# Patient Record
Sex: MALE | Race: WHITE | NOT HISPANIC OR LATINO | Employment: OTHER | URBAN - METROPOLITAN AREA
[De-identification: names, ages, dates, MRNs, and addresses within clinical notes are randomized per-mention and may not be internally consistent; named-entity substitution may affect disease eponyms.]

---

## 2018-08-31 ENCOUNTER — TRANSFERRED RECORDS (OUTPATIENT)
Dept: HEALTH INFORMATION MANAGEMENT | Facility: CLINIC | Age: 58
End: 2018-08-31

## 2021-07-19 ENCOUNTER — TRANSFERRED RECORDS (OUTPATIENT)
Dept: HEALTH INFORMATION MANAGEMENT | Facility: CLINIC | Age: 61
End: 2021-07-19

## 2021-07-26 ENCOUNTER — TRANSFERRED RECORDS (OUTPATIENT)
Dept: HEALTH INFORMATION MANAGEMENT | Facility: CLINIC | Age: 61
End: 2021-07-26

## 2021-09-07 ENCOUNTER — NURSE TRIAGE (OUTPATIENT)
Dept: NURSING | Facility: CLINIC | Age: 61
End: 2021-09-07

## 2021-09-07 NOTE — TELEPHONE ENCOUNTER
"Patient is being seen at [facility] and [facility] (both [facility]) does not feel that they are helping him with his pain adequately). He says he has a red painful rash in his perineal area/rectal area and has a catheter for urination. Advised that he follow up with PCP for better pain control.  Jennifer Harrell RN  [facility]    Reason for Disposition    Red, painful skin around the anus and no fever    Additional Information    Negative: Blood in stools or rectal bleeding without a stool    Negative: Rectal or anal pain caused by constipation    Negative: Rash or pain caused by diarrhea    Negative: Pinworms seen    Negative: Other worm seen in the stool    Negative: Rectal foreign body (large, sharp or causing bleeding)    Negative: Rectal foreign body (all others)    Negative: Sexual abuse suspected    Negative: Child sounds very sick or weak to the triager    Negative: Red, painful skin around the anus with fever    Negative: Red/purple tissue protrudes from the anus by caller's report    Answer Assessment - Initial Assessment Questions  1. SYMPTOM:  \"What's the main symptom you're concerned about?\" (e.g., rash, pain, itching, swelling)      pain  2. ONSET: \"When did pain  start?\"      3+ months ago  3. PAIN: \"Is there any pain?\" If so, ask: \"How bad is it?\"      Severe pain into penis and perineal area  4. STOOLS:  \"Any difficulty passing stools?\" \"When was the last one?\"      no  5. CAUSE: \"What do you think is causing the anus symptoms?\"      Rash, being treated at [facility]    Protocols used: RECTAL AND ANUS SYMPTOMS-P-OH      "

## 2021-09-16 ENCOUNTER — HOSPITAL ENCOUNTER (EMERGENCY)
Facility: CLINIC | Age: 61
Discharge: HOME | End: 2021-09-16
Attending: PHYSICIAN ASSISTANT | Admitting: PHYSICIAN ASSISTANT
Payer: COMMERCIAL

## 2021-09-16 VITALS
OXYGEN SATURATION: 98 % | HEART RATE: 92 BPM | TEMPERATURE: 99.1 F | BODY MASS INDEX: 28.29 KG/M2 | WEIGHT: 170 LBS | DIASTOLIC BLOOD PRESSURE: 94 MMHG | SYSTOLIC BLOOD PRESSURE: 133 MMHG | RESPIRATION RATE: 16 BRPM

## 2021-09-16 DIAGNOSIS — K62.89 RECTAL PAIN: ICD-10-CM

## 2021-09-16 DIAGNOSIS — R82.90 ABNORMAL URINALYSIS: ICD-10-CM

## 2021-09-16 LAB
ALBUMIN UR-MCNC: 30 MG/DL
ANION GAP SERPL CALCULATED.3IONS-SCNC: 6 MMOL/L (ref 3–14)
APPEARANCE UR: ABNORMAL
BACTERIA #/AREA URNS HPF: ABNORMAL /HPF
BASOPHILS # BLD AUTO: 0 10E3/UL (ref 0–0.2)
BASOPHILS NFR BLD AUTO: 0 %
BILIRUB UR QL STRIP: NEGATIVE
BUN SERPL-MCNC: 17 MG/DL (ref 7–30)
CALCIUM SERPL-MCNC: 9 MG/DL (ref 8.5–10.1)
CHLORIDE BLD-SCNC: 110 MMOL/L (ref 94–109)
CO2 SERPL-SCNC: 24 MMOL/L (ref 20–32)
COLOR UR AUTO: YELLOW
CREAT SERPL-MCNC: 0.81 MG/DL (ref 0.66–1.25)
EOSINOPHIL # BLD AUTO: 0 10E3/UL (ref 0–0.7)
EOSINOPHIL NFR BLD AUTO: 0 %
ERYTHROCYTE [DISTWIDTH] IN BLOOD BY AUTOMATED COUNT: 12 % (ref 10–15)
GFR SERPL CREATININE-BSD FRML MDRD: >90 ML/MIN/1.73M2
GLUCOSE BLD-MCNC: 111 MG/DL (ref 70–99)
GLUCOSE UR STRIP-MCNC: NEGATIVE MG/DL
HCT VFR BLD AUTO: 43 % (ref 40–53)
HGB BLD-MCNC: 14.8 G/DL (ref 13.3–17.7)
HGB UR QL STRIP: NEGATIVE
IMM GRANULOCYTES # BLD: 0.1 10E3/UL
IMM GRANULOCYTES NFR BLD: 1 %
KETONES UR STRIP-MCNC: NEGATIVE MG/DL
LEUKOCYTE ESTERASE UR QL STRIP: ABNORMAL
LYMPHOCYTES # BLD AUTO: 1.4 10E3/UL (ref 0.8–5.3)
LYMPHOCYTES NFR BLD AUTO: 13 %
MCH RBC QN AUTO: 30.3 PG (ref 26.5–33)
MCHC RBC AUTO-ENTMCNC: 34.4 G/DL (ref 31.5–36.5)
MCV RBC AUTO: 88 FL (ref 78–100)
MONOCYTES # BLD AUTO: 0.8 10E3/UL (ref 0–1.3)
MONOCYTES NFR BLD AUTO: 8 %
MUCOUS THREADS #/AREA URNS LPF: PRESENT /LPF
NEUTROPHILS # BLD AUTO: 7.9 10E3/UL (ref 1.6–8.3)
NEUTROPHILS NFR BLD AUTO: 78 %
NITRATE UR QL: POSITIVE
NRBC # BLD AUTO: 0 10E3/UL
NRBC BLD AUTO-RTO: 0 /100
PH UR STRIP: 6 [PH] (ref 5–7)
PLATELET # BLD AUTO: 350 10E3/UL (ref 150–450)
POTASSIUM BLD-SCNC: 3.9 MMOL/L (ref 3.4–5.3)
RBC # BLD AUTO: 4.89 10E6/UL (ref 4.4–5.9)
RBC URINE: 19 /HPF
SODIUM SERPL-SCNC: 140 MMOL/L (ref 133–144)
SP GR UR STRIP: 1.02 (ref 1–1.03)
UROBILINOGEN UR STRIP-MCNC: NORMAL MG/DL
WBC # BLD AUTO: 10.2 10E3/UL (ref 4–11)
WBC URINE: >182 /HPF

## 2021-09-16 PROCEDURE — 80048 BASIC METABOLIC PNL TOTAL CA: CPT | Performed by: PHYSICIAN ASSISTANT

## 2021-09-16 PROCEDURE — 81001 URINALYSIS AUTO W/SCOPE: CPT | Performed by: PHYSICIAN ASSISTANT

## 2021-09-16 PROCEDURE — 250N000009 HC RX 250: Performed by: PHYSICIAN ASSISTANT

## 2021-09-16 PROCEDURE — 87086 URINE CULTURE/COLONY COUNT: CPT | Performed by: PHYSICIAN ASSISTANT

## 2021-09-16 PROCEDURE — 99284 EMERGENCY DEPT VISIT MOD MDM: CPT | Performed by: PHYSICIAN ASSISTANT

## 2021-09-16 PROCEDURE — 99283 EMERGENCY DEPT VISIT LOW MDM: CPT

## 2021-09-16 PROCEDURE — 36415 COLL VENOUS BLD VENIPUNCTURE: CPT | Performed by: PHYSICIAN ASSISTANT

## 2021-09-16 PROCEDURE — 85025 COMPLETE CBC W/AUTO DIFF WBC: CPT | Performed by: PHYSICIAN ASSISTANT

## 2021-09-16 RX ORDER — CEPHALEXIN 500 MG/1
500 CAPSULE ORAL 3 TIMES DAILY
Qty: 21 CAPSULE | Refills: 0 | Status: SHIPPED | OUTPATIENT
Start: 2021-09-16 | End: 2021-09-20

## 2021-09-16 RX ORDER — LIDOCAINE HYDROCHLORIDE 20 MG/ML
JELLY TOPICAL ONCE
Status: COMPLETED | OUTPATIENT
Start: 2021-09-16 | End: 2021-09-16

## 2021-09-16 RX ADMIN — LIDOCAINE HYDROCHLORIDE: 20 JELLY TOPICAL at 11:05

## 2021-09-16 NOTE — ED PROVIDER NOTES
History     Chief Complaint   Patient presents with     Rectal/perineal Pain     HPI  Mustapha Negrete is a 60 year old male with past medical history significant for BPH with urinary retention indwelling Walden catheter, MARIAN, hepatic steatosis, psoriatic arthritis, dyslipidemia, mild intermittent asthma osteoarthritis of his hip, , atherosclerotic heart disease, low back pain, who presents to the emergency department with concern with ongoing rectal/perineal pain which is been present for approximately the last 2 months.  Patient describes intense burning pain of rectum which radiates through to his urethra/penis  without clear aggravating or alleviating factors.  He has been evaluated for this multiple times previously including admission to the hospital for uncontrolled pain and has been seen by urology, gastroenterology who performed colonoscopy and neurology had MRI to rule out cauda equina and does have appointment with urology tomorrow for trial of catheter removal as well as with neuro clinic later this month.   He was diagnosed with an anal fissure well after pain had started and is unsure if this is still present. And has been diagnosed with shingles with suspected neuropathic pain.  He has been treating with [facility] for pain control.  He has not taken recommended topical lidocaine as he states this makes his pain worse and is unable to tolerate recommended doses of gabapentin due to side effects.   He denies any fever, chills, myalgias, cough, dyspnea, wheezing, nausea, vomiting diarrhea, melena, hematochezia, dysuria, hematuria.        Allergies:  Allergies   Allergen Reactions     Pcn [Penicillins]      Feels warm and flushed  But tolerates cephalexin     Sulfa Drugs      Tetracycline      Problem List:    There are no problems to display for this patient.     Past Medical History:    Past Medical History:   Diagnosis Date     Asthma      Past Surgical History:    Past Surgical History:   Procedure  Laterality Date     ENT SURGERY      tonsillectomy     GENITOURINARY SURGERY      TURP     ORTHOPEDIC SURGERY      wrist surgery, carpal tunnel 2005     Family History:    Family History   Problem Relation Age of Onset     Cerebrovascular Disease Mother      Hypertension Mother      Diabetes Mother      Cancer Father 84        liver cancer     Prostate Cancer Father      Social History:  Marital Status:   [4]  Social History     Tobacco Use     Smoking status: Former Smoker     Quit date: 1999     Years since quittin.8     Smokeless tobacco: Never Used   Substance Use Topics     Alcohol use: No     Drug use: No      Medications:    hydrocodone-acetaminophen 5-325 MG per tablet      Review of Systems   Constitutional: Negative for activity change, appetite change, chills and fever.   Respiratory: Negative for cough, shortness of breath and wheezing.    Cardiovascular: Negative for chest pain and palpitations.   Gastrointestinal: Negative for abdominal pain, diarrhea, nausea and vomiting.        Rectal pain   Genitourinary: Positive for penile pain. Negative for dysuria, flank pain, frequency, genital sores and hematuria.   Skin: Negative for color change, rash and wound.   Neurological: Negative for dizziness, light-headedness and headaches.     Physical Exam   BP: (!) 133/94  Pulse: 112  Temp: 99.1  F (37.3  C)  Resp: 16  Weight: 77.1 kg (170 lb)  SpO2: 97 %  Physical Exam  GENERAL APPEARANCE: alert, cooperative, patient has pain which appears out of proportion to findings, standing on floor leaning over exam bed upon my entering the room.  EYES: EOMI,  PERRL, conjunctiva clear  RESP: lungs clear to auscultation - no rales, rhonchi or wheezes  CV: regular rates and rhythm, normal S1 S2, no murmur noted  ABDOMEN:  soft, nontender, no HSM or masses and bowel sounds normal  :  There is erythema of the gluteal cleft, no focal anal tenderness to palpation, no external hemorrhoids or anal fissures  noted  NEURO: Normal strength and tone, sensory exam grossly normal,  normal speech and mentation  SKIN: numerous hyperpigmented lesions on posterior right thigh  ED Course        Procedures       Critical Care time:  none        Results for orders placed or performed during the hospital encounter of 09/16/21   Basic metabolic panel     Status: Abnormal   Result Value Ref Range    Sodium 140 133 - 144 mmol/L    Potassium 3.9 3.4 - 5.3 mmol/L    Chloride 110 (H) 94 - 109 mmol/L    Carbon Dioxide (CO2) 24 20 - 32 mmol/L    Anion Gap 6 3 - 14 mmol/L    Urea Nitrogen 17 7 - 30 mg/dL    Creatinine 0.81 0.66 - 1.25 mg/dL    Calcium 9.0 8.5 - 10.1 mg/dL    Glucose 111 (H) 70 - 99 mg/dL    GFR Estimate >90 >60 mL/min/1.73m2   UA with Microscopic reflex to Culture     Status: Abnormal    Specimen: Urine, Walden Catheter   Result Value Ref Range    Color Urine Yellow Colorless, Straw, Light Yellow, Yellow    Appearance Urine Slightly Cloudy (A) Clear    Glucose Urine Negative Negative mg/dL    Bilirubin Urine Negative Negative    Ketones Urine Negative Negative mg/dL    Specific Gravity Urine 1.016 1.003 - 1.035    Blood Urine Negative Negative    pH Urine 6.0 5.0 - 7.0    Protein Albumin Urine 30  (A) Negative mg/dL    Urobilinogen Urine Normal Normal, 2.0 mg/dL    Nitrite Urine Positive (A) Negative    Leukocyte Esterase Urine Large (A) Negative    Bacteria Urine Moderate (A) None Seen /HPF    Mucus Urine Present (A) None Seen /LPF    RBC Urine 19 (H) <=2 /HPF    WBC Urine >182 (H) <=5 /HPF    Narrative    Urine Culture ordered based on laboratory criteria   CBC with platelets and differential     Status: Abnormal   Result Value Ref Range    WBC Count 10.2 4.0 - 11.0 10e3/uL    RBC Count 4.89 4.40 - 5.90 10e6/uL    Hemoglobin 14.8 13.3 - 17.7 g/dL    Hematocrit 43.0 40.0 - 53.0 %    MCV 88 78 - 100 fL    MCH 30.3 26.5 - 33.0 pg    MCHC 34.4 31.5 - 36.5 g/dL    RDW 12.0 10.0 - 15.0 %    Platelet Count 350 150 - 450 10e3/uL     % Neutrophils 78 %    % Lymphocytes 13 %    % Monocytes 8 %    % Eosinophils 0 %    % Basophils 0 %    % Immature Granulocytes 1 %    NRBCs per 100 WBC 0 <1 /100    Absolute Neutrophils 7.9 1.6 - 8.3 10e3/uL    Absolute Lymphocytes 1.4 0.8 - 5.3 10e3/uL    Absolute Monocytes 0.8 0.0 - 1.3 10e3/uL    Absolute Eosinophils 0.0 0.0 - 0.7 10e3/uL    Absolute Basophils 0.0 0.0 - 0.2 10e3/uL    Absolute Immature Granulocytes 0.1 (H) <=0.0 10e3/uL    Absolute NRBCs 0.0 10e3/uL       Medications - No data to display    Assessments & Plan (with Medical Decision Making)     I have reviewed the nursing notes.    I have reviewed the findings, diagnosis, plan and need for follow up with the patient.       New Prescriptions    No medications on file     Final diagnoses:   Rectal pain   Abnormal urinalysis     60-year-old male presents today with history of ongoing rectal/perineal pain for the last two months which has previously been evaluated by urology, gastroenterology and had MRI of lumbar spine.  Physical exam findings as described above may show some degree of intertrigo/skin irritation in gluteal clef, however symptoms most consistent with neuropathic pain would consider likely secondary to recent shingles diagnosis.  He did have non-contributory CBC, CMP.  Urinalysis did show some evidence of infection unclear if this represents true cystitis or is colonization from indwelling leon catheter.  I have low concern that this is the actual source of his pain at this time, however did discuss risk/benefits of initiating antibiotics while culture results pending and patient elected to initiating medications.  He was discharged home stable with prescription for topical butt paste cream.  Follow up as previously scheduled. Worrisome reasons to return to ER/UC sooner discussed.     Disclaimer: This note consists of symbols derived from keyboarding, dictation, and/or voice recognition software. As a result, there may be errors in  the script that have gone undetected.  Please consider this when interpreting information found in the chart.      9/16/2021   [facility] EMERGENCY DEPT     Luann Ann PA-C  09/21/21 [id number]

## 2021-09-17 LAB — BACTERIA UR CULT: NORMAL

## 2021-09-17 NOTE — RESULT ENCOUNTER NOTE
[facility] Emergency Dept discharge antibiotic (if prescribed): Cephalexin (Keflex) 500 mg capsule, 1 capsule (500 mg) by mouth 3 times daily for 7 days.  Date of Rx (if applicable):  9/16/21  No changes in treatment per [facility] ED Lab Result Urine culture protocol.

## 2021-09-19 ASSESSMENT — ENCOUNTER SYMPTOMS
SHORTNESS OF BREATH: 0
APPETITE CHANGE: 0
ABDOMINAL PAIN: 0
WHEEZING: 0
DYSURIA: 0
COUGH: 0
FLANK PAIN: 0
FREQUENCY: 0
ROS GI COMMENTS: RECTAL PAIN
CHILLS: 0
PALPITATIONS: 0
VOMITING: 0
DIARRHEA: 0
HEADACHES: 0
DIZZINESS: 0
FEVER: 0
HEMATURIA: 0
WOUND: 0
COLOR CHANGE: 0
NAUSEA: 0
ACTIVITY CHANGE: 0
LIGHT-HEADEDNESS: 0

## 2021-09-20 ENCOUNTER — OFFICE VISIT (OUTPATIENT)
Dept: UROLOGY | Facility: CLINIC | Age: 61
End: 2021-09-20
Payer: COMMERCIAL

## 2021-09-20 VITALS
WEIGHT: 170 LBS | DIASTOLIC BLOOD PRESSURE: 104 MMHG | HEIGHT: 65 IN | BODY MASS INDEX: 28.32 KG/M2 | SYSTOLIC BLOOD PRESSURE: 154 MMHG | HEART RATE: 100 BPM | OXYGEN SATURATION: 96 % | TEMPERATURE: 98.5 F

## 2021-09-20 DIAGNOSIS — R33.9 URINARY RETENTION: Primary | ICD-10-CM

## 2021-09-20 PROCEDURE — 99203 OFFICE O/P NEW LOW 30 MIN: CPT | Performed by: UROLOGY

## 2021-09-20 RX ORDER — FINASTERIDE 5 MG/1
5 TABLET, FILM COATED ORAL DAILY
Qty: 90 TABLET | Refills: 3 | Status: SHIPPED | OUTPATIENT
Start: 2021-09-20 | End: 2021-10-28

## 2021-09-20 RX ORDER — OXYBUTYNIN CHLORIDE 10 MG/1
10 TABLET, EXTENDED RELEASE ORAL DAILY
Qty: 60 TABLET | Refills: 3 | Status: ON HOLD | OUTPATIENT
Start: 2021-09-20 | End: 2021-10-12

## 2021-09-20 ASSESSMENT — MIFFLIN-ST. JEOR: SCORE: 1497.99

## 2021-09-20 NOTE — PROGRESS NOTES
Appointment source: New Patient  Patient name: Mustapha Negrete  Urology Staff: Alex Oneill MD    Subjective: This is a 60 year old year old male complaining of urinary retention.    Previously underwent UroLift which failed.    Currently has an indwelling Walden catheter for management of his urinary retention.    Reportedly also has developed shingles.    Is not taking any medications to alleviate urinary retention.    He was on tamsulosin stopping the medication because he believed it was increasing penile pain.    The penile pain seems to come in waves which may be bladder spasms although it is difficult to separate from issues related to his shingles.    Objective: Catheter appears to be in good position although the catheter fixation devices riding a little high.    Skin within the crease of the buttocks is bright red.  Skin is intact.    There is inflammation at the meatus from his catheter.    Assessment: Urinary retention    Plan: Start finasteride and restart tamsulosin.    We will prescribe oxybutynin 10 mg daily for possible bladder spasms.    We will have him apply hydrocortisone and clotrimazole to the buttocks crease for comfort and possible yeast infection management.    He will return in 2 weeks for follow-up    Total time 20 minutes

## 2021-09-24 ENCOUNTER — TELEPHONE (OUTPATIENT)
Dept: UROLOGY | Facility: CLINIC | Age: 61
End: 2021-09-24

## 2021-09-24 NOTE — TELEPHONE ENCOUNTER
Reason for Call:  Other prescription    Detailed comments: States previous urologist put him on finasteride (to shrink prostate) but due to the fact that he had a urolift surgery -bands around prostate- previous urologist called him right away and he was taken off medication 3 days after he started.    Currently pt saw  and he prescribed this medication - pt concerned wondering if he should be on this medication?    Phone Number Patient can be reached at: Home number on file [phone] (home)    Best Time:     Can we leave a detailed message on this number? YES    Call taken on 9/24/2021 at 8:47 AM by Leyda Holly

## 2021-09-24 NOTE — TELEPHONE ENCOUNTER
Patient wants to double-check with Dr. Oneill that it is okay for him to be on finasteride w/ his history of uro-lift. He had previously been told not to take finasteride, but was never given a reason.     Wants to make sure it is okay. Also wondering if he can get PSA checked. Last lab was 2018= 1.74.     Thank you,   Violetta MATA RN   [facility]

## 2021-09-24 NOTE — TELEPHONE ENCOUNTER
He can continue the finasteride    Message text      Per Dr. Oneill.     Violetta MATA RN   [facility]

## 2021-09-30 ENCOUNTER — TRANSFERRED RECORDS (OUTPATIENT)
Dept: HEALTH INFORMATION MANAGEMENT | Facility: CLINIC | Age: 61
End: 2021-09-30

## 2021-10-03 ENCOUNTER — MYC MEDICAL ADVICE (OUTPATIENT)
Dept: UROLOGY | Facility: CLINIC | Age: 61
End: 2021-10-03

## 2021-10-04 ENCOUNTER — TRANSFERRED RECORDS (OUTPATIENT)
Dept: HEALTH INFORMATION MANAGEMENT | Facility: CLINIC | Age: 61
End: 2021-10-04
Payer: COMMERCIAL

## 2021-10-04 LAB — PHQ9 SCORE: 20

## 2021-10-05 NOTE — TELEPHONE ENCOUNTER
Talked with [facility] in derm, they are going to see if they can get him in sooner than later for this. CK

## 2021-10-06 ENCOUNTER — APPOINTMENT (OUTPATIENT)
Dept: CT IMAGING | Facility: CLINIC | Age: 61
End: 2021-10-06
Attending: PHYSICIAN ASSISTANT
Payer: COMMERCIAL

## 2021-10-06 ENCOUNTER — TELEPHONE (OUTPATIENT)
Dept: UROLOGY | Facility: CLINIC | Age: 61
End: 2021-10-06

## 2021-10-06 ENCOUNTER — HOSPITAL ENCOUNTER (OUTPATIENT)
Facility: CLINIC | Age: 61
Setting detail: OBSERVATION
Discharge: HOME | End: 2021-10-13
Attending: PHYSICIAN ASSISTANT | Admitting: HOSPITALIST
Payer: COMMERCIAL

## 2021-10-06 DIAGNOSIS — R10.2 PERINEAL PAIN IN MALE: ICD-10-CM

## 2021-10-06 DIAGNOSIS — N48.89 PENILE PAIN: ICD-10-CM

## 2021-10-06 DIAGNOSIS — R52 UNCONTROLLED PAIN: ICD-10-CM

## 2021-10-06 DIAGNOSIS — N41.0 ACUTE PROSTATITIS: ICD-10-CM

## 2021-10-06 DIAGNOSIS — Z11.52 ENCOUNTER FOR SCREENING LABORATORY TESTING FOR SEVERE ACUTE RESPIRATORY SYNDROME CORONAVIRUS 2 (SARS-COV-2): ICD-10-CM

## 2021-10-06 DIAGNOSIS — K62.89 RECTAL PAIN: ICD-10-CM

## 2021-10-06 LAB
ALBUMIN SERPL-MCNC: 3.9 G/DL (ref 3.4–5)
ALBUMIN UR-MCNC: NEGATIVE MG/DL
ALP SERPL-CCNC: 33 U/L (ref 40–150)
ALT SERPL W P-5'-P-CCNC: 41 U/L (ref 0–70)
ANION GAP SERPL CALCULATED.3IONS-SCNC: 10 MMOL/L (ref 3–14)
APPEARANCE UR: CLEAR
AST SERPL W P-5'-P-CCNC: 18 U/L (ref 0–45)
BACTERIA #/AREA URNS HPF: ABNORMAL /HPF
BASOPHILS # BLD AUTO: 0 10E3/UL (ref 0–0.2)
BASOPHILS NFR BLD AUTO: 0 %
BILIRUB SERPL-MCNC: 1.8 MG/DL (ref 0.2–1.3)
BILIRUB UR QL STRIP: NEGATIVE
BUN SERPL-MCNC: 15 MG/DL (ref 7–30)
CALCIUM SERPL-MCNC: 9.7 MG/DL (ref 8.5–10.1)
CHLORIDE BLD-SCNC: 106 MMOL/L (ref 94–109)
CO2 SERPL-SCNC: 22 MMOL/L (ref 20–32)
COLOR UR AUTO: ABNORMAL
CREAT SERPL-MCNC: 0.76 MG/DL (ref 0.66–1.25)
CRP SERPL-MCNC: <2.9 MG/L (ref 0–8)
EOSINOPHIL # BLD AUTO: 0 10E3/UL (ref 0–0.7)
EOSINOPHIL NFR BLD AUTO: 0 %
ERYTHROCYTE [DISTWIDTH] IN BLOOD BY AUTOMATED COUNT: 11.6 % (ref 10–15)
ERYTHROCYTE [SEDIMENTATION RATE] IN BLOOD BY WESTERGREN METHOD: 10 MM/HR (ref 0–20)
GFR SERPL CREATININE-BSD FRML MDRD: >90 ML/MIN/1.73M2
GLUCOSE BLD-MCNC: 109 MG/DL (ref 70–99)
GLUCOSE UR STRIP-MCNC: NEGATIVE MG/DL
HCT VFR BLD AUTO: 41.4 % (ref 40–53)
HGB BLD-MCNC: 14.5 G/DL (ref 13.3–17.7)
HGB UR QL STRIP: ABNORMAL
HOLD SPECIMEN: NORMAL
IMM GRANULOCYTES # BLD: 0 10E3/UL
IMM GRANULOCYTES NFR BLD: 0 %
KETONES UR STRIP-MCNC: NEGATIVE MG/DL
LEUKOCYTE ESTERASE UR QL STRIP: ABNORMAL
LYMPHOCYTES # BLD AUTO: 1.5 10E3/UL (ref 0.8–5.3)
LYMPHOCYTES NFR BLD AUTO: 16 %
MCH RBC QN AUTO: 30.4 PG (ref 26.5–33)
MCHC RBC AUTO-ENTMCNC: 35 G/DL (ref 31.5–36.5)
MCV RBC AUTO: 87 FL (ref 78–100)
MONOCYTES # BLD AUTO: 0.8 10E3/UL (ref 0–1.3)
MONOCYTES NFR BLD AUTO: 8 %
NEUTROPHILS # BLD AUTO: 6.8 10E3/UL (ref 1.6–8.3)
NEUTROPHILS NFR BLD AUTO: 76 %
NITRATE UR QL: NEGATIVE
NRBC # BLD AUTO: 0 10E3/UL
NRBC BLD AUTO-RTO: 0 /100
PH UR STRIP: 8 [PH] (ref 5–7)
PLATELET # BLD AUTO: 355 10E3/UL (ref 150–450)
POTASSIUM BLD-SCNC: 3.6 MMOL/L (ref 3.4–5.3)
PROT SERPL-MCNC: 7.3 G/DL (ref 6.8–8.8)
RBC # BLD AUTO: 4.77 10E6/UL (ref 4.4–5.9)
RBC URINE: 4 /HPF
SARS-COV-2 RNA RESP QL NAA+PROBE: NEGATIVE
SODIUM SERPL-SCNC: 138 MMOL/L (ref 133–144)
SP GR UR STRIP: 1 (ref 1–1.03)
UROBILINOGEN UR STRIP-MCNC: NORMAL MG/DL
WBC # BLD AUTO: 9.1 10E3/UL (ref 4–11)
WBC URINE: 62 /HPF

## 2021-10-06 PROCEDURE — G0378 HOSPITAL OBSERVATION PER HR: HCPCS

## 2021-10-06 PROCEDURE — 258N000003 HC RX IP 258 OP 636: Performed by: PHYSICIAN ASSISTANT

## 2021-10-06 PROCEDURE — 81001 URINALYSIS AUTO W/SCOPE: CPT | Performed by: PHYSICIAN ASSISTANT

## 2021-10-06 PROCEDURE — 96375 TX/PRO/DX INJ NEW DRUG ADDON: CPT | Performed by: EMERGENCY MEDICINE

## 2021-10-06 PROCEDURE — 250N000011 HC RX IP 250 OP 636: Performed by: PHYSICIAN ASSISTANT

## 2021-10-06 PROCEDURE — 87491 CHLMYD TRACH DNA AMP PROBE: CPT | Performed by: HOSPITALIST

## 2021-10-06 PROCEDURE — C9803 HOPD COVID-19 SPEC COLLECT: HCPCS | Performed by: EMERGENCY MEDICINE

## 2021-10-06 PROCEDURE — 250N000009 HC RX 250: Performed by: PHYSICIAN ASSISTANT

## 2021-10-06 PROCEDURE — 250N000013 HC RX MED GY IP 250 OP 250 PS 637: Performed by: PHYSICIAN ASSISTANT

## 2021-10-06 PROCEDURE — 36415 COLL VENOUS BLD VENIPUNCTURE: CPT | Performed by: PHYSICIAN ASSISTANT

## 2021-10-06 PROCEDURE — 87635 SARS-COV-2 COVID-19 AMP PRB: CPT | Performed by: PHYSICIAN ASSISTANT

## 2021-10-06 PROCEDURE — 87086 URINE CULTURE/COLONY COUNT: CPT | Performed by: PHYSICIAN ASSISTANT

## 2021-10-06 PROCEDURE — 96374 THER/PROPH/DIAG INJ IV PUSH: CPT | Mod: 59 | Performed by: EMERGENCY MEDICINE

## 2021-10-06 PROCEDURE — 85652 RBC SED RATE AUTOMATED: CPT | Performed by: HOSPITALIST

## 2021-10-06 PROCEDURE — 99220 PR INITIAL OBSERVATION CARE,LEVEL III: CPT | Performed by: HOSPITALIST

## 2021-10-06 PROCEDURE — 82247 BILIRUBIN TOTAL: CPT | Performed by: PHYSICIAN ASSISTANT

## 2021-10-06 PROCEDURE — 99285 EMERGENCY DEPT VISIT HI MDM: CPT | Mod: 25 | Performed by: EMERGENCY MEDICINE

## 2021-10-06 PROCEDURE — 86140 C-REACTIVE PROTEIN: CPT | Performed by: HOSPITALIST

## 2021-10-06 PROCEDURE — 74177 CT ABD & PELVIS W/CONTRAST: CPT

## 2021-10-06 PROCEDURE — 99285 EMERGENCY DEPT VISIT HI MDM: CPT | Performed by: EMERGENCY MEDICINE

## 2021-10-06 PROCEDURE — 87040 BLOOD CULTURE FOR BACTERIA: CPT | Performed by: PHYSICIAN ASSISTANT

## 2021-10-06 PROCEDURE — 96376 TX/PRO/DX INJ SAME DRUG ADON: CPT | Performed by: EMERGENCY MEDICINE

## 2021-10-06 PROCEDURE — 85025 COMPLETE CBC W/AUTO DIFF WBC: CPT | Performed by: PHYSICIAN ASSISTANT

## 2021-10-06 PROCEDURE — 96361 HYDRATE IV INFUSION ADD-ON: CPT | Performed by: EMERGENCY MEDICINE

## 2021-10-06 RX ORDER — AMLODIPINE BESYLATE 10 MG/1
10 TABLET ORAL DAILY
COMMUNITY
Start: 2021-08-17 | End: 2022-06-21

## 2021-10-06 RX ORDER — NALOXONE HYDROCHLORIDE 0.4 MG/ML
0.4 INJECTION, SOLUTION INTRAMUSCULAR; INTRAVENOUS; SUBCUTANEOUS
Status: DISCONTINUED | OUTPATIENT
Start: 2021-10-06 | End: 2021-10-13 | Stop reason: HOSPADM

## 2021-10-06 RX ORDER — NALOXONE HYDROCHLORIDE 0.4 MG/ML
0.2 INJECTION, SOLUTION INTRAMUSCULAR; INTRAVENOUS; SUBCUTANEOUS
Status: DISCONTINUED | OUTPATIENT
Start: 2021-10-06 | End: 2021-10-13 | Stop reason: HOSPADM

## 2021-10-06 RX ORDER — HYDROMORPHONE HYDROCHLORIDE 1 MG/ML
0.5 INJECTION, SOLUTION INTRAMUSCULAR; INTRAVENOUS; SUBCUTANEOUS
Status: COMPLETED | OUTPATIENT
Start: 2021-10-06 | End: 2021-10-06

## 2021-10-06 RX ORDER — ACETAMINOPHEN 325 MG/1
650 TABLET ORAL EVERY 4 HOURS PRN
Status: DISCONTINUED | OUTPATIENT
Start: 2021-10-06 | End: 2021-10-09

## 2021-10-06 RX ORDER — ONDANSETRON 2 MG/ML
4 INJECTION INTRAMUSCULAR; INTRAVENOUS EVERY 30 MIN PRN
Status: DISCONTINUED | OUTPATIENT
Start: 2021-10-06 | End: 2021-10-06

## 2021-10-06 RX ORDER — ONDANSETRON 2 MG/ML
4 INJECTION INTRAMUSCULAR; INTRAVENOUS EVERY 6 HOURS PRN
Status: DISCONTINUED | OUTPATIENT
Start: 2021-10-06 | End: 2021-10-13 | Stop reason: HOSPADM

## 2021-10-06 RX ORDER — CLOTRIMAZOLE 1 %
1 CREAM (GRAM) TOPICAL 2 TIMES DAILY
COMMUNITY
Start: 2021-09-29 | End: 2021-11-17

## 2021-10-06 RX ORDER — NALOXONE HYDROCHLORIDE 0.4 MG/ML
0.4 INJECTION, SOLUTION INTRAMUSCULAR; INTRAVENOUS; SUBCUTANEOUS
Status: DISCONTINUED | OUTPATIENT
Start: 2021-10-06 | End: 2021-10-07

## 2021-10-06 RX ORDER — IBUPROFEN 600 MG/1
600 TABLET, FILM COATED ORAL EVERY 6 HOURS PRN
Status: DISCONTINUED | OUTPATIENT
Start: 2021-10-06 | End: 2021-10-09

## 2021-10-06 RX ORDER — ONDANSETRON 4 MG/1
4 TABLET, ORALLY DISINTEGRATING ORAL EVERY 6 HOURS PRN
Status: DISCONTINUED | OUTPATIENT
Start: 2021-10-06 | End: 2021-10-13 | Stop reason: HOSPADM

## 2021-10-06 RX ORDER — NALOXONE HYDROCHLORIDE 0.4 MG/ML
0.2 INJECTION, SOLUTION INTRAMUSCULAR; INTRAVENOUS; SUBCUTANEOUS
Status: DISCONTINUED | OUTPATIENT
Start: 2021-10-06 | End: 2021-10-07

## 2021-10-06 RX ORDER — OXYCODONE HYDROCHLORIDE 5 MG/1
5-10 TABLET ORAL
Status: DISCONTINUED | OUTPATIENT
Start: 2021-10-06 | End: 2021-10-09

## 2021-10-06 RX ORDER — DIPHENHYDRAMINE HYDROCHLORIDE 50 MG/ML
25 INJECTION INTRAMUSCULAR; INTRAVENOUS ONCE
Status: COMPLETED | OUTPATIENT
Start: 2021-10-06 | End: 2021-10-06

## 2021-10-06 RX ORDER — ACETAMINOPHEN 325 MG/1
975 TABLET ORAL ONCE
Status: COMPLETED | OUTPATIENT
Start: 2021-10-06 | End: 2021-10-06

## 2021-10-06 RX ORDER — IOPAMIDOL 755 MG/ML
79 INJECTION, SOLUTION INTRAVASCULAR ONCE
Status: COMPLETED | OUTPATIENT
Start: 2021-10-06 | End: 2021-10-06

## 2021-10-06 RX ORDER — NITROGLYCERIN 20 MG/G
1 OINTMENT TOPICAL 2 TIMES DAILY
COMMUNITY
Start: 2021-09-30 | End: 2022-01-04

## 2021-10-06 RX ORDER — OLANZAPINE 5 MG/1
5 TABLET, ORALLY DISINTEGRATING ORAL ONCE
Status: COMPLETED | OUTPATIENT
Start: 2021-10-06 | End: 2021-10-06

## 2021-10-06 RX ORDER — LIDOCAINE HYDROCHLORIDE 20 MG/ML
JELLY TOPICAL ONCE
Status: COMPLETED | OUTPATIENT
Start: 2021-10-06 | End: 2021-10-06

## 2021-10-06 RX ORDER — LEVOFLOXACIN 500 MG/1
500 TABLET, FILM COATED ORAL DAILY
Status: DISCONTINUED | OUTPATIENT
Start: 2021-10-06 | End: 2021-10-08

## 2021-10-06 RX ORDER — HYDROMORPHONE HYDROCHLORIDE 1 MG/ML
0.3 INJECTION, SOLUTION INTRAMUSCULAR; INTRAVENOUS; SUBCUTANEOUS
Status: DISCONTINUED | OUTPATIENT
Start: 2021-10-06 | End: 2021-10-09

## 2021-10-06 RX ORDER — OLANZAPINE 10 MG/2ML
5 INJECTION, POWDER, FOR SOLUTION INTRAMUSCULAR DAILY PRN
Status: DISCONTINUED | OUTPATIENT
Start: 2021-10-06 | End: 2021-10-06 | Stop reason: CLARIF

## 2021-10-06 RX ADMIN — IBUPROFEN 600 MG: 200 TABLET, FILM COATED ORAL at 13:44

## 2021-10-06 RX ADMIN — SODIUM CHLORIDE 1000 ML: 9 INJECTION, SOLUTION INTRAVENOUS at 13:56

## 2021-10-06 RX ADMIN — OXYCODONE HYDROCHLORIDE 5 MG: 5 TABLET ORAL at 23:00

## 2021-10-06 RX ADMIN — LEVOFLOXACIN 500 MG: 500 TABLET, FILM COATED ORAL at 19:38

## 2021-10-06 RX ADMIN — HYDROMORPHONE HYDROCHLORIDE 0.5 MG: 1 INJECTION, SOLUTION INTRAMUSCULAR; INTRAVENOUS; SUBCUTANEOUS at 19:14

## 2021-10-06 RX ADMIN — ACETAMINOPHEN 975 MG: 325 TABLET, FILM COATED ORAL at 13:44

## 2021-10-06 RX ADMIN — ACETAMINOPHEN 650 MG: 325 TABLET, FILM COATED ORAL at 22:57

## 2021-10-06 RX ADMIN — DIPHENHYDRAMINE HYDROCHLORIDE 25 MG: 50 INJECTION, SOLUTION INTRAMUSCULAR; INTRAVENOUS at 13:45

## 2021-10-06 RX ADMIN — HYDROMORPHONE HYDROCHLORIDE 0.5 MG: 1 INJECTION, SOLUTION INTRAMUSCULAR; INTRAVENOUS; SUBCUTANEOUS at 16:16

## 2021-10-06 RX ADMIN — LIDOCAINE HYDROCHLORIDE: 20 JELLY TOPICAL at 13:57

## 2021-10-06 RX ADMIN — OLANZAPINE 5 MG: 5 TABLET, ORALLY DISINTEGRATING ORAL at 18:12

## 2021-10-06 RX ADMIN — SODIUM CHLORIDE 60 ML: 9 INJECTION, SOLUTION INTRAVENOUS at 16:32

## 2021-10-06 RX ADMIN — HYDROMORPHONE HYDROCHLORIDE 0.5 MG: 1 INJECTION, SOLUTION INTRAMUSCULAR; INTRAVENOUS; SUBCUTANEOUS at 20:54

## 2021-10-06 RX ADMIN — IOPAMIDOL 79 ML: 755 INJECTION, SOLUTION INTRAVENOUS at 16:32

## 2021-10-06 RX ADMIN — PRAMOXINE HYDROCHLORIDE HYDROCORTISONE ACETATE 1 APPLICATORFUL: 100; 100 AEROSOL, FOAM TOPICAL at 16:18

## 2021-10-06 ASSESSMENT — ENCOUNTER SYMPTOMS
ABDOMINAL PAIN: 0
FREQUENCY: 0
GASTROINTESTINAL NEGATIVE: 1
DIZZINESS: 0
CONSTITUTIONAL NEGATIVE: 1
NECK STIFFNESS: 0
APPETITE CHANGE: 0
HEMATURIA: 0
CONFUSION: 0
MYALGIAS: 0
NECK PAIN: 0
CHILLS: 0
NUMBNESS: 0
DIARRHEA: 0
VOMITING: 0
BACK PAIN: 0
WEAKNESS: 0
FLANK PAIN: 0
PSYCHIATRIC NEGATIVE: 1
CARDIOVASCULAR NEGATIVE: 1
FEVER: 0
FATIGUE: 0
HEADACHES: 0
RESPIRATORY NEGATIVE: 1
NAUSEA: 0
LIGHT-HEADEDNESS: 0
ACTIVITY CHANGE: 0

## 2021-10-06 ASSESSMENT — MIFFLIN-ST. JEOR
SCORE: 1436.76
SCORE: 1448

## 2021-10-06 NOTE — ED NOTES
Existing catheter removed without difficulty, patient stating he doesn't want another CT scan, MD updated.

## 2021-10-06 NOTE — TELEPHONE ENCOUNTER
Pt returned call.  He c/o severe pain and burning in rectum and with catheter.  Pt really upset and stated no one will help him.  Referred him to RN for triage, RN agreed to call pt right away.      Maria M PINEDO CMA

## 2021-10-06 NOTE — TELEPHONE ENCOUNTER
Left message for the pt requesting a return call, number given.    Paperwork started and signed by Dr. Oneill.  Needs completion.      Maria M PINEDO CMA

## 2021-10-06 NOTE — TELEPHONE ENCOUNTER
"Called patient immediately to discuss symptoms.   Patient crying and sounds to be writhing in pain.   States his rectum hurts so bad he can no longer take it and that his penis hurts too.   Patient is all over in his conversation, stating he \"gets treated like I'm looking for pain medications only\" \"they keep thinking that's all I want\".   Has been in and out of emergency rooms, last seen in [place] 10/4 for c/o abdominal pain, however patient states he went in due to rectal pain and catheter problems?   Patient wondering if he has shingles.   Originally called for what we thought was to get catheter removed, however, patient understands we cannot successfully do a TOV in clinic (with no provider) due to the amount of pain he is having.   Patient has derm appointment today- informed him that he may need to reschedule as immediate pain control for possible shingles may not be achievable either, and may be sent to ED at that time as well.     Did encourage patient to go in to ED multiple times due to excruciating pain     Violetta MATA RN   [facility]       "

## 2021-10-06 NOTE — ED PROVIDER NOTES
History     Chief Complaint   Patient presents with     Catheter Problem     pt states that he has pain from his rectum to his penis     HPI  Mustapha Negrete is a 60 year old male with past medical history significant for BPH with urinary retention indwelling Walden catheter, MARIAN, hepatic steatosis, psoriatic arthritis, dyslipidemia, mild intermittent asthma osteoarthritis of his hip, atherosclerotic heart disease, low back pain, who presents to the emergency department with concern with worsening rectal/perineal pain and penial burning which is been an ongoing issue for the past 4 months and is progressively worsening.  Patient describes intense burning pain of rectum which radiates through to his urethra/penis as well as sharp burning pain to bilateral buttocks that occasionally radiate down back of the legs without clear aggravating or alleviating factors.  He has been evaluated for this multiple times previously including admission to the hospital for uncontrolled pain and has been seen by urology, gastroenterology who performed colonoscopy and neurology had MRI to rule out cauda equina. Patient was seen in the Emergency Room on 10/4/21 for abdominal pain and had CT and labs at that time. He has history of anal fissure in the past, and states that he also had shingles with suspected neuropathic pain to lower legs a few months ago that started on right leg resolved and then appeared on left leg.  He has been treating with Norco and ibuprofen for pain control with no relief.  He has not taken recommended topical lidocaine as he states this makes his pain worse and is unable to tolerate recommended doses of gabapentin due to side effects.   He denies any fever, chills, myalgias, cough, dyspnea, wheezing, shortness of breath, chest pain, abdominal pain, nausea, vomiting, diarrhea, melena, hematochezia, hematuria, groin pain, testicular pain or swelling, or rash. Patient states he has been using a topical ointment that  he can not remember the name of to the external perineal area which has helped some with redness and rash, but is not helping with the pain. Patient is down at least 20 lbs since issues started 4 months ago and he has been dealing with constipation prior to pain starting months ago. Patient with normal bowel movement today.     Allergies:  Allergies   Allergen Reactions     Droperidol      Other reaction(s): Extrapyramidal Side Effect     Fenofibrate Diarrhea     Other reaction(s): Headache  Other reaction(s): Headache       Keflex [Cephalexin] Itching     Lactose GI Disturbance     Other reaction(s): Intolerance-Can't Take  Other reaction(s): GI Upset, Intolerance-Can't Take       Pcn [Penicillins]      Feels warm and flushed  But tolerates cephalexin     Sulfa Drugs      Tetracycline      Atorvastatin Palpitations     Sertraline Other (See Comments) and Palpitations     Palpitations  No effect  Palpitations  No effect       Simvastatin Palpitations       Problem List:    Patient Active Problem List    Diagnosis Date Noted     Acute prostatitis 10/06/2021     Priority: Medium     Rectal pain 10/06/2021     Priority: Medium     Penile pain 10/06/2021     Priority: Medium     Perineal pain in male 10/06/2021     Priority: Medium     Uncontrolled pain 10/06/2021     Priority: Medium        Past Medical History:    Past Medical History:   Diagnosis Date     Asthma        Past Surgical History:    Past Surgical History:   Procedure Laterality Date     ENT SURGERY      tonsillectomy     GENITOURINARY SURGERY      TURP     ORTHOPEDIC SURGERY      wrist surgery, carpal tunnel 2005       Family History:    Family History   Problem Relation Age of Onset     Cerebrovascular Disease Mother      Hypertension Mother      Diabetes Mother      Cancer Father 84        liver cancer     Prostate Cancer Father        Social History:  Marital Status:   [4]  Social History     Tobacco Use     Smoking status: Former Smoker      "Quit date: 1999     Years since quittin.9     Smokeless tobacco: Never Used   Substance Use Topics     Alcohol use: No     Drug use: No        Medications:    No current outpatient medications on file.        Review of Systems   Constitutional: Negative.  Negative for activity change, appetite change, chills, fatigue and fever.   HENT: Negative.    Respiratory: Negative.    Cardiovascular: Negative.    Gastrointestinal: Negative.  Negative for abdominal pain, diarrhea, nausea and vomiting.   Genitourinary: Positive for penile pain. Negative for discharge, flank pain, frequency, genital sores, hematuria, penile swelling, scrotal swelling and testicular pain.        Penile burning and pain. Catheter present    Musculoskeletal: Negative for back pain, gait problem, myalgias, neck pain and neck stiffness.        Positive bilateral buttock pain, that radiates down both legs occasionally.    Skin:        Persistent rash to rectal/perineal region.    Neurological: Negative for dizziness, weakness, light-headedness, numbness and headaches.   Psychiatric/Behavioral: Negative.  Negative for confusion.   All other systems reviewed and are negative.      Physical Exam   BP: (!) 140/95  Pulse: (!) 127  Temp: 98.2  F (36.8  C)  Resp: 18  Height: 162.6 cm (5' 4\")  Weight: 72.6 kg (160 lb) (stated)  SpO2: 97 %      Physical Exam  Vitals and nursing note reviewed. Exam conducted with a chaperone present.   Constitutional:       General: He is in acute distress.      Appearance: Normal appearance. He is normal weight. He is not ill-appearing, toxic-appearing or diaphoretic.   HENT:      Head: Normocephalic and atraumatic.   Eyes:      General: No scleral icterus.     Extraocular Movements: Extraocular movements intact.      Conjunctiva/sclera: Conjunctivae normal.      Pupils: Pupils are equal, round, and reactive to light.   Cardiovascular:      Rate and Rhythm: Normal rate and regular rhythm.      Heart sounds: Normal " heart sounds.   Pulmonary:      Effort: Pulmonary effort is normal.      Breath sounds: Normal breath sounds.   Abdominal:      General: Bowel sounds are normal. There is no distension.      Palpations: Abdomen is soft. There is no mass.      Tenderness: There is abdominal tenderness (slight over suprapubic region. ). There is no right CVA tenderness, left CVA tenderness, guarding or rebound.      Hernia: No hernia is present.   Genitourinary:     Pubic Area: Rash present. No pubic lice.       Penis: Normal and circumcised. No phimosis, paraphimosis, hypospadias, erythema, tenderness, discharge, swelling or lesions.       Testes: Normal. Cremasteric reflex is present.         Right: Tenderness or swelling not present.         Left: Tenderness or swelling not present.      Epididymis:      Right: Normal. Not inflamed or enlarged. No mass or tenderness.      Left: Normal. Not inflamed or enlarged. No mass or tenderness.      Elio stage (genital): 5.      Prostate: Enlarged. No nodules present.      Rectum: Tenderness present. No external hemorrhoid. Normal anal tone.          Comments: No bloody or black tarry stool with rectal exam. Patient with tenderness throughout rectal exam and unable to tell if it is from prostate or not. Positive mild redness and macerated skin to the inferior base of the shaft of the penis with no tenderness, warmth, swelling, or purulent drainage.   Skin:     General: Skin is warm.      Capillary Refill: Capillary refill takes less than 2 seconds.      Findings: No bruising or rash.   Neurological:      General: No focal deficit present.      Mental Status: He is alert and oriented to person, place, and time.      Cranial Nerves: No cranial nerve deficit.      Sensory: No sensory deficit.      Motor: No weakness.      Coordination: Coordination normal.      Gait: Gait normal.      Deep Tendon Reflexes: Reflexes normal.   Psychiatric:         Mood and Affect: Mood normal.         Behavior:  Behavior normal.         Thought Content: Thought content normal.         Judgment: Judgment normal.         ED Course        Procedures             Critical Care time:  none               Results for orders placed or performed during the hospital encounter of 10/06/21 (from the past 24 hour(s))   CBC with platelets differential    Narrative    The following orders were created for panel order CBC with platelets differential.  Procedure                               Abnormality         Status                     ---------                               -----------         ------                     CBC with platelets and d...[[id number]]                      Final result                 Please view results for these tests on the individual orders.   Comprehensive metabolic panel   Result Value Ref Range    Sodium 138 133 - 144 mmol/L    Potassium 3.6 3.4 - 5.3 mmol/L    Chloride 106 94 - 109 mmol/L    Carbon Dioxide (CO2) 22 20 - 32 mmol/L    Anion Gap 10 3 - 14 mmol/L    Urea Nitrogen 15 7 - 30 mg/dL    Creatinine 0.76 0.66 - 1.25 mg/dL    Calcium 9.7 8.5 - 10.1 mg/dL    Glucose 109 (H) 70 - 99 mg/dL    Alkaline Phosphatase 33 (L) 40 - 150 U/L    AST 18 0 - 45 U/L    ALT 41 0 - 70 U/L    Protein Total 7.3 6.8 - 8.8 g/dL    Albumin 3.9 3.4 - 5.0 g/dL    Bilirubin Total 1.8 (H) 0.2 - 1.3 mg/dL    GFR Estimate >90 >60 mL/min/1.73m2   [facility] Draw    Narrative    The following orders were created for panel order [facility] Draw.  Procedure                               Abnormality         Status                     ---------                               -----------         ------                     Extra Blue Top Tube[[id number]]                              Final result               Extra Red Top Tube[[id number]]                               Final result               Extra Green Top (Lithium...[[id number]]                      Final result               Extra Purple Top Tube[[id number]]                            Final  result                 Please view results for these tests on the individual orders.   CBC with platelets and differential   Result Value Ref Range    WBC Count 9.1 4.0 - 11.0 10e3/uL    RBC Count 4.77 4.40 - 5.90 10e6/uL    Hemoglobin 14.5 13.3 - 17.7 g/dL    Hematocrit 41.4 40.0 - 53.0 %    MCV 87 78 - 100 fL    MCH 30.4 26.5 - 33.0 pg    MCHC 35.0 31.5 - 36.5 g/dL    RDW 11.6 10.0 - 15.0 %    Platelet Count 355 150 - 450 10e3/uL    % Neutrophils 76 %    % Lymphocytes 16 %    % Monocytes 8 %    % Eosinophils 0 %    % Basophils 0 %    % Immature Granulocytes 0 %    NRBCs per 100 WBC 0 <1 /100    Absolute Neutrophils 6.8 1.6 - 8.3 10e3/uL    Absolute Lymphocytes 1.5 0.8 - 5.3 10e3/uL    Absolute Monocytes 0.8 0.0 - 1.3 10e3/uL    Absolute Eosinophils 0.0 0.0 - 0.7 10e3/uL    Absolute Basophils 0.0 0.0 - 0.2 10e3/uL    Absolute Immature Granulocytes 0.0 <=0.0 10e3/uL    Absolute NRBCs 0.0 10e3/uL   Extra Blue Top Tube   Result Value Ref Range    Hold Specimen JIC    Extra Red Top Tube   Result Value Ref Range    Hold Specimen JIC    Extra Green Top (Lithium Heparin) Tube   Result Value Ref Range    Hold Specimen JIC    Extra Purple Top Tube   Result Value Ref Range    Hold Specimen JIC    CRP inflammation   Result Value Ref Range    CRP Inflammation <2.9 0.0 - 8.0 mg/L   Erythrocyte sedimentation rate auto   Result Value Ref Range    Erythrocyte Sedimentation Rate 10 0 - 20 mm/hr   UA with Microscopic reflex to Culture    Specimen: Urine, Walden Catheter   Result Value Ref Range    Color Urine Straw Colorless, Straw, Light Yellow, Yellow    Appearance Urine Clear Clear    Glucose Urine Negative Negative mg/dL    Bilirubin Urine Negative Negative    Ketones Urine Negative Negative mg/dL    Specific Gravity Urine 1.003 1.003 - 1.035    Blood Urine Moderate (A) Negative    pH Urine 8.0 (H) 5.0 - 7.0    Protein Albumin Urine Negative Negative mg/dL    Urobilinogen Urine Normal Normal, 2.0 mg/dL    Nitrite Urine Negative  Negative    Leukocyte Esterase Urine Large (A) Negative    Bacteria Urine Few (A) None Seen /HPF    RBC Urine 4 (H) <=2 /HPF    WBC Urine 62 (H) <=5 /HPF    Narrative    Urine Culture ordered based on laboratory criteria   CT Abdomen Pelvis w Contrast    Narrative    CT ABDOMEN PELVIS W CONTRAST 10/6/2021 4:40 PM    CLINICAL HISTORY: Rectal and perineal pain.    TECHNIQUE: CT scan of the abdomen and pelvis was performed following  injection of IV contrast. Multiplanar reformats were obtained. Dose  reduction techniques were used.  CONTRAST: 79 mL Isovue 370    COMPARISON: None.    FINDINGS:   LOWER CHEST: The visualized lung bases are clear.    HEPATOBILIARY: Left hepatic lobe cyst and otherwise normal liver.  Cholecystectomy.    PANCREAS: Normal.    SPLEEN: Normal.    ADRENAL GLANDS: Normal.    KIDNEYS/BLADDER: Right renal cortical cyst and bilateral parapelvic  simple cyst requiring no specific follow-up. No suspicious masses,  hydronephrosis or renal calculi. The urinary bladder is decompressed  with a Walden catheter. Gas in the urinary bladder lumen, related to  instrumentation.    BOWEL: Diverticulosis in the colon. No acute inflammatory change. No  obstruction.     PELVIC ORGANS: Fiducials within the prostate gland. Evaluation of the  pelvis is slightly limited by streak artifact from left PARKER. No  definite pelvic masses.    ADDITIONAL FINDINGS: No lymphadenopathy. No abdominal aortic aneurysm.  No free fluid, fluid collections or extraluminal air.    MUSCULOSKELETAL: Left total hip arthroplasty. Degenerative changes in  the spine. No suspicious lesions in the bones.      Impression    IMPRESSION:   1.  No acute findings in the abdomen and pelvis.    ZAHIDA MAZARIEGOS MD         SYSTEM ID:  [id number]   Asymptomatic COVID-19 Virus (Coronavirus) by PCR Nasopharyngeal    Specimen: Nasopharyngeal; Swab   Result Value Ref Range    SARS CoV2 PCR Negative Negative    Narrative    Testing was performed using the dileep   SARS-CoV-2 & Influenza A/B Assay on the dileep  [place]  System.  This test should be ordered for the detection of SARS-COV-2 in individuals who meet SARS-CoV-2 clinical and/or epidemiological criteria. Test performance is unknown in asymptomatic patients.  This test is for in vitro diagnostic use under the FDA EUA for laboratories certified under CLIA to perform moderate and/or high complexity testing. This test has not been FDA cleared or approved.  A negative test does not rule out the presence of PCR inhibitors in the specimen or target RNA in concentration below the limit of detection for the assay. The possibility of a false negative should be considered if the patient's recent exposure or clinical presentation suggests COVID-19.  [facility] are certified under the Clinical Laboratory Improvement Amendments of 1988 (CLIA-88) as qualified to perform moderate and/or high complexity laboratory testing.       Medications   hydrocortisone-pramoxine (PROCTOFOAM-HC) rectal foam 1 Applicatorful ( Rectal Canceled Entry 10/6/21 1730)   levofloxacin (LEVAQUIN) tablet 500 mg (500 mg Oral Given 10/6/21 1938)   acetaminophen (TYLENOL) tablet 650 mg (has no administration in time range)   ibuprofen (ADVIL/MOTRIN) tablet 600 mg (has no administration in time range)   naloxone (NARCAN) injection 0.2 mg (has no administration in time range)     Or   naloxone (NARCAN) injection 0.4 mg (has no administration in time range)     Or   naloxone (NARCAN) injection 0.2 mg (has no administration in time range)     Or   naloxone (NARCAN) injection 0.4 mg (has no administration in time range)   oxyCODONE (ROXICODONE) tablet 5-10 mg (has no administration in time range)   naloxone (NARCAN) injection 0.2 mg (has no administration in time range)     Or   naloxone (NARCAN) injection 0.4 mg (has no administration in time range)     Or   naloxone (NARCAN) injection 0.2 mg (has no administration in time range)     Or   naloxone  (NARCAN) injection 0.4 mg (has no administration in time range)   HYDROmorphone (PF) (DILAUDID) injection 0.3 mg (has no administration in time range)   ondansetron (ZOFRAN-ODT) ODT tab 4 mg (has no administration in time range)     Or   ondansetron (ZOFRAN) injection 4 mg (has no administration in time range)   lidocaine (XYLOCAINE) 2 % external gel ( Topical Given 10/6/21 1357)   0.9% sodium chloride BOLUS (0 mLs Intravenous Stopped 10/6/21 1802)   acetaminophen (TYLENOL) tablet 975 mg (975 mg Oral Given 10/6/21 1344)   ibuprofen (ADVIL/MOTRIN) tablet 600 mg (600 mg Oral Given 10/6/21 1344)   diphenhydrAMINE (BENADRYL) injection 25 mg (25 mg Intravenous Given 10/6/21 1345)   iopamidol (ISOVUE-370) solution 79 mL (79 mLs Intravenous Given 10/6/21 1632)   sodium chloride 0.9 % bag 500mL for CT scan flush use (60 mLs Intravenous Given 10/6/21 1632)   HYDROmorphone (PF) (DILAUDID) injection 0.5 mg (0.5 mg Intravenous Given 10/6/21 2054)   OLANZapine zydis (zyPREXA) ODT tab 5 mg (5 mg Oral Given 10/6/21 1812)       Assessments & Plan (with Medical Decision Making)     I have reviewed the nursing notes.    I have reviewed the findings, diagnosis, plan and need for follow up with the patient.    Mustapha Negrete is a 60 year old male with past medical history significant for BPH with urinary retention indwelling Walden catheter, MARIAN, hepatic steatosis, psoriatic arthritis, dyslipidemia, mild intermittent asthma osteoarthritis of his hip, atherosclerotic heart disease, low back pain, who presents to the emergency department with concern with worsening rectal/perineal pain and penial burning which is been an ongoing issue for the past 4 months and is progressively worsening.  Patient describes intense burning pain of rectum which radiates through to his urethra/penis as well as sharp burning pain to bilateral buttocks that occasionally radiate down back of the legs without clear aggravating or alleviating factors.  He has been  evaluated for this multiple times previously including admission to the hospital for uncontrolled pain and has been seen by urology, gastroenterology who performed colonoscopy and neurology had MRI to rule out cauda equina. Patient was seen in the Emergency Room on 10/4/21 for abdominal pain and had CT and labs at that time. He has history of anal fissure in the past, and states that he also had shingles with suspected neuropathic pain to lower legs a few months ago that started on right leg resolved and then appeared on left leg.  He has been treating with Norco and ibuprofen for pain control with no relief.  He has not taken recommended topical lidocaine as he states this makes his pain worse and is unable to tolerate recommended doses of gabapentin due to side effects.   He denies any fever, chills, myalgias, cough, dyspnea, wheezing, shortness of breath, chest pain, abdominal pain, nausea, vomiting, diarrhea, melena, hematochezia, hematuria, groin pain, testicular pain or swelling, or rash. Patient states he has been using a topical ointment that he can not remember the name of to the external perineal area which has helped some with redness and rash, but is not helping with the pain. Patient is down at least 20 lbs since issues started 4 months ago and he has been dealing with constipation prior to pain starting months ago. Patient with normal bowel movement today.     See exam findings above.  Comprehensive metabolic panel, CBC, lipase with no significant abnormal findings.  UA positive for infection however not sure if this is new or from catheter use and may be colonization infection.  Urine culture sent and currently pending.  CT of the dominant pelvis obtained to rule out perineal infection, abscess, necrotizing fasciitis, obdulio gangrene.  No obvious prostatitis noted on CT however due to rectal pain on exam cannot rule out acute prostatitis especially since the pain radiates to the penis.  Patient  given oral Levaquin for treatment of UTI possible early prostatitis.  Patient also admitted for pain control symptoms we were unable to manage his pain here in the emergency department.  Discussed patient case with hospitalist Dr. Hunter at our emergency department doctor Dr. Pena regarding patient case who both agreed with plan.  Patient admitted to the hospital for observation for pain management and rectal pain/perineal with possible early prostatitis and acute cystitis.  Urine culture and blood cultures x2 obtained prior to antibiotics being given.    Current Discharge Medication List          Final diagnoses:   Rectal pain   Perineal pain in male   Penile pain   Uncontrolled pain   Acute prostatitis       10/6/2021   [facility] EMERGENCY DEPT     Maria M Santana PA-C  10/06/21 7897

## 2021-10-06 NOTE — LETTER
[facility] MEDICAL SURGICAL  [address] [address]  Phone: [phone]  Fax: [phone]    October 13, 2021        Mustapha Negrete  [address] [address]          To whom it may concern:    RE: Mustapha Negrete    Patient was hospitalized at [facility] October 6, 2021 through October 13, 2021 with intractable pain under the care of myself as well as Dr Royce Chávez.  In addition patient was seen in the ER on October 4.      Patient has been dealing with an acute exacerbation of a problem that has developed over the last several months.  He will eventually be able to go back to work without restrictions but at this time he remains incapacitated by pain and is not able to sit down.  Anticipate he will need at least another 3 to 4 weeks to recover.    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  Please discuss further issues with Dr. Oneill his urologist or patient's primary care team.      Sincerely,        Alex Hunter MD  Hospital Medicine Service

## 2021-10-07 PROBLEM — K60.2 ANAL FISSURE: Status: ACTIVE | Noted: 2021-08-26

## 2021-10-07 PROBLEM — N13.8 BENIGN PROSTATIC HYPERPLASIA WITH URINARY OBSTRUCTION: Status: ACTIVE | Noted: 2021-07-12

## 2021-10-07 PROBLEM — G62.9 NEUROPATHY: Status: ACTIVE | Noted: 2021-08-16

## 2021-10-07 PROBLEM — K80.20 SYMPTOMATIC CHOLELITHIASIS: Status: ACTIVE | Noted: 2021-04-06

## 2021-10-07 PROBLEM — N40.1 BENIGN PROSTATIC HYPERPLASIA WITH URINARY OBSTRUCTION: Status: ACTIVE | Noted: 2021-07-12

## 2021-10-07 PROBLEM — J45.20 MILD INTERMITTENT ASTHMA, UNCOMPLICATED: Status: ACTIVE | Noted: 2017-04-05

## 2021-10-07 PROBLEM — L40.50 PSORIATIC ARTHRITIS (H): Status: ACTIVE | Noted: 2021-03-15

## 2021-10-07 PROBLEM — K76.0 HEPATIC STEATOSIS: Status: ACTIVE | Noted: 2021-03-26

## 2021-10-07 PROBLEM — E78.5 DYSLIPIDEMIA: Status: ACTIVE | Noted: 2017-07-10

## 2021-10-07 PROCEDURE — 99207 PR CDG-CODE CATEGORY CHANGED: CPT | Performed by: PHYSICIAN ASSISTANT

## 2021-10-07 PROCEDURE — 250N000013 HC RX MED GY IP 250 OP 250 PS 637: Performed by: PHYSICIAN ASSISTANT

## 2021-10-07 PROCEDURE — G0378 HOSPITAL OBSERVATION PER HR: HCPCS

## 2021-10-07 PROCEDURE — 99226 PR SUBSEQUENT OBSERVATION CARE,LEVEL III: CPT | Performed by: PHYSICIAN ASSISTANT

## 2021-10-07 PROCEDURE — 250N000011 HC RX IP 250 OP 636: Performed by: PHYSICIAN ASSISTANT

## 2021-10-07 PROCEDURE — 250N000013 HC RX MED GY IP 250 OP 250 PS 637: Performed by: HOSPITALIST

## 2021-10-07 PROCEDURE — 96376 TX/PRO/DX INJ SAME DRUG ADON: CPT

## 2021-10-07 RX ORDER — CLOTRIMAZOLE 1 %
1 CREAM (GRAM) TOPICAL 2 TIMES DAILY
Status: DISCONTINUED | OUTPATIENT
Start: 2021-10-07 | End: 2021-10-13 | Stop reason: HOSPADM

## 2021-10-07 RX ORDER — FINASTERIDE 5 MG/1
5 TABLET, FILM COATED ORAL DAILY
Status: DISCONTINUED | OUTPATIENT
Start: 2021-10-07 | End: 2021-10-13 | Stop reason: HOSPADM

## 2021-10-07 RX ORDER — POLYETHYLENE GLYCOL 3350 17 G/17G
17 POWDER, FOR SOLUTION ORAL DAILY PRN
Status: DISCONTINUED | OUTPATIENT
Start: 2021-10-07 | End: 2021-10-09

## 2021-10-07 RX ORDER — AMOXICILLIN 250 MG
1 CAPSULE ORAL 2 TIMES DAILY PRN
Status: DISCONTINUED | OUTPATIENT
Start: 2021-10-07 | End: 2021-10-13 | Stop reason: HOSPADM

## 2021-10-07 RX ORDER — AMITRIPTYLINE HYDROCHLORIDE 10 MG/1
10 TABLET ORAL AT BEDTIME
Status: DISCONTINUED | OUTPATIENT
Start: 2021-10-07 | End: 2021-10-09

## 2021-10-07 RX ORDER — OXYBUTYNIN CHLORIDE 5 MG/1
10 TABLET, EXTENDED RELEASE ORAL DAILY
Status: DISCONTINUED | OUTPATIENT
Start: 2021-10-07 | End: 2021-10-12

## 2021-10-07 RX ORDER — AMLODIPINE BESYLATE 10 MG/1
10 TABLET ORAL DAILY
Status: DISCONTINUED | OUTPATIENT
Start: 2021-10-07 | End: 2021-10-13 | Stop reason: HOSPADM

## 2021-10-07 RX ADMIN — HYDROMORPHONE HYDROCHLORIDE 0.3 MG: 1 INJECTION, SOLUTION INTRAMUSCULAR; INTRAVENOUS; SUBCUTANEOUS at 13:18

## 2021-10-07 RX ADMIN — OXYCODONE HYDROCHLORIDE 10 MG: 5 TABLET ORAL at 19:55

## 2021-10-07 RX ADMIN — HYDROMORPHONE HYDROCHLORIDE 0.3 MG: 1 INJECTION, SOLUTION INTRAMUSCULAR; INTRAVENOUS; SUBCUTANEOUS at 21:10

## 2021-10-07 RX ADMIN — CLOTRIMAZOLE 1 G: 1 CREAM TOPICAL at 19:24

## 2021-10-07 RX ADMIN — AMITRIPTYLINE HYDROCHLORIDE 10 MG: 10 TABLET, FILM COATED ORAL at 02:22

## 2021-10-07 RX ADMIN — POLYETHYLENE GLYCOL 3350 17 G: 17 POWDER, FOR SOLUTION ORAL at 19:54

## 2021-10-07 RX ADMIN — NITROGLYCERIN 15 MG: 20 OINTMENT TOPICAL at 19:26

## 2021-10-07 RX ADMIN — FINASTERIDE 5 MG: 5 TABLET, FILM COATED ORAL at 08:33

## 2021-10-07 RX ADMIN — PRAMOXINE HYDROCHLORIDE HYDROCORTISONE ACETATE 1 APPLICATORFUL: 100; 100 AEROSOL, FOAM TOPICAL at 08:41

## 2021-10-07 RX ADMIN — IBUPROFEN 600 MG: 600 TABLET ORAL at 04:11

## 2021-10-07 RX ADMIN — LEVOFLOXACIN 500 MG: 500 TABLET, FILM COATED ORAL at 08:33

## 2021-10-07 RX ADMIN — HYDROMORPHONE HYDROCHLORIDE 0.3 MG: 1 INJECTION, SOLUTION INTRAMUSCULAR; INTRAVENOUS; SUBCUTANEOUS at 04:10

## 2021-10-07 RX ADMIN — OXYBUTYNIN CHLORIDE 10 MG: 5 TABLET, EXTENDED RELEASE ORAL at 08:33

## 2021-10-07 RX ADMIN — SENNOSIDES AND DOCUSATE SODIUM 1 TABLET: 50; 8.6 TABLET ORAL at 19:55

## 2021-10-07 RX ADMIN — OXYCODONE HYDROCHLORIDE 10 MG: 5 TABLET ORAL at 15:53

## 2021-10-07 RX ADMIN — NITROGLYCERIN 15 MG: 20 OINTMENT TOPICAL at 08:33

## 2021-10-07 RX ADMIN — HYDROMORPHONE HYDROCHLORIDE 0.3 MG: 1 INJECTION, SOLUTION INTRAMUSCULAR; INTRAVENOUS; SUBCUTANEOUS at 06:52

## 2021-10-07 RX ADMIN — PRAMOXINE HYDROCHLORIDE HYDROCORTISONE ACETATE 1 APPLICATORFUL: 100; 100 AEROSOL, FOAM TOPICAL at 19:24

## 2021-10-07 RX ADMIN — ACETAMINOPHEN 650 MG: 325 TABLET, FILM COATED ORAL at 15:53

## 2021-10-07 RX ADMIN — IBUPROFEN 600 MG: 600 TABLET ORAL at 13:18

## 2021-10-07 RX ADMIN — CLOTRIMAZOLE 1 G: 1 CREAM TOPICAL at 08:41

## 2021-10-07 RX ADMIN — AMITRIPTYLINE HYDROCHLORIDE 10 MG: 10 TABLET, FILM COATED ORAL at 21:48

## 2021-10-07 RX ADMIN — OXYCODONE HYDROCHLORIDE 10 MG: 5 TABLET ORAL at 08:41

## 2021-10-07 RX ADMIN — AMLODIPINE BESYLATE 10 MG: 10 TABLET ORAL at 08:33

## 2021-10-07 RX ADMIN — ACETAMINOPHEN 650 MG: 325 TABLET, FILM COATED ORAL at 06:52

## 2021-10-07 NOTE — PLAN OF CARE
A/O X4 Patient stated pain increased 10/10 when passing gas, Medication was given. Pain fluctuates, patient stated the 9/10. Dilaudid given pain rated 7/10. Senna and miralax was given patient feels some of the pain is due to not having a BM for a few days.

## 2021-10-07 NOTE — PROGRESS NOTES
[facility]    Medicine Progress Note - Hospitalist Service       Date of Admission:  10/6/2021    Assessment & Plan           Mustapha Negrete is a 60 year old male history of prostate cancer, BPH, recent diagnosis of urinary retention 2 weeks after laparoscopic cholecystectomy, MARIAN on BiPAP, low back pain and Covid infection in April presenting with uncontrolled thigh and rectal pain.    Posterior thigh pain, likely neuropathic  Perineal and rectal pain, possibly neuropathic  Possible prostatitis  Anal Fissure, previously diagnosed   Perianal pain with radiation to the penis since 6/2021. Worse with BM. Also reports posterior thigh pain around the same time. Previously had several lesions on the lateral thighs, first the right then the left. Had been told by a chiropractor that this may be shingles.   Patient was admitted at [facility] 8/26 through 8/29 for the same issue.  He has been seen in the ER four times since then for similar complaints.  He was evaluated by [facility] neurological clinic back in July and had MRIs of his L-spine, see below. Prior laboratory work-up includes normal vitamin D and vitamin B12 and folate levels, negative syphilis, HIV, negative O&P. Additionally he was seen by [place] [facility], most recently last week at which time he was diagnosed with anal fissure along with a perianal fungal infection and started on topical antifungal as well as topical nitroglycerin.  He feels that this has seemed to help a bit. He has undergone colonoscopy recently which was reportedly normal.   Rectal pain may be related to anal fissure vs prostatitis vs pain syndrome vs other. In ED, patient did have quite a bit of prostate tenderness on rectal exam and so he was initiated on levofloxacin for possible prostatitis. He also reports increased pain with catheter exchanges.    Thigh pain seems to be neuropathic - question if this is a postherpetic neuralgia (prior  rash, reported on bilateral lateral thighs) vs lumbar radiculopathy (though bilateral and correlates more with S1 than lumbar changes noted below) vs pain syndrome. Patient reports previously been on gabapentin which was not terribly helpful for him.    Overall this distribution and description of pain is unusual. Though has had extensive work up as outpatient. Unclear exactly what is causing his symptoms, will trial below therapies and monitor response.  --normal inflammatory markers: WBC 9.1, ESR 10 and CRP <2.9.   --Blood cultures pending, urine culture pending  --gonorrhea and Chlamydia urinary pending  -Continue levofloxacin for now although not convinced this is truly prostatitis, suspect LYSSA would be painful in the setting even with a normal prostate.  Would lean towards an abbreviated course.  -Continue clotrimazole topical cream twice daily for previously diagnosed perianal fungal infection  -Continue nitroglycerin ointment to the rectum for anal fissure  --started hydrocortisone rectal foam for discomfort  -Continue acetaminophen, ibuprofen as needed  -Continue oxycodone 5 to 10 mg every 3 hours  -Continue hydromorphone 0.3 mg IV every 2 hours as needed  --Start amitriptyline 10 mg, will need to titrate (although can worsen his urinary retention)    L4/L5 compression  7/26/2021 MRI lumbar spine as below:    Unclear if these L5 and L4 nerve compressions are related to his symptoms.  At one  point patient was considered for what sounds like epidural spinal injections but due to concern for UTI and topical infection, this was deferred and not pursued.  Could consider repeat imaging but at this point patient would not be able to lay still without sedation for MRI, so will hold off.  Patient recently seen by a spine clinic and a plan is being formulated.    Urinary retention, chronic  Prostate cancer/BPH  Developed approximately 2 weeks after he had a laparoscopic cholecystectomy in May.  Catheter was placed in  May and has been in since.  Patient previously seen by [place] [facility] and then more recently by Dr. Oneill here.  Patient has failed trials of voids multiple times.  Reports he had cystoscopy done previously which was normal.  Patient is on oxybutynin which I assume is for bladder spasms.  -Continue home oxybutynin, finasteride  - continue leon catheter   - continue outpatient urology follow up    Hypertension  Blood pressures stable.  - Continue home amlodipine 10 daily    MARIAN  On BiPAP at home per [facility] records.    Covid status  Patient tested positive for Covid 5/3/2021.  He tested positive on 8/26/2021 when he was admitted to [facility], suspect this was lingering viral particles as he was asymptomatic at the time.    - He is not yet vaccinated because he has had all these other health issues since then, but he is open to getting vaccinated in the future.   - PCR negative 10/6/2021     Diet: Regular Diet Adult    DVT Prophylaxis: Low Risk/Ambulatory with no VTE prophylaxis indicated  Leon Catheter: PRESENT, indication: Retention  Central Lines: None  Code Status: Full Code     Disposition Plan   Expected discharge: 1-2 days recommended to prior living arrangement once adequate pain management/ tolerating PO medications and work up complete.     The patient's care was discussed with the Attending Physician, Dr. Royce Chávez, Bedside Nurse and Patient.    Viki Wetzel PA-C  Hospitalist Service  [facility]  Securely message with the Vocera Web Console (learn more here)  Text page via [employer] Paging/Directory  ______________________________________________________________________    Interval History   Pain has been present since June though worsening recently to the point where he was unable to function at home.   This morning, rectal pain feels better after hydrocortisone foam. It is exacerbated by BM. He has frequent BMs, mostly loose though does take a bowel regimen to keep  them soft.  The posterior thigh pain comes and goes without much pattern though does seem to be worse when he is ambulating.  He denies fever, chills, cold symptoms, shortness of breath, palpitations, chest pain, abdominal pain, nausea, vomiting, diarrhea, changes in urination (leon in place) or new rashes/skin changes.    Data reviewed today: I reviewed all medications, new labs and imaging results over the last 24 hours. I personally reviewed no images or EKG's today.    Physical Exam   Vital Signs: Temp: 98.2  F (36.8  C) Temp src: Oral BP: (!) 153/97 Pulse: 85   Resp: 18 SpO2: 97 % O2 Device: None (Room air)    Weight: 162 lbs 7.66 oz  Constitutional: laying down comfortably in bed, nontoxic in appearance, awake, alert, cooperative, no apparent distress, and appears stated age  Respiratory: No increased work of breathing, good air exchange, clear to auscultation bilaterally, no crackles or wheezing  Cardiovascular: regular rate and rhythm, and no murmur noted. No lower extremity edema bilaterally.  GI: normal bowel sounds, soft, non-distended, non-tender  Genitounirinary: Very mild perianal erythema with slight reported tenderness/altered sensation. Perirectal area appears normal. Internal rectal exam deferred. External genitalia appears grossly normal without tenderness. Leon cathter is in place. Nurse accompanied as chaperone.  Skin: normal skin color, texture, turgor  Musculoskeletal: normal bulk and tone. Moving all 4 extremities.  Neurologic: Awake, alert, oriented to name, place and time.   Neuropsychiatric: calm, pleasant, cooperative. Appropriate thought process/content. Short term memory is grossly intact.    Data   Recent Labs   Lab 10/06/21  1353   WBC 9.1   HGB 14.5   MCV 87         POTASSIUM 3.6   CHLORIDE 106   CO2 22   BUN 15   CR 0.76   ANIONGAP 10   ANITA 9.7   *   ALBUMIN 3.9   PROTTOTAL 7.3   BILITOTAL 1.8*   ALKPHOS 33*   ALT 41   AST 18     Recent Results (from the past  24 hour(s))   CT Abdomen Pelvis w Contrast    Narrative    CT ABDOMEN PELVIS W CONTRAST 10/6/2021 4:40 PM    CLINICAL HISTORY: Rectal and perineal pain.    TECHNIQUE: CT scan of the abdomen and pelvis was performed following  injection of IV contrast. Multiplanar reformats were obtained. Dose  reduction techniques were used.  CONTRAST: 79 mL Isovue 370    COMPARISON: None.    FINDINGS:   LOWER CHEST: The visualized lung bases are clear.    HEPATOBILIARY: Left hepatic lobe cyst and otherwise normal liver.  Cholecystectomy.    PANCREAS: Normal.    SPLEEN: Normal.    ADRENAL GLANDS: Normal.    KIDNEYS/BLADDER: Right renal cortical cyst and bilateral parapelvic  simple cyst requiring no specific follow-up. No suspicious masses,  hydronephrosis or renal calculi. The urinary bladder is decompressed  with a Walden catheter. Gas in the urinary bladder lumen, related to  instrumentation.    BOWEL: Diverticulosis in the colon. No acute inflammatory change. No  obstruction.     PELVIC ORGANS: Fiducials within the prostate gland. Evaluation of the  pelvis is slightly limited by streak artifact from left PARKER. No  definite pelvic masses.    ADDITIONAL FINDINGS: No lymphadenopathy. No abdominal aortic aneurysm.  No free fluid, fluid collections or extraluminal air.    MUSCULOSKELETAL: Left total hip arthroplasty. Degenerative changes in  the spine. No suspicious lesions in the bones.      Impression    IMPRESSION:   1.  No acute findings in the abdomen and pelvis.    ZAHIDA MAZARIEGOS MD         SYSTEM ID:  [id number]     Medications       amitriptyline  10 mg Oral At Bedtime     amLODIPine  10 mg Oral Daily     clotrimazole  1 applicator Topical BID     finasteride  5 mg Oral Daily     hydrocortisone-pramoxine  1 Applicatorful Rectal BID     levofloxacin  500 mg Oral Daily     nitroGLYcerin  1 each Topical BID     oxybutynin ER  10 mg Oral Daily

## 2021-10-07 NOTE — H&P
[facility]    History and Physical  Hospital Medicine       Date of Admission:  10/6/2021  Date of Service: 10/7/2021     Assessment & Plan   Mustapha Negrete is a 60 year old male history of prostate cancer, BPH, recent diagnosis of urinary retention 2 weeks after laparoscopic cholecystectomy, MARIAN on BiPAP, low back pain and Covid infection in April presenting with uncontrolled thigh and rectal pain.    Active Problems:    Acute prostatitis    Rectal pain    Penile pain    Perineal pain in male    Uncontrolled pain    Perineal and thigh pain / allodynia- ?  Postherpetic neuralgia  Concern for prostatitis   Patient was admitted at [facility] 8/26 through 8/29 for the same issue.  He has been seen in the ER four times since then for similar complaints.  He was evaluated by [facility] neurological clinic back in July and had MRIs of his L-spine, see below. Prior laboratory work-up includes normal vitamin D and vitamin B12 and folate levels, negative syphilis, HIV, negative O&P.Additionally he was seen by [place] [facility], most recently last week at which time he was diagnosed with anal fissure along with a perianal fungal infection and started on topical antifungal as well as topical nitroglycerin.  He feels that this has seemed to help a bit.   Tonight patient did have quite a bit of prostate tenderness on rectal exam in the ER and so he was initiated on levofloxacin; I did not repeat rectal exam at this time.    Overall his pain seems to be neuropathic - question if this is a postherpetic neuralgia although this is a strange location. Patient reports previously been on gabapentin which was not terribly helpful for him.  I do not think patient has been on a TCA yet.    -Check ESR and CRP.  Blood cultures pending.  -Check gonorrhea and Chlamydia urinary  -Continue levofloxacin for now although not convinced this is truly prostatitis, suspect LYSSA would be painful in the  setting even with a normal prostate.  Would lean towards an abbreviated course.  -Continue clotrimazole topical cream twice daily  -Continue nitroglycerin ointment twice daily to the rectum  -Continue acetaminophen, ibuprofen as needed  -Continue oxycodone 5 to 10 mg every 3 hours  -Continue hydromorphone 0.3 mg IV every 2 hours as needed  -Start amitriptyline 10 mg, will need to titrate (although can worsen his urinary retention)  -Also could try alternative options such as ketamine, valproic acid etc if not improving      L4/L5 compression  7/26/2021 MRI lumbar spine as below:    Unclear if these L5 and L4 nerve compressions are related to his symptoms.  At one  point patient was considered for what sounds like epidural spinal injections but due to concern for UTI and topical infection, this was deferred and not pursued.  Could consider repeat imaging but at this point patient would not be able to lay still without sedation for MRI, so will hold off.  Patient recently seen by a spine clinic and a plan is being formulated.    Urinary retention  Developed approximately 2 weeks after he had a laparoscopic cholecystectomy in May.  Catheter was placed in May and has been in since.  Patient previously seen by [place] [facility] and then more recently by Dr. Oneill here.  Patient has failed trials of voids multiple times.  Reports he had cystoscopy done previously which was normal.  Patient is on oxybutynin which I assume is for bladder spasms.  -If Dr. Oneill is here tomorrow would request consultation to see if anything else can be done  -Continue bladder spasms    Hypertension  -Continue amlodipine 10 daily    Prostate cancer/BPH  -Continue finasteride    MARIAN  On BiPAP at home per [facility] records.    Covid status  Patient tested positive for Covid 5/3/2021.  He tested positive on 8/26/2021 when he was admitted to [place], suspect this was lingering viral particles as he was asymptomatic at the time.  He is not yet  vaccinated because he has had all these other health issues since then, but he is open to getting vaccinated in the future.  PCR negative 10/6/2021         Diet: Regular Diet Adult    DVT Prophylaxis: Hold for now while observation status, may not be necessary patient discharges although suspect this is going to be unlikely  Walden Catheter: PRESENT, indication: Retention  Code Status:   Full code  Lines: PIV    Disposition Plan   Expected discharge:  recommended to prior living arrangement once adequate pain management/ tolerating PO medications.  Entered: Alex Hunter MD 10/07/2021, 1:20 AM     Status: Patient is appropriate for MedSurg  Alex Hunter MD        The patient's care was discussed with the Patient.    Primary Care Physician   Millie Britton [phone]    History is obtained from the patient, and review of old records via the EMR.    History of Present Illness   Mustapha Negrete is a 60 year old male with past medical history of above issues now presents on 10/6/2021 with pain.   He says the last 3 days have been the worst since it started.  He is no longer able to sit down.  Previously he worked as a  and now he cannot even drive his car due to the excruciating pain.  Pain is described as burning sensation of his perineal area and posterior thighs, with spasms.  Pain seems to come from his buttocks and then radiate into his penis.  There is some radiation down his legs bilaterally.  This has been going on since approximately June and has been intensifying.  Early on in the course, there were some red lesions on his thighs (of which he has picture) which were retrospectively thought to possibly be shingles.  These have mostly resolved at this point.    Review of Systems   The 10 point Review of Systems is negative other than noted in the HPI or here.     Past Medical History      Past Medical History:   Diagnosis Date     Asthma      Patient Active Problem List    Diagnosis Date  Noted     Acute prostatitis 10/06/2021     Priority: Medium     Rectal pain 10/06/2021     Priority: Medium     Penile pain 10/06/2021     Priority: Medium     Perineal pain in male 10/06/2021     Priority: Medium     Uncontrolled pain 10/06/2021     Priority: Medium        Past Surgical History     Past Surgical History:   Procedure Laterality Date     ENT SURGERY      tonsillectomy     GENITOURINARY SURGERY      TURP     ORTHOPEDIC SURGERY      wrist surgery, carpal tunnel 2005        Prior to Admission Medications   Prior to Admission Medications   Prescriptions Last Dose Informant Patient Reported? Taking?   NITRO-BID 2 % OINT ointment 10/6/2021 at Unknown time  Yes Yes   Sig: Apply 1 each topically 2 times daily Rectal area   amLODIPine (NORVASC) 10 MG tablet 10/6/2021 at Unknown time  Yes Yes   Sig: Take 10 mg by mouth daily   butt paste ointment   No No   Sig: Nystatin 15g/stomahesive 28.3g/Aquafor 60g   Patient not taking: Reported on 9/20/2021   clotrimazole (LOTRIMIN) 1 % external cream 10/6/2021 at Unknown time  Yes Yes   Sig: Apply 1 applicator topically 2 times daily Rectal area   finasteride (PROSCAR) 5 MG tablet 10/6/2021 at am  No Yes   Sig: Take 1 tablet (5 mg) by mouth daily   hydrocodone-acetaminophen 5-325 MG per tablet Past Week at Unknown time  Yes Yes   Sig: Take  by mouth every 6 hours as needed.   oxybutynin ER (DITROPAN-XL) 10 MG 24 hr tablet 10/6/2021 at am  No Yes   Sig: Take 1 tablet (10 mg) by mouth daily      Facility-Administered Medications: None     Allergies   Allergies   Allergen Reactions     Droperidol      Other reaction(s): Extrapyramidal Side Effect     Fenofibrate Diarrhea     Other reaction(s): Headache  Other reaction(s): Headache       Keflex [Cephalexin] Itching     Lactose GI Disturbance     Other reaction(s): Intolerance-Can't Take  Other reaction(s): GI Upset, Intolerance-Can't Take       Pcn [Penicillins]      Feels warm and flushed  But tolerates cephalexin      Sulfa Drugs      Tetracycline      Atorvastatin Palpitations     Sertraline Other (See Comments) and Palpitations     Palpitations  No effect  Palpitations  No effect       Simvastatin Palpitations       Family History    Family History   Problem Relation Age of Onset     Cerebrovascular Disease Mother      Hypertension Mother      Diabetes Mother      Cancer Father 84        liver cancer     Prostate Cancer Father        Social History   Social History     Socioeconomic History     Marital status:      Spouse name: Not on file     Number of children: Not on file     Years of education: Not on file     Highest education level: Not on file   Occupational History     Not on file   Tobacco Use     Smoking status: Former Smoker     Quit date: 1999     Years since quittin.9     Smokeless tobacco: Never Used   Substance and Sexual Activity     Alcohol use: No     Drug use: No     Sexual activity: Not Currently   Other Topics Concern     Parent/sibling w/ CABG, MI or angioplasty before 65F 55M? Not Asked   Social History Narrative     Not on file     Social Determinants of Health     Financial Resource Strain:      Difficulty of Paying Living Expenses:    Food Insecurity:      Worried About Running Out of Food in the Last Year:      Ran Out of Food in the Last Year:    Transportation Needs:      Lack of Transportation (Medical):      Lack of Transportation (Non-Medical):    Physical Activity:      Days of Exercise per Week:      Minutes of Exercise per Session:    Stress:      Feeling of Stress :    Social Connections:      Frequency of Communication with Friends and Family:      Frequency of Social Gatherings with Friends and Family:      Attends [employer] Services:      Active Member of Clubs or Organizations:      Attends Club or Organization Meetings:      Marital Status:    Intimate Partner Violence:      Fear of Current or Ex-Partner:      Emotionally Abused:      Physically Abused:      Sexually  "Abused:        Physical Exam   /79 (BP Location: Left arm)   Pulse 79   Temp 98.1  F (36.7  C) (Oral)   Resp 16   Ht 1.626 m (5' 4\")   Wt 73.7 kg (162 lb 7.7 oz)   SpO2 96%   BMI 27.89 kg/m       Weight: 162 lbs 7.66 oz Body mass index is 27.89 kg/m .     Constitutional: Uncomfortable appearing male resting in bed, at times having to shift his weight around due to spasms.  He winces with this.  Eyes: Eyes are clear, pupils are reactive.  HENT: Oropharynx is clear and moist. No evidence of cranial trauma.  Lymph/Hematologic: No epitrochlear, axillary, anterior or posterior cervical, or supraclavicular lymphadenopathy is appreciated.  Cardiovascular: Regular rate and rhythm, normal S1 and S2, and no murmur noted. JVP is normal. Good peripheral pulses in wrists bilaterally. No lower extremity edema.  Respiratory: Clear to auscultation bilaterally.   GI: Soft, non-tender, normal bowel sounds, no hepatomegaly.  Genitourinary: There is a urinary catheter.  Penis and testicles appear to be relatively unremarkable.  Perirectal area with some mild localized irritation.  Musculoskeletal: Normal muscle bulk and tone.  Skin: There are a few healed over lesions on his thigh with some surrounding excoriation  Neurologic: Neck supple. Cranial nerves are grossly intact.  is symmetric.     Data   Data reviewed today:   Recent Labs   Lab 10/06/21  1353   WBC 9.1   HGB 14.5   MCV 87         POTASSIUM 3.6   CHLORIDE 106   CO2 22   BUN 15   CR 0.76   ANIONGAP 10   ANITA 9.7   *   ALBUMIN 3.9   PROTTOTAL 7.3   BILITOTAL 1.8*   ALKPHOS 33*   ALT 41   AST 18       Recent Results (from the past 24 hour(s))   CT Abdomen Pelvis w Contrast    Narrative    CT ABDOMEN PELVIS W CONTRAST 10/6/2021 4:40 PM    CLINICAL HISTORY: Rectal and perineal pain.    TECHNIQUE: CT scan of the abdomen and pelvis was performed following  injection of IV contrast. Multiplanar reformats were obtained. Dose  reduction techniques " were used.  CONTRAST: 79 mL Isovue 370    COMPARISON: None.    FINDINGS:   LOWER CHEST: The visualized lung bases are clear.    HEPATOBILIARY: Left hepatic lobe cyst and otherwise normal liver.  Cholecystectomy.    PANCREAS: Normal.    SPLEEN: Normal.    ADRENAL GLANDS: Normal.    KIDNEYS/BLADDER: Right renal cortical cyst and bilateral parapelvic  simple cyst requiring no specific follow-up. No suspicious masses,  hydronephrosis or renal calculi. The urinary bladder is decompressed  with a Walden catheter. Gas in the urinary bladder lumen, related to  instrumentation.    BOWEL: Diverticulosis in the colon. No acute inflammatory change. No  obstruction.     PELVIC ORGANS: Fiducials within the prostate gland. Evaluation of the  pelvis is slightly limited by streak artifact from left PARKER. No  definite pelvic masses.    ADDITIONAL FINDINGS: No lymphadenopathy. No abdominal aortic aneurysm.  No free fluid, fluid collections or extraluminal air.    MUSCULOSKELETAL: Left total hip arthroplasty. Degenerative changes in  the spine. No suspicious lesions in the bones.      Impression    IMPRESSION:   1.  No acute findings in the abdomen and pelvis.    ZAHIDA MAZARIEGOS MD         SYSTEM ID:  [id number]       I personally reviewed CT images without evidence of neck fasc

## 2021-10-07 NOTE — PROGRESS NOTES
"Patient is alert and oriented, able to make his needs known. Continues to have a burning painful sensation in his rectal area and bilateral thighs that increases in intensity without much warning. Subsequently, he reports he has lost a significant amount of weight d/t overall poor nutrition intake. Started first dose of amitriptyline 10 mg tonight. Walden catheter remains intact and draining well.     /86 (BP Location: Left arm)   Pulse 73   Temp 98.2  F (36.8  C) (Oral)   Resp 18   Ht 1.626 m (5' 4\")   Wt 73.7 kg (162 lb 7.7 oz)   SpO2 96%   BMI 27.89 kg/m      "

## 2021-10-07 NOTE — PLAN OF CARE
"[facility] ADMISSION NOTE    Patient admitted to room 2312 at approximately 2100 via cart from emergency room. Patient was accompanied by nurse.     Verbal SBAR report received from Christy prior to patient arrival.     Patient ambulated to bed independently. Patient alert and oriented X 3. Pain is controlled with current analgesics.  Medication(s) being used: acetaminophen and narcotic analgesics including oxycodone and dilaudid.  . Admission vital signs: Blood pressure 129/79, pulse 79, temperature 98.1  F (36.7  C), temperature source Oral, resp. rate 16, height 1.626 m (5' 4\"), weight 73.7 kg (162 lb 7.7 oz), SpO2 96 %. Patient was oriented to plan of care, call light, bed controls, tv, telephone, bathroom and visiting hours.     Risk Assessment    The following safety risks were identified during admission: none. Yellow risk band applied: NO.     Skin Initial Assessment    This writer admitted this patient and completed a full skin assessment and Dedrick score in the Adult PCS flowsheet. Appropriate interventions initiated as needed.         Education    Patient has a [place] to Observation order: Yes  Observation education completed and documented: Yes      WING ROSALES RN    "

## 2021-10-07 NOTE — PLAN OF CARE
"Patient alert, oriented today.   Reports pain overall has improved compared to yesterday, but continues to report pain in rectal/perineum area radiating to upper thighs with \"zingers\" of shooting pain at times.   Patient stated Proctofoam was helpful. Discussed pain options with patient and importance of activity, fluid intake and fiber intake due to side effect of constipation from narcotics.     Motrin oral Dilaudid IV given for sudden \"intense\" discomfort rating 9/10 with plan to switch to oral oxycodone with intention of longer pain control after 1 hour, however patient was found to be sleeping soundly and did not appear to be in discomfort.     Will monitor.  "

## 2021-10-07 NOTE — PROGRESS NOTES
"CLINICAL NUTRITION SERVICES - ASSESSMENT NOTE     Nutrition Prescription    RECOMMENDATIONS FOR MDs/PROVIDERS TO ORDER:  None    Malnutrition Status:    Severe malnutrition in the context of acute on chronic illness.    Recommendations already ordered by Registered Dietitian (RD):  -No ice cream, milk or coffee on trays.  -SF pudding at 10am.  -Rita Doone cookies at 2pm  -Yogurt at HS.  -Daily weights.    Future/Additional Recommendations:  -Monitor, record and encourage oral intake.  -Add/adjust snacks as tolerated.     REASON FOR ASSESSMENT  Mustapha Negrete is a/an 60 year old male assessed by the dietitian for Admission Nutrition Risk Screen for significant weight loss (34+ lb) and poor oral intake.    NUTRITION HISTORY  Obtained from electronic medical record:  -Admitted w/ uncontrolled thigh and rectal pain. Concern for prostatitis. This all started 4 months ago (June).  -PMH: prostate cancer, BPH, recent diagnosis of urinary retention 2 weeks after laparoscopic cholecystectomy, MARIAN on BiPAP, low back pain and Covid infection in April.  -Per discussion in rounds today, patient has had many ED visits and extensive work-up elsewhere. He may discharge today pending pain control.  -GI: last BM 10/5/21. Hyperactive BS in all quadrants.      Spoke to patient at bedside.  -2 weeks after his gallbladder removal it \"all went to hell.\" He's been following a more bland diet since this all started. His decline in oral intake has been gradual. For the past week he's had no appetite d/t pain and was forcing himself to eat. He was eating grilled chicken, soups, fruit such as bananas and pears, toast/bread, and chicken strips. He was eating half of his normal intake during this time. His son was helping him cook.   -Before this started he was physically active and his normal intake was 3 meals/day. Can't drink milk or eat ice cream; can have cheese once in awhile. He doesn't drink coffee.  -Typically has a BM every other day " "(loose most of the time), but there isn't a pattern.      CURRENT NUTRITION ORDERS  Diet: Orders Placed This Encounter      Regular Diet Adult      Intake/Tolerance:  -100% of breakfast this morning. He had 2 turkey sandwiches yesterday (one in the ED, other up on the floor).  -Appetite is getting better. He was looking forward to lunch.    LABS  Labs reviewed    MEDICATIONS  Medications reviewed    ANTHROPOMETRICS  Height: 162.6 cm (5' 4\")  Most Recent Weight: 73.7 kg (162 lb 7.7 oz)    IBW: 59.1 kg (125% IBW)  BMI: 27.89 kg/m2 Overweight BMI 25-29.9  Weight History:   Wt Readings from Last 10 Encounters:   10/06/21 73.7 kg (162 lb 7.7 oz)   09/20/21 77.1 kg (170 lb)   09/16/21 77.1 kg (170 lb)   11/09/11 88.5 kg (195 lb)   -Per care everywhere:  74.8 kg (165 lb) 10/04/2021 7:47 PM CDT  76.2 kg (168 lb) 09/17/2021 8:29 PM CDT  77.1 kg (170 lb) 09/14/2021 7:55 AM CDT  76.4 kg (168 lb 8 oz) 08/29/2021 5:00 AM CDT  Discharge weight from OSH admission 8/26-8/29.  79.4 kg (175 lb) 08/16/2021 9:25 AM CDT  79.4 kg (175 lb) 07/29/2021 11:33 AM CDT  83.9 kg (185 lb) 06/16/2021 3:57 PM CDT  83.3 kg (183 lb 11.2 oz) 05/25/2021 7:28 AM CDT  83.9 kg (185 lb) 04/15/2021 9:29 AM CDT  96.2 kg (212 lb) 04/07/2021 8:09 AM CDT      -Patient was trying to lose weight before this all started, without success, but then got COVID-19 in April and lost weight d/t poor oral intake. He does recall previously weighing 212 lb when he got COVID-19. Since then, his UBW has been 170-175 lb up until a week ago. He weighs himself daily. He wants to get back up to 170 lb.    -Patient has lost 8 lb (5% body weight) in the last 1 month, and 50 lb (24% body weight) in the last 6 months, both of which are clinically significant and suggest acute on chronic weight loss.    Current encounter:  Vitals:    10/06/21 1210 10/06/21 2114   Weight: 72.6 kg (160 lb) 73.7 kg (162 lb 7.7 oz)       Dosing Weight: 74 kg (current weight)    ASSESSED NUTRITION " NEEDS  Estimated Energy Needs: 3715-4219 kcals/day (25 - 30 kcals/kg)  Justification: Maintenance  Estimated Protein Needs: 59-74 grams protein/day (0.8 - 1 grams of pro/kg)  Justification: Maintenance  Estimated Fluid Needs: (1 mL/kcal)   Justification: Maintenance    PHYSICAL FINDINGS  See malnutrition section below.     MALNUTRITION  % Intake: </= 50% for >/= 5 days (severe)  % Weight Loss: > 10% in 6 months (severe) - also acute  Subcutaneous Fat Loss: Facial region:  mild and Upper arm:  severe  Muscle Loss: Temporal:  mild, Facial & jaw region:  mild, Upper arm (bicep, tricep):  moderate and Dorsal hand:  moderate  Fluid Accumulation/Edema: None noted  Malnutrition Diagnosis: Severe malnutrition in the context of acute on chronic illness.    NUTRITION DIAGNOSIS  Inadequate oral intake related to poor appetite 2/2 rectal pain as evidenced by patient report of gradual decline in intake over the last 4 months and eating half of his normal intake for the past week, weight loss of 8 lb (5% body weight) in the last 1 month and 50 lb (24% body weight) in the last 6 months, and subcutaneous fat and muscle loss.      INTERVENTIONS  Implementation  Nutrition Education: Provided education on role of RD in nutrition plan of care. Provided and discussed the following handouts: Constipation Meal Planning Tips, Fill in the Fiber Gaps: Practical Ways to Add Fiber to Your Day, High-Calorie, High-Protein Nutrition Therapy and High-Calorie, High-Protein Recipes    Collaboration with other providers: patient plan of care discussed during IDT rounds this morning.    Modify composition of meals/snacks     Goals  Patient to consume % of nutritionally adequate meal trays TID, or the equivalent with supplements/snacks.     Monitoring/Evaluation  Progress toward goals will be monitored and evaluated per protocol.    Lay Walker RDN, MAIKEL  Clinical Dietitian  Office (Monday-Friday): [phone]  Weekend/Holiday Pager:  [phone]

## 2021-10-07 NOTE — PROGRESS NOTES
"SPIRITUAL HEALTH SERVICES  [facility] - Med/Surg    Referral Source: Pt request    - Mustapha welcomed visit and shared how he has been trying [facility] [place] lately.  He had a \"couple of pastors\" come out to his home recently to provide support and prayer.  He also highlighted how appreciative he has been of [facility] for recognizing the pain he was in and caring enough to admit him for diagnosis and treatment.    - Our visit was interrupted by a phone call from [place], where he has been trying to get in, but after the call he stated that he didn't feel like he needed to make an appointment there given the care he has been receiving here.    - He states he has been  for 15 years so he hasn't had \"any connections like that\" for some time.  He said that his son, Pavel 33 yrs, still lives at home with him.  He has encouraged him to \"move forward with his life\" but he has also been grateful for his support during this time of chronic pain.    - I provided prayer for continued relief from the pain and also for our staff as they determine what would be the primary cause and necessary treatment.    Plan: SH will remain available for any ongoing support needs during LOS.    Quinton Hollis M.A., [facility]  Lead   [facility]  Office: [phone]    "

## 2021-10-08 LAB
BACTERIA UR CULT: ABNORMAL
BACTERIA UR CULT: ABNORMAL
C TRACH DNA SPEC QL PROBE+SIG AMP: NEGATIVE
N GONORRHOEA DNA SPEC QL NAA+PROBE: NEGATIVE
PSA SERPL-MCNC: 2.88 UG/L (ref 0–4)

## 2021-10-08 PROCEDURE — 96375 TX/PRO/DX INJ NEW DRUG ADDON: CPT

## 2021-10-08 PROCEDURE — 96376 TX/PRO/DX INJ SAME DRUG ADON: CPT

## 2021-10-08 PROCEDURE — 36415 COLL VENOUS BLD VENIPUNCTURE: CPT | Performed by: INTERNAL MEDICINE

## 2021-10-08 PROCEDURE — 99226 PR SUBSEQUENT OBSERVATION CARE,LEVEL III: CPT | Performed by: INTERNAL MEDICINE

## 2021-10-08 PROCEDURE — G0378 HOSPITAL OBSERVATION PER HR: HCPCS

## 2021-10-08 PROCEDURE — 250N000013 HC RX MED GY IP 250 OP 250 PS 637: Performed by: PHYSICIAN ASSISTANT

## 2021-10-08 PROCEDURE — 250N000011 HC RX IP 250 OP 636: Performed by: PHYSICIAN ASSISTANT

## 2021-10-08 PROCEDURE — 250N000011 HC RX IP 250 OP 636: Performed by: INTERNAL MEDICINE

## 2021-10-08 PROCEDURE — G0103 PSA SCREENING: HCPCS | Performed by: INTERNAL MEDICINE

## 2021-10-08 PROCEDURE — 250N000013 HC RX MED GY IP 250 OP 250 PS 637: Performed by: HOSPITALIST

## 2021-10-08 PROCEDURE — 99207 PR CDG-CODE CATEGORY CHANGED: CPT | Performed by: INTERNAL MEDICINE

## 2021-10-08 RX ORDER — ERTAPENEM 1 G/1
1 INJECTION, POWDER, LYOPHILIZED, FOR SOLUTION INTRAMUSCULAR; INTRAVENOUS EVERY 24 HOURS
Status: DISCONTINUED | OUTPATIENT
Start: 2021-10-08 | End: 2021-10-09

## 2021-10-08 RX ADMIN — CLOTRIMAZOLE 1 G: 1 CREAM TOPICAL at 08:18

## 2021-10-08 RX ADMIN — OXYCODONE HYDROCHLORIDE 10 MG: 5 TABLET ORAL at 16:05

## 2021-10-08 RX ADMIN — ACETAMINOPHEN 650 MG: 325 TABLET, FILM COATED ORAL at 08:16

## 2021-10-08 RX ADMIN — HYDROMORPHONE HYDROCHLORIDE 0.3 MG: 1 INJECTION, SOLUTION INTRAMUSCULAR; INTRAVENOUS; SUBCUTANEOUS at 17:16

## 2021-10-08 RX ADMIN — NITROGLYCERIN 15 MG: 20 OINTMENT TOPICAL at 08:22

## 2021-10-08 RX ADMIN — ACETAMINOPHEN 650 MG: 325 TABLET, FILM COATED ORAL at 12:13

## 2021-10-08 RX ADMIN — IBUPROFEN 600 MG: 600 TABLET ORAL at 17:16

## 2021-10-08 RX ADMIN — IBUPROFEN 600 MG: 600 TABLET ORAL at 10:51

## 2021-10-08 RX ADMIN — CLOTRIMAZOLE 1 G: 1 CREAM TOPICAL at 19:58

## 2021-10-08 RX ADMIN — OXYCODONE HYDROCHLORIDE 10 MG: 5 TABLET ORAL at 12:13

## 2021-10-08 RX ADMIN — PRAMOXINE HYDROCHLORIDE HYDROCORTISONE ACETATE 1 APPLICATORFUL: 100; 100 AEROSOL, FOAM TOPICAL at 10:51

## 2021-10-08 RX ADMIN — ACETAMINOPHEN 650 MG: 325 TABLET, FILM COATED ORAL at 21:39

## 2021-10-08 RX ADMIN — OXYCODONE HYDROCHLORIDE 10 MG: 5 TABLET ORAL at 08:16

## 2021-10-08 RX ADMIN — HYDROMORPHONE HYDROCHLORIDE 0.3 MG: 1 INJECTION, SOLUTION INTRAMUSCULAR; INTRAVENOUS; SUBCUTANEOUS at 06:55

## 2021-10-08 RX ADMIN — POLYETHYLENE GLYCOL 3350 17 G: 17 POWDER, FOR SOLUTION ORAL at 08:18

## 2021-10-08 RX ADMIN — AMITRIPTYLINE HYDROCHLORIDE 10 MG: 10 TABLET, FILM COATED ORAL at 21:40

## 2021-10-08 RX ADMIN — LEVOFLOXACIN 500 MG: 500 TABLET, FILM COATED ORAL at 08:16

## 2021-10-08 RX ADMIN — FINASTERIDE 5 MG: 5 TABLET, FILM COATED ORAL at 08:16

## 2021-10-08 RX ADMIN — SENNOSIDES AND DOCUSATE SODIUM 1 TABLET: 50; 8.6 TABLET ORAL at 19:54

## 2021-10-08 RX ADMIN — ACETAMINOPHEN 650 MG: 325 TABLET, FILM COATED ORAL at 16:05

## 2021-10-08 RX ADMIN — SENNOSIDES AND DOCUSATE SODIUM 1 TABLET: 50; 8.6 TABLET ORAL at 08:18

## 2021-10-08 RX ADMIN — PRAMOXINE HYDROCHLORIDE HYDROCORTISONE ACETATE 1 APPLICATORFUL: 100; 100 AEROSOL, FOAM TOPICAL at 19:59

## 2021-10-08 RX ADMIN — AMLODIPINE BESYLATE 10 MG: 10 TABLET ORAL at 08:16

## 2021-10-08 RX ADMIN — HYDROMORPHONE HYDROCHLORIDE 0.3 MG: 1 INJECTION, SOLUTION INTRAMUSCULAR; INTRAVENOUS; SUBCUTANEOUS at 13:37

## 2021-10-08 RX ADMIN — NITROGLYCERIN 15 MG: 20 OINTMENT TOPICAL at 19:56

## 2021-10-08 RX ADMIN — HYDROMORPHONE HYDROCHLORIDE 0.3 MG: 1 INJECTION, SOLUTION INTRAMUSCULAR; INTRAVENOUS; SUBCUTANEOUS at 02:43

## 2021-10-08 RX ADMIN — IBUPROFEN 600 MG: 600 TABLET ORAL at 02:42

## 2021-10-08 RX ADMIN — ERTAPENEM SODIUM 1 G: 1 INJECTION, POWDER, LYOPHILIZED, FOR SOLUTION INTRAMUSCULAR; INTRAVENOUS at 19:53

## 2021-10-08 RX ADMIN — OXYCODONE HYDROCHLORIDE 10 MG: 5 TABLET ORAL at 21:38

## 2021-10-08 RX ADMIN — OXYBUTYNIN CHLORIDE 10 MG: 5 TABLET, EXTENDED RELEASE ORAL at 08:16

## 2021-10-08 ASSESSMENT — MIFFLIN-ST. JEOR: SCORE: 1403

## 2021-10-08 NOTE — PLAN OF CARE
Patient is alert and oriented.  Patient continues to report burning/shooting pain from back of thighs into rectal area.  Attempting to stay on top of oral pain medications today - oxycodone and tylenol every 4 hours and ibuprofen every 6.  IV dilaudid was given x 1 for breakthrough pain.  Patient also reports no bowel movement for several days, given senna, miralax, and prune juice.  No bowel movement yet.  Good urine output from leon.  Patient has been out of bed and moving in room independently but only tolerating small amount of activity.

## 2021-10-08 NOTE — PROGRESS NOTES
"Patient alert and oriented x 4. Continues to report pain, which woke him up. IV dilaudid 0.3mg and oral ibuprofen given tonight which has been helpful.    /79   Pulse 73   Temp 98.2  F (36.8  C) (Oral)   Resp 16   Ht 1.626 m (5' 4\")   Wt 73.7 kg (162 lb 7.7 oz)   SpO2 95%   BMI 27.89 kg/m      "

## 2021-10-08 NOTE — UTILIZATION REVIEW
"[facility]     Admission Status; Secondary Review Determination     Admission Date: 10/6/2021 12:33 PM      Under the authority of the Utilization Management Committee, the utilization review process indicated a secondary review on the above patient.  The review outcome is based on review of the medical records, discussions with staff, and applying clinical experience noted on the date of the review.          (x) Observation Status Appropriate - This patient does not meet hospital inpatient criteria and is placed in observation status. If this patient's primary payer is Medicare and was admitted as an inpatient, Condition Code 44 should be used and patient status changed to \"observation\".     RATIONALE FOR DETERMINATION   60-year-old male with a history of prostate cancer, BPH, urinary retention, sleep apnea, and low back pain was admitted on 10/6 with neuropathic perineal and rectal pain with possible prostatitis and anal fissure which was previously diagnosed.  Infectious work-up has been ordered.  He remains on oral levofloxacin.  He is receiving pain management with multiple analgesics.  At the time of this review the patient does not meet medical necessity for inpatient hospitalization and observation status remains appropriate.    The severity of illness, intensity of service provided, expected LOS and risk for adverse outcome make the care appropriate for further observation; however, doesn't meet criteria for hospital inpatient admission.          The information on this document is developed by the utilization review team in order for the business office to ensure compliance.  This only denotes the appropriateness of proper admission status and does not reflect the quality of care rendered.         The definitions of Inpatient Status and Observation Status used in making the determination above are those provided in the CMS Coverage Manual, Chapter 1 and Chapter 6, section 70.4.      Sincerely, "     Rubio Reyes DO MPH   Physician Advisor  Utilization Review  [facility]

## 2021-10-08 NOTE — PROGRESS NOTES
[facility] Progress Note  Date of Service: 10/08/2021     Assessment & Plan           Mustapha Negrete is a 60 year old male history of prostate cancer, BPH, recent diagnosis of urinary retention 2 weeks after laparoscopic cholecystectomy, MARIAN on BiPAP, low back pain and Covid infection in April presenting with uncontrolled thigh and rectal pain.    Possible prostatitis  Possible anal fissure, previously diagnosed   Perianal pain with radiation to the penis since 6/2021. Worse with BM. Also reports posterior thigh pain around the same time. Previously had several lesions on the lateral thighs, first the right then the left. Had been told by a chiropractor that this may be shingles.   Patient was admitted at [facility] 8/26 through 8/29 for the same issue.  He has been seen in the ER four times since then for similar complaints.  He was evaluated by [facility] neurological clinic back in July and had MRIs of his L-spine, see below. Prior laboratory work-up includes normal vitamin D and vitamin B12 and folate levels, negative syphilis, HIV, negative O&P. Additionally he was seen by [facility], most recently last week at which time he was diagnosed with anal fissure along with a perianal fungal infection and started on topical antifungal as well as topical nitroglycerin.  He feels that this has seemed to help a bit. He has undergone colonoscopy recently which was reportedly normal.   Rectal pain may be related to anal fissure vs prostatitis vs pain syndrome vs other. In ED, patient did have quite a bit of prostate tenderness on rectal exam and so he was initiated on levofloxacin for possible prostatitis. He also reports increased pain with catheter exchanges.    Thigh pain seems to be neuropathic - question if this is a postherpetic neuralgia (prior rash, reported on bilateral lateral thighs) vs lumbar radiculopathy (though bilateral and correlates  more with S1 than lumbar changes noted below) vs pain syndrome. Patient reports previously been on gabapentin which was not terribly helpful for him.    Overall this distribution and description of pain is unusual. Though has had extensive work up as outpatient. Unclear exactly what is causing his symptoms, will trial below therapies and monitor response.  This admission:  --normal inflammatory markers: WBC 9.1, ESR 10 and CRP <2.9.   --Blood cultures NGTD  --urine culture positive Enterobacter cloacae complex, sens pending  --gonorrhea and Chlamydia urinary negative      Patient has previously been diagnosed with  Prostatitis this summer and has been treated with levofloxacin 500mg daily x 14 days without improvement and also a course of Augmentin 3 weeks ago. Patient also treated with cipro in the recent past. Suspect patient may have chronic prostatitis as cause of his most severe symptoms of rectal pain radiating to penis and bladder outlet obstruction. Enterobacter positive UC and Enterobacter may have MDR.    - start ertapenem IV today while waiting for final sensitivities of urine culture. We need to get his pain under better control and the antibiotic may help.   -Stop levofloxacin given prior treatment with this at the same dose   - check PSA  -Continue clotrimazole topical cream twice daily for previously diagnosed perianal fungal infection  -Continue nitroglycerin ointment to the rectum for anal fissure  -- continue hydrocortisone rectal foam for discomfort  -Continue acetaminophen, ibuprofen as needed  -Continue oxycodone 5 to 10 mg every 3 hours  -Continue hydromorphone 0.3 mg IV every 2 hours as needed  --Start amitriptyline 10 mg, will need to titrate (although can worsen his urinary retention)    Recent leg lesions, possible bedbug bites    Patient has lesions in a line on one leg and later on the other. He has pictures from when they were acute on the left and these lesions have the appearance to me  of bedbug bites as they are in a line, have a central dark wound surrounded by erythema and localized swelling and he had symptoms of itching. They do not appear like zoster in my opinion.   -I asked patient to have a family member check his room for evidence of bedbug infestation. They can look online to find out what to look for. Patient agreeable.    L4/L5 compression  7/26/2021 MRI lumbar spine as below:    Unclear if these L5 and L4 nerve compressions are related to his symptoms.  At one  point patient was considered for what sounds like epidural spinal injections but due to concern for UTI and topical infection, this was deferred and not pursued.  Could consider repeat imaging but at this point patient would not be able to lay still without sedation for MRI, so will hold off.  Patient recently seen by a spine clinic and a plan is being formulated.    Suspect he may have some radicular pain into the legs that is separate from the rectal pain. Focus is first on the rectal pain.     Urinary retention, chronic  BPH    Developed approximately 2 weeks after he had a laparoscopic cholecystectomy in May.  Catheter was placed in May and has been in since.  Patient previously seen by [facility] and then more recently by Dr. Oneill here.  Patient has failed trials of voids multiple times.  Reports he had cystoscopy done previously which was normal.  Patient is on oxybutynin for bladder spasms.  -Continue home oxybutynin, finasteride  - continue leon catheter   - continue outpatient urology follow up    Hypertension  Blood pressures stable.  - Continue home amlodipine 10 daily    MARIAN  On BiPAP at home per [facility] records.    Covid status  Patient tested positive for Covid 5/3/2021.  He tested positive on 8/26/2021 when he was admitted to [facility], suspect this was lingering viral particles as he was asymptomatic at the time.    - He is not yet vaccinated because he has had all these other health issues since then,  but he is open to getting vaccinated in the future.   - PCR negative 10/6/2021     Diet: Snacks/Supplements Adult: Other; SF pudding; Between Meals  Snacks/Supplements Adult: Other; Rita Doone cookies; Between Meals  Snacks/Supplements Adult: Other; yogurt; Between Meals  Regular Diet Adult    DVT Prophylaxis: Low Risk/Ambulatory with no VTE prophylaxis indicated  [place] Catheter: PRESENT, indication: Retention  Central Lines: None  Code Status: Full Code       Discussion: Very complicated set of signs and symptoms and has had an extensive work-up. It seems his most significant symptom is rectal pain and prostatitis is not ruled out. If his prostatitis, he may get his best relief from appropriate antibiotic. His pain control is still difficult and requiring repeated doses of IV narcotics in addition to oral. Ultimately oral narcotics is not a good long-term solution.    Disposition Plan   Expected discharge: Possibly tomorrow if you get a little better pain control on some oral medications. Hopefully the change in antibiotic will help.    Attestation:  Total time: 70 minutes with greater than 50% spent counseling patient on potentially etiologies of his symptoms and potential treatment plans.    Royce Chávez MD  Hospital Medicine        Interval History   May be a little better with some of the topical applications. However otherwise still having symptoms as before. Most prominent symptom is rectal pain that radiates into the penis.    I reviewed his past medical history at length with the patient and have included significant new or findings in the assessment and plan above. I reviewed pictures of wounds on his leg from when they were newer and they had the appearance of linear set of lesions with a central break in the skin surrounded by erythematous mildly raised. These have the appearance of bedbug bites that I have previously seen. Patient denies any recent staying outside of his own home. Not aware of any  bedbug infestation.    Physical Exam   Temp:  [97.8  F (36.6  C)-98.2  F (36.8  C)] 98  F (36.7  C)  Pulse:  [] 75  Resp:  [16-20] 18  BP: (128-158)/(67-95) 141/87  SpO2:  [95 %-98 %] 96 %    Weights:   Vitals:    10/06/21 1210 10/06/21 2114 10/08/21 0539   Weight: 72.6 kg (160 lb) 73.7 kg (162 lb 7.7 oz) 69.2 kg (152 lb 8.9 oz)    Body mass index is 26.19 kg/m .    Constitutional: Alert and oriented x4, very talkative with a little pressured speech, occasionally winces in pain otherwise no acute distress and nontoxic-appearing  CV: Regular, no murmur, no edema  Respiratory: CTA bilaterally  GI: Soft, non-tender, bowel sounds normal. The prostate is little boggy but very tender. No other abnormalities found on rectal exam. Has good anal tone.  Skin: Warm and dry  Musculoskeletal: No significant L5 or sacral tenderness to palpation    Data   Recent Labs   Lab 10/06/21  1353   WBC 9.1   HGB 14.5   MCV 87         POTASSIUM 3.6   CHLORIDE 106   CO2 22   BUN 15   CR 0.76   ANIONGAP 10   ANITA 9.7   *   ALBUMIN 3.9   PROTTOTAL 7.3   BILITOTAL 1.8*   ALKPHOS 33*   ALT 41   AST 18       Recent Labs   Lab 10/06/21  1353   *        Unresulted Labs Ordered in the Past 30 Days of this Admission     Date and Time Order Name Status Description    10/6/2021  7:05 PM Blood Culture Peripheral Blood Preliminary     10/6/2021  7:05 PM Blood Culture Peripheral Blood Preliminary     10/6/2021  4:10 PM Urine Culture Preliminary            Imaging: No results found for this or any previous visit (from the past 24 hour(s)).     I reviewed all new labs and imaging results over the last 24 hours. I personally reviewed no images or EKG's today.    Medications       amitriptyline  10 mg Oral At Bedtime     amLODIPine  10 mg Oral Daily     clotrimazole  1 applicator Topical BID     finasteride  5 mg Oral Daily     hydrocortisone-pramoxine  1 Applicatorful Rectal BID     nitroGLYcerin  1 each Topical BID      oxybutynin ER  10 mg Oral Daily   Reviewed meds    Royce Chávez MD  [facility]

## 2021-10-09 PROCEDURE — 250N000013 HC RX MED GY IP 250 OP 250 PS 637: Performed by: PHYSICIAN ASSISTANT

## 2021-10-09 PROCEDURE — 250N000013 HC RX MED GY IP 250 OP 250 PS 637: Performed by: INTERNAL MEDICINE

## 2021-10-09 PROCEDURE — 99226 PR SUBSEQUENT OBSERVATION CARE,LEVEL III: CPT | Performed by: INTERNAL MEDICINE

## 2021-10-09 PROCEDURE — G0378 HOSPITAL OBSERVATION PER HR: HCPCS

## 2021-10-09 PROCEDURE — 250N000011 HC RX IP 250 OP 636: Performed by: PHYSICIAN ASSISTANT

## 2021-10-09 PROCEDURE — 96376 TX/PRO/DX INJ SAME DRUG ADON: CPT

## 2021-10-09 PROCEDURE — 250N000013 HC RX MED GY IP 250 OP 250 PS 637: Performed by: HOSPITALIST

## 2021-10-09 PROCEDURE — 99207 PR CDG-CODE CATEGORY CHANGED: CPT | Performed by: INTERNAL MEDICINE

## 2021-10-09 RX ORDER — POLYETHYLENE GLYCOL 3350 17 G/17G
17 POWDER, FOR SOLUTION ORAL DAILY
Status: DISCONTINUED | OUTPATIENT
Start: 2021-10-10 | End: 2021-10-13 | Stop reason: HOSPADM

## 2021-10-09 RX ORDER — TAMSULOSIN HYDROCHLORIDE 0.4 MG/1
0.4 CAPSULE ORAL DAILY
Status: DISCONTINUED | OUTPATIENT
Start: 2021-10-09 | End: 2021-10-13 | Stop reason: HOSPADM

## 2021-10-09 RX ORDER — OXYBUTYNIN CHLORIDE 5 MG/1
1 TABLET ORAL EVERY 6 HOURS PRN
Status: ON HOLD | COMMUNITY
Start: 2021-09-27 | End: 2021-10-13

## 2021-10-09 RX ORDER — CYCLOBENZAPRINE HCL 5 MG
5-10 TABLET ORAL EVERY 8 HOURS PRN
Status: DISCONTINUED | OUTPATIENT
Start: 2021-10-09 | End: 2021-10-13 | Stop reason: HOSPADM

## 2021-10-09 RX ORDER — CLONIDINE HYDROCHLORIDE 0.1 MG/1
0.1 TABLET ORAL 3 TIMES DAILY
Status: DISCONTINUED | OUTPATIENT
Start: 2021-10-09 | End: 2021-10-13 | Stop reason: HOSPADM

## 2021-10-09 RX ORDER — IBUPROFEN 600 MG/1
600 TABLET, FILM COATED ORAL 4 TIMES DAILY
Status: DISCONTINUED | OUTPATIENT
Start: 2021-10-09 | End: 2021-10-13 | Stop reason: HOSPADM

## 2021-10-09 RX ORDER — LEVOFLOXACIN 500 MG/1
500 TABLET, FILM COATED ORAL DAILY
Status: DISCONTINUED | OUTPATIENT
Start: 2021-10-09 | End: 2021-10-10

## 2021-10-09 RX ORDER — ACETAMINOPHEN 325 MG/1
975 TABLET ORAL EVERY 4 HOURS PRN
Status: DISCONTINUED | OUTPATIENT
Start: 2021-10-09 | End: 2021-10-13 | Stop reason: HOSPADM

## 2021-10-09 RX ADMIN — PRAMOXINE HYDROCHLORIDE HYDROCORTISONE ACETATE 1 APPLICATORFUL: 100; 100 AEROSOL, FOAM TOPICAL at 20:34

## 2021-10-09 RX ADMIN — TAMSULOSIN HYDROCHLORIDE 0.4 MG: 0.4 CAPSULE ORAL at 18:50

## 2021-10-09 RX ADMIN — HYDROMORPHONE HYDROCHLORIDE 0.3 MG: 1 INJECTION, SOLUTION INTRAMUSCULAR; INTRAVENOUS; SUBCUTANEOUS at 15:17

## 2021-10-09 RX ADMIN — CLOTRIMAZOLE 1 G: 1 CREAM TOPICAL at 10:26

## 2021-10-09 RX ADMIN — OXYCODONE HYDROCHLORIDE 10 MG: 5 TABLET ORAL at 12:26

## 2021-10-09 RX ADMIN — IBUPROFEN 600 MG: 600 TABLET ORAL at 23:17

## 2021-10-09 RX ADMIN — IBUPROFEN 600 MG: 600 TABLET ORAL at 18:50

## 2021-10-09 RX ADMIN — OXYBUTYNIN CHLORIDE 10 MG: 5 TABLET, EXTENDED RELEASE ORAL at 10:23

## 2021-10-09 RX ADMIN — CLONIDINE HYDROCHLORIDE 0.1 MG: 0.1 TABLET ORAL at 20:32

## 2021-10-09 RX ADMIN — AMLODIPINE BESYLATE 10 MG: 10 TABLET ORAL at 09:30

## 2021-10-09 RX ADMIN — HYDROMORPHONE HYDROCHLORIDE 0.3 MG: 1 INJECTION, SOLUTION INTRAMUSCULAR; INTRAVENOUS; SUBCUTANEOUS at 08:14

## 2021-10-09 RX ADMIN — OXYCODONE HYDROCHLORIDE 10 MG: 5 TABLET ORAL at 09:27

## 2021-10-09 RX ADMIN — NITROGLYCERIN 15 MG: 20 OINTMENT TOPICAL at 10:25

## 2021-10-09 RX ADMIN — FINASTERIDE 5 MG: 5 TABLET, FILM COATED ORAL at 09:26

## 2021-10-09 RX ADMIN — IBUPROFEN 600 MG: 600 TABLET ORAL at 00:09

## 2021-10-09 RX ADMIN — ACETAMINOPHEN 650 MG: 325 TABLET, FILM COATED ORAL at 05:13

## 2021-10-09 RX ADMIN — CLOTRIMAZOLE 1 G: 1 CREAM TOPICAL at 20:34

## 2021-10-09 RX ADMIN — NITROGLYCERIN 15 MG: 20 OINTMENT TOPICAL at 20:33

## 2021-10-09 RX ADMIN — OXYCODONE HYDROCHLORIDE 10 MG: 5 TABLET ORAL at 02:32

## 2021-10-09 RX ADMIN — LEVOFLOXACIN 500 MG: 500 TABLET, FILM COATED ORAL at 18:50

## 2021-10-09 RX ADMIN — ACETAMINOPHEN 650 MG: 325 TABLET, FILM COATED ORAL at 09:27

## 2021-10-09 RX ADMIN — CYCLOBENZAPRINE 5 MG: 5 TABLET, FILM COATED ORAL at 18:50

## 2021-10-09 RX ADMIN — ACETAMINOPHEN 650 MG: 325 TABLET, FILM COATED ORAL at 13:30

## 2021-10-09 RX ADMIN — IBUPROFEN 600 MG: 600 TABLET ORAL at 10:22

## 2021-10-09 RX ADMIN — PRAMOXINE HYDROCHLORIDE HYDROCORTISONE ACETATE 1 APPLICATORFUL: 100; 100 AEROSOL, FOAM TOPICAL at 10:25

## 2021-10-09 NOTE — PLAN OF CARE
"Patient is A&Ox4. Up Ind. Denies any nausea this evening. Has had pain throughout the night rating from 7-8/10 in groin/perineum area. Writer gave PRN Tylenol, along with 10 mg of Oxycodone PO PRN. With some relief. Patient received 1st ABX dose of InVanz , patient stated shortly after that perineum burned more. Writer gave PRN ibuprofen 600 mg PO at that time, along with ice. Patient appeared to fall back asleep. Writer also earlier in night applied scheduled dose of nitroglycerin to rectum, along with Proctofoam. Writer also applied clotrimazole cream to the perineum area. Perineum area appears slightly red, along with rectum. Cath patent and draining urine, with good urine output. Patient has yet to have a BM. PRN senna given around 2100 as well. Writer will pass along to day nurse if patient does not have BM overnight. Today will be day 4 without a bowel movement. Fluids, ambulation, and position change encouraged. PSA was 2.88 at 2000 on 10/8/2021.    /67   Pulse 76   Temp 97.2  F (36.2  C) (Oral)   Resp 19   Ht 1.626 m (5' 4\")   Wt 69.2 kg (152 lb 8.9 oz)   SpO2 96%   BMI 26.19 kg/m      Noy Hwang RN on 10/9/2021 at 2:24 AM    "

## 2021-10-10 PROCEDURE — 96375 TX/PRO/DX INJ NEW DRUG ADDON: CPT

## 2021-10-10 PROCEDURE — 96376 TX/PRO/DX INJ SAME DRUG ADON: CPT

## 2021-10-10 PROCEDURE — 250N000011 HC RX IP 250 OP 636: Performed by: INTERNAL MEDICINE

## 2021-10-10 PROCEDURE — G0378 HOSPITAL OBSERVATION PER HR: HCPCS

## 2021-10-10 PROCEDURE — 99207 PR CDG-CODE CATEGORY CHANGED: CPT | Performed by: INTERNAL MEDICINE

## 2021-10-10 PROCEDURE — 250N000013 HC RX MED GY IP 250 OP 250 PS 637: Performed by: HOSPITALIST

## 2021-10-10 PROCEDURE — 99226 PR SUBSEQUENT OBSERVATION CARE,LEVEL III: CPT | Performed by: INTERNAL MEDICINE

## 2021-10-10 PROCEDURE — 250N000013 HC RX MED GY IP 250 OP 250 PS 637: Performed by: INTERNAL MEDICINE

## 2021-10-10 RX ORDER — OXYCODONE HYDROCHLORIDE 5 MG/1
5 TABLET ORAL EVERY 6 HOURS PRN
Status: DISCONTINUED | OUTPATIENT
Start: 2021-10-10 | End: 2021-10-13 | Stop reason: HOSPADM

## 2021-10-10 RX ORDER — PREGABALIN 25 MG/1
25 CAPSULE ORAL 3 TIMES DAILY
Status: DISCONTINUED | OUTPATIENT
Start: 2021-10-10 | End: 2021-10-11

## 2021-10-10 RX ORDER — MORPHINE SULFATE 2 MG/ML
2 INJECTION, SOLUTION INTRAMUSCULAR; INTRAVENOUS ONCE
Status: COMPLETED | OUTPATIENT
Start: 2021-10-10 | End: 2021-10-10

## 2021-10-10 RX ORDER — MORPHINE SULFATE 2 MG/ML
4 INJECTION, SOLUTION INTRAMUSCULAR; INTRAVENOUS ONCE
Status: COMPLETED | OUTPATIENT
Start: 2021-10-10 | End: 2021-10-10

## 2021-10-10 RX ORDER — DULOXETIN HYDROCHLORIDE 20 MG/1
20 CAPSULE, DELAYED RELEASE ORAL DAILY
Status: DISCONTINUED | OUTPATIENT
Start: 2021-10-10 | End: 2021-10-13 | Stop reason: HOSPADM

## 2021-10-10 RX ADMIN — PRAMOXINE HYDROCHLORIDE HYDROCORTISONE ACETATE 1 APPLICATORFUL: 100; 100 AEROSOL, FOAM TOPICAL at 20:59

## 2021-10-10 RX ADMIN — CLONIDINE HYDROCHLORIDE 0.1 MG: 0.1 TABLET ORAL at 20:59

## 2021-10-10 RX ADMIN — OXYBUTYNIN CHLORIDE 10 MG: 5 TABLET, EXTENDED RELEASE ORAL at 08:05

## 2021-10-10 RX ADMIN — TAMSULOSIN HYDROCHLORIDE 0.4 MG: 0.4 CAPSULE ORAL at 08:05

## 2021-10-10 RX ADMIN — CLOTRIMAZOLE 1 G: 1 CREAM TOPICAL at 08:12

## 2021-10-10 RX ADMIN — NITROGLYCERIN 15 MG: 20 OINTMENT TOPICAL at 20:59

## 2021-10-10 RX ADMIN — OXYCODONE 5 MG: 5 TABLET ORAL at 12:56

## 2021-10-10 RX ADMIN — CYCLOBENZAPRINE 10 MG: 5 TABLET, FILM COATED ORAL at 08:04

## 2021-10-10 RX ADMIN — DULOXETINE HYDROCHLORIDE 20 MG: 20 CAPSULE, DELAYED RELEASE ORAL at 11:19

## 2021-10-10 RX ADMIN — MORPHINE SULFATE 4 MG: 2 INJECTION, SOLUTION INTRAMUSCULAR; INTRAVENOUS at 04:31

## 2021-10-10 RX ADMIN — OXYCODONE 5 MG: 5 TABLET ORAL at 20:59

## 2021-10-10 RX ADMIN — LEVOFLOXACIN 500 MG: 500 TABLET, FILM COATED ORAL at 08:05

## 2021-10-10 RX ADMIN — POLYETHYLENE GLYCOL 3350 17 G: 17 POWDER, FOR SOLUTION ORAL at 08:05

## 2021-10-10 RX ADMIN — AMLODIPINE BESYLATE 10 MG: 10 TABLET ORAL at 08:05

## 2021-10-10 RX ADMIN — CYCLOBENZAPRINE 10 MG: 5 TABLET, FILM COATED ORAL at 16:23

## 2021-10-10 RX ADMIN — ACETAMINOPHEN 975 MG: 325 TABLET, FILM COATED ORAL at 01:14

## 2021-10-10 RX ADMIN — IBUPROFEN 600 MG: 600 TABLET ORAL at 08:04

## 2021-10-10 RX ADMIN — MORPHINE SULFATE 2 MG: 2 INJECTION, SOLUTION INTRAMUSCULAR; INTRAVENOUS at 09:21

## 2021-10-10 RX ADMIN — IBUPROFEN 600 MG: 600 TABLET ORAL at 18:19

## 2021-10-10 RX ADMIN — PREGABALIN 25 MG: 25 CAPSULE ORAL at 20:59

## 2021-10-10 RX ADMIN — CLONIDINE HYDROCHLORIDE 0.1 MG: 0.1 TABLET ORAL at 15:34

## 2021-10-10 RX ADMIN — FINASTERIDE 5 MG: 5 TABLET, FILM COATED ORAL at 08:04

## 2021-10-10 RX ADMIN — IBUPROFEN 600 MG: 600 TABLET ORAL at 21:00

## 2021-10-10 RX ADMIN — CYCLOBENZAPRINE 5 MG: 5 TABLET, FILM COATED ORAL at 01:14

## 2021-10-10 RX ADMIN — CLOTRIMAZOLE 1 G: 1 CREAM TOPICAL at 21:00

## 2021-10-10 RX ADMIN — PREGABALIN 25 MG: 25 CAPSULE ORAL at 13:06

## 2021-10-10 RX ADMIN — NITROGLYCERIN 15 MG: 20 OINTMENT TOPICAL at 08:12

## 2021-10-10 RX ADMIN — ACETAMINOPHEN 975 MG: 325 TABLET, FILM COATED ORAL at 15:34

## 2021-10-10 RX ADMIN — PRAMOXINE HYDROCHLORIDE HYDROCORTISONE ACETATE 1 APPLICATORFUL: 100; 100 AEROSOL, FOAM TOPICAL at 08:12

## 2021-10-10 RX ADMIN — IBUPROFEN 600 MG: 600 TABLET ORAL at 12:56

## 2021-10-10 RX ADMIN — CLONIDINE HYDROCHLORIDE 0.1 MG: 0.1 TABLET ORAL at 11:19

## 2021-10-10 ASSESSMENT — MIFFLIN-ST. JEOR: SCORE: 1401

## 2021-10-10 NOTE — PROGRESS NOTES
"Pt is A/O, able to make needs known. Pt was resting comfortably after 1850 dose of Flexeril. Creams applied as ordered. Now pt verbalizing increased \"burning\" pain to rectum. Ibuprofen and ice pack provided. Will continue to assess pain.   "

## 2021-10-10 NOTE — PROGRESS NOTES
MD ordered one dose of IV Morphine which was given and was effective. Pt had been screaming and crying with burning rectal pain radiating down R leg. Stated that he cannot live with this much pain and does not know how he will manage it at home. Feeling stressed. Pt feeling better after medication and is resting.

## 2021-10-10 NOTE — PLAN OF CARE
"Patient continues to report 10/10 pain in rectal/perineal area.  This morning he had several loose bowel movements which caused 10/10 \"burning\" pain, this afternoon he described pain as spasms.  Discussed with MD - thinks narcotics are making pain worse and they have been stopped.  Patient was given a tennis ball to sit on in attempts to stretch the muscles.  Also trying flexeril.  Continues to have good urine output from leon catheter.  Up independently in room.   "

## 2021-10-10 NOTE — PROVIDER NOTIFICATION
MD notified that patient is having burning rectal pain. Not getting relief from current measures and all PRNs have been utilized. Awaiting response.

## 2021-10-10 NOTE — PROGRESS NOTES
[facility] Medicine Progress Note  Date of Service: 10/10/2021     Assessment & Plan           Mustapha Negrete is a 60 year old male history of prostate cancer, BPH, recent diagnosis of urinary retention 2 weeks after laparoscopic cholecystectomy, MARIAN on BiPAP, low back pain and Covid infection in April presenting with uncontrolled thigh and rectal pain.    Possible prostatitis  Suspect proctalgia fugax   Perianal pain with radiation to the penis since 6/2021. Worse with BM. Also reports posterior thigh pain around the same time. Previously had several lesions on the lateral thighs, first the right then the left. Had been told by a chiropractor that this may be shingles.   Patient was admitted at [facility] 8/26 through 8/29 for the same issue.  He has been seen in the ER four times since then for similar complaints.  He was evaluated by [facility] neurological clinic back in July and had MRIs of his L-spine, see below. Prior laboratory work-up includes normal vitamin D and vitamin B12 and folate levels, negative syphilis, HIV, negative O&P. Additionally he was seen by [place] [facility], most recently last week at which time he was diagnosed with anal fissure along with a perianal fungal infection and started on topical antifungal as well as topical nitroglycerin.  He feels that this has seemed to help a bit. He has undergone colonoscopy recently which was reportedly normal.   In ED, patient did have quite a bit of prostate tenderness on rectal exam and so he was initiated on levofloxacin for possible prostatitis. He also reports increased pain with catheter exchanges.    Thigh pain seems to be neuropathic - question if this is a postherpetic neuralgia (prior rash, reported on bilateral lateral thighs) vs lumbar radiculopathy (though bilateral and correlates more with S1 than lumbar changes noted below) vs pain syndrome. Patient reports previously been on  gabapentin which was not terribly helpful for him.    Overall this distribution and description of pain is unusual. Though has had extensive work up as outpatient. Unclear exactly what is causing his symptoms, will trial below therapies and monitor response.  This admission:    Normal inflammatory markers: WBC 9.1, ESR 10 and CRP <2.9.     Blood cultures NGTD    Urine culture positive Enterobacter cloacae complex, sens to cipro/levo    Gonorrhea and Chlamydia urinary negative      Patient has previously been diagnosed with  Prostatitis this summer and has been treated with ciprofloxacin 500mg BID x 28 days 6/22-7/19 without improvement and also a course of Augmentin 3 weeks ago. UC positive for Enterobacter. Possible patient still has acute bacterial prostatitis and gave a dose of ertapenem while waiting for sensitivities to return on Enterobacter.  Sensitivities show that the Enterobacter is sensitive to fluoroquinolones.  It is possible the patient did not respond to the long course of ciprofloxacin due to the dosing and trial of high-dose levofloxacin started to assure any possible infection has been treated.     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 DDx includes chronic prostatitis/chronic pelvic pain syndrome (CP/CPPS). His pain complaints are diverse. . He stopped his tamsulosin due to feeling that it caused his penis to fill partially with fluid. I'm not sure this was due to tamsulosin and he is willing to try again as this is first line for CP/CPPS along with finasteride. The topical nitroglycerin is treatment for the PF. PF involves spasms of the pelvic floor that are triggered first by smooth muscle spasms the rectum and anus. These can be relieved by stretching by sitting on a tennis ball. Finally, his pain syndrome is perpetuated by the use of narcotics with worsening pain coming on as the narcotics  wear off.    Patient has palpable muscle spasms in anterior perineum that reproduce his pelvic pain and at times pain radiating down right leg. These spasms were improved with constant pressure for 30-60 seconds on exam. This is most consistent with PF. Discussed with patient at length and reinforced that he can reduce his pain significantly by getting the spasms to stretch out.    - Use tennis ball to relieve muscle spasms of pelvic floor - patient has been doing this with some success   - resumed tamsulosin   - continue finasteride   - continue topical nitroglycerin   - schedule ibuprofen 600 mg QID   - oxycodone oral available but patient is to try the tennis ball maneuver first and he is agreeable.    - Blood pressures running a little bit up and so giving some temporary clonidine to see if that helps coming off the narcotics.   - course of high-dose levofloxacin 750 mg for possible bacterial prostatitis   -Continue clotrimazole topical cream twice daily for previously diagnosed perianal fungal infection  -- continue hydrocortisone rectal foam for discomfort    Recent leg lesions, possible bedbug bites    Patient has lesions in a line on one leg and later on the other. He has pictures from when they were acute on the left and these lesions have the appearance to me of bedbug bites as they are in a line, have a central dark wound surrounded by erythema and localized swelling and he had symptoms of itching. They do not appear like zoster in my opinion.   -I asked patient to have a family member check his room for evidence of bedbug infestation. They can look online to find out what to look for. Discussed with son today 10/10    L4/L5 compression  7/26/2021 MRI lumbar spine as below:    Unclear if these L5 and L4 nerve compressions are related to his symptoms.  At one  point patient was considered for what sounds like epidural spinal injections but due to concern for UTI and topical infection, this was deferred and  not pursued.  Could consider repeat imaging but at this point patient would not be able to lay still without sedation for MRI, so will hold off.  Patient recently seen by a spine clinic and a plan is being formulated.    Suspect he may have some radicular pain into the legs that is separate from the rectal pain. Focus is first on the rectal pain.     Urinary retention, chronic  BPH    Developed approximately 2 weeks after he had a laparoscopic cholecystectomy in May.  Catheter was placed in May and has been in since.  Patient previously seen by [facility] and then more recently by Dr. Oneill here.  Patient has failed trials of voids multiple times.  Reports he had cystoscopy done previously which was normal.  Patient is on oxybutynin for bladder spasms.  -Continue home oxybutynin, finasteride   - resuming tamsulosin  - continue leon catheter   - continue outpatient urology follow up    Hypertension  Blood pressures stable.  - Continue home amlodipine 10 daily    MARIAN  On BiPAP at home per [facility] records.    Covid status  Patient tested positive for Covid 5/3/2021.  He tested positive on 8/26/2021 when he was admitted to [facility], suspect this was lingering viral particles as he was asymptomatic at the time.    - He is not yet vaccinated because he has had all these other health issues since then, but he is open to getting vaccinated in the future.   - PCR negative 10/6/2021     Diet: Snacks/Supplements Adult: Other; SF pudding; Between Meals  Snacks/Supplements Adult: Other; Rita Doone cookies; Between Meals  Snacks/Supplements Adult: Other; yogurt; Between Meals  Regular Diet Adult    DVT Prophylaxis: Low Risk/Ambulatory with no VTE prophylaxis indicated  Leon Catheter: PRESENT, indication: Retention  Central Lines: None  Code Status: Full Code       Discussion: I think the patient can manage the peak pain with the tennis ball or other means of stretching the perineum when the muscles are spasmed.  If he  can see that he has this ability to treat this I think he will be less likely to be readmitted for extreme pain.  Patient is agreeable with this plan.    Disposition Plan   Expected discharge: Win in the morning    Attestation:  Total time: 60 minutes over 2 visits with 50 minutes of the time spent counseling patient on diagnosis and treatment plan    Royce Chávez MD  [facility] Medicine        Interval History   Try to go without narcotics overnight but had extreme pain requiring some IV morphine twice.  Tried using the tennis ball in bed.  Does not feel much better at this point.    Physical Exam   Temp:  [97.5  F (36.4  C)-98.5  F (36.9  C)] 98.5  F (36.9  C)  Pulse:  [] 118  Resp:  [17-18] 18  BP: ()/() 119/89  SpO2:  [93 %-98 %] 96 %    Weights:   Vitals:    10/06/21 2114 10/08/21 0539 10/10/21 0441   Weight: 73.7 kg (162 lb 7.7 oz) 69.2 kg (152 lb 8.9 oz) 69 kg (152 lb 1.9 oz)    Body mass index is 26.11 kg/m .    Constitutional: Alert and oriented x4, nontoxic, occasionally appears very uncomfortable.  CV: Regular  Respiratory: CTA bilaterally  GI: Soft, non-tender  Musculoskeletal: The perineum anterior to the anus was palpated and there is clearly palpable bands of spastic muscle that were tender and reproduced his severe pain.  When constant pressure applied the patient initially had pain which caused him to yell out but within seconds of constant pressure his pain improved and the muscle could be felt to relax.  To the right of center in the perineum was some tenderness that radiated down the right leg and it was associated with palpation of a band of tight muscle that also did improve with constant pressure applied for 30 to 60 seconds.  Skin: Warm and dry    Data   Recent Labs   Lab 10/06/21  1353   WBC 9.1   HGB 14.5   MCV 87         POTASSIUM 3.6   CHLORIDE 106   CO2 22   BUN 15   CR 0.76   ANIONGAP 10   ANITA 9.7   *   ALBUMIN 3.9   PROTTOTAL 7.3   BILITOTAL 1.8*    ALKPHOS 33*   ALT 41   AST 18       Recent Labs   Lab 10/06/21  1353   *        Unresulted Labs Ordered in the Past 30 Days of this Admission     Date and Time Order Name Status Description    10/6/2021  7:05 PM Blood Culture Peripheral Blood Preliminary     10/6/2021  7:05 PM Blood Culture Peripheral Blood Preliminary            Imaging: No results found for this or any previous visit (from the past 24 hour(s)).     I reviewed all new labs and imaging results over the last 24 hours. I personally reviewed no images or EKG's today.    Medications       amLODIPine  10 mg Oral Daily     cloNIDine  0.1 mg Oral TID     clotrimazole  1 applicator Topical BID     DULoxetine  20 mg Oral Daily     finasteride  5 mg Oral Daily     hydrocortisone-pramoxine  1 Applicatorful Rectal BID     ibuprofen  600 mg Oral 4x Daily     [START ON 10/11/2021] levofloxacin  750 mg Oral QAM AC     nitroGLYcerin  1 each Topical BID     oxybutynin ER  10 mg Oral Daily     polyethylene glycol  17 g Oral Daily     pregabalin  25 mg Oral TID     tamsulosin  0.4 mg Oral Daily   Reviewed    Royce Chávez MD  [facility] Medicine

## 2021-10-11 ENCOUNTER — TELEPHONE (OUTPATIENT)
Dept: UROLOGY | Facility: CLINIC | Age: 61
End: 2021-10-11

## 2021-10-11 LAB
ALBUMIN SERPL-MCNC: 3 G/DL (ref 3.4–5)
ALP SERPL-CCNC: 30 U/L (ref 40–150)
ALT SERPL W P-5'-P-CCNC: 29 U/L (ref 0–70)
ANION GAP SERPL CALCULATED.3IONS-SCNC: 7 MMOL/L (ref 3–14)
AST SERPL W P-5'-P-CCNC: 10 U/L (ref 0–45)
BACTERIA BLD CULT: NO GROWTH
BACTERIA BLD CULT: NO GROWTH
BILIRUB SERPL-MCNC: 0.8 MG/DL (ref 0.2–1.3)
BUN SERPL-MCNC: 18 MG/DL (ref 7–30)
CALCIUM SERPL-MCNC: 8.8 MG/DL (ref 8.5–10.1)
CHLORIDE BLD-SCNC: 106 MMOL/L (ref 94–109)
CO2 SERPL-SCNC: 24 MMOL/L (ref 20–32)
CREAT SERPL-MCNC: 0.9 MG/DL (ref 0.66–1.25)
ERYTHROCYTE [DISTWIDTH] IN BLOOD BY AUTOMATED COUNT: 11.5 % (ref 10–15)
GFR SERPL CREATININE-BSD FRML MDRD: >90 ML/MIN/1.73M2
GLUCOSE BLD-MCNC: 163 MG/DL (ref 70–99)
HCT VFR BLD AUTO: 36.4 % (ref 40–53)
HGB BLD-MCNC: 12.8 G/DL (ref 13.3–17.7)
MCH RBC QN AUTO: 30.7 PG (ref 26.5–33)
MCHC RBC AUTO-ENTMCNC: 35.2 G/DL (ref 31.5–36.5)
MCV RBC AUTO: 87 FL (ref 78–100)
PLATELET # BLD AUTO: 300 10E3/UL (ref 150–450)
POTASSIUM BLD-SCNC: 4 MMOL/L (ref 3.4–5.3)
PROT SERPL-MCNC: 6 G/DL (ref 6.8–8.8)
RBC # BLD AUTO: 4.17 10E6/UL (ref 4.4–5.9)
SODIUM SERPL-SCNC: 137 MMOL/L (ref 133–144)
WBC # BLD AUTO: 7.3 10E3/UL (ref 4–11)

## 2021-10-11 PROCEDURE — 99221 1ST HOSP IP/OBS SF/LOW 40: CPT | Performed by: SURGERY

## 2021-10-11 PROCEDURE — 250N000013 HC RX MED GY IP 250 OP 250 PS 637: Performed by: HOSPITALIST

## 2021-10-11 PROCEDURE — 80053 COMPREHEN METABOLIC PANEL: CPT | Performed by: SURGERY

## 2021-10-11 PROCEDURE — 99225 PR SUBSEQUENT OBSERVATION CARE,LEVEL II: CPT | Performed by: INTERNAL MEDICINE

## 2021-10-11 PROCEDURE — 250N000013 HC RX MED GY IP 250 OP 250 PS 637: Performed by: PHYSICIAN ASSISTANT

## 2021-10-11 PROCEDURE — 36415 COLL VENOUS BLD VENIPUNCTURE: CPT | Performed by: SURGERY

## 2021-10-11 PROCEDURE — 85027 COMPLETE CBC AUTOMATED: CPT | Performed by: SURGERY

## 2021-10-11 PROCEDURE — G0378 HOSPITAL OBSERVATION PER HR: HCPCS

## 2021-10-11 PROCEDURE — 250N000013 HC RX MED GY IP 250 OP 250 PS 637: Performed by: INTERNAL MEDICINE

## 2021-10-11 RX ORDER — PREGABALIN 25 MG/1
25 CAPSULE ORAL ONCE
Status: COMPLETED | OUTPATIENT
Start: 2021-10-11 | End: 2021-10-11

## 2021-10-11 RX ORDER — PREGABALIN 50 MG/1
50 CAPSULE ORAL 3 TIMES DAILY
Status: DISCONTINUED | OUTPATIENT
Start: 2021-10-11 | End: 2021-10-13 | Stop reason: HOSPADM

## 2021-10-11 RX ADMIN — CYCLOBENZAPRINE 10 MG: 5 TABLET, FILM COATED ORAL at 03:02

## 2021-10-11 RX ADMIN — POLYETHYLENE GLYCOL 3350 17 G: 17 POWDER, FOR SOLUTION ORAL at 08:15

## 2021-10-11 RX ADMIN — OXYBUTYNIN CHLORIDE 10 MG: 5 TABLET, EXTENDED RELEASE ORAL at 08:17

## 2021-10-11 RX ADMIN — PRAMOXINE HYDROCHLORIDE HYDROCORTISONE ACETATE 1 APPLICATORFUL: 100; 100 AEROSOL, FOAM TOPICAL at 21:20

## 2021-10-11 RX ADMIN — CYCLOBENZAPRINE 5 MG: 5 TABLET, FILM COATED ORAL at 11:13

## 2021-10-11 RX ADMIN — PRAMOXINE HYDROCHLORIDE HYDROCORTISONE ACETATE 1 APPLICATORFUL: 100; 100 AEROSOL, FOAM TOPICAL at 08:19

## 2021-10-11 RX ADMIN — AMLODIPINE BESYLATE 10 MG: 10 TABLET ORAL at 08:17

## 2021-10-11 RX ADMIN — OXYCODONE 5 MG: 5 TABLET ORAL at 12:49

## 2021-10-11 RX ADMIN — FINASTERIDE 5 MG: 5 TABLET, FILM COATED ORAL at 08:17

## 2021-10-11 RX ADMIN — CLONIDINE HYDROCHLORIDE 0.1 MG: 0.1 TABLET ORAL at 20:20

## 2021-10-11 RX ADMIN — PREGABALIN 50 MG: 50 CAPSULE ORAL at 20:20

## 2021-10-11 RX ADMIN — IBUPROFEN 600 MG: 600 TABLET ORAL at 18:30

## 2021-10-11 RX ADMIN — PREGABALIN 25 MG: 25 CAPSULE ORAL at 08:18

## 2021-10-11 RX ADMIN — ACETAMINOPHEN 975 MG: 325 TABLET, FILM COATED ORAL at 11:13

## 2021-10-11 RX ADMIN — CLONIDINE HYDROCHLORIDE 0.1 MG: 0.1 TABLET ORAL at 08:17

## 2021-10-11 RX ADMIN — LEVOFLOXACIN 750 MG: 500 TABLET, FILM COATED ORAL at 07:01

## 2021-10-11 RX ADMIN — PREGABALIN 25 MG: 25 CAPSULE ORAL at 13:22

## 2021-10-11 RX ADMIN — DULOXETINE HYDROCHLORIDE 20 MG: 20 CAPSULE, DELAYED RELEASE ORAL at 08:26

## 2021-10-11 RX ADMIN — IBUPROFEN 600 MG: 600 TABLET ORAL at 21:21

## 2021-10-11 RX ADMIN — PREGABALIN 25 MG: 25 CAPSULE ORAL at 15:53

## 2021-10-11 RX ADMIN — CLONIDINE HYDROCHLORIDE 0.1 MG: 0.1 TABLET ORAL at 13:22

## 2021-10-11 RX ADMIN — CYCLOBENZAPRINE 10 MG: 5 TABLET, FILM COATED ORAL at 18:32

## 2021-10-11 RX ADMIN — ACETAMINOPHEN 975 MG: 325 TABLET, FILM COATED ORAL at 20:19

## 2021-10-11 RX ADMIN — TAMSULOSIN HYDROCHLORIDE 0.4 MG: 0.4 CAPSULE ORAL at 08:17

## 2021-10-11 RX ADMIN — OXYCODONE 5 MG: 5 TABLET ORAL at 21:22

## 2021-10-11 RX ADMIN — CLOTRIMAZOLE 1 G: 1 CREAM TOPICAL at 11:06

## 2021-10-11 RX ADMIN — NITROGLYCERIN 15 MG: 20 OINTMENT TOPICAL at 08:16

## 2021-10-11 RX ADMIN — OXYCODONE 5 MG: 5 TABLET ORAL at 03:01

## 2021-10-11 RX ADMIN — NITROGLYCERIN 15 MG: 20 OINTMENT TOPICAL at 21:31

## 2021-10-11 RX ADMIN — CLOTRIMAZOLE 1 G: 1 CREAM TOPICAL at 21:20

## 2021-10-11 RX ADMIN — IBUPROFEN 600 MG: 600 TABLET ORAL at 13:22

## 2021-10-11 RX ADMIN — IBUPROFEN 600 MG: 600 TABLET ORAL at 08:17

## 2021-10-11 ASSESSMENT — MIFFLIN-ST. JEOR: SCORE: 1405

## 2021-10-11 NOTE — PLAN OF CARE
Patient still having pain, had an episode of severe pain this morning and received one time dose of 2mg morphine.  Has been better the rest of the day, have encouraged use of tennis ball when he starts to feel the pain and spasms start, which he has been doing and feels it is somewhat helpful.  5mg oxycodone given once this afternoon and has also had a dose of flexeril.  Able to get up and move about independently.

## 2021-10-11 NOTE — PLAN OF CARE
"Pt is A/O, able to make needs known. Blood pressure 113/74, pulse 66, temperature 98.1  F (36.7  C), temperature source Oral, resp. rate 16, height 1.626 m (5' 4\"), weight 69.4 kg (153 lb), SpO2 96 %.Stable on room air. Pt has chronic [place], patent with good output. Up ambulating independently. Pain is managed with Tylenol alternating with Ibuprofen. Also using Oxycodone and Flexeril for pain management. Tennis ball therapy utilized per MD instructions. Pt with better pain control tonight  Continue to assess per plan of care.    "

## 2021-10-11 NOTE — TELEPHONE ENCOUNTER
Reason for call:  Other   Patient called regarding (reason for call): appointment  Additional comments:  Patient calling. He is in the [facility]. He missed his appt today. Please call to reschedule.     Phone number to reach patient:  Home number on file [phone] (home)    Best Time:   Any     Can we leave a detailed message on this number?  YES    Travel screening: Not Applicable

## 2021-10-11 NOTE — PLAN OF CARE
"Pt is A/O, able to make needs known. Blood pressure 102/61, pulse 85, temperature 98.9  F (37.2  C), temperature source Oral, resp. rate 18, height 1.626 m (5' 4\"), weight 69 kg (152 lb 1.9 oz), SpO2 95 %.  Requested Oxycodone for pain at 2100. Still utilizing tennis ball per MD instructions. Appears to be more comfortable at present. Resting.   Continue to assess per plan of care.  "

## 2021-10-11 NOTE — PROGRESS NOTES
[facility] Medicine Progress Note  Date of Service: 10/11/2021     Assessment & Plan           Mustapha Negrete is a 60 year old male history of prostate cancer, BPH, recent diagnosis of urinary retention 2 weeks after laparoscopic cholecystectomy, MARIAN on BiPAP, low back pain and Covid infection in April presenting with uncontrolled thigh and rectal pain.    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 Worse with BM. Also reports posterior thigh pain around the same time. Previously had several lesions on the lateral thighs, first the right then the left. Had been told by a chiropractor that this may be shingles.   Patient was admitted at [facility] 8/26 through 8/29 for the same issue.  He has been seen in the ER four times since then for similar complaints.  He was evaluated by [facility] neurological clinic back in July and had MRIs of his L-spine, see below. Prior laboratory work-up includes normal vitamin D and vitamin B12 and folate levels, negative syphilis, HIV, negative O&P. Additionally he was seen by [place] [facility], most recently last week at which time he was diagnosed with anal fissure along with a perianal fungal infection and started on topical antifungal as well as topical nitroglycerin.  He feels that this has seemed to help a bit. He has undergone colonoscopy recently which was reportedly normal.   In ED, patient did have quite a bit of prostate tenderness on rectal exam and so he was initiated on levofloxacin for possible prostatitis. He also reports increased pain with catheter exchanges.    Thigh pain seems to be neuropathic - question if this is a postherpetic neuralgia (prior rash, reported on bilateral lateral thighs) vs lumbar radiculopathy (though bilateral and correlates more with S1 than lumbar changes noted  below) vs pain syndrome. Patient reports previously been on gabapentin which was not terribly helpful for him.    Overall this distribution and description of pain is unusual. Though has had extensive work up as outpatient. Unclear exactly what is causing his symptoms, will trial below therapies and monitor response.  This admission:    Normal inflammatory markers: WBC 9.1, ESR 10 and CRP <2.9.     Blood cultures NGTD    Urine culture positive Enterobacter cloacae complex, sens to cipro/levo    Gonorrhea and Chlamydia urinary negative      Patient has previously been diagnosed with  Prostatitis this summer and has been treated with ciprofloxacin 500mg BID x 28 days 6/22-7/19 without improvement and also a course of Augmentin 3 weeks ago. UC positive for Enterobacter. Possible patient still has acute bacterial prostatitis and gave a dose of ertapenem while waiting for sensitivities to return on Enterobacter.  Sensitivities show that the Enterobacter is sensitive to fluoroquinolones.  It is possible the patient did not respond to the long course of ciprofloxacin due to the dosing and trial of high-dose levofloxacin started to assure any possible infection has been treated.     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 DDx includes chronic prostatitis/chronic pelvic pain syndrome (CP/CPPS). His pain complaints are diverse. . He stopped his tamsulosin due to feeling that it caused his penis to fill partially with fluid but unlikely this was due to tamsulosin and he is willing to try again as this is first line for CP/CPPS along with finasteride. The topical nitroglycerin is treatment for the PF. PF involves spasms of the pelvic floor that are triggered first by smooth muscle spasms the rectum and anus. These can be relieved by stretching by sitting on a tennis ball. Finally, his pain syndrome is perpetuated by the use  of narcotics with worsening pain coming on as the narcotics wear off.    Patient has palpable muscle spasms in anterior perineum that reproduce his pelvic pain and at times pain radiating down right leg. These spasms were improved with constant pressure for 30-60 seconds on exam. This is most consistent with PF. Discussed with patient at length and reinforced that he can reduce his pain significantly by getting the spasms to stretch out.     Patient is using tennis ball with intermittent success at pain improvement. Still with occasional severe pain requiring oral oxycodone. Feels Lyrica is helping.   - Use tennis ball to relieve muscle spasms of pelvic floor - patient has been doing this with some success   - surgery consult today, discussed with Dr. Cruz   - resumed tamsulosin   - continue finasteride   - continue topical nitroglycerin   - continue schedule ibuprofen 600 mg QID   - pregabalin started 10/10 with some benefit, increase to 50 mg TID   - oxycodone oral available but patient is to try the tennis ball maneuver first and he is agreeable. Suspect ongoing narcotic use is part of his cycle of pain and needs to wean off.   - Blood pressures running a little bit up and so giving some temporary clonidine to see if that helps coming off the narcotics. Patient feels this may be helping so continuing   - course of high-dose levofloxacin 750 mg for possible bacterial prostatitis   -Continue clotrimazole topical cream twice daily for previously diagnosed perianal fungal infection  -- continue hydrocortisone rectal foam for discomfort    - physical therapy pelvic pain therapy available to see Tuesday    Recent leg lesions, possible bedbug bites    Patient has lesions in a line on one leg and later on the other. He has pictures from when they were acute on the left and these lesions have the appearance to me of bedbug bites as they are in a line, have a central dark wound surrounded by erythema and localized  swelling and he had symptoms of itching. They do not appear like zoster in my opinion.   -I asked patient to have a family member check his room for evidence of bedbug infestation. They can look online to find out what to look for. Discussed with son 10/10    L4/L5 compression  7/26/2021 MRI lumbar spine as below:    Unclear if these L5 and L4 nerve compressions are related to his symptoms.  At one  point patient was considered for what sounds like epidural spinal injections but due to concern for UTI and topical infection, this was deferred and not pursued.  Could consider repeat imaging but at this point patient would not be able to lay still without sedation for MRI, so will hold off.  Patient recently seen by a spine clinic and a plan is being formulated.    Patient noted to have muscle spasm pain down posterior right leg triggered by exam of perineum. Low suspicion that this pain is radiclar.     Urinary retention, chronic  BPH    Developed approximately 2 weeks after he had a laparoscopic cholecystectomy in May.  Catheter was placed in May and has been in since.  Patient previously seen by [place] [facility] and then more recently by Dr. Oneill here.  Patient has failed trials of voids multiple times.  Reports he had cystoscopy done previously which was normal.  Patient is on oxybutynin for bladder spasms.  -Continue home oxybutynin, finasteride   - resuming tamsulosin  - continue leon catheter   - missed outpatient urology follow up, Dr. Oneill not available to see inpatient today    Hypertension  Blood pressures stable.  - Continue home amlodipine 10 daily    MARIAN  On BiPAP at home per [facility] records.    Covid status  Patient tested positive for Covid 5/3/2021.  He tested positive on 8/26/2021 when he was admitted to [place], suspect this was lingering viral particles as he was asymptomatic at the time.    - He is not yet vaccinated because he has had all these other health issues since then, but he is open  to getting vaccinated in the future.   - PCR negative 10/6/2021     Diet: Snacks/Supplements Adult: Other; SF pudding; Between Meals  Snacks/Supplements Adult: Other; Rita Doone cookies; Between Meals  Snacks/Supplements Adult: Other; yogurt; Between Meals  Regular Diet Adult    DVT Prophylaxis: Low Risk/Ambulatory with no VTE prophylaxis indicated  Walden Catheter: PRESENT, indication: Retention  Central Lines: None  Code Status: Full Code           Discussion: Patient fearful of discharge without better control of peak pain episodes. Surgery to see.     Disposition Plan   Expected discharge:Soon.   Attestation:  Total time: 40 minutes    Royce Chávez MD  [facility]        Interval History   Some benefit with tennis ball massage but still some peak pain episodes requiring oxycodone. Feels Lyrica is helping. Afraid to go home.    Physical Exam   Temp:  [97.3  F (36.3  C)-98.9  F (37.2  C)] 97.3  F (36.3  C)  Pulse:  [] 87  Resp:  [16-18] 18  BP: (102-132)/(61-86) 120/70  SpO2:  [93 %-96 %] 96 %    Weights:   Vitals:    10/08/21 0539 10/10/21 0441 10/11/21 0327   Weight: 69.2 kg (152 lb 8.9 oz) 69 kg (152 lb 1.9 oz) 69.4 kg (153 lb)    Body mass index is 26.26 kg/m .    Constitutional: alert and oriented, nontoxic  CV: Regular  Respiratory: CTA bilaterally  GI: Soft, non-tender, bowel sounds present  Skin: Warm and dry  Musculoskeletal: perineal exam reproduces pain including triggering muscle spasm/pain down posterior right leg    Data   Recent Labs   Lab 10/06/21  1353   WBC 9.1   HGB 14.5   MCV 87         POTASSIUM 3.6   CHLORIDE 106   CO2 22   BUN 15   CR 0.76   ANIONGAP 10   ANITA 9.7   *   ALBUMIN 3.9   PROTTOTAL 7.3   BILITOTAL 1.8*   ALKPHOS 33*   ALT 41   AST 18       Recent Labs   Lab 10/06/21  1353   *        Unresulted Labs Ordered in the Past 30 Days of this Admission     Date and Time Order Name Status Description    10/6/2021  7:05 PM Blood Culture Peripheral Blood  Preliminary     10/6/2021  7:05 PM Blood Culture Peripheral Blood Preliminary            Imaging: No results found for this or any previous visit (from the past 24 hour(s)).     I reviewed all new labs and imaging results over the last 24 hours. I personally reviewed no images or EKG's today.    Medications       amLODIPine  10 mg Oral Daily     cloNIDine  0.1 mg Oral TID     clotrimazole  1 applicator Topical BID     DULoxetine  20 mg Oral Daily     finasteride  5 mg Oral Daily     hydrocortisone-pramoxine  1 Applicatorful Rectal BID     ibuprofen  600 mg Oral 4x Daily     levofloxacin  750 mg Oral QAM AC     nitroGLYcerin  1 each Topical BID     oxybutynin ER  10 mg Oral Daily     polyethylene glycol  17 g Oral Daily     pregabalin  25 mg Oral Once     pregabalin  50 mg Oral TID     tamsulosin  0.4 mg Oral Daily   Reviewed maru Chávez MD  [facility]

## 2021-10-12 ENCOUNTER — APPOINTMENT (OUTPATIENT)
Dept: PHYSICAL THERAPY | Facility: CLINIC | Age: 61
End: 2021-10-12
Payer: COMMERCIAL

## 2021-10-12 DIAGNOSIS — R33.9 URINARY RETENTION: ICD-10-CM

## 2021-10-12 PROCEDURE — 250N000013 HC RX MED GY IP 250 OP 250 PS 637: Performed by: INTERNAL MEDICINE

## 2021-10-12 PROCEDURE — 97110 THERAPEUTIC EXERCISES: CPT | Mod: GP | Performed by: PHYSICAL THERAPIST

## 2021-10-12 PROCEDURE — 99225 PR SUBSEQUENT OBSERVATION CARE,LEVEL II: CPT | Performed by: HOSPITALIST

## 2021-10-12 PROCEDURE — 96375 TX/PRO/DX INJ NEW DRUG ADDON: CPT

## 2021-10-12 PROCEDURE — 250N000011 HC RX IP 250 OP 636: Performed by: PHYSICIAN ASSISTANT

## 2021-10-12 PROCEDURE — 250N000013 HC RX MED GY IP 250 OP 250 PS 637: Performed by: HOSPITALIST

## 2021-10-12 PROCEDURE — G0378 HOSPITAL OBSERVATION PER HR: HCPCS

## 2021-10-12 PROCEDURE — 99207 PR CDG-CODE CATEGORY CHANGED: CPT | Performed by: HOSPITALIST

## 2021-10-12 PROCEDURE — 97162 PT EVAL MOD COMPLEX 30 MIN: CPT | Mod: GP | Performed by: PHYSICAL THERAPIST

## 2021-10-12 PROCEDURE — 250N000013 HC RX MED GY IP 250 OP 250 PS 637: Performed by: PHYSICIAN ASSISTANT

## 2021-10-12 RX ORDER — OXYBUTYNIN CHLORIDE 10 MG/1
10 TABLET, EXTENDED RELEASE ORAL DAILY
Qty: 60 TABLET | Refills: 0 | Status: SHIPPED | OUTPATIENT
Start: 2021-10-12 | End: 2021-10-13

## 2021-10-12 RX ADMIN — CLOTRIMAZOLE 1 G: 1 CREAM TOPICAL at 07:57

## 2021-10-12 RX ADMIN — ACETAMINOPHEN 975 MG: 325 TABLET, FILM COATED ORAL at 19:43

## 2021-10-12 RX ADMIN — OXYCODONE 5 MG: 5 TABLET ORAL at 19:59

## 2021-10-12 RX ADMIN — CLONIDINE HYDROCHLORIDE 0.1 MG: 0.1 TABLET ORAL at 07:54

## 2021-10-12 RX ADMIN — OXYBUTYNIN CHLORIDE 10 MG: 5 TABLET, EXTENDED RELEASE ORAL at 07:53

## 2021-10-12 RX ADMIN — NITROGLYCERIN 15 MG: 20 OINTMENT TOPICAL at 07:59

## 2021-10-12 RX ADMIN — IBUPROFEN 600 MG: 600 TABLET ORAL at 21:16

## 2021-10-12 RX ADMIN — IBUPROFEN 600 MG: 600 TABLET ORAL at 07:54

## 2021-10-12 RX ADMIN — ONDANSETRON 4 MG: 2 INJECTION INTRAMUSCULAR; INTRAVENOUS at 01:43

## 2021-10-12 RX ADMIN — FINASTERIDE 5 MG: 5 TABLET, FILM COATED ORAL at 07:57

## 2021-10-12 RX ADMIN — IBUPROFEN 600 MG: 600 TABLET ORAL at 17:36

## 2021-10-12 RX ADMIN — CLONIDINE HYDROCHLORIDE 0.1 MG: 0.1 TABLET ORAL at 14:10

## 2021-10-12 RX ADMIN — CYCLOBENZAPRINE 10 MG: 5 TABLET, FILM COATED ORAL at 19:43

## 2021-10-12 RX ADMIN — CYCLOBENZAPRINE 10 MG: 5 TABLET, FILM COATED ORAL at 11:43

## 2021-10-12 RX ADMIN — POLYETHYLENE GLYCOL 3350 17 G: 17 POWDER, FOR SOLUTION ORAL at 07:53

## 2021-10-12 RX ADMIN — DULOXETINE HYDROCHLORIDE 20 MG: 20 CAPSULE, DELAYED RELEASE ORAL at 07:53

## 2021-10-12 RX ADMIN — NITROGLYCERIN 15 MG: 20 OINTMENT TOPICAL at 21:17

## 2021-10-12 RX ADMIN — AMLODIPINE BESYLATE 10 MG: 10 TABLET ORAL at 07:54

## 2021-10-12 RX ADMIN — CLOTRIMAZOLE 1 G: 1 CREAM TOPICAL at 19:47

## 2021-10-12 RX ADMIN — TAMSULOSIN HYDROCHLORIDE 0.4 MG: 0.4 CAPSULE ORAL at 07:54

## 2021-10-12 RX ADMIN — CYCLOBENZAPRINE 10 MG: 5 TABLET, FILM COATED ORAL at 04:11

## 2021-10-12 RX ADMIN — PRAMOXINE HYDROCHLORIDE HYDROCORTISONE ACETATE 1 APPLICATORFUL: 100; 100 AEROSOL, FOAM TOPICAL at 19:47

## 2021-10-12 RX ADMIN — ACETAMINOPHEN 975 MG: 325 TABLET, FILM COATED ORAL at 04:12

## 2021-10-12 RX ADMIN — SENNOSIDES AND DOCUSATE SODIUM 1 TABLET: 50; 8.6 TABLET ORAL at 17:37

## 2021-10-12 RX ADMIN — OXYCODONE 5 MG: 5 TABLET ORAL at 08:04

## 2021-10-12 RX ADMIN — LEVOFLOXACIN 750 MG: 500 TABLET, FILM COATED ORAL at 06:25

## 2021-10-12 RX ADMIN — PRAMOXINE HYDROCHLORIDE HYDROCORTISONE ACETATE 1 APPLICATORFUL: 100; 100 AEROSOL, FOAM TOPICAL at 07:57

## 2021-10-12 RX ADMIN — IBUPROFEN 600 MG: 600 TABLET ORAL at 11:43

## 2021-10-12 RX ADMIN — CLONIDINE HYDROCHLORIDE 0.1 MG: 0.1 TABLET ORAL at 19:46

## 2021-10-12 RX ADMIN — ACETAMINOPHEN 975 MG: 325 TABLET, FILM COATED ORAL at 14:10

## 2021-10-12 NOTE — UTILIZATION REVIEW
"[facility]     Admission Status; Secondary Review Determination     Admission Date: 10/6/2021 12:33 PM      Under the authority of the Utilization Management Committee, the utilization review process indicated a secondary review on the above patient.  The review outcome is based on review of the medical records, discussions with staff, and applying clinical experience noted on the date of the review.          (x) Observation Status Appropriate - This patient does not meet hospital inpatient criteria and is placed in observation status. If this patient's primary payer is Medicare and was admitted as an inpatient, Condition Code 44 should be used and patient status changed to \"observation\".     RATIONALE FOR DETERMINATION   60-year-old male with a history of prostate cancer, BPH, urinary retention, sleep apnea, and low back pain was admitted with possible prostatitis triggering pelvic pain syndrome.  General surgery saw the patient and signed off.  He is on finasteride and Flomax.  Analgesics have been started with some improvement of his symptoms.  Most recently Lyrica has created some dizziness requiring dose titration.  Urology will be consulted.  He has a Walden for retention.  Overall he is showing clinical improvement.  He also is participating in pelvic floor exercises.  Plan is for trial of voiding and likely discharge tomorrow.  At the time of this review the patient does not meet medical necessity for inpatient hospitalization and observation status remains appropriate.    The severity of illness, intensity of service provided, expected LOS and risk for adverse outcome make the care appropriate for further observation; however, doesn't meet criteria for hospital inpatient admission.         The information on this document is developed by the utilization review team in order for the business office to ensure compliance.  This only denotes the appropriateness of proper admission status and does " not reflect the quality of care rendered.         The definitions of Inpatient Status and Observation Status used in making the determination above are those provided in the CMS Coverage Manual, Chapter 1 and Chapter 6, section 70.4.      Sincerely,     Rubio Reyes DO, MPH   Physician Advisor  Utilization Review  [facility]

## 2021-10-12 NOTE — PROGRESS NOTES
[facility] Medicine Progress Note  Date of Service: 10/12/2021     Assessment & Plan           Mustapha Negrete is a 60 year old male history of prostate cancer, BPH, recent diagnosis of urinary retention 2 weeks after laparoscopic cholecystectomy, MARIAN on BiPAP, low back pain and Covid infection in April presenting with uncontrolled thigh and rectal pain.    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 Worse with BM. Also reports posterior thigh pain around the same time. Previously had several lesions on the lateral thighs, first the right then the left. Had been told by a chiropractor that this may be shingles.   Patient was admitted at [facility] 8/26 through 8/29 for the same issue.  He has been seen in the ER four times since then for similar complaints.  He was evaluated by [facility] neurological clinic back in July and had MRIs of his L-spine, see below. Prior laboratory work-up includes normal vitamin D and vitamin B12 and folate levels, negative syphilis, HIV, negative O&P. Additionally he was seen by [place] [facility], most recently last week at which time he was diagnosed with anal fissure along with a perianal fungal infection and started on topical antifungal as well as topical nitroglycerin.  He feels that this has seemed to help a bit. He has undergone colonoscopy recently which was reportedly normal.   In ED, patient did have quite a bit of prostate tenderness on rectal exam and so he was initiated on levofloxacin for possible prostatitis. He also reports increased pain with catheter exchanges.    Thigh pain seems to be neuropathic - question if this is a postherpetic neuralgia (prior rash, reported on bilateral lateral thighs) vs lumbar radiculopathy (though bilateral and correlates more with S1 than lumbar changes noted  below) vs pain syndrome. Patient reports previously been on gabapentin which was not terribly helpful for him.    Overall this distribution and description of pain is unusual. Though has had extensive work up as outpatient. Unclear exactly what is causing his symptoms, will trial below therapies and monitor response.  This admission:    Normal inflammatory markers: WBC 9.1, ESR 10 and CRP <2.9.     Blood cultures NGTD    Urine culture positive Enterobacter cloacae complex, sens to cipro/levo    Gonorrhea and Chlamydia urinary negative      Patient has previously been diagnosed with  Prostatitis this summer and has been treated with ciprofloxacin 500mg BID x 28 days 6/22-7/19 without improvement and also a course of Augmentin 3 weeks ago. UC positive for Enterobacter. Possible patient still has acute bacterial prostatitis and gave a dose of ertapenem while waiting for sensitivities to return on Enterobacter.  Sensitivities show that the Enterobacter is sensitive to fluoroquinolones.  It is possible the patient did not respond to the long course of ciprofloxacin due to the dosing and trial of high-dose levofloxacin started to assure any possible infection has been treated.     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 DDx includes chronic prostatitis/chronic pelvic pain syndrome (CP/CPPS). His pain complaints are diverse. . He stopped his tamsulosin due to feeling that it caused his penis to fill partially with fluid but unlikely this was due to tamsulosin and he is willing to try again as this is first line for CP/CPPS along with finasteride. The topical nitroglycerin is treatment for the PF. PF involves spasms of the pelvic floor that are triggered first by smooth muscle spasms the rectum and anus. These can be relieved by stretching by sitting on a tennis ball. Finally, his pain syndrome is perpetuated by the use  of narcotics with worsening pain coming on as the narcotics wear off.    Patient has palpable muscle spasms in anterior perineum that reproduce his pelvic pain and at times pain radiating down right leg. These spasms were improved with constant pressure for 30-60 seconds on exam. This is most consistent with PF. Discussed with patient at length and reinforced that he can reduce his pain significantly by getting the spasms to stretch out.     Patient is using tennis ball with intermittent success at pain improvement. Still with occasional severe pain requiring oral oxycodone.  Lyrica was helping but he reports it is making him dizzy at night does not want to take anymore.     Was seen by general surgery yesterday, signed off.   -Pelvic floor physical therapy saw patient today, advised to not use tennis ball and had alternative strategies to offer   - resumed tamsulosin   - continue finasteride   - continue topical nitroglycerin   - continue schedule ibuprofen 600 mg QID   - pregabalin started 10/10 with some benefit, however patient reporting dizziness after increasing to 50 mg 3 times daily and feels that this is causing it.  Will hold further doses for today and reevaluate tomorrow   - oxycodone oral available but patient is to try the tennis ball maneuver first and he is agreeable. Suspect ongoing narcotic use is part of his cycle of pain and needs to wean off.   - Blood pressures running a little bit up and so giving some temporary clonidine to see if that helps coming off the narcotics. Patient feels this may be helping so continuing   - course of high-dose levofloxacin 750 mg for possible bacterial prostatitis   -Continue clotrimazole topical cream twice daily for previously diagnosed perianal fungal infection  -- continue hydrocortisone rectal foam for discomfort    - physical therapy pelvic pain therapy saw patient today, offered some exercises and will follow him as an outpatient.    Recent leg lesions,  possible bedbug bites    Patient has lesions in a line on one leg and later on the other. He has pictures from when they were acute on the left and these lesions have the appearance to me of bedbug bites as they are in a line, have a central dark wound surrounded by erythema and localized swelling and he had symptoms of itching. They do not appear like zoster in my opinion.   -I asked patient to have a family member check his room for evidence of bedbug infestation. They can look online to find out what to look for. Discussed with son 10/10    L4/L5 compression  7/26/2021 MRI lumbar spine as below:    Unclear if these L5 and L4 nerve compressions are related to his symptoms.  At one  point patient was considered for what sounds like epidural spinal injections but due to concern for UTI and topical infection, this was deferred and not pursued.  Could consider repeat imaging but at this point patient would not be able to lay still without sedation for MRI, so will hold off.  Patient recently seen by a spine clinic and a plan is being formulated.    Patient noted to have muscle spasm pain down posterior right leg triggered by exam of perineum. Low suspicion that this pain is radiclar.     Urinary retention, chronic  BPH    Developed approximately 2 weeks after he had a laparoscopic cholecystectomy in May.  Catheter was placed in May and has been in since.  Patient previously seen by [place] [facility] and then more recently by Dr. Oneill here.  Patient has failed trials of voids multiple times.  Reports he had cystoscopy done previously which was normal.  Patient is on oxybutynin for bladder spasms.  Patient previously had UroLift procedure done about a year and half ago  -Continue home finasteride   - resuming tamsulosin  - continue leon catheter but will do trial of void tomorrow  -Hold further oxybutynin (patient already received dose today so cannot do trial of void today)  -Appreciate Dr. Oneill of urology seeing  the patient today.    Hypertension  Blood pressures stable.  - Continue home amlodipine 10 daily    MARIAN  On BiPAP at home per [facility] records.    Covid status  Patient tested positive for Covid 5/3/2021.  He tested positive on 8/26/2021 when he was admitted to [facility], suspect this was lingering viral particles as he was asymptomatic at the time.    - He is not yet vaccinated because he has had all these other health issues since then, but he is open to getting vaccinated in the future.   - PCR negative 10/6/2021     Diet: Snacks/Supplements Adult: Other; SF pudding; Between Meals  Snacks/Supplements Adult: Other; Rita Doone cookies; Between Meals  Snacks/Supplements Adult: Other; yogurt; Between Meals  Regular Diet Adult    DVT Prophylaxis: Low Risk/Ambulatory with no VTE prophylaxis indicated  Walden Catheter: PRESENT, indication: Retention  Central Lines: None  Code Status: Full Code           Discussion: Urology advising inpatient trial of void tomorrow    Disposition Plan   Expected discharge: Tomorrow if trial of void goes well.   Attestation:    Reviewed labs  Discussed with Dr. Oneill of urology.  Also discussed with Dr. Pierre general surgery.        Interval History   Tolerated pelvic floor physical therapy.  Agreeable with doing trial of void tomorrow.  Dr. Oneill discussed intermittent catheterization as an alternative    Physical Exam   Temp:  [97.3  F (36.3  C)-98.5  F (36.9  C)] 98  F (36.7  C)  Pulse:  [74-94] 94  Resp:  [16-18] 16  BP: (112-132)/(67-89) 118/77  SpO2:  [93 %-96 %] 93 %    Weights:   Vitals:    10/08/21 0539 10/10/21 0441 10/11/21 0327   Weight: 69.2 kg (152 lb 8.9 oz) 69 kg (152 lb 1.9 oz) 69.4 kg (153 lb)    Body mass index is 26.26 kg/m .    Constitutional: alert and oriented, nontoxic  CV: Regular  Respiratory: CTA bilaterally  GI: Soft, non-tender, bowel sounds present  Skin: Warm and dry  Musculoskeletal: perineal exam reproduces pain including triggering muscle spasm/pain  down posterior right leg    Data   Recent Labs   Lab 10/11/21  1814 10/06/21  1353   WBC 7.3 9.1   HGB 12.8* 14.5   MCV 87 87    355    138   POTASSIUM 4.0 3.6   CHLORIDE 106 106   CO2 24 22   BUN 18 15   CR 0.90 0.76   ANIONGAP 7 10   ANITA 8.8 9.7   * 109*   ALBUMIN 3.0* 3.9   PROTTOTAL 6.0* 7.3   BILITOTAL 0.8 1.8*   ALKPHOS 30* 33*   ALT 29 41   AST 10 18       Recent Labs   Lab 10/11/21  1814 10/06/21  1353   * 109*        Unresulted Labs Ordered in the Past 30 Days of this Admission     No orders found from 9/6/2021 to 10/7/2021.           Imaging: No results found for this or any previous visit (from the past 24 hour(s)).     I reviewed all new labs and imaging results over the last 24 hours. I personally reviewed no images or EKG's today.    Medications       amLODIPine  10 mg Oral Daily     cloNIDine  0.1 mg Oral TID     clotrimazole  1 applicator Topical BID     DULoxetine  20 mg Oral Daily     finasteride  5 mg Oral Daily     hydrocortisone-pramoxine  1 Applicatorful Rectal BID     ibuprofen  600 mg Oral 4x Daily     levofloxacin  750 mg Oral QAM AC     nitroGLYcerin  1 each Topical BID     oxybutynin ER  10 mg Oral Daily     polyethylene glycol  17 g Oral Daily     pregabalin  50 mg Oral TID     tamsulosin  0.4 mg Oral Daily   Reviewed meds

## 2021-10-12 NOTE — PROGRESS NOTES
Physical Therapy Initial Inpatient Pelvic Floor Muscle Evaluation     10/12/21 0840   Quick Adds   Type of Visit Initial PT Evaluation   Living Environment   People in home alone   Self-Care   Current Activity Tolerance poor   Regular Exercise No   Disability/Function   Toileting issues yes  (Painful BMs and unable to fully empty bladder)   Change in Functional Status Since Onset of Current Illness/Injury yes   General Information   Onset of Illness/Injury or Date of Surgery   (Began after cholecystectomy in May 2021)   Referring Physician Alex Hunter MD   Patient/Family Therapy Goals Statement (PT) Get rid of pain, be able to urinate and have BMs   Pertinent History of Current Problem (include personal factors and/or comorbidities that impact the POC) Mustapha Negrete is a 60 year old male history of prostate cancer, BPH, recent diagnosis of urinary retention 2 weeks after laparoscopic cholecystectomy, MARIAN on BiPAP, low back pain and Covid infection in April presenting with uncontrolled thigh and rectal pain.  Pt is admitted d/t severe PFM pain, constipation, painful BMs and inability to urinate.    Existing Precautions/Restrictions no known precautions/restrictions   General Observations Pt is in great acute distress upon arrival of PT.    Cognition   Orientation Status (Cognition) oriented x 4   Affect/Mental Status (Cognition) agitated;anxious   Follows Commands (Cognition) WNL   Behavioral Issues overwhelmed easily   Safety Deficit (Cognition) minimal deficit   Pain Assessment   Patient Currently in Pain Yes, see Vital Sign flowsheet   Posture    Posture Not impaired   Range of Motion (ROM)   ROM Comment Decreased flexibility at (B) hips; piriformis tightness, adductor tightness, HS tightness   Strength   Strength Comments Pt has chronic tension and spasming of his PFM; viscious pain cycle present   Bed Mobility   Comment (Bed Mobility) Independent   Transfers   Transfer Safety Comments Independent   Sensory  "Examination   Sensory Perception Comments Radicular type pain down (R) posterior thigh \"to the foot.\"    Muscle Tone   Muscle Tone Comments Tension in PFM   Clinical Impression   Criteria for Skilled Therapeutic Intervention yes, treatment indicated   PT Diagnosis (PT) Impaired function at the Pelvic Floor Muscle   Influenced by the following impairments pain, chronic tone/tension, poor knowledge of function of the PFM   Functional limitations due to impairments dysuria, constipation, painful BMs   Clinical Presentation Unstable/Unpredictable   Clinical Presentation Rationale longstanding & worsening pain symptoms, unpredictable \"spasms\" of PFM, multiple comorbidities   Clinical Decision Making (Complexity) moderate complexity   Therapy Frequency (PT) One time eval and treatment only   Predicted Duration of Therapy Intervention (days/wks) 1 visit and then pt to be seen in OP PT   Planned Therapy Interventions (PT) cryotherapy;E-stim;home exercise program;joint mobilization;manual therapy techniques;motor coordination training;neuromuscular re-education;stretching;strengthening;other (see comments)  (Biofeedback)   Anticipated Equipment Needs at Discharge (PT)   (none)   Risk & Benefits of therapy have been explained evaluation/treatment results reviewed;care plan/treatment goals reviewed;participants voiced agreement with care plan;participants included;patient   Clinical Impression Comments Pt presents with primary c/o pelvic floor muscle pain/proctalgia fugax, of multiple month progression hx.  Pt could benefit from skilled PT to improve control, relaxation and elimination function of the PFM.    PT Discharge Planning    PT Discharge Recommendation (DC Rec) home with outpatient physical therapy   PT Rationale for DC Rec Pt could benfit from Formal Pelvic Floor Rehab program: biofeedback, behavioral modification and HEP.    PT Brief overview of current status  Pt in an acute attack of proctalgia fugax/PFM " tension/spasms.  Pt responded well to relaxation training, stretching and instruction in effective elimination techniques.  He will do well in an Outpatient PT program .   Total Evaluation Time   Total Evaluation Time (Minutes) 10   Thank you for the referral of this patient.  Val Molina, PT, MA  #[id number]

## 2021-10-12 NOTE — TELEPHONE ENCOUNTER
Requested Prescriptions   Pending Prescriptions Disp Refills     oxybutynin ER (DITROPAN-XL) 10 MG 24 hr tablet 60 tablet 3     Sig: Take 1 tablet (10 mg) by mouth daily       There is no refill protocol information for this order        Last office visit: 9/20/2021 with prescribing provider:  Dr. Oneill   Future Office Visit:          [facility]  Specialty Clinic CSS

## 2021-10-12 NOTE — PROGRESS NOTES
"Patient reported having some dizziness and feeling nauseous when he wakes up tonight. Patient recently started on new medications and reports it has progressively become more noticeable since then. Instructed patient to take his time when getting up and try eating something small prior to taking medication. Will continue to monitor.     /69 (BP Location: Left arm)   Pulse 74   Temp 98.3  F (36.8  C) (Oral)   Resp 18   Ht 1.626 m (5' 4\")   Wt 69.4 kg (153 lb)   SpO2 95%   BMI 26.26 kg/m      "

## 2021-10-12 NOTE — PLAN OF CARE
Patient receiving multiple medications for rectal cramping, burning and spasms with minimal relief.  Physical therapy has seen patient today and initiated some breathing techniques to help with relaxation.  Patient apparently was told not to use tennis ball therapy for rectal spasms anymore.

## 2021-10-12 NOTE — PROGRESS NOTES
"SPIRITUAL HEALTH SERVICES  [facility] - Med/Surg    Referral Source: Patient    - I provided follow up visit for Mustapha.  He shared a short narrative about the various things that were being tried by his care team.  Some haven't worked; others are \"promising.\"  He is very grateful for the competent and compassionate care that he has received.  He said, \"and not just from one or two staff, but from everybody!\"    - He reflected on how much he has been praying and, since he has been attending [facility] (on line) we talked about a recent sermon about prayer.  He had a question also about Hindu that he asked.  I provided support around these spoken concerns.    - His son, Pavel, called during our visit so I stepped out for a moment.  When I came back he affirmed the good support he is receiving from his son.    Plan:  will remain available for any ongoing support needs during LOS.    Quinton Hollis M.A., [facility]  Lead   [facility]  Office: [phone]    "

## 2021-10-13 VITALS
HEIGHT: 64 IN | BODY MASS INDEX: 26.12 KG/M2 | WEIGHT: 153 LBS | SYSTOLIC BLOOD PRESSURE: 132 MMHG | HEART RATE: 98 BPM | TEMPERATURE: 98.2 F | RESPIRATION RATE: 18 BRPM | DIASTOLIC BLOOD PRESSURE: 83 MMHG | OXYGEN SATURATION: 97 %

## 2021-10-13 LAB
ALBUMIN UR-MCNC: NEGATIVE MG/DL
APPEARANCE UR: CLEAR
BILIRUB UR QL STRIP: NEGATIVE
COLOR UR AUTO: COLORLESS
GLUCOSE UR STRIP-MCNC: NEGATIVE MG/DL
HGB UR QL STRIP: NEGATIVE
KETONES UR STRIP-MCNC: NEGATIVE MG/DL
LEUKOCYTE ESTERASE UR QL STRIP: NEGATIVE
NITRATE UR QL: NEGATIVE
PH UR STRIP: 7 [PH] (ref 5–7)
RBC URINE: 0 /HPF
SP GR UR STRIP: 1 (ref 1–1.03)
UROBILINOGEN UR STRIP-MCNC: NORMAL MG/DL
WBC URINE: 0 /HPF

## 2021-10-13 PROCEDURE — 99217 PR OBSERVATION CARE DISCHARGE: CPT | Performed by: HOSPITALIST

## 2021-10-13 PROCEDURE — 250N000013 HC RX MED GY IP 250 OP 250 PS 637: Performed by: HOSPITALIST

## 2021-10-13 PROCEDURE — G0378 HOSPITAL OBSERVATION PER HR: HCPCS

## 2021-10-13 PROCEDURE — 250N000013 HC RX MED GY IP 250 OP 250 PS 637: Performed by: INTERNAL MEDICINE

## 2021-10-13 PROCEDURE — 81001 URINALYSIS AUTO W/SCOPE: CPT | Performed by: HOSPITALIST

## 2021-10-13 PROCEDURE — 250N000013 HC RX MED GY IP 250 OP 250 PS 637: Performed by: PHYSICIAN ASSISTANT

## 2021-10-13 RX ORDER — CYCLOBENZAPRINE HCL 5 MG
5 TABLET ORAL EVERY 8 HOURS PRN
Qty: 30 TABLET | Refills: 0 | Status: SHIPPED | OUTPATIENT
Start: 2021-10-13 | End: 2021-10-14

## 2021-10-13 RX ORDER — CYCLOBENZAPRINE HCL 5 MG
5 TABLET ORAL EVERY 8 HOURS PRN
Qty: 30 TABLET | Refills: 0 | Status: SHIPPED | OUTPATIENT
Start: 2021-10-13 | End: 2021-10-13

## 2021-10-13 RX ORDER — DULOXETIN HYDROCHLORIDE 20 MG/1
20 CAPSULE, DELAYED RELEASE ORAL DAILY
Qty: 30 CAPSULE | Refills: 0 | Status: SHIPPED | OUTPATIENT
Start: 2021-10-14 | End: 2021-10-13

## 2021-10-13 RX ORDER — ACETAMINOPHEN 325 MG/1
975 TABLET ORAL EVERY 4 HOURS PRN
Start: 2021-10-13 | End: 2022-04-13

## 2021-10-13 RX ORDER — TAMSULOSIN HYDROCHLORIDE 0.4 MG/1
0.4 CAPSULE ORAL DAILY
Qty: 30 CAPSULE | Refills: 0 | Status: SHIPPED | OUTPATIENT
Start: 2021-10-14 | End: 2021-10-13

## 2021-10-13 RX ORDER — OXYCODONE HYDROCHLORIDE 5 MG/1
5 TABLET ORAL 2 TIMES DAILY PRN
Qty: 9 TABLET | Refills: 0 | Status: SHIPPED | OUTPATIENT
Start: 2021-10-13 | End: 2021-10-18

## 2021-10-13 RX ORDER — IBUPROFEN 600 MG/1
600 TABLET, FILM COATED ORAL 4 TIMES DAILY
Qty: 56 TABLET | Refills: 0 | Status: SHIPPED | OUTPATIENT
Start: 2021-10-13 | End: 2021-10-13

## 2021-10-13 RX ORDER — POLYETHYLENE GLYCOL 3350 17 G/17G
17 POWDER, FOR SOLUTION ORAL DAILY
Qty: 510 G | Refills: 0 | Status: SHIPPED | OUTPATIENT
Start: 2021-10-13 | End: 2021-10-13

## 2021-10-13 RX ORDER — TAMSULOSIN HYDROCHLORIDE 0.4 MG/1
0.4 CAPSULE ORAL DAILY
Qty: 30 CAPSULE | Refills: 0 | Status: SHIPPED | OUTPATIENT
Start: 2021-10-14 | End: 2021-10-28

## 2021-10-13 RX ORDER — LEVOFLOXACIN 750 MG/1
750 TABLET, FILM COATED ORAL
Qty: 22 TABLET | Refills: 0 | Status: SHIPPED | OUTPATIENT
Start: 2021-10-14 | End: 2021-10-13

## 2021-10-13 RX ORDER — LEVOFLOXACIN 750 MG/1
750 TABLET, FILM COATED ORAL
Qty: 22 TABLET | Refills: 0 | Status: SHIPPED | OUTPATIENT
Start: 2021-10-14 | End: 2021-10-14

## 2021-10-13 RX ORDER — DULOXETIN HYDROCHLORIDE 20 MG/1
20 CAPSULE, DELAYED RELEASE ORAL DAILY
Qty: 30 CAPSULE | Refills: 0 | Status: SHIPPED | OUTPATIENT
Start: 2021-10-14 | End: 2021-10-28

## 2021-10-13 RX ORDER — IBUPROFEN 600 MG/1
600 TABLET, FILM COATED ORAL 4 TIMES DAILY
Qty: 56 TABLET | Refills: 0 | Status: SHIPPED | OUTPATIENT
Start: 2021-10-13 | End: 2022-04-13

## 2021-10-13 RX ORDER — POLYETHYLENE GLYCOL 3350 17 G/17G
17 POWDER, FOR SOLUTION ORAL DAILY
Qty: 510 G | Refills: 0 | Status: SHIPPED | OUTPATIENT
Start: 2021-10-13 | End: 2022-01-04

## 2021-10-13 RX ADMIN — TAMSULOSIN HYDROCHLORIDE 0.4 MG: 0.4 CAPSULE ORAL at 08:30

## 2021-10-13 RX ADMIN — NITROGLYCERIN 15 MG: 20 OINTMENT TOPICAL at 08:30

## 2021-10-13 RX ADMIN — OXYCODONE 5 MG: 5 TABLET ORAL at 09:43

## 2021-10-13 RX ADMIN — OXYCODONE 5 MG: 5 TABLET ORAL at 01:59

## 2021-10-13 RX ADMIN — CLONIDINE HYDROCHLORIDE 0.1 MG: 0.1 TABLET ORAL at 08:28

## 2021-10-13 RX ADMIN — CLOTRIMAZOLE 1 G: 1 CREAM TOPICAL at 08:28

## 2021-10-13 RX ADMIN — ACETAMINOPHEN 975 MG: 325 TABLET, FILM COATED ORAL at 01:59

## 2021-10-13 RX ADMIN — IBUPROFEN 600 MG: 600 TABLET ORAL at 08:30

## 2021-10-13 RX ADMIN — SENNOSIDES AND DOCUSATE SODIUM 1 TABLET: 50; 8.6 TABLET ORAL at 11:13

## 2021-10-13 RX ADMIN — FINASTERIDE 5 MG: 5 TABLET, FILM COATED ORAL at 08:29

## 2021-10-13 RX ADMIN — AMLODIPINE BESYLATE 10 MG: 10 TABLET ORAL at 08:28

## 2021-10-13 RX ADMIN — CLONIDINE HYDROCHLORIDE 0.1 MG: 0.1 TABLET ORAL at 13:30

## 2021-10-13 RX ADMIN — POLYETHYLENE GLYCOL 3350 17 G: 17 POWDER, FOR SOLUTION ORAL at 05:37

## 2021-10-13 RX ADMIN — IBUPROFEN 600 MG: 600 TABLET ORAL at 11:13

## 2021-10-13 RX ADMIN — PRAMOXINE HYDROCHLORIDE HYDROCORTISONE ACETATE 1 APPLICATORFUL: 100; 100 AEROSOL, FOAM TOPICAL at 08:31

## 2021-10-13 RX ADMIN — DULOXETINE HYDROCHLORIDE 20 MG: 20 CAPSULE, DELAYED RELEASE ORAL at 08:29

## 2021-10-13 RX ADMIN — CYCLOBENZAPRINE 10 MG: 5 TABLET, FILM COATED ORAL at 03:15

## 2021-10-13 RX ADMIN — LEVOFLOXACIN 750 MG: 500 TABLET, FILM COATED ORAL at 06:47

## 2021-10-13 NOTE — DISCHARGE SUMMARY
[facility]    Discharge Summary  Hospital Medicine    Date of Admission:  10/6/2021  Date of Discharge:  10/13/2021   Discharging Provider: Alex Hunter  Date of Service: 10/13/2021      Primary Care     Millie Britton  [address]  [place] [address]      Identification and Chief Compaint: Mustapha Negrete is a 60 year old male who presented on 10/6/2021 with complaint of perineal pain.    Discharge Diagnoses       Perineal pain in male    Acute prostatitis    Rectal pain    Penile pain    Uncontrolled pain    Urinary retention    Neuropathy    Mild intermittent asthma, uncomplicated    Benign prostatic hyperplasia with urinary obstruction    Anal fissure    * No resolved hospital problems. *          Discharge Disposition   Discharged to home    Discharge Orders   No discharge procedures on file.     Discharge Medications   Current Discharge Medication List      START taking these medications    Details   oxybutynin ER (DITROPAN-XL) 10 MG 24 hr tablet Take 1 tablet (10 mg) by mouth daily  Qty: 60 tablet, Refills: 0    Associated Diagnoses: Urinary retention         CONTINUE these medications which have NOT CHANGED    Details   amLODIPine (NORVASC) 10 MG tablet Take 10 mg by mouth daily      clotrimazole (LOTRIMIN) 1 % external cream Apply 1 applicator topically 2 times daily Rectal area      finasteride (PROSCAR) 5 MG tablet Take 1 tablet (5 mg) by mouth daily  Qty: 90 tablet, Refills: 3    Associated Diagnoses: Urinary retention      hydrocodone-acetaminophen 5-325 MG per tablet Take 1 tablet by mouth Every 4-6 hours as needed      NITRO-BID 2 % OINT ointment Apply 1 each topically 2 times daily Rectal area      butt paste ointment Nystatin 15g/stomahesive 28.3g/Aquafor 60g  Qty: 135 g, Refills: 3      oxybutynin (DITROPAN) 5 MG tablet Take 1 tablet by mouth every 6 hours as needed           Allergies   Allergies   Allergen Reactions     Droperidol Other (See Comments)      Extrapyramidal Side Effect     Fenofibrate Headache and Diarrhea              Keflex [Cephalexin] Itching     Lactose GI Disturbance            Pcn [Penicillins] Other (See Comments)     Feels warm and flushed, but tolerates Cephalexin     Atorvastatin Palpitations     Sertraline Palpitations            Simvastatin Palpitations     Sulfa Drugs Headache and Rash     Burning       Tetracycline Rash     Burning         Consultations This Hospital Stay   Consultation during this admission received from urology    [facility] SERVICES IP CONSULT  SURGERY GENERAL IP CONSULT  PHYSICAL THERAPY ADULT IP CONSULT  SURGERY GENERAL IP CONSULT    Significant Results and Procedures   Procedures    None    Data   Results for orders placed or performed during the hospital encounter of 10/06/21   CT Abdomen Pelvis w Contrast    Narrative    CT ABDOMEN PELVIS W CONTRAST 10/6/2021 4:40 PM    CLINICAL HISTORY: Rectal and perineal pain.    TECHNIQUE: CT scan of the abdomen and pelvis was performed following  injection of IV contrast. Multiplanar reformats were obtained. Dose  reduction techniques were used.  CONTRAST: 79 mL Isovue 370    COMPARISON: None.    FINDINGS:   LOWER CHEST: The visualized lung bases are clear.    HEPATOBILIARY: Left hepatic lobe cyst and otherwise normal liver.  Cholecystectomy.    PANCREAS: Normal.    SPLEEN: Normal.    ADRENAL GLANDS: Normal.    KIDNEYS/BLADDER: Right renal cortical cyst and bilateral parapelvic  simple cyst requiring no specific follow-up. No suspicious masses,  hydronephrosis or renal calculi. The urinary bladder is decompressed  with a Walden catheter. Gas in the urinary bladder lumen, related to  instrumentation.    BOWEL: Diverticulosis in the colon. No acute inflammatory change. No  obstruction.     PELVIC ORGANS: Fiducials within the prostate gland. Evaluation of the  pelvis is slightly limited by streak artifact from left PARKER. No  definite pelvic masses.    ADDITIONAL FINDINGS:  No lymphadenopathy. No abdominal aortic aneurysm.  No free fluid, fluid collections or extraluminal air.    MUSCULOSKELETAL: Left total hip arthroplasty. Degenerative changes in  the spine. No suspicious lesions in the bones.      Impression    IMPRESSION:   1.  No acute findings in the abdomen and pelvis.    ZAHIDA MAZARIEGOS MD         SYSTEM ID:  [id number]       History of Present Illness   (From H&P) Mustapha Negrete is a 60 year old male with past medical history of above issues now presents on 10/6/2021 with pain.       He says the last 3 days have been the worst since it started.  He is no longer able to sit down.  Previously he worked as a  and now he cannot even drive his car due to the excruciating pain.  Pain is described as burning sensation of his perineal area and posterior thighs, with spasms.  Pain seems to come from his buttocks and then radiate into his penis.  There is some radiation down his legs bilaterally.  This has been going on since approximately June and has been intensifying.  Early on in the course, there were some red lesions on his thighs (of which he has picture) which were retrospectively thought to possibly be shingles.  These have mostly resolved at this point.    Hospital Course     Mustapha Negrete is a 60 year old male history of prostate cancer, BPH, recent diagnosis of urinary retention 2 weeks after laparoscopic cholecystectomy, MARIAN on BiPAP, low back pain and Covid infection in April presenting with uncontrolled thigh and rectal pain.    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 Worse with BM. Also reports posterior thigh pain around the same time. Previously had several lesions on the lateral thighs, first the right then the left. Had been told by a chiropractor that this may be shingles.   Patient was admitted at [facility] 8/26 through  8/29 for the same issue.  He has been seen in the ER four times since then for similar complaints.  He was evaluated by [facility] neurological clinic back in July and had MRIs of his L-spine, see below. Prior laboratory work-up includes normal vitamin D and vitamin B12 and folate levels, negative syphilis, HIV, negative O&P. Additionally he was seen by [facility], most recently last week at which time he was diagnosed with anal fissure along with a perianal fungal infection and started on topical antifungal as well as topical nitroglycerin.  He feels that this has seemed to help a bit. He has undergone colonoscopy recently which was reportedly normal.   In ED, patient did have quite a bit of prostate tenderness on rectal exam and so he was initiated on levofloxacin for possible prostatitis. He also reports increased pain with catheter exchanges.    Thigh pain seems to be neuropathic - question if this is a postherpetic neuralgia (prior rash, reported on bilateral lateral thighs) vs lumbar radiculopathy (though bilateral and correlates more with S1 than lumbar changes noted below) vs pain syndrome. Patient reports previously been on gabapentin which was not terribly helpful for him.    Overall this distribution and description of pain is unusual. Though has had extensive work up as outpatient. Unclear exactly what is causing his symptoms, will trial below therapies and monitor response.  This admission:    Normal inflammatory markers: WBC 9.1, ESR 10 and CRP <2.9.     Blood cultures NGTD    Urine culture positive Enterobacter cloacae complex, sens to cipro/levo    Gonorrhea and Chlamydia urinary negative      Patient has previously been diagnosed with  Prostatitis this summer and has been treated with ciprofloxacin 500mg BID x 28 days 6/22-7/19 without improvement and also a course of Augmentin 3 weeks ago. UC positive for Enterobacter. Possible patient still has acute bacterial prostatitis and gave a  dose of ertapenem while waiting for sensitivities to return on Enterobacter.  Sensitivities show that the Enterobacter is sensitive to fluoroquinolones.  It is possible the patient did not respond to the long course of ciprofloxacin due to the dosing and trial of high-dose levofloxacin started to assure any possible infection has been treated.     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 DDx includes chronic prostatitis/chronic pelvic pain syndrome (CP/CPPS). His pain complaints are diverse. . He stopped his tamsulosin due to feeling that it caused his penis to fill partially with fluid but unlikely this was due to tamsulosin and he is willing to try again as this is first line for CP/CPPS along with finasteride. The topical nitroglycerin is treatment for the PF. PF involves spasms of the pelvic floor that are triggered first by smooth muscle spasms the rectum and anus. These can be relieved by stretching by sitting on a tennis ball. Finally, his pain syndrome is perpetuated by the use of narcotics with worsening pain coming on as the narcotics wear off.    Patient has palpable muscle spasms in anterior perineum that reproduce his pelvic pain and at times pain radiating down right leg. These spasms were improved with constant pressure for 30-60 seconds on exam. This is most consistent with PF. Discussed with patient at length and reinforced that he can reduce his pain significantly by getting the spasms to stretch out.     Patient is using tennis ball with intermittent success at pain improvement. Still with occasional severe pain requiring oral oxycodone.  Lyrica was helping but he reports it is making him dizzy at night and so this was discontinued.   -Pelvic floor physical therapy saw patient here and referral placed at discharge   - resumed tamsulosin   - continue finasteride   - continue topical  nitroglycerin   - continue schedule ibuprofen 600 mg QID   - oxycodone oral given at discharge  #9 tablets Suspect ongoing narcotic use is part of his cycle of pain and needs to wean off.   - course of high-dose levofloxacin 750 mg for possible bacterial prostatitis - complete 24 more days for total of 4 weeks of treatement   - Continue clotrimazole topical cream twice daily for previously diagnosed perianal fungal infection  -- continue hydrocortisone rectal foam for discomfort    - physical therapy pelvic pain therapy saw patient today, offered some exercises and will follow him as an outpatient.    Recent leg lesions, possible bedbug bites    Patient has lesions in a line on one leg and later on the other. He has pictures from when they were acute on the left and these lesions have the appearance to me of bedbug bites as they are in a line, have a central dark wound surrounded by erythema and localized swelling and he had symptoms of itching. They do not appear like zoster in my opinion.   -I asked patient to have a family member check his room for evidence of bedbug infestation. They can look online to find out what to look for. Discussed with son 10/10    L4/L5 compression  7/26/2021 MRI lumbar spine as below:    Unclear if these L5 and L4 nerve compressions are related to his symptoms.  At one  point patient was considered for what sounds like epidural spinal injections but due to concern for UTI and topical infection, this was deferred and not pursued.  Could consider repeat imaging but at this point patient would not be able to lay still without sedation for MRI, so will hold off.  Patient recently seen by a spine clinic and a plan is being formulated.    Patient noted to have muscle spasm pain down posterior right leg triggered by exam of perineum. Low suspicion that this pain is radiclar.     Urinary retention, chronic  BPH    Developed approximately 2 weeks after he had a laparoscopic cholecystectomy in  May.  Catheter was placed in May and has been in since.  Patient previously seen by [facility] and then more recently by Dr. Oneill here.  Patient has failed trials of voids multiple times.  Reports he had cystoscopy done previously which was normal.  Patient is on oxybutynin for bladder spasms.  Patient previously had UroLift procedure done about a year and half ago. Tolerated trial of void here without evidence of recurrent retention.   --started on finasteride   - started tamsulosin  - discontinued further oxybutynin   - Appreciate Dr. Oneill of urology seeing the patient today.    Hypertension  Blood pressures stable.  - Continue home amlodipine 10 daily    MARIAN  On BiPAP at home per [facility] records.    Covid status  Patient tested positive for Covid 5/3/2021.  He tested positive on 8/26/2021 when he was admitted to [facility] as well.  - He is not yet vaccinated because he has had all these other health issues since then, but he is open to getting vaccinated in the future.   - PCR negative 10/6/2021    Pending Results   Unresulted Labs Ordered in the Past 30 Days of this Admission     No orders found from 9/6/2021 to 10/7/2021.          Physical Exam   Temp:  [97.8  F (36.6  C)-98.7  F (37.1  C)] 98.2  F (36.8  C)  Pulse:  [74-98] 98  Resp:  [16-18] 18  BP: (114-132)/(69-83) 132/83  SpO2:  [93 %-97 %] 97 %  Vitals:    10/08/21 0539 10/10/21 0441 10/11/21 0327   Weight: 69.2 kg (152 lb 8.9 oz) 69 kg (152 lb 1.9 oz) 69.4 kg (153 lb)       Constitutional: anxious appearing male pacing around room, trying to avoid sitting down    The discharge plan was discussed with the patient and RN.    Total time on this discharge was 45 min.    Alex Hunter MD

## 2021-10-13 NOTE — PLAN OF CARE
[facility] DISCHARGE NOTE    Patient discharged to home at 1:40 PM via wheel chair. Accompanied by significant other and staff. Discharge instructions reviewed with patient, opportunity offered to ask questions. Prescriptions sent to patients preferred pharmacy. All belongings sent with patient.    Sarthak Garcia RN

## 2021-10-13 NOTE — PROGRESS NOTES
Voiding trial was successful.    Catheter will be discontinued.    He will return to urology clinic for follow-up in 1 week.    He should stop the oxybutynin and continue tamsulosin 0.4 mg daily.    He should start finasteride 5 mg daily.    He should be encouraged to urinate frequently and does not need to force fluids as bladder emptying has been the problem.    He should not wait until he feels full to urinate but at minimum should urinate about every 3 hours during the waking hours of the day    Reduced fluid intake in the evening would be a good idea.

## 2021-10-13 NOTE — PLAN OF CARE
Voiding trial    170 cc of normal saline irrigation instilled in patients bladder cath plug put in place patient held irrigation for 5 min cath pulled patient voided 170 ml bladder scanned for 30 ml will update rounding MD

## 2021-10-13 NOTE — PROGRESS NOTES
"Requested PRN oxycodone 5 mg at bedtime. Flexeril for spasms.  Patient reports some stretching and other exercises PT discussed have been helpful in recovering during an acute attack. Encouraged patient to assist with the treatment regimen he will continue at home with the proctofoam application, nitro-ointment, and clotrimazole cream. Will continue to monitor.    /69   Pulse 87   Temp 97.8  F (36.6  C) (Oral)   Resp 18   Ht 1.626 m (5' 4\")   Wt 69.4 kg (153 lb)   SpO2 93%   BMI 26.26 kg/m      "

## 2021-10-14 ENCOUNTER — NURSE TRIAGE (OUTPATIENT)
Dept: NURSING | Facility: CLINIC | Age: 61
End: 2021-10-14

## 2021-10-14 ENCOUNTER — HOSPITAL ENCOUNTER (EMERGENCY)
Facility: CLINIC | Age: 61
Discharge: HOME | End: 2021-10-14
Attending: FAMILY MEDICINE | Admitting: FAMILY MEDICINE
Payer: COMMERCIAL

## 2021-10-14 ENCOUNTER — PATIENT OUTREACH (OUTPATIENT)
Dept: CARE COORDINATION | Facility: CLINIC | Age: 61
End: 2021-10-14

## 2021-10-14 VITALS
OXYGEN SATURATION: 95 % | HEART RATE: 95 BPM | HEIGHT: 65 IN | BODY MASS INDEX: 27.49 KG/M2 | RESPIRATION RATE: 20 BRPM | TEMPERATURE: 97.9 F | SYSTOLIC BLOOD PRESSURE: 148 MMHG | WEIGHT: 165 LBS | DIASTOLIC BLOOD PRESSURE: 97 MMHG

## 2021-10-14 DIAGNOSIS — R33.9 URINARY RETENTION WITH INCOMPLETE BLADDER EMPTYING: ICD-10-CM

## 2021-10-14 DIAGNOSIS — G89.29 CHRONIC INTRACTABLE PAIN: ICD-10-CM

## 2021-10-14 DIAGNOSIS — K59.00 CONSTIPATION, UNSPECIFIED CONSTIPATION TYPE: ICD-10-CM

## 2021-10-14 DIAGNOSIS — R20.3 HYPERESTHESIA: ICD-10-CM

## 2021-10-14 DIAGNOSIS — Z71.89 OTHER SPECIFIED COUNSELING: ICD-10-CM

## 2021-10-14 DIAGNOSIS — R68.89 SUSPECTED CAUDA EQUINA SYNDROME: ICD-10-CM

## 2021-10-14 PROCEDURE — 76857 US EXAM PELVIC LIMITED: CPT | Mod: 26 | Performed by: FAMILY MEDICINE

## 2021-10-14 PROCEDURE — 76857 US EXAM PELVIC LIMITED: CPT | Performed by: FAMILY MEDICINE

## 2021-10-14 PROCEDURE — 99284 EMERGENCY DEPT VISIT MOD MDM: CPT | Mod: 25 | Performed by: FAMILY MEDICINE

## 2021-10-14 PROCEDURE — 99285 EMERGENCY DEPT VISIT HI MDM: CPT | Mod: 25 | Performed by: FAMILY MEDICINE

## 2021-10-14 PROCEDURE — 250N000012 HC RX MED GY IP 250 OP 636 PS 637: Performed by: FAMILY MEDICINE

## 2021-10-14 PROCEDURE — 250N000013 HC RX MED GY IP 250 OP 250 PS 637: Performed by: FAMILY MEDICINE

## 2021-10-14 RX ORDER — OXYCODONE HYDROCHLORIDE 5 MG/1
10 TABLET ORAL EVERY 4 HOURS PRN
Status: DISCONTINUED | OUTPATIENT
Start: 2021-10-14 | End: 2021-10-14 | Stop reason: HOSPADM

## 2021-10-14 RX ORDER — NAPROXEN 250 MG/1
500 TABLET ORAL ONCE
Status: COMPLETED | OUTPATIENT
Start: 2021-10-14 | End: 2021-10-14

## 2021-10-14 RX ORDER — DEXAMETHASONE 2 MG/1
2 TABLET ORAL 2 TIMES DAILY WITH MEALS
Qty: 6 TABLET | Refills: 0 | Status: SHIPPED | OUTPATIENT
Start: 2021-10-14 | End: 2021-10-28

## 2021-10-14 RX ADMIN — NAPROXEN 500 MG: 250 TABLET ORAL at 14:27

## 2021-10-14 RX ADMIN — OXYCODONE 10 MG: 5 TABLET ORAL at 14:27

## 2021-10-14 RX ADMIN — DEXAMETHASONE 10 MG: 2 TABLET ORAL at 14:27

## 2021-10-14 ASSESSMENT — ENCOUNTER SYMPTOMS
ABDOMINAL PAIN: 0
NAUSEA: 0
DIARRHEA: 0
SINUS PRESSURE: 0
VOMITING: 0
SORE THROAT: 0
COUGH: 0
FEVER: 0
DYSURIA: 0
CONSTIPATION: 1
DIAPHORESIS: 0
HEADACHES: 0
BLOOD IN STOOL: 0
CHILLS: 0
FREQUENCY: 0
PALPITATIONS: 0
DIFFICULTY URINATING: 1
WHEEZING: 0
SHORTNESS OF BREATH: 0

## 2021-10-14 ASSESSMENT — MIFFLIN-ST. JEOR: SCORE: 1475.32

## 2021-10-14 NOTE — DISCHARGE INSTRUCTIONS
ICD-10-CM    1. Suspected cauda equina syndrome - chronic  R68.89 Neurosurgery Referral     MRI Lumbar spine w/o contrast     INT Epidural injection caudal single    I suspect this is the cause of symptoms, but this chronic for months.  The urine retention, constipation, pain in the perineum and scrotum, inner thighs,  wore pain in scrotum with dorsiflexion foot.  symptoms have been ongoing for >3 months.  plan follow-up neurosurgery.  I spoke with them today. plan MRI, followed by epidural steroid injection.   2. Urinary retention with incomplete bladder emptying  R33.9     continue flomax and finasteride   3. Hyperesthesia - perineum  R20.3     stay on cymbalta.  consider add back neurontin or lyrica   4. Chronic intractable pain  G89.29     take the decadron.  10 mg orally was given here. tomorrow take 2 mg orally twice daily for 3 days. you were given vicodin by pain clinic - this tylenol in it that complicates the dosing.  do not tylenol with vicodin - use this for breakthrough pain - you were given 120 of the vicodin on the 4th. .    take ibuprofen 600 mg orally every 6 hours scheduled.  ibuprofen will need to be held before epidural steroid injection when scheduled.  stop the levaquin - this cannot be used with steroid   5. Constipation, unspecified constipation type  K59.00     likely combination between opioid pain medications and the possible cauda equina.

## 2021-10-14 NOTE — TELEPHONE ENCOUNTER
"Pt discharged yesterday from [facility] ([place]).  Currently on phone with care coordinator (Tammy).  ED chart notes indicate:  Perianal pain with radiation to the penis since 6/2021.    Pt reports \"absolutely miserable.\"  Pt reports \"perineal pain is worse.\"  \"Feels like my tailbone is on fire.\"  \"Also the skin between rectum and scrotum.\"  \"No redness.\"  \"Looks normal.\"  \"Feels like a deeper burning pain.\"    Pt asks \"Could this be a staph infection down there?\"    Discharge Meds:  TYLENOL)  cyclobenzaprine (FLEXERIL)  DULoxetine (CYMBALTA)  Start taking on: October 14, 2021  ibuprofen (ADVIL/MOTRIN)  levofloxacin (LEVAQUIN)  Start taking on: October 14, 2021  oxyCODONE (ROXICODONE)  polyethylene glycol (MIRALAX)  tamsulosin (FLOMAX)  Start taking on: October 14, 2021  STOP taking:  HYDROcodone-acetaminophen 5-325 MG  tablet (NORCO)  oxybutynin 5 MG tablet (DITROPAN)  oxybutynin ER 10 MG 24 hr tablet (DITROPAN-XL)  Review your updated medication list below.  Your activity upon discharge: activity as tolerated  Follow this diet upon discharge: Regular diet  Discharge Instructions.    ___________________    Pt reports exact compliance with ED discharge instructions.  Rates current pain 120/10.  \"Can't sit or stand or walk or lay down.\"  Pain is \"deep, burning.\"    Advised returning to ED.  Pt agrees to plan.  However feels afraid 'they might reject me'.  Therefore now calling [facility] ED to give report.  Spoke with ED RN who takes report and indeed invites pt to return for additional eval.  Conveyed advice to pt who intends to have his brother or a neighbor drive him.  Discussed if no  available, to call an uber or taxi.  Pt agrees to plan.    Jennifer [facility] Nurse Advisor     Reason for Disposition    SEVERE pain (e.g., excruciating)    Additional Information    Negative: Rash or color change of scrotum BUT no swelling or pain    Negative: Followed a genital injury (e.g., penis, " "scrotum)    Negative: Swelling of scrotum is main symptom    Protocols used: SCROTAL PAIN-A-OH    _____________________    COVID 19 Nurse Triage Plan/Patient Instructions    Please be aware that novel coronavirus (COVID-19) may be circulating in the community. If you develop symptoms such as fever, cough, or SOB or if you have concerns about the presence of another infection including coronavirus (COVID-19), please contact your health care provider or visit [email or web address].     Disposition/Instructions    Additional COVID19 information to add for patients.   How can I protect others?  If you have symptoms (fever, cough, body aches or trouble breathing): Stay home and away from others (self-isolate) until:    At least 10 days have passed since your symptoms started, And     You ve had no fever--and no medicine that reduces fever--for 1 full day (24 hours), And      Your other symptoms have resolved (gotten better).     If you don t have symptoms, but a test showed that you have COVID-19 (you tested positive):    Stay home and away from others (self-isolate). Follow the tips under \"How do I self-isolate?\" below for 10 days (20 days if you have a weak immune system).    You don't need to be retested for COVID-19 before going back to school or work. As long as you're fever-free and feeling better, you can go back to school, work and other activities after waiting the 10 or 20 days.     How do I self-isolate?    Stay in your own room, even for meals. Use your own bathroom if you can.     Stay away from others in your home. No hugging, kissing or shaking hands. No visitors.    Don t go to work, school or anywhere else.     Clean  high touch  surfaces often (doorknobs, counters, handles, etc.). Use a household cleaning spray or wipes. You ll find a full list on the EPA website:  www.epa.gov/pesticide-registration/list-n-disinfectants-use-against-sars-cov-2.    Cover your mouth and nose with a mask, tissue or " washcloth to avoid spreading germs.    Wash your hands and face often. Use soap and water.    Caregivers in these groups are at risk for severe illness due to COVID-19:  o People 65 years and older  o People who live in a nursing home or long-term care facility  o People with chronic disease (lung, heart, cancer, diabetes, kidney, liver, immunologic)  o People who have a weakened immune system, including those who:  - Are in cancer treatment  - Take medicine that weakens the immune system, such as corticosteroids  - Had a bone marrow or organ transplant  - Have an immune deficiency  - Have poorly controlled HIV or AIDS  - Are obese (body mass index of 40 or higher)  - Smoke regularly    Caregivers should wear gloves while washing dishes, handling laundry and cleaning bedrooms and bathrooms.    Use caution when washing and drying laundry: Don t shake dirty laundry, and use the warmest water setting that you can.    For more tips, go to www.cdc.gov/coronavirus/2019-ncov/downloads/10Things.pdf.    How can I take care of myself?  1. Get lots of rest. Drink extra fluids (unless a doctor has told you not to).     2. Take Tylenol (acetaminophen) for fever or pain. If you have liver or kidney problems, ask your family doctor if it s okay to take Tylenol.     Adults can take either:     650 mg (two 325 mg pills) every 4 to 6 hours, or     1,000 mg (two 500 mg pills) every 8 hours as needed.     Note: Don t take more than 3,000 mg in one day.   Acetaminophen is found in many medicines (both prescribed and over-the-counter medicines). Read all labels to be sure you don t take too much.     For children, check the Tylenol bottle for the right dose. The dose is based on the child s age or weight.    3. If you have other health problems (like cancer, heart failure, an organ transplant or severe kidney disease): Call your specialty clinic if you don t feel better in the next 2 days.    4. Know when to call 911: Emergency warning  signs include:    Trouble breathing or shortness of breath    Pain or pressure in the chest that doesn t go away    Feeling confused like you haven t felt before, or not being able to wake up    Bluish-colored lips or face    What are the symptoms of COVID-19?     The most common symptoms are cough, fever and trouble breathing.     Less common symptoms include body aches, chills, diarrhea (loose, watery poops), fatigue (feeling very tired), headache, runny nose, sore throat and loss of smell.    COVID-19 can cause severe coughing (bronchitis) and lung infection (pneumonia).    How does it spread?     The virus may spread when a person coughs or sneezes into the air. The virus can travel about 6 feet this way, and it can live on surfaces.      Common  (household disinfectants) will kill the virus.    Who is at risk?  Anyone can catch COVID-19 if they re around someone who has the virus.    How can others protect themselves?     Stay away from people who have COVID-19 (or symptoms of COVID-19).    Wash hands often with soap and water. Or, use hand  with at least 60% alcohol.    Avoid touching the eyes, nose or mouth.     Wear a face mask when you go out in public, when sick or when caring for a sick person.    Where can I get more information?    [facility]: About COVID-19: [email or web address]    CDC: What to Do If You re Sick: www.cdc.gov/coronavirus/2019-ncov/about/steps-when-sick.html    CDC: Ending Home Isolation: www.cdc.gov/coronavirus/2019-ncov/hcp/disposition-in-home-patients.html     CDC: Caring for Someone: www.cdc.gov/coronavirus/2019-ncov/if-you-are-sick/care-for-someone.html     [facility]: Interim Guidance for Hospital Discharge to Home: www.health.[facility].mn.us/diseases/coronavirus/hcp/hospdischarge.pdf    [facility] clinical trials (COVID-19 research studies): clinicalaffairs.[facility]/umn-clinical-trials     Below are the COVID-19 hotlines at [facility]  [facility] ([facility]). Interpreters are available.   o For health questions: Call 970-581-4740 or 1-361.797.3309 (7 a.m. to 7 p.m.)  o For questions about schools and childcare: Call 009-988-3466 or 1-134.847.2584 (7 a.m. to 7 p.m.)          Thank you for taking steps to prevent the spread of this virus.  o Limit your contact with others.  o Wear a simple mask to cover your cough.  o Wash your hands well and often.    Resources    [facility]: About COVID-19: [email or web address]    CDC: What to Do If You're Sick: www.cdc.gov/coronavirus/2019-ncov/about/steps-when-sick.html    CDC: Ending Home Isolation: www.cdc.gov/coronavirus/2019-ncov/hcp/disposition-in-home-patients.html     CDC: Caring for Someone: www.cdc.gov/coronavirus/2019-ncov/if-you-are-sick/care-for-someone.html     [facility]: Interim Guidance for Hospital Discharge to Home: www.health.[facility].mn./diseases/coronavirus/hcp/hospdischarge.pdf    [facility] clinical trials (COVID-19 research studies): clinicalaffairs.[facility]/n-clinical-trials     Below are the COVID-19 hotlines at the [place] [facility] ([facility]). Interpreters are available.   o For health questions: Call 042-943-6255 or 1-539.818.1850 (7 a.m. to 7 p.m.)  o For questions about schools and childcare: Call 023-303-8377 or 1-597.176.5064 (7 a.m. to 7 p.m.)

## 2021-10-14 NOTE — ED PROVIDER NOTES
History     Chief Complaint   Patient presents with     Groin Pain     perianal burning.  Being sent by clinic just discharged yesterday.      HPI  Mustapha Negrete is a 60 year old male who presents with history of prostatitis rectal pain penile pain BPH -admitted to this facility for 3 days of symptoms worsening.  Has excruciating pain especially with sitting into the perineum and thighs.  Worsening since June.  History of urinary retention.  Obstructive sleep apnea on BiPAP low back pain.  Prior Covid infection in April.    Had seen chiropractic care.  Admitted at  8 26-8 29.  Has been seen by [facility] in July MRIs of his lumbar spine.  He had multiple vitamin D vitamin B12 levels folic acid levels negative syphilis HIV negative O&P.  Seen GI.  Diagnosed with an anal fissure.  Perianal fungal infection was considered.  Was empirically tried on levofloxacin for prostatitis.  Question of possible postherpetic neuralgia.  All of his markers were reassuring.  Recent urine culture positive enterobacter that was sensitive to Cipro and levofloxacin based GC chlamydia testing had been negative from urine.  Had been on tamsulosin but this caused atypical idiosyncratic symptoms in the penis of fullness and it was stopped.  Had been tried with nitroglycerin for proctalgia food axis.  Has been instructed on pelvic floor exercises.  Have been restarted tamsulosin continued finasteride topical nitroglycerin and ibuprofen oxycodone levofloxacin for 24 days for total treatment of 4 weeks.  Hydrocortisone foam rectal.  Physical therapy for pelvic floor.    MRI in July showed a right posterior paracentral lateral recess disc protrusion L4-5.  Narrowing at the right L5 nerve root moderate central canal stenosis moderate to severe bilateral foraminal stenosis moderate to severe right L3-4 lateral recess and compression of the right L4 nerve root.  Left lateral recess disc extrusion with caudal  migration at L2-3.    Also seen for BPH by both [facility] and Dr. Oneill at this facility.  Had significant urinary retention.  Prior UroLift procedure done about 1 and half years ago.    During his last evaluation he also underwent CT imaging.  This demonstrates no changes.      The patient has not yet been seen in his follow-up despite discharge yesterday he is referred back to the emergency department for further evaluation after this extensive evaluation that he has undergone on the most recent hospital stay.  He has not seen an outpatient provider yet for follow-up.       He tells me that his pain has been severe.  He had his catheter out yesterday for the first time consistently in 3 months.  Overnight he had difficulty urinating and actually went to the [place] emergency department but while he was in the parking lot felt that he could suddenly urinate was able to empty his bladder.  No current dysuria.  No frequency.  He denies fever.  He has had difficulty with constipation and feels that he only gets small amounts of stool out that is mostly liquid stool.  He has no blood in the stool or black tarry stools.  He is able to ambulate but ambulation makes the pain worse.  There is no obvious foot drop but dorsiflexion of the foot results in pain in the inguinal region and scrotum.      He has been tried on multiple neuropathy related agents such as Lyrica and gabapentin without sufficient effect.  There is been no spine injections such as epidural steroid injection or foraminal injection.              Allergies:  Allergies   Allergen Reactions     Droperidol Other (See Comments)     Extrapyramidal Side Effect     Fenofibrate Headache and Diarrhea              Keflex [Cephalexin] Itching     Lactose GI Disturbance            Pcn [Penicillins] Other (See Comments)     Feels warm and flushed, but tolerates Cephalexin     Atorvastatin Palpitations     Sertraline Palpitations            Simvastatin  Palpitations     Sulfa Drugs Headache and Rash     Burning       Tetracycline Rash     Burning         Problem List:    Patient Active Problem List    Diagnosis Date Noted     Acute prostatitis 10/06/2021     Priority: Medium     Rectal pain 10/06/2021     Priority: Medium     Penile pain 10/06/2021     Priority: Medium     Perineal pain in male 10/06/2021     Priority: Medium     Uncontrolled pain 10/06/2021     Priority: Medium     Anal fissure 08/26/2021     Priority: Medium     Urinary retention 08/16/2021     Priority: Medium     Neuropathy 08/16/2021     Priority: Medium     Benign prostatic hyperplasia with urinary obstruction 07/12/2021     Priority: Medium     MARIAN (obstructive sleep apnea) 05/03/2021     Priority: Medium     Formatting of this note might be different from the original.  Formatting of this note might be different from the original.  On BiPAP, see scans.  Formatting of this note might be different from the original.  On BiPAP, see scans.       Symptomatic cholelithiasis 04/06/2021     Priority: Medium     Hepatic steatosis 03/26/2021     Priority: Medium     Psoriatic arthritis (H) 03/15/2021     Priority: Medium     Dyslipidemia 07/10/2017     Priority: Medium     Mild intermittent asthma, uncomplicated 04/05/2017     Priority: Medium     OA (osteoarthritis) of hip 05/19/2015     Priority: Medium     Low back pain 10/25/2013     Priority: Medium     Reflux esophagitis 04/18/2013     Priority: Medium     Insomnia, unspecified 02/27/2013     Priority: Medium     GERD (gastroesophageal reflux disease) 10/16/2012     Priority: Medium        Past Medical History:    Past Medical History:   Diagnosis Date     Asthma        Past Surgical History:    Past Surgical History:   Procedure Laterality Date     ENT SURGERY      tonsillectomy     GENITOURINARY SURGERY      TURP     ORTHOPEDIC SURGERY      wrist surgery, carpal tunnel 2005       Family History:    Family History   Problem Relation Age of  "Onset     Cerebrovascular Disease Mother      Hypertension Mother      Diabetes Mother      Cancer Father 84        liver cancer     Prostate Cancer Father        Social History:  Marital Status:   [4]  Social History     Tobacco Use     Smoking status: Former Smoker     Quit date: 1999     Years since quittin.9     Smokeless tobacco: Never Used   Substance Use Topics     Alcohol use: No     Drug use: No        Medications:    acetaminophen (TYLENOL) 325 MG tablet  amLODIPine (NORVASC) 10 MG tablet  butt paste ointment  clotrimazole (LOTRIMIN) 1 % external cream  cyclobenzaprine (FLEXERIL) 5 MG tablet  DULoxetine (CYMBALTA) 20 MG capsule  finasteride (PROSCAR) 5 MG tablet  ibuprofen (ADVIL/MOTRIN) 600 MG tablet  levofloxacin (LEVAQUIN) 750 MG tablet  NITRO-BID 2 % OINT ointment  oxyCODONE (ROXICODONE) 5 MG tablet  polyethylene glycol (MIRALAX) 17 GM/Dose powder  tamsulosin (FLOMAX) 0.4 MG capsule          Review of Systems   Constitutional: Negative for chills, diaphoresis and fever.   HENT: Negative for ear pain, sinus pressure and sore throat.    Eyes: Negative for visual disturbance.   Respiratory: Negative for cough, shortness of breath and wheezing.    Cardiovascular: Negative for chest pain and palpitations.   Gastrointestinal: Positive for constipation. Negative for abdominal pain, blood in stool, diarrhea, nausea and vomiting.   Genitourinary: Positive for decreased urine volume and difficulty urinating. Negative for dysuria, frequency and urgency.   Skin: Negative for rash.   Neurological: Negative for headaches.   All other systems reviewed and are negative.      Physical Exam   BP: (!) 132/91  Pulse: (!) 128  Temp: 97.9  F (36.6  C)  Resp: 20  Height: 165.1 cm (5' 5\")  Weight: 74.8 kg (165 lb)  SpO2: 98 %      Physical Exam  Constitutional:       General: He is in acute distress.      Appearance: He is not diaphoretic.   HENT:      Head: Atraumatic.   Eyes:      Conjunctiva/sclera: " Conjunctivae normal.   Cardiovascular:      Rate and Rhythm: Normal rate and regular rhythm.      Heart sounds: No murmur heard.     Pulmonary:      Effort: Pulmonary effort is normal. No respiratory distress.      Breath sounds: Normal breath sounds. No stridor. No wheezing or rhonchi.   Abdominal:      General: Abdomen is flat. There is no distension.      Palpations: There is no mass.      Tenderness: There is no abdominal tenderness. There is no guarding.   Musculoskeletal:      Cervical back: Neck supple.      Right lower leg: No edema.      Left lower leg: No edema.   Skin:     Coloration: Skin is not pale.      Findings: No rash.   Neurological:      General: No focal deficit present.      Mental Status: He is alert.      Motor: No weakness.       Rectal exam is with poor rectal tone.   The perineum is hypersensitive as is the scrotum.  No testicular masses.  No obvious testicular tenderness aside from hyperesthesia in the region.  No hernia.    DTRs are 3+ at the patella 2+ at the Achilles.  He has pain in the scrotum and penis when he dorsiflexes his foot.  Strength is limited by the pain and elicits.  This appears to be worse in the left foot than the right.  I detect no obvious inner thigh numbness but he has hyperesthesia on the inner thighs.          ED Course        Procedures              Critical Care time:  none               Results for orders placed or performed during the hospital encounter of 10/14/21 (from the past 24 hour(s))   POC US ABDOMEN LIMITED    Impression    [facility] Procedure Note      Limited Bedside ED Ultrasound of the Urinary Bladder:    PERFORMED BY: Dr. Max Rico MD  INDICATIONS:  Possible obstrucion and/or mass  PROBE: Low frequency convex probe  BODY LOCATION:  Abdomen  FINDINGS:  Visualization of the bladder in longitudinal and transverse views demonstrated a distended state.  The bladder dimensions measured: 4 cm width X 8 height cm X 7 cm  length.  INTERPRETATION:  Total calculated volume was estimated at 240 cc.  The bladder is abnormally distended.  IMAGE DOCUMENTATION: Images were archived to PACs system.         Medications - No data to display    Assessments & Plan (with Medical Decision Making)     MDM: Mustapha Negrete is a 60 year old male who presents with a very complicated history of the groin and perineal pain that has been persistent and progressive since the spring of this year with abnormal lumbar spine imaging.  Multiple other etiologies of his pain have been postulated and is being treated extensively for these.  But he has had a persistent urinary retention that required catheterization for up to 3 months and just had this removed recently, he has inguinal pain, he has constipation with only small amounts of stool out and poor rectal tone on exam.  He has hyperesthesia in the inguinal region and in the buttock that has been severe and with any ambulation is worse.  He has 250 cc of urine in his bladder currently at bedside ultrasound after his catheter was removed yesterday.  I am concerned for possible chronic cauda equina symptoms and have placed a call for spine consult.  Dr. Ochoa will be calling back     Dr. Ochoa recommends [facility] neurosurgery.  I spoke with Dr. Ruiz in Neurosurgery  who was unable to view the imaging done in the [facility] system.  He agrees that evaluation related to central stenosis is important and repeat MRI is recommended.  Recommends epidural steroid injection after MRI.  Recommends a steroid burst.  We discussed the use of Decadron.  He would recommend 2 mg twice daily 3 days.  The patient was given 1 dose of 10 mg today and then will resume the 2 mg dosing tomorrow.   Patient should then see Neurosurgery.    The difficulty with this presentation is that he has significant findings most suggestive of chronic lumbar spinal stenosis and cauda equina that is chronic for at least several months.   He has had 3  months of urinary retention, persistent constipation and most importantly significant perineal and inner thigh hyperesthesia.      Although these symptoms have been chronic, the patient has had significant pain and and the concerning findings above and I have therefore tried to expedite his evaluation.  I have placed an order for MRI of the lumbar spine as stat, I placed a consult for epidural steroid injection also as ASAP, I have ordered neurosurgery referral also with ASAP within 3 to 5 days.      Finally I have secured a follow-up with  for tomorrow in clinic.  This is primarily to establish coordinated, complex care that will be needed over the next several weeks.       The patient will likely need continuation of his chronic pain management.  His chronic pain management providers have supplied him with 120 Vicodin approximately 1 week ago and he has not used these significantly.  We discussed that the Tylenol and the Vicodin could not be combined with Tylenol but that is based medications should be an anti-inflammatory such as ibuprofen 600 mg every 6 hours.  If Tylenol is sufficient he should use that but if not use the Vicodin.  I told him he will have incomplete relief from his pain and that my goal will be lowering intractable pain 7-8 out of 10 pain down to 4-5 out of 10 pain.  Other measures such as lidocaine patch, position changes, the Cymbalta that is previously been started, consideration for restarting Neurontin.        I have reviewed the nursing notes.    I have reviewed the findings, diagnosis, plan and need for follow up with the patient.       New Prescriptions    No medications on file       Final diagnoses:   Suspected cauda equina syndrome - chronic - I suspect this is the cause of symptoms, but this chronic for months.  The urine retention, constipation, pain in the perineum and scrotum, inner thighs,  wore pain in scrotum with dorsiflexion foot.  symptoms have been ongoing for >3  months.  plan follow-up neurosurgery.  I spoke with them today. plan MRI, followed by epidural steroid injection.   Urinary retention with incomplete bladder emptying - continue flomax and finasteride   Hyperesthesia - perineum - stay on cymbalta.  consider add back neurontin or lyrica   Chronic intractable pain - take the decadron.  10 mg orally was given here. tomorrow take 2 mg orally twice daily for 3 days. you were given vicodin by pain clinic - this tylenol in it that complicates the dosing.  do not tylenol with vicodin - use this for breakthrough pain - you were given 120 of the vicodin on the 4th. .    take ibuprofen 600 mg orally every 6 hours scheduled.  ibuprofen will need to be held before epidural steroid injection when scheduled.  stop the levaquin - this cannot be used with steroid   Constipation, unspecified constipation type - likely combination between opioid pain medications and the possible cauda equina.       10/14/2021   [facility] EMERGENCY DEPT     Max Rico MD  10/15/21 0034

## 2021-10-14 NOTE — PROGRESS NOTES
Clinic Care Coordination Contact    Background: Care Coordination referral placed from [facility] discharge report for reason of patient meeting criteria for a TCM outreach call by [facility] team.    Assessment: Upon chart review, [facility] Team member will cancel/close the referral for TCM outreach due to reason below:     Patient was in a lot of deep pain and burring and [facility] assisted in calling Nurse Triage with the patient to discuss what to do next the RN had requested for the patient to see medical treatment in the ER.    Plan: Care Coordination referral for TCM outreach canceled.    Tammy Maldonado MA  [facility], [facility]

## 2021-10-15 ENCOUNTER — OFFICE VISIT (OUTPATIENT)
Dept: FAMILY MEDICINE | Facility: CLINIC | Age: 61
End: 2021-10-15
Payer: COMMERCIAL

## 2021-10-15 VITALS
RESPIRATION RATE: 22 BRPM | WEIGHT: 168 LBS | DIASTOLIC BLOOD PRESSURE: 82 MMHG | OXYGEN SATURATION: 97 % | HEART RATE: 110 BPM | SYSTOLIC BLOOD PRESSURE: 128 MMHG | TEMPERATURE: 98.1 F | HEIGHT: 65 IN | BODY MASS INDEX: 27.99 KG/M2

## 2021-10-15 DIAGNOSIS — G83.4 CAUDA EQUINA SYNDROME (H): Primary | ICD-10-CM

## 2021-10-15 PROCEDURE — 99204 OFFICE O/P NEW MOD 45 MIN: CPT | Performed by: INTERNAL MEDICINE

## 2021-10-15 ASSESSMENT — MIFFLIN-ST. JEOR: SCORE: 1488.92

## 2021-10-15 NOTE — PROGRESS NOTES
"    Assessment & Plan     Cauda equina syndrome (H) -symptoms of new onset bowel and bladder disturbance along with perineal paresthesia and severe radicular pain and right leg weakness is consistent with clinical cauda equina.  He does have severe right lateral recess narrowing with compression of the right L5 nerve root and the L4 nerve root.  Clouding the picture is a rash in the groin as well as existing BPH.  Agree with urgent neurosurgery referral.  He appear to already be on their radar.  I did send a message to the RN in the neurosurgery clinic stressing the importance of an urgent referral.          44 minutes spent on the date of the encounter doing chart review, review of outside records, review of test results, patient visit and documentation        BMI:   Estimated body mass index is 27.96 kg/m  as calculated from the following:    Height as of this encounter: 1.651 m (5' 5\").    Weight as of this encounter: 76.2 kg (168 lb).       Patient Instructions   1. Agree with urgent Neurosurgery appointment - they are aware and should call you today or Monday  2. Ok with half of Decadron dose due to side effects.  3. Would avoid \"PM medications\" for sleep - stick with Melatonin. PM medications can cause urinary retention.  No more than 5 mg. 1-5 mg melatonin.   Higher doses can make sleep worse.  Review tips for sleep below.  4. Symptoms sound more like side effects of steroid than Cymbalta.  If symptoms of 'feeling jacked up' continue 1 week off steroid, then may be due to cymbalta  5. Follow-up with  in 2-4 weeks        Sleep Hygiene    1. Avoid doing anything stressful in the hour before bedtime. Avoid paying bills, watching politics on the news, etc.  2. Avoid screens just before bedtime. This includes cell phones, iPad, computers, TV. The bright lights on the screen is very stimulating to the brain, and makes it difficult to follow asleep and stay asleep  3. Avoid alcohol. Alcohol can sometimes " make it easier to fall asleep, but significantly reduces the chance that you will stay asleep. It also impairs REM sleep, which is the good restful sleep  4. Use the bed for sleep and sex only. Avoid eating, reading, watching TV in bed  5. If you feel very restless at bedtime, try doing mundane physical activities such as vacuuming, folding laundry, etc.  6. If you find yourself making lists in your head at night, keep pen and paper at the bedside. Write down these lists. Also visualize yourself checking that item off your list and removing it from your brain.  7. Keep the room cool and dark. Consider investing in late darkening curtains  8. Avoid drinking excessive fluids just before bedtime. Empty your bladder just before bedtime  9. Cognitive behavioral therapy (CBT) has been proven to be highly effective to treat insomnia.  Consider meeting with [facility] [place] for CBT.  There is also a free kelvin called CBT-I               No follow-ups on file.    Viki Hanson, [facility]    Zhang Luciano is a 60 year old who presents for the following health issues     HPI     Former PCP Julián Xavier, [facility]    No records from [facility] - noted this after visit, will have patient sign EARLE.  Minimal records from [facility], filled out EARLE     ED/UC Followup:    Facility:  [facility] [place]  Date of visit: 10/14/2021  Reason for visit: Suspected chronic cauna equida syndrome, urinary retention with incomplete bladder emptying, perineum hyperesthesia, chronic intractable pain, and constipation  Current Status: Today, patient is a little bit better but still very sore.  He has been in and out of the hospital the last few weeks.   --lyrica caused dizziness.  Gabapentin not helpful  --has not been at work since June due to severity of symptoms   --2 days w/out catheter.  No trouble so far  --using ibuprofen 600 mg, Tylenol for pain.  --finds the  Decadron is helping but makes him jittery and palpitations; wants to cut in half  --has oxycodone #7 pill; still has Norco leftover.  Used 1.5 Norco last night (Rx #120 [facility] on 10/4)  None today.  --hard to sleep due to pain.  --feels the steroid has helped significantly. Still with severe pain however.  --[facility].    --was seen by [facility], records are incomplete         From ER Note yesterday   history of prostatitis rectal pain penile pain BPH -admitted to this facility for 3 days of symptoms worsening.  Has excruciating pain especially with sitting into the perineum and thighs.  Worsening since June.  History of urinary retention.  Obstructive sleep apnea on BiPAP low back pain.  Prior Covid infection in April.    Had seen chiropractic care.  Admitted at [facility] 8 26-8 29.  Has been seen by [facility] in July MRIs of his lumbar spine.  He had multiple vitamin D vitamin B12 levels folic acid levels negative syphilis HIV negative O&P.  Seen GI.  Diagnosed with an anal fissure.  Perianal fungal infection was considered.  Was empirically tried on levofloxacin for prostatitis.  Question of possible postherpetic neuralgia.  All of his markers were reassuring.  Recent urine culture positive enterobacter that was sensitive to Cipro and levofloxacin based GC chlamydia testing had been negative from urine.  Had been on tamsulosin but this caused atypical idiosyncratic symptoms in the penis of fullness and it was stopped.  Had been tried with nitroglycerin for proctalgia food axis.  Has been instructed on pelvic floor exercises.  Have been restarted tamsulosin continued finasteride topical nitroglycerin and ibuprofen oxycodone levofloxacin for 24 days for total treatment of 4 weeks.  Hydrocortisone foam rectal.  Physical therapy for pelvic floor.     MRI in July showed a right posterior paracentral lateral recess disc protrusion L4-5.   Narrowing at the right L5 nerve root moderate central canal stenosis moderate to severe bilateral foraminal stenosis moderate to severe right L3-4 lateral recess and compression of the right L4 nerve root.  Left lateral recess disc extrusion with caudal migration at L2-3.     Also seen for BPH by both [facility] and Dr. Oneill at this facility.  Had significant urinary retention.  Prior UroLift procedure done about 1 and half years ago.     During his last evaluation he also underwent CT imaging.  This demonstrates no changes.  He tells me that his pain has been severe.  He had his catheter out yesterday for the first time consistently in 3 months.  Overnight he had difficulty urinating and actually went to the [place] emergency department but while he was in the parking lot felt that he could suddenly urinate was able to empty his bladder.  No current dysuria.  No frequency.  He denies fever.  He has had difficulty with constipation and feels that he only gets small amounts of stool out that is mostly liquid stool.  He has no blood in the stool or black tarry stools.  He is able to ambulate but ambulation makes the pain worse.  There is no obvious foot drop but dorsiflexion of the foot results in pain in the inguinal region and scrotum.        He has been tried on multiple neuropathy related agents such as Lyrica and gabapentin without sufficient effect.  There is been no spine injections such as epidural steroid injection or foraminal injection.    MDM: Mustapha Negrete is a 60 year old male who presents with a very complicated history of the groin and perineal pain that has been persistent and progressive since the spring of this year with abnormal lumbar spine imaging.  Multiple other etiologies of his pain have been postulated and is being treated extensively for these.  But he has had a persistent urinary retention that required catheterization for up to 3 months and just had this removed recently, he has  inguinal pain, he has constipation with only small amounts of stool out and poor rectal tone on exam.  He has hyperesthesia in the inguinal region and in the buttock that has been severe and with any ambulation is worse.  He has 250 cc of urine in his bladder currently at bedside ultrasound after his catheter was removed yesterday.  I am concerned for possible chronic cauda equina symptoms and have placed a call for spine consult.  Dr. Ochoa will be calling back      Dr. Ochoa recommends [facility] neurosurgery.  I spoke with Dr. Ruiz in Neurosurgery  who was unable to view the imaging done in the [facility] system.  He agrees that evaluation related to central stenosis is important and repeat MRI is recommended.  Recommends epidural steroid injection after MRI.  Recommends a steroid burst.  We discussed the use of Decadron.  He would recommend 2 mg twice daily 3 days.  The patient was given 1 dose of 10 mg today and then will resume the 2 mg dosing tomorrow.   Patient should then see Neurosurgery.     The difficulty with this presentation is that he has significant findings most suggestive of chronic lumbar spinal stenosis and cauda equina that is chronic for at least several months.   He has had 3 months of urinary retention, persistent constipation and most importantly significant perineal and inner thigh hyperesthesia.       Although these symptoms have been chronic, the patient has had significant pain and and the concerning findings above and I have therefore tried to expedite his evaluation.  I have placed an order for MRI of the lumbar spine as stat, I placed a consult for epidural steroid injection also as ASAP, I have ordered neurosurgery referral also with ASAP within 3 to 5 days.       Finally I have secured a follow-up with  for tomorrow in clinic.  This is primarily to establish coordinated, complex care that will be needed over the next several weeks.        The patient will likely need continuation  "of his chronic pain management.  His chronic pain management providers have supplied him with 120 Vicodin approximately 1 week ago and he has not used these significantly.  We discussed that the Tylenol and the Vicodin could not be combined with Tylenol but that is based medications should be an anti-inflammatory such as ibuprofen 600 mg every 6 hours.  If Tylenol is sufficient he should use that but if not use the Vicodin.  I told him he will have incomplete relief from his pain and that my goal will be lowering intractable pain 7-8 out of 10 pain down to 4-5 out of 10 pain.  Other measures such as lidocaine patch, position changes, the Cymbalta that is previously been started, consideration for restarting Neurontin.      hosp discharge 10/14   - oxycodone oral given at discharge  #9 tablets Suspect ongoing narcotic use is part of his cycle of pain and needs to wean off.   - course of high-dose levofloxacin 750 mg for possible bacterial prostatitis - complete 24 more days for total of 4 weeks of treatement   - Continue clotrimazole topical cream twice daily for previously diagnosed perianal fungal infection  -- continue hydrocortisone rectal foam for discomfort    - physical therapy pelvic pain therapy saw patient today, offered some exercises and will follow him as an outpatient.            Review of Systems   Constitutional, HEENT, cardiovascular, pulmonary, GI, , musculoskeletal, neuro, skin, endocrine and psych systems are negative, except as otherwise noted.      Objective    /82 (BP Location: Right arm, Patient Position: Sitting, Cuff Size: Adult Regular)   Pulse 110   Temp 98.1  F (36.7  C) (Tympanic)   Resp 22   Ht 1.651 m (5' 5\")   Wt 76.2 kg (168 lb)   SpO2 97%   BMI 27.96 kg/m    Body mass index is 27.96 kg/m .  Physical Exam   GENERAL APPEARANCE: alert, no distress and appears in pain  Comprehensive back pain exam:  Tenderness of bilat paralumbar muscles, Pain limits the following " motions: sitting, lumbar flex/ext, 4/5 strength in right leg compared to left leg, absent reflexes bilat knee, decreased sensation in right lower leg compared to left and Straight leg positive on  right, indicating possible ipsilateral radiculopathy

## 2021-10-15 NOTE — PATIENT INSTRUCTIONS
"1. Agree with urgent Neurosurgery appointment - they are aware and should call you today or Monday  2. Ok with half of Decadron dose due to side effects.  3. Would avoid \"PM medications\" for sleep - stick with Melatonin. PM medications can cause urinary retention.  No more than 5 mg. 1-5 mg melatonin.   Higher doses can make sleep worse.  Review tips for sleep below.  4. Symptoms sound more like side effects of steroid than Cymbalta.  If symptoms of 'feeling jacked up' continue 1 week off steroid, then may be due to cymbalta  5. Follow-up with  in 2-4 weeks        Sleep Hygiene    1. Avoid doing anything stressful in the hour before bedtime. Avoid paying bills, watching politics on the news, etc.  2. Avoid screens just before bedtime. This includes cell phones, iPad, computers, TV. The bright lights on the screen is very stimulating to the brain, and makes it difficult to follow asleep and stay asleep  3. Avoid alcohol. Alcohol can sometimes make it easier to fall asleep, but significantly reduces the chance that you will stay asleep. It also impairs REM sleep, which is the good restful sleep  4. Use the bed for sleep and sex only. Avoid eating, reading, watching TV in bed  5. If you feel very restless at bedtime, try doing mundane physical activities such as vacuuming, folding laundry, etc.  6. If you find yourself making lists in your head at night, keep pen and paper at the bedside. Write down these lists. Also visualize yourself checking that item off your list and removing it from your brain.  7. Keep the room cool and dark. Consider investing in late darkening curtains  8. Avoid drinking excessive fluids just before bedtime. Empty your bladder just before bedtime  9. Cognitive behavioral therapy (CBT) has been proven to be highly effective to treat insomnia.  Consider meeting with [facility] [place] for CBT.  There is also a free kelvin called CBT-I           "

## 2021-10-15 NOTE — Clinical Note
I see a telephone note was routed to you about scheduling Mr. Negrete [place] [facility].  He is having very severe symptoms and would greatly benefit from very urgent referral.  He has been totally disabled since symptom onset.  Symptoms have been ongoing since at least May.  He has had extensive work-up, seen multiple specialists, been to ER multiple times, hospitalized multiple times, tried multiple meds.  Please let me know if he is not able to be seen within the next 1-2 weeks.  I am honestly shocked that he was not referred to [facility] [place], although I am very new to his care, as of today.

## 2021-10-16 ENCOUNTER — HOSPITAL ENCOUNTER (EMERGENCY)
Facility: CLINIC | Age: 61
Discharge: HOME | End: 2021-10-16
Attending: FAMILY MEDICINE | Admitting: FAMILY MEDICINE
Payer: COMMERCIAL

## 2021-10-16 ENCOUNTER — NURSE TRIAGE (OUTPATIENT)
Dept: NURSING | Facility: CLINIC | Age: 61
End: 2021-10-16

## 2021-10-16 ENCOUNTER — APPOINTMENT (OUTPATIENT)
Dept: MRI IMAGING | Facility: CLINIC | Age: 61
End: 2021-10-16
Attending: FAMILY MEDICINE
Payer: COMMERCIAL

## 2021-10-16 VITALS
DIASTOLIC BLOOD PRESSURE: 88 MMHG | RESPIRATION RATE: 20 BRPM | SYSTOLIC BLOOD PRESSURE: 138 MMHG | OXYGEN SATURATION: 97 % | WEIGHT: 160 LBS | HEART RATE: 106 BPM | TEMPERATURE: 98 F | BODY MASS INDEX: 26.63 KG/M2

## 2021-10-16 DIAGNOSIS — R33.9 URINARY RETENTION: ICD-10-CM

## 2021-10-16 DIAGNOSIS — K62.89 RECTAL PAIN: ICD-10-CM

## 2021-10-16 DIAGNOSIS — R10.2 PERINEAL PAIN IN MALE: ICD-10-CM

## 2021-10-16 DIAGNOSIS — N48.89 PENILE PAIN: ICD-10-CM

## 2021-10-16 PROCEDURE — 72148 MRI LUMBAR SPINE W/O DYE: CPT

## 2021-10-16 PROCEDURE — 250N000011 HC RX IP 250 OP 636: Performed by: FAMILY MEDICINE

## 2021-10-16 PROCEDURE — 99285 EMERGENCY DEPT VISIT HI MDM: CPT | Performed by: FAMILY MEDICINE

## 2021-10-16 PROCEDURE — 250N000009 HC RX 250: Performed by: FAMILY MEDICINE

## 2021-10-16 PROCEDURE — 96374 THER/PROPH/DIAG INJ IV PUSH: CPT | Mod: 59 | Performed by: FAMILY MEDICINE

## 2021-10-16 PROCEDURE — 51798 US URINE CAPACITY MEASURE: CPT | Performed by: FAMILY MEDICINE

## 2021-10-16 PROCEDURE — 51702 INSERT TEMP BLADDER CATH: CPT | Performed by: FAMILY MEDICINE

## 2021-10-16 PROCEDURE — 99285 EMERGENCY DEPT VISIT HI MDM: CPT | Mod: 25 | Performed by: FAMILY MEDICINE

## 2021-10-16 RX ORDER — HYDROMORPHONE HYDROCHLORIDE 2 MG/1
2 TABLET ORAL EVERY 6 HOURS PRN
Qty: 10 TABLET | Refills: 0 | Status: SHIPPED | OUTPATIENT
Start: 2021-10-16 | End: 2021-10-19

## 2021-10-16 RX ORDER — LIDOCAINE HYDROCHLORIDE 20 MG/ML
JELLY TOPICAL ONCE
Status: COMPLETED | OUTPATIENT
Start: 2021-10-16 | End: 2021-10-16

## 2021-10-16 RX ORDER — HYDROMORPHONE HYDROCHLORIDE 1 MG/ML
0.5 INJECTION, SOLUTION INTRAMUSCULAR; INTRAVENOUS; SUBCUTANEOUS
Status: COMPLETED | OUTPATIENT
Start: 2021-10-16 | End: 2021-10-16

## 2021-10-16 RX ADMIN — HYDROMORPHONE HYDROCHLORIDE 0.5 MG: 1 INJECTION, SOLUTION INTRAMUSCULAR; INTRAVENOUS; SUBCUTANEOUS at 10:32

## 2021-10-16 RX ADMIN — LIDOCAINE HYDROCHLORIDE: 20 JELLY TOPICAL at 13:24

## 2021-10-16 ASSESSMENT — ENCOUNTER SYMPTOMS
CONSTIPATION: 0
NUMBNESS: 1
SORE THROAT: 0
VOMITING: 0
DIFFICULTY URINATING: 1
FEVER: 0
NAUSEA: 0
DIARRHEA: 0
DIAPHORESIS: 1
BACK PAIN: 0
COUGH: 0
SHORTNESS OF BREATH: 0
WOUND: 1
ABDOMINAL PAIN: 0
RHINORRHEA: 0
RECTAL PAIN: 1

## 2021-10-16 NOTE — DISCHARGE INSTRUCTIONS
Use the Dilaudid as needed for severe pain.  See urology on Monday as scheduled.  Neurosurgery should be calling you to set up an appointment.  Apply Vaseline to the area of irritated skin under the penis.

## 2021-10-16 NOTE — ED TRIAGE NOTES
"pt here with groin and leg pain for 4months, last two weeks and last two days the \"attacks\" are happening every hour. Pt has problems when he is trying to urinate. Pt seen in ED 10/14 with Dr. Rico diagnosed with cauda equina syndrome  "

## 2021-10-16 NOTE — ED PROVIDER NOTES
"  History     Chief Complaint   Patient presents with     Leg Pain     pt here with groin and leg pain for 4months, last two weeks and last two days the \"attacks\" are happening every hour. Pt has problems when he is trying to urinate. Pt seen in ED 10/14 with Dr. Rico diagnosed with cauda equina syndrome     Groin Pain     HPI  Mustapha Negrete is a 60 year old male who has a complex history of neurologic deficit suspicious for chronic cauda equina syndrome.  Please refer to previous note from Dr. Rico, and  in the clinic this week.  Details are well elucidated in those notes and are largely unchanged.  He notes that today he comes in because the pain is getting worse in terms of frequency.  It occurs about every hour.  It is triggered by needing to urinate or defecate.  It is accompanied by severe sweating of the perineum.  He has a burning type pain in the perineum and posterior thighs.  Nothing seems to help this.  He has been on steroids which she had to decrease because of side effects.  They also started him on Cymbalta to see if that would help but it has really made a difference yet and he is not sure if the side effects might be from that as well.  In addition he has been given some oxycodone and that really has not made any difference in the symptoms when they occur.  He was hospitalized here in early October and at that time Dilaudid seemed to provide some measure of relief.  He is scheduled for an MRI tomorrow and then is supposed to have neurosurgical follow-up but has not heard back from them yet.    Allergies:  Allergies   Allergen Reactions     Droperidol Other (See Comments)     Extrapyramidal Side Effect     Fenofibrate Headache and Diarrhea              Keflex [Cephalexin] Itching     Lactose GI Disturbance            Pcn [Penicillins] Other (See Comments)     Feels warm and flushed, but tolerates Cephalexin     Atorvastatin Palpitations     Sertraline Palpitations            Simvastatin " Palpitations     Sulfa Drugs Headache and Rash     Burning       Tetracycline Rash     Burning         Problem List:    Patient Active Problem List    Diagnosis Date Noted     Acute prostatitis 10/06/2021     Priority: Medium     Rectal pain 10/06/2021     Priority: Medium     Penile pain 10/06/2021     Priority: Medium     Perineal pain in male 10/06/2021     Priority: Medium     Uncontrolled pain 10/06/2021     Priority: Medium     Anal fissure 08/26/2021     Priority: Medium     Urinary retention 08/16/2021     Priority: Medium     Neuropathy 08/16/2021     Priority: Medium     Benign prostatic hyperplasia with urinary obstruction 07/12/2021     Priority: Medium     MARIAN (obstructive sleep apnea) 05/03/2021     Priority: Medium     Formatting of this note might be different from the original.  Formatting of this note might be different from the original.  On BiPAP, see scans.  Formatting of this note might be different from the original.  On BiPAP, see scans.       Symptomatic cholelithiasis 04/06/2021     Priority: Medium     Hepatic steatosis 03/26/2021     Priority: Medium     Psoriatic arthritis (H) 03/15/2021     Priority: Medium     Dyslipidemia 07/10/2017     Priority: Medium     Mild intermittent asthma, uncomplicated 04/05/2017     Priority: Medium     OA (osteoarthritis) of hip 05/19/2015     Priority: Medium     Low back pain 10/25/2013     Priority: Medium     Reflux esophagitis 04/18/2013     Priority: Medium     Insomnia, unspecified 02/27/2013     Priority: Medium     GERD (gastroesophageal reflux disease) 10/16/2012     Priority: Medium        Past Medical History:    Past Medical History:   Diagnosis Date     Asthma        Past Surgical History:    Past Surgical History:   Procedure Laterality Date     ENT SURGERY      tonsillectomy     GENITOURINARY SURGERY      TURP     ORTHOPEDIC SURGERY      wrist surgery, carpal tunnel 2005       Family History:    Family History   Problem Relation Age of  Onset     Cerebrovascular Disease Mother      Hypertension Mother      Diabetes Mother      Cancer Father 84        liver cancer     Prostate Cancer Father        Social History:  Marital Status:   [4]  Social History     Tobacco Use     Smoking status: Former Smoker     Quit date: 1999     Years since quittin.9     Smokeless tobacco: Never Used   Vaping Use     Vaping Use: Never used   Substance Use Topics     Alcohol use: No     Drug use: No        Medications:    HYDROmorphone (DILAUDID) 2 MG tablet  acetaminophen (TYLENOL) 325 MG tablet  amLODIPine (NORVASC) 10 MG tablet  butt paste ointment  clotrimazole (LOTRIMIN) 1 % external cream  dexamethasone (DECADRON) 2 MG tablet  DULoxetine (CYMBALTA) 20 MG capsule  finasteride (PROSCAR) 5 MG tablet  ibuprofen (ADVIL/MOTRIN) 600 MG tablet  NITRO-BID 2 % OINT ointment  oxyCODONE (ROXICODONE) 5 MG tablet  polyethylene glycol (MIRALAX) 17 GM/Dose powder  tamsulosin (FLOMAX) 0.4 MG capsule          Review of Systems   Constitutional: Positive for diaphoresis. Negative for fever.   HENT: Negative for congestion, ear pain, rhinorrhea and sore throat.    Respiratory: Negative for cough and shortness of breath.    Cardiovascular: Negative for chest pain.   Gastrointestinal: Positive for rectal pain. Negative for abdominal pain, constipation, diarrhea, nausea and vomiting.   Genitourinary: Positive for difficulty urinating and penile pain.   Musculoskeletal: Negative for back pain.   Skin: Positive for wound (Skin under the penis is becoming irritated from current symptoms.).   Neurological: Positive for numbness.       Physical Exam   BP: (!) 149/99  Pulse: 106  Temp: 98  F (36.7  C)  Resp: 20  Weight: 72.6 kg (160 lb)  SpO2: 97 %      Physical Exam  Constitutional:       General: He is not in acute distress.     Appearance: Normal appearance. He is normal weight. He is not ill-appearing, toxic-appearing or diaphoretic.   HENT:      Head: Normocephalic and  atraumatic.   Eyes:      Extraocular Movements: Extraocular movements intact.   Cardiovascular:      Rate and Rhythm: Normal rate.   Pulmonary:      Effort: Pulmonary effort is normal.   Musculoskeletal:         General: Normal range of motion.      Cervical back: Normal range of motion.   Skin:     General: Skin is warm and dry.   Neurological:      General: No focal deficit present.      Mental Status: He is alert.   Psychiatric:         Mood and Affect: Mood normal.         ED Course        Procedures  Bladder scanning indicated 500 mL of urine in the bladder.  Patient was unable to void to adequately empty the bladder.  He has been working with an indwelling urinary catheter for the last couple months and has only had it out for a few days.  I think given his level of urinary retention, coupled with his discomfort, replacement of the catheter is appropriate and this is accomplished in the ED. large amount of urine was returned and due to this and possible factor of his bladder distention and his pain spasms decision made to leave the Walden in with leg bag.  He does have follow-up scheduled with urology on Monday.    MRI did show 2 fairly significant disc herniations.  It will be good for neurosurgery to weigh in on this and the referrals already in for that.            Critical Care time:  none               Results for orders placed or performed during the hospital encounter of 10/16/21 (from the past 24 hour(s))   Lumbar spine MRI w/o contrast    Narrative    EXAM: MR LUMBAR SPINE W/O CONTRAST  LOCATION: [facility]  DATE/TIME: 10/16/2021 10:56 AM    INDICATION: Spinal stenosis, lumbosacral.  COMPARISON: None.  TECHNIQUE: Routine Lumbar Spine MRI without IV contrast.    FINDINGS:   Nomenclature is based on 5 lumbar type vertebral bodies. Minimal rightward convex curvature lumbar spine. Exaggerated lumbar spine lordosis. Lumbar vertebral body heights are maintained. Grade 1  retrolisthesis of L1 on L2 and L2 on L3. Grade 1   anterolisthesis of L4 on L5. Modic type II degenerative endplate changes involving the apposing endplates at L4-L5. The L1 and L5 vertebral body hemangiomas. There is partial fusion across the L5-S1 disc space.    Normal distal spinal cord and cauda equina with conus medullaris at L1. Bilateral renal cysts. Unremarkable visualized bony pelvis.    T12-L1: Normal disc signal and disc space height. No focal disc herniation. Normal facets. No spinal canal stenosis or neural foraminal narrowing.     L1-L2: Disc desiccation and minimal loss of disc space height. Left central disc protrusion. Normal facets. No spinal canal stenosis or neural foraminal narrowing.    L2-L3: Disc desiccation and mild loss of disc space height. Mild circumferential disc bulge. Normal facets. No spinal canal stenosis or neural foraminal narrowing.     L3-L4: Disc desiccation and mild loss of disc space height. Circumferential disc bulge with superimposed right paracentral disc protrusion with narrowing of the right lateral recess and apparent mass effect on the descending/proximal traversing right L4   nerve root. Mild spinal canal stenosis. Mild bilateral neural foraminal stenosis.    L4-L5: Disc desiccation and moderate loss of disc space height. Circumferential disc bulge with superimposed right paracentral slightly caudally directed disc extrusion. There is narrowing of the right lateral recess and apparent mass effect on the   descending/proximal traversing right L5 nerve root. Severe degenerative facet changes. Mild spinal canal stenosis. Mild to moderate bilateral neural foraminal stenosis.    L5-S1: Partial fusion across the disc space. Mild circumferential disc interbody spurring. Mild facet arthropathy. No spinal canal stenosis. No right neural foraminal stenosis. Mild left neural foraminal stenosis.      Impression    IMPRESSION:  1.  Mild to moderate lumbar spondylitic changes  superimposed on mild developmental narrowing of the lumbar spinal canal.  2.  At L4-L5, there is a right paracentral slightly caudally directed disc extrusion that narrows the right lateral recess and causes mass effect on the descending/traversing right L5 nerve root.  3.  At L3-L4, there is a right paracentral disc protrusion superimposed on disc bulge with narrowing of the right lateral recess and mass effect on the descending/traversing right L4 nerve root.       Medications   lidocaine (XYLOCAINE) 2 % external gel (has no administration in time range)   HYDROmorphone (PF) (DILAUDID) injection 0.5 mg (0.5 mg Intravenous Given 10/16/21 1032)       Assessments & Plan (with Medical Decision Making)     I have reviewed the nursing notes.    I have reviewed the findings, diagnosis, plan and need for follow up with the patient.   Severe spasm pain in the perineum.  Could be due to bladder distention or disc disease.  No evidence of cauda equina syndrome on the MRI.    Urinary retention status post catheter removal.  Catheter replaced.  Urology follow-up already scheduled for Monday.    Prescription of Dilaudid for home use will be provided in hopes this offers some relief.    Skin under the penis is slightly irritated recommend using Vaseline as a skin barrier.    New Prescriptions    HYDROMORPHONE (DILAUDID) 2 MG TABLET    Take 1 tablet (2 mg) by mouth every 6 hours as needed for pain       Final diagnoses:   Rectal pain   Penile pain   Perineal pain in male   Urinary retention       10/16/2021   [facility] EMERGENCY DEPT     Michael Miranda MD  10/16/21 [id number]

## 2021-10-16 NOTE — TELEPHONE ENCOUNTER
He was seen on Thursday for nerve damage.  He started on steroids and Cymbalta 2 days ago and it helped yesterday , but today it has come back with a vengence .  He is having extreme pain ,burning ,and cramping in the back of his hamstring.  It is happening multiple times an hour now, and he is having a hard time walking and just moving .  It gets worse when he bears down to urinate and have a bowel movement.  Care advice is to go to the ED to have the pain and his symptoms re evaluated.    Patient verbalized understanding and agrees with plan.    Cathy Neves Nurse Triage Advisor 8:11 AM 10/16/2021      Reason for Disposition    Unable to walk    Additional Information    Negative: Looks like a broken bone or dislocated joint (e.g., crooked or deformed)    Negative: Sounds like a life-threatening emergency to the triager    Negative: Entire foot is cool or blue in comparison to other side    Negative: Difficulty breathing    Negative: Chest pain    Protocols used: LEG PAIN-A-AH

## 2021-10-18 ENCOUNTER — ALLIED HEALTH/NURSE VISIT (OUTPATIENT)
Dept: UROLOGY | Facility: CLINIC | Age: 61
End: 2021-10-18
Payer: COMMERCIAL

## 2021-10-18 DIAGNOSIS — R33.9 URINARY RETENTION: Primary | ICD-10-CM

## 2021-10-18 DIAGNOSIS — N13.8 BENIGN PROSTATIC HYPERPLASIA WITH URINARY OBSTRUCTION: ICD-10-CM

## 2021-10-18 DIAGNOSIS — N40.1 BENIGN PROSTATIC HYPERPLASIA WITH URINARY OBSTRUCTION: ICD-10-CM

## 2021-10-18 DIAGNOSIS — R10.2 PERINEAL PAIN IN MALE: ICD-10-CM

## 2021-10-18 PROCEDURE — 87086 URINE CULTURE/COLONY COUNT: CPT

## 2021-10-18 PROCEDURE — 87088 URINE BACTERIA CULTURE: CPT

## 2021-10-18 PROCEDURE — 87186 SC STD MICRODIL/AGAR DIL: CPT

## 2021-10-18 PROCEDURE — 99207 PR NO CHARGE NURSE ONLY: CPT

## 2021-10-18 NOTE — NURSING NOTE
Patient was in the ED prior to this appointment and had a catheter placed so a PVR could not be completed. Writer spoke with Dr. Oneill who in turn spoke with the patient .  Catheter hold placement was changed and a UA sample collected.  Patient wanted a letter to be sent to [facility] in regards to him having a catheter for the past time.  Dr. Oneill was informed of this request.  Claim/REf number [id number], Fax number [phone] [employer].    Bella Simmons LPN on 10/18/2021 at 12:36 PM

## 2021-10-18 NOTE — TELEPHONE ENCOUNTER
FUTURE VISIT INFORMATION      FUTURE VISIT INFORMATION:    Date: 10/19/2021    Time: 130pm    [place] [facility] [place]  REFERRAL INFORMATION:    Referring provider:  Dr. Rico     Referring providers clinic:  [facility]     Reason for visit/diagnosis  ED Follow-up     RECORDS REQUESTED FROM:       Clinic name Comments Records Status Imaging Status   Internal ED Visit-10/14/2021    -10/15/2021    MR Lumbar Spine-10/16/2021 Epic PACS         Queenie PEREA Lumbar Spine-7/26/2021 Scanned to Chart Requested by mail                        10/18/2021-Request for images faxed to [facility] @ 423pm

## 2021-10-18 NOTE — LETTER
[facility]  [address] [address]  Phone: [phone]      October 18, 2021      RE: Mustapha Negrete  [address] [address]    [employer]  Claim # [id number]    Fax # [phone]      To whom it may concern:    Mustapha Negrete is under my professional care.    As a result of urinary retention, managed by indwelling Walden catheter, he will be unable to work until further notice.    Sincerely,      Alex Oneill MD

## 2021-10-19 ENCOUNTER — PRE VISIT (OUTPATIENT)
Dept: NEUROSURGERY | Facility: CLINIC | Age: 61
End: 2021-10-19

## 2021-10-19 ENCOUNTER — TELEPHONE (OUTPATIENT)
Dept: UROLOGY | Facility: CLINIC | Age: 61
End: 2021-10-19

## 2021-10-19 ENCOUNTER — VIRTUAL VISIT (OUTPATIENT)
Dept: NEUROSURGERY | Facility: CLINIC | Age: 61
End: 2021-10-19
Payer: COMMERCIAL

## 2021-10-19 DIAGNOSIS — M54.42 ACUTE RIGHT-SIDED LOW BACK PAIN WITH BILATERAL SCIATICA: Primary | ICD-10-CM

## 2021-10-19 DIAGNOSIS — M54.41 ACUTE RIGHT-SIDED LOW BACK PAIN WITH BILATERAL SCIATICA: Primary | ICD-10-CM

## 2021-10-19 PROCEDURE — 99207 PR NO CHARGE LOS: CPT | Performed by: NURSE PRACTITIONER

## 2021-10-19 NOTE — NURSING NOTE
Called patient to let him know letter had been faxed on 10/19/21 and that the original was mailed to him.    Bella Simmons LPN on 10/19/2021 at 12:27 PM

## 2021-10-19 NOTE — PROGRESS NOTES
Patient was recently evaluated in the Emergency Room on 10/14/2021.    Mr. Negrete is a 60 year old male with past medical history significant for BPH with urinary retention indwelling Walden catheter, MARIAN, hepatic steatosis, psoriatic arthritis, dyslipidemia, mild intermittent asthma osteoarthritis of his hip, atherosclerotic heart disease, low back pain, who presented to the emergency department with concern with worsening rectal/perineal pain and penial burning which is been an ongoing issue for the past 4 months and is progressively worsening.  Patient described intense burning pain of rectum which radiates through to his urethra/penis as well as sharp burning pain to bilateral buttocks that occasionally radiate down back of the legs without clear aggravating or alleviating factors.      Dr Ruiz from Neurosurgery was consulted with the following recommendations:   Dr. Ochoa recommends [facility] neurosurgery.  I spoke with Dr. Ruiz in Neurosurgery  who was unable to view the imaging done in  the [facility] system.  He agrees that evaluation related to central stenosis is important and repeat MRI is recommended.  Recommends  epidural steroid injection after MRI.  Recommends a steroid burst.  We discussed the use of Decadron.  He would recommend 2 mg  twice daily 3 days.  The patient was given 1 dose of 10 mg today and then will resume the 2 mg dosing tomorrow.   Patient should  then see Neurosurgery.      The difficulty with this presentation is that he has significant findings most suggestive of chronic lumbar spinal stenosis and cauda  equina that is chronic for at least several months.   He has had 3 months of urinary retention, persistent constipation and most  importantly significant perineal and inner thigh hyperesthesia.        Although these symptoms have been chronic, the patient has had significant pain and and the concerning findings above and I have  therefore tried to expedite his evaluation.  I have placed an  order for MRI of the lumbar spine as stat, I placed a consult for epidural  steroid injection also as ASAP, I have ordered neurosurgery referral also with ASAP within 3 to 5 days.      Today, the patient was scheduled with Dr. Matty Ruiz, but unfortunately Dr. Ruiz was pulled away to a surgical procedure in the operating room and needed to cancel clinic.   I was full in clinic myself, but briefly consulted with the patient by telephone.   He also had a very poor phone connection.   He states that he was doing well until this past April when he was dx'd with COVID-19.   He was quite sedentary and lying in the couch often.   He developed significant groin numbness.   He then required gallbladder surgery, and he developed urinary retention, requiring indwelling leon catheter  He also developed severe pain in his right buttock, and down the leg to the calf, and leg weakness.     His MRI of Lumbar spine is reviewed.   Patient is appropriate for surgical consultation with one of the spine neurosurgeons.   Will order EOS Total body spine xray imaging, leg length evaluation,   And Lumbar spine flexion/extension xray imaging.   He will be scheduled for an in-person evaluation with Dr. Drummond this Thursday, Oct 21st.     Naomy Ramos DNP  Neurosurgery Nurse Practitioner  [facility]  [phone]

## 2021-10-19 NOTE — TELEPHONE ENCOUNTER
Reason for Call:  Other - Request for letter: Short Term Disability Letter    Detailed comments: Patient called and stated he needs a letter to go to his short term disability group otherwise they will end his short term disability in two days. Patient talk to [employer] [facility] today and they still haven't received this letter. Patient spoke with ER nurse about this yesterday and she put a note in his chart.  can see that Dr. Oneill put a letter in patient's chart yesterday. Verified with patient the claim number as well as the fax number for [employer] [facility] and both are correct.    Per patient, please fax letter today to [employer] [facility]: [phone]  **Also, please call patient as soon as fax has gone through so he is aware that it was sent. He will then call [employer] to make sure they received it.     Phone Number Patient can be reached at: Home number on file [phone] (home)    Best Time: Anytime    Can we leave a detailed message on this number? YES    Call taken on 10/19/2021 at 10:00 AM by Sydni Santos

## 2021-10-19 NOTE — TELEPHONE ENCOUNTER
Letter was signed by Dr. Oneill and faxed to the requested number on 10/19/21 A copy of the letter was also mailed to patient.    Bella Simmons LPN on 10/19/2021 at 11:11 AM

## 2021-10-19 NOTE — LETTER
10/19/2021       RE: Mustapha Negrete  [address] [address]     Dear Colleague,    Thank you for referring your patient, Mustapha Negrete, to the [facility] [place] at [facility]. Please see a copy of my visit note below.    Patient was recently evaluated in the Emergency Room on 10/14/2021.    Mr. Negrete is a 60 year old male with past medical history significant for BPH with urinary retention indwelling Walden catheter, MARIAN, hepatic steatosis, psoriatic arthritis, dyslipidemia, mild intermittent asthma osteoarthritis of his hip, atherosclerotic heart disease, low back pain, who presented to the emergency department with concern with worsening rectal/perineal pain and penial burning which is been an ongoing issue for the past 4 months and is progressively worsening.  Patient described intense burning pain of rectum which radiates through to his urethra/penis as well as sharp burning pain to bilateral buttocks that occasionally radiate down back of the legs without clear aggravating or alleviating factors.      Dr Ruiz from Neurosurgery was consulted with the following recommendations:   Dr. Ochoa recommends [facility] neurosurgery.  I spoke with Dr. Ruiz in Neurosurgery  who was unable to view the imaging done in  the [facility] system.  He agrees that evaluation related to central stenosis is important and repeat MRI is recommended.  Recommends  epidural steroid injection after MRI.  Recommends a steroid burst.  We discussed the use of Decadron.  He would recommend 2 mg  twice daily 3 days.  The patient was given 1 dose of 10 mg today and then will resume the 2 mg dosing tomorrow.   Patient should  then see Neurosurgery.      The difficulty with this presentation is that he has significant findings most suggestive of chronic lumbar spinal stenosis and cauda  equina that is chronic for at least several months.   He has had 3 months of  urinary retention, persistent constipation and most  importantly significant perineal and inner thigh hyperesthesia.        Although these symptoms have been chronic, the patient has had significant pain and and the concerning findings above and I have  therefore tried to expedite his evaluation.  I have placed an order for MRI of the lumbar spine as stat, I placed a consult for epidural  steroid injection also as ASAP, I have ordered neurosurgery referral also with ASAP within 3 to 5 days.      Today, the patient was scheduled with Dr. Matty Ruiz, but unfortunately Dr. Ruiz was pulled away to a surgical procedure in the operating room and needed to cancel clinic.   I was full in clinic myself, but briefly consulted with the patient by telephone.   He also had a very poor phone connection.   He states that he was doing well until this past April when he was dx'd with COVID-19.   He was quite sedentary and lying in the couch often.   He developed significant groin numbness.   He then required gallbladder surgery, and he developed urinary retention, requiring indwelling leon catheter  He also developed severe pain in his right buttock, and down the leg to the calf, and leg weakness.     His MRI of Lumbar spine is reviewed.   Patient is appropriate for surgical consultation with one of the spine neurosurgeons.   Will order EOS Total body spine xray imaging, leg length evaluation,   And Lumbar spine flexion/extension xray imaging.   He will be scheduled for an in-person evaluation with Dr. Drummond this Thursday, Oct 21st.     Naomy Ramos DNP  Neurosurgery Nurse Practitioner  [facility]  [phone]           Again, thank you for allowing me to participate in the care of your patient.      Sincerely,    MICAELA Casillas CNP

## 2021-10-20 ENCOUNTER — TELEPHONE (OUTPATIENT)
Dept: NEUROSURGERY | Facility: CLINIC | Age: 61
End: 2021-10-20

## 2021-10-20 ENCOUNTER — TELEPHONE (OUTPATIENT)
Dept: UROLOGY | Facility: CLINIC | Age: 61
End: 2021-10-20

## 2021-10-20 DIAGNOSIS — Z87.440 PERSONAL HISTORY OF URINARY TRACT INFECTION: Primary | ICD-10-CM

## 2021-10-20 LAB — BACTERIA UR CULT: ABNORMAL

## 2021-10-20 RX ORDER — CIPROFLOXACIN 250 MG/1
250 TABLET, FILM COATED ORAL 2 TIMES DAILY
Qty: 14 TABLET | Refills: 0 | Status: SHIPPED | OUTPATIENT
Start: 2021-10-20 | End: 2021-11-17

## 2021-10-20 NOTE — TELEPHONE ENCOUNTER
----- Message from JACOB Oneill MD sent at 10/20/2021  4:01 PM CDT -----  Regarding: Prescription for UTI  Please let this patient know that he has a prescription for ciprofloxacin to manage his urinary tract infection.Treating this infection may help with his discomfort.

## 2021-10-20 NOTE — TELEPHONE ENCOUNTER
Health Call Center    Phone Message    May a detailed message be left on voicemail: yes     Reason for Call: Other: Pt called about appt with Dr. Drummond tomorrow. No appt is on Epic. Pt  stated he was scheduled and needs to have xrays scheduled.    Please call Pt back ASAP to clarify ASAP as he is in a great deal of pain and needs this appt tomorrow,    Action Taken: Message routed to:  [facility] ([facility] Neurosurgery    Travel Screening: Not Applicable

## 2021-10-20 NOTE — PROGRESS NOTES
Has bacteriuria sensitive to ciprofloxacin.    Having significant symptoms and will therefore treat.

## 2021-10-21 ENCOUNTER — ANCILLARY PROCEDURE (OUTPATIENT)
Dept: GENERAL RADIOLOGY | Facility: CLINIC | Age: 61
End: 2021-10-21
Attending: NURSE PRACTITIONER
Payer: COMMERCIAL

## 2021-10-21 ENCOUNTER — OFFICE VISIT (OUTPATIENT)
Dept: NEUROSURGERY | Facility: CLINIC | Age: 61
End: 2021-10-21
Payer: COMMERCIAL

## 2021-10-21 VITALS
BODY MASS INDEX: 27.96 KG/M2 | SYSTOLIC BLOOD PRESSURE: 153 MMHG | RESPIRATION RATE: 16 BRPM | WEIGHT: 168 LBS | DIASTOLIC BLOOD PRESSURE: 98 MMHG | HEART RATE: 97 BPM | OXYGEN SATURATION: 97 %

## 2021-10-21 DIAGNOSIS — M54.42 ACUTE RIGHT-SIDED LOW BACK PAIN WITH BILATERAL SCIATICA: ICD-10-CM

## 2021-10-21 DIAGNOSIS — M54.41 ACUTE RIGHT-SIDED LOW BACK PAIN WITH BILATERAL SCIATICA: ICD-10-CM

## 2021-10-21 DIAGNOSIS — M70.72 ISCHIAL BURSITIS OF LEFT SIDE: Primary | ICD-10-CM

## 2021-10-21 DIAGNOSIS — G57.93 NEUROPATHIC PAIN, LEG, BILATERAL: ICD-10-CM

## 2021-10-21 PROCEDURE — 99215 OFFICE O/P EST HI 40 MIN: CPT | Performed by: NEUROLOGICAL SURGERY

## 2021-10-21 PROCEDURE — 77073 BONE LENGTH STUDIES: CPT | Performed by: STUDENT IN AN ORGANIZED HEALTH CARE EDUCATION/TRAINING PROGRAM

## 2021-10-21 PROCEDURE — 72082 X-RAY EXAM ENTIRE SPI 2/3 VW: CPT | Performed by: STUDENT IN AN ORGANIZED HEALTH CARE EDUCATION/TRAINING PROGRAM

## 2021-10-21 PROCEDURE — 99417 PROLNG OP E/M EACH 15 MIN: CPT | Performed by: NEUROLOGICAL SURGERY

## 2021-10-21 PROCEDURE — 72120 X-RAY BEND ONLY L-S SPINE: CPT | Performed by: RADIOLOGY

## 2021-10-21 RX ORDER — HYDROCODONE BITARTRATE AND ACETAMINOPHEN 5; 325 MG/1; MG/1
1 TABLET ORAL
COMMUNITY
Start: 2021-09-29 | End: 2021-11-17

## 2021-10-21 ASSESSMENT — PAIN SCALES - GENERAL: PAINLEVEL: EXTREME PAIN (8)

## 2021-10-21 NOTE — PROGRESS NOTES
Neurosurgery Clinic Note    Chief Complaint: bilateral buttock pain    History of Present Illness:  It was a pleasure to evaluate Mustapha Negrtee in clinic today at the kind referral of [facility]  [address] [place] [address].    Mustapha Negrete is a 60 year old male presenting with severe pain in bilateral buttocks; LEFT is worse than RIGHT; no symptoms below his knees, symptoms mainly in posterior buttock and posterior thighs  Worst when SITTING; unable to sit in regular chair due to severe pain.  Symptoms started after patient got COVID and had 20-pound sudden weight loss  Symptoms have always been bilateral  Patient has had Walden catheter since urgent cholecystectomy for over 4 months; has had prior TURP. He has been treated recently for an anal fissure and for an anal rash.  He notably has had a vesicular type of rash along his right posterior thigh to his knee, and now has a similar one on the left side to his knee, that had red raised papules; he has cell phone photo we will upload to chart.    He had lumbar spine MRIs that showed right-sided disc herniations and was referred to [facility]; he was unable to see the patient in clinic and NP Naomy Ramos did a phone eval, patient was then put into my clinic because spinal pathology was anticipated to be cause of symptoms. However, of note, the lumbar spine MRI has NO significant left-sided pathology to explain his more severe left-sided symptoms which are actually worse than his right-sided symptoms.              Past Medical History:   Diagnosis Date     Asthma        Past Surgical History:   Procedure Laterality Date     ENT SURGERY      tonsillectomy     GENITOURINARY SURGERY      TURP     ORTHOPEDIC SURGERY      wrist surgery, carpal tunnel 2005       Social History     Socioeconomic History     Marital status:      Spouse name: Not on file     Number of children: Not on file     Years of education: Not on file      Highest education level: Not on file   Occupational History     Not on file   Tobacco Use     Smoking status: Former Smoker     Quit date: 1999     Years since quittin.9     Smokeless tobacco: Never Used   Vaping Use     Vaping Use: Never used   Substance and Sexual Activity     Alcohol use: No     Drug use: No     Sexual activity: Not Currently   Other Topics Concern     Parent/sibling w/ CABG, MI or angioplasty before 65F 55M? Not Asked   Social History Narrative     Not on file     Social Determinants of Health     Financial Resource Strain:      Difficulty of Paying Living Expenses:    Food Insecurity:      Worried About Running Out of Food in the Last Year:      Ran Out of Food in the Last Year:    Transportation Needs:      Lack of Transportation (Medical):      Lack of Transportation (Non-Medical):    Physical Activity:      Days of Exercise per Week:      Minutes of Exercise per Session:    Stress:      Feeling of Stress :    Social Connections:      Frequency of Communication with Friends and Family:      Frequency of Social Gatherings with Friends and Family:      Attends Buddhism Services:      Active Member of Clubs or Organizations:      Attends Club or Organization Meetings:      Marital Status:    Intimate Partner Violence:      Fear of Current or Ex-Partner:      Emotionally Abused:      Physically Abused:      Sexually Abused:        family history includes Cancer (age of onset: 84) in his father; Cerebrovascular Disease in his mother; Diabetes in his mother; Hypertension in his mother; Prostate Cancer in his father.        IMAGING per my own measurement and interpretation:    MRI severe loss of disc height L3-4, L4-5, L5-S1, right lateral recess stenosis due to disc herniations L4-5, L5-S1; small left central disc herniation at L1-2 and at L2-3 but no significant left lateral recess or foraminal stenosis      Xrays: L4-S1 hypolordosis, compensatory L1-L4 hyperextension        Resulted  Imaging/Labs:    Lumbar spine MRI w/o contrast    Result Date: 10/16/2021  EXAM: MR LUMBAR SPINE W/O CONTRAST LOCATION: [facility] DATE/TIME: 10/16/2021 10:56 AM INDICATION: Spinal stenosis, lumbosacral. COMPARISON: None. TECHNIQUE: Routine Lumbar Spine MRI without IV contrast. FINDINGS: Nomenclature is based on 5 lumbar type vertebral bodies. Minimal rightward convex curvature lumbar spine. Exaggerated lumbar spine lordosis. Lumbar vertebral body heights are maintained. Grade 1 retrolisthesis of L1 on L2 and L2 on L3. Grade 1 anterolisthesis of L4 on L5. Modic type II degenerative endplate changes involving the apposing endplates at L4-L5. The L1 and L5 vertebral body hemangiomas. There is partial fusion across the L5-S1 disc space. Normal distal spinal cord and cauda equina with conus medullaris at L1. Bilateral renal cysts. Unremarkable visualized bony pelvis. T12-L1: Normal disc signal and disc space height. No focal disc herniation. Normal facets. No spinal canal stenosis or neural foraminal narrowing. L1-L2: Disc desiccation and minimal loss of disc space height. Left central disc protrusion. Normal facets. No spinal canal stenosis or neural foraminal narrowing. L2-L3: Disc desiccation and mild loss of disc space height. Mild circumferential disc bulge. Normal facets. No spinal canal stenosis or neural foraminal narrowing. L3-L4: Disc desiccation and mild loss of disc space height. Circumferential disc bulge with superimposed right paracentral disc protrusion with narrowing of the right lateral recess and apparent mass effect on the descending/proximal traversing right L4 nerve root. Mild spinal canal stenosis. Mild bilateral neural foraminal stenosis. L4-L5: Disc desiccation and moderate loss of disc space height. Circumferential disc bulge with superimposed right paracentral slightly caudally directed disc extrusion. There is narrowing of the right lateral recess and apparent mass  "effect on the descending/proximal traversing right L5 nerve root. Severe degenerative facet changes. Mild spinal canal stenosis. Mild to moderate bilateral neural foraminal stenosis. L5-S1: Partial fusion across the disc space. Mild circumferential disc interbody spurring. Mild facet arthropathy. No spinal canal stenosis. No right neural foraminal stenosis. Mild left neural foraminal stenosis.     IMPRESSION: 1.  Mild to moderate lumbar spondylitic changes superimposed on mild developmental narrowing of the lumbar spinal canal. 2.  At L4-L5, there is a right paracentral slightly caudally directed disc extrusion that narrows the right lateral recess and causes mass effect on the descending/traversing right L5 nerve root. 3.  At L3-L4, there is a right paracentral disc protrusion superimposed on disc bulge with narrowing of the right lateral recess and mass effect on the descending/traversing right L4 nerve root.      Vitamin D:  Vitamin D Deficiency Screening Results:  No results found for: VITDT  25 OH Vit D2   Date Value Ref Range Status   11/09/2011 <5 ug/L Final     25 OH Vit D3   Date Value Ref Range Status   11/09/2011 54 ug/L Final     25 OH Vit D total   Date Value Ref Range Status   11/09/2011  30 - 75 ug/L Final    <59  Season, race, dietary intake, and treatment affect the concentration of   25-hydroxy-Vitamin D. Values may decrease during winter months and increase   during summer months. Values less than 30 ug/L may indicate Vitamin D   deficiency.         Nutritional Status:  Estimated body mass index is 27.96 kg/m  as calculated from the following:    Height as of 10/15/21: 1.651 m (5' 5\").    Weight as of this encounter: 76.2 kg (168 lb).    Lab Results   Component Value Date    ALBUMIN 3.0 10/11/2021       Diabetes Screening:  No results found for: A1C    Nicotine Usage:    No                Physical Exam   BP (!) 153/98   Pulse 97   Resp 16   Wt 76.2 kg (168 lb)   SpO2 97%   BMI 27.96 kg/m  "   Constitutional: Oriented to person, place, and time. Appears well-developed and well-nourished. Cooperative. No distress.     Neurological: alert and oriented to person, place, and time.   No cranial nerve deficit   sensory deficit denies  Gait antalgic      Reflex Scores:        Tricep reflexes are 2+ on the right side and 2+ on the left side.       Bicep reflexes are 2+ on the right side and 2+ on the left side.       Brachioradialis reflexes are 2+ on the right side and 2+ on the left side.       Patellar reflexes are 2+ on the right side and 2+ on the left side.       Achilles reflexes are 0 on the right side and 0 on the left side.    STRENGTH LEFT RIGHT   Deltoid 5 5   Bicep 5 5   Wrist Extensor 5 5   Tricep 5 5   Finger flexion 5 5   Finger abduction 5 5    5 5       Hip Flexion     5     5   Knee Extension 5 5   Ankle Dorsiflexion 5 5   Extensor Hallucis Longus 5 5   Plantar Flexion 4 4   Foot eversion 5 5   Foot inversion 5 5       No Tato's   No ankle clonus    Severe ischial tuberosity tenderness to palpation BILATERAL, reproducing symptomatology  Tenderness to palpation bilateral left >right ischial tuberosity, inferior pubic ramus and proximal biceps femoris tendon    5/5 muscle strength for all hip and knee muscles, except bilateral hamstrings and bilateral gluteus merrill.   Gluteus merrill 4/5 bilaterally; tested in prone with the knee flexed; caused pain in typical area of buttock pain for patient.  Resisted knee flexion in prone was 4/5 and slightly painful bilaterally.   4/5 bilateral lying supine with the left hip flexed to 90  and the knee extended, resisted knee flexion       Sacroiliac Joint Exam LEFT RIGHT   Demi Finger Test - -   PSIS tenderness - -   ThighThrust - -   HUMPHREY - -   Pelvic Gapping - -   Pelvic Compression - -   Gaenslen s - -   Sacral Thrust - -   Hip Exam     Posterior impingement - -   Medial femoral impingement - -       Negative straight leg raise;  Negative  crossed straight leg raise    Skin: Skin is warm, dry; right posterior thigh to knee and up to buttock has healed round dark grey lesions. Left posterior and lateral thigh has similar lesions that are red in color.    Psychiatric: Normal mood and affect. Speech is normal and behavior is normal.        ASSESSMENT:  Mustapha Negrete is a 60 year old male with severe bilateral leg pain, pelvic pain, perineal pain, not supported by lumbar MRI findings of isolated right lateral recess stenosis on right side;  Has had vesicular rash on left and right in areas of pain.  Ischial tuberosity tenderness to palpation, pain with sitting and pain at rest suggest ischiogluteal bursitis as a significant component of the patient's pathology.      PLAN:  I do not think that this patient's pain generator is in his spine; he has negative traditional radicular stretch testing, negative sacroiliac joint exam, negative hip exam, and his pathological pain is reproduced with ischial tuberosity palpation, and he has a vesicular rash in distribution of his pain first on the right leg now on the left leg.    the lumbar spine MRI has NO significant left-sided pathology to explain his more severe left-sided symptoms which are actually worse than his right-sided symptoms    I ordered an MRI pelvis with and without contrast to look for ischial soft tissue pathology, we will refer him to Neurology for his neuropathic leg pain potentially secondary to Herpes zoster.    I do not plan for spine surgery at this time because this does not explain his acute pathology and bilateral pain.          Kristal Drummond MD    [facility] Department of Neurosurgery  Complex Spinal Deformity, Scoliosis, and Minimally Invasive Spine Surgery Specialist  Office: [phone]    10/21/2021        I spent one hundred and two (102) minutes in patient care with greater than 50% spent in counseling and/or coordination of care. I called and spoke  to Orthopedic providers about the patient, spoke to Neurology providers about this patient, called and got medical records from [facility] about this patient.

## 2021-10-21 NOTE — PATIENT INSTRUCTIONS
Dr. Drummond ordered an MRI of your pelvis to make sure there are no signs of infection. Dr. Drummond placed a consult to Neurology to evaluate your neuropathic leg pain. Please followup with them. We do not plan for any spine surgery at this time.

## 2021-10-21 NOTE — LETTER
10/21/2021       RE: Mustapha Negrete  [address] [facility] [address]     Dear Colleague,    Thank you for referring your patient, Mustapha Negrete, to the [facility] NEUROSURGERY CLINIC [place] [facility]. Please see a copy of my visit note below.    Neurosurgery Clinic Note    Chief Complaint: bilateral buttock pain    History of Present Illness:  It was a pleasure to evaluate Mustapha Negrete in clinic today at the kind referral of Naomy Ramos, MICAELA CNP  [address] [place] [address].    Mustapha Negrete is a 60 year old male presenting with severe pain in bilateral buttocks; LEFT is worse than RIGHT; no symptoms below his knees, symptoms mainly in posterior buttock and posterior thighs  Worst when SITTING; unable to sit in regular chair due to severe pain.  Symptoms started after patient got COVID and had 20-pound sudden weight loss  Symptoms have always been bilateral  Patient has had Walden catheter since urgent cholecystectomy for over 4 months; has had prior TURP. He has been treated recently for an anal fissure and for an anal rash.  He notably has had a vesicular type of rash along his right posterior thigh to his knee, and now has a similar one on the left side to his knee, that had red raised papules; he has cell phone photo we will upload to chart.    He had lumbar spine MRIs that showed right-sided disc herniations and was referred to Matty Ruiz; he was unable to see the patient in clinic and NP Naomy Ramos did a phone eval, patient was then put into my clinic because spinal pathology was anticipated to be cause of symptoms. However, of note, the lumbar spine MRI has NO significant left-sided pathology to explain his more severe left-sided symptoms which are actually worse than his right-sided symptoms.      Past Medical History:   Diagnosis Date     Asthma        Past Surgical History:   Procedure Laterality Date     ENT SURGERY       tonsillectomy     GENITOURINARY SURGERY      TURP     ORTHOPEDIC SURGERY      wrist surgery, carpal tunnel        Social History     Socioeconomic History     Marital status:      Spouse name: Not on file     Number of children: Not on file     Years of education: Not on file     Highest education level: Not on file   Occupational History     Not on file   Tobacco Use     Smoking status: Former Smoker     Quit date: 1999     Years since quittin.9     Smokeless tobacco: Never Used   Vaping Use     Vaping Use: Never used   Substance and Sexual Activity     Alcohol use: No     Drug use: No     Sexual activity: Not Currently   Other Topics Concern     Parent/sibling w/ CABG, MI or angioplasty before 65F 55M? Not Asked   Social History Narrative     Not on file     Social Determinants of Health     Financial Resource Strain:      Difficulty of Paying Living Expenses:    Food Insecurity:      Worried About Running Out of Food in the Last Year:      Ran Out of Food in the Last Year:    Transportation Needs:      Lack of Transportation (Medical):      Lack of Transportation (Non-Medical):    Physical Activity:      Days of Exercise per Week:      Minutes of Exercise per Session:    Stress:      Feeling of Stress :    Social Connections:      Frequency of Communication with Friends and Family:      Frequency of Social Gatherings with Friends and Family:      Attends Temple Services:      Active Member of Clubs or Organizations:      Attends Club or Organization Meetings:      Marital Status:    Intimate Partner Violence:      Fear of Current or Ex-Partner:      Emotionally Abused:      Physically Abused:      Sexually Abused:        family history includes Cancer (age of onset: 84) in his father; Cerebrovascular Disease in his mother; Diabetes in his mother; Hypertension in his mother; Prostate Cancer in his father.        IMAGING per my own measurement and interpretation:    MRI severe loss of  disc height L3-4, L4-5, L5-S1, right lateral recess stenosis due to disc herniations L4-5, L5-S1; small left central disc herniation at L1-2 and at L2-3 but no significant left lateral recess or foraminal stenosis      Xrays: L4-S1 hypolordosis, compensatory L1-L4 hyperextension        Resulted Imaging/Labs:    Lumbar spine MRI w/o contrast    Result Date: 10/16/2021  EXAM: MR LUMBAR SPINE W/O CONTRAST LOCATION: [facility] DATE/TIME: 10/16/2021 10:56 AM INDICATION: Spinal stenosis, lumbosacral. COMPARISON: None. TECHNIQUE: Routine Lumbar Spine MRI without IV contrast. FINDINGS: Nomenclature is based on 5 lumbar type vertebral bodies. Minimal rightward convex curvature lumbar spine. Exaggerated lumbar spine lordosis. Lumbar vertebral body heights are maintained. Grade 1 retrolisthesis of L1 on L2 and L2 on L3. Grade 1 anterolisthesis of L4 on L5. Modic type II degenerative endplate changes involving the apposing endplates at L4-L5. The L1 and L5 vertebral body hemangiomas. There is partial fusion across the L5-S1 disc space. Normal distal spinal cord and cauda equina with conus medullaris at L1. Bilateral renal cysts. Unremarkable visualized bony pelvis. T12-L1: Normal disc signal and disc space height. No focal disc herniation. Normal facets. No spinal canal stenosis or neural foraminal narrowing. L1-L2: Disc desiccation and minimal loss of disc space height. Left central disc protrusion. Normal facets. No spinal canal stenosis or neural foraminal narrowing. L2-L3: Disc desiccation and mild loss of disc space height. Mild circumferential disc bulge. Normal facets. No spinal canal stenosis or neural foraminal narrowing. L3-L4: Disc desiccation and mild loss of disc space height. Circumferential disc bulge with superimposed right paracentral disc protrusion with narrowing of the right lateral recess and apparent mass effect on the descending/proximal traversing right L4 nerve root. Mild  "spinal canal stenosis. Mild bilateral neural foraminal stenosis. L4-L5: Disc desiccation and moderate loss of disc space height. Circumferential disc bulge with superimposed right paracentral slightly caudally directed disc extrusion. There is narrowing of the right lateral recess and apparent mass effect on the descending/proximal traversing right L5 nerve root. Severe degenerative facet changes. Mild spinal canal stenosis. Mild to moderate bilateral neural foraminal stenosis. L5-S1: Partial fusion across the disc space. Mild circumferential disc interbody spurring. Mild facet arthropathy. No spinal canal stenosis. No right neural foraminal stenosis. Mild left neural foraminal stenosis.     IMPRESSION: 1.  Mild to moderate lumbar spondylitic changes superimposed on mild developmental narrowing of the lumbar spinal canal. 2.  At L4-L5, there is a right paracentral slightly caudally directed disc extrusion that narrows the right lateral recess and causes mass effect on the descending/traversing right L5 nerve root. 3.  At L3-L4, there is a right paracentral disc protrusion superimposed on disc bulge with narrowing of the right lateral recess and mass effect on the descending/traversing right L4 nerve root.      Vitamin D:  Vitamin D Deficiency Screening Results:  No results found for: VITDT  25 OH Vit D2   Date Value Ref Range Status   11/09/2011 <5 ug/L Final     25 OH Vit D3   Date Value Ref Range Status   11/09/2011 54 ug/L Final     25 OH Vit D total   Date Value Ref Range Status   11/09/2011  30 - 75 ug/L Final    <59  Season, race, dietary intake, and treatment affect the concentration of   25-hydroxy-Vitamin D. Values may decrease during winter months and increase   during summer months. Values less than 30 ug/L may indicate Vitamin D   deficiency.         Nutritional Status:  Estimated body mass index is 27.96 kg/m  as calculated from the following:    Height as of 10/15/21: 1.651 m (5' 5\").    Weight as of " this encounter: 76.2 kg (168 lb).    Lab Results   Component Value Date    ALBUMIN 3.0 10/11/2021       Diabetes Screening:  No results found for: A1C    Nicotine Usage:    No                Physical Exam   BP (!) 153/98   Pulse 97   Resp 16   Wt 76.2 kg (168 lb)   SpO2 97%   BMI 27.96 kg/m    Constitutional: Oriented to person, place, and time. Appears well-developed and well-nourished. Cooperative. No distress.     Neurological: alert and oriented to person, place, and time.   No cranial nerve deficit   sensory deficit denies  Gait antalgic      Reflex Scores:        Tricep reflexes are 2+ on the right side and 2+ on the left side.       Bicep reflexes are 2+ on the right side and 2+ on the left side.       Brachioradialis reflexes are 2+ on the right side and 2+ on the left side.       Patellar reflexes are 2+ on the right side and 2+ on the left side.       Achilles reflexes are 0 on the right side and 0 on the left side.    STRENGTH LEFT RIGHT   Deltoid 5 5   Bicep 5 5   Wrist Extensor 5 5   Tricep 5 5   Finger flexion 5 5   Finger abduction 5 5    5 5       Hip Flexion     5     5   Knee Extension 5 5   Ankle Dorsiflexion 5 5   Extensor Hallucis Longus 5 5   Plantar Flexion 4 4   Foot eversion 5 5   Foot inversion 5 5       No Tato's   No ankle clonus    Severe ischial tuberosity tenderness to palpation BILATERAL, reproducing symptomatology  Tenderness to palpation bilateral left >right ischial tuberosity, inferior pubic ramus and proximal biceps femoris tendon    5/5 muscle strength for all hip and knee muscles, except bilateral hamstrings and bilateral gluteus merrill.   Gluteus merrill 4/5 bilaterally; tested in prone with the knee flexed; caused pain in typical area of buttock pain for patient.  Resisted knee flexion in prone was 4/5 and slightly painful bilaterally.   4/5 bilateral lying supine with the left hip flexed to 90  and the knee extended, resisted knee flexion       Sacroiliac Joint  Exam LEFT RIGHT   Demi Finger Test - -   PSIS tenderness - -   ThighThrust - -   HUMPHREY - -   Pelvic Gapping - -   Pelvic Compression - -   Gaenslen s - -   Sacral Thrust - -   Hip Exam     Posterior impingement - -   Medial femoral impingement - -       Negative straight leg raise;  Negative crossed straight leg raise    Skin: Skin is warm, dry; right posterior thigh to knee and up to buttock has healed round dark grey lesions. Left posterior and lateral thigh has similar lesions that are red in color.    Psychiatric: Normal mood and affect. Speech is normal and behavior is normal.        ASSESSMENT:  Mustapha Negrete is a 60 year old male with severe bilateral leg pain, pelvic pain, perineal pain, not supported by lumbar MRI findings of isolated right lateral recess stenosis on right side;  Has had vesicular rash on left and right in areas of pain.  Ischial tuberosity tenderness to palpation, pain with sitting and pain at rest suggest ischiogluteal bursitis as a significant component of the patient's pathology.      PLAN:  I do not think that this patient's pain generator is in his spine; he has negative traditional radicular stretch testing, negative sacroiliac joint exam, negative hip exam, and his pathological pain is reproduced with ischial tuberosity palpation, and he has a vesicular rash in distribution of his pain first on the right leg now on the left leg.    the lumbar spine MRI has NO significant left-sided pathology to explain his more severe left-sided symptoms which are actually worse than his right-sided symptoms    I ordered an MRI pelvis with and without contrast to look for ischial soft tissue pathology, we will refer him to Neurology for his neuropathic leg pain potentially secondary to Herpes zoster.    I do not plan for spine surgery at this time because this does not explain his acute pathology and bilateral pain.      Kristal Drummond MD    [facility] Department of  Neurosurgery  Complex Spinal Deformity, Scoliosis, and Minimally Invasive Spine Surgery Specialist  Office: [phone]    10/21/2021        I spent one hundred and two (102) minutes in patient care with greater than 50% spent in counseling and/or coordination of care. I called and spoke to Orthopedic providers about the patient, spoke to Neurology providers about this patient, called and got medical records from [facility] about this patient.        Again, thank you for allowing me to participate in the care of your patient.      Sincerely,    Kristal Drummond MD

## 2021-10-24 ENCOUNTER — HOSPITAL ENCOUNTER (OUTPATIENT)
Dept: MRI IMAGING | Facility: CLINIC | Age: 61
Discharge: HOME | End: 2021-10-24
Attending: NEUROLOGICAL SURGERY | Admitting: NEUROLOGICAL SURGERY
Payer: COMMERCIAL

## 2021-10-24 DIAGNOSIS — M70.72 ISCHIAL BURSITIS OF LEFT SIDE: ICD-10-CM

## 2021-10-24 PROCEDURE — A9585 GADOBUTROL INJECTION: HCPCS | Performed by: NEUROLOGICAL SURGERY

## 2021-10-24 PROCEDURE — 72197 MRI PELVIS W/O & W/DYE: CPT

## 2021-10-24 PROCEDURE — 72197 MRI PELVIS W/O & W/DYE: CPT | Mod: 26 | Performed by: RADIOLOGY

## 2021-10-24 PROCEDURE — 255N000002 HC RX 255 OP 636: Performed by: NEUROLOGICAL SURGERY

## 2021-10-24 RX ORDER — GADOBUTROL 604.72 MG/ML
7 INJECTION INTRAVENOUS ONCE
Status: COMPLETED | OUTPATIENT
Start: 2021-10-24 | End: 2021-10-24

## 2021-10-24 RX ADMIN — GADOBUTROL 7 ML: 604.72 INJECTION INTRAVENOUS at 09:04

## 2021-10-28 ENCOUNTER — OFFICE VISIT (OUTPATIENT)
Dept: FAMILY MEDICINE | Facility: CLINIC | Age: 61
End: 2021-10-28
Payer: COMMERCIAL

## 2021-10-28 ENCOUNTER — PATIENT OUTREACH (OUTPATIENT)
Dept: NURSING | Facility: CLINIC | Age: 61
End: 2021-10-28
Payer: COMMERCIAL

## 2021-10-28 ENCOUNTER — ALLIED HEALTH/NURSE VISIT (OUTPATIENT)
Dept: UROLOGY | Facility: CLINIC | Age: 61
End: 2021-10-28
Payer: COMMERCIAL

## 2021-10-28 ENCOUNTER — HOSPITAL ENCOUNTER (EMERGENCY)
Facility: CLINIC | Age: 61
Discharge: HOME | End: 2021-10-28
Attending: EMERGENCY MEDICINE | Admitting: EMERGENCY MEDICINE
Payer: COMMERCIAL

## 2021-10-28 VITALS
OXYGEN SATURATION: 97 % | TEMPERATURE: 98.7 F | SYSTOLIC BLOOD PRESSURE: 130 MMHG | DIASTOLIC BLOOD PRESSURE: 80 MMHG | RESPIRATION RATE: 16 BRPM | BODY MASS INDEX: 28.12 KG/M2 | HEART RATE: 116 BPM | WEIGHT: 169 LBS

## 2021-10-28 VITALS
WEIGHT: 169 LBS | DIASTOLIC BLOOD PRESSURE: 89 MMHG | TEMPERATURE: 98.3 F | SYSTOLIC BLOOD PRESSURE: 135 MMHG | OXYGEN SATURATION: 98 % | HEIGHT: 64 IN | BODY MASS INDEX: 28.85 KG/M2 | RESPIRATION RATE: 16 BRPM | HEART RATE: 88 BPM

## 2021-10-28 DIAGNOSIS — M51.16 LUMBAR DISC HERNIATION WITH RADICULOPATHY: ICD-10-CM

## 2021-10-28 DIAGNOSIS — R10.2 PERINEAL PAIN IN MALE: ICD-10-CM

## 2021-10-28 DIAGNOSIS — G47.01 INSOMNIA DUE TO MEDICAL CONDITION: ICD-10-CM

## 2021-10-28 DIAGNOSIS — R33.9 URINARY RETENTION: ICD-10-CM

## 2021-10-28 DIAGNOSIS — K62.89 RECTAL PAIN: ICD-10-CM

## 2021-10-28 DIAGNOSIS — Z97.8 INDWELLING FOLEY CATHETER PRESENT: ICD-10-CM

## 2021-10-28 DIAGNOSIS — R52 UNCONTROLLED PAIN: Primary | ICD-10-CM

## 2021-10-28 DIAGNOSIS — N40.1 BENIGN PROSTATIC HYPERPLASIA WITH URINARY OBSTRUCTION: ICD-10-CM

## 2021-10-28 DIAGNOSIS — Z87.440 PERSONAL HISTORY OF URINARY TRACT INFECTION: Primary | ICD-10-CM

## 2021-10-28 DIAGNOSIS — S73.191S TEAR OF RIGHT ACETABULAR LABRUM, SEQUELA: ICD-10-CM

## 2021-10-28 DIAGNOSIS — N13.8 BENIGN PROSTATIC HYPERPLASIA WITH URINARY OBSTRUCTION: ICD-10-CM

## 2021-10-28 PROCEDURE — 51798 US URINE CAPACITY MEASURE: CPT

## 2021-10-28 PROCEDURE — 51700 IRRIGATION OF BLADDER: CPT

## 2021-10-28 PROCEDURE — 99215 OFFICE O/P EST HI 40 MIN: CPT | Performed by: INTERNAL MEDICINE

## 2021-10-28 PROCEDURE — 87086 URINE CULTURE/COLONY COUNT: CPT

## 2021-10-28 PROCEDURE — 250N000009 HC RX 250: Performed by: EMERGENCY MEDICINE

## 2021-10-28 PROCEDURE — 99285 EMERGENCY DEPT VISIT HI MDM: CPT | Mod: 25

## 2021-10-28 PROCEDURE — 99284 EMERGENCY DEPT VISIT MOD MDM: CPT | Performed by: EMERGENCY MEDICINE

## 2021-10-28 RX ORDER — TRAZODONE HYDROCHLORIDE 50 MG/1
50-100 TABLET, FILM COATED ORAL AT BEDTIME
Qty: 30 TABLET | Refills: 0 | Status: SHIPPED | OUTPATIENT
Start: 2021-10-28 | End: 2021-11-17

## 2021-10-28 RX ORDER — GABAPENTIN 300 MG/1
3 CAPSULE ORAL DAILY
COMMUNITY
Start: 2021-07-28 | End: 2021-11-04

## 2021-10-28 RX ORDER — LIDOCAINE HYDROCHLORIDE 20 MG/ML
JELLY TOPICAL ONCE
Status: COMPLETED | OUTPATIENT
Start: 2021-10-28 | End: 2021-10-28

## 2021-10-28 RX ADMIN — LIDOCAINE HYDROCHLORIDE 10 ML: 20 JELLY TOPICAL at 23:17

## 2021-10-28 ASSESSMENT — ENCOUNTER SYMPTOMS
FLANK PAIN: 1
APPETITE CHANGE: 0
VOMITING: 0
CHILLS: 0
ABDOMINAL PAIN: 1
WEAKNESS: 0
DYSPHORIC MOOD: 0
BACK PAIN: 0
LIGHT-HEADEDNESS: 0
SHORTNESS OF BREATH: 0
FEVER: 0
FATIGUE: 0
HEADACHES: 0
NAUSEA: 0
DIARRHEA: 0
CHEST TIGHTNESS: 0

## 2021-10-28 ASSESSMENT — ASTHMA QUESTIONNAIRES
QUESTION_2 LAST FOUR WEEKS HOW OFTEN HAVE YOU HAD SHORTNESS OF BREATH: NOT AT ALL
ACUTE_EXACERBATION_TODAY: NO
QUESTION_5 LAST FOUR WEEKS HOW WOULD YOU RATE YOUR ASTHMA CONTROL: WELL CONTROLLED
QUESTION_3 LAST FOUR WEEKS HOW OFTEN DID YOUR ASTHMA SYMPTOMS (WHEEZING, COUGHING, SHORTNESS OF BREATH, CHEST TIGHTNESS OR PAIN) WAKE YOU UP AT NIGHT OR EARLIER THAN USUAL IN THE MORNING: NOT AT ALL
ACT_TOTALSCORE: 23
QUESTION_4 LAST FOUR WEEKS HOW OFTEN HAVE YOU USED YOUR RESCUE INHALER OR NEBULIZER MEDICATION (SUCH AS ALBUTEROL): ONCE A WEEK OR LESS
QUESTION_1 LAST FOUR WEEKS HOW MUCH OF THE TIME DID YOUR ASTHMA KEEP YOU FROM GETTING AS MUCH DONE AT WORK, SCHOOL OR AT HOME: NONE OF THE TIME

## 2021-10-28 ASSESSMENT — MIFFLIN-ST. JEOR: SCORE: 1477.58

## 2021-10-28 NOTE — PROGRESS NOTES
Assessment & Plan     Uncontrolled pain - Severe uncontrolled pain. He has been totally unable to work since June due to the severity of the pain. He has multiple confounding issues including pre-existing BPH that has significantly worsened to the point that he requires catheterization. He has been intolerant to multiple prostate/bladder medications. He is following closely with urology. It is unclear if he has had shingles or what the etiology of the rash is. There is no rash seen today. The neurosurgeon did not feel that the herniated disc with nerve root impingement was the cause of his symptoms. It is unclear what is causing the symptoms if this is the case. Did send a message to his urologist and neurosurgeon to better coordinate care in order to determine the next course of action  - Care Coordination Referral; Future  - Pain Management Referral; Future    Lumbar disc herniation with radiculopathy -he has acute on chronic urine retention and new onset constipation. These could be unrelated to his spinal pathology seen on imaging. We will send a message to the neurosurgeon to discuss further    After visit, NSG messaged me and feels strongly that his pain is neuropathy from shingles and not due to his spinal pathology seen on MRI.    Insomnia due to medical condition -start medication  - traZODone (DESYREL) 50 MG tablet; Take 1-2 tablets ( mg) by mouth At Bedtime    Benign prostatic hyperplasia with urinary obstruction -follows with urology    Perineal pain in male -see above, unclear cause    Rectal pain -see above, unclear cause    Tear of right acetabular labrum, sequela -does not appear to be a major driving force of his pain        52 minutes spent on the date of the encounter doing chart review, review of outside records, review of test results, patient visit, documentation and discussion with other provider(s)        BMI:   Estimated body mass index is 28.12 kg/m  as calculated from the  "following:    Height as of 10/15/21: 1.651 m (5' 5\").    Weight as of this encounter: 76.7 kg (169 lb).       Patient Instructions   1. For disability - you need to work with the company to have forms sent to . We will see if there is anything for specialists to fill out; will wait on this for now  2. Disability company will need to request medical records  3. SW referral to help with disability and paperwork  4.  will collaborate with the specialists about the cause of the pain  5. Referral to Dr. Ramirez [facility]           No follow-ups on file.    Viki Hanson, [facility] DANTE Luciano is a 60 year old who presents for the following health issues     HPI     ACT: done   Covid: Not yet  Flu shot: Not yet  PPSV23: only after 2023 per [facility]    Review MRI     Saw Radiologist - Acute right-sided low back pain with bilateral sciatica 10//21//21    FORM: Disability forms ( very important ) or will end insurance   Claim n#: [id number]  Fax: [phone]  Atn to [employer]  --he spends most of the visit discussing this    Needs something for sleep   --used very old med that started with a T but wasn't sure the name    Back Pain  Onset/Duration: Still having a lot pain - start about 5 months ago   Description:   Location of pain: gluteus both  Character of pain: sharp, stabbing and constant  Pain radiation: radiates into the right leg and radiates into the left leg  New numbness or weakness in legs, not attributed to pain: no   Intensity: Currently 10/10, At its worst 10/10  Progression of Symptoms: worsening and constant  History:   Specific cause: none  Pain interferes with job: YES  History of back problems: previous herniated disc  Any previous MRI or X-rays: Yes--multiple  Sees a specialist for back pain: No  Alleviating factors:   Improved by: nothing    Precipitating factors:  Worsened by: Lifting, Bending, Standing, Sitting, Lying Flat, " Walking and Coughing  Therapies tried and outcome: norco      --is seeing NSG - Dr. Drummond  --he has not heard results of MRI  --he is not taking any pain meds because they don't help  --cymbalta caused dizziness so he stopped  --has seen Urology (dr. Oneill and Dr. Loyd), [facility] pain clinic, Neurosurgery (dr. Drummond), [facility] Neurology    Chart review:  -reviewed records from [facility];  Was being seen 7/2018 for chronic left hip, left shoulder and right foot pain, bilateral hands; s/p left hip replacement;   --s/p bilateral carpal tunnel surgery early 2021  --left shoulder injection 3/2021  -prostate problems pre-date this episode of severe pain - urolift surgery  --has been seen consistently at [facility] since at least 2018;  Pain was stable until 5/12/2021 when he started noting low back pain.  Next visit was 8/24/21 - tramadol was given in addition to norco;  He had injection at [facility] on 8/26  --follow-up visit at [facility] 9/27 - he reports increase in pain.  Started on belbuca and off tramadol and back on norco.  --he notes new onset constipation since May; he attributed this to norco, but he had been on norco for many years prior to May  --last visit with [facility] 10/4- belbuca stopped due to side effects - shortness of breath.  Increased norco    Derm/Uro  --multiple confounding diagnosis - anal fungal infection, possible shingles? Recurrent UTI needing antibiotic, possible prostatitis?  --penile pain/bladder spasms - patient thought was related to tamsulosin and/or ditropan so he stopped it.  Often presented to ER w/these symptoms and thought due to urine retention so catheter placed    MRI 10/16/21  1.  Mild to moderate lumbar spondylitic changes superimposed on mild developmental narrowing of the lumbar spinal canal.  2.  At L4-L5, there is a right paracentral slightly caudally directed disc extrusion that narrows the right lateral recess and causes mass effect on the descending/traversing right L5 nerve  root.  3.  At L3-L4, there is a right paracentral disc protrusion superimposed on disc bulge with narrowing of the right lateral recess and mass effect on the descending/traversing right L4 nerve root.    Review of Systems   Constitutional, HEENT, cardiovascular, pulmonary, GI, , musculoskeletal, neuro, skin, endocrine and psych systems are negative, except as otherwise noted.      Objective    /80   Pulse 116   Temp 98.7  F (37.1  C) (Tympanic)   Resp 16   Wt 76.7 kg (169 lb)   SpO2 97%   BMI 28.12 kg/m    Body mass index is 28.12 kg/m .  Physical Exam   GENERAL APPEARANCE: alert, no distress and patient is often changing positions throughout the visit and appears to be in significant pain. He spends most of the visit standing and leaning over the exam table or the chair. He is unable to sit for long periods of time and often has his weight shifted to one side or the other  Skin: Rectal skin,  cleft, bilateral buttocks, posterior and anterior thighs are clear without rashes

## 2021-10-28 NOTE — PROGRESS NOTES
Patient here for Trial Office Void and cath removal.  NS instilled free gravity into bladder via closed system.  300ml instilled.  Catheter successfully removed at 11:45am PM without immediate complication.  Pt self void 300ml and bladder scan revealed 13ml in bladder.  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  Reviewed home care and voiding techniques to reduce retention and promote bladder emptying.      Kalyn Cho RN

## 2021-10-28 NOTE — PROGRESS NOTES
Clinic Care Coordination Contact  Community Health Worker Initial Outreach    CHW Initial Information Gathering:  Referral Source: PCP  Current living arrangement:: I live in a private home (Patient stated he lives with his adult son)  Community Resources: None  Informal Support system:: Children  No PCP office visit in Past Year: Yes  Transportation means:: Family  CHW Additional Questions  If ED/Hospital discharge, follow-up appointment scheduled as recommended?: N/A  Medication changes made following ED/Hospital discharge?: N/A  MyChart active?: Yes  Patient sent Social Determinants of Health questionnaire?: Yes    Patient accepts CC: Yes. Patient scheduled for assessment with  CC on 10/29 at 11am. Patient noted desire to discuss transportation resources (back up plan), disability forms, future planning/possible support groups.     The Clinic Community Health Worker spoke with the patient today to discuss possible Clinic Care Coordination enrollment. The service was described to the patient and immediate needs were discussed. The patient agreed to enrollment and an assessment was scheduled. The patient was provided with contact information for the clinic CHW.             Assessment date: 10/29 @11am    Megha GUO  Community Health Worker  [facility]  Clinic Care Coordination  [facility]  [email or web address]   Office: [phone]

## 2021-10-28 NOTE — PATIENT INSTRUCTIONS
1. For disability - you need to work with the company to have forms sent to . We will see if there is anything for specialists to fill out; will wait on this for now  2. Disability company will need to request medical records  3. SW referral to help with disability and paperwork  4.  will collaborate with the specialists about the cause of the pain  5. Referral to Dr. Ramirez  Pain clinic

## 2021-10-29 ENCOUNTER — TELEPHONE (OUTPATIENT)
Dept: UROLOGY | Facility: CLINIC | Age: 61
End: 2021-10-29

## 2021-10-29 ENCOUNTER — TELEPHONE (OUTPATIENT)
Dept: FAMILY MEDICINE | Facility: CLINIC | Age: 61
End: 2021-10-29

## 2021-10-29 ENCOUNTER — PATIENT OUTREACH (OUTPATIENT)
Dept: NURSING | Facility: CLINIC | Age: 61
End: 2021-10-29
Attending: INTERNAL MEDICINE
Payer: COMMERCIAL

## 2021-10-29 DIAGNOSIS — R52 UNCONTROLLED PAIN: ICD-10-CM

## 2021-10-29 LAB — BACTERIA UR CULT: NORMAL

## 2021-10-29 ASSESSMENT — ASTHMA QUESTIONNAIRES: ACT_TOTALSCORE: 23

## 2021-10-29 ASSESSMENT — ACTIVITIES OF DAILY LIVING (ADL): DEPENDENT_IADLS:: TRANSPORTATION

## 2021-10-29 NOTE — LETTER
[facility] CARE COORDINATION  [facility]  [address]  [place] [address]    October 29, 2021    Mustapha Negrete  [address] [facility] [address]      Dear Mustapha,    I am a clinic care coordinator who works with Viki Hanson DO at [facility]. I wanted to thank you for spending the time to talk with me.  Below is a description of clinic care coordination and how I can further assist you.      The clinic care coordination team is made up of a registered nurse,  and community health worker who understand the health care system. The goal of clinic care coordination is to help you manage your health and improve access to the health care system in the most efficient manner. The team can assist you in meeting your health care goals by providing education, coordinating services, strengthening the communication among your providers and supporting you with any resource needs.    Please feel free to contact the Community Health Worker at [phone] with any questions or concerns. We are focused on providing you with the highest-quality healthcare experience possible and that all starts with you.     Sincerely,     JELANI Judd   Social Work Clinic Care Coordinator   [facility]  [phone]  [email or web address]     Enclosed: Resources

## 2021-10-29 NOTE — TELEPHONE ENCOUNTER
Reason for call:    Symptom or request:       Patient called stating that he need forms for short term disability-[facility] completed asap.     Claim # [id number]-- fax back [phone].    Forms are being fax to [phone]    Best Time:  Any      Can we leave a detailed message on this number?  YES     Sejal ZAVALA  Station

## 2021-10-29 NOTE — ED TRIAGE NOTES
The last few months has needed multiple Walden's due to urinary retention. Went to urology today. Walden removed and sent home after a trial in voiding that he passed in the office.     Currently on Cipro for a UTI, and culture per Mustapha is due to come back Monday.     Bladder scan 478 at the bedside.

## 2021-10-29 NOTE — PROGRESS NOTES
Clinic Care Coordination Contact    Clinic Care Coordination Contact  OUTREACH    Referral Information:  Referral Source: PCP    Primary Diagnosis: Psychosocial    Chief Complaint   Patient presents with     Clinic Care Coordination - Initial     Needs assessment        Universal Utilization:   Clinic Utilization  Difficulty keeping appointments: No  Compliance Concerns: No  No-Show Concerns: No  No PCP office visit in Past Year: No    Utilization    Hospital Admissions  1             ED Visits  5             No Show Count (past year)  1                Current as of: 10/29/2021 11:18 AM              Clinical Concerns:  Order: Financial support, other - use comments.    PCP OV 10/28/21:   Plan:   1. For disability - you need to work with the company to have forms sent to . We will see if there is anything for specialists to fill out; will wait on this for now.  2. Disability company will need to request medical records.  3. SW referral to help with disability and paperwork.  4.  will collaborate with the specialists about the cause of the pain.  5. Referral to Dr. Ramirez  Pain clinic.    CHW: Patient noted desire to discuss transportation resources (back up plan), disability forms, future planning/possible support groups. Patient stated he has been out of work on short term disability. He has a son who is 33 yrs old and works FT, helps as much as possible. If there are times he cannot provide a ride to an appt, patient stated it might be nice to have resources for transportation as a back up plan.    SW CC outreach to pt for initial assessment per CHW scheduling.     1) Short term disability forms:  Pt talked to PCP yesterday about this. His employer faxed over this AM. They had canceled his STD 10/26/21 but claim will stay open for another week. It will get back dated, but no payment until this is done. CC to look at chart later to see if updated. Very urgent.    Forms were received.       2) Health concerns:   Pt was experiencing catheter issues yesterday, reports he was in so much pain he could not mo. Pt went to ED yesterday. Pt has upcoming urology appt 11/16/21.     CC reviewed pain clinic referral. Gave phone #. [phone]. CC advised to call and schedule if pt does not hear from them today.   Pt reported he went to [facility] in [place] in the past. Did not have a good experience.     Pt reports 24 hrs of pain. Burning. Cant sleep, cant do anything. Pt has PT 11/9/21 - pt has seen her in the past, hope this helps with pain and burning. Pt reports he is able to do more without catheter. Hopeful to get it out soon.     3) Mental health resources:   Pt is dealing with pain, chronic pain. Would like to be able to turn mind off. CC to send low cost/free options.    4) RN CC:   Reviewed that pt may benefit from talking to RN CC. Scheduled pt for Tuesday 11/2/21 at 10 am.     5) Other resources:  Send resources for transportation, support groups, future planning in mail.    6) MyChart:  Pt has issues with MyChart. CC advised to call [employer] support, gave # [phone]. Pt already disabled 2 step auth.     Medication Management:  Medication review status: Not reviewed, had PCP OV; defer to RN CC    Functional Status:  Dependent IADLs: Transportation    Living Situation:  Current living arrangement: I live in a private home (Patient stated he lives with his adult son)  [facility]    Lifestyle & Psychosocial Needs:    Social Determinants of Health     Tobacco Use: Medium Risk     Smoking Tobacco Use: Former Smoker     Smokeless Tobacco Use: Never Used   Alcohol Use:      Frequency of Alcohol Consumption:      Average Number of Drinks:      Frequency of Binge Drinking:    Financial Resource Strain:      Difficulty of Paying Living Expenses:    Food Insecurity:      Worried About Running Out of Food in the Last Year:      Ran Out of Food in the Last Year:     Transportation Needs:      Lack of Transportation (Medical):      Lack of Transportation (Non-Medical):    Physical Activity:      Days of Exercise per Week:      Minutes of Exercise per Session:    Stress:      Feeling of Stress :    Social Connections:      Frequency of Communication with Friends and Family:      Frequency of Social Gatherings with Friends and Family:      Attends Nondenominational Services:      Active Member of Clubs or Organizations:      Attends Club or Organization Meetings:      Marital Status:    Intimate Partner Violence:      Fear of Current or Ex-Partner:      Emotionally Abused:      Physically Abused:      Sexually Abused:    Depression: Not at risk     PHQ-2 Score: 0   Housing Stability:      Unable to Pay for Housing in the Last Year:      Number of Places Lived in the Last Year:      Unstable Housing in the Last Year:        Inadequate nutrition: No  Tube Feeding: No  Transportation means: Family  Nondenominational or spiritual beliefs that impact treatment: No  Mental health DX: No  Mental health management concern: No  Chemical Dependency Status: No Current Concerns  Informal Support system: Children    Care Coordinator has reviewed patient's Social Determinants of Health (SDoH) on this date. Upon review, changes were not made.      Resources and Interventions:  Current Resources:   Community Resources: Financial/Insurance  Employment Status: employed full-time, disabled    Advance Care Plan/Directive  Advanced Care Plans/Directives on file: No    Referrals Placed: Community Resources, [facility] Resources, Financial Services, Transportation, Senior Linkage Line, Mental Health     Patient/Caregiver understanding: Pt reports understanding and denies any additional questions or concerns at this times. CORI CC engaged in AIDET communication during encounter.       Future Appointments              In 4 days [place] [facility]    In 1 week Val [place] PT [facility]  [facility] RAJAN    In 2 weeks JACOB Oneill MD [facility]    In 2 weeks Viki Hanson DO [facility]  Arrive at: Clinic A          Plan: RN CC will outreach to pt for continued assessment next week.     CORI CC will mail intro letter and resources to pt.     [facility]   Social Work Clinic Care Coordinator   [facility]  [phone]  [email or web address]

## 2021-10-29 NOTE — LETTER
Resources:    All categories: https://www.accap.org/resource-guide/     Transportation:      -[facility] [phone]  [email or web address]. Transportation service for seniors 60+ & clients receiving services from [facility] . For medical, dental, or social service appointments only. Transportation provided for Veterans (all ages) to & from VA Hospitals in [facility], and [place]. Call between 9 am-12 pm Mon-Fri. Donations accepted.    - [facility] [phone]  [email or web address]. [facility] and [facility]. Accessible to the disabled & wheelchairs. Fixed route schedule information from [employer] [phone].    - [employer] [phone]    Mental health:     [facility] access for mental health appts: [phone]    If you are experiencing a mental health crisis:    [facility] mental health crisis: [phone]    Call **CRISIS (**431717) from a cell phone to talk to a team of professionals who can help you. (For land lines, see the directory for mental health crisis phone numbers in [place] by county.)    Text  [place]  to 695718. Crisis Text Line offers free help for those who are having a mental health crisis or are contemplating suicide. Services are available 24/7 across [place].    National Suicide Prevention Lifeline 120-420-PRAH (1929).    ROSINA: https://namimn.org/support/information-and-resources/     For non-crisis mental health support:    [place] Warmline ph: 125-817-3000 --The [place] Warmline provides a slvg-ln-ixtx approach to mental health recovery, support and wellness. Calls are answered by our team of professionally trained Certified Peer Specialists, who have first hand experience living with a mental health condition.  The Warmline provides a safe, anonymous and confidential environment to connect with people who are here to listen here to help.  Monday - Saturday 5:00 pm - 10:00  pm    Peer Support Connection ph: [phone] -- Peer to Peer Telephone Support, a safe and supportive place for people to call and speak with peers who are there to listen and support adults experiencing emotional distress.  Peers support each other by understanding struggles, sharing life experiences, building natural relationships, and being empathetic.  Available every day 5:00 pm - 9:00 am    Future planning:     Advance Care Planning and Health Care Directives  When it comes to decisions about your health care, it s important that your voice is heard. You may not always be able to speak for yourself.    We encourage you to have discussions with your loved ones, cultural or spiritual leaders and health care providers about your goals, values, beliefs and choices.    We are a part of [employer] [place] , supporting and promoting the benefits of advance care planning conversations.    Our goals are to:    Help you make informed decisions about your healthcare choices and ensure that those choices are honored    Offer advance care planning discussions with trained staff    Ensure your choices are clearly defined, documented and available in your medical record    Translate your choices into medical orders as needed    Why is Advance Care Planning important?    Know what your health care choices are and decide what is right for you    An unexpected illness or injury could leave you unable to participate in important treatment decisions    When choices are left to others to decide that responsibility can be difficult and stressful    By discussing and outlining your choices, your voice is heard in the care you want to receive    How can I learn more?  We offer free classes at multiple locations, days and times. Our trained facilitators will provide information and guide you through a Health Care Directive document. They can also review, notarize and add your document to your medical record.    Call [facility]  Access [facility] at [phone] or toll free at [phone] for assistance.

## 2021-10-29 NOTE — TELEPHONE ENCOUNTER
The form is received, and placed in [employer] basket to fill out. Forms need at least 3 business days maybe more depending on the providers schedule.     Lucille Jeff CSS on 10/29/2021 at 11:57 AM

## 2021-10-29 NOTE — ED PROVIDER NOTES
History     Chief Complaint   Patient presents with     Urinary Retention     had his catheter removed today and has only been able to urinate a little bit since then     BETO Negrete is a 60 year old male with history of prostatic enlargement and urinary retention who has had an indwelling catheter for the past several months which was removed this morning presented for evaluation of recurrent urinary retention.  Patient reports that he was able to void urine normally after the catheter was removed but since then has not voided anymore.  He reports increasing lower suprapubic pain and pressure throughout the afternoon and evening.  Has been drinking plenty of fluids.  Also reports some mild achiness in his flanks bilaterally.  No fevers or chills.  Symptoms similar to previous episodes of urinary retention.    ==================================================================    CHART REVIEW:      Progress Notes  Kalyn Cho RN (Registered Nurse)     Patient here for Trial Office Void and cath removal.  NS instilled free gravity into bladder via closed system.  300ml instilled.  Catheter successfully removed at 11:45am PM without immediate complication.  Pt self void 300ml and bladder scan revealed 13ml in bladder.  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  Reviewed home care and voiding techniques to reduce retention and promote bladder emptying.            END CHART REVIEW  ==================================================================      Allergies:  Allergies   Allergen Reactions     Droperidol Other (See Comments)     Extrapyramidal Side Effect     Fenofibrate Headache and Diarrhea              Fentanyl      Other reaction(s): N&V hard to wake up     Keflex [Cephalexin] Itching     Lactose GI Disturbance            Midazolam      Other reaction(s): N&V, Hard  to wake up     Monosodium Glutamate      Other reaction(s): diarrhea, headaches     Pcn [Penicillins] Other (See Comments)     Feels warm and flushed, but tolerates Cephalexin     Atorvastatin Palpitations     Sertraline Palpitations            Simvastatin Palpitations     Sulfa Drugs Headache and Rash     Burning    Other reaction(s): Rash     Tetracycline Rash     Burning         Problem List:    Patient Active Problem List    Diagnosis Date Noted     Tear of right acetabular labrum, sequela 10/28/2021     Priority: Medium     Lumbar disc herniation with radiculopathy 10/28/2021     Priority: Medium     MRI 10/21  2.  At L4-L5, there is a right paracentral slightly caudally directed disc extrusion that narrows the right lateral recess and causes mass effect on the descending/traversing right L5 nerve root.  3.  At L3-L4, there is a right paracentral disc protrusion superimposed on disc bulge with narrowing of the right lateral recess and mass effect on the descending/traversing right L4 nerve root.       Acute prostatitis 10/06/2021     Priority: Medium     Rectal pain 10/06/2021     Priority: Medium     Penile pain 10/06/2021     Priority: Medium     Perineal pain in male 10/06/2021     Priority: Medium     Uncontrolled pain 10/06/2021     Priority: Medium     Anal fissure 08/26/2021     Priority: Medium     Urinary retention 08/16/2021     Priority: Medium     Neuropathy 08/16/2021     Priority: Medium     Benign prostatic hyperplasia with urinary obstruction 07/12/2021     Priority: Medium     MARIAN (obstructive sleep apnea) 05/03/2021     Priority: Medium     Formatting of this note might be different from the original.  Formatting of this note might be different from the original.  On BiPAP, see scans.  Formatting of this note might be different from the original.  On BiPAP, see scans.       Symptomatic cholelithiasis 04/06/2021     Priority: Medium     Hepatic steatosis 03/26/2021     Priority: Medium      Psoriatic arthritis (H) 03/15/2021     Priority: Medium     Dyslipidemia 07/10/2017     Priority: Medium     Mild intermittent asthma, uncomplicated 2017     Priority: Medium     OA (osteoarthritis) of hip 2015     Priority: Medium     Low back pain 10/25/2013     Priority: Medium     Reflux esophagitis 2013     Priority: Medium     Insomnia, unspecified 2013     Priority: Medium     GERD (gastroesophageal reflux disease) 10/16/2012     Priority: Medium        Past Medical History:    Past Medical History:   Diagnosis Date     Asthma        Past Surgical History:    Past Surgical History:   Procedure Laterality Date     ENT SURGERY      tonsillectomy     GENITOURINARY SURGERY      TURP     ORTHOPEDIC SURGERY      wrist surgery, carpal tunnel 2005       Family History:    Family History   Problem Relation Age of Onset     Cerebrovascular Disease Mother      Hypertension Mother      Diabetes Mother      Cancer Father 84        liver cancer     Prostate Cancer Father        Social History:  Marital Status:   [4]  Social History     Tobacco Use     Smoking status: Former Smoker     Quit date: 1999     Years since quittin.9     Smokeless tobacco: Never Used   Vaping Use     Vaping Use: Never used   Substance Use Topics     Alcohol use: No     Drug use: No        Medications:    acetaminophen (TYLENOL) 325 MG tablet  amLODIPine (NORVASC) 10 MG tablet  ciprofloxacin (CIPRO) 250 MG tablet  clotrimazole (LOTRIMIN) 1 % external cream  gabapentin (NEURONTIN) 300 MG capsule  HYDROcodone-acetaminophen (NORCO) 5-325 MG tablet  ibuprofen (ADVIL/MOTRIN) 600 MG tablet  NITRO-BID 2 % OINT ointment  polyethylene glycol (MIRALAX) 17 GM/Dose powder  traZODone (DESYREL) 50 MG tablet          Review of Systems   Constitutional: Negative for appetite change, chills, fatigue and fever.   HENT: Negative for congestion.    Respiratory: Negative for chest tightness and shortness of breath.   "  Cardiovascular: Negative for chest pain.   Gastrointestinal: Positive for abdominal pain. Negative for diarrhea, nausea and vomiting.   Genitourinary: Positive for decreased urine volume (Has not voided since this morning) and flank pain (Mild).   Musculoskeletal: Negative for back pain.        Chronic pain down the back of his leg thought to be due to shingles   Skin: Positive for rash (On the back of his left leg, improving).   Neurological: Negative for weakness, light-headedness and headaches.   Psychiatric/Behavioral: Negative for dysphoric mood.   All other systems reviewed and are negative.      Physical Exam   BP: 134/88  Pulse: 88  Temp: 98.1  F (36.7  C)  Resp: 16  Height: 162.6 cm (5' 4\")  Weight: 76.7 kg (169 lb)  SpO2: 97 %      Physical Exam  Vitals and nursing note reviewed.   Constitutional:       Appearance: Normal appearance. He is not ill-appearing or diaphoretic.   HENT:      Head: Atraumatic.      Nose: Nose normal.   Eyes:      Conjunctiva/sclera: Conjunctivae normal.   Cardiovascular:      Rate and Rhythm: Normal rate.      Pulses: Normal pulses.   Pulmonary:      Effort: Pulmonary effort is normal.   Abdominal:      Palpations: Abdomen is soft.      Tenderness: There is abdominal tenderness (Suprapubic tenderness present with fullness of the bladder palpable). There is no guarding.   Musculoskeletal:         General: Normal range of motion.      Cervical back: Normal range of motion.   Skin:     General: Skin is warm and dry.      Capillary Refill: Capillary refill takes less than 2 seconds.   Neurological:      Mental Status: He is alert and oriented to person, place, and time.   Psychiatric:         Mood and Affect: Mood normal.         ED Course        Procedures                No results found for this or any previous visit (from the past 24 hour(s)).    Medications   lidocaine (XYLOCAINE) 2 % external gel (10 mLs Topical Given 10/28/21 9277)     11:25 PM: Patient reassessed.  Catheter " draining clear yellow urine.  Abdominal discomfort relieved.  Assessments & Plan (with Medical Decision Making)  60-year-old male with BPH and urinary retention presenting for evaluation recurrent urinary retention after [place]den was removed this morning.  Patient has a fall and distended and mildly tender in bladder on exam.  Bladder scanner showing about 500 cc in the bladder.  Recommended replacement of the catheter and follow-up with urology.     I have reviewed the nursing notes.    I have reviewed the findings, diagnosis, plan and need for follow up with the patient.       New Prescriptions    No medications on file       Final diagnoses:   Urinary retention       10/28/2021   [facility] EMERGENCY DEPT     Pena, Tesfaye Manning MD  10/28/21 [id number]

## 2021-10-29 NOTE — TELEPHONE ENCOUNTER
Reason for Call:  Other call back    Detailed comments: Pt states he was in yesterday for appt. and  Had catheter removed. Pt states he was in ER last night due to not being able to urinate-after12 hours, even though he was drinking fluid. Pt states they put in another catheter in ER. Pt experiencing pain and burning-he thinks there is an infection. Pt would like care team to reach out to him to discuss next steps.    Phone Number Patient can be reached at: Home number on file [phone] (home)    Best Time:  any    Can we leave a detailed message on this number? YES    Call taken on 10/29/2021 at 8:11 AM by Chuck Kathleen

## 2021-10-31 ENCOUNTER — HEALTH MAINTENANCE LETTER (OUTPATIENT)
Age: 61
End: 2021-10-31

## 2021-11-01 ENCOUNTER — TELEPHONE (OUTPATIENT)
Dept: PALLIATIVE MEDICINE | Facility: CLINIC | Age: 61
End: 2021-11-01

## 2021-11-01 NOTE — LETTER
November 1, 2021    Mustapha Negrete  [id number] [facility] [address]    Dear Mustapha,                                                                 Welcome to the [facility].  We are located on the 2nd floor ([address]) of the [facility], located at [address] [place] [address] [place] [address].  Your appointment has been scheduled on Monday November 15, 2021 at 1:30 PM with Dillan Prescott MD.    At your first visit, you will meet your team of caregivers who will help you to develop pain management strategies that will last a lifetime. You will meet with our support staff to review your insurance information, and collect your co-payment if required by your insurance company. You will also meet with a medical pain specialist and care coordinator who will assess your pain and develop a plan of care for your successful pain rehabilitation. You should expect to spend approximately 1 hour at your first visit with us. Usually, patients work with us for a period of 6-12 months, and eventually return to their primary doctor once their pain management has stabilized.      To help us make your visit go as smoothly as possible, please bring the following items with you on your visit:       Completed Pain Questionnaire enclosed in this packet.  If you do not bring the completed questionnaire, we may have to reschedule your appointment.    List of any medicines that you are currently taking or have been prescribed    Important NON-[place] medical information such as medical records or tests results (X-rays, or laboratory tests)    Your health insurance card    Financial resources to cover your co-payment or balance due at the time of service (cash, personal check, Visa, and MasterCard are acceptable methods of payment)     Due to the demand for new patient evaluations, you must notify the scheduling department 48 hours (2 days) in advance if you are not able to keep this appointment.  Failure to do so could affect your ability to reschedule with our clinic. Please be aware that we will not prescribe any medications at your first visit.     Please call [phone] with any questions regarding your appointment. We look forward to meeting you and working to address your health care needs.     Sincerely,      [facility]

## 2021-11-01 NOTE — TELEPHONE ENCOUNTER
[facility] Center Referral      1. Confirmed address with patient? Yes  2. Confirmed phone number with patient? Yes  3. Confirmed referring provider? Yes  4. Is the PCP the same as the referring provider? Yes  5. Has the patient been to any previous pain clinics? Yes  (If yes, send EARLE with welcome letter)  6. Which insurance are we to bill for this appointment?  BCBS    7. Informed pt of cancellation (48 hour) policy? Yes    REGARDING OPIOID MEDICATIONS: We will always address appropriateness of opioid pain medications, but we generally will not automatically take on a prescribing role. When we do take on prescribing of opioids for chronic pain, it is in collaboration with the referring physician for an intermediate period of time (months), with an expectation that the primary physician or provider will assume the prescribing role if medications are effective at stable doses with demonstrated compliance. Therefore, please do not assume that your prescribing responsibilities end on the day of pain clinic consultation.  8. Informed pt of prescribing policy? Yes    9.Please be aware that once you are established with a pain provider and location, you will need to continue have all future visits with that provider and location. It is best to determine what location is the most convenient for you and schedule with that one.    ** PATIENT INFORMED OF THIS POLICY Yes      9. Referring Provider: Viki Hanson     10. Criteria for Triage Eval:   -Missed/Failed 1st appointment? N/A     -Medication Focused? N/A     -Mental Health Concerns? (e.g. Recent psych hospitalization/snap shot)? N/A     -Active substance abuse? N/A     -Patient behaviors (e.g. Offensive language/raised voice)? N/A    Yanira DUNLAP    [facility]

## 2021-11-02 ENCOUNTER — PRE VISIT (OUTPATIENT)
Dept: NEUROLOGY | Facility: CLINIC | Age: 61
End: 2021-11-02

## 2021-11-02 ENCOUNTER — PATIENT OUTREACH (OUTPATIENT)
Dept: NURSING | Facility: CLINIC | Age: 61
End: 2021-11-02
Payer: COMMERCIAL

## 2021-11-02 DIAGNOSIS — R33.9 URINARY RETENTION: Primary | ICD-10-CM

## 2021-11-02 DIAGNOSIS — M51.16 LUMBAR DISC HERNIATION WITH RADICULOPATHY: ICD-10-CM

## 2021-11-02 RX ORDER — HYDRALAZINE HYDROCHLORIDE 25 MG/1
25 TABLET, FILM COATED ORAL 2 TIMES DAILY PRN
COMMUNITY
End: 2021-11-17

## 2021-11-02 SDOH — HEALTH STABILITY: PHYSICAL HEALTH: ON AVERAGE, HOW MANY DAYS PER WEEK DO YOU ENGAGE IN MODERATE TO STRENUOUS EXERCISE (LIKE A BRISK WALK)?: 0 DAYS

## 2021-11-02 SDOH — ECONOMIC STABILITY: FOOD INSECURITY: WITHIN THE PAST 12 MONTHS, THE FOOD YOU BOUGHT JUST DIDN'T LAST AND YOU DIDN'T HAVE MONEY TO GET MORE.: NEVER TRUE

## 2021-11-02 SDOH — ECONOMIC STABILITY: TRANSPORTATION INSECURITY
IN THE PAST 12 MONTHS, HAS LACK OF TRANSPORTATION KEPT YOU FROM MEETINGS, WORK, OR FROM GETTING THINGS NEEDED FOR DAILY LIVING?: NO

## 2021-11-02 SDOH — HEALTH STABILITY: PHYSICAL HEALTH: ON AVERAGE, HOW MANY MINUTES DO YOU ENGAGE IN EXERCISE AT THIS LEVEL?: 0 MIN

## 2021-11-02 SDOH — ECONOMIC STABILITY: TRANSPORTATION INSECURITY
IN THE PAST 12 MONTHS, HAS THE LACK OF TRANSPORTATION KEPT YOU FROM MEDICAL APPOINTMENTS OR FROM GETTING MEDICATIONS?: NO

## 2021-11-02 SDOH — ECONOMIC STABILITY: FOOD INSECURITY: WITHIN THE PAST 12 MONTHS, YOU WORRIED THAT YOUR FOOD WOULD RUN OUT BEFORE YOU GOT MONEY TO BUY MORE.: NEVER TRUE

## 2021-11-02 ASSESSMENT — ACTIVITIES OF DAILY LIVING (ADL): DEPENDENT_IADLS:: TRANSPORTATION

## 2021-11-02 ASSESSMENT — SOCIAL DETERMINANTS OF HEALTH (SDOH)
IN A TYPICAL WEEK, HOW MANY TIMES DO YOU TALK ON THE PHONE WITH FAMILY, FRIENDS, OR NEIGHBORS?: TWICE A WEEK
HOW HARD IS IT FOR YOU TO PAY FOR THE VERY BASICS LIKE FOOD, HOUSING, MEDICAL CARE, AND HEATING?: NOT HARD AT ALL
HOW OFTEN DO YOU GET TOGETHER WITH FRIENDS OR RELATIVES?: TWICE A WEEK
DO YOU BELONG TO ANY CLUBS OR ORGANIZATIONS SUCH AS CHURCH GROUPS UNIONS, FRATERNAL OR ATHLETIC GROUPS, OR SCHOOL GROUPS?: YES
HOW OFTEN DO YOU GET TOGETHER WITH FRIENDS OR RELATIVES?: MORE THAN THREE TIMES A WEEK
HOW OFTEN DO YOU ATTEND CHURCH OR RELIGIOUS SERVICES?: 1 TO 4 TIMES PER YEAR
IN A TYPICAL WEEK, HOW MANY TIMES DO YOU TALK ON THE PHONE WITH FAMILY, FRIENDS, OR NEIGHBORS?: MORE THAN THREE TIMES A WEEK
HOW OFTEN DO YOU ATTENT MEETINGS OF THE CLUB OR ORGANIZATION YOU BELONG TO?: 1 TO 4 TIMES PER YEAR

## 2021-11-02 NOTE — TELEPHONE ENCOUNTER
FUTURE VISIT INFORMATION      FUTURE VISIT INFORMATION:    Date: 11/4/2021    Time: 1pm    [facility]  REFERRAL INFORMATION:    Referring provider:  Dr. Drummond     Referring providers clinic:  [facility]     Reason for visit/diagnosis  Numbness     RECORDS REQUESTED FROM:       Clinic name Comments Records Status Imaging Status   Internal Dr. Drummond-10/21/2021    MR Lumbar Spine-10/16/2021 Epic PACS          [facility]ie MR Lumbar Spine-7/26/2021 Scanned to Chart PACS

## 2021-11-02 NOTE — PROGRESS NOTES
Clinic Care Coordination Contact    Clinic Care Coordination Contact  OUTREACH    Referral Information:  Referral Source: PCP    Primary Diagnosis: Genitourinary Disorders    Chief Complaint   Patient presents with     Clinic Care Coordination - Initial     Clinic Care Coordination RN         Universal Utilization: 10/16/2021 ED visit Urinary retention   Clinic Utilization  Difficulty keeping appointments:: No  Compliance Concerns: No  No-Show Concerns: No  No PCP office visit in Past Year: No  Utilization    Hospital Admissions  1             ED Visits  5             No Show Count (past year)  1                Current as of: 11/1/2021  2:31 PM            Clinical Concerns:  Current Medical Concerns:  Patient continues to have pain in back of legs,buttocks pain that wraps around colon and penis     Recently saw the Neurosurgeon and was told he was not a candidate for surgery and was referred to the Neurologist.  Patient was given the Neurology contact and he will make a future appointment   Patient has upcoming appointments with Urologist 11/16 and hopefully will have his leon catheter removed.  Catheter is  draining clear yellow  urine and denies any blood in the urine     Current Behavioral Concerns:   Patient continues to have pain in buttocks,upper thigh's radiating to colon and penis. .     Patient has anxiety over the chronic pain and wants to get back to his normal life.  Patient has not worked since June and was on short term disability and is now working on long term disability      Recently saw the Neuro surgeon and no surgery is needed and referred to a Neurologist .  Neurology contact information given to patient to make a future appointment     Neurosurgeon states possibly related to previous shingles and COVID diagnosis       Education Provided to patient:CCintroductory letter and care plan sent via My Chart   Pain  Pain (GOAL):: Yes  Type: Chronic (>3mo)  Radiating: Yes  Progression:  Constant  Limitation of routine activities due to chronic pain:: Yes  Alleviating Factors: Rest, Stretching  Health Maintenance Reviewed: Due/Overdue   Health Maintenance Due   Topic Date Due     PREVENTIVE CARE VISIT  Never done     ASTHMA ACTION PLAN  Never done     ADVANCE CARE PLANNING  Never done     Pneumococcal Vaccine: Pediatrics (0 to 5 Years) and At-Risk Patients (6 to 64 Years) (1 of 2 - PPSV23) Never done     COVID-19 Vaccine (1) Never done     HIV SCREENING  Never done     HEPATITIS C SCREENING  Never done     LIPID  Never done     ZOSTER IMMUNIZATION (1 of 2) Never done     INFLUENZA VACCINE (1) Never done     Clinical Pathway: None    Medication Management:  Medication review status: Medications reviewed and no changes reported per patient.             Functional Status:  Dependent IADLs:: Transportation  Bed or wheelchair confined:: No  Mobility Status: Independent    Living Situation:  Current living arrangement:: I live in a private home (Patient stated he lives with his adult son)  Brother ans his son provide transportation to appointments   Type of residence:: Private home - stairs    Lifestyle & Psychosocial Needs:    Social Determinants of Health     Tobacco Use: Medium Risk     Smoking Tobacco Use: Former Smoker     Smokeless Tobacco Use: Never Used   Alcohol Use:      Frequency of Alcohol Consumption:      Average Number of Drinks:      Frequency of Binge Drinking:    Financial Resource Strain: Low Risk      Difficulty of Paying Living Expenses: Not hard at all   Food Insecurity: No Food Insecurity     Worried About Running Out of Food in the Last Year: Never true     Ran Out of Food in the Last Year: Never true   Transportation Needs: No Transportation Needs     Lack of Transportation (Medical): No     Lack of Transportation (Non-Medical): No   Physical Activity: Inactive     Days of Exercise per Week: 0 days     Minutes of Exercise per Session: 0 min   Stress:      Feeling of Stress :     Social Connections: Unknown     Frequency of Communication with Friends and Family: More than three times a week     Frequency of Social Gatherings with Friends and Family: More than three times a week     Attends Anabaptism Services: 1 to 4 times per year     Active Member of Clubs or Organizations: Yes     Attends Club or Organization Meetings: 1 to 4 times per year     Marital Status: Not on file   Intimate Partner Violence:      Fear of Current or Ex-Partner:      Emotionally Abused:      Physically Abused:      Sexually Abused:    Depression: Not at risk     PHQ-2 Score: 0   Housing Stability:      Unable to Pay for Housing in the Last Year:      Number of Places Lived in the Last Year:      Unstable Housing in the Last Year:      Inadequate nutrition (GOAL):: No  Tube Feeding: No  Inadequate activity/exercise (GOAL):: No  Significant changes in sleep pattern (GOAL): No  Transportation means:: Family     Anabaptism or spiritual beliefs that impact treatment:: No  Mental health DX:: No  Mental health management concern (GOAL):: No  Chemical Dependency Status: No Current Concerns  Informal Support system:: Children      Resources and Interventions:  Current Resources:      Community Resources: Financial/Insurance  Supplies used at home:: None  Equipment Currently Used at Home: none  Employment Status: employed full-time, disabled         Advance Care Plan/Directive  Advanced Care Plans/Directives on file:: No    Referrals Placed: Community Resources, [facility] Resources, Financial Services, Transportation, Senior Linkage Line, Mental Health     Goals:   Goals        General     Medical (pt-stated)      Notes - Note created  11/2/2021 11:10 AM by Omaira Daigle RN     Goal Statement #2: I will complete the Health Maintenance due in the next 1-3 months   Health Maintenance Due   Topic Date Due     PREVENTIVE CARE VISIT  Never done     ASTHMA ACTION PLAN  Never done     ADVANCE CARE PLANNING  Never done      Pneumococcal Vaccine: Pediatrics (0 to 5 Years) and At-Risk Patients (6 to 64 Years) (1 of 2 - PPSV23) Never done     COVID-19 Vaccine (1) Never done     HIV SCREENING  Never done     HEPATITIS C SCREENING  Never done     LIPID  Never done     ZOSTER IMMUNIZATION (1 of 2) Never done     INFLUENZA VACCINE (1) Never done      Date Goal set: 11/2/2021  Barriers: None identified   Strengths: agrees with the plan   Date to Achieve By: 2/2/2022  Patient expressed understanding of goal: Yes  Action steps to achieve this goal:  1. I will discuss with my provider at a future visit          Pain Management (pt-stated)      Notes - Note edited  11/2/2021 11:28 AM by Omaira Daigle, CARMEN     Goal Statement #1: I will have my leon catheter removed 11/16 at Urology visit and cI will be able to urinate on my own .    I will decrease bilateral buttocks and thigh pain in the next 1-2 months   Date Goal set: 11/2/2021  Barriers: constant pain   Strengths: Motivated   Date to Achieve By: 2/2/2022  Patient expressed understanding of goal: Yes  Action steps to achieve this goal:  1. I will keep future Primary Care ,Urology, Pain Clinic appointments and will make a future Neurology appointment   2. I will drink plenty of water to keep my urine clear and yellow  3. I will take Tylenol/Ibuprofen and Gabapentin as directed   4. I will walk a block when able to increase strength               Patient/Caregiver understanding: Patient expresses understanding of plan discussed today     Outreach Frequency: weekly  Future Appointments              In 2 days Sriram Escalera MD [facility]    In 1 week Val Molina PT [facility] Rehabilitation [facility]    In 1 week Arian Prescott MD [facility], [facility]    In 2 weeks JACOB Oneill MD [facility]    In 2 weeks Viki Hanson DO [facility]  [facility]  Arrive at: Clinic A          Plan:   1. Patient will keep future appointments with PT 11/9,Pain Clinic 11/15, Urologist 11/16 and PCP 11/17/2021  2. Patient will take Tylenol.Ibuprofen and Gabapentin as directed   3. Patient will review Transportation,MH and advanced Planning information sent in the mail 10/29/2021  4. CC RN will follow up in 3-5 business days     [facility]   Omaira Daigle RN, Care Coordinator   [facility]'s   E-mail [email or web address]   [phone]

## 2021-11-02 NOTE — LETTER
[facility] CARE COORDINATION  [address] [place] [address]  November 2, 2021    Mustapha Negrete  [address] [address] [place] [address]      Dear Mustapha,    I am a clinic care coordinator who works with Viki Hanson DO at [facility] . I wanted to thank you for spending the time to talk with me.  Below is a description of clinic care coordination and how I can further assist you.      The clinic care coordination team is made up of a registered nurse,  and community health worker who understand the health care system. The goal of clinic care coordination is to help you manage your health and improve access to the health care system in the most efficient manner. The team can assist you in meeting your health care goals by providing education, coordinating services, strengthening the communication among your providers and supporting you with any resource needs.    Please feel free to contact me at [phone] with any questions or concerns. We are focused on providing you with the highest-quality healthcare experience possible and that all starts with you.     Sincerely,     [facility]   Omaira Daigle RN, Care Coordinator   [facility]'s   E-mail [email or web address]   [phone]    Enclosed: I have enclosed a copy of the Patient Centered Plan of Care. This has helpful information and goals that we have talked about. Please keep this in an easy to access place to use as needed.

## 2021-11-02 NOTE — LETTER
[facility]  Patient Centered Plan of Care  About Me:        Patient Name:  Mustapha Negrete    YOB: 1960  Age:         60 year old   Rustam MRN:    [id number] Telephone Information:  Home Phone [phone]   Mobile [phone]       Address:  [address] [place] [address] [place] [address] Email address:  [email or web address]      Emergency Contact(s)    Name Relationship Lgl Grd Work Phone Home Phone Mobile Phone   STEFANO AYOUB Son   [phone]           Primary language:  English     needed? No   [employer]:  [phone] op. 1  Other communication barriers:None    Preferred Method of Communication:     Current living arrangement: I live in a private home (Patient stated he lives with his adult son)    Mobility Status/ Medical Equipment: Independent        Health Maintenance  Health Maintenance Reviewed: Due/Overdue   Health Maintenance Due   Topic Date Due     PREVENTIVE CARE VISIT  Never done     ASTHMA ACTION PLAN  Never done     ADVANCE CARE PLANNING  Never done     Pneumococcal Vaccine: Pediatrics (0 to 5 Years) and At-Risk Patients (6 to 64 Years) (1 of 2 - PPSV23) Never done     COVID-19 Vaccine (1) Never done     HIV SCREENING  Never done     HEPATITIS C SCREENING  Never done     LIPID  Never done     ZOSTER IMMUNIZATION (1 of 2) Never done     INFLUENZA VACCINE (1) Never done         My Access Plan  Medical Emergency 911   Primary Clinic Line [facility] - [phone]   24 Hour Appointment Line [phone] or  [phone]) (toll-free)   24 Hour Nurse Line [phone] (toll-free)   Preferred Urgent Care [facility], [phone]     Preferred Hospital [place] [place]  [phone]     Preferred Pharmacy [facility] [employer] [facility] [place] - [address]     Behavioral Health Crisis Line The National Suicide Prevention Lifeline at 1-113.583.3415 or 437        My Care Team Members  Patient Care Team       Relationship Specialty Notifications Start End    Viki Hanson DO PCP - General Internal Medicine Admissions 10/15/21     Phone: [phone] Pager: [phone] Fax: [phone] [address] [facility] [address]    JACOB Oneill MD Assigned Surgical Provider   9/26/21     Phone: [phone] Pager: [phone] Fax: [phone] [address] [facility] [address]    Viki Hanson DO Assigned PCP   9/30/21     Phone: [phone] Pager: [phone] Fax: [phone] [address] [facility] [address]    Matty Ruiz MD MD Neurological Surgery  10/18/21     Phone: [phone] Fax: [phone]         [address] [place] [address]    Max Rico MD Referring Physician Family Medicine  10/18/21     Phone: [phone] Fax: [phone] [address] [place] [address]    Kristal Drummond MD Assigned Neuroscience Provider   10/31/21     Phone: [phone] Pager: [phone] Fax: [phone]        [address] [place] [address]            My Care Plans  Self Management and Treatment Plan  Goals and (Comments)  Goals        General     Medical (pt-stated)      Notes - Note created  11/2/2021 11:10 AM by Omaira Daigle RN     Goal Statement #2: I will complete the Health Maintenance due in the next 1-3 months   Health Maintenance Due   Topic Date Due     PREVENTIVE CARE VISIT  Never done     ASTHMA ACTION PLAN  Never done     ADVANCE CARE PLANNING  Never done     Pneumococcal Vaccine: Pediatrics (0 to 5 Years) and At-Risk Patients (6 to 64 Years) (1 of 2 - PPSV23) Never done     COVID-19 Vaccine (1) Never done     HIV SCREENING  Never done     HEPATITIS C SCREENING  Never done     LIPID  Never done     ZOSTER IMMUNIZATION (1 of 2) Never done     INFLUENZA VACCINE (1) Never done      Date Goal set: 11/2/2021  Barriers: None identified   Strengths: agrees with the plan    Date to Achieve By: 2/2/2022  Patient expressed understanding of goal: Yes  Action steps to achieve this goal:  1. I will discuss with my provider at a future visit          Pain Management (pt-stated)      Notes - Note edited  11/2/2021 11:28 AM by Omaira Daigle RN     Goal Statement #1: I will have my leon catheter removed 11/16 at Urology visit and cI will be able to urinate on my own .    I will decrease bilateral buttocks and thigh pain in the next 1-2 months   Date Goal set: 11/2/2021  Barriers: constant pain   Strengths: Motivated   Date to Achieve By: 2/2/2022  Patient expressed understanding of goal: Yes  Action steps to achieve this goal:  1. I will keep future Primary Care ,Urology, Pain Clinic appointments and will make a future Neurology appointment   2. I will drink plenty of water to keep my urine clear and yellow  3. I will take Tylenol/Ibuprofen and Gabapentin as directed   4. I will walk a block when able to increase strength                Action Plans on File: None     Advance Care Plans/Directives Type: None  No data recorded    My Medical and Care Information  Problem List   Patient Active Problem List   Diagnosis     Acute prostatitis     Rectal pain     Penile pain     Perineal pain in male     Uncontrolled pain     Urinary retention     Symptomatic cholelithiasis     Reflux esophagitis     Psoriatic arthritis (H)     MARIAN (obstructive sleep apnea)     OA (osteoarthritis) of hip     Neuropathy     Mild intermittent asthma, uncomplicated     Insomnia, unspecified     GERD (gastroesophageal reflux disease)     Hepatic steatosis     Dyslipidemia     Low back pain     Benign prostatic hyperplasia with urinary obstruction     Anal fissure     Tear of right acetabular labrum, sequela     Lumbar disc herniation with radiculopathy      Current Medications and Allergies:  See printed Medication Report.    Care Coordination Start Date: 11/02/2021   Frequency of Care Coordination: weekly     Form  Last Updated: 11/02/2021

## 2021-11-03 NOTE — TELEPHONE ENCOUNTER
Form completed, signed, and faxed to Jin Calixto at [phone].  Copy of form sent to scan and copy placed in basket.

## 2021-11-04 ENCOUNTER — OFFICE VISIT (OUTPATIENT)
Dept: NEUROLOGY | Facility: CLINIC | Age: 61
End: 2021-11-04
Payer: COMMERCIAL

## 2021-11-04 ENCOUNTER — TELEPHONE (OUTPATIENT)
Dept: NEUROLOGY | Facility: CLINIC | Age: 61
End: 2021-11-04

## 2021-11-04 VITALS
SYSTOLIC BLOOD PRESSURE: 129 MMHG | WEIGHT: 175 LBS | RESPIRATION RATE: 16 BRPM | HEART RATE: 106 BPM | OXYGEN SATURATION: 97 % | DIASTOLIC BLOOD PRESSURE: 82 MMHG | BODY MASS INDEX: 30.04 KG/M2

## 2021-11-04 DIAGNOSIS — G89.29 CHRONIC BILATERAL LOW BACK PAIN, UNSPECIFIED WHETHER SCIATICA PRESENT: ICD-10-CM

## 2021-11-04 DIAGNOSIS — Z11.59 ENCOUNTER FOR SCREENING FOR OTHER VIRAL DISEASES: ICD-10-CM

## 2021-11-04 DIAGNOSIS — G62.9 NEUROPATHY: ICD-10-CM

## 2021-11-04 DIAGNOSIS — R90.89 ABNORMAL IMAGING OF CENTRAL NERVOUS SYSTEM: ICD-10-CM

## 2021-11-04 DIAGNOSIS — N48.89 PENILE PAIN: ICD-10-CM

## 2021-11-04 DIAGNOSIS — M79.2: ICD-10-CM

## 2021-11-04 DIAGNOSIS — R10.2 PERINEAL PAIN IN MALE: ICD-10-CM

## 2021-11-04 DIAGNOSIS — K62.89 RECTAL PAIN: ICD-10-CM

## 2021-11-04 DIAGNOSIS — M54.50 CHRONIC BILATERAL LOW BACK PAIN, UNSPECIFIED WHETHER SCIATICA PRESENT: ICD-10-CM

## 2021-11-04 PROCEDURE — 99204 OFFICE O/P NEW MOD 45 MIN: CPT | Mod: GC | Performed by: STUDENT IN AN ORGANIZED HEALTH CARE EDUCATION/TRAINING PROGRAM

## 2021-11-04 RX ORDER — GABAPENTIN 300 MG/1
600 CAPSULE ORAL 3 TIMES DAILY
Qty: 180 CAPSULE | Refills: 11 | Status: SHIPPED | OUTPATIENT
Start: 2021-11-04 | End: 2021-11-04

## 2021-11-04 RX ORDER — GABAPENTIN 300 MG/1
600 CAPSULE ORAL 3 TIMES DAILY
Qty: 180 CAPSULE | Refills: 11 | Status: SHIPPED | OUTPATIENT
Start: 2021-11-04 | End: 2022-06-29

## 2021-11-04 ASSESSMENT — PAIN SCALES - GENERAL: PAINLEVEL: SEVERE PAIN (7)

## 2021-11-04 NOTE — TELEPHONE ENCOUNTER
[facility]    Phone Message    May a detailed message be left on voicemail: yes     Reason for Call: Other: Pt calling in to inform he is having a hard time finding somewhere to get his covid test done by monday, please call back to discuss further      Action Taken: Message routed to:  [facility] neurology    Travel Screening: Not Applicable

## 2021-11-04 NOTE — PATIENT INSTRUCTIONS
1. We will test the fluid around your spinal cord with a lumbar puncture.  Please schedule this with interventional radiology department.    2.  A prescription for gabapentin has been signed.  Increase gabapentin as able:  Start with 1 tablet in morning, 1 tablet in midday, 2 tablets in evening    After one week:  2 tablets in morning, 1 tablet midday, 2 tablets in evening    After one week:  2 tablets in morning, 2 tablets midday, 2 tablets in evening (this will have you taking 600 mg three times daily)    3. Follow up with Dr. Escalera in approximately two weeks after the lumbar puncture to discuss results.

## 2021-11-04 NOTE — LETTER
2021       RE: Mustapha Negrete  [id number] [facility] [address]     Dear Colleague,    Thank you for referring your patient, Mustapha Negrete, to the [facility] [place] at [facility]. Please see a copy of my visit note below.    DEPARTMENT OF NEUROLOGY    Patient Name:  Mustapha Negrete  MRN:  [id number]    :  1960  Date of Clinic Visit:  2021  Primary Care Provider:  Viki Hanson    Assessment:  #Neuropathic pain sacral distribution  #Numbness sacral distribution  #Urinary retention  #Constipation  #Abnormal MRI imaging  Patient initially developed painless onset of numbness in the sacral S2/S3/S4/S5 distribution around the time of acute COVID-19 infection 2021.  He further developed painless urinary retention and constipation mid 2021.  He later developed burning/stinging pain in the back of each thigh and the buttocks.  Neurologic exam with sensory level around S2 dermatome.  Lumbar MRI with possible abnormality at the conus medullaris on personal review.   Distribution of sensory abnormality in bilateral sacral dermatomes suggestive of localization in the lower spinal cord versus a process affecting bilateral sacral roots.  Suspect the constipation and urinary retention is associated with lesion affecting sacral nerves.  Given altered appearance of conus medullaris on latest 2 MRI images, suspect lesion is at that location.  Possible this may represent an infectious/inflammatory etiology given onset around the time of COVID-19 infection and report of possible rash in the posterior thighs.  It is also possible that there may have been a vascular insult, given reports of hypercoagulability with acute COVID-19 infection and timing of initial numbness.  It is also possible that there may be a neoplastic process, though the distribution of numbness and pain does not seem to have expanded as would be  expected for an expanding lesion.  To evaluate between these possibilities, will arrange for CSF studies.      Plan:  - Lumbar puncture to include opening pressure, protein, glucose, cell count, cytology, flow cytometry, HSV, VZV, and to save extra CSF for further studies  -For neuropathic pain, increase gabapentin by 300 mg/day each week:    -First week: 300 mg - 300 mg-600 mg    -Second week: 600 mg-300 mg - 600 mg    -Third week and beyond: 600 mg 3 times daily  -Should above gabapentin dose proved to be insufficient, could increase gabapentin dose further  -Patient to follow-up approximately 2 weeks after lumbar puncture to discuss laboratory results    Patient has been seen with Dr. Gore who agrees with my assessment and plan.    Rebecca Morse MD  PGY-4 Neurology      ------------------------------------------------------------------------------------------------------------    Chief Complaint: pain      HPI:   Patient is a 60-year-old man who presents in consultation for neuropathic pain.      Prior to COVID-19 infection in April 2021, patient reports that he was healthy with no ongoing medical diseases.  Around the time of COVID-19 infection, he noticed that he was numb in the back below the belt line down to his posterior thighs as he was recovering on the couch at home.  This numbness diminished a little bit never disappeared over the subsequent weeks of recovery.  He also underwent cholecystectomy 5/13/2021.  In mid June, he developed constipation and urinary difficulty around the same time.  In late June, he developed burning/stinging sensation of his buttocks and associated with urination.  There was also reports of a rash shortly thereafter with various opinions regarding whether or not the rash represented zoster.    He was seen in consultation at [facility] neurological [facility] in July 2021 in consultation for bilateral posterior leg pain, urinary hesitancy/obstruction, skin rash.  He had reported  itchiness of bilateral groin, buttocks, and posterior legs down to the level of the knees.  At time of follow-up September 2021, MRI was reportedly unremarkable.  Pain continues to be described as burning and throbbing and had required multiple visits to the emergency department for pain management.  There was discussion that there was no clear cause of bowel or bladder difficulties found on MRI, and also discussion of compression of the left L4 nerve root.  Pain management was reportedly provided by [facility].    Since that time, he reports that the distribution of the pain and numbness has not changed.  Continues to extend from scrotum anteriorly to below the belt line posteriorly to just past midline posterior buttocks to down the posterior thighs almost to the level of knees.    Prior negative laboratory studies included vitamin B12, folate, syphilis, HIV.  He was diagnosed with anal fissure by gastroenterology services.  He was also diagnosed with perianal fungal infection.  Colonoscopy was reportedly normal.  He has been treated with multiple courses of antibiotics due to urinary tract infections in the context of need for chronic Walden catheter and additional antibiotics for presumed prostatitis.    Patient had been prescribed opioid medications due to pain, which he reports were not helpful.  A prescription for trazodone was recently written and he has not yet started this medication.  He has been taking gabapentin 300 mg 3 times daily with mild benefit to his neuropathic pain.  He experiences fatigue as a side effect of gabapentin.      Past Medical History:   Diagnosis Date     Asthma      Past Surgical History:   Procedure Laterality Date     ENT SURGERY      tonsillectomy     GENITOURINARY SURGERY      TURP     ORTHOPEDIC SURGERY      wrist surgery, carpal tunnel 2005       Current Outpatient Medications:      acetaminophen (TYLENOL) 325 MG tablet, Take 3 tablets (975 mg) by mouth every 4 hours as needed  "for mild pain or pain, Disp: , Rfl:      amLODIPine (NORVASC) 10 MG tablet, Take 10 mg by mouth daily, Disp: , Rfl:      clotrimazole (LOTRIMIN) 1 % external cream, Apply 1 applicator topically 2 times daily Rectal area, Disp: , Rfl:      gabapentin (NEURONTIN) 300 MG capsule, Take 2 capsules (600 mg) by mouth 3 times daily, Disp: 180 capsule, Rfl: 11     hydrALAZINE (APRESOLINE) 25 MG tablet, Take 25 mg by mouth 2 times daily as needed, Disp: , Rfl:      HYDROcodone-acetaminophen (NORCO) 5-325 MG tablet, Take 1 tablet by mouth , Disp: , Rfl:      ibuprofen (ADVIL/MOTRIN) 600 MG tablet, Take 1 tablet (600 mg) by mouth 4 times daily, Disp: 56 tablet, Rfl: 0     NITRO-BID 2 % OINT ointment, Apply 1 each topically 2 times daily Rectal area, Disp: , Rfl:      polyethylene glycol (MIRALAX) 17 GM/Dose powder, Take 17 g by mouth daily, Disp: 510 g, Rfl: 0     traZODone (DESYREL) 50 MG tablet, Take 1-2 tablets ( mg) by mouth At Bedtime, Disp: 30 tablet, Rfl: 0     ciprofloxacin (CIPRO) 250 MG tablet, Take 1 tablet (250 mg) by mouth 2 times daily (Patient not taking: Reported on 10/28/2021), Disp: 14 tablet, Rfl: 0    Social History     Tobacco Use     Smoking status: Former Smoker     Quit date: 1999     Years since quittin.0     Smokeless tobacco: Never Used   Vaping Use     Vaping Use: Never used   Substance Use Topics     Alcohol use: No     Drug use: No         ROS: 10-point review of systems was negative except for as noted above.     Vital signs:      BP: 129/82 Pulse: 106   Resp: 16 SpO2: 97 %       Weight: 79.4 kg (175 lb)  Estimated body mass index is 30.04 kg/m  as calculated from the following:    Height as of 10/28/21: 1.626 m (5' 4\").    Weight as of this encounter: 79.4 kg (175 lb).      Examination:   -General: Appears to be in pain and changing positions during interview.  -HEENT: Head is normocephalic, atraumatic.   -Pulm: Normal work of breathing on RA.   -Abdomen: Non-distended. "   -Musculoskeletal: No abnormalities noted on gross inspection.  Walden collection bag in place.  -Skin: Several 0.5 to 1 cm discolorations noted in a linear pattern on the posterior thigh resembling scar tissue    -Neurological:   --MS: Patient is alert, attentive, and oriented well. Speech is clear and fluent. Recall and remote memory intact.   --CNs: Pupils symmetric, round and reactive to light. Visual fields are full. Ocular motility is full without nystagmus. Facial sensation intact.  Facial expressions full and symmetric. Hearing intact to conversation. Palate elevation normal. Shoulder shrug is strong and symmetric. Tongue motions normal.   --Motor: Normal muscle tone and bulk. Muscle strength is 5/5 throughout upper and lower extremities.  --Reflexes: 2+ and symmetric at biceps, triceps, brachioradialis, patellae and Achilles. Toes are downgoing bilaterally.   --Sensory: Sensation in upper limbs intact and symmetric to light touch, and prick, vibration.  There is a well delineated area of decreased sensation to light touch and pinprick extending from slightly above the gluteal cleft, inferiorly to the posterior thighs, laterally to approximately senior living to the hips, in approximately S2/S3 dermatomal distribution with presumed continued decreased sensation to S4/S5 distribution not directly tested.  --Coordination:Heel-shin and finger-nose-finger is intact. Negative Romberg.   --Gait: Stands with feet normally spaced. Gait is steady.  Able to perform toe walk, heel walk, and tandem gait without difficulty.      INVESTIGATIONS:  All available and relevant labs, imaging, and other procedures were reviewed with the patient at this visit. Those of particular significance are listed below:    Lumbar spine MRIs dated 10/16/2021, 7/26/2021, and 11/22/2011 were personally reviewed.  On close inspection, there is T2 and STIR hyperintensity in signal of the conus medullaris that is not present in the 2011 MRI imaging.   "Per latest MRI report:  \"IMPRESSION:  1.  Mild to moderate lumbar spondylitic changes superimposed on mild developmental narrowing of the lumbar spinal canal.  2.  At L4-L5, there is a right paracentral slightly caudally directed disc extrusion that narrows the right lateral recess and causes mass effect on the descending/traversing right L5 nerve root.  3.  At L3-L4, there is a right paracentral disc protrusion superimposed on disc bulge with narrowing of the right lateral recess and mass effect on the descending/traversing right L4 nerve root.\"      Attestation signed by Swathi Gore MD at 11/12/2021 12:37 PM:  Physician Attestation   I, Swathi Gore MD, saw this patient and agree with the findings and plan of care as documented in the note.  Per my read, possible conus abnormality on MRI lumbar spine, which may correspond to patient's symptoms. Recommend further evaluation with LP.     Swathi Gore MD        Again, thank you for allowing me to participate in the care of your patient.      Sincerely,    Sriram Escalera MD      "

## 2021-11-04 NOTE — PROGRESS NOTES
DEPARTMENT OF NEUROLOGY    Patient Name:  Mustapha Negrete  MRN:  [id number]    :  1960  Date of Clinic Visit:  2021  Primary Care Provider:  Viki Hanson    Assessment:  #Neuropathic pain sacral distribution  #Numbness sacral distribution  #Urinary retention  #Constipation  #Abnormal MRI imaging  Patient initially developed painless onset of numbness in the sacral S2/S3/S4/S5 distribution around the time of acute COVID-19 infection 2021.  He further developed painless urinary retention and constipation mid 2021.  He later developed burning/stinging pain in the back of each thigh and the buttocks.  Neurologic exam with sensory level around S2 dermatome.  Lumbar MRI with possible abnormality at the conus medullaris on personal review.   Distribution of sensory abnormality in bilateral sacral dermatomes suggestive of localization in the lower spinal cord versus a process affecting bilateral sacral roots.  Suspect the constipation and urinary retention is associated with lesion affecting sacral nerves.  Given altered appearance of conus medullaris on latest 2 MRI images, suspect lesion is at that location.  Possible this may represent an infectious/inflammatory etiology given onset around the time of COVID-19 infection and report of possible rash in the posterior thighs.  It is also possible that there may have been a vascular insult, given reports of hypercoagulability with acute COVID-19 infection and timing of initial numbness.  It is also possible that there may be a neoplastic process, though the distribution of numbness and pain does not seem to have expanded as would be expected for an expanding lesion.  To evaluate between these possibilities, will arrange for CSF studies.      Plan:  - Lumbar puncture to include opening pressure, protein, glucose, cell count, cytology, flow cytometry, HSV, VZV, and to save extra CSF for further studies  -For neuropathic pain, increase  gabapentin by 300 mg/day each week:    -First week: 300 mg - 300 mg-600 mg    -Second week: 600 mg-300 mg - 600 mg    -Third week and beyond: 600 mg 3 times daily  -Should above gabapentin dose proved to be insufficient, could increase gabapentin dose further  -Patient to follow-up approximately 2 weeks after lumbar puncture to discuss laboratory results    Patient has been seen with Dr. Gore who agrees with my assessment and plan.    Rebecca Morse MD  PGY-4 Neurology      ------------------------------------------------------------------------------------------------------------    Chief Complaint: pain      HPI:   Patient is a 60-year-old man who presents in consultation for neuropathic pain.      Prior to COVID-19 infection in April 2021, patient reports that he was healthy with no ongoing medical diseases.  Around the time of COVID-19 infection, he noticed that he was numb in the back below the belt line down to his posterior thighs as he was recovering on the couch at home.  This numbness diminished a little bit never disappeared over the subsequent weeks of recovery.  He also underwent cholecystectomy 5/13/2021.  In mid June, he developed constipation and urinary difficulty around the same time.  In late June, he developed burning/stinging sensation of his buttocks and associated with urination.  There was also reports of a rash shortly thereafter with various opinions regarding whether or not the rash represented zoster.    He was seen in consultation at [facility] neurological [facility] in July 2021 in consultation for bilateral posterior leg pain, urinary hesitancy/obstruction, skin rash.  He had reported itchiness of bilateral groin, buttocks, and posterior legs down to the level of the knees.  At time of follow-up September 2021, MRI was reportedly unremarkable.  Pain continues to be described as burning and throbbing and had required multiple visits to the emergency department for pain management.  There was  discussion that there was no clear cause of bowel or bladder difficulties found on MRI, and also discussion of compression of the left L4 nerve root.  Pain management was reportedly provided by [facility].    Since that time, he reports that the distribution of the pain and numbness has not changed.  Continues to extend from scrotum anteriorly to below the belt line posteriorly to just past midline posterior buttocks to down the posterior thighs almost to the level of knees.    Prior negative laboratory studies included vitamin B12, folate, syphilis, HIV.  He was diagnosed with anal fissure by gastroenterology services.  He was also diagnosed with perianal fungal infection.  Colonoscopy was reportedly normal.  He has been treated with multiple courses of antibiotics due to urinary tract infections in the context of need for chronic Walden catheter and additional antibiotics for presumed prostatitis.    Patient had been prescribed opioid medications due to pain, which he reports were not helpful.  A prescription for trazodone was recently written and he has not yet started this medication.  He has been taking gabapentin 300 mg 3 times daily with mild benefit to his neuropathic pain.  He experiences fatigue as a side effect of gabapentin.      Past Medical History:   Diagnosis Date     Asthma      Past Surgical History:   Procedure Laterality Date     ENT SURGERY      tonsillectomy     GENITOURINARY SURGERY      TURP     ORTHOPEDIC SURGERY      wrist surgery, carpal tunnel 2005       Current Outpatient Medications:      acetaminophen (TYLENOL) 325 MG tablet, Take 3 tablets (975 mg) by mouth every 4 hours as needed for mild pain or pain, Disp: , Rfl:      amLODIPine (NORVASC) 10 MG tablet, Take 10 mg by mouth daily, Disp: , Rfl:      clotrimazole (LOTRIMIN) 1 % external cream, Apply 1 applicator topically 2 times daily Rectal area, Disp: , Rfl:      gabapentin (NEURONTIN) 300 MG capsule, Take 2 capsules (600 mg) by mouth 3  "times daily, Disp: 180 capsule, Rfl: 11     hydrALAZINE (APRESOLINE) 25 MG tablet, Take 25 mg by mouth 2 times daily as needed, Disp: , Rfl:      HYDROcodone-acetaminophen (NORCO) 5-325 MG tablet, Take 1 tablet by mouth , Disp: , Rfl:      ibuprofen (ADVIL/MOTRIN) 600 MG tablet, Take 1 tablet (600 mg) by mouth 4 times daily, Disp: 56 tablet, Rfl: 0     NITRO-BID 2 % OINT ointment, Apply 1 each topically 2 times daily Rectal area, Disp: , Rfl:      polyethylene glycol (MIRALAX) 17 GM/Dose powder, Take 17 g by mouth daily, Disp: 510 g, Rfl: 0     traZODone (DESYREL) 50 MG tablet, Take 1-2 tablets ( mg) by mouth At Bedtime, Disp: 30 tablet, Rfl: 0     ciprofloxacin (CIPRO) 250 MG tablet, Take 1 tablet (250 mg) by mouth 2 times daily (Patient not taking: Reported on 10/28/2021), Disp: 14 tablet, Rfl: 0    Social History     Tobacco Use     Smoking status: Former Smoker     Quit date: 1999     Years since quittin.0     Smokeless tobacco: Never Used   Vaping Use     Vaping Use: Never used   Substance Use Topics     Alcohol use: No     Drug use: No         ROS: 10-point review of systems was negative except for as noted above.     Vital signs:      BP: 129/82 Pulse: 106   Resp: 16 SpO2: 97 %       Weight: 79.4 kg (175 lb)  Estimated body mass index is 30.04 kg/m  as calculated from the following:    Height as of 10/28/21: 1.626 m (5' 4\").    Weight as of this encounter: 79.4 kg (175 lb).      Examination:   -General: Appears to be in pain and changing positions during interview.  -HEENT: Head is normocephalic, atraumatic.   -Pulm: Normal work of breathing on RA.   -Abdomen: Non-distended.   -Musculoskeletal: No abnormalities noted on gross inspection.  Walden collection bag in place.  -Skin: Several 0.5 to 1 cm discolorations noted in a linear pattern on the posterior thigh resembling scar tissue    -Neurological:   --MS: Patient is alert, attentive, and oriented well. Speech is clear and fluent. Recall and " "remote memory intact.   --CNs: Pupils symmetric, round and reactive to light. Visual fields are full. Ocular motility is full without nystagmus. Facial sensation intact.  Facial expressions full and symmetric. Hearing intact to conversation. Palate elevation normal. Shoulder shrug is strong and symmetric. Tongue motions normal.   --Motor: Normal muscle tone and bulk. Muscle strength is 5/5 throughout upper and lower extremities.  --Reflexes: 2+ and symmetric at biceps, triceps, brachioradialis, patellae and Achilles. Toes are downgoing bilaterally.   --Sensory: Sensation in upper limbs intact and symmetric to light touch, and prick, vibration.  There is a well delineated area of decreased sensation to light touch and pinprick extending from slightly above the gluteal cleft, inferiorly to the posterior thighs, laterally to approximately residential to the hips, in approximately S2/S3 dermatomal distribution with presumed continued decreased sensation to S4/S5 distribution not directly tested.  --Coordination:Heel-shin and finger-nose-finger is intact. Negative Romberg.   --Gait: Stands with feet normally spaced. Gait is steady.  Able to perform toe walk, heel walk, and tandem gait without difficulty.      INVESTIGATIONS:  All available and relevant labs, imaging, and other procedures were reviewed with the patient at this visit. Those of particular significance are listed below:    Lumbar spine MRIs dated 10/16/2021, 7/26/2021, and 11/22/2011 were personally reviewed.  On close inspection, there is T2 and STIR hyperintensity in signal of the conus medullaris that is not present in the 2011 MRI imaging.  Per latest MRI report:  \"IMPRESSION:  1.  Mild to moderate lumbar spondylitic changes superimposed on mild developmental narrowing of the lumbar spinal canal.  2.  At L4-L5, there is a right paracentral slightly caudally directed disc extrusion that narrows the right lateral recess and causes mass effect on the " "descending/traversing right L5 nerve root.  3.  At L3-L4, there is a right paracentral disc protrusion superimposed on disc bulge with narrowing of the right lateral recess and mass effect on the descending/traversing right L4 nerve root.\"    "

## 2021-11-05 NOTE — TELEPHONE ENCOUNTER
I called pt back. I let him know I had one of our schedulers help set up his covid test Tuesday 11/9 at [place] at 10am. He has lab work and LP on Thursday 11/11 at the [facility] arrive at 11am.     Olamide MORALES

## 2021-11-09 ENCOUNTER — LAB (OUTPATIENT)
Dept: LAB | Facility: CLINIC | Age: 61
End: 2021-11-09
Payer: COMMERCIAL

## 2021-11-09 ENCOUNTER — HOSPITAL ENCOUNTER (OUTPATIENT)
Dept: PHYSICAL THERAPY | Facility: CLINIC | Age: 61
Setting detail: THERAPIES SERIES
End: 2021-11-09
Attending: HOSPITALIST
Payer: COMMERCIAL

## 2021-11-09 ENCOUNTER — TELEPHONE (OUTPATIENT)
Dept: UROLOGY | Facility: CLINIC | Age: 61
End: 2021-11-09

## 2021-11-09 DIAGNOSIS — Z11.59 ENCOUNTER FOR SCREENING FOR OTHER VIRAL DISEASES: ICD-10-CM

## 2021-11-09 DIAGNOSIS — R90.89 ABNORMAL IMAGING OF CENTRAL NERVOUS SYSTEM: ICD-10-CM

## 2021-11-09 LAB — SARS-COV-2 RNA RESP QL NAA+PROBE: NEGATIVE

## 2021-11-09 PROCEDURE — 97162 PT EVAL MOD COMPLEX 30 MIN: CPT | Mod: GP | Performed by: PHYSICAL THERAPIST

## 2021-11-09 PROCEDURE — 97110 THERAPEUTIC EXERCISES: CPT | Mod: GP,59 | Performed by: PHYSICAL THERAPIST

## 2021-11-09 PROCEDURE — U0005 INFEC AGEN DETEC AMPLI PROBE: HCPCS

## 2021-11-09 PROCEDURE — 90912 BFB TRAINING 1ST 15 MIN: CPT | Mod: GP | Performed by: PHYSICAL THERAPIST

## 2021-11-09 PROCEDURE — U0003 INFECTIOUS AGENT DETECTION BY NUCLEIC ACID (DNA OR RNA); SEVERE ACUTE RESPIRATORY SYNDROME CORONAVIRUS 2 (SARS-COV-2) (CORONAVIRUS DISEASE [COVID-19]), AMPLIFIED PROBE TECHNIQUE, MAKING USE OF HIGH THROUGHPUT TECHNOLOGIES AS DESCRIBED BY CMS-2020-01-R: HCPCS

## 2021-11-09 NOTE — TELEPHONE ENCOUNTER
Reason for Call:  Other     Detailed comments: Patient presented to reception desk:  Just wanted to pass along to Dr. Oneill that he is going in this Thursday 11/11/2021 for a draining of some spinal fluid.  They think that this may be causing some of his issues.  He is still planning on keeping his appt. with Dr. Oneill on Tuesday 11/16/2021 at 1p.m. in person     Phone Number Patient can be reached at: Home number on file [phone] (home)    Best Time:     Can we leave a detailed message on this number? Not Applicable    Call taken on 11/9/2021 at 10:35 AM by Chuck Kathleen

## 2021-11-10 ENCOUNTER — PATIENT OUTREACH (OUTPATIENT)
Dept: NURSING | Facility: CLINIC | Age: 61
End: 2021-11-10
Payer: COMMERCIAL

## 2021-11-10 ENCOUNTER — PATIENT OUTREACH (OUTPATIENT)
Dept: CARE COORDINATION | Facility: CLINIC | Age: 61
End: 2021-11-10
Payer: COMMERCIAL

## 2021-11-10 DIAGNOSIS — R33.9 URINARY RETENTION: Primary | ICD-10-CM

## 2021-11-10 ASSESSMENT — ACTIVITIES OF DAILY LIVING (ADL): DEPENDENT_IADLS:: TRANSPORTATION

## 2021-11-10 NOTE — PROGRESS NOTES
Clinic Care Coordination Contact  [facility]/Voicemail    Clinical Data: Care Coordination  Patient left message on CHW's voicemail with call back information and requested return call from [facility].  Patient stated he needed help addressing some forms that he needs his doctor to provide to short term disability ASAP.   Plan: CHW will route encounter to RN CC and [facility] to discuss patient's request and determine any necessary delegations for CHW.     Megha GUO  Community Health Worker  [facility] Care Coordination  [facility]  [email or web address]   Office: [phone]

## 2021-11-10 NOTE — PROGRESS NOTES
"Physical Therapy Initial Pelvic Floor Muscle Evaluation     11/09/21 1000   General Information   Type of Visit Initial OP Ortho PT Evaluation   Start of Care Date 11/09/21   Referring Physician Alex Hunter MD   Patient/Family Goals Statement Get rid of catheter, reduce burning and numbness in the buttock   Orders Evaluate and Treat   Date of Order 10/13/21   Certification Required? No   Medical Diagnosis Perineal pain in male   Surgical/Medical history reviewed Yes   Precautions/Limitations no known precautions/limitations   General Information Comments PMHx: LBP, lipidemia, asthma, dislipidemia, anal fissure, TURP at age 38, \"urolift\" surgery 2 yrs ago   Body Part(s)   Body Part(s) Pelvic Floor Dysfunction   Presentation and Etiology   Pertinent history of current problem (include personal factors and/or comorbidities that impact the POC) Pt states he had Covid on 4/1/21.  Felt from lying supine for so long his \"butt went numb\" and \"everything has gone wrong since then.\"  Pt states has had trouble with urination for many years (thinks 20 yrs).  Most recently had inability to urinate on 7/4/21, and came in to ER and was catheterized ([facility]).  Pt then had cath pulled 3-4 wks later, unable to urinate within that first 12 hours and then had cath placed again.  Pt was hospitalized with inability to go, and severe pain.  Since 10/1/21, pt has been to ED 5x for pain and inability to void and try to pull catheter.   Currently pt still has catheter in and expects to have it removed as a trial on 11/16/21.  Feels he always has an infection \"inside.\"    Impairments A. Pain;C. Swelling;D. Decreased ROM;E. Decreased flexibility;F. Decreased strength and endurance;H. Impaired gait;J. Burning;K. Numbness;L. Tingling;P. Bowel or bladder problems   Functional Limitations perform activities of daily living;perform required work activities;perform desired leisure / sports activities   Symptom Location Pelvic Floor Muscle " "  How/Where did it occur From insidious onset   Onset date of current episode/exacerbation 07/04/21   Chronicity Chronic   Best (/10) 10   Worst (/10) 10   Pain quality B. Dull;C. Aching;D. Burning;E. Shooting;F. Stabbing   Frequency of pain/symptoms A. Constant   Pain/symptoms are: The same all the time   Pain/symptoms exacerbated by A. Sitting;C. Lifting;G. Certain positions;I. Bending   Pain/symptoms eased by D. Nothing;K. Other   Pain eased by comment When catheter is removed pain is better   Progression of symptoms since onset: Worsened   Prior Level of Function   Functional Level Prior Comment Prior to 7/4/21, \"things weren't good\" but he did not require catheter to go.    Current Level of Function   Current Community Support Family/friend caregiver   Patient role/employment history A. Employed   Employment Comments  ([employer]) - until Jan 9, 2022   Living environment [place]   Fall Risk Screen   Fall screen completed by PT   Have you fallen 2 or more times in the past year? No   Have you fallen and had an injury in the past year? No   Is patient a fall risk? No   Pelvic Floor Dysfunction Questions   Regular exercise Yes  (1/2 mile daily)   Fluid intake-glasses/day (one glass/cup = 8oz Water = 160oz/day, Powerade = 32oz/day   Caffeinated beverages-glasses/day none   Alcoholic beverages - glasses/day none   Recent diet change? No  (lost 40 lbs since problems began)   Do you have the sensation that you need to go to the toilet?  Yes  (even with catheter; less urges for BMs is \"getting clogged.\")   Number of bladder infections last year?  Non-stop with burning!!!   Frequency of bowel movements:  Every other day- random; various consistencies   Do you ignore the urge to defecate?  No   Pelvic Floor Dysfunction Objective Findings   Pain-pelvic dysfunction Testicular pain;Pelvic pain   Observation Pain behaviors demonstrated; sits carefully, and is very aware of presence of his catheter. " " \"It hurts all the time.\"    Posture WFL, mildly shows general flexed pain posture.    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  Pain with any activation of PFM today.  Will more formally assess when catheter is out.    Medications Other   Pelvic (R) > (L) at superficial trans perineum and medial to ischial tuberosity   Testicular (R) > (L), tenderness laterally and at spermatic cord   Pad Depends \"sometimes\" d/t leaking around catheter (?)   Number of pads 1   Medication comment Pt states he was on \"a few drugs for my bladder and it was so painful so I stopped taking all of them and now I am not getting the 'attacks' that sent me into the hospital.\"    Planned Therapy Interventions   Planned Therapy Interventions joint mobilization;manual therapy;motor coordination training;neuromuscular re-education;strengthening;stretching   Planned Modality Interventions   Planned Modality Interventions Biofeedback;Cryotherapy;Electrical stimulation;Hot packs;TENS   Clinical Impression   Criteria for Skilled Therapeutic Interventions Met yes, treatment indicated   PT Diagnosis Impaired function at the PFM   Influenced by the following impairments currently has catheter in place, pain, poor knowledge of functional use of the PFM, possible tension, possible weakness; difficult to fully assess PFM with catheter in place.    Functional limitations due to impairments painful urination, painful BMs, constipation, urinary incontinence around catheter   Clinical Presentation Unstable/Unpredictable   Clinical Presentation Rationale longstanding h/o urinary and pelvic pain issues; unpredictable \"attacks\" of pain; multiple comorbidities   Clinical " Decision Making (Complexity) Moderate complexity   Therapy Frequency 1 time/week   Predicted Duration of Therapy Intervention (days/wks) 8 weeks for up to 8 sessions   Risk & Benefits of therapy have been explained Yes   Patient, Family & other staff in agreement with plan of care Yes   Clinical Impression Comments Pt presents known to this PT d/t recent hospitalization (10/6/21) where he was incapacitated with pelvic pain, unable to urinate and unable to have BMs.  PT was called to his bedside to assist in calming his PFM.  Pt currently is home and has had tried to have catheter removed, but then will not be able to urinate within 12 hours and need catheter replaced.  Pt could benefit from skilled PT to improve control of PFM to urinate once catheter is removed.    Education Assessment   Preferred Learning Style Listening;Reading;Demonstration;Pictures/video   Barriers to Learning No barriers   Ortho Goal 1   Goal Identifier STG   Goal Description 1)Pt will be able to urinate and/or self-cath to empty bladder upon removal of catheter in 1 week.    Target Date 11/16/21   Ortho Goal 2   Goal Identifier STG   Goal Description 2)Pt will routinely empty bladder every 2 hours to avoid need for replacement of catheter in 2 weeks.    Target Date 11/23/21   Ortho Goal 3   Goal Identifier LTG   Goal Description 3)Pt will report void times without pain and without need to cath, every 2 hours, in 4 weeks.    Target Date 12/07/21   Ortho Goal 4   Goal Identifier LTG   Goal Description 4)Pt will report BSC Type 4 daily in 8 weeks.    Target Date 01/04/22   Ortho Goal 5   Goal Identifier LTG   Goal Description 5)Pt will be indep in HEP to prevent need for catheters, in 8 weeks.    Target Date 01/04/22   Total Evaluation Time   PT Eval, Moderate Complexity Minutes (98241) 30   Thank you for the referral of this patient.  Val Molina, PT, MA  #[id number]

## 2021-11-10 NOTE — PROGRESS NOTES
Clinic Care Coordination Contact    Follow Up Progress Note   10/16/2021 ED visit Urinary retention      Assessment:    CC RN informed him the disability forms were sent Nov 2     Patient is stressed out because his short term disability ended Jul 9      Just received a termination letter from work and [employer] information .     I gave him the Senior linkage line information to apply for new insurance     Patient is scheduled for a lumbar spine procedure tomorrow     Leon catheter draining well and Urology appointment 11/16 and hope to have the leon removed     CC RN  will follow up with him next week     Care Gaps:    Health Maintenance Due   Topic Date Due     PREVENTIVE CARE VISIT  Never done     URINE DRUG SCREEN  Never done     ASTHMA ACTION PLAN  Never done     ADVANCE CARE PLANNING  Never done     Pneumococcal Vaccine: Pediatrics (0 to 5 Years) and At-Risk Patients (6 to 64 Years) (1 of 2 - PPSV23) Never done     COVID-19 Vaccine (1) Never done     HIV SCREENING  Never done     HEPATITIS C SCREENING  Never done     LIPID  Never done     ZOSTER IMMUNIZATION (1 of 2) Never done     INFLUENZA VACCINE (1) Never done       Care Gap Goal set: Yes    Goals addressed this encounter:   Goals Addressed                    This Visit's Progress       Medical (pt-stated)   0%      Goal Statement #2: I will complete the Health Maintenance due in the next 1-3 months   Health Maintenance Due   Topic Date Due     PREVENTIVE CARE VISIT  Never done     ASTHMA ACTION PLAN  Never done     ADVANCE CARE PLANNING  Never done     Pneumococcal Vaccine: Pediatrics (0 to 5 Years) and At-Risk Patients (6 to 64 Years) (1 of 2 - PPSV23) Never done     COVID-19 Vaccine (1) Never done     HIV SCREENING  Never done     HEPATITIS C SCREENING  Never done     LIPID  Never done     ZOSTER IMMUNIZATION (1 of 2) Never done     INFLUENZA VACCINE (1) Never done      Date Goal set: 11/2/2021  Barriers: None identified   Strengths: agrees with the plan    Date to Achieve By: 2/2/2022  Patient expressed understanding of goal: Yes  Action steps to achieve this goal:  1. I will discuss with my provider at a future visit            Pain Management (pt-stated)   40%      Goal Statement #1: I will have my leon catheter removed 11/16 at Urology visit and cI will be able to urinate on my own .    I will decrease bilateral buttocks and thigh pain in the next 1-2 months   Date Goal set: 11/2/2021  Barriers: constant pain   Strengths: Motivated   Date to Achieve By: 2/2/2022  Patient expressed understanding of goal: Yes  Action steps to achieve this goal:  1. I will keep future Primary Care ,Urology, Pain Clinic appointments and will make a future Neurology appointment   2. I will drink plenty of water to keep my urine clear and yellow  3. I will take Tylenol/Ibuprofen and Gabapentin as directed   4. I will walk a block when able to increase strength               Intervention/Education provided during outreach: CC contact information given for any future needs           Plan:   Patient will call the Senior Linkage line [phone] to apply for new insurance   Care Coordinator will follow up in 3-5 business days     [facility]   Omaira Daigle RN, Care Coordinator   [facility]'s   E-mail [email or web address]   [phone]

## 2021-11-11 ENCOUNTER — NURSE TRIAGE (OUTPATIENT)
Dept: NURSING | Facility: CLINIC | Age: 61
End: 2021-11-11

## 2021-11-11 ENCOUNTER — ANCILLARY PROCEDURE (OUTPATIENT)
Dept: GENERAL RADIOLOGY | Facility: CLINIC | Age: 61
End: 2021-11-11
Attending: PSYCHIATRY & NEUROLOGY
Payer: COMMERCIAL

## 2021-11-11 ENCOUNTER — LAB (OUTPATIENT)
Dept: LAB | Facility: CLINIC | Age: 61
End: 2021-11-11

## 2021-11-11 VITALS
TEMPERATURE: 98.5 F | HEART RATE: 95 BPM | SYSTOLIC BLOOD PRESSURE: 136 MMHG | OXYGEN SATURATION: 95 % | DIASTOLIC BLOOD PRESSURE: 89 MMHG

## 2021-11-11 DIAGNOSIS — M79.2: ICD-10-CM

## 2021-11-11 DIAGNOSIS — Z01.812 PRE-PROCEDURE LAB EXAM: Primary | ICD-10-CM

## 2021-11-11 DIAGNOSIS — R90.89 ABNORMAL IMAGING OF CENTRAL NERVOUS SYSTEM: ICD-10-CM

## 2021-11-11 DIAGNOSIS — Z01.812 PRE-PROCEDURE LAB EXAM: ICD-10-CM

## 2021-11-11 LAB
APPEARANCE CSF: CLEAR
COLOR CSF: COLORLESS
ERYTHROCYTE [DISTWIDTH] IN BLOOD BY AUTOMATED COUNT: 11.9 % (ref 10–15)
GLUCOSE CSF-MCNC: 64 MG/DL (ref 40–70)
HCT VFR BLD AUTO: 42.7 % (ref 40–53)
HGB BLD-MCNC: 14.2 G/DL (ref 13.3–17.7)
INR PPP: 0.92 (ref 0.85–1.15)
MCH RBC QN AUTO: 30 PG (ref 26.5–33)
MCHC RBC AUTO-ENTMCNC: 33.3 G/DL (ref 31.5–36.5)
MCV RBC AUTO: 90 FL (ref 78–100)
PLATELET # BLD AUTO: 347 10E3/UL (ref 150–450)
PROT CSF-MCNC: 36 MG/DL (ref 15–60)
RBC # BLD AUTO: 4.73 10E6/UL (ref 4.4–5.9)
RBC # CSF MANUAL: 21 /UL (ref 0–2)
TUBE # CSF: 3
WBC # BLD AUTO: 8.1 10E3/UL (ref 4–11)
WBC # CSF MANUAL: 0 /UL (ref 0–5)

## 2021-11-11 PROCEDURE — 85610 PROTHROMBIN TIME: CPT | Performed by: PATHOLOGY

## 2021-11-11 PROCEDURE — 88184 FLOWCYTOMETRY/ TC 1 MARKER: CPT | Performed by: PATHOLOGY

## 2021-11-11 PROCEDURE — 87529 HSV DNA AMP PROBE: CPT | Performed by: PSYCHIATRY & NEUROLOGY

## 2021-11-11 PROCEDURE — 88108 CYTOPATH CONCENTRATE TECH: CPT | Mod: 26 | Performed by: PATHOLOGY

## 2021-11-11 PROCEDURE — 87798 DETECT AGENT NOS DNA AMP: CPT | Performed by: PSYCHIATRY & NEUROLOGY

## 2021-11-11 PROCEDURE — 36415 COLL VENOUS BLD VENIPUNCTURE: CPT | Performed by: PATHOLOGY

## 2021-11-11 PROCEDURE — 88188 FLOWCYTOMETRY/READ 9-15: CPT | Performed by: PATHOLOGY

## 2021-11-11 PROCEDURE — 85027 COMPLETE CBC AUTOMATED: CPT | Performed by: PATHOLOGY

## 2021-11-11 PROCEDURE — 88108 CYTOPATH CONCENTRATE TECH: CPT | Mod: TC,XU

## 2021-11-11 PROCEDURE — 89050 BODY FLUID CELL COUNT: CPT | Performed by: PSYCHIATRY & NEUROLOGY

## 2021-11-11 PROCEDURE — 84157 ASSAY OF PROTEIN OTHER: CPT | Performed by: PSYCHIATRY & NEUROLOGY

## 2021-11-11 PROCEDURE — 88184 FLOWCYTOMETRY/ TC 1 MARKER: CPT | Performed by: PSYCHIATRY & NEUROLOGY

## 2021-11-11 PROCEDURE — 99000 SPECIMEN HANDLING OFFICE-LAB: CPT | Performed by: PATHOLOGY

## 2021-11-11 PROCEDURE — 88185 FLOWCYTOMETRY/TC ADD-ON: CPT | Performed by: PSYCHIATRY & NEUROLOGY

## 2021-11-11 PROCEDURE — 62328 DX LMBR SPI PNXR W/FLUOR/CT: CPT | Performed by: PHYSICIAN ASSISTANT

## 2021-11-11 PROCEDURE — 84999 UNLISTED CHEMISTRY PROCEDURE: CPT | Performed by: PSYCHIATRY & NEUROLOGY

## 2021-11-11 PROCEDURE — 82945 GLUCOSE OTHER FLUID: CPT | Performed by: PSYCHIATRY & NEUROLOGY

## 2021-11-11 RX ORDER — LIDOCAINE HYDROCHLORIDE 10 MG/ML
30 INJECTION, SOLUTION EPIDURAL; INFILTRATION; INTRACAUDAL; PERINEURAL ONCE
Status: COMPLETED | OUTPATIENT
Start: 2021-11-11 | End: 2021-11-11

## 2021-11-11 RX ADMIN — LIDOCAINE HYDROCHLORIDE 5 ML: 10 INJECTION, SOLUTION EPIDURAL; INFILTRATION; INTRACAUDAL; PERINEURAL at 12:13

## 2021-11-11 NOTE — TELEPHONE ENCOUNTER
Pt reporting, his urine was clear this morning when woke.    Then the Pt got up.  Did something's around the house.   Then went for a walk and after going for a walk.    Pt has noticed a bunch of blood in his urine bag after he came for a walk.     Pt reporting, increased pain in the groin area.    No fever symptoms noted.    Pt is having a lumbar infusion today at noon.       So Pt would like to know what is causing the increased bleeding after activity.     It seems like this is an on going thing with blood in his urine bag after he does a lot of activity and groin pain.     It feels like Pt has an infection all the time.      Please call Pt back @ [phone] for further assistance.      Valerie Krause RN  Central Triage Red Flags/Med Refills          Reason for Disposition    Bloody or red-colored urine and no recent prostate or bladder surgery (Exception: brief episode and urine now clear)    Additional Information    Negative: Shock suspected (e.g., cold/pale/clammy skin, too weak to stand, low BP, rapid pulse)    Negative: Sounds like a life-threatening emergency to the triager    Negative: Catheter was accidentally pulled-out and bright red continuous bleeding    Negative: SEVERE abdominal pain    Negative: Fever > 100.4 F (38.0 C)    Negative: Drinking very little and dehydration suspected (e.g., no urine > 12 hours, very dry mouth, very lightheaded)    Negative: Patient sounds very sick or weak to the triager    Negative: Catheter was accidentally pulled-out    Negative: Bleeding around catheter (e.g., from penis or female urethra)    Negative: Lower abdominal pain or distention    Negative: Tea-colored or slightly red urine lasts > 24 hours that is not cleared by increasing fluid intake, and no recent prostate or bladder surgery    Negative: Cloudy urine lasts > 24 hours and not cleared by increasing fluid intake    Protocols used: URINARY CATHETER SYMPTOMS AND [id number]

## 2021-11-11 NOTE — TELEPHONE ENCOUNTER
Discussed a long walk can cause irritation friction in bladder when Wal[place] in place.  Reassurance given and advised to drink plenty of water when able (NPO for procedure) to flush and lubricate the bladder wall.

## 2021-11-11 NOTE — TELEPHONE ENCOUNTER
Pt reporting, increased blood in his catheter after doing somethings around the house and going for a walk.  His urine was clear this morning after he woke up.  But now it looks like really dark beer.  Increase burning and pain in the penis an groin area.    No fever symptoms noted.    Pt is having a lumbar infusion today at noon.    Does not want to miss that appointment.  But would like a call back from the clinic regarding the increase blood in his urine.   Please call the Pt back @ [phone] for further assistance.   Thank you

## 2021-11-11 NOTE — PROGRESS NOTES
Interventional Radiology Brief Post Procedure Note    Procedure: LP with opening pressure    Proceduralist: Nahed Baez PA-C    Assistant: None    Time Out: Prior to the start of the procedure and with procedural staff participation, I verbally confirmed the patient s identity using two indicators, relevant allergies, that the procedure was appropriate and matched the consent or emergent situation, and that the correct equipment/implants were available. Immediately prior to starting the procedure I conducted the Time Out with the procedural staff and re-confirmed the patient s name, procedure, and site/side. (The Joint Commission universal protocol was followed.)  Yes    Medications   Medication Event Details Admin User Admin Time       Sedation: None. Local Anesthestic used    Findings: Fluoroscopic guided lumbar puncture with opening pressure of 12. A total of 10 mL collected.    Estimated Blood Loss: Minimal    Fluoroscopy Time:  minute(s)    SPECIMENS: None    Complications: 1. None     Condition: Stable    Plan: Follow up per primary    Comments: See dictated procedure note for full details.    Nahed Baez PA-C

## 2021-11-12 LAB
HSV1 DNA CSF QL NAA+PROBE: NOT DETECTED
HSV2 DNA CSF QL NAA+PROBE: NOT DETECTED
PATH REPORT.COMMENTS IMP SPEC: NORMAL
PATH REPORT.FINAL DX SPEC: NORMAL
PATH REPORT.MICROSCOPIC SPEC OTHER STN: NORMAL
PATH REPORT.RELEVANT HX SPEC: NORMAL
VZV DNA SPEC QL NAA+PROBE: NEGATIVE

## 2021-11-13 LAB
PATH REPORT.COMMENTS IMP SPEC: NORMAL
PATH REPORT.FINAL DX SPEC: NORMAL
PATH REPORT.GROSS SPEC: NORMAL
PATH REPORT.MICROSCOPIC SPEC OTHER STN: NORMAL
PATH REPORT.RELEVANT HX SPEC: NORMAL

## 2021-11-15 ENCOUNTER — OFFICE VISIT (OUTPATIENT)
Dept: PALLIATIVE MEDICINE | Facility: CLINIC | Age: 61
End: 2021-11-15
Payer: COMMERCIAL

## 2021-11-15 VITALS — DIASTOLIC BLOOD PRESSURE: 77 MMHG | SYSTOLIC BLOOD PRESSURE: 123 MMHG | HEART RATE: 103 BPM

## 2021-11-15 DIAGNOSIS — N48.89 PENILE PAIN: ICD-10-CM

## 2021-11-15 DIAGNOSIS — R10.2 PERINEAL PAIN IN MALE: ICD-10-CM

## 2021-11-15 DIAGNOSIS — M54.16 LUMBAR RADICULOPATHY: ICD-10-CM

## 2021-11-15 DIAGNOSIS — M79.18 MYOFASCIAL MUSCLE PAIN: Primary | ICD-10-CM

## 2021-11-15 DIAGNOSIS — R52 UNCONTROLLED PAIN: ICD-10-CM

## 2021-11-15 DIAGNOSIS — K62.89 RECTAL PAIN: ICD-10-CM

## 2021-11-15 DIAGNOSIS — M79.2 NEUROPATHIC PAIN: ICD-10-CM

## 2021-11-15 PROCEDURE — 99205 OFFICE O/P NEW HI 60 MIN: CPT | Performed by: PAIN MEDICINE

## 2021-11-15 ASSESSMENT — PAIN SCALES - GENERAL: PAINLEVEL: WORST PAIN (10)

## 2021-11-15 NOTE — PROGRESS NOTES
[facility] Consultation    Date of visit: 11/14/2021    Reason for consultation:    Primary Care Provider is Viki Hanson.  Pain medications are being prescribed by n/a.    Please see the [facility] health questionnaire which the patient completed and reviewed with me in detail.    Chief Complaint:    Chief Complaint   Patient presents with     Pain     MME prescribed prior to seeing patient:  Current MME:    Pain history:  Currently being seen by neurology. Suggested further titration of gabapentin for peripheral  neuropathy. Recent LP  Seeing ortho  Seeing urology   Saw neuro surg- did notfeel is was radic in origin . Hx of L5-S1 ant fusion   Different than prior lumbar symptoms.  Reports some urinary retention/bowel- having leon possibly pulled this wk. Has scope planned   Mustapha Negrete is a 60 year old male who first started having problems with pain 4/21  Symptoms started around the time of his COVID-19 infection.  Burning stinging in his posterior thigh and buttocks    Of note has a hx L5-S1 ant fusion 11yr ago  The pain was only on the right side  Pain radiated to his LE   The pt had surgery with essentially complete resolution of his symptoms   The pt feels his current pain is different    Currently  Possible inciting event covid infection in April   The pt did have a rash that he felt was consistent with shingels first on the right than on the left.  The never had shingels in the past  The pt saw derm who did not feel it was shingels    The pt has burning/numbness over his post thigh/rectum/testicles/penis  Of note worse pain is over his coccyx and in his rectum     The symp are worse on the Left>R  The pain is constant  The pain varies in severity   The pain varies in nature  The pain is sharp   The pain is hot  The pain isburning   Denies any symptoms below his knees   The pain is worse with prolonged in activity/being in one position  Worse with prolonged  walking     The pt notes some benefit with changing position  Maybe some benefit with massage    Of note having some bleeding from the catheter     Denies incontinence  Does have retention     The pt not sleeping well- but is sleeping better than before    Pain sig affects his quality of life    Improvement with gabapentin - slowly titrating up dose. Still struggling with am dose 2/2 to sedation     Improvement with a few tabs of steroids- did not have full medrol dose pack       Pain rating: intensity  Averages 10/10 on a 0-10 scale.    Current treatments include:  Mikaela pentin 300mg, 600mg, 600mg    Previous medication treatments included:   cymbalta   voltaren gel     Other treatments have included:  Mustapha Negrete has not been seen at a pain clinic in the past.    PT: no  Chiropractic: n  Acupuncture: n  TENs Unit: n  Injections:   Pt reports     Past Medical History:  Past Medical History:   Diagnosis Date     Asthma      Past Surgical History:  Past Surgical History:   Procedure Laterality Date     ENT SURGERY      tonsillectomy     GENITOURINARY SURGERY      TURP     ORTHOPEDIC SURGERY      wrist surgery, carpal tunnel 2005     Medications:  Current Outpatient Medications   Medication Sig Dispense Refill     acetaminophen (TYLENOL) 325 MG tablet Take 3 tablets (975 mg) by mouth every 4 hours as needed for mild pain or pain       amLODIPine (NORVASC) 10 MG tablet Take 10 mg by mouth daily       clotrimazole (LOTRIMIN) 1 % external cream Apply 1 applicator topically 2 times daily Rectal area       gabapentin (NEURONTIN) 300 MG capsule Take 2 capsules (600 mg) by mouth 3 times daily 180 capsule 11     hydrALAZINE (APRESOLINE) 25 MG tablet Take 25 mg by mouth 2 times daily as needed       ibuprofen (ADVIL/MOTRIN) 600 MG tablet Take 1 tablet (600 mg) by mouth 4 times daily 56 tablet 0     NITRO-BID 2 % OINT ointment Apply 1 each topically 2 times daily Rectal area       polyethylene glycol (MIRALAX) 17 GM/Dose powder  Take 17 g by mouth daily 510 g 0     ciprofloxacin (CIPRO) 250 MG tablet Take 1 tablet (250 mg) by mouth 2 times daily (Patient not taking: Reported on 10/28/2021) 14 tablet 0     HYDROcodone-acetaminophen (NORCO) 5-325 MG tablet Take 1 tablet by mouth  (Patient not taking: Reported on 11/15/2021)       traZODone (DESYREL) 50 MG tablet Take 1-2 tablets ( mg) by mouth At Bedtime (Patient not taking: Reported on 11/15/2021) 30 tablet 0     Allergies:     Allergies   Allergen Reactions     Droperidol Other (See Comments)     Extrapyramidal Side Effect     Fenofibrate Headache and Diarrhea              Fentanyl      Other reaction(s): N&V hard to wake up     Keflex [Cephalexin] Itching     Lactose GI Disturbance            Midazolam      Other reaction(s): N&V, Hard to wake up     Monosodium Glutamate      Other reaction(s): diarrhea, headaches     Pcn [Penicillins] Other (See Comments)     Feels warm and flushed, but tolerates Cephalexin     Atorvastatin Palpitations     Sertraline Palpitations            Simvastatin Palpitations     Sulfa Drugs Headache and Rash     Burning    Other reaction(s): Rash     Tetracycline Rash     Burning       Social History:     uHistory of chemical dependency treatment: no    Family history:  Family History   Problem Relation Age of Onset     Cerebrovascular Disease Mother      Hypertension Mother      Diabetes Mother      Cancer Father 84        liver cancer     Prostate Cancer Father      Family history of headaches:   n    Review of Systems:  Skin: negative  Eyes: negative  Ears/Nose/Throat: negative  Respiratory: No shortness of breath, dyspnea on exertion, cough, or hemoptysis  Cardiovascular: negative  Gastrointestinal: negative  Genitourinary: negative  Musculoskeletal: negative  Neurologic: negative  Psychiatric: negative  Hematologic/Lymphatic/Immunologic: negative  Endocrine: negative    Physical Exam:  Vitals:    11/15/21 1330   BP: 123/77   Pulse: 103      Exam:  Constitutional: healthy, alert and no distress  Head: normocephalic. Atraumatic.   Eyes: no redness or jaundice noted   card: Negative JVD  Respiratory: Speaking in full sentences no accessory muscles use   gastrointestinal: soft  Skin: no suspicious lesions or rashes  Psychiatric: mentation appears normal and affect normal/bright    Musculoskeletal exam:  Gait/Station/Posture: wnl had to get up multiple times during apt  Cervical spine: ROMwnl       Thoracic spine:  Normal     Lumbar spine:     ROM: wnl   Myofascial tenderness:  M+++   Joshi's: ++               Straight leg exam: neg on the left   HUMPHREY/FADIR: mild              SI: neg   Greater trochanteric bursa: neg  Neurologic exam:    Motor:  5/5 UE and LE strength  Reflexes:       Achilles:  +2    Sensory:  (upper and lower extremities):   Light touch: normal    Allodynia: absent    Dysethesia: ++++   Hyperalgesia: absent     Diagnostic tests:       T12-L1: Normal disc signal and disc space height. No focal disc herniation. Normal facets. No spinal canal stenosis or neural foraminal narrowing.      L1-L2: Disc desiccation and minimal loss of disc space height. Left central disc protrusion. Normal facets. No spinal canal stenosis or neural foraminal narrowing.     L2-L3: Disc desiccation and mild loss of disc space height. Mild circumferential disc bulge. Normal facets. No spinal canal stenosis or neural foraminal narrowing.      L3-L4: Disc desiccation and mild loss of disc space height. Circumferential disc bulge with superimposed right paracentral disc protrusion with narrowing of the right lateral recess and apparent mass effect on the descending/proximal traversing right L4   nerve root. Mild spinal canal stenosis. Mild bilateral neural foraminal stenosis.     L4-L5: Disc desiccation and moderate loss of disc space height. Circumferential disc bulge with superimposed right paracentral slightly caudally directed disc extrusion. There is narrowing  of the right lateral recess and apparent mass effect on the   descending/proximal traversing right L5 nerve root. Severe degenerative facet changes. Mild spinal canal stenosis. Mild to moderate bilateral neural foraminal stenosis.     L5-S1: Partial fusion across the disc space. Mild circumferential disc interbody spurring. Mild facet arthropathy. No spinal canal stenosis. No right neural foraminal stenosis. Mild left neural foraminal stenosis.                                                                      IMPRESSION:  1.  Mild to moderate lumbar spondylitic changes superimposed on mild developmental narrowing of the lumbar spinal canal.  2.  At L4-L5, there is a right paracentral slightly caudally directed disc extrusion that narrows the right lateral recess and causes mass effect on the descending/traversing right L5 nerve root.  3.  At L3-L4, there is a right paracentral disc protrusion superimposed on disc bulge with narrowing of the right lateral recess and mass effect on the descending/traversing right L4 nerve root.                                                                   IMPRESSION:   1. No change in slight retrolisthesis of L2 on L3 and anterior slip of  L4 4 L5.  2. Anterior fusion L5-S1 with ossification of the annulus.  3. Degenerative changes as noted above.    Impression:  Postsurgical changes of left total hip arthroplasty with metallic  susceptibility artifact partially compromising assessment.  1. No ischial tuberosity bursitis or collection on either side.   2. Suspect right anterosuperior acetabular labral tearing.          Assessment/Plan:  Mustapha Negrete is a 60 year old male who presents with the complaints of bilat l>r post thigh/rectum/testicles/penis. Currently most sig symp is his rectal pain.  Mustapha was seen today for pain.    Diagnoses and all orders for this visit:    Myofascial muscle pain    Uncontrolled pain  -     Pain Management Referral    Penile pain    Rectal  pain    Perineal pain in male    Lumbar radiculopathy    Neuropathic pain         - Further procedures recommended:     - consider Left pudendal nerve block(would be reasonable to discuss with urologist)     - consider ganglion impar block   - Medication Management:    - would agree with continued gradual titration of gabapentin    - consider addition of elavil instead of trazodone at night for both pain and sleep. (would also recommend discussing with urology as well)  - Physical Therapy: Continue  - Clinical Health Psychologist to address issues of relaxation, behavioral change, coping style, and other factors important to improvement: would strongly consider   - Diagnostic Studies: no  - Urine toxicology screen today: no   - Follow up:    - pending decision on how to proceed      The total TIME spent on this patient on the day of the appointment was 70 minutes.   Time spent preparing to see the patient (reviewing records and tests)  Time spend face to face with the patient  Time spent ordering tests, medications, procedures and referrals  Time spent Referring and communicating with other healthcare professionals  Documenting clinical information in Epic    Arian Prescott MD  [facility]  This note was created with voice recognition software, and while reviewed for accuracy, typos may remain.

## 2021-11-15 NOTE — PATIENT INSTRUCTIONS
- Further procedures recommended:     - consider Left pudendal nerve block(would be reasonable to discuss with urologist)     - consider ganglion impar block   - Medication Management:    - would agree with continued gradual titration of gabapentin    - consider addition of elavil instead of trazodone at night for both pain and sleep. (would also recommend discussing with urology as well)  - Physical Therapy: Continue  - Clinical Health Psychologist to address issues of relaxation, behavioral change, coping style, and other factors important to improvement: would strongly consider   - Diagnostic Studies: no  - Urine toxicology screen today: no   - Follow up:    - pending decision on how to proceed  ----------------------------------------------------------------  Clinic Number:  [phone]     Call with any questions about your care and for scheduling assistance.     Calls are returned Monday through Friday between 8 AM and 4:30 PM. We usually get back to you within 2 business days depending on the issue/request.    If we are prescribing your medications:    For opioid medication refills, call the clinic or send a [employer] message 7 days in advance.  Please include:    Name of requested medication    Name of the pharmacy.    For non-opioid medications, call your pharmacy directly to request a refill. Please allow 3-4 days to be processed.     Per [place] State Law:    All controlled substance prescriptions must be filled within 30 days of being written.      For those controlled substances allowing refills, pickup must occur within 30 days of last fill.      We believe regular attendance is key to your success in our program!      Any time you are unable to keep your appointment we ask that you call us at least 24 hours in advance to cancel.This will allow us to offer the appointment time to another patient.     Multiple missed appointments may lead to dismissal from the clinic.

## 2021-11-16 ENCOUNTER — HOSPITAL ENCOUNTER (OUTPATIENT)
Dept: PHYSICAL THERAPY | Facility: CLINIC | Age: 61
Setting detail: THERAPIES SERIES
End: 2021-11-16
Attending: HOSPITALIST
Payer: COMMERCIAL

## 2021-11-16 ENCOUNTER — OFFICE VISIT (OUTPATIENT)
Dept: UROLOGY | Facility: CLINIC | Age: 61
End: 2021-11-16
Payer: COMMERCIAL

## 2021-11-16 VITALS
BODY MASS INDEX: 29.88 KG/M2 | TEMPERATURE: 98.5 F | DIASTOLIC BLOOD PRESSURE: 94 MMHG | HEIGHT: 64 IN | SYSTOLIC BLOOD PRESSURE: 143 MMHG | HEART RATE: 70 BPM | WEIGHT: 175.04 LBS

## 2021-11-16 DIAGNOSIS — Z87.440 PERSONAL HISTORY OF URINARY TRACT INFECTION: Primary | ICD-10-CM

## 2021-11-16 PROCEDURE — 99213 OFFICE O/P EST LOW 20 MIN: CPT | Performed by: UROLOGY

## 2021-11-16 PROCEDURE — 97140 MANUAL THERAPY 1/> REGIONS: CPT | Mod: GP | Performed by: PHYSICAL THERAPIST

## 2021-11-16 PROCEDURE — 87088 URINE BACTERIA CULTURE: CPT | Performed by: UROLOGY

## 2021-11-16 PROCEDURE — 87086 URINE CULTURE/COLONY COUNT: CPT | Performed by: UROLOGY

## 2021-11-16 PROCEDURE — 97110 THERAPEUTIC EXERCISES: CPT | Mod: GP | Performed by: PHYSICAL THERAPIST

## 2021-11-16 RX ORDER — CIPROFLOXACIN 250 MG/1
250 TABLET, FILM COATED ORAL 2 TIMES DAILY
Qty: 10 TABLET | Refills: 0 | Status: SHIPPED | OUTPATIENT
Start: 2021-11-16 | End: 2021-11-29

## 2021-11-16 ASSESSMENT — MIFFLIN-ST. JEOR: SCORE: 1505

## 2021-11-16 NOTE — NURSING NOTE
Catheter removal documentation on 11/16/2021:    Mustapha Negrete presents to the clinic for catheter removal.  Reason for removal: Pt taught to self catheterize   Order has been verified. Yes  Catheter successfully removed at 1330 without immediate complication.    Patient taught to self catheterize per Dr. Oneill. Patient demonstrated the procedure well. Patient was taught with a 14 fr. Catheter. Supplies given to the patient. Patient is to keep urine volume under 400 cc if possible. Patient handouts given.      Maria M Brooks, CMA

## 2021-11-16 NOTE — NURSING NOTE
"Initial BP (!) 143/94 (BP Location: Right arm, Patient Position: Sitting, Cuff Size: Adult Large)   Pulse 70   Temp 98.5  F (36.9  C) (Tympanic)   Ht 1.626 m (5' 4\")   Wt 79.4 kg (175 lb 0.7 oz)   BMI 30.05 kg/m   Estimated body mass index is 30.05 kg/m  as calculated from the following:    Height as of this encounter: 1.626 m (5' 4\").    Weight as of this encounter: 79.4 kg (175 lb 0.7 oz). .    Bethany Lemon MA    "

## 2021-11-17 ENCOUNTER — TELEPHONE (OUTPATIENT)
Dept: FAMILY MEDICINE | Facility: CLINIC | Age: 61
End: 2021-11-17

## 2021-11-17 ENCOUNTER — OFFICE VISIT (OUTPATIENT)
Dept: FAMILY MEDICINE | Facility: CLINIC | Age: 61
End: 2021-11-17
Payer: COMMERCIAL

## 2021-11-17 VITALS
RESPIRATION RATE: 16 BRPM | OXYGEN SATURATION: 96 % | SYSTOLIC BLOOD PRESSURE: 122 MMHG | DIASTOLIC BLOOD PRESSURE: 60 MMHG | TEMPERATURE: 97.5 F | BODY MASS INDEX: 31.76 KG/M2 | HEART RATE: 97 BPM | WEIGHT: 185 LBS

## 2021-11-17 DIAGNOSIS — R10.2 PERINEAL PAIN IN MALE: ICD-10-CM

## 2021-11-17 DIAGNOSIS — K62.89 RECTAL PAIN: Primary | ICD-10-CM

## 2021-11-17 DIAGNOSIS — G89.29 CHRONIC BILATERAL LOW BACK PAIN, UNSPECIFIED WHETHER SCIATICA PRESENT: ICD-10-CM

## 2021-11-17 DIAGNOSIS — S73.191S TEAR OF RIGHT ACETABULAR LABRUM, SEQUELA: ICD-10-CM

## 2021-11-17 DIAGNOSIS — R33.9 URINARY RETENTION: ICD-10-CM

## 2021-11-17 DIAGNOSIS — M51.16 LUMBAR DISC HERNIATION WITH RADICULOPATHY: ICD-10-CM

## 2021-11-17 DIAGNOSIS — M54.50 CHRONIC BILATERAL LOW BACK PAIN, UNSPECIFIED WHETHER SCIATICA PRESENT: ICD-10-CM

## 2021-11-17 PROCEDURE — 99213 OFFICE O/P EST LOW 20 MIN: CPT | Performed by: INTERNAL MEDICINE

## 2021-11-17 RX ORDER — TAMSULOSIN HYDROCHLORIDE 0.4 MG/1
0.4 CAPSULE ORAL DAILY
COMMUNITY
Start: 2021-11-12 | End: 2022-01-04

## 2021-11-17 RX ORDER — METOPROLOL SUCCINATE 25 MG/1
1 TABLET, EXTENDED RELEASE ORAL DAILY
COMMUNITY
End: 2021-11-17

## 2021-11-17 NOTE — PATIENT INSTRUCTIONS
1. Send a my chart message to Neurologist to explain test results  2. If you don't hear back by the end of the week, send  my chart message and we can send message to Neurology clinic directly to have them communicate the results with you

## 2021-11-17 NOTE — RESULT ENCOUNTER NOTE
Hi Dr. Gore,    Thank you for the heads up! I'll keep an eye out for additional results coming in.    Vasile Page

## 2021-11-17 NOTE — PROGRESS NOTES
Appointment source: Established Patient  Patient name: Mustapha Negrete  Urology Staff: Alex Oneill MD    Subjective: This is a 60 year old year old male returning for follow up of chronic pelvic pain and urinary retention.    He is currently undergoing neurological evaluation for his chronic pelvic pain which may be related to spinal cord issues.  The urinary retention may be part of that but is also contributing to his pain as a result of the discomfort induced by the Walden catheter he requires.    He has failed voiding trials previously.    He stopped taking tamsulosin out of concern that it was producing side effects including increased pelvic pain.    He also stopped taking the finasteride.      Objective: Intermittent catheterization was taught and the catheter was discontinued.    He is willing to retry the tamsulosin.    Assessment: Recurrent urinary tract infection status post UroLift several years ago.    Urine appears infected and he was started on ciprofloxacin empirically.    Urine culture was sent.    Plan: We will see him back in several weeks for follow-up.    Surgical intervention for his urinary retention remains a possibility but is not practical at the current time secondary to Covid restrictions limiting surgeries that require an inpatient stay.    Total time 10 minutes

## 2021-11-17 NOTE — PROGRESS NOTES
Assessment & Plan   Problem List Items Addressed This Visit     Low back pain    Lumbar disc herniation with radiculopathy    Perineal pain in male    Rectal pain - Primary    Tear of right acetabular labrum, sequela    Urinary retention         He has a more defined treatment plan with his specialists -Urology, Neurology, Pain clinic.  Multiple sources of his low back/leg/rectal/perineal pain.  Continue plan as outlined by his specialists; he is still waiting to hear test results from Neurology.  Follow-up with me in early  - when his short term disability will .  Will need to determine if he has recovered enough to return to work or needs long term disability.    Patient Instructions   1. Send a my chart message to Neurologist to explain test results  2. If you don't hear back by the end of the week, send  my chart message and we can send message to Neurology clinic directly to have them communicate the results with you      No follow-ups on file.    Viki Hanson, [facility] and HomeABRAHAM Luciano is a 60 year old who presents for the following health issues     HPI     Covid: dECLINED; we discussed at length and he declines  Flu shot: DECLINED  PPSV23: only after  per [facility]  ADV: HANDLE      Last visit with  10/28/21 Uncontrolled pain - still very high pain 10/10    BP Readings from Last 6 Encounters:   21 122/60   21 (!) 143/94   11/15/21 123/77   21 136/89   21 129/82   10/28/21 135/89     --has been following with Neurology, Pain Clinic in [place], Urology since our last visit  --had spinal tap - has not heard results  --wonders if he should have the nerve block  --Urology removed cath yesterday, taught self cath;  Was given antibiotic for probable urine infection  --short term disability was approved through 22; applied for [employer] because he would need COBRA which he couldn't afford; would strongly prefer to go back  to work if he is making progress w/therapies.      Back Pain  Onset/Duration: Still having a lot pain - start about 5 months ago   Description:   Location of pain: gluteus both  Character of pain: sharp, stabbing and constant  Pain radiation: radiates into the right leg and radiates into the left leg  New numbness or weakness in legs, not attributed to pain: no   Intensity: Currently 10/10, At its worst 10/10  Progression of Symptoms: worsening and constant  History:   Specific cause: none  Pain interferes with job: YES  History of back problems: previous herniated disc  Any previous MRI or X-rays: Yes--multiple  Sees a specialist for back pain: No  Alleviating factors:   Improved by: nothing    Precipitating factors:  Worsened by: Lifting, Bending, Standing, Sitting, Lying Flat, Walking and Coughing  Therapies tried and outcome: no        Review of Systems   Constitutional, HEENT, cardiovascular, pulmonary, gi and gu systems are negative, except as otherwise noted.      Objective    /60   Pulse 97   Temp 97.5  F (36.4  C) (Tympanic)   Resp 16   Wt 83.9 kg (185 lb)   SpO2 96%   BMI 31.76 kg/m    Body mass index is 31.76 kg/m .  Physical Exam   GENERAL APPEARANCE: alert, no distress and appears in significantly less pain than previous visits

## 2021-11-18 LAB
BACTERIA UR CULT: ABNORMAL

## 2021-11-18 NOTE — TELEPHONE ENCOUNTER
Received a chart note request from [facility] for chart notes from Dr. Oneill from 11/1/2021.  Request faxed to [facility] for processing.

## 2021-11-19 ENCOUNTER — TELEPHONE (OUTPATIENT)
Dept: UROLOGY | Facility: CLINIC | Age: 61
End: 2021-11-19
Payer: COMMERCIAL

## 2021-11-19 DIAGNOSIS — Z87.440 PERSONAL HISTORY OF URINARY TRACT INFECTION: Primary | ICD-10-CM

## 2021-11-19 NOTE — TELEPHONE ENCOUNTER
----- Message from JACOB Oneill MD sent at 11/19/2021  9:21 AM CST -----  Regarding: Needs a repeat urine culture in about a week  Please notify him and let him know that we should get a urine specimen from him in about a week to see if the antibiotic gave him was helpful.

## 2021-11-22 ENCOUNTER — HOSPITAL ENCOUNTER (OUTPATIENT)
Dept: PHYSICAL THERAPY | Facility: CLINIC | Age: 61
Setting detail: THERAPIES SERIES
End: 2021-11-22
Attending: HOSPITALIST
Payer: COMMERCIAL

## 2021-11-22 ENCOUNTER — TELEPHONE (OUTPATIENT)
Dept: PALLIATIVE MEDICINE | Facility: CLINIC | Age: 61
End: 2021-11-22

## 2021-11-22 DIAGNOSIS — M53.3 COCCYDYNIA: ICD-10-CM

## 2021-11-22 DIAGNOSIS — K62.89 RECTAL PAIN: Primary | ICD-10-CM

## 2021-11-22 PROCEDURE — 97535 SELF CARE MNGMENT TRAINING: CPT | Mod: GP | Performed by: PHYSICAL THERAPIST

## 2021-11-22 PROCEDURE — 97110 THERAPEUTIC EXERCISES: CPT | Mod: GP | Performed by: PHYSICAL THERAPIST

## 2021-11-22 NOTE — TELEPHONE ENCOUNTER
Patient would like a call back. Continued pain and went to Urologist and wants to be scheduled for epidural. He is painful and Urologist had no new suggestions.

## 2021-11-23 ENCOUNTER — PATIENT OUTREACH (OUTPATIENT)
Dept: NURSING | Facility: CLINIC | Age: 61
End: 2021-11-23
Payer: COMMERCIAL

## 2021-11-23 ENCOUNTER — TELEPHONE (OUTPATIENT)
Dept: NEUROLOGY | Facility: CLINIC | Age: 61
End: 2021-11-23

## 2021-11-23 DIAGNOSIS — R33.9 URINARY RETENTION: Primary | ICD-10-CM

## 2021-11-23 ASSESSMENT — ACTIVITIES OF DAILY LIVING (ADL): DEPENDENT_IADLS:: TRANSPORTATION

## 2021-11-23 NOTE — TELEPHONE ENCOUNTER
Routing message below to provider to review and advise     Lorelei Grider RN, BSN, CMSRN  RN Care Coordinator  [facility]        Plan of Care 11/15/2021 visit w/ Dr. Arian Prescott   - Further procedures recommended:                           - consider Left pudendal nerve block (would be reasonable to discuss with urologist)                           - consider ganglion impar block   - Medication Management:               - would agree with continued gradual titration of gabapentin               - consider addition of elavil instead of trazodone at night for both pain and sleep. (would also recommend discussing with urology as well)  - Physical Therapy: Continue  - Clinical Health Psychologist to address issues of relaxation, behavioral change, coping style, and other factors important to improvement: would strongly consider   - Diagnostic Studies: no  - Urine toxicology screen today: no   - Follow up:               - pending decision on how to proceed

## 2021-11-23 NOTE — TELEPHONE ENCOUNTER
Clinic Care Coordination Contact    Follow Up Progress Note   10/16/2021 ED visit Urinary retention      Assessment: Patient reports the leon catheter is out and self cath periodically  Patient has a nurse only appointment Friday for a urine specimen and then a Urology follow up on Monday 11/29/2021  Patient will have a nerve block in the future when he is cleared of no antibiotics for 7-10 days and so depends on the Urologist advice   Tailbone burning continues with is nerve pain ,  Only lying down and resting relives the pain and no pain medication touches the nerve pain   Patient has short term disability until Jan 8 and then might be terminated  Patient is on [place] now which he is happy about .  Patient is close to penitentiary and then plans to use 401 K and collect Social security in a year     Care Gaps:    Health Maintenance Due   Topic Date Due     PREVENTIVE CARE VISIT  Never done     ASTHMA ACTION PLAN  Never done     Pneumococcal Vaccine: Pediatrics (0 to 5 Years) and At-Risk Patients (6 to 64 Years) (1 of 2 - PPSV23) Never done     COVID-19 Vaccine (1) Never done     HIV SCREENING  Never done     HEPATITIS C SCREENING  Never done     LIPID  Never done     ZOSTER IMMUNIZATION (1 of 2) Never done     INFLUENZA VACCINE (1) Never done       Care Gap Goal set: Yes    Goals addressed this encounter:   Goals Addressed                    This Visit's Progress       Medical (pt-stated)   0%      Goal Statement #2: I will complete the Health Maintenance due in the next 1-3 months   Health Maintenance Due   Topic Date Due     PREVENTIVE CARE VISIT  Never done     ASTHMA ACTION PLAN  Never done     ADVANCE CARE PLANNING  Never done     Pneumococcal Vaccine: Pediatrics (0 to 5 Years) and At-Risk Patients (6 to 64 Years) (1 of 2 - PPSV23) Never done     COVID-19 Vaccine (1) Never done     HIV SCREENING  Never done     HEPATITIS C SCREENING  Never done     LIPID  Never done     ZOSTER IMMUNIZATION (1 of 2)  Never done     INFLUENZA VACCINE (1) Never done      Date Goal set: 11/2/2021  Barriers: None identified   Strengths: agrees with the plan   Date to Achieve By: 2/2/2022  Patient expressed understanding of goal: Yes  Action steps to achieve this goal:  1. I will discuss with my provider at a future visit            Pain Management (pt-stated)   70%      Goal Statement #1: I will have my leon catheter removed 11/16 at Urology visit and cI will be able to urinate on my own .    I will decrease bilateral buttocks and thigh pain in the next 1-2 months   Date Goal set: 11/2/2021  Barriers: constant pain   Strengths: Motivated   Date to Achieve By: 2/2/2022  Patient expressed understanding of goal: Yes  Action steps to achieve this goal:  1. I will keep future Primary Care ,Urology, Pain Clinic appointments and will make a future Neurology appointment   2. I will drink plenty of water to keep my urine clear and yellow  3. I will take Tylenol/Ibuprofen and Gabapentin as directed   4. I will walk a block when able to increase strength               Intervention/Education provided during outreach: CC available if questions or concerns      Plan:   Patient will keep future Urology appointments Friday for UA and Monday for bladder scan   Care Coordinator will follow up in 3-5 business days   [facility]   Omaira Daigle RN, Care Coordinator   [facility]'s   E-mail [email or web address]   [phone]

## 2021-11-24 NOTE — TELEPHONE ENCOUNTER
Patient contacted via telephone with the results of his CSF studies. Explained that all of his studies have returned unrevealing with respect to the cause of his pain. He has been giving some consideration to pursuing a nerve block to provide some relief, as he has been in constant 10/10 pain over the past several months with little to no relief with the gabapentin. If feasible from a proceduralist standpoint, would encourage further exploration of this option given his unrelenting pain.     Vasile Escalera MD  Neurology Resident PGY2

## 2021-11-26 ENCOUNTER — ALLIED HEALTH/NURSE VISIT (OUTPATIENT)
Dept: UROLOGY | Facility: CLINIC | Age: 61
End: 2021-11-26
Payer: COMMERCIAL

## 2021-11-26 ENCOUNTER — TELEPHONE (OUTPATIENT)
Dept: PALLIATIVE MEDICINE | Facility: CLINIC | Age: 61
End: 2021-11-26

## 2021-11-26 DIAGNOSIS — Z87.440 PERSONAL HISTORY OF URINARY TRACT INFECTION: ICD-10-CM

## 2021-11-26 PROCEDURE — 99207 PR NO CHARGE NURSE ONLY: CPT

## 2021-11-26 PROCEDURE — 87086 URINE CULTURE/COLONY COUNT: CPT

## 2021-11-26 NOTE — TELEPHONE ENCOUNTER
Please call patient [phone]. Still waiting to hear about procedure. Still in pain. Has seen urologist and waiting to hear about injection.

## 2021-11-26 NOTE — PROGRESS NOTES
Pt arrived for UA/UC. Sample collected and brought to lab.  Gerri TURNER RN BSN PHN  Specialty Clinics

## 2021-11-27 LAB — BACTERIA UR CULT: NO GROWTH

## 2021-11-29 ENCOUNTER — OFFICE VISIT (OUTPATIENT)
Dept: UROLOGY | Facility: CLINIC | Age: 61
End: 2021-11-29
Payer: COMMERCIAL

## 2021-11-29 VITALS
DIASTOLIC BLOOD PRESSURE: 78 MMHG | BODY MASS INDEX: 31.58 KG/M2 | SYSTOLIC BLOOD PRESSURE: 118 MMHG | TEMPERATURE: 98.2 F | WEIGHT: 185 LBS | HEART RATE: 106 BPM | HEIGHT: 64 IN

## 2021-11-29 DIAGNOSIS — N40.1 BENIGN PROSTATIC HYPERPLASIA WITH LOWER URINARY TRACT SYMPTOMS, SYMPTOM DETAILS UNSPECIFIED: Primary | ICD-10-CM

## 2021-11-29 LAB
ALBUMIN UR-MCNC: NEGATIVE MG/DL
APPEARANCE UR: CLEAR
BILIRUB UR QL STRIP: NEGATIVE
COLOR UR AUTO: YELLOW
GLUCOSE UR STRIP-MCNC: NEGATIVE MG/DL
HGB UR QL STRIP: NEGATIVE
KETONES UR STRIP-MCNC: NEGATIVE MG/DL
LEUKOCYTE ESTERASE UR QL STRIP: ABNORMAL
NITRATE UR QL: NEGATIVE
PH UR STRIP: 6 [PH] (ref 5–7)
RBC #/AREA URNS AUTO: ABNORMAL /HPF
SP GR UR STRIP: 1.01 (ref 1–1.03)
SQUAMOUS #/AREA URNS AUTO: ABNORMAL /LPF
UROBILINOGEN UR STRIP-ACNC: 0.2 E.U./DL
WBC #/AREA URNS AUTO: ABNORMAL /HPF

## 2021-11-29 PROCEDURE — 51798 US URINE CAPACITY MEASURE: CPT | Performed by: UROLOGY

## 2021-11-29 PROCEDURE — 87186 SC STD MICRODIL/AGAR DIL: CPT | Performed by: UROLOGY

## 2021-11-29 PROCEDURE — 87086 URINE CULTURE/COLONY COUNT: CPT | Performed by: UROLOGY

## 2021-11-29 PROCEDURE — 99213 OFFICE O/P EST LOW 20 MIN: CPT | Mod: 25 | Performed by: UROLOGY

## 2021-11-29 PROCEDURE — 81001 URINALYSIS AUTO W/SCOPE: CPT | Performed by: UROLOGY

## 2021-11-29 PROCEDURE — 87088 URINE BACTERIA CULTURE: CPT | Performed by: UROLOGY

## 2021-11-29 RX ORDER — FINASTERIDE 5 MG/1
5 TABLET, FILM COATED ORAL DAILY
Qty: 90 TABLET | Refills: 3 | Status: SHIPPED | OUTPATIENT
Start: 2021-11-29 | End: 2022-01-04

## 2021-11-29 ASSESSMENT — MIFFLIN-ST. JEOR: SCORE: 1550.15

## 2021-11-29 NOTE — NURSING NOTE
".Initial /78 (BP Location: Right arm, Patient Position: Sitting, Cuff Size: Adult Regular)   Pulse 106   Temp 98.2  F (36.8  C) (Tympanic)   Ht 1.626 m (5' 4\")   Wt 83.9 kg (185 lb)   BMI 31.76 kg/m   Estimated body mass index is 31.76 kg/m  as calculated from the following:    Height as of this encounter: 1.626 m (5' 4\").    Weight as of this encounter: 83.9 kg (185 lb). .        Active order to obtain bladder scan? Yes   Name of ordering provider:  Nino  Bladder scan preformed post void Yes:   Bladder scan reveled 134 ML  Provider notified?  Yes    Bella Simmons LPN on 11/29/2021 at 10:16 AM        "

## 2021-11-29 NOTE — TELEPHONE ENCOUNTER
Screening Questions for Radiology Injections:    Injection to be done at which interventional clinic site? [facility]    If [facility] location, tell patient that this procedure requires a COVID-19 lab test be done within 4 days of the procedure. Would you still like to move forward with scheduling the procedure?  Not Applicable   If YES, let patient know that someone will call them to schedule the COVID-19 test and that they will only receive a call back if the result is positive. Route to nursing to enter order.     Instruct patient to arrive as directed prior to the scheduled appointment time:    [place] minutes before      [place]: 30 minutes before; if IV needed 1 hour before     Procedure ordered by Genie     Procedure ordered? ganglion impar       Transforaminal Cervical LEILA -  [place] does NOT have providers that do these- [facility] and [facility] do have providers that do    As a reminder, receiving steroids can decrease your body's ability to fight infection.   Would you still like to move forward with scheduling the injection?  Yes    What insurance would patient like us to bill for this procedure? BC       Worker's comp or MVA (motor vehicle accident) -Any injection DO NOT SCHEDULE and route to Mis Loza.      [employer] insurance - For SI joint injections, DO NOT SCHEDULE and route Mis Loza.       ALL BCBS, [employer] and [employer]-Route to Mis for review DO NOT SCHEDULE      IF SCHEDULING IN [place] AND NEEDS A PA, IT IS OKAY TO SCHEDULE. [place] HANDLES THEIR OWN PA'S AFTER THE PATIENT IS SCHEDULED. PLEASE SCHEDULE AT LEAST 1 WEEK OUT SO A PA CAN BE OBTAINED.    Any chance of pregnancy? Not Applicable   If YES, do NOT schedule and route to RN pool    Is an  needed? No     Patient has a drive home? (mandatory) YES: Informed     Is patient taking any blood thinners (That is: Coumadin, Warfarin, Jantoven, Pradaxa Xarelto, Eliquis, Edoxaban,  Enoxaparin, Lovenox, Heparin, Arixtra, Fondaparinux, or Fragmin? OR Antiplatelet medication such as Plavix, Brilinta, or Effient? )? No   If hold needed, do NOT schedule, route to RN pool     Is patient taking any aspirin products (includes Excedrin and Fiorinal)? No     If more than 325mg/day, OK to schedule; Instruct pt to decrease to less than 325 mg for 7 days AND route to RN pool    For CERVICAL procedures, hold all aspirin products for 6 days.     Tell pt that if aspirin product is not held for 6 days, the procedure WILL BE cancelled.      Does the patient have a bleeding or clotting disorder? No     If YES, okay to schedule AND route to RN nurse pool    For any patients with platelet count <100, must be forwarded to provider    Any allergies to contrast dye, iodine, shellfish, or numbing and steroid medications? No    If YES, add allergy information to appointment notes AND route to the RN pool     If LEILA and Contrast Dye / Iodine Allergy? DO NOT SCHEDULE, route to RN pool    Allergies: Droperidol, Fenofibrate, Fentanyl, Keflex [cephalexin], Lactose, Midazolam, Monosodium glutamate, Pcn [penicillins], Atorvastatin, Sertraline, Simvastatin, Sulfa drugs, and Tetracycline     Is patient diabetic?  No  If YES, instruct them to bring their glucometer.    Does patient have an active infection or treated for one within the past week? No     Is patient currently taking any antibiotics?  No     For patients on chronic, preventative, or prophylactic antibiotics, procedures may be scheduled.     For patients on antibiotics for active or recent infection:antibiotic course must have been completed for 4 days    Is patient currently taking any steroid medications? (i.e. Prednisone, Medrol)  No     For patients on steroid medications, course must have been completed for 4 days    Is patient actively being treated for cancer or immunocompromised? No  If YES, do NOT schedule and route to RN pool     Are you able to get on and  off an exam table with minimal or no assistance? Yes  If NO, do NOT schedule and route to RN pool    Are you able to roll over and lay on your stomach with minimal or no assistance? Yes  If NO, do NOT schedule and route to RN pool     Has the patient had a flu shot or any other vaccinations within 7 days before or after the procedure.  No     Have you recently had a COVID vaccine or have plans to get it in the near future? No    If yes, explain that for the vaccine to work best they need to:       wait 1 week before and 1 week after getting Vaccine #1    wait 1 week before and 2 weeks after getting Vaccine #2    wait 1 week before and 2 weeks after getting Vaccine BOOSTER    If patient has concerns about the timing, send to RN pool     Does patient have an MRI/CT?  YES: 2021  Check Procedure Scheduling Grid to see if required.      Was the MRI done within the last 3 years?  Yes    If yes, where was the MRI done i.e.[facility], [facility], [place], [facility] etc?       If no, do not schedule and route to RN pool    If MRI was not done at [place], [facility] or [facility] do NOT schedule and route to RN pool.      If pt has an imaging disc, the injection MAY be scheduled but pt has to bring disc to appt.     If they show up without the disc the injection cannot be done    Procedure Specific Instructions:      If celiac plexus block, informed patient NPO for 6 hours and that it is okay to take medications with sips of water, especially blood pressure medications  Not Applicable         If this is for a cervical procedure, informed patient that aspirin needs to be held for 6 days.   Not Applicable      If IV needed:    Do not schedule procedures requiring IV placement in the first appointment of the day or first appointment after lunch. Do NOT schedule at 0745, 0815 or 1245.     Instructed pt to arrive 30 minutes early for IV start if required. (Check Procedure Scheduling Grid)  Not  Applicable    Reminders:      If you are started on any steroids or antibiotics between now and your appointment, you must contact us because the procedure may need to be cancelled.  No      For all procedures except radiofrequency ablations (RFAs) and spinal cord stimulator (SCS) trials, informed patient:    IV sedation is not provided for this procedure.  If you feel that an oral anti-anxiety medication is needed, you can discuss this further with your referring provider or primary care provider.  The Pain Clinic provider will discuss specifics of what the procedure includes at your appointment.  Most procedures last 10-20 minutes.  We use numbing medications to help with any discomfort during the procedure.  Not Applicable      For patients 85 or older we recommend having an adult stay w/ them for the remainder of the day.       Does the patient have any questions?  NO  Dori Jalloh  [facility]

## 2021-11-29 NOTE — TELEPHONE ENCOUNTER
Matchett, Severiegn L   Creation Time:  11/26/2021  9:54 AM           Please call patient [phone]. Still waiting to hear about procedure. Still in pain. Has seen urologist and waiting to hear about injection.

## 2021-11-29 NOTE — TELEPHONE ENCOUNTER
No PA required, okay to schedule      Mis RIVERA    [facility]

## 2021-11-29 NOTE — PROGRESS NOTES
Appointment source: Established Patient  Patient name: Mustapha Negrete  Urology Staff: Alex Oneill MD    Subjective: This is a 60 year old year old male returning for follow up of symptoms of bladder outlet obstruction and chronic back pain.    Since starting the intermittent catheterization he has been having less difficulty with urination.    He voids pretty much without any difficulty during the day and often catheterizes himself just once a day.    He is voiding volumes in excess of 200 cc which is promising.    He is currently taking tamsulosin.    His back issues continue to be a very vexing problem and he is currently arranging for some form of nerve block to help with the pain.    He reports that his urine is cloudy in appearance and has an odor.    Objective: Postvoid residual is 134 mL.  Last catheterization was last evening.    Assessment: Reduction in voiding symptoms after resuming tamsulosin and using intermittent catheterization for management of episodic difficulty with bladder emptying.    Symptoms of a possible bladder infection.    Plan: Urine culture has been sent.    He will start empirically on ciprofloxacin 250 mg twice daily.    I will add finasteride to his tamsulosin regimen for further improvement of his voiding especially during these times of severe back issues.    Total time 15 minutes

## 2021-11-30 ENCOUNTER — HOSPITAL ENCOUNTER (OUTPATIENT)
Dept: PHYSICAL THERAPY | Facility: CLINIC | Age: 61
Setting detail: THERAPIES SERIES
End: 2021-11-30
Attending: HOSPITALIST
Payer: COMMERCIAL

## 2021-11-30 PROCEDURE — 90912 BFB TRAINING 1ST 15 MIN: CPT | Mod: GP | Performed by: PHYSICAL THERAPIST

## 2021-11-30 PROCEDURE — 97140 MANUAL THERAPY 1/> REGIONS: CPT | Mod: GP | Performed by: PHYSICAL THERAPIST

## 2021-11-30 PROCEDURE — 90913 BFB TRAINING EA ADDL 15 MIN: CPT | Mod: GP | Performed by: PHYSICAL THERAPIST

## 2021-11-30 PROCEDURE — 97110 THERAPEUTIC EXERCISES: CPT | Mod: GP,59 | Performed by: PHYSICAL THERAPIST

## 2021-11-30 NOTE — TELEPHONE ENCOUNTER
Called patient to schedule injection, he stated he is on antibiotics and will call back to schedule once he knows the end date '        Dori Jalloh    [facility]

## 2021-12-01 DIAGNOSIS — Z87.440 PERSONAL HISTORY OF URINARY TRACT INFECTION: Primary | ICD-10-CM

## 2021-12-01 LAB — BACTERIA UR CULT: ABNORMAL

## 2021-12-01 RX ORDER — NITROFURANTOIN 25; 75 MG/1; MG/1
100 CAPSULE ORAL 2 TIMES DAILY
Qty: 20 CAPSULE | Refills: 3 | Status: SHIPPED | OUTPATIENT
Start: 2021-12-01 | End: 2022-01-04

## 2021-12-01 NOTE — PROGRESS NOTES
Staph epidermidis UTI sensitive to nitrofurantoin.    We will have him stop the ciprofloxacin and start the Macrobid.    We will repeat his urine culture in 2 weeks.

## 2021-12-07 ENCOUNTER — PATIENT OUTREACH (OUTPATIENT)
Dept: CARE COORDINATION | Facility: CLINIC | Age: 61
End: 2021-12-07
Payer: COMMERCIAL

## 2021-12-07 DIAGNOSIS — R33.9 URINARY RETENTION: Primary | ICD-10-CM

## 2021-12-07 ASSESSMENT — ACTIVITIES OF DAILY LIVING (ADL): DEPENDENT_IADLS:: TRANSPORTATION

## 2021-12-07 NOTE — PROGRESS NOTES
Clinic Care Coordination Contact  [facility]/Voicemail    Referral Source: PCP  yolette Answer   Reason for Referral: Financial Support       10/16/2021 ED visit Urinary retention     Clinical Data: Care Coordinator Outreach  Outreach attempted x 1.  Left message on patient's voicemail with call back information and requested return call.  Plan: . Care Coordinator will try to reach patient again in 3-5 business days.  [facility]   Omaira Daigle RN, Care Coordinator   [facility]'s   E-mail [email or web address]   [phone]

## 2021-12-13 ENCOUNTER — HOSPITAL ENCOUNTER (OUTPATIENT)
Dept: PHYSICAL THERAPY | Facility: CLINIC | Age: 61
Setting detail: THERAPIES SERIES
End: 2021-12-13
Attending: HOSPITALIST
Payer: COMMERCIAL

## 2021-12-13 PROCEDURE — 97110 THERAPEUTIC EXERCISES: CPT | Mod: GP | Performed by: PHYSICAL THERAPIST

## 2021-12-13 PROCEDURE — 97140 MANUAL THERAPY 1/> REGIONS: CPT | Mod: GP | Performed by: PHYSICAL THERAPIST

## 2021-12-16 ENCOUNTER — RADIOLOGY INJECTION OFFICE VISIT (OUTPATIENT)
Dept: PALLIATIVE MEDICINE | Facility: CLINIC | Age: 61
End: 2021-12-16
Payer: COMMERCIAL

## 2021-12-16 ENCOUNTER — PATIENT OUTREACH (OUTPATIENT)
Dept: NURSING | Facility: CLINIC | Age: 61
End: 2021-12-16
Payer: COMMERCIAL

## 2021-12-16 VITALS — DIASTOLIC BLOOD PRESSURE: 99 MMHG | HEART RATE: 86 BPM | OXYGEN SATURATION: 97 % | SYSTOLIC BLOOD PRESSURE: 137 MMHG

## 2021-12-16 DIAGNOSIS — M54.50 LOW BACK PAIN: Primary | ICD-10-CM

## 2021-12-16 DIAGNOSIS — M79.2 PERIPHERAL NEURALGIA: ICD-10-CM

## 2021-12-16 DIAGNOSIS — M53.3 COCCYDYNIA: ICD-10-CM

## 2021-12-16 DIAGNOSIS — R33.9 URINARY RETENTION: ICD-10-CM

## 2021-12-16 DIAGNOSIS — K62.89 RECTAL PAIN: ICD-10-CM

## 2021-12-16 PROCEDURE — 77002 NEEDLE LOCALIZATION BY XRAY: CPT | Performed by: PAIN MEDICINE

## 2021-12-16 PROCEDURE — 64450 NJX AA&/STRD OTHER PN/BRANCH: CPT | Mod: RT | Performed by: PAIN MEDICINE

## 2021-12-16 RX ORDER — TRIAMCINOLONE ACETONIDE 40 MG/ML
40 INJECTION, SUSPENSION INTRA-ARTICULAR; INTRAMUSCULAR ONCE
Status: COMPLETED | OUTPATIENT
Start: 2021-12-16 | End: 2021-12-16

## 2021-12-16 RX ADMIN — TRIAMCINOLONE ACETONIDE 40 MG: 40 INJECTION, SUSPENSION INTRA-ARTICULAR; INTRAMUSCULAR at 12:27

## 2021-12-16 ASSESSMENT — PAIN SCALES - GENERAL
PAINLEVEL: WORST PAIN (10)
PAINLEVEL: SEVERE PAIN (7)

## 2021-12-16 ASSESSMENT — ACTIVITIES OF DAILY LIVING (ADL): DEPENDENT_IADLS:: TRANSPORTATION

## 2021-12-16 NOTE — NURSING NOTE
Discharge Information    IV Discontiued Time:  NA    Amount of Fluid Infused:  NA    Discharge Criteria = When patient returns to baseline or as per MD order    Consciousness:  Pt is fully awake    Circulation:  BP +/- 20% of pre-procedure level    Respiration:  Patient is able to breathe deeply    O2 Sat:  Patient is able to maintain O2 Sat >92% on room air    Activity:  Moves 4 extremities on command    Ambulation:  Patient is able to stand and walk or stand and pivot into wheelchair    Dressing:  Clean/dry or No Dressing    Notes:   Discharge instructions and AVS given to patient    Patient meets criteria for discharge?  YES    Admitted to PCU?  No    Responsible adult present to accompany patient home?  Yes    Signature/Title:    Lorelei Villarreal RN  RN Care Coordinator  [facility] [place]

## 2021-12-16 NOTE — PROGRESS NOTES
Pre procedure Diagnosis: coccyx pain, rectal, buttock pain   Post procedure Diagnosis: Same  Procedure performed: ganglion impar block   Anesthesia: none  Complications: none  Operators: Arian Prescott MD      Indications:   Pt presenting for a ganglion impar block.  They have a history of pain over his coccyx rectal pain perineal pain. They tried conservative therapy including meds.       Options/alternatives, benefits and risks were discussed with the patient including bleeding, infection, no pain relief, tissue trauma, exposure to radiation, reaction to medications weakness, numbness, bowel injury.  Questions were answered to her satisfaction and she agrees to proceed. Voluntary informed consent was obtained and signed.      Vitals were reviewed: Yes  Allergies were reviewed:  Yes   Medications were reviewed:  Yes   Pre-procedure pain score: 10/10     Procedure:  After getting informed consent, patient was brought into the procedure suite and was placed in a prone position on the procedure table.   A Pause for the Cause was performed.  Patient was prepped and draped in sterile fashion.      The patient identified areas of pain around her tailbone, and these were marked.  Fluroscopy was then used to identify these locations.      A 25 gauge 2 inch needle was attached to IV tubing.  Bupivacaine 4. ml 0.5% and 40 mg of Kenalog was used.  The two locations along the coccyx were noted.  These were below the level of the sacrococcygeal ligament, although may be related to ligamentous structures attached to the coccyx.  At each of these locations, 2ml of bupivacaine and 20mg kenalog was injected.  These locations were verified in AP and lateral fluoro, to determine location and depth.       A total of 4ml of bupivacaine and 40mg kenalog was utilized     The patient tolerated the procedure well, and was taken to the recovery room.    Images were saved to PACS.     Post-procedure pain score: 8/10  Follow-up includes:    -f/u phone call in one week    Arian Prescott MD  [facility]

## 2021-12-16 NOTE — NURSING NOTE
Pre-procedure Intake  If YES to any questions or NO to having a   Please complete laminated checklist and leave on the computer keyboard for Provider, verbally inform provider if able.    For SCS Trial, RFA's or any sedation procedure:  Have you been fasting? NA    If yes, for how long?     Are you taking any any blood thinners such as Coumadin, Warfarin, Jantoven, Pradaxa Xarelto, Eliquis, Edoxaban, Enoxaparin, Lovenox, Heparin, Arixtra, Fondaparinux, or Fragmin? OR Antiplatelet medication such as Plavix, Brilinta, or Effient?   No     If yes, when did you take your last dose?     Do you take aspirin?  No    If cervical procedure, have you held aspirin for 6 days?       Do you have any allergies to contrast dye, iodine, steroid and/or numbing medications?  NO    Are you currently taking antibiotics or have an active infection?  NO    Have you had a fever/elevated temperature within the past week? NO    Are you currently taking oral steroids? NO    Do you have a ? Yes    Are you pregnant or breastfeeding?  Not Applicable    Have you received the COVID-19 vaccine? No     If yes, was it your 1st, 2nd or only dose needed?     Date of most recent vaccine:     Notify provider and RNs if systolic BP >170, diastolic BP >100, P >100 or O2 sats < 90%

## 2021-12-16 NOTE — PROGRESS NOTES
Clinic Care Coordination Contact    Follow Up Progress Note     10/16/2021 ED visit Urinary retention   10/29/2021 PCP referral   Financial Support    Assessment:   Patient just had a nerve block injection today   Patient should expect relief in days to 2 weeks .    Patient states the disability forms were sent to Dr Oneill and  to fill out . The deadline is 12 /29 to be eligible  for long term disability .    CC RN sent a message to both providers to Please complete forms ASAP    Patient has gone 5 days without self cathing.  Patient is voiding a Third of his flow and might start cathing at night     Care Gaps:    Health Maintenance Due   Topic Date Due     PREVENTIVE CARE VISIT  Never done     ASTHMA ACTION PLAN  Never done     COVID-19 Vaccine (1) Never done     Pneumococcal Vaccine: Pediatrics (0 to 5 Years) and At-Risk Patients (6 to 64 Years) (1 of 2 - PPSV23) Never done     HIV SCREENING  Never done     HEPATITIS C SCREENING  Never done     LIPID  Never done     ZOSTER IMMUNIZATION (1 of 2) Never done     INFLUENZA VACCINE (1) Never done       Care Gap Goal set: Yes    Goals addressed this encounter:   Goals Addressed                    This Visit's Progress       Medical (pt-stated)   0%      Goal Statement #2: I will complete the Health Maintenance due in the next 1-3 months   Health Maintenance Due   Topic Date Due     PREVENTIVE CARE VISIT  Never done     ASTHMA ACTION PLAN  Never done     ADVANCE CARE PLANNING  Never done     Pneumococcal Vaccine: Pediatrics (0 to 5 Years) and At-Risk Patients (6 to 64 Years) (1 of 2 - PPSV23) Never done     COVID-19 Vaccine (1) Never done     HIV SCREENING  Never done     HEPATITIS C SCREENING  Never done     LIPID  Never done     ZOSTER IMMUNIZATION (1 of 2) Never done     INFLUENZA VACCINE (1) Never done      Date Goal set: 11/2/2021  Barriers: None identified   Strengths: agrees with the plan   Date to Achieve By: 2/2/2022  Patient expressed understanding of  goal: Yes  Action steps to achieve this goal:  1. I will discuss with my provider at a future visit            Pain Management (pt-stated)   80%      Goal Statement #1: I will have my leon catheter removed 11/16 at Urology visit and cI will be able to urinate on my own .    I will decrease bilateral buttocks and thigh pain in the next 1-2 months   Date Goal set: 11/2/2021  Barriers: constant pain   Strengths: Motivated   Date to Achieve By: 2/2/2022  Patient expressed understanding of goal: Yes  Action steps to achieve this goal:  1. I will keep future Primary Care ,Urology, Pain Clinic appointments and will make a future Neurology appointment   2. I will drink plenty of water to keep my urine clear and yellow  3. I will take Tylenol/Ibuprofen and Gabapentin as directed   4. I will walk a block when able to increase strength               Intervention/Education provided during outreach: CC RN  available for questions or concerns      Outreach Frequency: 2 weeks    Plan:   Sent a message to Dr Oneill and  to complete disability forms by 12/9/2021 to be eligible for long term disability   Care Coordinator will follow up in 1-2 weeks   [facility]   Omaira Daigle RN, Care Coordinator   [facility]'s   E-mail [email or web address]   [phone]

## 2021-12-16 NOTE — PATIENT INSTRUCTIONS
[facility]   Procedure Discharge Instructions    Today you saw:  Dr. Arian Prescott      You had an:  Ganglion Impar    Medications used:  Lidocaine   Bupivacaine Omnipaque   Kenalog             Be cautious when walking. Numbness and/or weakness in the lower extremities may occur for up to 6-8 hours after the procedure due to effect of the local anesthetic    Do not drive for 6 hours. The effect of the local anesthetic could slow your reflexes.     You may resume your regular activities after 24 hours    Avoid strenuous activity for the first 24 hours    You may shower, however avoid swimming, tub baths or hot tubs for 24 hours following your procedure    You may have a mild to moderate increase in pain for several days following the injection.    It may take up to 14 days for the steroid medication to start working although you may feel the effect as early as a few days after the procedure.       You may use ice packs for 10-15 minutes, 3 to 4 times a day at the injection site for comfort    Do not use heat to painful areas for 6 to 8 hours. This will give the local anesthetic time to wear off and prevent you from accidentally burning your skin.     Unless you have been directed to avoid the use of anti-inflammatory medications (NSAIDS), you may use medications such as ibuprofen, Aleve or Tylenol for pain control if needed.     If you were fasting, you may resume your normal diet and medications after the procedure    If you have diabetes, check your blood sugar more frequently than usual as your blood sugar may be higher than normal for 10-14 days following a steroid injection. Contact your doctor who manages your diabetes if your blood sugar is higher than usual    Possible side effects of steroids that you may experience include flushing, elevated blood pressure, increased appetite, mild headaches and restlessness.  All of these symptoms will get better with time.    If you  experience any of the following, call the Pain Clinic during work hours (Mon-Friday 8-4:30 pm) at [phone] or the Provider Line after hours at [phone]:  -Fever over 100 degree F  -Swelling, bleeding, redness, drainage, warmth at the injection site  -Progressive weakness or numbness in your legs  -Loss of bowel or bladder function  -Unusual headache that is not relieved by Tylenol or other pain reliever  -Unusual new onset of pain that is not improving

## 2021-12-20 ENCOUNTER — TELEPHONE (OUTPATIENT)
Dept: FAMILY MEDICINE | Facility: CLINIC | Age: 61
End: 2021-12-20
Payer: COMMERCIAL

## 2021-12-20 NOTE — TELEPHONE ENCOUNTER
Received a message from Chuck Kathleen [facility] in Specialty, asking about Disability paper work that [employer] has sent. I called patient and he is [employer] so he was not able to take down the correct fax number. He will call back with the correct fax number here at [place] [facility]. [phone].  We have not gotten any new [employer] forms. All we have is the ones from October. He said he needs these done before 12/29/2021.     -From Patient Outreach note on 12/16/2021-  Plan:   Sent a message to Dr Oneill and  to complete disability forms by 12/9/2021 to be eligible for long term disability   Care Coordinator will follow up in 1-2 weeks   [facility]   Omaira Daigle RN, Care Coordinator       Lucille NERI on 12/20/2021 at 9:33 AM

## 2021-12-20 NOTE — TELEPHONE ENCOUNTER
Patient called back and I looked in the patients Media and there forms where scanned in today.   Looks like on the [employer] for the fax number to return the Medical Records fax number was put on there the [phone].     I told patient that I can print them off I will place in Benefitter Basket to fill out.   Please call patient when completed.    Lucille Jeff CSS on 12/20/2021 at 1:52 PM

## 2021-12-21 NOTE — TELEPHONE ENCOUNTER
Pt returned call. Pt to come to [facility] Clinic tomorrow to sign authorization form to release Dr. Oneill chart notes to [place] (see TE 11/17/21 ). Previously signed authorization (signed 10/31/21 by patient) was not acceptable to release information, as 10/31/21 was present date and notes requested were Nov. 1, 2021 through present date. Once authorization is signed, call needs to be placed to [employer] ph:  [phone] to notify them to  look for fax (fax to [phone]) and expedite doctor notes to [place].     Placed form at  ([facility]) for pt to sign.    Chuck Kathleen  [facility] CSS

## 2021-12-21 NOTE — TELEPHONE ENCOUNTER
Left VM for patient to call [facility]. Need to find out from pt if chart notes requested by [employer] (see TE 11/17/21 Dr. YUE Hanson) were still needed.     Chuck Kathleen  [facility] CSS

## 2021-12-22 NOTE — TELEPHONE ENCOUNTER
"Just to follow up... we have not seen patient since previously completed forms that were signed 11/2/2021.  Will [facility] be completing forms?     Forms are in \"Awaiting Response\" tray on forms desk.   "

## 2021-12-23 ENCOUNTER — TELEPHONE (OUTPATIENT)
Dept: UROLOGY | Facility: CLINIC | Age: 61
End: 2021-12-23
Payer: COMMERCIAL

## 2021-12-23 NOTE — TELEPHONE ENCOUNTER
I personally called [facility]   They stated that the patients Insurance is requesting Records from  And Dr. Oneill.     needs to complete the forms and send OV notes from 10/29-Present. I will print them and put in with the forms.  I noticed we filled out a Restriction form on 11/9 in media.     Put forms back in Julies black basket to review and fill out more if needed before giving to .    Shejanet Please start a new Telephone Encounter For the LTD form request. He is resending the forms to [phone] because he said we did not get the forms that  has to fill out.   Save current forms and wait for the other ones that are coming.

## 2021-12-23 NOTE — TELEPHONE ENCOUNTER
Starting new TE for Dr. Oneill's care team regarding LTD forms from [place], as requested (TE 12/20/21 ).     Chuck Kathleen  [facility] CSS

## 2021-12-24 NOTE — TELEPHONE ENCOUNTER
Placed in [place] bin that Valentin will be out till Sebastian 3. Unsure if he will or if they will except it.     Lucille Jeff CSS on 12/24/2021 at 10:42 AM

## 2021-12-27 ENCOUNTER — TELEPHONE (OUTPATIENT)
Dept: PALLIATIVE MEDICINE | Facility: CLINIC | Age: 61
End: 2021-12-27
Payer: COMMERCIAL

## 2021-12-27 NOTE — TELEPHONE ENCOUNTER
Faxed to [employer] (Attn. Stacia Raphael fax: [phone]) office notes (Dr. Oneill) etc. from November 1st through present. Left VM for Stacia Raphael ph: [phone] Ext. [phone] to let [employer] know that we still have not received any additional paperwork from them (see TE 12/20/21).     Copy in file, Authorization for Release sent to cinthya Kathleen  [facility] CSS

## 2021-12-27 NOTE — TELEPHONE ENCOUNTER
Patient called and stated the injection he had on 12/16 is not working and he would like to discuss        Dori Jalloh    [facility]

## 2021-12-29 ENCOUNTER — OFFICE VISIT (OUTPATIENT)
Dept: UROLOGY | Facility: CLINIC | Age: 61
End: 2021-12-29
Payer: COMMERCIAL

## 2021-12-29 ENCOUNTER — ALLIED HEALTH/NURSE VISIT (OUTPATIENT)
Dept: FAMILY MEDICINE | Facility: CLINIC | Age: 61
End: 2021-12-29
Payer: COMMERCIAL

## 2021-12-29 VITALS — SYSTOLIC BLOOD PRESSURE: 145 MMHG | DIASTOLIC BLOOD PRESSURE: 89 MMHG | HEART RATE: 89 BPM

## 2021-12-29 DIAGNOSIS — M51.16 LUMBAR DISC HERNIATION WITH RADICULOPATHY: Primary | ICD-10-CM

## 2021-12-29 DIAGNOSIS — Z87.440 PERSONAL HISTORY OF URINARY TRACT INFECTION: Primary | ICD-10-CM

## 2021-12-29 LAB
ALBUMIN UR-MCNC: NEGATIVE MG/DL
APPEARANCE UR: CLEAR
BILIRUB UR QL STRIP: NEGATIVE
COLOR UR AUTO: YELLOW
GLUCOSE UR STRIP-MCNC: NEGATIVE MG/DL
HGB UR QL STRIP: NEGATIVE
KETONES UR STRIP-MCNC: NEGATIVE MG/DL
LEUKOCYTE ESTERASE UR QL STRIP: NEGATIVE
NITRATE UR QL: NEGATIVE
PH UR STRIP: 6 [PH] (ref 5–7)
RBC #/AREA URNS AUTO: NORMAL /HPF
SP GR UR STRIP: 1.02 (ref 1–1.03)
UROBILINOGEN UR STRIP-ACNC: 0.2 E.U./DL
WBC #/AREA URNS AUTO: NORMAL /HPF

## 2021-12-29 PROCEDURE — 87086 URINE CULTURE/COLONY COUNT: CPT | Performed by: UROLOGY

## 2021-12-29 PROCEDURE — 99213 OFFICE O/P EST LOW 20 MIN: CPT | Mod: 25 | Performed by: UROLOGY

## 2021-12-29 PROCEDURE — 81001 URINALYSIS AUTO W/SCOPE: CPT | Performed by: UROLOGY

## 2021-12-29 PROCEDURE — 51798 US URINE CAPACITY MEASURE: CPT | Performed by: UROLOGY

## 2021-12-29 PROCEDURE — 99207 PR NO CHARGE NURSE ONLY: CPT

## 2021-12-29 NOTE — PROGRESS NOTES
Patient came to clinic to ask about disability paperwork he needed filled out.  I checked and the disability paperwork is on 's desk waiting to be filled out. Dr Waggoner out of office until 1/4/22

## 2021-12-29 NOTE — NURSING NOTE
"Initial BP (!) 145/89   Pulse 89  Estimated body mass index is 31.76 kg/m  as calculated from the following:    Height as of 11/29/21: 1.626 m (5' 4\").    Weight as of 11/29/21: 83.9 kg (185 lb). .  Gerri TURNER RN BSN PHN  [facility] Clin[facility]    "

## 2021-12-29 NOTE — TELEPHONE ENCOUNTER
Per Dr. Capellan this form needs to be completed by PCP and patient has appointment.  Form placed in bin for .

## 2021-12-30 LAB
BACTERIA UR CULT: NORMAL
PERFORMING LABORATORY: NORMAL
SCANNED LAB RESULT: NORMAL
TEST NAME: NORMAL

## 2021-12-31 NOTE — TELEPHONE ENCOUNTER
Per Dr. Prescott's 11/15.21 visit plan:  Further procedures recommended:     - consider Left pudendal nerve block(would be reasonable to discuss with urologist)   - consider ganglion impar block ]12/16/21-  No benefit    Routed to provider.     Jess, RN-BSN  [facility]

## 2022-01-03 ENCOUNTER — DOCUMENTATION ONLY (OUTPATIENT)
Dept: UROLOGY | Facility: CLINIC | Age: 62
End: 2022-01-03
Payer: COMMERCIAL

## 2022-01-03 PROBLEM — R10.13 EPIGASTRIC PAIN: Status: ACTIVE | Noted: 2021-08-11

## 2022-01-03 PROBLEM — K80.10 CALCULUS OF GALLBLADDER WITH CHRONIC CHOLECYSTITIS WITHOUT OBSTRUCTION: Status: ACTIVE | Noted: 2021-05-25

## 2022-01-03 PROBLEM — U07.1 COVID-19 VIRUS INFECTION: Status: ACTIVE | Noted: 2021-04-06

## 2022-01-03 PROBLEM — K92.1 MELENA: Status: ACTIVE | Noted: 2021-04-06

## 2022-01-03 PROBLEM — M19.012 ARTHRITIS OF LEFT ACROMIOCLAVICULAR JOINT: Status: ACTIVE | Noted: 2019-08-05

## 2022-01-03 PROBLEM — N39.0 UTI (URINARY TRACT INFECTION): Status: ACTIVE | Noted: 2021-08-26

## 2022-01-03 PROBLEM — M75.122 COMPLETE TEAR OF LEFT ROTATOR CUFF: Status: ACTIVE | Noted: 2022-01-03

## 2022-01-03 PROBLEM — L29.0 PRURITUS ANI: Status: ACTIVE | Noted: 2022-01-03

## 2022-01-03 PROBLEM — K59.00 CONSTIPATION: Status: ACTIVE | Noted: 2021-08-26

## 2022-01-03 PROBLEM — R39.9 LOWER URINARY TRACT SYMPTOMS: Status: ACTIVE | Noted: 2019-05-13

## 2022-01-03 PROBLEM — K57.30 DIVERTICULAR DISEASE OF LARGE INTESTINE: Status: ACTIVE | Noted: 2021-08-03

## 2022-01-03 PROBLEM — R19.8 IRREGULAR BOWEL HABITS: Status: ACTIVE | Noted: 2021-07-28

## 2022-01-03 PROBLEM — R21 RASH: Status: ACTIVE | Noted: 2021-08-26

## 2022-01-03 PROBLEM — R19.4 CHANGE IN BOWEL HABITS: Status: ACTIVE | Noted: 2022-01-03

## 2022-01-03 RX ORDER — OXYBUTYNIN CHLORIDE 5 MG/1
TABLET ORAL
COMMUNITY
Start: 2021-12-25 | End: 2022-01-04

## 2022-01-03 NOTE — PROGRESS NOTES
"Answers for HPI/ROS submitted by the patient on 1/4/2022  If you checked off any problems, how difficult have these problems made it for you to do your work, take care of things at home, or get along with other people?: Somewhat difficult  PHQ9 TOTAL SCORE: 6  CHRISSY 7 TOTAL SCORE: 3      Assessment & Plan   Problem List Items Addressed This Visit     Lumbar disc herniation with radiculopathy    Neuropathy    MARIAN (obstructive sleep apnea)    Perineal pain in male - Primary    Urinary retention         Defer to pain physician to determine if an EMG is indicated.  Trial of other neuropathy medications?  Change to Lyrica?  Defer to pain physician.  Did fill out paperwork for long-term disability    30 minutes spent on the date of the encounter doing chart review, review of outside records, patient visit, documentation and Filling out forms        BMI:   Estimated body mass index is 31.94 kg/m  as calculated from the following:    Height as of this encounter: 1.657 m (5' 5.25\").    Weight as of this encounter: 87.7 kg (193 lb 6.4 oz).       Patient Instructions   1. It is reasonable to ask Dr. Prescott about EMG test  2. May consider other medications for neuropathy  3.  will fill out forms for disability       No follow-ups on file.    Viki Hanson, DO  [facility] Luciano is a 61 year old who presents for the following health issues     HPI   Chief Complaint   Patient presents with     Forms     Disability forms     Back Pain     Pain History:  When did you first notice your pain? - More than 6 weeks   Have you seen this provider for your pain in the past?   Yes   Where in your body do you have pain? Buttocks and upper legs bilaterally  Are you seeing anyone else for your pain? Yes - Arian Prescott MD at [facility] in [place] for pain management    PHQ-9 SCORE 1/4/2022   PHQ-9 Total Score MyChart 6 (Mild depression)   PHQ-9 Total Score 6     CHRISSY-7 SCORE 1/4/2022 "   Total Score 3 (minimal anxiety)   Total Score 3     PEG Score 1/4/2022   PEG Total Score 10       Chronic Pain Follow Up:    Location of pain: rectal/perineal/low back  Analgesia/pain control:    - Recent changes:  The same    - Overall control: Inadequate pain control    - Current treatments: gabapentin, injections  Adherence:     - Do you ever take more pain medicine than prescribed? No    - When did you take your last dose of pain medicine?  N/A   Adverse effects: yes, felt funny on higher dose of gabapentin  PDMP Review       Value Time User    State PDMP site checked  Yes 10/14/2021  4:18 PM Max Rico MD        Last CSA Agreement:   CSA -- Patient Level:    CSA: None found at the patient level.       Last UDS:             Urine retention:  --saw Urology 12/29 - Assessment: Reasonably good voiding on a combination of finasteride and tamsulosin.  --however, today he reports he stopped finasteride and flomax because of nocturia.  He reports Urology is aware. I don't see any telephone encounters about this, but patient reports he did talk w/Urology about this.    Pain:  --had injection 12/16, not working  --still having significant pain; feeling frustrated  --has appointment with pain clinic after appointment today  --he was taking gabapentin 600 TID, but it made him feel funny, so he self decreased to 300; did not feel higher dose helped.  --he wonders about massage to help with pain  --he is doing physical therapy - somewhat helpful  --has been seeing chiro - somewhat helpful  --wonders about EMG  --has used old opiates - not helpful, except maybe for 1 hour or so    Forms:  --short term disability expires 1/9.  Now looking to apply for long term disability  --has not made any progress despite therapies, injections, multiple medications, multiple speciality consultation   --is looking to go back to see Dr. Waggoner who did previous back surgery    Current Outpatient Medications   Medication Sig Dispense  "Refill     acetaminophen (TYLENOL) 325 MG tablet Take 3 tablets (975 mg) by mouth every 4 hours as needed for mild pain or pain       amLODIPine (NORVASC) 10 MG tablet Take 10 mg by mouth daily       gabapentin (NEURONTIN) 300 MG capsule Take 2 capsules (600 mg) by mouth 3 times daily 180 capsule 11     ibuprofen (ADVIL/MOTRIN) 600 MG tablet Take 1 tablet (600 mg) by mouth 4 times daily 56 tablet 0         Review of Systems   Constitutional, HEENT, cardiovascular, pulmonary, gi and gu systems are negative, except as otherwise noted.      Objective    /82 (BP Location: Right arm, Patient Position: Sitting, Cuff Size: Adult Regular)   Pulse 83   Temp 97.7  F (36.5  C) (Tympanic)   Resp 16   Ht 1.657 m (5' 5.25\")   Wt 87.7 kg (193 lb 6.4 oz)   SpO2 97%   BMI 31.94 kg/m    Body mass index is 31.94 kg/m .  Physical Exam   GENERAL APPEARANCE: alert and no distress                "

## 2022-01-04 ENCOUNTER — OFFICE VISIT (OUTPATIENT)
Dept: PALLIATIVE MEDICINE | Facility: CLINIC | Age: 62
End: 2022-01-04
Payer: COMMERCIAL

## 2022-01-04 ENCOUNTER — OFFICE VISIT (OUTPATIENT)
Dept: FAMILY MEDICINE | Facility: CLINIC | Age: 62
End: 2022-01-04
Payer: COMMERCIAL

## 2022-01-04 VITALS
HEART RATE: 83 BPM | TEMPERATURE: 97.7 F | SYSTOLIC BLOOD PRESSURE: 136 MMHG | RESPIRATION RATE: 16 BRPM | DIASTOLIC BLOOD PRESSURE: 82 MMHG | WEIGHT: 193.4 LBS | HEIGHT: 65 IN | OXYGEN SATURATION: 97 % | BODY MASS INDEX: 32.22 KG/M2

## 2022-01-04 VITALS — DIASTOLIC BLOOD PRESSURE: 83 MMHG | HEART RATE: 85 BPM | SYSTOLIC BLOOD PRESSURE: 144 MMHG

## 2022-01-04 DIAGNOSIS — G47.33 OSA (OBSTRUCTIVE SLEEP APNEA): ICD-10-CM

## 2022-01-04 DIAGNOSIS — R10.2 PERINEAL PAIN IN MALE: ICD-10-CM

## 2022-01-04 DIAGNOSIS — M51.16 LUMBAR DISC HERNIATION WITH RADICULOPATHY: ICD-10-CM

## 2022-01-04 DIAGNOSIS — M53.3 COCCYDYNIA: ICD-10-CM

## 2022-01-04 DIAGNOSIS — G58.8 PUDENDAL NEURALGIA: Primary | ICD-10-CM

## 2022-01-04 DIAGNOSIS — R10.2 PERINEAL PAIN IN MALE: Primary | ICD-10-CM

## 2022-01-04 DIAGNOSIS — M79.2 NEUROPATHIC PAIN: ICD-10-CM

## 2022-01-04 DIAGNOSIS — G62.9 NEUROPATHY: ICD-10-CM

## 2022-01-04 DIAGNOSIS — R33.9 URINARY RETENTION: ICD-10-CM

## 2022-01-04 PROCEDURE — 99214 OFFICE O/P EST MOD 30 MIN: CPT | Performed by: INTERNAL MEDICINE

## 2022-01-04 PROCEDURE — 99214 OFFICE O/P EST MOD 30 MIN: CPT | Performed by: PAIN MEDICINE

## 2022-01-04 RX ORDER — AMITRIPTYLINE HYDROCHLORIDE 10 MG/1
10 TABLET ORAL AT BEDTIME
Qty: 30 TABLET | Refills: 1 | Status: SHIPPED | OUTPATIENT
Start: 2022-01-04 | End: 2022-02-15 | Stop reason: SINTOL

## 2022-01-04 ASSESSMENT — ANXIETY QUESTIONNAIRES
5. BEING SO RESTLESS THAT IT IS HARD TO SIT STILL: NOT AT ALL
3. WORRYING TOO MUCH ABOUT DIFFERENT THINGS: NOT AT ALL
GAD7 TOTAL SCORE: 3
4. TROUBLE RELAXING: NEARLY EVERY DAY
6. BECOMING EASILY ANNOYED OR IRRITABLE: NOT AT ALL
GAD7 TOTAL SCORE: 3
GAD7 TOTAL SCORE: 3
2. NOT BEING ABLE TO STOP OR CONTROL WORRYING: NOT AT ALL
1. FEELING NERVOUS, ANXIOUS, OR ON EDGE: NOT AT ALL
7. FEELING AFRAID AS IF SOMETHING AWFUL MIGHT HAPPEN: NOT AT ALL
7. FEELING AFRAID AS IF SOMETHING AWFUL MIGHT HAPPEN: NOT AT ALL

## 2022-01-04 ASSESSMENT — PATIENT HEALTH QUESTIONNAIRE - PHQ9
SUM OF ALL RESPONSES TO PHQ QUESTIONS 1-9: 6
SUM OF ALL RESPONSES TO PHQ QUESTIONS 1-9: 6
10. IF YOU CHECKED OFF ANY PROBLEMS, HOW DIFFICULT HAVE THESE PROBLEMS MADE IT FOR YOU TO DO YOUR WORK, TAKE CARE OF THINGS AT HOME, OR GET ALONG WITH OTHER PEOPLE: SOMEWHAT DIFFICULT

## 2022-01-04 ASSESSMENT — MIFFLIN-ST. JEOR: SCORE: 1603.1

## 2022-01-04 ASSESSMENT — PAIN SCALES - GENERAL
PAINLEVEL: WORST PAIN (10)
PAINLEVEL: WORST PAIN (10)

## 2022-01-04 NOTE — PATIENT INSTRUCTIONS
- Further procedures recommended:     - Left pudendal nerve block   - Medication Management:    - gabapentin 300mg three times a day    - start elavil 10mg at night will gradually titrate as tolerated   - Physical Therapy: Continue  - Clinical Health Psychologist to address issues of relaxation, behavioral change, coping style, and other factors important to improvement: would strongly consider   - Diagnostic Studies:    - would consider emg   - Urine toxicology screen today: no   - Follow up:    - 1-2 months after the procedure    - reasonable to follow up with surgery        ----------------------------------------------------------------  Clinic Number:  [phone]     Call with any questions about your care and for scheduling assistance.     Calls are returned Monday through Friday between 8 AM and 4:30 PM. We usually get back to you within 2 business days depending on the issue/request.    If we are prescribing your medications:    For opioid medication refills, call the clinic or send a AudioSnaps message 7 days in advance.  Please include:    Name of requested medication    Name of the pharmacy.    For non-opioid medications, call your pharmacy directly to request a refill. Please allow 3-4 days to be processed.     Per [place] State Law:    All controlled substance prescriptions must be filled within 30 days of being written.      For those controlled substances allowing refills, pickup must occur within 30 days of last fill.      We believe regular attendance is key to your success in our program!      Any time you are unable to keep your appointment we ask that you call us at least 24 hours in advance to cancel.This will allow us to offer the appointment time to another patient.     Multiple missed appointments may lead to dismissal from the clinic.

## 2022-01-04 NOTE — PROGRESS NOTES
[facility] F/u  Chief Complaint   Patient presents with     Pain     MME prescribed prior to seeing patient:  Current MME:    rec  - Further procedures recommended:     - consider Left pudendal nerve block(would be reasonable to discuss with urologist)     - consider ganglion impar block   - Medication Management:    - would agree with continued gradual titration of gabapentin    - consider addition of elavil instead of trazodone at night for both pain and sleep. (would also recommend discussing with urology as well)  - Physical Therapy: Continue  - Clinical Health Psychologist to address issues of relaxation, behavioral change, coping style, and other factors important to improvement: would strongly consider   - Diagnostic Studies: no  - Urine toxicology screen today: no   - Follow up:    - pending decision on how to proceed    Interval   No benefit with gang impar  Pain L>R  Pain worse in the groin, test, penis, and upper buttocks  The pt no longer cath  Going BM daily   The pain is burning  The pain is stinging   Cont to be constant  Pt cont to be frustrated  Doing PHYSICAL THERAPY  With pt  The pt feels he is going crazy  Denies any new weakness  Denies recent falls  No new incontinence  Did not tolerate increase in gabapentin   Bad reaction with lyrica in the past    Pain history:  Currently being seen by neurology. Suggested further titration of gabapentin for peripheral  neuropathy. Recent LP  Seeing ortho  Seeing urology   Saw neuro surg- did notfeel is was radic in origin . Hx of L5-S1 ant fusion   Different than prior lumbar symptoms.  Reports some urinary retention/bowel- having leon possibly pulled this wk. Has scope planned   Mustapha Negrete is a 60 year old male who first started having problems with pain 4/21  Symptoms started around the time of his COVID-19 infection.  Burning stinging in his posterior thigh and buttocks    Of note has a hx L5-S1 ant fusion 11yr ago  The pain was  only on the right side  Pain radiated to his LE   The pt had surgery with essentially complete resolution of his symptoms   The pt feels his current pain is different    Currently  Possible inciting event covid infection in April   The pt did have a rash that he felt was consistent with shingels first on the right than on the left.  The never had shingels in the past  The pt saw derm who did not feel it was shingels    The pt has burning/numbness over his post thigh/rectum/testicles/penis  Of note worse pain is over his coccyx and in his rectum     The symp are worse on the Left>R  The pain is constant  The pain varies in severity   The pain varies in nature  The pain is sharp   The pain is hot  The pain isburning   Denies any symptoms below his knees   The pain is worse with prolonged in activity/being in one position  Worse with prolonged walking     The pt notes some benefit with changing position  Maybe some benefit with massage    Of note having some bleeding from the catheter     Denies incontinence  Does have retention     The pt not sleeping well- but is sleeping better than before    Pain sig affects his quality of life    Improvement with gabapentin - slowly titrating up dose. Still struggling with am dose 2/2 to sedation     Improvement with a few tabs of steroids- did not have full medrol dose pack       Pain rating: intensity  Averages 10/10 on a 0-10 scale.    Current treatments include:  Mikaela 300 tid  Motrin  acetamin    Previous medication treatments included:   cymbalta   voltaren gel   Lyrica   Other treatments have included:  Mustapha Negrete has not been seen at a pain clinic in the past.    PT: no  Chiropractic: n  Acupuncture: n  TENs Unit: n  Injections:   Pt reports     Past Medical History:  Past Medical History:   Diagnosis Date     Asthma      Past Surgical History:  Past Surgical History:   Procedure Laterality Date     ENT SURGERY      tonsillectomy     GENITOURINARY SURGERY      TURP      ORTHOPEDIC SURGERY      wrist surgery, carpal tunnel 2005     Medications:  Current Outpatient Medications   Medication Sig Dispense Refill     amitriptyline (ELAVIL) 10 MG tablet Take 1 tablet (10 mg) by mouth At Bedtime 30 tablet 1     amLODIPine (NORVASC) 10 MG tablet Take 10 mg by mouth daily       gabapentin (NEURONTIN) 300 MG capsule Take 2 capsules (600 mg) by mouth 3 times daily 180 capsule 11     acetaminophen (TYLENOL) 325 MG tablet Take 3 tablets (975 mg) by mouth every 4 hours as needed for mild pain or pain (Patient not taking: Reported on 1/4/2022)       ibuprofen (ADVIL/MOTRIN) 600 MG tablet Take 1 tablet (600 mg) by mouth 4 times daily (Patient not taking: Reported on 1/4/2022) 56 tablet 0     Allergies:     Allergies   Allergen Reactions     Droperidol Other (See Comments)     Extrapyramidal Side Effect     Fenofibrate Headache and Diarrhea              Fentanyl      Other reaction(s): N&V hard to wake up     Keflex [Cephalexin] Itching     Lactose GI Disturbance         Other reaction(s): GI upset     Midazolam      Other reaction(s): N&V, Hard to wake up     Monosodium Glutamate      Other reaction(s): diarrhea, headaches     Pcn [Penicillins] Other (See Comments)     Feels warm and flushed, but tolerates Cephalexin     Atorvastatin Palpitations     Other reaction(s): palpitations     Sertraline Palpitations         Other reaction(s): Unknown     Simvastatin Palpitations     Other reaction(s): palpitations     Sulfa Drugs Headache and Rash     Burning    Other reaction(s): Rash  Other reaction(s): rash and headache     Tetracycline Rash     Burning    Other reaction(s): rash     Social History:     uHistory of chemical dependency treatment: no    Family history:  Family History   Problem Relation Age of Onset     Cerebrovascular Disease Mother      Hypertension Mother      Diabetes Mother      Cancer Father 84        liver cancer     Prostate Cancer Father      Family history of headaches:    n    Review of Systems:  Skin: negative  Eyes: negative  Ears/Nose/Throat: negative  Respiratory: No shortness of breath, dyspnea on exertion, cough, or hemoptysis  Cardiovascular: negative  Gastrointestinal: negative  Genitourinary: negative  Musculoskeletal: negative  Neurologic: negative  Psychiatric: negative  Hematologic/Lymphatic/Immunologic: negative  Endocrine: negative    Physical Exam:  Vitals:    01/04/22 1309   BP: (!) 144/83   Pulse: 85     Exam:  Constitutional: healthy, alert and no distress  Head: normocephalic. Atraumatic.   Eyes: no redness or jaundice noted   card: Negative JVD  Respiratory: Speaking in full sentences no accessory muscles use   gastrointestinal: soft  Skin: no suspicious lesions or rashes  Psychiatric: mentation appears normal and affect normal/bright    Musculoskeletal exam:  Gait/Station/Posture: wnl had to get up multiple times during apt  Cervical spine: ROMwnl       Thoracic spine:  Normal     Lumbar spine:     ROM: wnl   Myofascial tenderness:  M+++   Joshi's: ++               Straight leg exam: neg on the left   HUMPHREY/FADIR: mild              SI: neg   Greater trochanteric bursa: neg  Neurologic exam:    Motor:  5/5 UE and LE strength  Reflexes:       Achilles:  +2    Sensory:  (upper and lower extremities):   Light touch: normal    Allodynia: absent    Dysethesia: ++++   Hyperalgesia: absent     Diagnostic tests:       T12-L1: Normal disc signal and disc space height. No focal disc herniation. Normal facets. No spinal canal stenosis or neural foraminal narrowing.      L1-L2: Disc desiccation and minimal loss of disc space height. Left central disc protrusion. Normal facets. No spinal canal stenosis or neural foraminal narrowing.     L2-L3: Disc desiccation and mild loss of disc space height. Mild circumferential disc bulge. Normal facets. No spinal canal stenosis or neural foraminal narrowing.      L3-L4: Disc desiccation and mild loss of disc space height.  Circumferential disc bulge with superimposed right paracentral disc protrusion with narrowing of the right lateral recess and apparent mass effect on the descending/proximal traversing right L4   nerve root. Mild spinal canal stenosis. Mild bilateral neural foraminal stenosis.     L4-L5: Disc desiccation and moderate loss of disc space height. Circumferential disc bulge with superimposed right paracentral slightly caudally directed disc extrusion. There is narrowing of the right lateral recess and apparent mass effect on the   descending/proximal traversing right L5 nerve root. Severe degenerative facet changes. Mild spinal canal stenosis. Mild to moderate bilateral neural foraminal stenosis.     L5-S1: Partial fusion across the disc space. Mild circumferential disc interbody spurring. Mild facet arthropathy. No spinal canal stenosis. No right neural foraminal stenosis. Mild left neural foraminal stenosis.                                                                      IMPRESSION:  1.  Mild to moderate lumbar spondylitic changes superimposed on mild developmental narrowing of the lumbar spinal canal.  2.  At L4-L5, there is a right paracentral slightly caudally directed disc extrusion that narrows the right lateral recess and causes mass effect on the descending/traversing right L5 nerve root.  3.  At L3-L4, there is a right paracentral disc protrusion superimposed on disc bulge with narrowing of the right lateral recess and mass effect on the descending/traversing right L4 nerve root.                                                                   IMPRESSION:   1. No change in slight retrolisthesis of L2 on L3 and anterior slip of  L4 4 L5.  2. Anterior fusion L5-S1 with ossification of the annulus.  3. Degenerative changes as noted above.    Impression:  Postsurgical changes of left total hip arthroplasty with metallic  susceptibility artifact partially compromising assessment.  1. No ischial tuberosity  bursitis or collection on either side.   2. Suspect right anterosuperior acetabular labral tearing.          Assessment/Plan:  Mustapha Negrete is a 60 year old male who presents with the complaints of bilat l>r post thigh/rectum/testicles/penis. Currently most sig symp is his rectal pain.  Mustapha was seen today for pain.    Diagnoses and all orders for this visit:    Pudendal neuralgia  -     PAIN INJECTION EVAL/TREAT/FOLLOW UP; Future  -     amitriptyline (ELAVIL) 10 MG tablet; Take 1 tablet (10 mg) by mouth At Bedtime    Neuropathic pain    Perineal pain in male    Coccydynia         - Further procedures recommended:     - Left pudendal nerve block  - Medication Management:    - gabapentin 300mg three times a day    - start elavil 10mg at night will gradually titrate as tolerated   - Physical Therapy: Continue  - Clinical Health Psychologist to address issues of relaxation, behavioral change, coping style, and other factors important to improvement: would strongly consider   - Diagnostic Studies:    - would consider emg   - Urine toxicology screen today: no   - Follow up:    - 1-2 months after the procedure    - reasonable to follow up with surgery          The total TIME spent on this patient on the day of the appointment was25 minutes.   Time spent preparing to see the patient (reviewing records and tests)  Time spend face to face with the patient  Time spent ordering tests, medications, procedures and referrals  Time spent Referring and communicating with other healthcare professionals  Documenting clinical information in Epic    Arian Prescott MD  [facility]  This note was created with voice recognition software, and while reviewed for accuracy, typos may remain.

## 2022-01-04 NOTE — PATIENT INSTRUCTIONS
1. It is reasonable to ask Dr. Prescott about EMG test  2. May consider other medications for neuropathy  3.  will fill out forms for disability

## 2022-01-05 ENCOUNTER — TELEPHONE (OUTPATIENT)
Dept: PALLIATIVE MEDICINE | Facility: CLINIC | Age: 62
End: 2022-01-05
Payer: COMMERCIAL

## 2022-01-05 ASSESSMENT — ANXIETY QUESTIONNAIRES: GAD7 TOTAL SCORE: 3

## 2022-01-05 NOTE — TELEPHONE ENCOUNTER
Form completed, signed, and faxed to [facility] at [phone]. Copy of form sent to scan and copy placed in cabinet.

## 2022-01-05 NOTE — TELEPHONE ENCOUNTER
Screening Questions for Radiology Injections:    Injection to be done at which interventional clinic site? [facility] - [facility]    Remind [place] Pt's that Covid test is no longer required except for sedation procedure Pt's.    Instruct patient to arrive as directed prior to the scheduled appointment time:    [facility]      [place]: 30 minutes before; if IV needed 1 hour before     Procedure ordered by Genie    Procedure ordered? left Pudendal nerve block      Transforaminal Cervical LEILA -  [place] does NOT have providers that do these- [facility] and [facility] do have providers that do    As a reminder, receiving steroids can decrease your body's ability to fight infection.   Would you still like to move forward with scheduling the injection?  Yes    What insurance would patient like us to bill for this procedure? [place] - will bring info to appointment      Worker's comp or MVA (motor vehicle accident) -Any injection DO NOT SCHEDULE and route to Mis Loza.      "[employer] insurance - For SI joint injections, DO NOT SCHEDULE and route Mis Loza.       ALL BCBS, [employer] and [employer]-Route to Mis for review DO NOT SCHEDULE      IF SCHEDULING IN [place] AND NEEDS A PA, IT IS OKAY TO SCHEDULE. [place] HANDLES THEIR OWN PA'S AFTER THE PATIENT IS SCHEDULED. PLEASE SCHEDULE AT LEAST 1 WEEK OUT SO A PA CAN BE OBTAINED.    Any chance of pregnancy? Not Applicable   If YES, do NOT schedule and route to RN pool    Is an  needed? No     Patient has a drive home? (mandatory) YES: INFORMED    Is patient taking any blood thinners (That is: Coumadin, Warfarin, Jantoven, Pradaxa Xarelto, Eliquis, Edoxaban, Enoxaparin, Lovenox, Heparin, Arixtra, Fondaparinux, or Fragmin? OR Antiplatelet medication such as Plavix, Brilinta, or Effient? )? No   If hold needed, do NOT schedule, route to RN pool     Is patient taking any aspirin products (includes Excedrin and Fiorinal)? No     If  more than 325mg/day, OK to schedule; Instruct pt to decrease to less than 325 mg for 7 days AND route to RN pool    For CERVICAL procedures, hold all aspirin products for 6 days.     Tell pt that if aspirin product is not held for 6 days, the procedure WILL BE cancelled.      Does the patient have a bleeding or clotting disorder? No     If YES, okay to schedule AND route to RN nurse pool    For any patients with platelet count <100, must be forwarded to provider    Any allergies to contrast dye, iodine, shellfish, or numbing and steroid medications? No    If YES, add allergy information to appointment notes AND route to the RN pool     If LEILA and Contrast Dye / Iodine Allergy? DO NOT SCHEDULE, route to RN pool    Allergies: Droperidol, Fenofibrate, Fentanyl, Keflex [cephalexin], Lactose, Midazolam, Monosodium glutamate, Pcn [penicillins], Atorvastatin, Sertraline, Simvastatin, Sulfa drugs, and Tetracycline     Is patient diabetic?  No  If YES, instruct them to bring their glucometer.    Does patient have an active infection or treated for one within the past week? No     Is patient currently taking any antibiotics?  No     For patients on chronic, preventative, or prophylactic antibiotics, procedures may be scheduled.     For patients on antibiotics for active or recent infection:antibiotic course must have been completed for 4 days    Is patient currently taking any steroid medications? (i.e. Prednisone, Medrol)  No     For patients on steroid medications, course must have been completed for 4 days    Is patient actively being treated for cancer or immunocompromised? No  If YES, do NOT schedule and route to RN pool     Are you able to get on and off an exam table with minimal or no assistance? Yes  If NO, do NOT schedule and route to RN pool    Are you able to roll over and lay on your stomach with minimal or no assistance? Yes  If NO, do NOT schedule and route to RN pool     Has the patient had a flu shot or any  other vaccinations within 7 days before or after the procedure.  No     Have you recently had a COVID vaccine or have plans to get it in the near future? No    If yes, explain that for the vaccine to work best they need to:       wait 1 week before and 1 week after getting Vaccine #1    wait 1 week before and 2 weeks after getting Vaccine #2    wait 1 week before and 2 weeks after getting Vaccine BOOSTER    If patient has concerns about the timing, send to RN pool     Does patient have an MRI/CT?  Not Applicable  Check Procedure Scheduling Grid to see if required.      Was the MRI done within the last 3 years?  NA    If yes, where was the MRI done i.e.[facility], [facility], [facility], [facility] etc?       If no, do not schedule and route to RN pool    If MRI was not done at [place], [facility] or [facility] do NOT schedule and route to RN pool.      If pt has an imaging disc, the injection MAY be scheduled but pt has to bring disc to appt.     If they show up without the disc the injection cannot be done    Procedure Specific Instructions:      If celiac plexus block, informed patient NPO for 6 hours and that it is okay to take medications with sips of water, especially blood pressure medications  Not Applicable         If this is for a cervical procedure, informed patient that aspirin needs to be held for 6 days.   Not Applicable      If IV needed:    Do not schedule procedures requiring IV placement in the first appointment of the day or first appointment after lunch. Do NOT schedule at 0745, 0815 or 1245.     Instructed pt to arrive 30 minutes early for IV start if required. (Check Procedure Scheduling Grid)  Not Applicable    Reminders:      If you are started on any steroids or antibiotics between now and your appointment, you must contact us because the procedure may need to be cancelled.  Yes      For all procedures except radiofrequency ablations (RFAs) and spinal cord stimulator (SCS) trials,  informed patient:    IV sedation is not provided for this procedure.  If you feel that an oral anti-anxiety medication is needed, you can discuss this further with your referring provider or primary care provider.  The Pain Clinic provider will discuss specifics of what the procedure includes at your appointment.  Most procedures last 10-20 minutes.  We use numbing medications to help with any discomfort during the procedure.  Not Applicable      For patients 85 or older we recommend having an adult stay w/ them for the remainder of the day.       Does the patient have any questions?  NO  Yanira Jiménez  [facility]

## 2022-01-10 ENCOUNTER — PATIENT OUTREACH (OUTPATIENT)
Dept: CARE COORDINATION | Facility: CLINIC | Age: 62
End: 2022-01-10
Payer: MEDICAID

## 2022-01-10 DIAGNOSIS — M54.50 LOW BACK PAIN: Primary | ICD-10-CM

## 2022-01-10 ASSESSMENT — ACTIVITIES OF DAILY LIVING (ADL): DEPENDENT_IADLS:: TRANSPORTATION

## 2022-01-10 NOTE — PROGRESS NOTES
Clinic Care Coordination Contact  [facility]/Voicemail    Referral Source: PCP  10/16/2021 ED visit Urinary retention   10/29/2021 PCP referral   Financial Support  Clinical Data: Care Coordinator Outreach  Outreach attempted x 1.  Left message on patient's voicemail with call back information and requested return call.  Plan: . Care Coordinator will try to reach patient again in 3-5 business days.  [facility]   Omaira Daigle RN, Care Coordinator   [facility]   E-mail [email or web address]   [phone]

## 2022-01-12 ENCOUNTER — OFFICE VISIT (OUTPATIENT)
Dept: NEUROLOGY | Facility: CLINIC | Age: 62
End: 2022-01-12
Payer: MEDICAID

## 2022-01-12 VITALS — DIASTOLIC BLOOD PRESSURE: 94 MMHG | HEART RATE: 85 BPM | OXYGEN SATURATION: 96 % | SYSTOLIC BLOOD PRESSURE: 150 MMHG

## 2022-01-12 DIAGNOSIS — M79.2 NEURALGIA: Primary | ICD-10-CM

## 2022-01-12 PROCEDURE — 99214 OFFICE O/P EST MOD 30 MIN: CPT | Mod: GC | Performed by: PSYCHIATRY & NEUROLOGY

## 2022-01-12 RX ORDER — CARBAMAZEPINE 200 MG/1
400 TABLET ORAL 2 TIMES DAILY
Qty: 120 TABLET | Refills: 6 | Status: SHIPPED | OUTPATIENT
Start: 2022-01-12 | End: 2022-02-15 | Stop reason: SINTOL

## 2022-01-12 NOTE — PROGRESS NOTES
DEPARTMENT OF NEUROLOGY    Patient Name:  Mustapha Negrete  MRN:  [id number]    :  1960  Date of Clinic Visit:  2022  Primary Care Provider:  Viki Hanson    Assessment:  Mr. Negrete is a 61-year-old male with a history of hypertension who returns this afternoon for follow-up of his neuropathic pain.  He was last seen for this concern on 2021, at which time we elected to pursue a lumbar puncture to evaluate for infectious and/or neoplastic processes to explain a potential lesion in his conus medullaris.  This work-up has since returned unrevealing but unfortunately the patient continues to have significant neuropathic pain and numbness in the S1-S2 distribution.  He has been seen in the pain clinic and has received a nerve block, although he did not feel as though this was helpful.  He has also been taking the gabapentin he was prescribed but does not feel like this has been helpful either.    At this point, it is unclear to me as to what exactly is causing the patient's pain.  Interestingly, it is well confined to the S1-S2 dermatome although I am less convinced that his symptoms are coming from the possible cord signal abnormality previously identified in the conus medullaris, especially in the setting of his bland CSF work-up.  I do think that he should continue working with the pain medicine specialists to see if there are any procedures/interventions that he could potentially benefit from.  In the interim, we will start him on carbamazepine for his neuralgic pain.      Plan:  - Start carbamazepine titration as follows:   - Take one tablet (200 mg) daily for the first few days   - Then, take one tablet in the morning and one tablet in the evening for one week   - Then, take one tablet in the morning and two tablets in the evening for one week   - Then, take two tablets in the morning and two tablets in the evening (I.e., 800 mg total daily)  - We will check a CBC and CMP in one  month  - Continue following in the pain clinic for injections/procedures      Patient has been seen with Dr. Gore who agrees with my assessment and plan.    Vasile Escalera MD  [facility] Department of Neurology PGY-2      ------------------------------------------------------------------------------------------------------------    Chief Complaint: Follow-up neuropathic pain      HPI: Mr. Negrete is a 61-year-old male with a history of hypertension who returns to neurology this afternoon for follow-up of neuropathic pain.  He was initially seen for this concern on 11/4/2021, at which time a lumbar puncture was ordered looking for an infectious or neoplastic process leading to the patient's symptoms.  He was also initiated on a increasing titration of gabapentin for his neuropathic pain.  Since last being seen in the office, patient did call into the clinic reporting ongoing 10/10 pain unabated by the gabapentin and he was encouraged to consider pursuing a nerve block. Please see Dr. Morse's note from 11/4/2021 for additional information regarding this patient's initial presentation to Neurology.     Interval history: Today, patient reiterates that he for started having his sacral pain approximately 2 weeks following having his gallbladder removed in May 2021.  He reports that the gabapentin he was previously prescribed has not been helping at all.  He reached up to a dose of 600 mg 3 times daily but noted some grogginess at this dosing and has since decreased his daily dosing to 300 mg 3 times daily.  Patient also shares that he did go for a nerve block through the pain clinic, although this also failed to relieve any of his pain unfortunately.  He is scheduled to have another nerve block; plan had been for him to get this done tomorrow, but apparently this has been rescheduled out about a week.    In addition to the above, patient has been doing some strengthening exercises on his own.  He had been  "working with physical therapy but has not in recent history due to changes in his insurance.  Other approaches that he has been taking include massage therapy as well as taking alpha lipoic acid for nerve health.  Patient notes that his pain doctor did prescribe him amitriptyline, but he notes that he has not tried this at all due to concern for side effects; specifically, he is concerned about urinary retention as a side effect of this medication.      ROS: 10-point review of systems was negative except for as noted above.     Vital signs:                         Estimated body mass index is 31.94 kg/m  as calculated from the following:    Height as of 1/4/22: 1.657 m (5' 5.25\").    Weight as of 1/4/22: 87.7 kg (193 lb 6.4 oz).      Examination:   -General: Sitting on the chair, appears to be in mild distress with repositioning.  -HEENT: No skin discolorations noted. Head is normocephalic, atraumatic.   -Pulm: Normal work of breathing on RA.   -Abdomen: Non-distended.   -Musculoskeletal: No abnormalities noted on gross inspection.     -Neurological:   --MS: Patient is alert, attentive, and oriented. Speech is clear and fluent.  Remote memory is intact  --CNs: Pupils symmetric, round. Ocular motility is full. Muscles of mastication and facial expression normal. Hearing intact to conversation. Shoulder shrug is strong and symmetric.   --Motor: Normal muscle bulk. Muscle strength is 4+/5 bilaterally at the hip flexors.  Knee flexion and extension is 5/5 bilaterally.  Plantar flexion is 5/5 bilaterally, although dorsiflexion is 4/5 bilaterally.  He has normal tone throughout the lower extremities.  --Sensory: Intact to light touch in 4 out of 4 extremities  --Gait: Stands with feet normally spaced. Gait is antalgic.      INVESTIGATIONS:  All available and relevant labs, imaging, and other procedures were reviewed with the patient at this visit. Those of particular significance are listed below:    CSF cell count: 0 " nucleated cells, 21 RBCs  Cytology: Negative for malignancy  Flow cytometry: No immunophenotypic evidence for B-cell lymphoma.  CSF glucose: 64  CSF protein: 36  CSF HSV and VZV: Negative

## 2022-01-12 NOTE — LETTER
2022       RE: Mustapha Negrete  [address] [address] [address]     Dear Colleague,    Thank you for referring your patient, Mustapha Negrete, to the [facility] [place] at [facility]. Please see a copy of my visit note below.      DEPARTMENT OF NEUROLOGY    Patient Name:  Mustapha Negrete  MRN:  [id number]    :  1960  Date of Clinic Visit:  2022  Primary Care Provider:  Viki Hanson    Assessment:  Mr. Negrete is a 61-year-old male with a history of hypertension who returns this afternoon for follow-up of his neuropathic pain.  He was last seen for this concern on 2021, at which time we elected to pursue a lumbar puncture to evaluate for infectious and/or neoplastic processes to explain a potential lesion in his conus medullaris.  This work-up has since returned unrevealing but unfortunately the patient continues to have significant neuropathic pain and numbness in the S1-S2 distribution.  He has been seen in the pain clinic and has received a nerve block, although he did not feel as though this was helpful.  He has also been taking the gabapentin he was prescribed but does not feel like this has been helpful either.    At this point, it is unclear to me as to what exactly is causing the patient's pain.  Interestingly, it is well confined to the S1-S2 dermatome although I am less convinced that his symptoms are coming from the possible cord signal abnormality previously identified in the conus medullaris, especially in the setting of his bland CSF work-up.  I do think that he should continue working with the pain medicine specialists to see if there are any procedures/interventions that he could potentially benefit from.  In the interim, we will start him on carbamazepine for his neuralgic pain.      Plan:  - Start carbamazepine titration as follows:   - Take one tablet (200 mg) daily for the first few days   -  Then, take one tablet in the morning and one tablet in the evening for one week   - Then, take one tablet in the morning and two tablets in the evening for one week   - Then, take two tablets in the morning and two tablets in the evening (I.e., 800 mg total daily)  - We will check a CBC and CMP in one month  - Continue following in the pain clinic for injections/procedures      Patient has been seen with Dr. Gore who agrees with my assessment and plan.    Vasile Escalera MD  [facility] Department of Neurology PGY-2      ------------------------------------------------------------------------------------------------------------    Chief Complaint: Follow-up neuropathic pain      HPI: Mr. Negrete is a 61-year-old male with a history of hypertension who returns to neurology this afternoon for follow-up of neuropathic pain.  He was initially seen for this concern on 11/4/2021, at which time a lumbar puncture was ordered looking for an infectious or neoplastic process leading to the patient's symptoms.  He was also initiated on a increasing titration of gabapentin for his neuropathic pain.  Since last being seen in the office, patient did call into the clinic reporting ongoing 10/10 pain unabated by the gabapentin and he was encouraged to consider pursuing a nerve block. Please see Dr. Mosre's note from 11/4/2021 for additional information regarding this patient's initial presentation to Neurology.     Interval history: Today, patient reiterates that he for started having his sacral pain approximately 2 weeks following having his gallbladder removed in May 2021.  He reports that the gabapentin he was previously prescribed has not been helping at all.  He reached up to a dose of 600 mg 3 times daily but noted some grogginess at this dosing and has since decreased his daily dosing to 300 mg 3 times daily.  Patient also shares that he did go for a nerve block through the pain clinic, although this also failed to  "relieve any of his pain unfortunately.  He is scheduled to have another nerve block; plan had been for him to get this done tomorrow, but apparently this has been rescheduled out about a week.    In addition to the above, patient has been doing some strengthening exercises on his own.  He had been working with physical therapy but has not in recent history due to changes in his insurance.  Other approaches that he has been taking include massage therapy as well as taking alpha lipoic acid for nerve health.  Patient notes that his pain doctor did prescribe him amitriptyline, but he notes that he has not tried this at all due to concern for side effects; specifically, he is concerned about urinary retention as a side effect of this medication.      ROS: 10-point review of systems was negative except for as noted above.     Vital signs:                         Estimated body mass index is 31.94 kg/m  as calculated from the following:    Height as of 1/4/22: 1.657 m (5' 5.25\").    Weight as of 1/4/22: 87.7 kg (193 lb 6.4 oz).      Examination:   -General: Sitting on the chair, appears to be in mild distress with repositioning.  -HEENT: No skin discolorations noted. Head is normocephalic, atraumatic.   -Pulm: Normal work of breathing on RA.   -Abdomen: Non-distended.   -Musculoskeletal: No abnormalities noted on gross inspection.     -Neurological:   --MS: Patient is alert, attentive, and oriented. Speech is clear and fluent.  Remote memory is intact  --CNs: Pupils symmetric, round. Ocular motility is full. Muscles of mastication and facial expression normal. Hearing intact to conversation. Shoulder shrug is strong and symmetric.   --Motor: Normal muscle bulk. Muscle strength is 4+/5 bilaterally at the hip flexors.  Knee flexion and extension is 5/5 bilaterally.  Plantar flexion is 5/5 bilaterally, although dorsiflexion is 4/5 bilaterally.  He has normal tone throughout the lower extremities.  --Sensory: Intact to light " touch in 4 out of 4 extremities  --Gait: Stands with feet normally spaced. Gait is antalgic.      INVESTIGATIONS:  All available and relevant labs, imaging, and other procedures were reviewed with the patient at this visit. Those of particular significance are listed below:    CSF cell count: 0 nucleated cells, 21 RBCs  Cytology: Negative for malignancy  Flow cytometry: No immunophenotypic evidence for B-cell lymphoma.  CSF glucose: 64  CSF protein: 36  CSF HSV and VZV: Negative    Attestation signed by Swathi Gore MD at 1/21/2022 10:01 AM:  Physician Attestation   I, Swathi Gore MD, saw this patient and agree with the findings and plan of care as documented in the note.      Reviewed LP results, which are reassuring. Discussed symptomatic treatment options, and recommend trial of carbamazepine with lab monitoring.    Swathi Gore MD

## 2022-01-12 NOTE — PATIENT INSTRUCTIONS
- Start by taking one tablet (200 mg) daily for the first few days. After that, begin taking one tablet in the morning and one tablet in the evening for one week. If you are tolerating the medication well, increase to one tablet in the morning and two tablets in th evening for one week. After one week, increase to a target dose of two tablets in the morning and two tablets in the evening.  - OK to taper off the gabapentin as we discussed.

## 2022-01-19 ENCOUNTER — TELEPHONE (OUTPATIENT)
Dept: NEUROLOGY | Facility: CLINIC | Age: 62
End: 2022-01-19
Payer: MEDICAID

## 2022-01-19 NOTE — TELEPHONE ENCOUNTER
"I called and spoke to Mustapha.  He is reporting that he is now up to two tables of Tegretol (400 mg) but is feeling extremely fatigued, a \"little out of it\", and is experiencing upper and lower extremity achiness.  I told him I would update Dr Escalera and call him back with any recommendations.    Message sent to Dr Escalera  "

## 2022-01-19 NOTE — TELEPHONE ENCOUNTER
[facility] Call Center    Phone Message    May a detailed message be left on voicemail: yes     Reason for Call: Medication Question or concern regarding medication   Prescription Clarification  Name of Medication: carBAMazepine (TEGRETOL) 200 MG tablet  Prescribing Provider: Dr. Escalera   Pharmacy: NA   What on the order needs clarification? Pt says that medication is too strong and making him extremely exhausted and some joint pains.    Please call Pt back to advise if he can just stop taking.          Action Taken: Message routed to:  [facility] Neurology    Travel Screening: Not Applicable

## 2022-01-20 ENCOUNTER — RADIOLOGY INJECTION OFFICE VISIT (OUTPATIENT)
Dept: PALLIATIVE MEDICINE | Facility: CLINIC | Age: 62
End: 2022-01-20
Attending: PAIN MEDICINE
Payer: MEDICAID

## 2022-01-20 VITALS — HEART RATE: 89 BPM | SYSTOLIC BLOOD PRESSURE: 157 MMHG | DIASTOLIC BLOOD PRESSURE: 97 MMHG

## 2022-01-20 DIAGNOSIS — G58.8 PUDENDAL NEURALGIA: ICD-10-CM

## 2022-01-20 PROCEDURE — 77002 NEEDLE LOCALIZATION BY XRAY: CPT | Performed by: PAIN MEDICINE

## 2022-01-20 PROCEDURE — 64430 NJX AA&/STRD PUDENDAL NERVE: CPT | Mod: 50 | Performed by: PAIN MEDICINE

## 2022-01-20 RX ORDER — TRIAMCINOLONE ACETONIDE 40 MG/ML
40 INJECTION, SUSPENSION INTRA-ARTICULAR; INTRAMUSCULAR ONCE
Status: COMPLETED | OUTPATIENT
Start: 2022-01-20 | End: 2022-01-20

## 2022-01-20 RX ADMIN — TRIAMCINOLONE ACETONIDE 40 MG: 40 INJECTION, SUSPENSION INTRA-ARTICULAR; INTRAMUSCULAR at 14:26

## 2022-01-20 ASSESSMENT — PAIN SCALES - GENERAL
PAINLEVEL: WORST PAIN (10)
PAINLEVEL: EXTREME PAIN (8)

## 2022-01-20 NOTE — NURSING NOTE
Discharge Information    IV Discontiued Time:  NA    Amount of Fluid Infused:  NA    Discharge Criteria = When patient returns to baseline or as per MD order    Consciousness:  Pt is fully awake    Circulation:  BP +/- 20% of pre-procedure level    Respiration:  Patient is able to breathe deeply    O2 Sat:  Patient is able to maintain O2 Sat >92% on room air    Activity:  Moves 4 extremities on command    Ambulation:  Patient is able to stand and walk or stand and pivot into wheelchair    Dressing:  Clean/dry or No Dressing    Notes:   Discharge instructions and AVS given to patient    Patient meets criteria for discharge?  YES    Admitted to PCU?  No    Responsible adult present to accompany patient home?  Yes    Signature/Title:    Lorelei Villarreal RN  RN Care Coordinator  [facility]

## 2022-01-20 NOTE — PATIENT INSTRUCTIONS
[facility]   Procedure Discharge Instructions    Today you saw:  Dr. Arian Prescott   You had an:  Left Pudendal Nerve Block           Be cautious when walking. Numbness and/or weakness in the lower extremities and rectum may occur for up to 6-8 hours after the procedure due to effect of the local anesthetic    Do not drive for 6 hours. The effect of the local anesthetic could slow your reflexes.     You may resume your regular activities after 24 hours    Avoid strenuous activity for the first 24 hours    You may shower, however avoid swimming, tub baths or hot tubs for 24 hours following your procedure    You may have a mild to moderate increase in pain for several days following the injection.    It may take up to 14 days for the steroid medication to start working although you may feel the effect as early as a few days after the procedure.       You may use ice packs for 10-15 minutes, 3 to 4 times a day at the injection site for comfort    Do not use heat to painful areas for 6 to 8 hours. This will give the local anesthetic time to wear off and prevent you from accidentally burning your skin.     Unless you have been directed to avoid the use of anti-inflammatory medications (NSAIDS), you may use medications such as ibuprofen, Aleve or Tylenol for pain control if needed.     If you were fasting, you may resume your normal diet and medications after the procedure    If you have diabetes, check your blood sugar more frequently than usual as your blood sugar may be higher than normal for 10-14 days following a steroid injection. Contact your doctor who manages your diabetes if your blood sugar is higher than usual    Possible side effects of steroids that you may experience include flushing, elevated blood pressure, increased appetite, mild headaches and restlessness.  All of these symptoms will get better with time.    If you experience any of the following, call the Pain Clinic  during work hours (Mon-Friday 8-4:30 pm) at [phone] or the Provider Line after hours at [phone]:  -Fever over 100 degree F  -Swelling, bleeding, redness, drainage, warmth at the injection site  -Progressive weakness or numbness in your legs   -Loss of bowel or bladder function (rectal numbness is normal)  -Unusual headache that is not relieved by Tylenol or other pain reliever  -Unusual new onset of pain that is not improving

## 2022-01-20 NOTE — PROGRESS NOTES
Pre procedure Diagnosis: Pudendal neuralgia   Post procedure Diagnosis: Same  Procedure performed: Bilateral Pudendal nerve blockinjection  Anesthesia: none  Complications: none  Operators: Arian Prescott MD     Indications:   Mustapha Negrete is a 61 year old male. The patient has a history of bilateral perineal and testicle/penis pain . Other conservative treatments prior to injection include .    Options/alternatives, benefits and risks were discussed with the patient including bleeding, infection, tissue trauma, exposure to radiation, reaction to medications including seizure, nerve injury, weakness, and numbness.  Questions were answered to his satisfaction and he agrees to proceed. Voluntary informed consent was obtained and signed.     Vitals were reviewed: Yes  Allergies were reviewed:  Yes   Medications were reviewed:  Yes   Pre-procedure pain score: 10/10    Procedure:  After obtaining signed informed consent, the patient was brought into the procedure suite and was placed in a prone position on the procedure table.   A Pause for the Cause was performed.  The patient was prepped and draped in the usual sterile fashion.     After identifying the bilateral ischium, the C-arm was rotated obliquely to obtain the best view of the ischial spine  Then 3 ml of Lidocaine 1%  was used to anesthetize the skin at a skin entry site coaxial with the fluoroscopy beam at this location.  A 22 gauge 5 inch needle was advanced under intermittent fluoroscopy until it was felt to contact the ischial spine and walked off the medial boarder.  Aspiration was negative.    A total of 1ml of Omnipaque-300 was injected, confirming appropriate position, with no intravascular uptake noted.  9ml of Omnipaque was wasted. Location was verified in lateral view.    2 ml of 0.5% bupivacaine with 40mg of Kenalog was injected.  The needle was removed.     Hemostasis was achieved, the area was cleaned, and bandaids were placed when appropriate.   The patient tolerated the procedure well, and was taken to the recovery room.  Images were saved to PACS.    Post-procedure pain score: 8/10  Follow-up includes:   -f/u phone call in one week  -f/u with referring Physician     Arian Prescott MD  [facility]

## 2022-01-21 ENCOUNTER — PATIENT OUTREACH (OUTPATIENT)
Dept: NURSING | Facility: CLINIC | Age: 62
End: 2022-01-21
Payer: MEDICAID

## 2022-01-21 DIAGNOSIS — M54.50 LOW BACK PAIN: Primary | ICD-10-CM

## 2022-01-21 DIAGNOSIS — R33.9 URINARY RETENTION: ICD-10-CM

## 2022-01-21 ASSESSMENT — ACTIVITIES OF DAILY LIVING (ADL): DEPENDENT_IADLS:: TRANSPORTATION

## 2022-01-21 NOTE — PROGRESS NOTES
Clinic Care Coordination Contact    Follow Up Progress Note   PCP referral 10/29 Financial support needed      Assessment:   2 positive things is the patient has health insurance and now his disability is in place     Patient continues to work with the Urologist for urinary retention .  Patient voids small amounts  Patient plans to start self catheterizing     Patient continue to get injections for the pain in back of legs,buttocks pain that wraps around colon and penis    Patient continues to see a Massage therapist for tight muscles     Neurologist is looking for a specialist to refer patient to in the future   Patient states he is feeling down due to not getting any relief of pain.  Patient does have support from his son,daughter,brother and sister   Care Gaps:    Health Maintenance Due   Topic Date Due     PREVENTIVE CARE VISIT  Never done     ASTHMA ACTION PLAN  Never done     COVID-19 Vaccine (1) Never done     Pneumococcal Vaccine: Pediatrics (0 to 5 Years) and At-Risk Patients (6 to 64 Years) (1 of 2 - PPSV23) Never done     HIV SCREENING  Never done     HEPATITIS C SCREENING  Never done     LIPID  Never done     ZOSTER IMMUNIZATION (1 of 2) Never done     INFLUENZA VACCINE (1) Never done       Care Gap Goal set: Yes    Goals addressed this encounter:   Goals Addressed                    This Visit's Progress       Medical (pt-stated)   0%      Goal Statement #2: I will complete the Health Maintenance due in the next 1-3 months   Health Maintenance Due   Topic Date Due     PREVENTIVE CARE VISIT  Never done     ASTHMA ACTION PLAN  Never done     ADVANCE CARE PLANNING  Never done     Pneumococcal Vaccine: Pediatrics (0 to 5 Years) and At-Risk Patients (6 to 64 Years) (1 of 2 - PPSV23) Never done     COVID-19 Vaccine (1) Never done     HIV SCREENING  Never done     HEPATITIS C SCREENING  Never done     LIPID  Never done     ZOSTER IMMUNIZATION (1 of 2) Never done     INFLUENZA VACCINE (1) Never done      Date  Goal set: 11/2/2021  Barriers: None identified   Strengths: agrees with the plan   Date to Achieve By: 2/2/2022  Patient expressed understanding of goal: Yes  Action steps to achieve this goal:  1. I will discuss with my provider at a future visit            Pain Management (pt-stated)   90%      Goal Statement #1: I will have my leon catheter removed 11/16 at Urology visit and  will be able to urinate on my own .    I will decrease bilateral buttocks and thigh pain in the next 3-6  months   Date Goal set: 11/2/2021  Barriers: constant pain   Strengths: Motivated   Date to Achieve By: 5/2/2022  Patient expressed understanding of goal: Yes  Action steps to achieve this goal:  1. I will keep future Primary Care ,Urology, Pain Clinic appointments and will make a future Neurology appointment   2. I will drink plenty of water to keep my urine clear and yellow  3. I will take Tylenol/Ibuprofen and Gabapentin as directed   4. I will walk a block when able to increase strength               Intervention/Education provided during outreach: CC RN encouraged patient to write  questions down for future specialty provider visits      Outreach Frequency: 2 weeks    Plan:   Patient will start self catheterizing   Care Coordinator will follow up in 1-2 weeks  [facility]   Omaira Daigle RN, Care Coordinator   [facility]'s   E-mail [email or web address]   [phone]

## 2022-01-25 ENCOUNTER — OFFICE VISIT (OUTPATIENT)
Dept: UROLOGY | Facility: CLINIC | Age: 62
End: 2022-01-25
Payer: MEDICAID

## 2022-01-25 ENCOUNTER — TELEPHONE (OUTPATIENT)
Dept: NEUROLOGY | Facility: CLINIC | Age: 62
End: 2022-01-25

## 2022-01-25 VITALS
SYSTOLIC BLOOD PRESSURE: 144 MMHG | HEART RATE: 97 BPM | BODY MASS INDEX: 32.15 KG/M2 | WEIGHT: 193 LBS | DIASTOLIC BLOOD PRESSURE: 87 MMHG | HEIGHT: 65 IN | TEMPERATURE: 98.5 F

## 2022-01-25 DIAGNOSIS — N32.0 BLADDER OUTLET OBSTRUCTION: Primary | ICD-10-CM

## 2022-01-25 PROCEDURE — 51798 US URINE CAPACITY MEASURE: CPT | Performed by: UROLOGY

## 2022-01-25 PROCEDURE — 99213 OFFICE O/P EST LOW 20 MIN: CPT | Mod: 25 | Performed by: UROLOGY

## 2022-01-25 ASSESSMENT — MIFFLIN-ST. JEOR: SCORE: 1601.28

## 2022-01-25 NOTE — NURSING NOTE
"Initial BP (!) 144/87 (BP Location: Right arm, Patient Position: Sitting, Cuff Size: Adult Regular)   Pulse 97   Temp 98.5  F (36.9  C) (Tympanic)   Ht 1.657 m (5' 5.25\")   Wt 87.5 kg (193 lb)   BMI 31.87 kg/m   Estimated body mass index is 31.87 kg/m  as calculated from the following:    Height as of this encounter: 1.657 m (5' 5.25\").    Weight as of this encounter: 87.5 kg (193 lb). .    Bella Simmons LPN on 1/25/2022 at 2:02 PM    "

## 2022-01-25 NOTE — TELEPHONE ENCOUNTER
----- Message from Sriram Escalera MD sent at 1/25/2022  2:08 PM CST -----  Hi Maria Alejandra,    It's too bad that he wasn't getting relief from the carbamazepine. If the side effects were intolerable at this low of a dose, I'm fine with him stopping the medication. He does follow in the pain clinic and I think this is probably the best place for him to be started on new treatments going forward.     Thanks,  Vasile

## 2022-01-26 NOTE — PROGRESS NOTES
Appointment source: Established Patient  Patient name: Mustapha Negrete  Urology Staff: Alex Oneill MD    Subjective: This is a 61 year old year old male returning for follow up of bladder outlet obstruction symptoms and chronic back pain.    He continues to empty his bladder intermittently by catheterization but also is able to void.    He describes that the urine stream is a little slow.  He did stop both his tamsulosin and finasteride thinking they were not helping at all.  This was done several months ago.    Pain control issues with his back are ongoing and the injections he has had so far have not been particularly helpful.    The back pain issues and the voiding issues are mixed together enough in terms of their symptoms to make it difficult to know what is driving some of his problems.    We did go over his CT scan which reveals the UroLift material in his bladder neck from his previous procedure.  He did have initially good luck with that UroLift but then gradually his symptoms worsened and I am concerned that the presence of this foreign material in the bladder neck may actually be contributing to his current voiding symptoms.    Objective: Postvoid residual is around 30 mL.    Assessment: Ongoing back issues and bladder issues.    Plan: Restart finasteride and tamsulosin.    Return for follow-up in early April    Total time 15 minutes

## 2022-01-28 ENCOUNTER — TELEPHONE (OUTPATIENT)
Dept: PALLIATIVE MEDICINE | Facility: CLINIC | Age: 62
End: 2022-01-28
Payer: MEDICAID

## 2022-01-28 NOTE — TELEPHONE ENCOUNTER
Reason for call:  Other   Patient called regarding (reason for call): call back  Additional comments: Pt calling to state Bilateral Pudendal nerve blockinjection has not helped with his pain.    Phone number to reach patient:  Cell number on file:    Telephone Information:   Mobile [phone]       Best Time:  anytime    Can we leave a detailed message on this number?  YES    Travel screening: Not Applicable     Yanira DUNLAP    [facility]

## 2022-01-31 NOTE — TELEPHONE ENCOUNTER
Per Dr. Prescott's 1/4/22 visit notes:     Further procedures recommended:                           - Left pudendal nerve block. 1/20/22. No relief at all.   - Medication Management:               - gabapentin 300mg three times a day. At this dose currently.  Has tried increasing this in the past-did not tolerate the higher doses.                 - start elavil 10mg at night will gradually titrate as tolerated -did not start as he saw the SE were urinary retention and constipation and he already has this issue  - Physical Therapy: Continue  - Clinical Health Psychologist to address issues of relaxation, behavioral change, coping style, and other factors important to improvement: would strongly consider   - Diagnostic Studies:               - would consider emg would like to try this.   - Urine toxicology screen today: no   - Follow up:               - 1-2 months after the procedure               - reasonable to follow up with surgery-he isn't sur2 what is meant by this but would be open to seeing a surgeon.        Routed to Dr. Prescott.      Jess RN-BSN  [facility]

## 2022-01-31 NOTE — TELEPHONE ENCOUNTER
Pt saw Dr. Prescott in clinic.      Jess, RN-BSN  [facility]

## 2022-02-01 NOTE — TELEPHONE ENCOUNTER
Patient has seen Dr. Chambers in the past.  May be reasonable to follow-up with her and discuss whether she feels any other type of neurologic work-up including a EMG may be beneficial.  Could also discuss at [facility] has seen neurology there.        Could patient please clarify [facility] as this was mentioned in the note.    Lastly I do recommend its worth trying the Elavil to see if he has any improvement, without any significant side effects.      I would hold off on any further injections at this time

## 2022-02-02 ENCOUNTER — MYC MEDICAL ADVICE (OUTPATIENT)
Dept: PALLIATIVE MEDICINE | Facility: CLINIC | Age: 62
End: 2022-02-02
Payer: MEDICAID

## 2022-02-02 NOTE — TELEPHONE ENCOUNTER
Copied MyChart response into related MyChart encounter 1/28/22 & routed to provider    Lorelei Grider RN, BSN, CMSRN  RN Care Coordinator  [facility]

## 2022-02-02 NOTE — TELEPHONE ENCOUNTER
Patient's reply can be found in MyChart encounter 2/2/22, copied in below    To: PAIN NURSE      From: Mustapha Negrete      Created: 2/2/2022 12:19 PM        *-*-*This message was handled on 2/2/2022 1:47 PM by LORELEI BARR*-*-*    I would like for someone to do a EMG so far any doctors i seen have not done it and i mentioned it a few times i would like to have done i am getting very tired of not following through and doing nothing but give me drugs.  i am sick of chronic pain yes i like to have it done i seen her or this doctor jess.  could you set something up for me i can't take the Elavil side effects. please help me setting me up with EMG        Routing [employer] message to provider to review and advise on EMG     Lorelei Grider RN, BSN, CMSRN  RN Care Coordinator  [facility]

## 2022-02-02 NOTE — TELEPHONE ENCOUNTER
[employer] message sent to patient with Message From Provider below    Lorelei Grider RN, BSN, CMSRN  RN Care Coordinator  [facility]

## 2022-02-03 NOTE — TELEPHONE ENCOUNTER
Patient recently started new medication with neurology.  I would recommend giving this medication a chance to work.  It is important to remember the goal is improvement there is no magic fix.  I would not recommend an additional injection at this time.  I will reach out to her neurologist to see if they feel like EMG is warranted or will provide any additional diagnostic value.  In general I have less experience ordering EMGs for sacral pathologies.

## 2022-02-04 NOTE — TELEPHONE ENCOUNTER
Case discussed with neurology.  Would recommend holding off EMG.  Please encourage patient that this is a gradual difficult process and the importance of making 1 change at a time and focusing on improvement.

## 2022-02-04 NOTE — PROGRESS NOTES
"[facility]    Outpatient Physical Therapy Discharge Note  Patient: Mustapha Negrete  : 1960    Beginning/End Dates of Reporting Period:  21 to 21    Referring Provider: Alex Hunter MD    Therapy Diagnosis: PFM Dysfunction     Client Self Report: Still getting burning and points to inguinal and lateral pelvis.  Only had to cath 1x in last 10 days. Feels he has spasms where he will try to go and then not fully empty and will go back in 10 mins to void \"at least another 6oz.\"   Gave up on enemas as was more painful.     Objective Measurements:  Objective Measure: Voiding  Details: BM: loose and small diameter lately, everyday to every other day lately;   Urination: 6 oz avg, feels burning penis and this is his urge to go.  Various times of voids - sometimes every couple hours, and other times 2x in 30 mins. Overnite: 3-4 x per nite to urinate.   Objective Measure: Enemas  Details: No longer doing.  Objective Measure: Palpation  Details: \"It all hurts\" when asked if is tender or not. Pt motions to entire pelvic region. No particular painful areas, but did find TrP at (L) STP mm and at bulbospongiosus.   Objective Measure: GERA   Details: 1.5 width and 2cm depth at 2inches above and 1 inch below umbillicus.          Goals:  Goal Identifier STG   Goal Description 1)Pt will be able to urinate and/or self-cath to empty bladder upon removal of catheter in 1 week.    Target Date 21   Date Met  21   Progress (detail required for progress note): Met: pt had to cath for 3 days after catheter was taken out, and last 3 days was able to urinate without cath'ing.      Goal Identifier STG   Goal Description 2)Pt will routinely empty bladder every 2 hours to avoid need for replacement of catheter in 2 weeks.    Target Date 21   Date Met  21   Progress (detail required for progress note): Met: " feels he is going small amounts at least every 2 hours.      Goal Identifier LTG   Goal Description 3)Pt will report void times without pain and without need to cath, every 2 hours, in 4 weeks.    Target Date 12/07/21   Date Met      Progress (detail required for progress note): Not Fully Met: will get PFM pain all the time.      Goal Identifier LTG   Goal Description 4)Pt will report BSC Type 4 daily in 8 weeks.    Target Date 01/04/22   Date Met      Progress (detail required for progress note):       Goal Identifier LT   Goal Description 5)Pt will be indep in HEP to prevent need for catheters, in 8 weeks.    Target Date 01/04/22   Date Met            Plan:  Discharge from therapy.    Discharge:    Reason for Discharge: Patient chooses to discontinue therapy.  Patient has failed to schedule further appointments.    Equipment Issued: none    Discharge Plan: Patient to continue home program.

## 2022-02-08 NOTE — TELEPHONE ENCOUNTER
Arian Prescott MD routed conversation to Pain Nurse 4 days ago     Arian Prescott MD 4 days ago     JS       Case discussed with neurology.  Would recommend holding off EMG.  Please encourage patient that this is a gradual difficult process and the importance of making 1 change at a time and focusing on improvement.         Documentation      Swathi Gore MD Bowers, Jeffrey L, MD; Arian Prescott MD; Pain Nurse 5 days ago     AM    Thank you all,     I agree that EMG is unlikely to be useful at this time.     MD Boo    Message text      Sriram Escalera MD Snitzer, Jonathan Raphael, MD; Swathi Gore MD; Pain Nurse 5 days ago     ESTRELLITA    Thanks for the heads up.     I personally do not think getting an EMG will add anything or change our approach to treating his symptoms. Would you agree Dr. Gore?     Thanks,   Vasile    Message text      Arian Prescott MD Metzler, Abby Ilona, MD; Sriram Escalera MD; Pain Nurse 5 days ago     JS    Patient fixated on getting an EMG.  I am not sure the diagnostic value of this.  Wanted to reach out.  I would not recommend a repeat pudendal nerve block at this time given he got minimal benefit.  He did get some benefit with a ganglion impar block although this was short-lived.  I attempted to explain to patient that there is no cure for his symptoms rather trying to find things that will provide some improvement.  I completely agree with starting carbamazepine.  Thanks for all your help.    Routing comment      Summarized and sent message to patient    Lorelei Grider RN, BSN, CMSRN  RN Care Coordinator  [facility]

## 2022-02-08 NOTE — TELEPHONE ENCOUNTER
Arian Prescott MD routed conversation to Pain Nurse 4 days ago     Arian Prescott MD 4 days ago     JS       Case discussed with neurology.  Would recommend holding off EMG.  Please encourage patient that this is a gradual difficult process and the importance of making 1 change at a time and focusing on improvement.         Documentation      Swathi Gore MD Bowers, Jeffrey L, MD; Arian Prescott MD; Pain Nurse 5 days ago     AM    Thank you all,     I agree that EMG is unlikely to be useful at this time.     MD Boo    Message text      Sriram Escalera MD Snitzer, Jonathan Raphael, MD; Swathi Gore MD; Pain Nurse 5 days ago     ESTRELLITA    Thanks for the heads up.     I personally do not think getting an EMG will add anything or change our approach to treating his symptoms. Would you agree Dr. Gore?     Thanks,   Vasile    Message text      Arian Prescott MD Metzler, Abby Ilona, MD; Sriram Escalera MD; Pain Nurse 5 days ago     JS    Patient fixated on getting an EMG.  I am not sure the diagnostic value of this.  Wanted to reach out.  I would not recommend a repeat pudendal nerve block at this time given he got minimal benefit.  He did get some benefit with a ganglion impar block although this was short-lived.  I attempted to explain to patient that there is no cure for his symptoms rather trying to find things that will provide some improvement.  I completely agree with starting carbamazepine.  Thanks for all your help.    Routing comment      [employer] message response sent in 2/2/22 encounter    Lorelei Grider RN, BSN, CMSRN  RN Care Coordinator  [facility]

## 2022-02-09 NOTE — TELEPHONE ENCOUNTER
I am sorry to hear your frustration\    Our job as providers is to decide what is clinically safe and appropriate and then present those options to the patient's to ultimately make decisions.  As a result, I do not ever treat patients like guinea pigs.  Rather it is about trial and error finding what works for each individual person.  Our clinic mainly focuses on chronic pain conditions.  With chronic pain there is no magic or perfect fix.  Our goal is to help people improve their quality of life.  This needs to be done in most cases in a safe and incremental process. part of our clinic is a multidisciplinary approach.  I think a referral to our pain PhD would be very beneficial.  Additionally, review of notes from neurology you did not tolerate carbamazepine.  It now sounds like you are not tolerating the Elavil.  Sometimes we will try Pamelor given possible lower side effect profile.  Again our goal needs to be focused on improvement not cure.  After discussion with neurology, I am not sure how much diagnostic value an EMG would provide.  If patient would like to get another opinion from another neurologist or pain provider.

## 2022-02-09 NOTE — TELEPHONE ENCOUNTER
Mustapha reporting side effects from taking Elavil for 4 days (Message sent to clarify what side effects) routing to provider for further advisement on medication alternative    Lorelei Grider RN, BSN, CMSRN  RN Care Coordinator  [facility]

## 2022-02-14 ENCOUNTER — PATIENT OUTREACH (OUTPATIENT)
Dept: NURSING | Facility: CLINIC | Age: 62
End: 2022-02-14
Payer: MEDICAID

## 2022-02-14 DIAGNOSIS — R10.2 PERINEAL PAIN IN MALE: Primary | ICD-10-CM

## 2022-02-14 ASSESSMENT — ACTIVITIES OF DAILY LIVING (ADL): DEPENDENT_IADLS:: TRANSPORTATION

## 2022-02-14 NOTE — PROGRESS NOTES
Clinic Care Coordination Contact    Follow Up Progress Note      Assessment: Patient states he has been having dry needling/acupuncture  3 times a week to decrease pain.  Patient is very hopeful this will work   Massage therapist referred patient to the dry needling .  Continues to have burning upon urination and small amounts of urine  Tomorrow has an appointment with PCP and will talk to Urology office and give an update   Care Gaps:    Health Maintenance Due   Topic Date Due     PREVENTIVE CARE VISIT  Never done     ASTHMA ACTION PLAN  Never done     COVID-19 Vaccine (1) Never done     Pneumococcal Vaccine: Pediatrics (0 to 5 Years) and At-Risk Patients (6 to 64 Years) (1 of 2 - PPSV23) Never done     COLORECTAL CANCER SCREENING  Never done     HIV SCREENING  Never done     HEPATITIS C SCREENING  Never done     LIPID  Never done     ZOSTER IMMUNIZATION (1 of 2) Never done     INFLUENZA VACCINE (1) Never done       Care Gap Goal set: Yes    Goals addressed this encounter:   Goals Addressed                    This Visit's Progress       Medical (pt-stated)   0%      Goal Statement #2: I will complete the Health Maintenance due in the next 1-3 months   Health Maintenance Due   Topic Date Due     PREVENTIVE CARE VISIT  Never done     ASTHMA ACTION PLAN  Never done     ADVANCE CARE PLANNING  Never done     Pneumococcal Vaccine: Pediatrics (0 to 5 Years) and At-Risk Patients (6 to 64 Years) (1 of 2 - PPSV23) Never done     COVID-19 Vaccine (1) Never done     HIV SCREENING  Never done     HEPATITIS C SCREENING  Never done     LIPID  Never done     ZOSTER IMMUNIZATION (1 of 2) Never done     INFLUENZA VACCINE (1) Never done      Date Goal set: 11/2/2021  Barriers: None identified   Strengths: agrees with the plan   Date to Achieve By: 2/2/2022  Patient expressed understanding of goal: Yes  Action steps to achieve this goal:  1. I will discuss with my provider at a future visit            Pain Management (pt-stated)   90%       Goal Statement #1: I will have my leon catheter removed 11/16 at Urology visit and  will be able to urinate on my own .    I will decrease bilateral buttocks and thigh pain in the next 3-6  months   Date Goal set: 11/2/2021  Barriers: constant pain   Strengths: Motivated   Date to Achieve By: 5/2/2022  Patient expressed understanding of goal: Yes  Action steps to achieve this goal:  1. I will keep future Primary Care ,Urology, Pain Clinic appointments and will make a future Neurology appointment   2. I will drink plenty of water to keep my urine clear and yellow  3. I will take Tylenol/Ibuprofen and Gabapentin as directed   4. I will walk a block when able to increase strength   5. I will continue dry needling 3 times a week               Intervention/Education provided during outreach: Not discussed      Outreach Frequency: 2 weeks    Plan:   Patient will continue dry needling /acupuncture 3 times a week to decrease pain   Care Coordinator will follow up in 1-2 weeks   [facility]   Omaira Daigle RN, Care Coordinator   [facility]   E-mail [email or web address]   [phone]

## 2022-02-15 ENCOUNTER — OFFICE VISIT (OUTPATIENT)
Dept: FAMILY MEDICINE | Facility: CLINIC | Age: 62
End: 2022-02-15
Payer: MEDICAID

## 2022-02-15 ENCOUNTER — TELEPHONE (OUTPATIENT)
Dept: UROLOGY | Facility: CLINIC | Age: 62
End: 2022-02-15

## 2022-02-15 VITALS
SYSTOLIC BLOOD PRESSURE: 140 MMHG | DIASTOLIC BLOOD PRESSURE: 86 MMHG | HEIGHT: 65 IN | WEIGHT: 195.2 LBS | OXYGEN SATURATION: 98 % | RESPIRATION RATE: 12 BRPM | HEART RATE: 87 BPM | BODY MASS INDEX: 32.52 KG/M2 | TEMPERATURE: 97.4 F

## 2022-02-15 DIAGNOSIS — Z13.220 SCREENING FOR HYPERLIPIDEMIA: ICD-10-CM

## 2022-02-15 DIAGNOSIS — Z11.4 SCREENING FOR HIV (HUMAN IMMUNODEFICIENCY VIRUS): ICD-10-CM

## 2022-02-15 DIAGNOSIS — K62.89 RECTAL PAIN: Primary | ICD-10-CM

## 2022-02-15 DIAGNOSIS — R10.2 PERINEAL PAIN IN MALE: ICD-10-CM

## 2022-02-15 DIAGNOSIS — G89.4 CHRONIC PAIN SYNDROME: ICD-10-CM

## 2022-02-15 DIAGNOSIS — I10 ESSENTIAL HYPERTENSION: ICD-10-CM

## 2022-02-15 DIAGNOSIS — Z11.59 NEED FOR HEPATITIS C SCREENING TEST: ICD-10-CM

## 2022-02-15 DIAGNOSIS — Z12.11 SCREEN FOR COLON CANCER: ICD-10-CM

## 2022-02-15 PROCEDURE — 99214 OFFICE O/P EST MOD 30 MIN: CPT | Performed by: INTERNAL MEDICINE

## 2022-02-15 RX ORDER — TERAZOSIN 1 MG/1
1 CAPSULE ORAL AT BEDTIME
Qty: 90 CAPSULE | Refills: 3 | Status: SHIPPED | OUTPATIENT
Start: 2022-02-15 | End: 2022-03-08

## 2022-02-15 ASSESSMENT — PATIENT HEALTH QUESTIONNAIRE - PHQ9
SUM OF ALL RESPONSES TO PHQ QUESTIONS 1-9: 3
SUM OF ALL RESPONSES TO PHQ QUESTIONS 1-9: 3
10. IF YOU CHECKED OFF ANY PROBLEMS, HOW DIFFICULT HAVE THESE PROBLEMS MADE IT FOR YOU TO DO YOUR WORK, TAKE CARE OF THINGS AT HOME, OR GET ALONG WITH OTHER PEOPLE: SOMEWHAT DIFFICULT

## 2022-02-15 ASSESSMENT — PAIN SCALES - PAIN ENJOYMENT GENERAL ACTIVITY SCALE (PEG)
PEG_TOTALSCORE: 10
INTERFERED_ENJOYMENT_LIFE: 10 - COMPLETELY INTERFERES
AVG_PAIN_PASTWEEK: 10 - PAIN AS BAD AS YOU CAN IMAGINE
INTERFERED_GENERAL_ACTIVITY: 10 - COMPLETELY INTERFERES

## 2022-02-15 ASSESSMENT — MIFFLIN-ST. JEOR: SCORE: 1611.26

## 2022-02-15 NOTE — TELEPHONE ENCOUNTER
Reason for Call:  Other prescription    Detailed comments: Pt presented to Specialty reception staff that he was awaiting a call back from the care team regarding a new medication after LOV. Pt requesting a call back.     Phone Number Patient can be reached at: Home number on file [phone] (home)    Best Time:     Can we leave a detailed message on this number? Not Applicable    Call taken on 2/15/2022 at 11:58 AM by Chuck Kathleen

## 2022-02-15 NOTE — PATIENT INSTRUCTIONS
Pain:  1. Follow-up with Dr. Oneill - out of office in March  2. Referral to physical therapy   3.   certified you for medical cannabis today; need follow-up yearly to re-certify    Hypertension:  1. Continue amlodipine  2. Start Terazosin (Hytrin) 1 mg once daily  3. RN blood pressure check 2-4 weeks.    Health Care Maintenance:  1. Recommend routine blood work  2. You are due for a colonoscopy.  Please call [phone] to schedule

## 2022-02-15 NOTE — PROGRESS NOTES
"  Assessment & Plan     Rectal pain - Patient is still hopeful that another pain doctor or clinic would find a different treatment that would be helpful.  He has tried and failed multiple medications due to side effects.  He is a candidate for medical cannabis, he was certified today.  Advise follow-up in 1 year for recertification, sooner for other medical issues.  He still has many questions about the EMG but his pain doctor has stated that they did not think an EMG had useful diagnostic value  - Physical Therapy Referral; Future    Perineal pain in male -to follow-up with urology  - Physical Therapy Referral; Future    Essential hypertension -patient wonders if the alpha-blocker also help with his prostate condition, order placed   - terazosin (HYTRIN) 1 MG capsule; Take 1 capsule (1 mg) by mouth At Bedtime    Chronic pain syndrome - wants referral for physical therapy  - Physical Therapy Referral; Future    Screening for HIV (human immunodeficiency virus)  - HIV Antigen Antibody Combo; Future    Screen for colon cancer  - Adult Gastro Ref - Procedure Only; Future    Screening for hyperlipidemia  - Lipid panel reflex to direct LDL Fasting; Future    Need for hepatitis C screening test  - Hepatitis C Screen Reflex to HCV RNA Quant and Genotype; Future             BMI:   Estimated body mass index is 32.23 kg/m  as calculated from the following:    Height as of this encounter: 1.657 m (5' 5.25\").    Weight as of this encounter: 88.5 kg (195 lb 3.2 oz).   Weight management plan: Discussed healthy diet and exercise guidelines    Patient Instructions   Pain:  1. Follow-up with Dr. Oneill - out of office in March  2. Referral to physical therapy   3.   certified you for medical cannabis today; need follow-up yearly to re-certify    Hypertension:  1. Continue amlodipine  2. Start Terazosin (Hytrin) 1 mg once daily  3. RN blood pressure check 2-4 weeks.    Health Care Maintenance:  1. Recommend routine blood " work  2. You are due for a colonoscopy.  Please call [phone] to schedule        No follow-ups on file.    Viki Hanson [facility] DANTE Luciano is a 61 year old who presents for the following health issues     History of Present Illness       He eats 0-1 servings of fruits and vegetables daily.He consumes 0 sweetened beverage(s) daily.He exercises with enough effort to increase his heart rate 20 to 29 minutes per day.  He exercises with enough effort to increase his heart rate 3 or less days per week.   He is taking medications regularly.       Hypertension Follow-up      Do you check your blood pressure regularly outside of the clinic? Yes     Are you following a low salt diet? No    Are your blood pressures ever more than 140 on the top number (systolic) OR more than 90 on the bottom number (diastolic), for example 140/90? Yes      Genitourinary - Male Perineal Pain  Onset/Duration: been going on for about 10 months now, been seen for this before and just getting worse, pain, burning and needle-like sensation  Description:   Dysuria (painful urination): YES}  Hematuria (blood in urine): no  Frequency: middle of the night  Waking at night to urinate: YES  Hesitancy (delay in urine): YES  Retention (unable to empty): YES  Decrease in urinary flow: YES  Incontinence: YES  Progression of Symptoms:  Worsening but also staying the same  Accompanying Signs & Symptoms:  Fever: no  Back/Flank pain: no, hamstrings, buttocks pain area  Urethral discharge: no  Testicle lumps/masses/pain: no  Nausea and/or vomiting: no  Abdominal pain: no  History:   History of frequent UTI s: YES  History of kidney stones: no  History of hernias: no  Personal or Family history of Prostate problems: YES father  Sexually active: no  Precipitating or alleviating factors: None  Therapies tried and outcome: course of antibiotics, ibuprofen and Tylenol'  --has tried multiple modalities - going to pain  clinic, neurology - amitriptyline (side effects), multiple injections, carbamazepine (side effects)  --is doing physical therapy with dry needling and this is helping some  --he wonders if an EMG has any diagnostic value; feeling frustrated with multiple doctors and lack of improvement; wonders if he should go to [facility]  --he notes minor improvement in medical cannabis             Medical Cannabis Certification      MEDICAL DECISION MAKING: Mustapha Negrete is a 61 year old male who presents in consultation for certification into the [facility] Medical Cannabis Program.    The known standards of accepted treatment options for the patient's qualifying condition (s) were discussed today. Mustapha Negrete is a 61 year old male medical history including diagnotic tests, past medication and treatment trials, including outcome reviewed for the past 12 months. The patient's current medication and pan of care were also reviewed, The potential risks and benefits of medical cannabis was discussed. The patient is aware, that the scientific evidence of medical cannabis in regards to the therapeutic benefit and risk as well as  interaction with other drugs is limited.  The patient is also aware that  combined with other therapies medical cannabis may or may not  improve the qualifying condition and may also worsen other conditions such as depression, anxiety, insomnia and substance use disorders. Mustapha Negrete is a 61 year old male is aware of the formulations that are currently available. The patient is also aware that the cost of medical cannabis registration and purchase is not covered under medical insurance. The patient was also informed that there is limitations of use of medical cannabis in public places, while on the job, as well as across state lines.  Mustapha Negrete is a 61 year old maleis aware that this practitioner is under no obligation to to certify for medical marijuana, even if the patient has a  qualifying medical condition.    If this practitioner certifies a qualifying medical condition(s) for this patient, they mutually agree to regular follow up of 12 months with the certifying practitioner.  The patient also agrees to periodic review of the medical cannabis registry, documentation of enrollment in the program, and  random urine drug screen      1. Patient presents to the clinic with request for medical cannabis  --is patient a [place] resident? YES    2. Medical Condition:  Chronic pain syndrome    If Chronic Pain, PEG 3 score 10    3.Relevant history and/or tests relating to the above condition: see excellent summary of patient pain from Pain Clinic note 1/4/22 - dr. Prescott    4. Standard Treatments Tried  --see note dated 1/4/22    5. Discussion about cost - initial registration is $200.  30 day supply is estimated to cost $200-500 which is not covered by health insurance    6. Does the patient have a disability the prevents him/her from self-administering of mediations, or is the patient unable to acquire medical cannabiss from a distribution center?  NO       RECOMMENDATIONS/PLAN:   [place] in the [facility] Medical Cannabis Program Yes  Patient e-mail collected  Yes  [place] statement given to the patient  Yes  Updated medication and problem list given to the patient   Yes          Urology:  --needs to follow-up with Dr. Oneill  --wonders if Hytrin would help with blood pressure and prostate problems    Hypertension:  -running high    GI: troubles with constipation since GB surgery      Current Outpatient Medications   Medication Sig Dispense Refill     amLODIPine (NORVASC) 10 MG tablet Take 10 mg by mouth daily       gabapentin (NEURONTIN) 300 MG capsule Take 2 capsules (600 mg) by mouth 3 times daily 180 capsule 11     acetaminophen (TYLENOL) 325 MG tablet Take 3 tablets (975 mg) by mouth every 4 hours as needed for mild pain or pain (Patient not taking: Reported on  "1/4/2022)       amitriptyline (ELAVIL) 10 MG tablet Take 1 tablet (10 mg) by mouth At Bedtime (Patient not taking: Reported on 1/12/2022) 30 tablet 1     carBAMazepine (TEGRETOL) 200 MG tablet Take 2 tablets (400 mg) by mouth 2 times daily Start by taking one tablet (200 mg) daily for the first few days. After that, begin taking one tablet in the morning and one tablet in the evening for one week. If you are tolerating the medication well, increase to one tablet in the morning and two tablets in th evening for one week. After one week, increase to a target dose of two tablets in the morning and two tablets in the evening. (Patient not taking: Reported on 1/20/2022) 120 tablet 6     ibuprofen (ADVIL/MOTRIN) 600 MG tablet Take 1 tablet (600 mg) by mouth 4 times daily (Patient not taking: Reported on 1/4/2022) 56 tablet 0         Review of Systems   Constitutional, HEENT, cardiovascular, pulmonary, gi and gu systems are negative, except as otherwise noted.      Objective    BP (!) 140/86 (BP Location: Right arm, Patient Position: Chair, Cuff Size: Adult Regular)   Pulse 87   Temp 97.4  F (36.3  C) (Tympanic)   Resp 12   Ht 1.657 m (5' 5.25\")   Wt 88.5 kg (195 lb 3.2 oz)   SpO2 98%   BMI 32.23 kg/m    Body mass index is 32.23 kg/m .  Physical Exam   GENERAL APPEARANCE: healthy, alert and no distress                Answers for HPI/ROS submitted by the patient on 2/15/2022  If you checked off any problems, how difficult have these problems made it for you to do your work, take care of things at home, or get along with other people?: Somewhat difficult  PHQ9 TOTAL SCORE: 3      "

## 2022-02-15 NOTE — TELEPHONE ENCOUNTER
No new meds listed- pt restarted on Flomax and Finasteride     Had to stop the  Finasteride due to burning again.   Stayed on Flomax.    Pt thought provider was going to discuss with a colleague and get back to him about what is next.  Called because things are not better and would like to know what next?    Kalyn DOWNEY   Specialty Clinic RN.

## 2022-02-16 ENCOUNTER — TELEPHONE (OUTPATIENT)
Dept: FAMILY MEDICINE | Facility: CLINIC | Age: 62
End: 2022-02-16
Payer: MEDICAID

## 2022-02-16 DIAGNOSIS — M54.50 CHRONIC BILATERAL LOW BACK PAIN, UNSPECIFIED WHETHER SCIATICA PRESENT: Primary | ICD-10-CM

## 2022-02-16 DIAGNOSIS — R52 UNCONTROLLED PAIN: ICD-10-CM

## 2022-02-16 DIAGNOSIS — R10.2 PERINEAL PAIN IN MALE: ICD-10-CM

## 2022-02-16 DIAGNOSIS — G89.29 CHRONIC BILATERAL LOW BACK PAIN, UNSPECIFIED WHETHER SCIATICA PRESENT: Primary | ICD-10-CM

## 2022-02-16 NOTE — TELEPHONE ENCOUNTER
I assume that just meant the [facility] clinic location.  Signed the order

## 2022-02-16 NOTE — TELEPHONE ENCOUNTER
,    Patient calls and states he wanted the PT for [facility].  Fax [phone].  Pended for your completion. Eleonora DRUMMOND RN

## 2022-02-17 NOTE — TELEPHONE ENCOUNTER
WG    Let him know that I have asked one of my colleagues to speak to him regarding additional prostate surgery after a UroLift procedure.

## 2022-02-24 DIAGNOSIS — K62.89 RECTAL PAIN: Primary | ICD-10-CM

## 2022-02-25 ENCOUNTER — TELEPHONE (OUTPATIENT)
Dept: FAMILY MEDICINE | Facility: CLINIC | Age: 62
End: 2022-02-25
Payer: MEDICAID

## 2022-02-25 DIAGNOSIS — K62.89 RECTAL PAIN: Primary | ICD-10-CM

## 2022-02-25 NOTE — TELEPHONE ENCOUNTER
----- Message from Dana Gordon sent at 2/25/2022 10:17 AM CST -----        Procedure  has reached out to schedule colonoscopy.    Patient now states that he would like a referral to general surgery for consult before scheduling colonoscopy.    Please reach out to patient to discuss     I am sending this message as a courtesy for patient per his request and I only am involved if scheduling an endo procedure       Thank you     Dana Gordon  [facility]  Procedure   [phone]

## 2022-03-03 ENCOUNTER — PATIENT OUTREACH (OUTPATIENT)
Dept: NURSING | Facility: CLINIC | Age: 62
End: 2022-03-03
Payer: COMMERCIAL

## 2022-03-03 DIAGNOSIS — N41.0 ACUTE PROSTATITIS: Primary | ICD-10-CM

## 2022-03-03 ASSESSMENT — ACTIVITIES OF DAILY LIVING (ADL): DEPENDENT_IADLS:: TRANSPORTATION

## 2022-03-07 ENCOUNTER — OFFICE VISIT (OUTPATIENT)
Dept: SURGERY | Facility: CLINIC | Age: 62
End: 2022-03-07
Payer: COMMERCIAL

## 2022-03-07 ENCOUNTER — TELEPHONE (OUTPATIENT)
Dept: SURGERY | Facility: CLINIC | Age: 62
End: 2022-03-07

## 2022-03-07 ENCOUNTER — ALLIED HEALTH/NURSE VISIT (OUTPATIENT)
Dept: FAMILY MEDICINE | Facility: CLINIC | Age: 62
End: 2022-03-07
Payer: COMMERCIAL

## 2022-03-07 ENCOUNTER — TELEPHONE (OUTPATIENT)
Dept: FAMILY MEDICINE | Facility: CLINIC | Age: 62
End: 2022-03-07

## 2022-03-07 VITALS — DIASTOLIC BLOOD PRESSURE: 80 MMHG | SYSTOLIC BLOOD PRESSURE: 126 MMHG | HEART RATE: 76 BPM

## 2022-03-07 VITALS
WEIGHT: 195 LBS | BODY MASS INDEX: 32.49 KG/M2 | SYSTOLIC BLOOD PRESSURE: 124 MMHG | DIASTOLIC BLOOD PRESSURE: 81 MMHG | TEMPERATURE: 97.5 F | HEART RATE: 90 BPM | HEIGHT: 65 IN

## 2022-03-07 DIAGNOSIS — K60.2 ANAL FISSURE: Primary | ICD-10-CM

## 2022-03-07 DIAGNOSIS — K60.2 ANAL FISSURE: ICD-10-CM

## 2022-03-07 DIAGNOSIS — I10 ESSENTIAL HYPERTENSION: Primary | ICD-10-CM

## 2022-03-07 PROCEDURE — 99203 OFFICE O/P NEW LOW 30 MIN: CPT | Performed by: SURGERY

## 2022-03-07 PROCEDURE — 99207 PR NO CHARGE NURSE ONLY: CPT

## 2022-03-07 ASSESSMENT — PAIN SCALES - GENERAL: PAINLEVEL: WORST PAIN (10)

## 2022-03-07 NOTE — TELEPHONE ENCOUNTER
Please see message below and order alternative for pt to [facility] in [place]. Thank you.  Gerri TURNER RN BSN PHN  Specialty Clinics

## 2022-03-07 NOTE — TELEPHONE ENCOUNTER
Nifedipine compound cannot made at local [facility], need to send order to a compounding pharmacy.    Corine Michel RN

## 2022-03-07 NOTE — PROGRESS NOTES
PCP:  Viki Hanson    Chief complaint: Rectal difficulties (multiple)    History of Present Illness: Mustapha is a 61-year-old man who presents to see me with some rectal problems.  He was healthy and working as a  until about 10 months ago when he contracted Covid.  Ever since that time he has had multiple difficulties primarily involving neurologic issues from the umbilicus down.  He describes burning pain tingling numbness of his skin also, he has developed urinary difficulties where he cannot pass his urine.  He has to strain and then only passes a small amount of urine.  He has been seen by urology and has had a catheter placed which was miserable.  He has tried straight cathing which is miserable.  This problem is still ongoing.    From a rectal standpoint he also developed essentially the same type of problem involving his stools.  He feels like he has to go, strains at the toilet and only is able to pass a little bit of stool.  Sometimes it is only gas.  He feels bloated all the time.    He has seen multiple specialists and had a colonoscopy at [facility] sometime last summer which was normal (per patient).  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  He does not bleed with bowel movements.  He has tried MiraLAX and other laxatives, but when he finally does have a bowel movement it is pretty runny.  He is not yet been able to find balance between too loose and too firm.  He has not tried Metamucil.    Histories:  Past Medical History:   Diagnosis Date     Asthma        Past Surgical History:   Procedure Laterality Date     ENT SURGERY      tonsillectomy     GENITOURINARY SURGERY      TURP     ORTHOPEDIC SURGERY      wrist surgery, carpal tunnel 2005       Family History   Problem Relation Age of Onset     Cerebrovascular Disease Mother      Hypertension Mother      Diabetes Mother      Cancer Father 84        liver cancer     Prostate Cancer Father        Social History     Tobacco Use      Smoking status: Former Smoker     Quit date: 1999     Years since quittin.3     Smokeless tobacco: Never Used   Substance Use Topics     Alcohol use: No       Current Outpatient Medications   Medication Sig Dispense Refill     acetaminophen (TYLENOL) 325 MG tablet Take 3 tablets (975 mg) by mouth every 4 hours as needed for mild pain or pain       amLODIPine (NORVASC) 10 MG tablet Take 10 mg by mouth daily       gabapentin (NEURONTIN) 300 MG capsule Take 2 capsules (600 mg) by mouth 3 times daily 180 capsule 11     ibuprofen (ADVIL/MOTRIN) 600 MG tablet Take 1 tablet (600 mg) by mouth 4 times daily 56 tablet 0     nifedipine 0.2% in white petrolatum 0.2 % OINT ointment Apply topically 2 times daily 30 g 2     terazosin (HYTRIN) 1 MG capsule Take 1 capsule (1 mg) by mouth At Bedtime 90 capsule 3       Allergies   Allergen Reactions     Droperidol Other (See Comments)     Extrapyramidal Side Effect     Fenofibrate Headache and Diarrhea              Fentanyl      Other reaction(s): N&V hard to wake up     Keflex [Cephalexin] Itching     Lactose GI Disturbance         Other reaction(s): GI upset     Midazolam      Other reaction(s): N&V, Hard to wake up     Monosodium Glutamate      Other reaction(s): diarrhea, headaches     Pcn [Penicillins] Other (See Comments)     Feels warm and flushed, but tolerates Cephalexin     Atorvastatin Palpitations     Other reaction(s): palpitations     Sertraline Palpitations         Other reaction(s): Unknown     Simvastatin Palpitations     Other reaction(s): palpitations     Sulfa Drugs Headache and Rash     Burning    Other reaction(s): Rash  Other reaction(s): rash and headache     Tetracycline Rash     Burning    Other reaction(s): rash       Images:  No results found for this or any previous visit (from the past 744 hour(s)).    Labs:  No results found for any visits on 22.    ROS:  Brief problem focused review of systems negative except as above    /81 (BP  "Location: Right arm, Patient Position: Sitting, Cuff Size: Adult Large)   Pulse 90   Temp 97.5  F (36.4  C) (Tympanic)   Ht 1.657 m (5' 5.25\")   Wt 88.5 kg (195 lb)   BMI 32.20 kg/m      Exam:  General - Alert and Oriented X4, NAD, well nourished  HEENT - Normocephalic, atraumatic  Neck - supple  Lungs -respirations unlabored, chest wall excursion normal   CV - Heart RRR  Abdomen - Soft, non-tender  Rectal -external exam is normal.  Digital exam normal with some discomfort to palpation in the posterior midline.  The sphincter muscles appear somewhat tight, good tone, no masses  Neuro -grossly intact  Extremities - No cyanosis, clubbing or edema.      Assessment and Plan: Mustapha presents with a difficult problem.  I do not think there is a surgical issue here although he may find some benefit with nifedipine ointment.  In my opinion his problem is a failure of the sphincter muscles to relax when he initiates a bowel movement.  This has to be a neurologic problem although I cannot specifically say which one.  It is most certainly related to his Covid illness.      While its not a cure, I thought it would be reasonable to try some nifedipine ointment to see if we can relax his sphincters and allow him to have a bowel movement.  I also advised stool softeners and Sabianism use of fiber therapy (specifically Metamucil 1 tablespoon daily).  We could consider Botox injections if the nifedipine works, but, again, that would be a temporary solution.    I sent a prescription for nifedipine to the pharmacy at the hospital here.  I hope it works for him.  I will see him back as needed.    30-minute spent with patient.        Matty Wells MD FACS          "

## 2022-03-07 NOTE — TELEPHONE ENCOUNTER
not in clinic this week. Looks like patient have appointment with urologist tomorrow, recommend to discuss Terazosin dose change with urologist.    MICAELA Campbell CNP

## 2022-03-07 NOTE — PROGRESS NOTES
"Initial /81 (BP Location: Right arm, Patient Position: Sitting, Cuff Size: Adult Large)   Pulse 90   Temp 97.5  F (36.4  C) (Tympanic)   Ht 1.657 m (5' 5.25\")   Wt 88.5 kg (195 lb)   BMI 32.20 kg/m   Estimated body mass index is 32.2 kg/m  as calculated from the following:    Height as of this encounter: 1.657 m (5' 5.25\").    Weight as of this encounter: 88.5 kg (195 lb). .    Bella Simmons LPN on 3/7/2022 at 7:53 AM    "

## 2022-03-07 NOTE — LETTER
"    3/7/2022         RE: Mustapha Negrete  [address] [facility] [address]        Dear Colleague,    Thank you for referring your patient, Mustapha Negrete, to the [facility]. Please see a copy of my visit note below.    Initial /81 (BP Location: Right arm, Patient Position: Sitting, Cuff Size: Adult Large)   Pulse 90   Temp 97.5  F (36.4  C) (Tympanic)   Ht 1.657 m (5' 5.25\")   Wt 88.5 kg (195 lb)   BMI 32.20 kg/m   Estimated body mass index is 32.2 kg/m  as calculated from the following:    Height as of this encounter: 1.657 m (5' 5.25\").    Weight as of this encounter: 88.5 kg (195 lb). .    Bella Simmons LPN on 3/7/2022 at 7:53 AM      PCP:  Viki Hanson    Chief complaint: Rectal difficulties (multiple)    History of Present Illness: Mustapha is a 61-year-old man who presents to see me with some rectal problems.  He was healthy and working as a  until about 10 months ago when he contracted Covid.  Ever since that time he has had multiple difficulties primarily involving neurologic issues from the umbilicus down.  He describes burning pain tingling numbness of his skin also, he has developed urinary difficulties where he cannot pass his urine.  He has to strain and then only passes a small amount of urine.  He has been seen by urology and has had a catheter placed which was miserable.  He has tried straight cathing which is miserable.  This problem is still ongoing.    From a rectal standpoint he also developed essentially the same type of problem involving his stools.  He feels like he has to go, strains at the toilet and only is able to pass a little bit of stool.  Sometimes it is only gas.  He feels bloated all the time.    He has seen multiple specialists and had a colonoscopy at [facility] sometime last summer which was normal (per patient).  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  He does not bleed with bowel movements.  He has tried MiraLAX and other " laxatives, but when he finally does have a bowel movement it is pretty runny.  He is not yet been able to find balance between too loose and too firm.  He has not tried Metamucil.    Histories:  Past Medical History:   Diagnosis Date     Asthma        Past Surgical History:   Procedure Laterality Date     ENT SURGERY      tonsillectomy     GENITOURINARY SURGERY      TURP     ORTHOPEDIC SURGERY      wrist surgery, carpal tunnel        Family History   Problem Relation Age of Onset     Cerebrovascular Disease Mother      Hypertension Mother      Diabetes Mother      Cancer Father 84        liver cancer     Prostate Cancer Father        Social History     Tobacco Use     Smoking status: Former Smoker     Quit date: 1999     Years since quittin.3     Smokeless tobacco: Never Used   Substance Use Topics     Alcohol use: No       Current Outpatient Medications   Medication Sig Dispense Refill     acetaminophen (TYLENOL) 325 MG tablet Take 3 tablets (975 mg) by mouth every 4 hours as needed for mild pain or pain       amLODIPine (NORVASC) 10 MG tablet Take 10 mg by mouth daily       gabapentin (NEURONTIN) 300 MG capsule Take 2 capsules (600 mg) by mouth 3 times daily 180 capsule 11     ibuprofen (ADVIL/MOTRIN) 600 MG tablet Take 1 tablet (600 mg) by mouth 4 times daily 56 tablet 0     nifedipine 0.2% in white petrolatum 0.2 % OINT ointment Apply topically 2 times daily 30 g 2     terazosin (HYTRIN) 1 MG capsule Take 1 capsule (1 mg) by mouth At Bedtime 90 capsule 3       Allergies   Allergen Reactions     Droperidol Other (See Comments)     Extrapyramidal Side Effect     Fenofibrate Headache and Diarrhea              Fentanyl      Other reaction(s): N&V hard to wake up     Keflex [Cephalexin] Itching     Lactose GI Disturbance         Other reaction(s): GI upset     Midazolam      Other reaction(s): N&V, Hard to wake up     Monosodium Glutamate      Other reaction(s): diarrhea, headaches     Pcn [Penicillins]  "Other (See Comments)     Feels warm and flushed, but tolerates Cephalexin     Atorvastatin Palpitations     Other reaction(s): palpitations     Sertraline Palpitations         Other reaction(s): Unknown     Simvastatin Palpitations     Other reaction(s): palpitations     Sulfa Drugs Headache and Rash     Burning    Other reaction(s): Rash  Other reaction(s): rash and headache     Tetracycline Rash     Burning    Other reaction(s): rash       Images:  No results found for this or any previous visit (from the past 744 hour(s)).    Labs:  No results found for any visits on 03/07/22.    ROS:  Brief problem focused review of systems negative except as above    /81 (BP Location: Right arm, Patient Position: Sitting, Cuff Size: Adult Large)   Pulse 90   Temp 97.5  F (36.4  C) (Tympanic)   Ht 1.657 m (5' 5.25\")   Wt 88.5 kg (195 lb)   BMI 32.20 kg/m      Exam:  General - Alert and Oriented X4, NAD, well nourished  HEENT - Normocephalic, atraumatic  Neck - supple  Lungs -respirations unlabored, chest wall excursion normal   CV - Heart RRR  Abdomen - Soft, non-tender  Rectal -external exam is normal.  Digital exam normal with some discomfort to palpation in the posterior midline.  The sphincter muscles appear somewhat tight, good tone, no masses  Neuro -grossly intact  Extremities - No cyanosis, clubbing or edema.      Assessment and Plan: Mustapha presents with a difficult problem.  I do not think there is a surgical issue here although he may find some benefit with nifedipine ointment.  In my opinion his problem is a failure of the sphincter muscles to relax when he initiates a bowel movement.  This has to be a neurologic problem although I cannot specifically say which one.  It is most certainly related to his Covid illness.      While its not a cure, I thought it would be reasonable to try some nifedipine ointment to see if we can relax his sphincters and allow him to have a bowel movement.  I also advised stool " softeners and Anabaptist use of fiber therapy (specifically Metamucil 1 tablespoon daily).  We could consider Botox injections if the nifedipine works, but, again, that would be a temporary solution.    I sent a prescription for nifedipine to the pharmacy at the hospital here.  I hope it works for him.  I will see him back as needed.    30-minute spent with patient.        Matty Wells MD FACS              Again, thank you for allowing me to participate in the care of your patient.        Sincerely,        Matty Wells MD

## 2022-03-07 NOTE — TELEPHONE ENCOUNTER
Reason for Call:  Other prescription  Detailed comments: Pharmacy states nifedipine 0.2% in white petrolatum 0.2 % OINT ointment is not covered by insurance. Pt does not want to pay for medication and would like an alternative ordered, if possible. [facility] [place].  Please advise.      Phone Number Patient can be reached at: Other phone number:  "[employer] .xt [phone]    Best Time: any    Can we leave a detailed message on this number? Not Applicable    Call taken on 3/7/2022 at 9:18 AM by Chuck Kathleen

## 2022-03-07 NOTE — TELEPHONE ENCOUNTER
Called pt and he would like to get prescription if it is the only option for him. [facility] [place] [facility] and sent message to mix with 2% lidocaine and to see if possible to get covered by insurance for pt.   Gerri TURNER RN BSN PHN  Specialty Clinics

## 2022-03-07 NOTE — TELEPHONE ENCOUNTER
Called pt and script changed back to [facility] pharmacy.   Gerri TURNER RN BSN PHN  Specialty Clinics

## 2022-03-07 NOTE — TELEPHONE ENCOUNTER
Mustapha Negrete is a 61 year old year old patient who comes in today for a Blood Pressure check because of new medication and ongoing blood pressure monitoring.     Vital Signs as repeated by RN /80, HR 76.     Patient is taking medication as prescribed  Patient is tolerating medications well.    Patient is monitoring Blood Pressure at home.  Average readings if yes are:this morning /88, Patient has not been recording his readings at home.    Current complaints: none  Disposition:  patient to continue with the same medication.Pt instructed to await provider response.     Pt states Terazosin is working well and helping with urinary issues. Pt questioning if dose can be increased.     Mihaela Birmingham RN

## 2022-03-07 NOTE — TELEPHONE ENCOUNTER
Spoke with patient. Patient aware of recommendation of provider to discuss medication with urologist tomorrow.     Mihaela Birmingham RN

## 2022-03-08 ENCOUNTER — LAB (OUTPATIENT)
Dept: LAB | Facility: CLINIC | Age: 62
End: 2022-03-08
Payer: COMMERCIAL

## 2022-03-08 ENCOUNTER — OFFICE VISIT (OUTPATIENT)
Dept: UROLOGY | Facility: CLINIC | Age: 62
End: 2022-03-08
Payer: COMMERCIAL

## 2022-03-08 ENCOUNTER — TELEPHONE (OUTPATIENT)
Dept: SURGERY | Facility: CLINIC | Age: 62
End: 2022-03-08

## 2022-03-08 VITALS
WEIGHT: 195 LBS | SYSTOLIC BLOOD PRESSURE: 159 MMHG | DIASTOLIC BLOOD PRESSURE: 93 MMHG | BODY MASS INDEX: 32.49 KG/M2 | HEIGHT: 65 IN

## 2022-03-08 DIAGNOSIS — Z13.220 SCREENING FOR HYPERLIPIDEMIA: ICD-10-CM

## 2022-03-08 DIAGNOSIS — R10.2 PERINEAL PAIN IN MALE: ICD-10-CM

## 2022-03-08 DIAGNOSIS — Z11.59 NEED FOR HEPATITIS C SCREENING TEST: ICD-10-CM

## 2022-03-08 DIAGNOSIS — I10 ESSENTIAL HYPERTENSION: ICD-10-CM

## 2022-03-08 DIAGNOSIS — N40.1 BENIGN PROSTATIC HYPERPLASIA WITH LOWER URINARY TRACT SYMPTOMS, SYMPTOM DETAILS UNSPECIFIED: Primary | ICD-10-CM

## 2022-03-08 DIAGNOSIS — N52.39 OTHER POST-PROCEDURAL ERECTILE DYSFUNCTION: ICD-10-CM

## 2022-03-08 DIAGNOSIS — R33.9 URINARY RETENTION: ICD-10-CM

## 2022-03-08 DIAGNOSIS — N32.0 BLADDER OUTLET OBSTRUCTION: ICD-10-CM

## 2022-03-08 DIAGNOSIS — Z11.4 SCREENING FOR HIV (HUMAN IMMUNODEFICIENCY VIRUS): ICD-10-CM

## 2022-03-08 LAB
ALBUMIN UR-MCNC: NEGATIVE MG/DL
APPEARANCE UR: ABNORMAL
BILIRUB UR QL STRIP: NEGATIVE
CHOLEST SERPL-MCNC: 190 MG/DL
COLOR UR AUTO: YELLOW
FASTING STATUS PATIENT QL REPORTED: NO
GLUCOSE UR STRIP-MCNC: NEGATIVE MG/DL
HDLC SERPL-MCNC: 54 MG/DL
HGB UR QL STRIP: ABNORMAL
KETONES UR STRIP-MCNC: NEGATIVE MG/DL
LDLC SERPL CALC-MCNC: 110 MG/DL
LEUKOCYTE ESTERASE UR QL STRIP: ABNORMAL
MUCOUS THREADS #/AREA URNS LPF: PRESENT /LPF
NITRATE UR QL: NEGATIVE
NONHDLC SERPL-MCNC: 136 MG/DL
PH UR STRIP: 8 [PH] (ref 5–7)
RBC URINE: 4 /HPF
SP GR UR STRIP: 1.01 (ref 1–1.03)
TRIGL SERPL-MCNC: 129 MG/DL
UROBILINOGEN UR STRIP-MCNC: NORMAL MG/DL
WBC URINE: >182 /HPF

## 2022-03-08 PROCEDURE — 81001 URINALYSIS AUTO W/SCOPE: CPT | Performed by: PATHOLOGY

## 2022-03-08 PROCEDURE — 51798 US URINE CAPACITY MEASURE: CPT | Performed by: UROLOGY

## 2022-03-08 PROCEDURE — 87389 HIV-1 AG W/HIV-1&-2 AB AG IA: CPT | Mod: 90 | Performed by: PATHOLOGY

## 2022-03-08 PROCEDURE — 80061 LIPID PANEL: CPT | Performed by: PATHOLOGY

## 2022-03-08 PROCEDURE — 36415 COLL VENOUS BLD VENIPUNCTURE: CPT | Performed by: PATHOLOGY

## 2022-03-08 PROCEDURE — 87086 URINE CULTURE/COLONY COUNT: CPT | Mod: 90 | Performed by: PATHOLOGY

## 2022-03-08 PROCEDURE — 86803 HEPATITIS C AB TEST: CPT | Mod: 90 | Performed by: PATHOLOGY

## 2022-03-08 PROCEDURE — 87088 URINE BACTERIA CULTURE: CPT | Mod: 90 | Performed by: PATHOLOGY

## 2022-03-08 PROCEDURE — 99000 SPECIMEN HANDLING OFFICE-LAB: CPT | Performed by: PATHOLOGY

## 2022-03-08 PROCEDURE — 51741 ELECTRO-UROFLOWMETRY FIRST: CPT | Performed by: UROLOGY

## 2022-03-08 PROCEDURE — 52000 CYSTOURETHROSCOPY: CPT | Performed by: UROLOGY

## 2022-03-08 PROCEDURE — 99203 OFFICE O/P NEW LOW 30 MIN: CPT | Mod: 25 | Performed by: UROLOGY

## 2022-03-08 PROCEDURE — 87186 SC STD MICRODIL/AGAR DIL: CPT | Mod: 90 | Performed by: PATHOLOGY

## 2022-03-08 PROCEDURE — 84403 ASSAY OF TOTAL TESTOSTERONE: CPT | Mod: 90 | Performed by: PATHOLOGY

## 2022-03-08 RX ORDER — LIDOCAINE HYDROCHLORIDE 20 MG/ML
JELLY TOPICAL ONCE
Status: COMPLETED | OUTPATIENT
Start: 2022-03-08 | End: 2022-03-08

## 2022-03-08 RX ORDER — TADALAFIL 5 MG/1
5 TABLET ORAL EVERY 24 HOURS
Qty: 30 TABLET | Refills: 3 | Status: SHIPPED | OUTPATIENT
Start: 2022-03-08 | End: 2022-03-14

## 2022-03-08 RX ORDER — TADALAFIL 5 MG/1
5 TABLET ORAL EVERY 24 HOURS
Qty: 30 TABLET | Refills: 3 | Status: SHIPPED | OUTPATIENT
Start: 2022-03-08 | End: 2022-03-08

## 2022-03-08 RX ORDER — TAMSULOSIN HYDROCHLORIDE 0.4 MG/1
CAPSULE ORAL
COMMUNITY
End: 2022-05-13

## 2022-03-08 RX ORDER — TERAZOSIN 5 MG/1
5 CAPSULE ORAL AT BEDTIME
Qty: 30 CAPSULE | Refills: 3 | Status: SHIPPED | OUTPATIENT
Start: 2022-03-08 | End: 2022-03-08

## 2022-03-08 RX ORDER — TERAZOSIN 1 MG/1
5 CAPSULE ORAL AT BEDTIME
Qty: 90 CAPSULE | Refills: 3 | Status: SHIPPED | OUTPATIENT
Start: 2022-03-08 | End: 2022-03-08

## 2022-03-08 RX ADMIN — LIDOCAINE HYDROCHLORIDE: 20 JELLY TOPICAL at 15:21

## 2022-03-08 ASSESSMENT — PAIN SCALES - GENERAL: PAINLEVEL: WORST PAIN (10)

## 2022-03-08 NOTE — NURSING NOTE
"Chief Complaint   Patient presents with     Cystoscopy     painful, buring urination, \"everything is so tight and burny\" CIC not successful; too painful- per patient       Blood pressure (!) 159/93, height 1.651 m (5' 5\"), weight 88.5 kg (195 lb). Body mass index is 32.45 kg/m .    Patient Active Problem List   Diagnosis     Acute prostatitis     Rectal pain     Penile pain     Perineal pain in male     Uncontrolled pain     Urinary retention     Symptomatic cholelithiasis     Reflux esophagitis     Psoriatic arthritis (H)     MARIAN (obstructive sleep apnea)     OA (osteoarthritis) of hip     Neuropathy     Mild intermittent asthma, uncomplicated     Insomnia, unspecified     GERD (gastroesophageal reflux disease)     Hepatic steatosis     Dyslipidemia     Low back pain     Benign prostatic hyperplasia with urinary obstruction     Anal fissure     Tear of right acetabular labrum, sequela     Lumbar disc herniation with radiculopathy     Lower urinary tract symptoms     Arthritis of left acromioclavicular joint     ASHD (arteriosclerotic heart disease)     Calculus of gallbladder with chronic cholecystitis without obstruction     Change in bowel habits     Complete tear of left rotator cuff     Constipation     COVID-19 virus infection     Displacement of lumbar intervertebral disc without myelopathy     Diverticular disease of large intestine     Dysphagia     Epigastric pain     Hiatal hernia     Hyperlipidemia     Irregular bowel habits     Low testosterone     Melena     Pruritus ani     Rash     UTI (urinary tract infection)       Allergies   Allergen Reactions     Droperidol Other (See Comments)     Extrapyramidal Side Effect     Fenofibrate Headache and Diarrhea              Fentanyl      Other reaction(s): N&V hard to wake up     Keflex [Cephalexin] Itching     Lactose GI Disturbance         Other reaction(s): GI upset     Midazolam      Other reaction(s): N&V, Hard to wake up     Monosodium Glutamate      " Other reaction(s): diarrhea, headaches     Pcn [Penicillins] Other (See Comments)     Feels warm and flushed, but tolerates Cephalexin     Atorvastatin Palpitations     Other reaction(s): palpitations     Sertraline Palpitations         Other reaction(s): Unknown     Simvastatin Palpitations     Other reaction(s): palpitations     Sulfa Drugs Headache and Rash     Burning    Other reaction(s): Rash  Other reaction(s): rash and headache     Tetracycline Rash     Burning    Other reaction(s): rash       Current Outpatient Medications   Medication Sig Dispense Refill     acetaminophen (TYLENOL) 325 MG tablet Take 3 tablets (975 mg) by mouth every 4 hours as needed for mild pain or pain       amLODIPine (NORVASC) 10 MG tablet Take 10 mg by mouth daily       gabapentin (NEURONTIN) 300 MG capsule Take 2 capsules (600 mg) by mouth 3 times daily 180 capsule 11     ibuprofen (ADVIL/MOTRIN) 600 MG tablet Take 1 tablet (600 mg) by mouth 4 times daily 56 tablet 0     nifedipine 0.2% in white petrolatum 0.2 % OINT ointment Apply topically 2 times daily To rectal area 30 g 2     terazosin (HYTRIN) 1 MG capsule Take 1 capsule (1 mg) by mouth At Bedtime 90 capsule 3     tamsulosin (FLOMAX) 0.4 MG capsule 1 capsule         Social History     Tobacco Use     Smoking status: Former Smoker     Quit date: 1999     Years since quittin.3     Smokeless tobacco: Never Used   Vaping Use     Vaping Use: Never used   Substance Use Topics     Alcohol use: No     Drug use: No       Invasive Procedure Safety Checklist:    Procedure: Cystoscopy    Action: Complete sections and checkboxes as appropriate.    Pre-procedure:  1. Patient ID Verified with 2 identifiers (Ifeoma and  or MRN) : YES    2. Procedure and site verified with patient/designee (when able) : YES    3. Accurate consent documentation in medical record : YES    4. H&P (or appropriate assessment) documented in medical record : N/A  H&P must be up to 30 days prior to  procedure an updated within 24 hours of                 Procedure as applicable.     5. Relevant diagnostic and radiology test results appropriately labeled and displayed as applicable : YES    6. Blood products, implants, devices, and/or special equipment available for the procedure as applicable : YES    7. Procedure site(s) marked with provider initials [Exclusions: none] : NO    8. Marking not required. Reason : Yes  Procedure does not require site marking    Time Out:     Time-Out performed immediately prior to starting procedure, including verbal and active participation of all team members addressing: YES    1. Correct patient identity.  2. Confirmed that the correct side and site are marked.  3. An accurate procedure to be done.  4. Agreement on the procedure to be done.  5. Correct patient position.  6. Relevant images and results are properly labeled and appropriately displayed.  7. The need to administer antibiotics or fluids for irrigation purposes during the procedure as applicable.  8. Safety precautions based on patient history or medication use.    During Procedure: Verification of correct person, site, and procedure occurs any time the responsibility for care of the patient is transferred to another member of the care team.    The following medication was given:     MEDICATION:  Lidocaine without epinephrine 2% jelly  ROUTE: urethral   SITE: urethral   DOSE: 10 mL  LOT #: [id number]  : [employer]  EXPIRATION DATE: 8-23  NDC#: [address]-4   Was there drug waste? No    Prior to med admin, verified patient identity using patient's name and date of birth.  Due to med administration, patient instructed to remain in clinic for 15 minutes  afterwards, and to report any adverse reaction to me immediately.    Drug Amount Wasted:  None.  Vial/Syringe: KRISTINE Woods EMT  3/8/2022  3:13 PM

## 2022-03-08 NOTE — TELEPHONE ENCOUNTER
Reason for Call:  Other prescription    Detailed comments: Amber calling from [facility] need clarification on rx Compound Nifedipine Cream.     Phone Number to  be reached at: [phone]    Best Time: anytime    Can we leave a detailed message on this number? NO    Call taken on 3/8/2022 at 8:17 AM by Tanvi Grewal

## 2022-03-08 NOTE — LETTER
3/8/2022       RE: Mustapha Negrete  [id number] [address] [id number]     Dear Colleague,    Thank you for referring your patient, Mustapha Negrete, to the [facility] [place] at [facility]. Please see a copy of my visit note below.      [facility] COMPREHENSIVE LOWER URINARY TRACT SYMPTOM EVALUATION    Reason for cystoscopy: Pelvic pain    Brief History: 62 yo M referred from Dr. Oneill for history of ongoing prostate/pelvic floor pain.  He has a longstanding history of LUTS.  Underwent TURP in mid 2000s.  More recently had Urolift last year.  He has had severe dysuria, slow flow, intermittency and generalized pain extending from his hips to mid thighs for the past year.  He is seeing pelvic floor PT.  He has tried pudendal nerve injections.  Nothing has really helped.  Takes gabapentin daily, also on small dose of terazosin which is perhaps helpful.  Denies hematuria or UTI.      AUA Symptom Score 34/6    CYSTOSCOPY  After obtaining informed consent, the patient was prepped and draped in the standard sterile fashion.  The 15 Panamanian flexible cystoscope was inserted through the urethral meatus.      The anterior urethra was:  normal without stricture.    The external sphincter was  appropriately coapted.   The prostatic urethra demonstrated minimal hypertrophy, urolift clips could be seen at the level of the bladder neck with an appreciable tenting of the prostate creating an open fossa.  There was no calcification.  The right clip was perhaps a bit close to the bladder neck itself.    The bladder neck was  nonocclusive.    The bladder was  unremarkable for tumors, erythema or stones.    The ureteral orifices  were identified on each side in orthotopic position with efflux of clear urine.   There were minimal trabeculations.    On retroflexion there was the usual bladder neck hyperemia.    There was no intravesical protrusion of the  prostate.      The patient tolerated the procedure well without complication.      UROFLOW/PVR  p uro prostate review 3/8/2022   Peak Flow 11.9   Average Flow 6.8   Residual Volume by Ultrasound 130   Character of Curve Can not be characterized       EVALUATION AND MANAGEMENT   Upon completion of the cystoscopy the patient was brought to a separate examination room to discuss findings of above testing, review available treatment options and make a plan for further steps in care     SUMMARY: 62 yo M with pelvic floor pain  -We discussed that his prostate is not objectively obstructive.  Do not suspect further intervention would be helpful to address his primary concerns  -Discussed interstim as a potential treatment option.  Will refer to Dr. Tello for consideration  -He discussed issues with ED, hasnt had strong erection in 10+ years, reports history of low T though uncertain when.  Recommend trial of cialis instead of terazosin which could help any BPH symptoms and ED as well  -Update testosterone level  -Screening UA      >30 minutes spent were spent on the E/M portion of the visit in a separate examination room after cystoscopy discussing findings, available treatment options and next steps in care.      >30 minutes spent on the date of the encounter, including direct interaction with the patient, performing chart review, documentation and further activities as noted above, exclusive of the time spent performing  cystoscopy    IFrancis saw and evaluated this patient and agree with the plan as stated above.  I personally performed all listed procedures.

## 2022-03-08 NOTE — PROGRESS NOTES
[facility] COMPREHENSIVE LOWER URINARY TRACT SYMPTOM EVALUATION    Reason for cystoscopy: Pelvic pain    Brief History: 60 yo M referred from Dr. Oneill for history of ongoing prostate/pelvic floor pain.  He has a longstanding history of LUTS.  Underwent TURP in mid 2000s.  More recently had Urolift last year.  He has had severe dysuria, slow flow, intermittency and generalized pain extending from his hips to mid thighs for the past year.  He is seeing pelvic floor PT.  He has tried pudendal nerve injections.  Nothing has really helped.  Takes gabapentin daily, also on small dose of terazosin which is perhaps helpful.  Denies hematuria or UTI.      AUA Symptom Score 34/6    CYSTOSCOPY  After obtaining informed consent, the patient was prepped and draped in the standard sterile fashion.  The 15 Sami flexible cystoscope was inserted through the urethral meatus.      The anterior urethra was:  normal without stricture.    The external sphincter was  appropriately coapted.   The prostatic urethra demonstrated minimal hypertrophy, urolift clips could be seen at the level of the bladder neck with an appreciable tenting of the prostate creating an open fossa.  There was no calcification.  The right clip was perhaps a bit close to the bladder neck itself.    The bladder neck was  nonocclusive.    The bladder was  unremarkable for tumors, erythema or stones.    The ureteral orifices  were identified on each side in orthotopic position with efflux of clear urine.   There were minimal trabeculations.    On retroflexion there was the usual bladder neck hyperemia.    There was no intravesical protrusion of the prostate.      The patient tolerated the procedure well without complication.      UROFLOW/PVR  [facility] uro prostate review 3/8/2022   Peak Flow 11.9   Average Flow 6.8   Residual Volume by Ultrasound 130   Character of Curve Can not be characterized       EVALUATION AND MANAGEMENT   Upon completion of the  cystoscopy the patient was brought to a separate examination room to discuss findings of above testing, review available treatment options and make a plan for further steps in care     SUMMARY: 62 yo M with pelvic floor pain  -We discussed that his prostate is not objectively obstructive.  Do not suspect further intervention would be helpful to address his primary concerns  -Discussed interstim as a potential treatment option.  Will refer to Dr. Tello for consideration  -He discussed issues with ED, hasnt had strong erection in 10+ years, reports history of low T though uncertain when.  Recommend trial of cialis instead of terazosin which could help any BPH symptoms and ED as well  -Update testosterone level  -Screening UA      >30 minutes spent were spent on the E/M portion of the visit in a separate examination room after cystoscopy discussing findings, available treatment options and next steps in care.      >30 minutes spent on the date of the encounter, including direct interaction with the patient, performing chart review, documentation and further activities as noted above, exclusive of the time spent performing  cystoscopy    I, Francis Jean-Baptiste saw and evaluated this patient and agree with the plan as stated above.  I personally performed all listed procedures.

## 2022-03-08 NOTE — PATIENT INSTRUCTIONS
Please schedule a consult with Dr. Telol for Interstim, a clinic coordinator will be contacting you to set this up, and stop by the lab to have your blood drawn.

## 2022-03-09 LAB
HCV AB SERPL QL IA: NONREACTIVE
HIV 1+2 AB+HIV1 P24 AG SERPL QL IA: NONREACTIVE

## 2022-03-10 LAB — TESTOST SERPL-MCNC: 240 NG/DL (ref 240–950)

## 2022-03-11 LAB — BACTERIA UR CULT: ABNORMAL

## 2022-03-14 ENCOUNTER — PATIENT OUTREACH (OUTPATIENT)
Dept: UROLOGY | Facility: CLINIC | Age: 62
End: 2022-03-14
Payer: COMMERCIAL

## 2022-03-14 ENCOUNTER — TELEPHONE (OUTPATIENT)
Dept: UROLOGY | Facility: CLINIC | Age: 62
End: 2022-03-14
Payer: COMMERCIAL

## 2022-03-14 DIAGNOSIS — R10.2 PERINEAL PAIN IN MALE: ICD-10-CM

## 2022-03-14 DIAGNOSIS — N39.0 URINARY TRACT INFECTION: Primary | ICD-10-CM

## 2022-03-14 RX ORDER — NITROFURANTOIN 25; 75 MG/1; MG/1
100 CAPSULE ORAL 2 TIMES DAILY
Qty: 10 CAPSULE | Refills: 0 | Status: SHIPPED | OUTPATIENT
Start: 2022-03-14 | End: 2022-03-14

## 2022-03-14 RX ORDER — TADALAFIL 5 MG/1
5 TABLET ORAL EVERY 24 HOURS
Qty: 90 TABLET | Refills: 3 | Status: SHIPPED | OUTPATIENT
Start: 2022-03-14 | End: 2022-05-13

## 2022-03-14 RX ORDER — NITROFURANTOIN 25; 75 MG/1; MG/1
100 CAPSULE ORAL 2 TIMES DAILY
Qty: 14 CAPSULE | Refills: 0 | Status: SHIPPED | OUTPATIENT
Start: 2022-03-14 | End: 2022-04-13

## 2022-03-14 NOTE — TELEPHONE ENCOUNTER
JACOB Oneill MD Bratsch, Angie J RN  Caller: Unspecified (Today,  9:30 AM)  Please explain to the patient that intravenous antibiotics offer no meaningful advantage over oral medications if sensitivities have been established by urine culture.  Frequent monitoring of urine cultures and possible long-term low-dose therapy may be indicated.  I will ask the urology clinic staff at [facility] to make him an appointment in the middle of April for follow-up.       Attempted to call pt. Left detailed message to call clinic back at [phone].   To relay message and confirm preferred pharmacy to send Macrobid to.     iKm Garcia, CARMEN MSN

## 2022-03-14 NOTE — TELEPHONE ENCOUNTER
[facility] Call Center    Phone Message    May a detailed message be left on voicemail: yes     Reason for Call: Other: Pt called regarding lab results.  Pt has questions about the results and is wondering the next steps. Please give pt a call to discuss.      Action Taken: Message routed to:  [facility] URO    Travel Screening: Not Applicable

## 2022-03-14 NOTE — TELEPHONE ENCOUNTER
Call center called with pt on the line. Writer reviewed pt's chart and took the call. Pt was returning Kim's message. [facility] [place]. Pt wanted to inform Dr Jean-Baptiste that the Cialis Rx was $100 for 30 tablets and the pt did not . They want to confirm that the macrobid is not a sulfa drug as they are allergic.

## 2022-03-14 NOTE — TELEPHONE ENCOUNTER
Order sent to confirmed preferred pharmacy.     Spoke to pharmacy and confirmed order received. Updated order for 7 days treatment per Radha Dalal RN.     Kim Mojica RN MSN    Signed Prescriptions:                        Disp   Refills    nitroFURantoin macrocrystal-monohydrate (M*14 cap*0        Sig: Take 1 capsule (100 mg) by mouth 2 times daily  Authorizing Provider: CLARISSA LINDSEY  Ordering User: KIM MOJICA

## 2022-03-14 NOTE — TELEPHONE ENCOUNTER
Spoke to pt. Reviewed UC results. Offered Macrobid, but pt states that he feels he needs IV antibiotics due to dealing this recurring infections for the last 10 months and doesn't have complete relief of symptoms. Pt would like providers to weigh in on suggestion for IV antibiotic instead of oral treatment. Pt requests for Dr. Jean-Baptiste and Dr. Oneill to provide input.     Kim Garcia RN MSN

## 2022-03-18 ENCOUNTER — PRE VISIT (OUTPATIENT)
Dept: UROLOGY | Facility: CLINIC | Age: 62
End: 2022-03-18
Payer: COMMERCIAL

## 2022-03-18 ENCOUNTER — PATIENT OUTREACH (OUTPATIENT)
Dept: NURSING | Facility: CLINIC | Age: 62
End: 2022-03-18
Payer: COMMERCIAL

## 2022-03-18 DIAGNOSIS — R10.2 PERINEAL PAIN IN MALE: Primary | ICD-10-CM

## 2022-03-18 DIAGNOSIS — N39.0 URINARY TRACT INFECTION: ICD-10-CM

## 2022-03-18 DIAGNOSIS — N41.0 ACUTE PROSTATITIS: ICD-10-CM

## 2022-03-18 ASSESSMENT — ACTIVITIES OF DAILY LIVING (ADL): DEPENDENT_IADLS:: TRANSPORTATION

## 2022-03-18 NOTE — PROGRESS NOTES
Clinic Care Coordination Contact    Follow Up Progress Note      Assessment:    Brief conversation with patient today .  Patient reports everything is about the same and nothing new to report     Patient just finished dry needling PT in [place] and on his way home   Very sore from the procedure.  Patient is thinking about making a visit to a Chiropractor .    Patient has a virtual Urology visit April 6     Care Gaps:    Health Maintenance Due   Topic Date Due     PREVENTIVE CARE VISIT  Never done     ASTHMA ACTION PLAN  Never done     COVID-19 Vaccine (1) Never done     Pneumococcal Vaccine: Pediatrics (0 to 5 Years) and At-Risk Patients (6 to 64 Years) (1 of 2 - PPSV23) Never done     ZOSTER IMMUNIZATION (1 of 2) Never done     INFLUENZA VACCINE (1) Never done       Care Gap Goal set: Yes    Goals addressed this encounter:   Goals Addressed                    This Visit's Progress       Medical (pt-stated)   0%      Goal Statement #2: I will complete the Health Maintenance due in the next 1-3 months   Health Maintenance Due   Topic Date Due     PREVENTIVE CARE VISIT  Never done     ASTHMA ACTION PLAN  Never done     ADVANCE CARE PLANNING  Never done     Pneumococcal Vaccine: Pediatrics (0 to 5 Years) and At-Risk Patients (6 to 64 Years) (1 of 2 - PPSV23) Never done     COVID-19 Vaccine (1) Never done     HIV SCREENING  Never done     HEPATITIS C SCREENING  Never done     LIPID  Never done     ZOSTER IMMUNIZATION (1 of 2) Never done     INFLUENZA VACCINE (1) Never done      Date Goal set: 11/2/2021  Barriers: None identified   Strengths: agrees with the plan   Date to Achieve By: 2/2/2022  Patient expressed understanding of goal: Yes  Action steps to achieve this goal:  1. I will discuss with my provider at a future visit            Pain Management (pt-stated)   90%      Goal Statement #1: I will be able to urinate on my own without difficulty .    I will decrease bilateral buttocks and thigh pain in the next 3-6   months   Date Goal set: 11/2/2021  Barriers: constant pain   Strengths: Motivated   Date to Achieve By: 5/2/2022  Patient expressed understanding of goal: Yes  Action steps to achieve this goal:  1. I will keep future Primary Care ,Urology, Pain Clinic appointments and will make a future Neurology appointment   2. I will drink plenty of water to keep my urine clear and yellow  3. I will take Tylenol/Ibuprofen and Gabapentin as directed   4. I will walk a block when able to increase strength   5. I will continue dry needling 3 times a week               Intervention/Education provided during outreach: Not discussed today      Outreach Frequency: 2 weeks    Plan:   Patient continues outpatient PT  Patient will keep future urology Virtual visit 4/6/2022  Care Coordinator will follow up in 1-2 weeks   [facility]   Omaira Daigle RN, Care Coordinator   [facility]'s   E-mail [email or web address]   [phone]

## 2022-03-18 NOTE — TELEPHONE ENCOUNTER
Reason for Visit: Consult on Interstim    Diagnosis: Benign prostatic hyperplasia with lower urinary tract symptoms, Bladder outlet obstruction Urinary retention, Perineal pain in male, Essential hypertension, Other post-procedural erectile dysfunction    Orders/Procedures/Records: in system    Contact Patient: n/a    Rooming Requirements: Shavon Turner  03/18/22  10:00 AM

## 2022-03-23 RX ORDER — NITROFURANTOIN 25; 75 MG/1; MG/1
CAPSULE ORAL
Refills: 0 | OUTPATIENT
Start: 2022-03-23

## 2022-03-23 NOTE — TELEPHONE ENCOUNTER
NITROFURANTOIN MONO- MG      Last Written Prescription Date:  3-14-22  Last Fill Quantity: 14,   # refills: 0  Last Office Visit : 3-8-22  Future Office visit:  4-6-22    Routing refill request to provider for review/approval because:  Med not on protocol  Last fill 14 tab:0       0 = understands/communicates without difficulty

## 2022-03-24 ENCOUNTER — TELEPHONE (OUTPATIENT)
Dept: FAMILY MEDICINE | Facility: CLINIC | Age: 62
End: 2022-03-24
Payer: COMMERCIAL

## 2022-03-24 NOTE — TELEPHONE ENCOUNTER
Patient Quality Outreach    Patient is due for the following:   Hypertension -  Hypertension follow-up visit  Physical  - Due after never done    NEXT STEPS:   Schedule a yearly physical    Type of outreach:    Sent Qriously message.      Questions for provider review:    None     Millie Sherwood MA

## 2022-03-29 ENCOUNTER — NURSE TRIAGE (OUTPATIENT)
Dept: UROLOGY | Facility: CLINIC | Age: 62
End: 2022-03-29
Payer: COMMERCIAL

## 2022-03-29 DIAGNOSIS — R30.0 DYSURIA: Primary | ICD-10-CM

## 2022-03-29 DIAGNOSIS — R33.9 URINARY RETENTION: ICD-10-CM

## 2022-03-29 NOTE — TELEPHONE ENCOUNTER
Spoke to pt. Pt is requesting increase in terazosin. Pt is currently taking 200 mg now. He was taking 100 mg, but increased it last month. Since he's increased dose, he has noted an improvement in his urinary flow. Pt feels he could still improve and he also still gets up 3-4 times a night. Pt tried using cialis, but did not have any improvement in flow so he stopped taking.     Kim Garcia RN MSN

## 2022-03-29 NOTE — TELEPHONE ENCOUNTER
[facility] Call Center    Phone Message    May a detailed message be left on voicemail: yes     Reason for Call: Medication Question or concern regarding medication   Prescription Clarification  Name of Medication: terazosin  Prescribing Provider:    Pharmacy: [facility] [address] [place] [address]   What on the order needs clarification? Pt is calling, stating  put him on terazosin, and is wanting a higher dosage, please call annabel, thank you          Action Taken: Message routed to:  [facility] uro    Travel Screening: Not Applicable

## 2022-03-30 NOTE — TELEPHONE ENCOUNTER
Spoke to pt. Pt clarified that he is taking 1 mg capsules. Pt is taking up to 2 capsules currently. Pt would like Terazosin order to be updated to take up to 5 mg daily.     Kim Garcia RN MSN

## 2022-03-31 NOTE — TELEPHONE ENCOUNTER
Pt Is calling again wanting an update, he is wanting to take 4 caps daily, he stated he found an old rx, so he has some medication still, but he will run out soon as he will be taking more than the recommended dose, please call annabel with an update, thank you

## 2022-04-04 RX ORDER — TERAZOSIN 1 MG/1
1 CAPSULE ORAL DAILY
Qty: 150 CAPSULE | Refills: 3 | Status: SHIPPED | OUTPATIENT
Start: 2022-04-04 | End: 2022-04-11

## 2022-04-04 NOTE — TELEPHONE ENCOUNTER
Francis Jean-Baptiste MD Bratsch, Kim J, RN  Caller: Unspecified (6 days ago,  9:01 AM)  Lets order him 5mg terazosin daily, he just needs to watch out for dizziness/lightheadedness which are the common side effects thanks     Spoke to pt. Informed pt of order and he verbalized understanding.     Nurse Triage SBAR    Is this a 2nd Level Triage? NO    Situation: Pt reports having worsening ongoing dysuria.     Background: Pt has been experiencing this for last 10 months and feels that it has worsened in last 2 months.     Assessment: Pt experiences burning and stinging with urination. He reports that it feels like pin needles. Pt is currently working on pelvic floor muscles with a therapist. Pt is getting up every 2 hours at night time. Pt is straining to urinate every time, but does feel he is getting all urine out. Takes a lot of effort. Urine is clear and dark yellow, like beer color. Pt will sometimes experience pain in his kidney when he is really straining. Pt reports he is drinking 100 ounces of water including sugar free powerade. No hematuria or fever. Chart and problems list reviewed.      Protocol Recommended Disposition:   See Today In Office    Recommendation: UA/UC.  Increase fluid intake.  Upcoming video visit with Dr. Tello on 4/6/22.      .    Does the patient meet one of the following criteria for ADS visit consideration? No     Kim Garcia, RN MSN    Orders Placed This Encounter   Procedures     Routine UA with microscopic - No culture     Standing Status:   Future     Standing Expiration Date:   4/4/2023     Urine Culture Aerobic Bacterial     Standing Status:   Future     Standing Expiration Date:   4/4/2023       Reason for Disposition    All other males with painful urination, or patient wants to be seen    Protocols used: URINATION PAIN - MALE-A-OH

## 2022-04-05 ENCOUNTER — LAB (OUTPATIENT)
Dept: LAB | Facility: CLINIC | Age: 62
End: 2022-04-05
Payer: COMMERCIAL

## 2022-04-05 DIAGNOSIS — R30.0 DYSURIA: ICD-10-CM

## 2022-04-05 LAB
ALBUMIN UR-MCNC: NEGATIVE MG/DL
APPEARANCE UR: CLEAR
BILIRUB UR QL STRIP: NEGATIVE
COLOR UR AUTO: YELLOW
GLUCOSE UR STRIP-MCNC: NEGATIVE MG/DL
HGB UR QL STRIP: NEGATIVE
KETONES UR STRIP-MCNC: NEGATIVE MG/DL
LEUKOCYTE ESTERASE UR QL STRIP: NEGATIVE
NITRATE UR QL: NEGATIVE
PH UR STRIP: 6.5 [PH] (ref 5–7)
RBC #/AREA URNS AUTO: NORMAL /HPF
SP GR UR STRIP: 1.01 (ref 1–1.03)
UROBILINOGEN UR STRIP-ACNC: 0.2 E.U./DL
WBC #/AREA URNS AUTO: NORMAL /HPF

## 2022-04-05 PROCEDURE — 81001 URINALYSIS AUTO W/SCOPE: CPT

## 2022-04-05 PROCEDURE — 87086 URINE CULTURE/COLONY COUNT: CPT

## 2022-04-06 ENCOUNTER — VIRTUAL VISIT (OUTPATIENT)
Dept: UROLOGY | Facility: CLINIC | Age: 62
End: 2022-04-06
Payer: COMMERCIAL

## 2022-04-06 VITALS — WEIGHT: 195 LBS | BODY MASS INDEX: 33.29 KG/M2 | HEIGHT: 64 IN

## 2022-04-06 DIAGNOSIS — R33.9 URINARY RETENTION: ICD-10-CM

## 2022-04-06 DIAGNOSIS — M62.89 PELVIC FLOOR DYSFUNCTION: Primary | ICD-10-CM

## 2022-04-06 DIAGNOSIS — R10.2 PELVIC PAIN: ICD-10-CM

## 2022-04-06 PROCEDURE — 99215 OFFICE O/P EST HI 40 MIN: CPT | Mod: 95 | Performed by: UROLOGY

## 2022-04-06 RX ORDER — DIAZEPAM 2.5 MG/.5ML
10 GEL RECTAL
Qty: 10 SUPPOSITORY | Refills: 0 | Status: SHIPPED | OUTPATIENT
Start: 2022-04-06 | End: 2022-05-13

## 2022-04-06 ASSESSMENT — PAIN SCALES - GENERAL: PAINLEVEL: WORST PAIN (10)

## 2022-04-06 NOTE — PATIENT INSTRUCTIONS
-rectal valium suppositories  -send information on SNS, both medtronic and [employer].   -f/u in a month

## 2022-04-06 NOTE — PROGRESS NOTES
"HPI:  Mustapha Negrete is a 61 year old male being seen for pelvic floor dysfunction and urinary retention.  This all started in middle of May of 2021 he had a cholecystectomy and tehn had Singles in the summer.  He then went into retention and required a catheter for 4 months.  He is able to void a little better but continues to have difficulty voiding.  He also has some pelvic discomfort.  He uses gabapentin.  He also continues to go to PFPT.  He has tried amitriptyline and carbamazepine.      Reviewed previous notes from Dr. Jean-Baptiste on 3/8/22      Exam:  Ht 1.626 m (5' 4\")   Wt 88.5 kg (195 lb)   BMI 33.47 kg/m    GENERAL: Healthy, alert and no distress  EYES: Eyes grossly normal to inspection.  No discharge or erythema, or obvious scleral/conjunctival abnormalities.  RESP: No audible wheeze, cough, or visible cyanosis.  No visible retractions or increased work of breathing.    SKIN: Visible skin clear. No significant rash, abnormal pigmentation or lesions.  NEURO: Cranial nerves grossly intact.  Mentation and speech appropriate for age.  PSYCH: Mentation appears normal, affect normal/bright, judgement and insight intact, normal speech and appearance well-groomed.    Review of Imaging:  The following imaging exams were independently viewed and interpreted by me and discussed with patient:  10/24/21 pelvic MRI  Impression:  Postsurgical changes of left total hip arthroplasty with metallic  susceptibility artifact partially compromising assessment.  1. No ischial tuberosity bursitis or collection on either side.   2. Suspect right anterosuperior acetabular labral tearing.    Review of Labs:  The following labs were reviewed by me and discussed with the patient:  Recent Results (from the past 720 hour(s))   Routine UA with microscopic - No culture    Collection Time: 03/08/22  3:30 AM   Result Value Ref Range    Color Urine Yellow Colorless, Straw, Light Yellow, Yellow    Appearance Urine Slightly Cloudy (A) Clear    " "Glucose Urine Negative Negative mg/dL    Bilirubin Urine Negative Negative    Ketones Urine Negative Negative mg/dL    Specific Gravity Urine 1.010 1.003 - 1.035    Blood Urine Trace (A) Negative    pH Urine 8.0 (H) 5.0 - 7.0    Protein Albumin Urine Negative Negative mg/dL    Urobilinogen Urine Normal Normal, 2.0 mg/dL    Nitrite Urine Negative Negative    Leukocyte Esterase Urine Moderate (A) Negative    Mucus Urine Present (A) None Seen /LPF    RBC Urine 4 (H) <=2 /HPF    WBC Urine >182 (H) <=5 /HPF   Urine Culture Aerobic Bacterial    Collection Time: 03/08/22  3:30 AM    Specimen: Urine, Midstream   Result Value Ref Range    Culture >100,000 CFU/mL Staphylococcus epidermidis (A)        Susceptibility    Staphylococcus epidermidis - SB*     Oxacillin* >=4.0 Resistant ug/mL      * Oxacillin susceptible isolates are susceptible to cephalosporins (example: cefazolin and cephalexin) and beta lactam combination agents. Oxacillin resistant isolates are resistant to these agents.     Gentamicin 4.0 Susceptible ug/mL     Ciprofloxacin 2.0 Intermediate ug/mL     Levofloxacin 4.0 Resistant ug/mL     Vancomycin 1.0 Susceptible ug/mL     Tetracycline <=1.0 Susceptible ug/mL     Nitrofurantoin <=16.0 Susceptible ug/mL     Trimethoprim/Sulfamethoxazole  Susceptible      * Antibiotics listed as \"No Interpretation\" have no regulatory guidelines for susceptibility/resistance available.   Testosterone total    Collection Time: 03/08/22  4:34 PM   Result Value Ref Range    Testosterone Total 240 240 - 950 ng/dL   HIV Antigen Antibody Combo    Collection Time: 03/08/22  4:34 PM   Result Value Ref Range    HIV Antigen Antibody Combo Nonreactive Nonreactive   Hepatitis C Screen Reflex to HCV RNA Quant and Genotype    Collection Time: 03/08/22  4:34 PM   Result Value Ref Range    Hepatitis C Antibody Nonreactive Nonreactive   Lipid panel reflex to direct LDL Fasting    Collection Time: 03/08/22  4:34 PM   Result Value Ref Range    " Cholesterol 190 <200 mg/dL    Triglycerides 129 <150 mg/dL    Direct Measure HDL 54 >=40 mg/dL    LDL Cholesterol Calculated 110 (H) <=100 mg/dL    Non HDL Cholesterol 136 (H) <130 mg/dL    Patient Fasting > 8hrs? No    Routine UA with microscopic - No culture    Collection Time: 04/05/22  9:04 AM   Result Value Ref Range    Color Urine Yellow Colorless, Straw, Light Yellow, Yellow    Appearance Urine Clear Clear    Glucose Urine Negative Negative mg/dL    Bilirubin Urine Negative Negative    Ketones Urine Negative Negative mg/dL    Specific Gravity Urine 1.010 1.003 - 1.035    Blood Urine Negative Negative    pH Urine 6.5 5.0 - 7.0    Protein Albumin Urine Negative Negative mg/dL    Urobilinogen Urine 0.2 0.2, 1.0 E.U./dL    Nitrite Urine Negative Negative    Leukocyte Esterase Urine Negative Negative   Urine Culture Aerobic Bacterial    Collection Time: 04/05/22  9:04 AM    Specimen: Urine, Midstream   Result Value Ref Range    Culture No growth, less than 1 day    Urine Microscopic Exam    Collection Time: 04/05/22  9:04 AM   Result Value Ref Range    RBC Urine 0-2 0-2 /HPF /HPF    WBC Urine 0-5 0-5 /HPF /HPF         Assessment & Plan   62 y/o male with idiopathic urinary retention that is intermittent but has some urinary hesitancy despite being s/p Urolift and TURP and no evidence of urinary obstruction.  He also has some pelvic floor dysfunction and has exhausted many therapies wihtout avail or relief.  We discussed options of rectal Valium to help relax pelvic floor as well as SNS in detail.  Pt. Would like to think further about SNS and in the meantime will try the rectal suppositories.  -rectal valium suppositories  -send information on SNS, both medtronic and [employer]on[employer].   -f/u in a month    Nayan Tello MD  [facility] [place]      ==========================      Additional Coding Information:    Time spent:  43 minutes spent on the date of the encounter doing chart review,  history and exam, documentation and further activities per the note

## 2022-04-06 NOTE — PROGRESS NOTES
Mustapha is a 61 year old who is being evaluated via a billable video visit.      How would you like to obtain your AVS? MyChart  If the video visit is dropped, the invitation should be resent by: Text to cell phone: [phone]  Will anyone else be joining your video visit? No      Video Start Time: 1:12 PM  Video-Visit Details    Type of service:  Video Visit    Video End Time:1:42 PM    Originating Location (pt. Location): Home    Distant Location (provider location):  [facility] [place]     Platform used for Video Visit: [employer]

## 2022-04-06 NOTE — LETTER
"4/6/2022       RE: Mustapha Negrete  [id number] [address] [address]     Dear Colleague,    Thank you for referring your patient, Mustapha Negrete, to the [facility] [place] at [facility]. Please see a copy of my visit note below.    Mustapha is a 61 year old who is being evaluated via a billable video visit.      How would you like to obtain your AVS? MyChart  If the video visit is dropped, the invitation should be resent by: Text to cell phone: [phone]  Will anyone else be joining your video visit? No      Video Start Time: 1:12 PM  Video-Visit Details    Type of service:  Video Visit    Video End Time:1:42 PM    Originating Location (pt. Location): Home    Distant Location (provider location):  [facility]OLOG[facility] [place]     Platform used for Video Visit: [email or web address]    HPI:  Mustapha Negrete is a 61 year old male being seen for pelvic floor dysfunction and urinary retention.  This all started in middle of May of 2021 he had a cholecystectomy and tehn had Singles in the summer.  He then went into retention and required a catheter for 4 months.  He is able to void a little better but continues to have difficulty voiding.  He also has some pelvic discomfort.  He uses gabapentin.  He also continues to go to PFPT.  He has tried amitriptyline and carbamazepine.      Reviewed previous notes from Dr. Jean-Baptiste on 3/8/22      Exam:  Ht 1.626 m (5' 4\")   Wt 88.5 kg (195 lb)   BMI 33.47 kg/m    GENERAL: Healthy, alert and no distress  EYES: Eyes grossly normal to inspection.  No discharge or erythema, or obvious scleral/conjunctival abnormalities.  RESP: No audible wheeze, cough, or visible cyanosis.  No visible retractions or increased work of breathing.    SKIN: Visible skin clear. No significant rash, abnormal pigmentation or lesions.  NEURO: Cranial nerves grossly intact.  Mentation and speech appropriate for age.  PSYCH: Mentation " appears normal, affect normal/bright, judgement and insight intact, normal speech and appearance well-groomed.    Review of Imaging:  The following imaging exams were independently viewed and interpreted by me and discussed with patient:  10/24/21 pelvic MRI  Impression:  Postsurgical changes of left total hip arthroplasty with metallic  susceptibility artifact partially compromising assessment.  1. No ischial tuberosity bursitis or collection on either side.   2. Suspect right anterosuperior acetabular labral tearing.    Review of Labs:  The following labs were reviewed by me and discussed with the patient:  Recent Results (from the past 720 hour(s))   Routine UA with microscopic - No culture    Collection Time: 03/08/22  3:30 AM   Result Value Ref Range    Color Urine Yellow Colorless, Straw, Light Yellow, Yellow    Appearance Urine Slightly Cloudy (A) Clear    Glucose Urine Negative Negative mg/dL    Bilirubin Urine Negative Negative    Ketones Urine Negative Negative mg/dL    Specific Gravity Urine 1.010 1.003 - 1.035    Blood Urine Trace (A) Negative    pH Urine 8.0 (H) 5.0 - 7.0    Protein Albumin Urine Negative Negative mg/dL    Urobilinogen Urine Normal Normal, 2.0 mg/dL    Nitrite Urine Negative Negative    Leukocyte Esterase Urine Moderate (A) Negative    Mucus Urine Present (A) None Seen /LPF    RBC Urine 4 (H) <=2 /HPF    WBC Urine >182 (H) <=5 /HPF   Urine Culture Aerobic Bacterial    Collection Time: 03/08/22  3:30 AM    Specimen: Urine, Midstream   Result Value Ref Range    Culture >100,000 CFU/mL Staphylococcus epidermidis (A)        Susceptibility    Staphylococcus epidermidis - SB*     Oxacillin* >=4.0 Resistant ug/mL      * Oxacillin susceptible isolates are susceptible to cephalosporins (example: cefazolin and cephalexin) and beta lactam combination agents. Oxacillin resistant isolates are resistant to these agents.     Gentamicin 4.0 Susceptible ug/mL     Ciprofloxacin 2.0 Intermediate ug/mL     " Levofloxacin 4.0 Resistant ug/mL     Vancomycin 1.0 Susceptible ug/mL     Tetracycline <=1.0 Susceptible ug/mL     Nitrofurantoin <=16.0 Susceptible ug/mL     Trimethoprim/Sulfamethoxazole  Susceptible      * Antibiotics listed as \"No Interpretation\" have no regulatory guidelines for susceptibility/resistance available.   Testosterone total    Collection Time: 03/08/22  4:34 PM   Result Value Ref Range    Testosterone Total 240 240 - 950 ng/dL   HIV Antigen Antibody Combo    Collection Time: 03/08/22  4:34 PM   Result Value Ref Range    HIV Antigen Antibody Combo Nonreactive Nonreactive   Hepatitis C Screen Reflex to HCV RNA Quant and Genotype    Collection Time: 03/08/22  4:34 PM   Result Value Ref Range    Hepatitis C Antibody Nonreactive Nonreactive   Lipid panel reflex to direct LDL Fasting    Collection Time: 03/08/22  4:34 PM   Result Value Ref Range    Cholesterol 190 <200 mg/dL    Triglycerides 129 <150 mg/dL    Direct Measure HDL 54 >=40 mg/dL    LDL Cholesterol Calculated 110 (H) <=100 mg/dL    Non HDL Cholesterol 136 (H) <130 mg/dL    Patient Fasting > 8hrs? No    Routine UA with microscopic - No culture    Collection Time: 04/05/22  9:04 AM   Result Value Ref Range    Color Urine Yellow Colorless, Straw, Light Yellow, Yellow    Appearance Urine Clear Clear    Glucose Urine Negative Negative mg/dL    Bilirubin Urine Negative Negative    Ketones Urine Negative Negative mg/dL    Specific Gravity Urine 1.010 1.003 - 1.035    Blood Urine Negative Negative    pH Urine 6.5 5.0 - 7.0    Protein Albumin Urine Negative Negative mg/dL    Urobilinogen Urine 0.2 0.2, 1.0 E.U./dL    Nitrite Urine Negative Negative    Leukocyte Esterase Urine Negative Negative   Urine Culture Aerobic Bacterial    Collection Time: 04/05/22  9:04 AM    Specimen: Urine, Midstream   Result Value Ref Range    Culture No growth, less than 1 day    Urine Microscopic Exam    Collection Time: 04/05/22  9:04 AM   Result Value Ref Range    RBC " Urine 0-2 0-2 /HPF /HPF    WBC Urine 0-5 0-5 /HPF /HPF         Assessment & Plan   60 y/o male with idiopathic urinary retention that is intermittent but has some urinary hesitancy despite being s/p Urolift and TURP and no evidence of urinary obstruction.  He also has some pelvic floor dysfunction and has exhausted many therapies wihtout avail or relief.  We discussed options of rectal Valium to help relax pelvic floor as well as SNS in detail.  Pt. Would like to think further about SNS and in the meantime will try the rectal suppositories.  -rectal valium suppositories  -send information on SNS, both medtronic and [employer].   -f/u in a month    Nayan Tello MD  [facility]OLOG[facility] [place]      ==========================      Additional Coding Information:    Time spent:  43 minutes spent on the date of the encounter doing chart review, history and exam, documentation and further activities per the note

## 2022-04-07 ENCOUNTER — PATIENT OUTREACH (OUTPATIENT)
Dept: NURSING | Facility: CLINIC | Age: 62
End: 2022-04-07
Payer: COMMERCIAL

## 2022-04-07 DIAGNOSIS — N41.0 ACUTE PROSTATITIS: Primary | ICD-10-CM

## 2022-04-07 DIAGNOSIS — R33.9 URINARY RETENTION: ICD-10-CM

## 2022-04-07 LAB — BACTERIA UR CULT: NO GROWTH

## 2022-04-07 ASSESSMENT — ACTIVITIES OF DAILY LIVING (ADL): DEPENDENT_IADLS:: TRANSPORTATION

## 2022-04-07 NOTE — PROGRESS NOTES
Clinic Care Coordination Contact    Follow Up Progress Note   10 28/2022 ED visit Urinary retention   10/29/2021 PCP referral   Financial Support     Assessment: Patient continues to have burning in hamstrings radiating up the canal   Patient had a recent visit with the Urologist .  The Urologist suggested a suppository to release tense muscles and then will discuss electrodes to the back nerves Next Urology visit 4/18/2022  Patient continues dry needling therapy and pelvic floor exercises   Continues to work on long term disability     Care Gaps:    Health Maintenance Due   Topic Date Due     PREVENTIVE CARE VISIT  Never done     ASTHMA ACTION PLAN  Never done     COVID-19 Vaccine (1) Never done     Pneumococcal Vaccine: Pediatrics (0 to 5 Years) and At-Risk Patients (6 to 64 Years) (1 of 2 - PPSV23) Never done     ZOSTER IMMUNIZATION (1 of 2) Never done     INFLUENZA VACCINE (1) Never done     ASTHMA CONTROL TEST  04/28/2022       Care Gap Goal set: Yes    Goals addressed this encounter:   Goals Addressed                    This Visit's Progress       Medical (pt-stated)   0%      Goal Statement #2: I will complete the Health Maintenance due in the next 1-3 months   Health Maintenance Due   Topic Date Due     PREVENTIVE CARE VISIT  Never done     ASTHMA ACTION PLAN  Never done     ADVANCE CARE PLANNING  Never done     Pneumococcal Vaccine: Pediatrics (0 to 5 Years) and At-Risk Patients (6 to 64 Years) (1 of 2 - PPSV23) Never done     COVID-19 Vaccine (1) Never done     HIV SCREENING  Never done     HEPATITIS C SCREENING  Never done     LIPID  Never done     ZOSTER IMMUNIZATION (1 of 2) Never done     INFLUENZA VACCINE (1) Never done      Date Goal set: 11/2/2021  Barriers: None identified   Strengths: agrees with the plan   Date to Achieve By: 2/2/2022  Patient expressed understanding of goal: Yes  Action steps to achieve this goal:  1. I will discuss with my provider at a future visit            Pain Management  (pt-stated)   70%      Goal Statement #1: I will be able to urinate on my own without difficulty .    I will decrease bilateral buttocks and thigh pain in the next 3-6  months   Date Goal set: 11/2/2021  Barriers: constant pain   Strengths: Motivated   Date to Achieve By: 5/2/2022  Patient expressed understanding of goal: Yes  Action steps to achieve this goal:  1. I will keep future Primary Care ,Urology, Pain Clinic appointments and will make a future Neurology appointment   2. I will drink plenty of water to keep my urine clear and yellow  3. I will take Tylenol/Ibuprofen and Gabapentin as directed   4. I will walk a block when able to increase strength   5. I will continue dry needling 3 times a week  6. I will try the suppositories to release muscles and discuss a electrode therapy to the nerves in back with Urologist               Intervention/Education provided during outreach: Not discussed      Outreach Frequency: 2 weeks        Plan:   1. Urologist will mail suppositories in the mail to use at night to release tense muscles  2. Patient will continue pelvic floor exercises and dry needling therapy   3. Care Coordinator will follow up after Urology visit 3/18/2022    [facility]   Omaira Daigle RN, Care Coordinator   [facility]'s   E-mail [email or web address]   [phone]

## 2022-04-11 RX ORDER — TERAZOSIN 1 MG/1
1 CAPSULE ORAL DAILY
Qty: 450 CAPSULE | Refills: 0 | Status: SHIPPED | OUTPATIENT
Start: 2022-04-11 | End: 2022-10-20

## 2022-04-11 NOTE — TELEPHONE ENCOUNTER
TERAZOSIN 1 MG CAPSULE      Take 1 capsule (1 mg) by mouth daily Take up to 5 mg daily.  Last Written Prescription Date:  4-4-20  Last Fill Quantity: 150,   # refills: 3  Last Office Visit : 4-6-22  Future Office visit:  none    Routing refill request to provider for review/approval because:  Requesting 90 day-    pharm --? Direction

## 2022-04-12 ENCOUNTER — TELEPHONE (OUTPATIENT)
Dept: UROLOGY | Facility: CLINIC | Age: 62
End: 2022-04-12
Payer: COMMERCIAL

## 2022-04-12 NOTE — TELEPHONE ENCOUNTER
[facility] Call Center    Phone Message    May a detailed message be left on voicemail: yes     Reason for Call: Medication Question or concern regarding medication   Prescription Clarification  Name of Medication: diazepam 10 mg SUPP [[id number]]  Prescribing Provider: Dr. Tello   [facility] [place] [id number] [facility]   What on the order needs clarification? Pt called and stated that the provider did not sign off on the above medication and the pt is really wanting to get this going and start to see if it helps him at all, Please call pt once order is signed. Thanks!    Action Taken: Message routed to:  [facility] Uro    Travel Screening: Not Applicable

## 2022-04-12 NOTE — TELEPHONE ENCOUNTER
Spoke to pt. Informed pt that order was sent to pharmacy. He reports that the pharmacy told him something needs to be signed by provider. Informed him that the pharmacy will be contacted.     Spoke to pharmacy. They report that they had faxed a form that needs to be reviewed and signed by provider to clinic on 4/7/22. They will resend now to [phone].     Informed pt of pending form to be completed.     Kim Garcia RN MSN

## 2022-04-12 NOTE — TELEPHONE ENCOUNTER
Carmencita Capellan  You; Kim Garcia RN Just now (11:00 AM)     MT    Fax was received. I will have Dr. Jean-Baptiste sign it today.

## 2022-05-05 ENCOUNTER — TELEPHONE (OUTPATIENT)
Dept: UROLOGY | Facility: CLINIC | Age: 62
End: 2022-05-05
Payer: COMMERCIAL

## 2022-05-06 ENCOUNTER — PATIENT OUTREACH (OUTPATIENT)
Dept: CARE COORDINATION | Facility: CLINIC | Age: 62
End: 2022-05-06
Payer: COMMERCIAL

## 2022-05-06 NOTE — PROGRESS NOTES
Clinic Care Coordination Contact  [facility]/Voicemail    Clinical Data: Care Coordinator Outreach  Outreach attempted x 1.  Left message on patient's voicemail with call back information and requested return call.  Plan: Care Coordinator will try to reach patient again in 3-5 business days.    Lucille Maldonado RN, Casual Care Coordinator  [facility] Care Coordination  [phone]

## 2022-05-13 ENCOUNTER — OFFICE VISIT (OUTPATIENT)
Dept: FAMILY MEDICINE | Facility: CLINIC | Age: 62
End: 2022-05-13
Payer: COMMERCIAL

## 2022-05-13 VITALS
TEMPERATURE: 97.5 F | RESPIRATION RATE: 20 BRPM | WEIGHT: 196.9 LBS | HEART RATE: 79 BPM | SYSTOLIC BLOOD PRESSURE: 124 MMHG | OXYGEN SATURATION: 97 % | DIASTOLIC BLOOD PRESSURE: 82 MMHG | BODY MASS INDEX: 33.8 KG/M2

## 2022-05-13 DIAGNOSIS — M62.89 PELVIC FLOOR DYSFUNCTION: ICD-10-CM

## 2022-05-13 DIAGNOSIS — M53.3 COCCYDYNIA: ICD-10-CM

## 2022-05-13 DIAGNOSIS — R10.2 PERINEAL PAIN IN MALE: Primary | ICD-10-CM

## 2022-05-13 DIAGNOSIS — G58.8 PUDENDAL NEURALGIA: ICD-10-CM

## 2022-05-13 PROBLEM — R21 RASH: Status: RESOLVED | Noted: 2021-08-26 | Resolved: 2022-05-13

## 2022-05-13 PROBLEM — R19.8 IRREGULAR BOWEL HABITS: Status: RESOLVED | Noted: 2021-07-28 | Resolved: 2022-05-13

## 2022-05-13 PROBLEM — K92.1 MELENA: Status: RESOLVED | Noted: 2021-04-06 | Resolved: 2022-05-13

## 2022-05-13 PROBLEM — E78.5 DYSLIPIDEMIA: Status: RESOLVED | Noted: 2017-07-10 | Resolved: 2022-05-13

## 2022-05-13 PROBLEM — R52 UNCONTROLLED PAIN: Status: RESOLVED | Noted: 2021-10-06 | Resolved: 2022-05-13

## 2022-05-13 PROBLEM — R10.13 EPIGASTRIC PAIN: Status: RESOLVED | Noted: 2021-08-11 | Resolved: 2022-05-13

## 2022-05-13 PROBLEM — N39.0 UTI (URINARY TRACT INFECTION): Status: RESOLVED | Noted: 2021-08-26 | Resolved: 2022-05-13

## 2022-05-13 PROCEDURE — 99214 OFFICE O/P EST MOD 30 MIN: CPT | Performed by: INTERNAL MEDICINE

## 2022-05-13 RX ORDER — ALBUTEROL SULFATE 90 UG/1
2 AEROSOL, METERED RESPIRATORY (INHALATION) EVERY 6 HOURS
COMMUNITY

## 2022-05-13 ASSESSMENT — ASTHMA QUESTIONNAIRES: ACT_TOTALSCORE: 20

## 2022-05-13 ASSESSMENT — PAIN SCALES - GENERAL: PAINLEVEL: WORST PAIN (10)

## 2022-05-13 ASSESSMENT — PATIENT HEALTH QUESTIONNAIRE - PHQ9
SUM OF ALL RESPONSES TO PHQ QUESTIONS 1-9: 11
10. IF YOU CHECKED OFF ANY PROBLEMS, HOW DIFFICULT HAVE THESE PROBLEMS MADE IT FOR YOU TO DO YOUR WORK, TAKE CARE OF THINGS AT HOME, OR GET ALONG WITH OTHER PEOPLE: VERY DIFFICULT
SUM OF ALL RESPONSES TO PHQ QUESTIONS 1-9: 11

## 2022-05-13 NOTE — PROGRESS NOTES
Assessment & Plan   Problem List Items Addressed This Visit     Coccydynia    Pelvic floor dysfunction    Perineal pain in male - Primary    Pudendal neuralgia         Patient reports ongoing frustration with lack of diagnostic clarity to the cause of his pain.  He continues to be very focused on determining the source of the pain and therefore alleviating/curing the pain.  We discussed that the may not be a definitive diagnosis for the cause of the pain, and that treatment should focus on managing chronic pain as opposed to curing the pain that he has been so focused on thus far.  Discussed that he is trialed multiple different medication therapies and been seen at multiple pain clinics and multiple musculoskeletal specialist, therefore his pain is more complex than what a primary care physician can manage.  I encouraged him to reestablish with the pain clinic for ongoing management.  Once he is in a steady state, if opiates are part of his pain treatment plan I could continue prescribing, but at this point his pain is complex and dynamic and therefore it needs intervention from the pain clinic.  He is frustrated with this answer.  He also insists that I order x-rays that his chiropractor recommended which I declined.  I stated that is not appropriate for primary care physician to order testing that other healthcare providers recommended.  He expresses frustration that this is well      Patient Instructions   HEALTH CARE MAINTENANCE:  1. You are due for a colonoscopy.  Please call [phone] to schedule    Pain:  1. If other doctors want images, they need to order themselves. It is not appropriate for a primary care doctor to orders other tests requested by doctor  2. Due for follow-up in June with Dr. Tello [phone]  3. Recommend follow-up with Pain Clinic for medication management;  the pain you are experiencing is too complex for a primary care physician to manage  4. Agree with ongoing physical therapy        No follow-ups on file.    Viki Hanson, DO  [facility] DANTE Luciano is a 61 year old who presents for the following health issues  accompanied by his self.    History of Present Illness       Mental Health Follow-up:                    Today's PHQ-9         PHQ-9 Total Score: 11  PHQ-9 Q9 Thoughts of better off dead/self-harm past 2 weeks :   (P) Not at all    How difficult have these problems made it for you to do your work, take care of things at home, or get along with other people: Very difficult        Reason for visit:  Pain pelvic  Symptom onset:  More than a month  Symptoms include:  Pelvic pain urology pain  Symptom intensity:  Severe  Symptom progression:  Worsening  Had these symptoms before:  Yes  Has tried/received treatment for these symptoms:  Yes  Previous treatment was successful:  No  What makes it worse:  Sitting and laying down  What makes it better:  No    He eats 0-1 servings of fruits and vegetables daily.He consumes 0 sweetened beverage(s) daily.He exercises with enough effort to increase his heart rate 9 or less minutes per day.  He exercises with enough effort to increase his heart rate 3 or less days per week.   He is taking medications regularly.       Chief Complaint   Patient presents with     pt     PT would like Sacrum and Coccyx x-rays, been doing PT for 2 months and this has not helping having numbness, burns, going to go to [facility] in June for 5 day treatment adjustment of tailbone.      Labs Only     UA, A1C,      Medication Request     For Pain      Health Maintenance     Dont want shots            Pain History:  When did you first notice your pain? - Chronic Pain x 1 year  Have you seen anyone else for your pain? Yes - PT  Where in your body do you have pain? Musculoskeletal problem/pain  Onset/Duration: 13 m  Description  Location: Buttocks, penis - bilateral  Joint Swelling:  YES  Redness: unsure  Pain: YES  Warmth: YES  Intensity:  severe  Progression of Symptoms:  worsening  Accompanying signs and symptoms:   Fevers: no  Numbness/tingling/weakness: YES  History  Trauma to the area: no  Recent illness:  13 m ago had covid  Previous similar problem: no  Previous evaluation:  PT right now,   Precipitating or alleviating factors:  Aggravating factors include: sitting, walking and laying down  Therapies tried and outcome: rest/inactivity, heat, ice, massage, stretching, exercises, acetaminophen, Ibuprofen, chiropractor and physical therapy, not helped   --he is going to see a Chiro in [place] - a [facility] specialist  --he has been to multiple doctors for the pain - multiple pain([facility] pain clinic,  pain clinic - he was on opiates prior to episodes of pain) ortho, urology, neurology, Neurosurgery  --he had pelvic MRI 10/21  --SNS offered by Urology  --last opiate Rx 10/16/21  --he reports new hair loss in both lower legs and swelling in both legs - physical therapy is worried about vascular problem  --he wants Rx for opiates      Therapies tried  --Amitriptyline  --carbamazepine  --rectal valium - reports facial swelling after use  --gabapentin  --steroids  --multiple opiates  --cymbalta  --lyrica      Current Outpatient Medications   Medication Sig Dispense Refill     albuterol (PROAIR HFA/PROVENTIL HFA/VENTOLIN HFA) 108 (90 Base) MCG/ACT inhaler Inhale 2 puffs into the lungs every 6 hours       amLODIPine (NORVASC) 10 MG tablet Take 10 mg by mouth daily       gabapentin (NEURONTIN) 300 MG capsule Take 2 capsules (600 mg) by mouth 3 times daily 180 capsule 11     terazosin (HYTRIN) 1 MG capsule TAKE 1 CAPSULE (1 MG) BY MOUTH DAILY TAKE UP TO 5 MG DAILY. 450 capsule 0         Review of Systems   Constitutional, HEENT, cardiovascular, pulmonary, gi and gu systems are negative, except as otherwise noted.      Objective    /82 (BP Location: Right arm, Patient Position:  Sitting, Cuff Size: Adult Regular)   Pulse 79   Temp 97.5  F (36.4  C) (Tympanic)   Resp 20   Wt 89.3 kg (196 lb 14.4 oz)   SpO2 97%   BMI 33.80 kg/m    Body mass index is 33.8 kg/m .  Physical Exam   GENERAL APPEARANCE: alert and no distress  CV: Normal and equal bilateral pedal pulses  LYMPHATICS: 1+ nonpitting edema bilateral lower legs  SKIN: Mild varicose vein changes bilateral lower legs

## 2022-05-13 NOTE — PATIENT INSTRUCTIONS
HEALTH CARE MAINTENANCE:  You are due for a colonoscopy.  Please call [phone] to schedule    Pain:  If other doctors want images, they need to order themselves. It is not appropriate for a primary care doctor to orders other tests requested by doctor  Due for follow-up in June with Dr. Tello [phone]  Recommend follow-up with Pain Clinic for medication management;  the pain you are experiencing is too complex for a primary care physician to manage  Agree with ongoing physical therapy

## 2022-05-24 ENCOUNTER — PATIENT OUTREACH (OUTPATIENT)
Dept: CARE COORDINATION | Facility: CLINIC | Age: 62
End: 2022-05-24
Payer: COMMERCIAL

## 2022-05-24 DIAGNOSIS — M62.89 PELVIC FLOOR DYSFUNCTION: Primary | ICD-10-CM

## 2022-05-24 DIAGNOSIS — N41.0 ACUTE PROSTATITIS: ICD-10-CM

## 2022-05-24 ASSESSMENT — ACTIVITIES OF DAILY LIVING (ADL): DEPENDENT_IADLS:: TRANSPORTATION

## 2022-05-24 NOTE — PROGRESS NOTES
Clinic Care Coordination Contact  [facility]/Voicemail    Referral Source: PCP  Clinical Data: Care Coordinator Outreach  Outreach attempted x 2.  Left message on patient's voicemail with call back information and requested return call.  Plan: . Care Coordinator will try to reach patient again in 3-5 business days.  [facility]   Omaira Daigle RN, Care Coordinator   [facility]'s   E-mail [email or web address]   [phone]

## 2022-05-25 ENCOUNTER — PATIENT OUTREACH (OUTPATIENT)
Dept: NURSING | Facility: CLINIC | Age: 62
End: 2022-05-25
Payer: COMMERCIAL

## 2022-05-25 DIAGNOSIS — M62.89 PELVIC FLOOR DYSFUNCTION: Primary | ICD-10-CM

## 2022-05-25 ASSESSMENT — ACTIVITIES OF DAILY LIVING (ADL): DEPENDENT_IADLS:: TRANSPORTATION

## 2022-05-25 NOTE — PROGRESS NOTES
Clinic Care Coordination Contact    Follow Up Progress Note   Coccydynia      Pelvic floor dysfunction     Perineal pain in male - Primary     Pudendal neuralgia        Assessment:   Patient states he is switching to the [facility] for primary care   Patient has a future appointment with Dr Pro 7/5/2022    CC RN informed patient Sebastian Rock RN is the [facility] CC RN .    Patient  requests a follow up by Sebastian Rock after [facility]8 [facility] 10 [facility]     Patient tried the suppositories to release muscle tension in the rectal area but had an allergic reaction where his face got swollen     Patient has pain throughout the day and he doesn't sleep  Patient is desperate to find a therapy that helps .  Several has had several  different therapies in the past   Last PCP visits suggests returning to the Pain Clinic     Care Gaps:    Health Maintenance Due   Topic Date Due     PREVENTIVE CARE VISIT  Never done     ASTHMA ACTION PLAN  Never done     COLORECTAL CANCER SCREENING  Never done       Care Gap Goal set: Yes    Goals addressed this encounter:    Goals Addressed                    This Visit's Progress       Medical (pt-stated)   0%      Goal Statement #2: I will complete the Health Maintenance due in the next 1-3 months   Health Maintenance Due   Topic Date Due     PREVENTIVE CARE VISIT  Never done     ASTHMA ACTION PLAN  Never done     ADVANCE CARE PLANNING  Never done     Pneumococcal Vaccine: Pediatrics (0 to 5 Years) and At-Risk Patients (6 to 64 Years) (1 of 2 - PPSV23) Never done     COVID-19 Vaccine (1) Never done     HIV SCREENING  Never done     HEPATITIS C SCREENING  Never done     LIPID  Never done     ZOSTER IMMUNIZATION (1 of 2) Never done     INFLUENZA VACCINE (1) Never done      Date Goal set: 11/2/2021  Barriers: None identified   Strengths: agrees with the plan   Date to Achieve By: 2/2/2022  Patient expressed understanding of goal:  Yes  Action steps to achieve this goal:  1. I will discuss with my provider at a future visit              Pain Management (pt-stated)   70%      Goal Statement #1: I will be able to urinate on my own without difficulty .    I will decrease bilateral buttocks and thigh pain in the next 3-6  months   Date Goal set: 11/2/2021  Barriers: constant pain   Strengths: Motivated   Date to Achieve By: 5/2/2022 Updated 8/2/2022  Patient expressed understanding of goal: Yes  Action steps to achieve this goal:  1. I will keep future Primary Care ( recently switched to the [facility] ),Urology, Pain Clinic appointments and will make a future Neurology appointment   2. I will drink plenty of water to keep my urine clear and yellow  3. I will take Tylenol/Ibuprofen and Gabapentin as directed   4. I will walk a block when able to increase strength   5. I will continue dry needling 3 times a week  6. I will keep future appointments scheduled with [facility] specialist in [place] June 6, 8 and 10th              Intervention/Education provided during outreach: informed patient of new care coordinator and agrees to be transferred to Sebastian Rock RN CC for future follow up      Outreach Frequency: 2 weeks        Plan:   1. Patient is switching primary care to the [facility] .CC RN transferred care to Sebastian MONTE   2. Care Coordinator will follow up after tailbone specialty appointments for therapy in [facility] June 6,8 and 10 th  3. Updated care plan sent via My Chart     [facility]   Omaira Daigle RN, Care Coordinator   [facility]'s   E-mail [email or web address]   [phone]

## 2022-05-25 NOTE — LETTER
[facility]  Patient Centered Plan of Care  [facility] CARE COORDINATION    May 25, 2022        Mustapha Negrete  [address] [address]          Dear Nemo Luciano is an updated Patient Centered Plan of Care for your continued enrollment in Care Coordination. It has been my pleasure working with you.  Sebastian Rock is your new nurse care coordinator  You can reach her at [phone]  Sincerely,    [facility]   Omaira Daigle RN, Care Coordinator   [facility]'s   E-mail [email or web address]   [phone]          About Me:        Patient Name:  Mustapha Negrete    YOB: 1960  Age:         61 year old   [place] MRN:    [id number] Telephone Information:  Home Phone [phone]   Mobile [phone]       Address:  [address] [address] Email address:  [email or web address]      Emergency Contact(s)    Name Relationship Lgl Grd Work Phone Home Phone Mobile Phone   STEFANO AYOUB Son   [phone]           Primary language:  English     needed? No   [place] [employer]:  [phone] op. 1  Other communication barriers:None    Preferred Method of Communication:     Current living arrangement: I live in a private home (Patient stated he lives with his adult son)    Mobility Status/ Medical Equipment: Independent        Health Maintenance  Health Maintenance Reviewed:   Health Maintenance Due   Topic Date Due     PREVENTIVE CARE VISIT  Never done     ASTHMA ACTION PLAN  Never done     COLORECTAL CANCER SCREENING  Never done     My Access Plan  Medical Emergency 911   Primary Clinic Line [facility] [phone]   24 Hour Appointment Line [phone] or  [phone] ([phone]) (toll-free)   24 Hour Nurse Line [phone] (toll-free)   Preferred Urgent Care [facility], [phone]     [facility]  [place], [place]  [phone]     Preferred Pharmacy [id number] [place], [place] - [address]     Behavioral Health Crisis Line The National Suicide Prevention Lifeline at 1-801.819.6343 or 911       My Care Team Members  Patient Care Team       Relationship Specialty Notifications Start End    JACOB Oneill MD Assigned Surgical Provider   9/26/21     Phone: [phone] Pager: [phone] Fax: [phone] [address] [address]    Viki Hanson DO Assigned PCP   9/30/21     Phone: [phone] Pager: [phone] Fax: [phone] [address] [address]    Matty Ruiz MD MD Neurological Surgery  10/18/21     Phone: [phone] Fax: [phone]         [address] [place] [address]    Max Rico MD Referring Physician Family Medicine  10/18/21     Phone: [phone] Fax: [phone] [address] [place] [facility] [address]    Kristal Drummond MD Assigned Neuroscience Provider   10/31/21     Phone: [phone] Pager: [phone] Fax: [phone]        [address] [place] [address]    Sebastian Rock RN Lead Care Coordinator Primary Care - CC Admissions 5/25/22     Phone: [phone] Fax: [phone]                My Care Plans  Self Management and Treatment Plan  Goals and (Comments)   Goals        General     Medical (pt-stated)      Notes - Note created  11/2/2021 11:10 AM by Omaira Daigle RN     Goal Statement #2: I will complete the Health Maintenance due in the next 1-3 months   Health Maintenance Due   Topic Date Due     PREVENTIVE CARE VISIT  Never done     ASTHMA ACTION PLAN  Never done     ADVANCE CARE PLANNING  Never done     Pneumococcal Vaccine: Pediatrics (0 to 5 Years) and At-Risk Patients (6 to 64 Years) (1 of 2 - PPSV23) Never done     COVID-19 Vaccine (1) Never done     HIV SCREENING  Never done     HEPATITIS C SCREENING  Never done     LIPID  Never done     ZOSTER  IMMUNIZATION (1 of 2) Never done     INFLUENZA VACCINE (1) Never done      Date Goal set: 11/2/2021  Barriers: None identified   Strengths: agrees with the plan   Date to Achieve By: 2/2/2022  Patient expressed understanding of goal: Yes  Action steps to achieve this goal:  1. I will discuss with my provider at a future visit            Pain Management (pt-stated)      Notes - Note edited  5/25/2022  4:23 PM by Omaira Daigle RN     Goal Statement #1: I will be able to urinate on my own without difficulty .    I will decrease bilateral buttocks and thigh pain in the next 3-6  months   Date Goal set: 11/2/2021  Barriers: constant pain   Strengths: Motivated   Date to Achieve By: 5/2/2022 Updated 8/2/2022  Patient expressed understanding of goal: Yes  Action steps to achieve this goal:  1. I will keep future Primary Care ( recently switched to the [facility] ),Urology, Pain Clinic appointments and will make a future Neurology appointment   2. I will drink plenty of water to keep my urine clear and yellow  3. I will take Tylenol/Ibuprofen and Gabapentin as directed   4. I will walk a block when able to increase strength   5. I will continue dry needling 3 times a week  6. I will keep future appointments scheduled with [facility] specialist in [place] June 6, 8 and 10th               Action Plans on File: None     Advance Care Plans/Directives Type:   No data recorded    My Medical and Care Information  Problem List   Patient Active Problem List   Diagnosis     Acute prostatitis     Rectal pain     Penile pain     Perineal pain in male     Urinary retention     Symptomatic cholelithiasis     Reflux esophagitis     Psoriatic arthritis (H)     MARIAN (obstructive sleep apnea)     OA (osteoarthritis) of hip     Neuropathy     Mild intermittent asthma, uncomplicated     Insomnia, unspecified     GERD (gastroesophageal reflux disease)     Hepatic steatosis     Benign prostatic hyperplasia with urinary obstruction      Anal fissure     Tear of right acetabular labrum, sequela     Lumbar disc herniation with radiculopathy     Lower urinary tract symptoms     Arthritis of left acromioclavicular joint     ASHD (arteriosclerotic heart disease)     Calculus of gallbladder with chronic cholecystitis without obstruction     Change in bowel habits     Complete tear of left rotator cuff     Constipation     COVID-19 virus infection     Displacement of lumbar intervertebral disc without myelopathy     Diverticular disease of large intestine     Hiatal hernia     Hyperlipidemia     Low testosterone     Pruritus ani     Pelvic floor dysfunction     Pudendal neuralgia     Coccydynia      Current Medications and Allergies:   Current Outpatient Medications   Medication     albuterol (PROAIR HFA/PROVENTIL HFA/VENTOLIN HFA) 108 (90 Base) MCG/ACT inhaler     amLODIPine (NORVASC) 10 MG tablet     gabapentin (NEURONTIN) 300 MG capsule     terazosin (HYTRIN) 1 MG capsule     No current facility-administered medications for this visit.       Care Coordination Start Date: 11/2/2021   Frequency of Care Coordination: 2 weeks     Form Last Updated: 05/25/2022

## 2022-06-20 ENCOUNTER — VIRTUAL VISIT (OUTPATIENT)
Dept: UROLOGY | Facility: CLINIC | Age: 62
End: 2022-06-20
Payer: COMMERCIAL

## 2022-06-20 DIAGNOSIS — R10.2 PELVIC PAIN: ICD-10-CM

## 2022-06-20 DIAGNOSIS — M62.89 PELVIC FLOOR DYSFUNCTION: ICD-10-CM

## 2022-06-20 DIAGNOSIS — R33.9 URINARY RETENTION: Primary | ICD-10-CM

## 2022-06-20 PROCEDURE — 99215 OFFICE O/P EST HI 40 MIN: CPT | Mod: 95 | Performed by: UROLOGY

## 2022-06-20 NOTE — PATIENT INSTRUCTIONS
-schedule appt. With Dr. Jean-Baptiste to discuss removal of urolift clips.  -if he has this removed and still has issues then he can return and consider SNS at that time.

## 2022-06-20 NOTE — PROGRESS NOTES
Reason for visit:  F/u on urinary symptoms.    Clinical Data.  Mr. Negrete is a 61 year old male with pelvic floor dysfunction and urinary retention.  This all started in middle of May of 2021 he had a cholecystectomy and tehn had Singles in the summer.  He then went into retention and required a catheter for 4 months.  He is able to void a little better but continues to have difficulty voiding.  He also has some pelvic discomfort.  He uses gabapentin.  He also continues to go to PFPT.  He has tried amitriptyline and carbamazepine.     He was started on rectal valium but he didn't tolerate at all.  He also had some sacral adjustment which he also doesn't feel like it helped.    10/24/21 pelvic MRI  Impression:  Postsurgical changes of left total hip arthroplasty with metallic  susceptibility artifact partially compromising assessment.  1. No ischial tuberosity bursitis or collection on either side.   2. Suspect right anterosuperior acetabular labral tearing.      Assessment & Plan   62 y/o male with idiopathic urinary retention that is intermittent but has some urinary hesitancy despite being s/p Urolift and prior to that a TURP and no evidence of urinary obstruction.  He also has some pelvic floor dysfunction and has exhausted many therapies wihtout avail or relief.  We discussed options of SNS in detail.  However he feels strongly that his problems started shortly after the urolift implant and he would like to consider having the clips removed.    -schedule appt. With Dr. Jean-Baptiste to discuss removal of urolift clips.  -if he has this removed and still has issues then he can return and consider SNS at that time.    Nayan Tello MD  [facility]OLOGY [facility] [place]      ==========================      Additional Coding Information:    Time spent:  44 minutes spent on the date of the encounter doing chart review, history and exam, documentation and further activities per the note

## 2022-06-20 NOTE — PROGRESS NOTES
Mustapha Negrete  who is being evaluated via a billable video visit.      How would you like to obtain your AVS? Mail a copy  If the video visit is dropped, the invitation should be resent by: Send to e-mail at: [email or web address]  Will anyone else be joining your video visit? No    Video-Visit Details    Type of service:  Video Visit    Video Start Time: 3:02 PM    Video End Time:3:22 PM    Originating Location (pt. Location): Home    Distant Location (provider location):  [facility]     Platform used for Video Visit: [employer]

## 2022-06-21 DIAGNOSIS — I10 ESSENTIAL HYPERTENSION: Primary | ICD-10-CM

## 2022-06-21 RX ORDER — AMLODIPINE BESYLATE 10 MG/1
10 TABLET ORAL DAILY
Qty: 90 TABLET | Refills: 1 | Status: SHIPPED | OUTPATIENT
Start: 2022-06-21 | End: 2022-08-08

## 2022-06-21 NOTE — TELEPHONE ENCOUNTER
Shelby at [facility] in [facility], says that patient is requesting a refill of amlodipine 10mg and that he's completely out of the medication. He says that  is his PCP now, and that the med was previously filled by Dr. Murcia through [facility] but that he doesn't go there anymore. Pharmacy is requesting a one month refill of the medication, and that they can have him make an appointment for future refills. Order pended, routing to provider for review as medication is listed as historical.    Luann Cat RN  [facility]

## 2022-06-24 ENCOUNTER — PATIENT OUTREACH (OUTPATIENT)
Dept: CARE COORDINATION | Facility: CLINIC | Age: 62
End: 2022-06-24

## 2022-06-24 ASSESSMENT — ACTIVITIES OF DAILY LIVING (ADL): DEPENDENT_IADLS:: TRANSPORTATION

## 2022-06-24 NOTE — LETTER
[facility]  Patient Centered Plan of Care  About Me:        Patient Name:  Mustapha Negrete    YOB: 1960  Age:         61 year old   Rustam MRN:    [id number] Telephone Information:  Home Phone [phone]   Mobile [phone]       Address:  [address] [place] [address] [place] [address] Email address:  [email or web address]      Emergency Contact(s)    Name Relationship Lgl Grd Work Phone Home Phone Mobile Phone   STEFANO AYOUB Son   [phone]           Primary language:  English     needed? No   [employer]:  [phone] op. 1  Other communication barriers:None    Preferred Method of Communication:     Current living arrangement: I live in a private home (Patient stated he lives with his adult son)    Mobility Status/ Medical Equipment: Independent        Health Maintenance  Health Maintenance Reviewed: Due/Overdue   Health Maintenance Due   Topic Date Due     PREVENTIVE CARE VISIT  Never done     ASTHMA ACTION PLAN  Never done     COLORECTAL CANCER SCREENING  Never done           My Access Plan  Medical Emergency 911   Primary Clinic Line [facility] [phone]   24 Hour Appointment Line [phone] or  [phone]) (toll-free)   24 Hour Nurse Line [phone] (toll-free)   Preferred Urgent Care [facility], [phone]     [facility] [place], [place]  [phone]     Preferred Pharmacy [id number] [place] - [address]     Behavioral Health Crisis Line The National Suicide Prevention Lifeline at 1-679.409.5951 or 911             My Care Team Members  Patient Care Team       Relationship Specialty Notifications Start End    No Ref-Primary, Physician PCP - General   6/14/22     Fax: [phone]         JACOB Oneill MD Assigned Surgical Provider   9/26/21     Phone: [phone] Pager: [phone] Fax: [phone]  [facility] [address]    Viki Hanson DO Assigned PCP   9/30/21     Phone: [phone] Pager: [phone] Fax: [phone] [address] [facility] [address]    Matty Ruiz MD MD Neurological Surgery  10/18/21     Phone: [phone] Fax: [phone]         [address] [facility] [address]    Max Rico MD Referring Physician Family Medicine  10/18/21     Phone: [phone] Fax: [phone] [address] [facility] [place] [address]    Kristal Drummond MD Assigned Neuroscience Provider   10/31/21     Phone: [phone] Pager: [phone] Fax: [phone]        [address] [facility] [address] [facility] [address]    Sebastian Rock, RN Lead Care Coordinator Primary Care - CC Admissions 5/25/22     Phone: [phone] Fax: [phone]                My Care Plans  Self Management and Treatment Plan  Goals and (Comments)   Goals        General     Medical (pt-stated)      Notes - Note edited  6/24/2022  3:17 PM by Sebastian Rock, RN     Goal Statement #2: I will complete the Health Maintenance due in the next 1-3 months   Health Maintenance Due   Topic Date Due     PREVENTIVE CARE VISIT  Never done     ASTHMA ACTION PLAN  Never done     ADVANCE CARE PLANNING  Never done     Pneumococcal Vaccine: Pediatrics (0 to 5 Years) and At-Risk Patients (6 to 64 Years) (1 of 2 - PPSV23) Never done     COVID-19 Vaccine (1) Never done     HIV SCREENING  Never done     HEPATITIS C SCREENING  Never done     LIPID  Never done     ZOSTER IMMUNIZATION (1 of 2) Never done     INFLUENZA VACCINE (1) Never done      Date Goal set: 11/2/2021  Barriers: None identified   Strengths: agrees with the plan   Date to Achieve By: 2/2/2023  Patient expressed understanding of goal: Yes  Action steps to achieve this goal:  1. I will discuss with my provider at a future visit            Pain Management (pt-stated)      Notes - Note edited  5/25/2022  4:23 PM by Omaira Daigle RN     Goal Statement #1:  I will be able to urinate on my own without difficulty .    I will decrease bilateral buttocks and thigh pain in the next 3-6  months   Date Goal set: 11/2/2021  Barriers: constant pain   Strengths: Motivated   Date to Achieve By: 5/2/2022 Updated 8/2/2022  Patient expressed understanding of goal: Yes  Action steps to achieve this goal:  1. I will keep future Primary Care ( recently switched to the [facility] ),Urology, Pain Clinic appointments and will make a future Neurology appointment   2. I will drink plenty of water to keep my urine clear and yellow  3. I will take Tylenol/Ibuprofen and Gabapentin as directed   4. I will walk a block when able to increase strength   5. I will continue dry needling 3 times a week  6. I will keep future appointments scheduled with [facility] in [place] June 6, 8 and 10th               Action Plans on File:                       Advance Care Plans/Directives Type:   No data recorded    My Medical and Care Information  Problem List   Patient Active Problem List   Diagnosis     Acute prostatitis     Rectal pain     Penile pain     Perineal pain in male     Urinary retention     Symptomatic cholelithiasis     Reflux esophagitis     Psoriatic arthritis (H)     MARIAN (obstructive sleep apnea)     OA (osteoarthritis) of hip     Neuropathy     Mild intermittent asthma, uncomplicated     Insomnia, unspecified     GERD (gastroesophageal reflux disease)     Hepatic steatosis     Benign prostatic hyperplasia with urinary obstruction     Anal fissure     Tear of right acetabular labrum, sequela     Lumbar disc herniation with radiculopathy     Lower urinary tract symptoms     Arthritis of left acromioclavicular joint     ASHD (arteriosclerotic heart disease)     Calculus of gallbladder with chronic cholecystitis without obstruction     Change in bowel habits     Complete tear of left rotator cuff     Constipation     COVID-19 virus infection     Displacement of lumbar  intervertebral disc without myelopathy     Diverticular disease of large intestine     Hiatal hernia     Hyperlipidemia     Low testosterone     Pruritus ani     Pelvic floor dysfunction     Pudendal neuralgia     Coccydynia      Current Medications and Allergies:  See printed Medication Report.    Care Coordination Start Date: 11/2/2021   Frequency of Care Coordination: monthly     Form Last Updated: 06/24/2022

## 2022-06-24 NOTE — PROGRESS NOTES
Clinic Care Coordination Contact  [facility]/Voicemail       Clinical Data: Care Coordinator Outreach  Outreach attempted x 1.  Left message on patient's voicemail with call back information and requested return call.  Plan: Care Coordinator sent care coordination introduction letter on 5/25/22 via [facility]. Care Coordinator will try to reach patient again in 10 business days.          Sebastian Jimenez MSN, RN, PHN, CCM   Primary Care Clinical RN Care Coordinator  [facility]  6/24/2022   2:59 PM  [email or web address]  Office: [phone]

## 2022-06-24 NOTE — LETTER
[facility] CARE COORDINATION      June 24, 2022    Mustapha Negrete  [address] [facility] [address]      Dear Mustapha,    I am a clinic care coordinator who works with Physician Chandrika Wiggins with the [facility]. I wanted to introduce myself and provide you with my contact information for you to be able to call me with any questions or concerns. I wanted to thank you for spending the time to talk with me.  Below is a description of clinic care coordination and how I can further assist you.       The clinic care coordination team is made up of a registered nurse, , financial resource worker and community health worker who understand the health care system. The goal of clinic care coordination is to help you manage your health and improve access to the health care system. Our team works alongside your provider to assist you in determining your health and social needs. We can help you obtain health care and community resources, providing you with necessary information and education. We can work with you through any barriers and develop a care plan that helps coordinate and strengthen the communication between you and your care team.    Please feel free to contact me with any questions or concerns regarding care coordination and what we can offer.      We are focused on providing you with the highest-quality healthcare experience possible.    Sincerely,     Sebastian Jimenez MSN, RN, PHN, [facility]   Primary Care Clinical RN Care Coordinator  [facility]  6/24/2022   3:27 PM  [email or web address]  Office: [phone]      Enclosed: I have enclosed a copy of the Patient Centered Plan of Care. This has helpful information and goals that we have talked about. Please keep this in an easy to access place to use as needed.

## 2022-06-24 NOTE — PROGRESS NOTES
Clinic Care Coordination Contact    Follow Up Progress Note      Assessment: The RN CC nurse care coordinator received a call back from the patient for a follow-up call.  The patient states that he remains in extraordinary pain from his rectum to his penis.  He is switching doctors to Dr. Pro, and has an appointment on July 5.  The patient is hoping that Dr. Pro will have some different ideas of what he can do to reduce the pain.  The patient also states that he has edema in both legs with the right being worse than the left.  He is attending physical therapy and getting minimal results but he keeps going.  He also wears compression stockings when he can to keep the edema down.  The RN CC also recommended to the patient that he elevate his legs as often as possible to help prevent that edema.    Care Gaps:    Health Maintenance Due   Topic Date Due     PREVENTIVE CARE VISIT  Never done     ASTHMA ACTION PLAN  Never done     COLORECTAL CANCER SCREENING  Never done       Care Gaps Last addressed on 6/24/22 and Care Gap Goal set: Yes    Goals addressed this encounter:    Goals Addressed                    This Visit's Progress       Medical (pt-stated)   10%      Goal Statement #2: I will complete the Health Maintenance due in the next 1-3 months   Health Maintenance Due   Topic Date Due     PREVENTIVE CARE VISIT  Never done     ASTHMA ACTION PLAN  Never done     ADVANCE CARE PLANNING  Never done     Pneumococcal Vaccine: Pediatrics (0 to 5 Years) and At-Risk Patients (6 to 64 Years) (1 of 2 - PPSV23) Never done     COVID-19 Vaccine (1) Never done     HIV SCREENING  Never done     HEPATITIS C SCREENING  Never done     LIPID  Never done     ZOSTER IMMUNIZATION (1 of 2) Never done     INFLUENZA VACCINE (1) Never done      Date Goal set: 11/2/2021  Barriers: None identified   Strengths: agrees with the plan   Date to Achieve By: 2/2/2023  Patient expressed understanding of goal: Yes  Action steps to achieve this  goal:  1. I will discuss with my provider at a future visit              Pain Management (pt-stated)   70%      Goal Statement #1: I will be able to urinate on my own without difficulty .    I will decrease bilateral buttocks and thigh pain in the next 3-6  months   Date Goal set: 11/2/2021  Barriers: constant pain   Strengths: Motivated   Date to Achieve By: 5/2/2022 Updated 8/2/2022  Patient expressed understanding of goal: Yes  Action steps to achieve this goal:  1. I will keep future Primary Care ( recently switched to the [facility] ),Urology, Pain Clinic appointments and will make a future Neurology appointment   2. I will drink plenty of water to keep my urine clear and yellow  3. I will take Tylenol/Ibuprofen and Gabapentin as directed   4. I will walk a block when able to increase strength   5. I will continue dry needling 3 times a week  6. I will keep future appointments scheduled with [facility] specialist in [place] June 6, 8 and 10th              Intervention/Education provided during outreach: The RN CC reviewed with the patient elevation of his legs to prevent edema.     Outreach Frequency: monthly        Plan:   1.  The patient will elevate his legs as often as possible to prevent the edema.  2.  The patient will take all medications as prescribed by the providers to control his pain.  3.  The patient will reach out to the RN CC as needed with questions or concerns.    RN CC Nurse Care Coordinator will follow up in 3 weeks.            Sebastian Jimenez MSN, RN, PHN, CCM   Primary Care Clinical RN Care Coordinator  [facility]  6/24/2022   3:46 PM  [email or web address]  Office: [phone]

## 2022-06-29 ENCOUNTER — OFFICE VISIT (OUTPATIENT)
Dept: PALLIATIVE MEDICINE | Facility: CLINIC | Age: 62
End: 2022-06-29
Payer: COMMERCIAL

## 2022-06-29 VITALS — HEART RATE: 101 BPM | SYSTOLIC BLOOD PRESSURE: 148 MMHG | DIASTOLIC BLOOD PRESSURE: 90 MMHG

## 2022-06-29 DIAGNOSIS — M53.3 COCCYDYNIA: Primary | ICD-10-CM

## 2022-06-29 DIAGNOSIS — R90.89 ABNORMAL IMAGING OF CENTRAL NERVOUS SYSTEM: ICD-10-CM

## 2022-06-29 DIAGNOSIS — M79.2 NEUROPATHIC PAIN: ICD-10-CM

## 2022-06-29 DIAGNOSIS — G58.8 PUDENDAL NEURALGIA: ICD-10-CM

## 2022-06-29 DIAGNOSIS — M79.18 MYOFASCIAL MUSCLE PAIN: ICD-10-CM

## 2022-06-29 DIAGNOSIS — K62.89 RECTAL PAIN: ICD-10-CM

## 2022-06-29 DIAGNOSIS — M79.2: ICD-10-CM

## 2022-06-29 PROCEDURE — 99214 OFFICE O/P EST MOD 30 MIN: CPT | Performed by: PAIN MEDICINE

## 2022-06-29 RX ORDER — GABAPENTIN 300 MG/1
CAPSULE ORAL
Qty: 60 CAPSULE | Refills: 11 | COMMUNITY
Start: 2022-06-29 | End: 2022-12-09

## 2022-06-29 RX ORDER — NORTRIPTYLINE HCL 10 MG
10 CAPSULE ORAL AT BEDTIME
Qty: 30 CAPSULE | Refills: 1 | Status: SHIPPED | OUTPATIENT
Start: 2022-06-29 | End: 2022-08-08

## 2022-06-29 ASSESSMENT — PAIN SCALES - GENERAL: PAINLEVEL: WORST PAIN (10)

## 2022-06-29 NOTE — PATIENT INSTRUCTIONS
- Further procedures recommended:     - ordered sacrococcygeal ligament and ganglion impar block    - Medication Management:    - start Pamelor 10mg will titrate as tolerated    - consider medical cannabis   - Physical Therapy: Continue  - Clinical Health Psychologist to address issues of relaxation, behavioral change, coping style, and other factors important to improvement: would strongly consider   - Diagnostic Studies:    - not at this time   - Urine toxicology screen today: no   - Follow up:    - 1-2 months after the procedure     ----------------------------------------------------------------  Clinic Number:  [phone]   Call with any questions about your care and for scheduling assistance.   Calls are returned Monday through Friday between 8 AM and 4:30 PM. We usually get back to you within 2 business days depending on the issue/request.    If we are prescribing your medications:  For opioid medication refills, call the clinic or send a femeninas message 7 days in advance.  Please include:  Name of requested medication  Name of the pharmacy.  For non-opioid medications, call your pharmacy directly to request a refill. Please allow 3-4 days to be processed.   Per [place] State Law:  All controlled substance prescriptions must be filled within 30 days of being written.    For those controlled substances allowing refills, pickup must occur within 30 days of last fill.      We believe regular attendance is key to your success in our program!    Any time you are unable to keep your appointment we ask that you call us at least 24 hours in advance to cancel.This will allow us to offer the appointment time to another patient.   Multiple missed appointments may lead to dismissal from the clinic.

## 2022-07-03 ENCOUNTER — APPOINTMENT (OUTPATIENT)
Dept: CT IMAGING | Facility: CLINIC | Age: 62
End: 2022-07-03
Attending: EMERGENCY MEDICINE
Payer: COMMERCIAL

## 2022-07-03 ENCOUNTER — HOSPITAL ENCOUNTER (EMERGENCY)
Facility: CLINIC | Age: 62
Discharge: HOME | End: 2022-07-03
Attending: EMERGENCY MEDICINE | Admitting: EMERGENCY MEDICINE
Payer: COMMERCIAL

## 2022-07-03 VITALS
SYSTOLIC BLOOD PRESSURE: 150 MMHG | WEIGHT: 195 LBS | DIASTOLIC BLOOD PRESSURE: 96 MMHG | TEMPERATURE: 98.2 F | HEART RATE: 98 BPM | RESPIRATION RATE: 16 BRPM | HEIGHT: 65 IN | BODY MASS INDEX: 32.49 KG/M2 | OXYGEN SATURATION: 97 %

## 2022-07-03 DIAGNOSIS — G24.3 SPASMODIC TORTICOLLIS: ICD-10-CM

## 2022-07-03 PROCEDURE — 99284 EMERGENCY DEPT VISIT MOD MDM: CPT | Mod: 25 | Performed by: EMERGENCY MEDICINE

## 2022-07-03 PROCEDURE — 250N000013 HC RX MED GY IP 250 OP 250 PS 637: Performed by: EMERGENCY MEDICINE

## 2022-07-03 PROCEDURE — 20552 NJX 1/MLT TRIGGER POINT 1/2: CPT | Performed by: EMERGENCY MEDICINE

## 2022-07-03 PROCEDURE — 72125 CT NECK SPINE W/O DYE: CPT

## 2022-07-03 RX ORDER — ACETAMINOPHEN 500 MG
1000 TABLET ORAL ONCE
Status: COMPLETED | OUTPATIENT
Start: 2022-07-03 | End: 2022-07-03

## 2022-07-03 RX ORDER — OXYCODONE HYDROCHLORIDE 5 MG/1
5 TABLET ORAL EVERY 6 HOURS PRN
Qty: 10 TABLET | Refills: 0 | Status: SHIPPED | OUTPATIENT
Start: 2022-07-03 | End: 2022-08-05

## 2022-07-03 RX ADMIN — ACETAMINOPHEN 1000 MG: 500 TABLET ORAL at 09:56

## 2022-07-03 NOTE — ED TRIAGE NOTES
Neck pain x 2 weeks, worsened yesterday, now limited mobility in neck, no injury to neck. Pain does not radiate down UE.      Triage Assessment     Row Name 07/03/22 0892       Triage Assessment (Adult)    Airway WDL WDL       Respiratory WDL    Respiratory WDL WDL       Skin Circulation/Temperature WDL    Skin Circulation/Temperature WDL WDL       Cardiac WDL    Cardiac WDL WDL       Peripheral/Neurovascular WDL    Peripheral Neurovascular WDL WDL       Cognitive/Neuro/Behavioral WDL    Cognitive/Neuro/Behavioral WDL WDL

## 2022-07-03 NOTE — ED PROVIDER NOTES
History     Chief Complaint   Patient presents with     Neck Pain     Neck pain x 2 weeks, worsened yesterday, now limited mobility in neck, no injury to neck       HPI  Mustapha Negrete is a 61 year old male who presents for neck pain.  Symptoms ongoing over the past 2 weeks, no known injury but worsened yesterday.  The patient says that yesterday of the son move and was carrying multiple boxes.  Since then he has had significant worsening of the pain, severe, sharp, hurts to move the neck in all.  He has tried ibuprofen without improvement.  He denies fever, chills, nausea, vomiting, chest pain, shortness of breath, cough.  No sore throat, runny nose, or rash.  He does have a headache, moderate to severe, hurts in the back of the head and radiates up to the forehead.  No dizziness, changes in his vision, or new focal numbness or weakness.  He says he has had ongoing issues with bladder function, this has been an issue for over a year after he had prostate surgery.  He has been following up with urology regarding possibility of removing the clips placed in his prostate and the possibility of a bladder stimulator, no changes in his urinary function with this.  No difficulty ambulating.    Allergies:  Allergies   Allergen Reactions     Droperidol Other (See Comments)     Extrapyramidal Side Effect     Fenofibrate Headache and Diarrhea              Fentanyl      Other reaction(s): N&V hard to wake up     Keflex [Cephalexin] Itching     Lactose GI Disturbance         Other reaction(s): GI upset     Midazolam      Other reaction(s): N&V, Hard to wake up     Monosodium Glutamate      Other reaction(s): diarrhea, headaches     Pcn [Penicillins] Other (See Comments)     Feels warm and flushed, but tolerates Cephalexin     Atorvastatin Palpitations     Other reaction(s): palpitations     Sertraline Palpitations         Other reaction(s): Unknown     Simvastatin Palpitations     Other reaction(s): palpitations     Sulfa Drugs  Headache and Rash     Burning    Other reaction(s): Rash  Other reaction(s): rash and headache     Tetracycline Rash     Burning    Other reaction(s): rash       Problem List:    Patient Active Problem List    Diagnosis Date Noted     Pelvic floor dysfunction 05/13/2022     Priority: Medium     Pudendal neuralgia 05/13/2022     Priority: Medium     Coccydynia 05/13/2022     Priority: Medium     Change in bowel habits 01/03/2022     Priority: Medium     Complete tear of left rotator cuff 01/03/2022     Priority: Medium     Pruritus ani 01/03/2022     Priority: Medium     Tear of right acetabular labrum, sequela 10/28/2021     Priority: Medium     Lumbar disc herniation with radiculopathy 10/28/2021     Priority: Medium     MRI 10/21  2.  At L4-L5, there is a right paracentral slightly caudally directed disc extrusion that narrows the right lateral recess and causes mass effect on the descending/traversing right L5 nerve root.  3.  At L3-L4, there is a right paracentral disc protrusion superimposed on disc bulge with narrowing of the right lateral recess and mass effect on the descending/traversing right L4 nerve root.       Acute prostatitis 10/06/2021     Priority: Medium     Rectal pain 10/06/2021     Priority: Medium     Penile pain 10/06/2021     Priority: Medium     Perineal pain in male 10/06/2021     Priority: Medium     Anal fissure 08/26/2021     Priority: Medium     Constipation 08/26/2021     Priority: Medium     Urinary retention 08/16/2021     Priority: Medium     Neuropathy 08/16/2021     Priority: Medium     Diverticular disease of large intestine 08/03/2021     Priority: Medium     Benign prostatic hyperplasia with urinary obstruction 07/12/2021     Priority: Medium     Calculus of gallbladder with chronic cholecystitis without obstruction 05/25/2021     Priority: Medium     MARIAN (obstructive sleep apnea) 05/03/2021     Priority: Medium     On BiPAP, see scans.       Symptomatic cholelithiasis  2021     Priority: Medium     COVID-19 virus infection 2021     Priority: Medium     Hepatic steatosis 2021     Priority: Medium     Psoriatic arthritis (H) 03/15/2021     Priority: Medium     Arthritis of left acromioclavicular joint 2019     Priority: Medium     Lower urinary tract symptoms 2019     Priority: Medium     Mild intermittent asthma, uncomplicated 2017     Priority: Medium     OA (osteoarthritis) of hip 2015     Priority: Medium     ASHD (arteriosclerotic heart disease) 10/25/2013     Priority: Medium     Hyperlipidemia 10/25/2013     Priority: Medium     Reflux esophagitis 2013     Priority: Medium     Hiatal hernia 2013     Priority: Medium     Insomnia, unspecified 2013     Priority: Medium     GERD (gastroesophageal reflux disease) 10/16/2012     Priority: Medium     Low testosterone 10/16/2012     Priority: Medium     Displacement of lumbar intervertebral disc without myelopathy 2011     Priority: Medium        Past Medical History:    Past Medical History:   Diagnosis Date     Asthma        Past Surgical History:    Past Surgical History:   Procedure Laterality Date     ENT SURGERY      tonsillectomy     GENITOURINARY SURGERY      TURP     ORTHOPEDIC SURGERY      wrist surgery, carpal tunnel        Family History:    Family History   Problem Relation Age of Onset     Cerebrovascular Disease Mother      Hypertension Mother      Diabetes Mother      Cancer Father 84        liver cancer     Prostate Cancer Father        Social History:  Marital Status:   [4]  Social History     Tobacco Use     Smoking status: Former Smoker     Quit date: 1999     Years since quittin.6     Smokeless tobacco: Never Used   Vaping Use     Vaping Use: Never used   Substance Use Topics     Alcohol use: No     Drug use: No        Medications:    oxyCODONE (ROXICODONE) 5 MG tablet  albuterol (PROAIR HFA/PROVENTIL HFA/VENTOLIN HFA) 108  "(90 Base) MCG/ACT inhaler  amLODIPine (NORVASC) 10 MG tablet  gabapentin (NEURONTIN) 300 MG capsule  nortriptyline (PAMELOR) 10 MG capsule  terazosin (HYTRIN) 1 MG capsule          Review of Systems  Pertinent positives and negatives listed in the HPI, all other systems reviewed and are negative.    Physical Exam   BP: (!) 150/96  Pulse: 98  Temp: 98.2  F (36.8  C)  Resp: 16  Height: 165.1 cm (5' 5\")  Weight: 88.5 kg (195 lb)  SpO2: 97 %      Physical Exam  Vitals and nursing note reviewed.   Constitutional:       General: He is in acute distress.      Appearance: He is well-developed. He is not diaphoretic.   HENT:      Head: Normocephalic and atraumatic.      Right Ear: External ear normal.      Left Ear: External ear normal.      Nose: Nose normal.   Eyes:      General: No scleral icterus.     Conjunctiva/sclera: Conjunctivae normal.   Cardiovascular:      Rate and Rhythm: Normal rate and regular rhythm.   Pulmonary:      Effort: Pulmonary effort is normal. No respiratory distress.      Breath sounds: No stridor.   Musculoskeletal:      Cervical back: Torticollis present. No erythema, signs of trauma or crepitus. Decreased range of motion.   Skin:     General: Skin is warm and dry.   Neurological:      Mental Status: He is alert and oriented to person, place, and time.      Motor: Motor function is intact. No weakness or tremor.      Gait: Gait normal.      Comments: Left arm: 5/5 strength shoulder abduction, elbow flexion/extension, wrist flexion/extension, index-thumb opposition, and finger abduction/adduction. Light touch sensation intact at 1st dorsal webspace, thenar eminence, and volar surface of 5th digit.  Right arm: 5/5 strength shoulder abduction, elbow flexion/extension, wrist flexion/extension, index-thumb opposition, and finger abduction/adduction. Light touch sensation intact at 1st dorsal webspace, thenar eminence, and volar surface of 5th digit.  Left lower extremity: 5/5 strength with hip " flexion, hip extension, knee flexion, knee extension, dorsiflexion, and plantar flexion  Right lower extremity: 5/5 strength with hip flexion, hip extension, knee flexion, knee extension, dorsiflexion, and plantar flexion    Psychiatric:         Behavior: Behavior normal.         ED Course                 Procedures  Trigger point injection  Location: Left neck  Indication: pain  Date: 7/3/2022   The patient gave verbal consent for the procedure. Risks and benefits were discussed.  Pre-procedure exam: Motor and sensory exam normal prior to injection. Normal perfusion.  Procedure: A 30 gauge needle was inserted under normal sterile procedure. Approximately 3 mL of 0.5% bupivacaine was injected. The patient tolerated the procedure well without immediate complication. Pain was improved.             Critical Care time:  none               Results for orders placed or performed during the hospital encounter of 07/03/22 (from the past 24 hour(s))   Cervical spine CT w/o contrast    Narrative    EXAM: CT CERVICAL SPINE W/O CONTRAST  LOCATION: [facility]  DATE/TIME: 7/3/2022 10:01 AM    INDICATION: pain, torticollis; Neck pain; No applicable indication  COMPARISON: None.  TECHNIQUE: Routine CT Cervical Spine without IV contrast. Multiplanar reformats. Dose reduction techniques were used.    FINDINGS:  No evidence of acute fracture or subluxation of the cervical spine by CT imaging. Anterolisthesis of C4 on C5 measuring 2 mm. Anterior marginal osteophytes at C3/C4-C6/C7. The vertebral bodies of the cervical spine otherwise have normal stature and   alignment. Multilevel degenerative disc disease cervical spine with disc height loss, most pronounced at C4/C5-C6/C7. The partially imaged intracranial contents are unremarkable. No prevertebral soft tissue swelling. The partially imaged lung apices are   unremarkable.     Craniovertebral junction and C1-C2: The odontoid process is well approximated  with the anterior body of C1 and well aligned between the lateral masses of C1.    C2-C3: No posterior disc bulge or spinal canal narrowing. No neural foraminal narrowing.     C3-C4: No posterior disc bulge or spinal canal narrowing. Uncovertebral joint disease and facet arthropathy with severe left neural foraminal narrowing. No right neural foraminal narrowing.     C4-C5: Broad bar disc osteophyte complex with mild spinal canal narrowing. Uncovertebral joint disease and facet arthropathy with severe left and moderate neural foraminal narrowing.     C5-C6: Broad bar disc osteophyte complex with mild spinal canal narrowing. Uncovertebral joint disease and facet arthropathy with severe bilateral neural foraminal narrowing.     C6-C7: Broad bar disc osteophyte complex with mild spinal canal narrowing. Uncovertebral joint disease and facet arthropathy with severe bilateral neural foraminal narrowing.     C7-T1: No posterior disc bulge or spinal canal narrowing. No neural foraminal narrowing.       Impression    IMPRESSION:  1.  No evidence of acute fracture or subluxation of the cervical spine by CT imaging.  2.  Degenerative cervical spondylosis as described above.       Medications   acetaminophen (TYLENOL) tablet 1,000 mg (1,000 mg Oral Given 7/3/22 0956)       Assessments & Plan (with Medical Decision Making)   61-year-old male presents for pain and stiffness has been getting worse for 2 weeks.  Temperature is 36.8  C, heart rate is 98, SPO2 is 97% on.  He is given acetaminophen for pain.  Trigger point injection performed as above.  CT of the spine obtained, images reviewed independently as well as radiology read reviewed, no signs of fracture dislocation or mass.  At this time he is safe to discharge home with instructions to return if he has worsening of his symptoms or other concerns, otherwise use acetaminophen, ibuprofen, full course of oxycodone for severe pain.  The patient is in agreement to this  plan.    I have reviewed the nursing notes.    I have reviewed the findings, diagnosis, plan and need for follow up with the patient.       Discharge Medication List as of 7/3/2022 10:46 AM      START taking these medications    Details   oxyCODONE (ROXICODONE) 5 MG tablet Take 1 tablet (5 mg) by mouth every 6 hours as needed for severe pain, Disp-10 tablet, R-0, [facility]             Final diagnoses:   Spasmodic torticollis       7/3/2022   [facility] EMERGENCY DEPT     Jose Pérez MD  07/03/22 2029

## 2022-07-03 NOTE — DISCHARGE INSTRUCTIONS
Return to the emergency department for fevers, weakness in the legs, difficulty walking, worsening symptoms, or other concerns.  Follow-up in clinic for recheck if not feeling better in 1 week.

## 2022-07-05 ENCOUNTER — TELEPHONE (OUTPATIENT)
Dept: PALLIATIVE MEDICINE | Facility: CLINIC | Age: 62
End: 2022-07-05

## 2022-07-05 ENCOUNTER — OFFICE VISIT (OUTPATIENT)
Dept: FAMILY MEDICINE | Facility: CLINIC | Age: 62
End: 2022-07-05
Payer: COMMERCIAL

## 2022-07-05 VITALS
HEART RATE: 102 BPM | SYSTOLIC BLOOD PRESSURE: 140 MMHG | OXYGEN SATURATION: 96 % | WEIGHT: 200 LBS | TEMPERATURE: 98.3 F | DIASTOLIC BLOOD PRESSURE: 82 MMHG | BODY MASS INDEX: 33.28 KG/M2

## 2022-07-05 DIAGNOSIS — Z12.5 SCREENING FOR PROSTATE CANCER: ICD-10-CM

## 2022-07-05 DIAGNOSIS — M62.89 PELVIC FLOOR DYSFUNCTION: Primary | ICD-10-CM

## 2022-07-05 DIAGNOSIS — R39.89 URINARY PROBLEM: ICD-10-CM

## 2022-07-05 DIAGNOSIS — Z12.11 SCREEN FOR COLON CANCER: ICD-10-CM

## 2022-07-05 DIAGNOSIS — I10 ESSENTIAL HYPERTENSION: ICD-10-CM

## 2022-07-05 LAB
ALBUMIN UR-MCNC: NEGATIVE MG/DL
APPEARANCE UR: CLEAR
BACTERIA #/AREA URNS HPF: ABNORMAL /HPF
BILIRUB UR QL STRIP: NEGATIVE
COLOR UR AUTO: YELLOW
GLUCOSE UR STRIP-MCNC: NEGATIVE MG/DL
HGB UR QL STRIP: NEGATIVE
KETONES UR STRIP-MCNC: NEGATIVE MG/DL
LEUKOCYTE ESTERASE UR QL STRIP: NEGATIVE
MUCOUS THREADS #/AREA URNS LPF: PRESENT /LPF
NITRATE UR QL: NEGATIVE
PH UR STRIP: 6.5 [PH] (ref 5–7)
RBC #/AREA URNS AUTO: ABNORMAL /HPF
SP GR UR STRIP: 1.02 (ref 1–1.03)
SQUAMOUS #/AREA URNS AUTO: ABNORMAL /LPF
UROBILINOGEN UR STRIP-ACNC: 0.2 E.U./DL
WBC #/AREA URNS AUTO: ABNORMAL /HPF

## 2022-07-05 PROCEDURE — 84154 ASSAY OF PSA FREE: CPT | Performed by: FAMILY MEDICINE

## 2022-07-05 PROCEDURE — 84153 ASSAY OF PSA TOTAL: CPT | Performed by: FAMILY MEDICINE

## 2022-07-05 PROCEDURE — 99213 OFFICE O/P EST LOW 20 MIN: CPT | Performed by: FAMILY MEDICINE

## 2022-07-05 PROCEDURE — 36415 COLL VENOUS BLD VENIPUNCTURE: CPT | Performed by: FAMILY MEDICINE

## 2022-07-05 PROCEDURE — 81001 URINALYSIS AUTO W/SCOPE: CPT | Performed by: FAMILY MEDICINE

## 2022-07-05 RX ORDER — LISINOPRIL 10 MG/1
10 TABLET ORAL DAILY
Qty: 90 TABLET | Refills: 3 | Status: SHIPPED | OUTPATIENT
Start: 2022-07-05 | End: 2022-09-08

## 2022-07-05 ASSESSMENT — ENCOUNTER SYMPTOMS
SHORTNESS OF BREATH: 0
PALPITATIONS: 0
PARESTHESIAS: 1
NERVOUS/ANXIOUS: 0
COUGH: 0
FEVER: 0
CHILLS: 0
MYALGIAS: 0
HEADACHES: 0
SORE THROAT: 0
DIZZINESS: 0
ARTHRALGIAS: 0
WEAKNESS: 0
JOINT SWELLING: 0

## 2022-07-05 ASSESSMENT — PAIN SCALES - GENERAL: PAINLEVEL: WORST PAIN (10)

## 2022-07-05 NOTE — TELEPHONE ENCOUNTER
Screening Questions for Radiology Injections:    Injection to be done at which interventional clinic site? [facility]    Procedure ordered by Genie     Procedure ordered? sacrococcygeal ligament and ganglion impar block     Transforaminal Cervical LEILA - Send to [facility] ([facility]) - No [facility] providers perform this procedure    What insurance would patient like us to bill for this procedure? [employer]     Worker's comp or MVA (motor vehicle accident) -Any injection DO NOT SCHEDULE and route to Meghann Nesbitt.      [employer] insurance - For SI joint injections, DO NOT SCHEDULE and route to Mis Dago.       ALL BCBS, [employer] and [employer] - DO NOT SCHEDULE and route to Mis Loza    Is an  needed? No     Patient has a  home? (Review Grid) YES: Informed     Any chance of pregnancy? Not Applicable   If YES, do NOT schedule and route to RN pool    Is patient actively being treated for cancer or immunocompromised? No  If YES, do NOT schedule and route to RN pool     Does the patient have a bleeding or clotting disorder? No     If YES, okay to schedule AND route to RN nurse pool. (For any patients with platelet count <100, RN must forward to provider)    Is patient taking any Blood Thinners OR Antiplatelet medication?  No   If hold needed, do NOT schedule, route to RN pool    Examples:   o Blood Thinners: (Coumadin, Warfarin, Jantoven, Pradaxa, Xarelto, Eliquis, Edoxaban, Enoxaparin, Lovenox, Heparin, Arixtra, Fondaparinux or Fragmin)  o Antiplatelet Medications: (Plavix, Brilinta or Effient)     Is patient taking any aspirin products (includes Excedrin and Fiorinal)? No     If more than 325mg/day, OK to schedule; Instruct Pt to decrease to less than 325 mg for 7 days AND route to RN pool    For CERVICAL procedures, hold all aspirin products for 6 days.     Tell Pt that if aspirin product is not held for 6 days, the procedure WILL BE cancelled.     Any allergies to  contrast dye, iodine, shellfish, or numbing and steroid medications? No    If YES, schedule and add allergy information to appointment notes AND route to the RN pool     If LEILA and Contrast Dye / Iodine Allergy? DO NOT SCHEDULE, route to RN pool    Allergies: Droperidol, Fenofibrate, Fentanyl, Keflex [cephalexin], Lactose, Midazolam, Monosodium glutamate, Pcn [penicillins], Atorvastatin, Sertraline, Simvastatin, Sulfa drugs, and Tetracycline     Does patient have an active infection or treated for one within the past week? No    Is patient currently taking any antibiotics or steroid medications?  No     For patients on chronic, preventative, or prophylactic antibiotics, procedures may be scheduled.     For patients on antibiotics for active or recent infection, schedule 4 days after completed.    For patients on steroid medications, schedule 4 days after completed.     Has the patient had a flu shot or any other vaccinations within the past 7 days? No  If yes, explain that for the vaccine to work best they need to:       wait 1 week before and 1 week after getting any Vaccine    wait 1 week before and 2 weeks after getting Covid Vaccine #2 or BOOSTER    If patient has concerns about the timing, send to RN pool     Does patient have an MRI/CT?  Not Applicable Include Date and Check Procedure Scheduling Grid to see if required.    Was the MRI/CT done within the last 3 years?  NA     If no route to RN Pool    If yes, where was the MRI/CT done?      Refer to PACS Transmissions list for approved external locations and route to RN Pool High Priority    If MRI was not done at approved external location do NOT schedule and route to RN pool.      If patient has an imaging disc, the injection MAY be scheduled but patient must bring disc to appt or appt will be cancelled.    Procedure Specific Instructions:    If celiac plexus block, informed patient NPO for 6 hours and that it is okay to take medications with sips of water,  especially blood pressure medications Not Applicable         If this is for a cervical procedure, informed patient that aspirin needs to be held for 6 days.   Not Applicable      Sedation, If Sedation is ordered for any procedure, patient must be NPO for 6 hours prior to procedure Not Applicable      If IV needed:    Do not schedule procedures requiring IV placement in the first appointment of the day or first appointment after lunch. Do NOT schedule at 0745, 0815 or 1245.     Instructed patient to arrive 30 minutes early for IV start if required. (Check Procedure Scheduling Grid)  Not Applicable    Reminders:    If you are started on any steroids or antibiotics between now and your appointment, you must contact us because the procedure may need to be cancelled.  No      As a reminder, receiving steroids can decrease your body's ability to fight infection.   Would you still like to move forward with scheduling the injection?  No      IV Sedation is not provided for procedures. If oral anti-anxiety medication is needed, the patient should request this from their referring provider.      Instruct patient to arrive as directed prior to the scheduled appointment time:  [place]: 30 minutes before; if IV needed 1 hour before       For patients 85 or older we recommend having an adult stay w/ them for the remainder of the day.       If the patient is Diabetic, remind them to bring their glucometer.      Does the patient have any questions?  NO  Dori Jalloh  [facility] [place]

## 2022-07-05 NOTE — PATIENT INSTRUCTIONS
Claudy Luciano,    Thank you for allowing [facility] to manage your care.    I ordered some blood work, please go to the laboratory to get your laboratory studies.    Keep your upcoming appointment with urology and pain management.     I sent your prescriptions to your pharmacy.    For your high blood pressure, I am starting lisinopril 10 mg daily.     I made a colonoscopy referral, they will be calling in approximately 1 week to set up your appointment.  If you do not hear from them, please call the specialty number on your after visit.     For your convenience, test results are released as soon as they are available  Please allow 1-2 business days for me to send you a comment about your results.  If not done so, I encourage you to login into [employer] [email or web address] to review your results in real time.     If you have any questions or concerns, please feel free to call us at [phone].    Sincerely,    Dr. Pro    Did you know?      You can schedule a video visit for follow-up appointments as well as future appointments for certain conditions.  Please see the below link.     [email or web address]    If you have not already done so,  I encourage you to sign up for [employer] ([email or web address]).  This will allow you to review your results, securely communicate with a provider, and schedule virtual visits as well.

## 2022-07-05 NOTE — PROGRESS NOTES
1. Pelvic floor dysfunction  History of Urolift.  Now with symptoms of numbness and pain.  Advised to keep appointment with urology and pain management.  Continue with pelvic floor PT.  Will defer to urology if patient benefits from nerve stimulator or removal of staples from previous surgery.  Continue with gabapentin at this time.     2. Screen for colon cancer  - Colonscopy Screening  Referral; Future    3. Urinary problem  - UA with Microscopic reflex to Culture - Clinic Collect  - Urine Microscopic    4. Screening for prostate cancer  - PSA, total and free; Future  - PSA, total and free    5. Essential hypertension  Amlodipine was discontinued in the past.   - lisinopril (ZESTRIL) 10 MG tablet; Take 1 tablet (10 mg) by mouth daily  Dispense: 90 tablet; Refill: 3      Past Medical History:   Diagnosis Date     MARIAN (obstructive sleep apnea)      Pelvic floor dysfunction        Past Surgical History:   Procedure Laterality Date     BACK SURGERY       CHOLECYSTECTOMY       ENT SURGERY      tonsillectomy     GENITOURINARY SURGERY      TURP     JOINT REPLACEMENT, HIP RT/LT Left      ORTHOPEDIC SURGERY      wrist surgery, carpal tunnel      PROSTATE SURGERY      Urolift     ROTATOR CUFF REPAIR RT/LT Left        Family History   Problem Relation Age of Onset     Cerebrovascular Disease Mother      Hypertension Mother      Diabetes Mother      Liver Cancer Mother      Cancer Father 84        liver cancer     Prostate Cancer Father        Social History     Tobacco Use     Smoking status: Former Smoker     Packs/day: 1.00     Years: 5.00     Pack years: 5.00     Quit date: 1999     Years since quittin.6     Smokeless tobacco: Never Used   Substance Use Topics     Alcohol use: No     Current Outpatient Medications   Medication     albuterol (PROAIR HFA/PROVENTIL HFA/VENTOLIN HFA) 108 (90 Base) MCG/ACT inhaler     gabapentin (NEURONTIN) 300 MG capsule     lisinopril (ZESTRIL) 10 MG tablet      nortriptyline (PAMELOR) 10 MG capsule     oxyCODONE (ROXICODONE) 5 MG tablet     terazosin (HYTRIN) 1 MG capsule     amLODIPine (NORVASC) 10 MG tablet     No current facility-administered medications for this visit.        Allergies   Allergen Reactions     Droperidol Other (See Comments)     Extrapyramidal Side Effect     Fenofibrate Headache and Diarrhea              Fentanyl      Other reaction(s): N&V hard to wake up     Keflex [Cephalexin] Itching     Lactose GI Disturbance         Other reaction(s): GI upset     Midazolam      Other reaction(s): N&V, Hard to wake up     Monosodium Glutamate      Other reaction(s): diarrhea, headaches     Pcn [Penicillins] Other (See Comments)     Feels warm and flushed, but tolerates Cephalexin     Atorvastatin Palpitations     Other reaction(s): palpitations     Sertraline Palpitations         Other reaction(s): Unknown     Simvastatin Palpitations     Other reaction(s): palpitations     Sulfa Drugs Headache and Rash     Burning    Other reaction(s): Rash  Other reaction(s): rash and headache     Tetracycline Rash     Burning    Other reaction(s): rash           Zhang Luciano is a 61 year old, presenting for the following health issues:  Urinary Problem      History of Present Illness       Reason for visit:  Pelvic area pain sever swelling burning pain numbness difficulty urinating    He eats 0-1 servings of fruits and vegetables daily.He consumes 0 sweetened beverage(s) daily.He exercises with enough effort to increase his heart rate 20 to 29 minutes per day.  He exercises with enough effort to increase his heart rate 3 or less days per week.   He is taking medications regularly.    1. Establish care    2. Pelvic pain: Started last April when patient had COVID-19.  Patient had numbness due constant laying in back.  Had cholecystomy last May.  Patient went back to work.  After 3 weeks of returning back to work, difficulty with urination and frequency.  Feels pressure  and burning.  Has to hold testicular downward.  Burning sensation.  As of note, patient when Urolift 2-3 years ago, patient had nocturia and urinary frequency.  After the procedure, symptoms after 6-8 months but slowly getting worse.  New symptoms burning and needle sensation.  Started on gabapentin.  Patient was sent to pelvic floor physical therapy with some mild relief.  Patient has been seen by urology and was referred by Dr. Jean-Baptiste and Elisha.  Was recommended to have SNS (nerve stimulator).  Scheduled to have a follow-up Dr. Jean-Baptiste on 7/19/22.  Patient tried tylenol and ibuprofen with minimal improvement.  Patient is followed by pain clinic for potential nerve block.     3. Essential hypertension: Leg swelling: Patient stopped amlodipine due swelling.       Review of Systems   Constitutional: Negative for chills and fever.   HENT: Negative for congestion, ear pain, hearing loss and sore throat.    Respiratory: Negative for cough and shortness of breath.    Cardiovascular: Negative for chest pain, palpitations and peripheral edema.   Musculoskeletal: Negative for arthralgias, joint swelling and myalgias.   Skin: Negative for rash.   Neurological: Positive for paresthesias (pelvic ). Negative for dizziness, weakness and headaches.   Psychiatric/Behavioral: Negative for mood changes. The patient is not nervous/anxious.             Objective    BP (!) 140/82   Pulse 102   Temp 98.3  F (36.8  C) (Tympanic)   Wt 90.7 kg (200 lb)   SpO2 96%   BMI 33.28 kg/m    Body mass index is 33.28 kg/m .  Physical Exam  Constitutional:       General: He is not in acute distress.     Appearance: He is well-developed.   HENT:      Head: Normocephalic and atraumatic.      Nose: Nose normal.   Eyes:      Conjunctiva/sclera: Conjunctivae normal.   Neck:      Trachea: No tracheal deviation.   Cardiovascular:      Rate and Rhythm: Normal rate and regular rhythm.      Heart sounds: Normal heart sounds.   Pulmonary:      Effort:  Pulmonary effort is normal.      Breath sounds: No wheezing.   Musculoskeletal:         General: Normal range of motion.      Cervical back: Normal range of motion.   Skin:     Findings: No erythema or rash.   Neurological:      Mental Status: He is alert and oriented to person, place, and time.   Psychiatric:         Behavior: Behavior normal.

## 2022-07-06 ENCOUNTER — TELEPHONE (OUTPATIENT)
Dept: GASTROENTEROLOGY | Facility: CLINIC | Age: 62
End: 2022-07-06

## 2022-07-06 ENCOUNTER — PATIENT OUTREACH (OUTPATIENT)
Dept: CARE COORDINATION | Facility: CLINIC | Age: 62
End: 2022-07-06

## 2022-07-06 ENCOUNTER — HOSPITAL ENCOUNTER (OUTPATIENT)
Facility: AMBULATORY SURGERY CENTER | Age: 62
End: 2022-07-06
Attending: SURGERY
Payer: COMMERCIAL

## 2022-07-06 LAB
PSA FREE MFR SERPL: 10.47 %
PSA FREE SERPL-MCNC: 0.2 NG/ML
PSA SERPL-MCNC: 1.91 NG/ML (ref 0–4.5)

## 2022-07-06 ASSESSMENT — ACTIVITIES OF DAILY LIVING (ADL): DEPENDENT_IADLS:: TRANSPORTATION

## 2022-07-06 NOTE — PROGRESS NOTES
Clinic Care Coordination Contact    Follow Up Progress Note      Assessment: The RN CC nurse care coordinator contacted the patient by phone for a follow-up call.  The patient was recently in the ED for shoulder pain and received a trigger injection.  The patient states that today he was seen by a specialist who gave him a steroid injection in his shoulder.  The patient states that he is feeling much better.  The patient has seen a new provider at [facility].  He is very pleased with the visit.    Care Gaps:    Health Maintenance Due   Topic Date Due     PREVENTIVE CARE VISIT  Never done     ASTHMA ACTION PLAN  Never done     COLORECTAL CANCER SCREENING  Never done       Care Gaps Last addressed on 7/6/22    Goals addressed this encounter:    Goals Addressed                    This Visit's Progress       Medical (pt-stated)   10%      Goal Statement #2: I will complete the Health Maintenance due in the next 1-3 months   Health Maintenance Due   Topic Date Due     PREVENTIVE CARE VISIT  Never done     ASTHMA ACTION PLAN  Never done     ADVANCE CARE PLANNING  Never done     Pneumococcal Vaccine: Pediatrics (0 to 5 Years) and At-Risk Patients (6 to 64 Years) (1 of 2 - PPSV23) Never done     COVID-19 Vaccine (1) Never done     HIV SCREENING  Never done     HEPATITIS C SCREENING  Never done     LIPID  Never done     ZOSTER IMMUNIZATION (1 of 2) Never done     INFLUENZA VACCINE (1) Never done      Date Goal set: 11/2/2021  Barriers: None identified   Strengths: agrees with the plan   Date to Achieve By: 2/2/2023  Patient expressed understanding of goal: Yes  Action steps to achieve this goal:  1. I will discuss with my provider at a future visit              Pain Management (pt-stated)   70%      Goal Statement #1: I will be able to urinate on my own without difficulty .    I will decrease bilateral buttocks and thigh pain in the next 3-6  months   Date Goal set: 11/2/2021  Barriers: constant pain   Strengths:  Motivated   Date to Achieve By: 5/2/2022 Updated 8/2/2022  Patient expressed understanding of goal: Yes  Action steps to achieve this goal:  1. I will keep future Primary Care ( recently switched to the [facility] ),Urology, Pain Clinic appointments and will make a future Neurology appointment   2. I will drink plenty of water to keep my urine clear and yellow  3. I will take Tylenol/Ibuprofen and Gabapentin as directed   4. I will walk a block when able to increase strength   5. I will continue dry needling 3 times a week  6. I will keep future appointments scheduled with [facility] specialist in [place] June 6, 8 and 10th              Intervention/Education provided during outreach: The patient will work on avoiding dehydration.     Outreach Frequency: monthly        Plan:   1.  The patient will continue to watch his fluid intake to ensure that he is not becoming dehydrated.  2.  The patient will take all of his medications as prescribed by the provider especially the Tylenol/ibuprofen and gabapentin.  3.  The patient will follow the directions of his new provider and complete the tests as required.      The RN CC Nurse care coordinator will reach out to the patient again in 4 weeks.      Sebastian Jimenez MSN, RN, PHN, CCM   Primary Care Clinical RN Care Coordinator  [facility]  7/6/2022   12:20 PM  [email or web address]  Office: [phone]        {

## 2022-07-06 NOTE — LETTER
[facility]  Patient Centered Plan of Care  About Me:        Patient Name:  Mustapha Negrete    YOB: 1960  Age:         61 year old   Rustam MRN:    [id number] Telephone Information:  Home Phone [phone]   Mobile [phone]       Address:  [address] [place] [address] [place] [address] Email address:  [email or web address]      Emergency Contact(s)    Name Relationship Lgl Grd Work Phone Home Phone Mobile Phone   STEFANO AYOUB Son   [phone]           Primary language:  English     needed? No   [employer]:  [phone] op. 1  Other communication barriers:None    Preferred Method of Communication:     Current living arrangement: I live in a private home (Patient stated he lives with his adult son)    Mobility Status/ Medical Equipment: Independent        Health Maintenance  Health Maintenance Reviewed: Due/Overdue   Health Maintenance Due   Topic Date Due     PREVENTIVE CARE VISIT  Never done     ASTHMA ACTION PLAN  Never done     COLORECTAL CANCER SCREENING  Never done           My Access Plan  Medical Emergency 911   Primary Clinic Line [facility] - [phone]   24 Hour Appointment Line [phone] or  [phone]) (toll-free)   24 Hour Nurse Line [phone] (toll-free)   Preferred Urgent Care [facility], [phone]     Preferred Hospital [place], [place]  [phone]     Preferred Pharmacy [id number] [employer] [facility] [place] - [address]     Behavioral Health Crisis Line The National Suicide Prevention Lifeline at 1-467.136.8315 or 911             My Care Team Members  Patient Care Team       Relationship Specialty Notifications Start End    Silver Pro DO PCP - General Family Medicine Admissions 7/6/22     Phone: [phone] Fax: [phone] [address] [facility] [address] [place] [address]    JACOB Oneill MD Assigned Surgical  Provider   9/26/21     Phone: [phone] Pager: [phone] Fax: [phone] [address] [facility] [address]    Viki Hanson DO Assigned PCP   9/30/21     Phone: [phone] Pager: [phone] Fax: [phone] [address] [facility] [address]    Matty Ruiz MD MD Neurological Surgery  10/18/21     Phone: [phone] Fax: [phone]         [address] [place] [address]    Max Rico MD Referring Physician Family Medicine  10/18/21     Phone: [phone] Fax: [phone] [address] [facility] [address] [place] [address]    Kristal Drummond MD Assigned Neuroscience Provider   10/31/21     Phone: [phone] Pager: [phone] Fax: [phone]        [address] [facility] [address] [facility] [address]    Sebastian Rock, RN Lead Care Coordinator Primary Care - CC Admissions 5/25/22     Phone: [phone] Fax: [phone]                My Care Plans  Self Management and Treatment Plan  Goals and (Comments)   Goals        General     Medical (pt-stated)      Notes - Note edited  6/24/2022  3:17 PM by Sebastian Rock, RN     Goal Statement #2: I will complete the Health Maintenance due in the next 1-3 months   Health Maintenance Due   Topic Date Due     PREVENTIVE CARE VISIT  Never done     ASTHMA ACTION PLAN  Never done     ADVANCE CARE PLANNING  Never done     Pneumococcal Vaccine: Pediatrics (0 to 5 Years) and At-Risk Patients (6 to 64 Years) (1 of 2 - PPSV23) Never done     COVID-19 Vaccine (1) Never done     HIV SCREENING  Never done     HEPATITIS C SCREENING  Never done     LIPID  Never done     ZOSTER IMMUNIZATION (1 of 2) Never done     INFLUENZA VACCINE (1) Never done      Date Goal set: 11/2/2021  Barriers: None identified   Strengths: agrees with the plan   Date to Achieve By: 2/2/2023  Patient expressed understanding of goal: Yes  Action steps to achieve this goal:  1. I will discuss with my provider at a future visit            Pain Management  (pt-stated)      Notes - Note edited  5/25/2022  4:23 PM by Omaira Daigle, RN     Goal Statement #1: I will be able to urinate on my own without difficulty .    I will decrease bilateral buttocks and thigh pain in the next 3-6  months   Date Goal set: 11/2/2021  Barriers: constant pain   Strengths: Motivated   Date to Achieve By: 5/2/2022 Updated 8/2/2022  Patient expressed understanding of goal: Yes  Action steps to achieve this goal:  1. I will keep future Primary Care ( recently switched to the [facility] ),Urology, Pain Clinic appointments and will make a future Neurology appointment   2. I will drink plenty of water to keep my urine clear and yellow  3. I will take Tylenol/Ibuprofen and Gabapentin as directed   4. I will walk a block when able to increase strength   5. I will continue dry needling 3 times a week  6. I will keep future appointments scheduled with [facility] specialist in [place] June 6, 8 and 10th               Action Plans on File:                       Advance Care Plans/Directives Type:   No data recorded    My Medical and Care Information  Problem List   Patient Active Problem List   Diagnosis     Acute prostatitis     Rectal pain     Penile pain     Perineal pain in male     Urinary retention     Symptomatic cholelithiasis     Reflux esophagitis     Psoriatic arthritis (H)     MARIAN (obstructive sleep apnea)     OA (osteoarthritis) of hip     Neuropathy     Mild intermittent asthma, uncomplicated     Insomnia, unspecified     GERD (gastroesophageal reflux disease)     Hepatic steatosis     Benign prostatic hyperplasia with urinary obstruction     Anal fissure     Tear of right acetabular labrum, sequela     Lumbar disc herniation with radiculopathy     Lower urinary tract symptoms     Arthritis of left acromioclavicular joint     ASHD (arteriosclerotic heart disease)     Calculus of gallbladder with chronic cholecystitis without obstruction     Change in bowel habits      Complete tear of left rotator cuff     Constipation     COVID-19 virus infection     Displacement of lumbar intervertebral disc without myelopathy     Diverticular disease of large intestine     Hiatal hernia     Hyperlipidemia     Low testosterone     Pruritus ani     Pelvic floor dysfunction     Pudendal neuralgia     Coccydynia      Current Medications and Allergies:  See printed Medication Report.    Care Coordination Start Date: 11/2/2021   Frequency of Care Coordination: monthly     Form Last Updated: 07/06/2022

## 2022-07-06 NOTE — TELEPHONE ENCOUNTER
Screening Questions  BlueKIND OF PREP RedLOCATION [review exclusion criteria] GreenSEDATION TYPE      1. Are you active on mychart? yes - but would like letter in mail       2. Ordering/Referring Provider: Silver Pro DO     3. What insurance is in the chart? [employer]    4. Do you have a legal guardian or medical Power of ?  Are you able to give consent for your medical care? y   (Sedation review/consideration needed)      5.    BMI 33.28 [BMI OVER 40-EXTENDED PREP]  If greater than 40 review exclusion criteria [PAC APPT IF @ [facility]]       6.  Have you had a positive covid test in the last 90 days? n     7.   Respiratory Screening :  [If yes to any of the following HOSPITAL setting only]                     Do you use daily home oxygen? n  Do you have mod to severe Obstructive Sleep Apnea? n [OKAY @ [facility] SH PH RI]                   Do you have Pulmonary Hypertension? n                   Do you have UNCONTROLLED asthma? n        8.   Have you had a heart or lung transplant? n      9.   Are you currently on dialysis?  n [ If yes, G-PREP & HOSPITAL setting only]      10.   Do you have chronic kidney disease? n [ If yes, G-PREP ]     11.  Have you had a stroke or Transient ischemic attack (TIA - aka  mini stroke ) within 6 months?  n (If yes, please review exclusion criteria)     12.   In the past 6 months, have you had any heart related issues including cardiomyopathy or heart attack? n          If yes, did it require cardiac stenting or other implantable device? n       13.   Do you have any implantable devices in your body (pacemaker, defib, LVAD)? n  (If yes, please review exclusion criteria)     14.   Do you take nitroglycerin? n           If yes, how often? n  (if yes, HOSPITAL setting ONLY)     15.   Are you currently taking any blood thinners? n          [IF YES, INFORM PATIENT TO FOLLOW UP W/ ORDERING PROVIDER FOR BRIDGING INSTRUCTIONS]      16.   Do you have a diagnosis of diabetes? n  [ If yes, G-PREP ]     17.   [FEMALES] Are you currently pregnant? n   If yes, how many weeks? n     18.   Are you taking any prescription pain medications on a routine schedule?  n  [ If yes, EXTENDED PREP.] [If yes, MAC]     19.   Do you have any chemical dependencies such as alcohol, street drugs, or methadone?  n [If yes, MAC]     20.   Do you have any history of post-traumatic stress syndrome, severe anxiety or history of psychosis?  n [If yes, MAC]     21.   Do you transfer independently?  y     22.  On a regular basis do you go 3-5 days between bowel movements? n [ If yes, EXTENDED PREP.]     23.   Preferred LOCAL Pharmacy for Pre Prescription       [id number] [place] [address]          Scheduling Details      Caller :  Mustapha   (Please ask for phone number if not scheduled by patient)    Type of Procedure Scheduled: Lower Endoscopy [Colonoscopy]  Which Colonoscopy Prep was Sent?: mprep    Surgeon: chito  Date of Procedure: 08/09   Location:     Sedation Type: mod    Conscious Sedation- Needs  for 6 hours after the procedure  MAC/General-Needs  for 24 hours after procedure    Pre-op Required at [facility], [place], [place] and OR for MAC sedation: n  (advise patient they will need a pre-op prior to procedure -)      Informed patient they will need an adult  y  Cannot take any type of public or medical transportation alone    Pre-Procedure Covid test to be completed at [facility] or Externally: home    Confirmed Nurse will call to complete assessment y    Additional comments:

## 2022-07-08 ENCOUNTER — PRE VISIT (OUTPATIENT)
Dept: UROLOGY | Facility: CLINIC | Age: 62
End: 2022-07-08

## 2022-07-08 NOTE — TELEPHONE ENCOUNTER
Reason for visit: Follow-Up discuss removal of uro clips    Dx/Hx/Sx: Urinary Retention    Records/imaging/labs/orders: In EPIC    At Rooming: standard video

## 2022-07-19 ENCOUNTER — VIRTUAL VISIT (OUTPATIENT)
Dept: UROLOGY | Facility: CLINIC | Age: 62
End: 2022-07-19
Payer: COMMERCIAL

## 2022-07-19 DIAGNOSIS — R10.2 PELVIC PAIN IN MALE: Primary | ICD-10-CM

## 2022-07-19 PROCEDURE — 99214 OFFICE O/P EST MOD 30 MIN: CPT | Mod: 95 | Performed by: UROLOGY

## 2022-07-19 NOTE — PROGRESS NOTES
Mustapha is a 61 year old who is being evaluated via a billable video visit.            UROLOGY OUTPATIENT VISIT      Chief Complaint:   Pelvic Pain      Synopsis    Mustapha Negrete is a very pleasant AGE: 61 year old year old person    He has been seen by me and Dr. Tello for pelvic pain.  He has a long history of this summarized in prior notes.  Recent bout seems to have been exacerbated by UroLift clips placed several years ago.  Today he reports severe frustration with the lack of any efficacy with symptoms.  Dr. Tello had discussed a possible trial of sacral neuromodulation but also wanted him to discuss the possibility of an outlet procedure 1 more time with me for which he presents today.  Some of his symptoms in particular the slow stream and sensation of incomplete emptying are suggestive of outlet obstruction but we discussed that this also is difficult to differentiate from detrusor hypocontractility.  Given the fact that he has previously had an endoscopic work-up and not been identified as having overt evidence of prostatic obstruction I am suspicious that he has perhaps hypocontractility as his primary reason for his symptoms.  We discussed the further work-up for this including urodynamics.    Medical Comorbidities      Past Medical History:   Diagnosis Date     MARIAN (obstructive sleep apnea)      Pelvic floor dysfunction                Medications     Current Outpatient Medications   Medication     albuterol (PROAIR HFA/PROVENTIL HFA/VENTOLIN HFA) 108 (90 Base) MCG/ACT inhaler     gabapentin (NEURONTIN) 300 MG capsule     lisinopril (ZESTRIL) 10 MG tablet     terazosin (HYTRIN) 1 MG capsule     amLODIPine (NORVASC) 10 MG tablet     nortriptyline (PAMELOR) 10 MG capsule     oxyCODONE (ROXICODONE) 5 MG tablet     No current facility-administered medications for this visit.         The following  distinct labs were reviewed    I personally reviewed all applicable laboratory data and went over findings with  patient  Significant for:    CBC RESULTS:  Recent Labs   Lab Test 11/11/21  1049 10/11/21  1814 10/06/21  1353 09/16/21  1101   WBC 8.1 7.3 9.1 10.2   HGB 14.2 12.8* 14.5 14.8    300 355 350        BMP RESULTS:  Recent Labs   Lab Test 10/11/21  1814 10/06/21  1353 09/16/21  1101    138 140   POTASSIUM 4.0 3.6 3.9   CHLORIDE 106 106 110*   CO2 24 22 24   ANIONGAP 7 10 6   * 109* 111*   BUN 18 15 17   CR 0.90 0.76 0.81   GFRESTIMATED >90 >90 >90       CALCIUM RESULTS:  Recent Labs   Lab Test 10/11/21  1814 10/06/21  1353 09/16/21  1101   ANITA 8.8 9.7 9.0       PTH RESULTS:  No results for input(s): PTHI in the last 77174 hours.    HGB A1C RESULTS:  No results found for: A1C    UA RESULTS:   Recent Labs   Lab Test 07/05/22  0959 04/05/22  0904 03/08/22  0330   SG 1.025 1.010 1.010   URINEPH 6.5 6.5 8.0*   NITRITE Negative Negative Negative   RBCU 0-2 0-2 4*   WBCU 0-5 0-5 >182*       PSA RESULTS  Prostate Specific Antigen Screen   Date Value Ref Range Status   10/08/2021 2.88 0.00 - 4.00 ug/L Final     PSA Tumor Marker   Date Value Ref Range Status   07/05/2022 1.91 0.00 - 4.50 ng/mL Final         Recent Imaging Report    I personally reviewed all applicable imaging and went over the below findings with patient.    Results for orders placed or performed during the hospital encounter of 07/03/22   Cervical spine CT w/o contrast    Narrative    EXAM: CT CERVICAL SPINE W/O CONTRAST  LOCATION: [facility]  DATE/TIME: 7/3/2022 10:01 AM    INDICATION: pain, torticollis; Neck pain; No applicable indication  COMPARISON: None.  TECHNIQUE: Routine CT Cervical Spine without IV contrast. Multiplanar reformats. Dose reduction techniques were used.    FINDINGS:  No evidence of acute fracture or subluxation of the cervical spine by CT imaging. Anterolisthesis of C4 on C5 measuring 2 mm. Anterior marginal osteophytes at C3/C4-C6/C7. The vertebral bodies of the cervical spine otherwise have  normal stature and   alignment. Multilevel degenerative disc disease cervical spine with disc height loss, most pronounced at C4/C5-C6/C7. The partially imaged intracranial contents are unremarkable. No prevertebral soft tissue swelling. The partially imaged lung apices are   unremarkable.     Craniovertebral junction and C1-C2: The odontoid process is well approximated with the anterior body of C1 and well aligned between the lateral masses of C1.    C2-C3: No posterior disc bulge or spinal canal narrowing. No neural foraminal narrowing.     C3-C4: No posterior disc bulge or spinal canal narrowing. Uncovertebral joint disease and facet arthropathy with severe left neural foraminal narrowing. No right neural foraminal narrowing.     C4-C5: Broad bar disc osteophyte complex with mild spinal canal narrowing. Uncovertebral joint disease and facet arthropathy with severe left and moderate neural foraminal narrowing.     C5-C6: Broad bar disc osteophyte complex with mild spinal canal narrowing. Uncovertebral joint disease and facet arthropathy with severe bilateral neural foraminal narrowing.     C6-C7: Broad bar disc osteophyte complex with mild spinal canal narrowing. Uncovertebral joint disease and facet arthropathy with severe bilateral neural foraminal narrowing.     C7-T1: No posterior disc bulge or spinal canal narrowing. No neural foraminal narrowing.       Impression    IMPRESSION:  1.  No evidence of acute fracture or subluxation of the cervical spine by CT imaging.  2.  Degenerative cervical spondylosis as described above.              Assessment/Plan   61 year old year old person with chronic pelvic pain and urinary symptoms  -I suggest that he undergo video urodynamics to further characterize voiding and determine whether or not there is any evidence of obstruction  -I also made contact with  and will share my impression.  I do suspect there may be a role for sacral neuromodulation but will defer  this to her expertise.    CC:  [employer]    >20 minutes spent exclusive to any listed procedure on the clinical encounter date doing chart review, history and exam, documentation and further activities as noted above        How would you like to obtain your AVS? MyChart  If the video visit is dropped, the invitation should be resent by: Text to cell phone: [phone]  Will anyone else be joining your video visit? No        Video-Visit Details    Video Start Time: 9:47    Type of service:  Video Visit    Video End Time:9:56    Originating Location (pt. Location): Home    Distant Location (provider location):  [facility]     Platform used for Video Visit: [employer]

## 2022-07-19 NOTE — LETTER
7/19/2022       RE: Mustapha Negrete  [address] [address] [place] [address]     Dear Colleague,    Thank you for referring your patient, Mustapha Negrete, to the [facility] [place] [facility]. Please see a copy of my visit note below.    Mustapha is a 61 year old who is being evaluated via a billable video visit.            UROLOGY OUTPATIENT VISIT      Chief Complaint:   Pelvic Pain      Synopsis    Mustapha Negrete is a very pleasant AGE: 61 year old year old person    He has been seen by me and Dr. Tello for pelvic pain.  He has a long history of this summarized in prior notes.  Recent bout seems to have been exacerbated by UroLift clips placed several years ago.  Today he reports severe frustration with the lack of any efficacy with symptoms.  Dr. Tello had discussed a possible trial of sacral neuromodulation but also wanted him to discuss the possibility of an outlet procedure 1 more time with me for which he presents today.  Some of his symptoms in particular the slow stream and sensation of incomplete emptying are suggestive of outlet obstruction but we discussed that this also is difficult to differentiate from detrusor hypocontractility.  Given the fact that he has previously had an endoscopic work-up and not been identified as having overt evidence of prostatic obstruction I am suspicious that he has perhaps hypocontractility as his primary reason for his symptoms.  We discussed the further work-up for this including urodynamics.    Medical Comorbidities      Past Medical History:   Diagnosis Date     MARIAN (obstructive sleep apnea)      Pelvic floor dysfunction                Medications     Current Outpatient Medications   Medication     albuterol (PROAIR HFA/PROVENTIL HFA/VENTOLIN HFA) 108 (90 Base) MCG/ACT inhaler     gabapentin (NEURONTIN) 300 MG capsule     lisinopril (ZESTRIL) 10 MG tablet     terazosin (HYTRIN) 1 MG capsule     amLODIPine  (NORVASC) 10 MG tablet     nortriptyline (PAMELOR) 10 MG capsule     oxyCODONE (ROXICODONE) 5 MG tablet     No current facility-administered medications for this visit.         The following  distinct labs were reviewed    I personally reviewed all applicable laboratory data and went over findings with patient  Significant for:    CBC RESULTS:  Recent Labs   Lab Test 11/11/21  1049 10/11/21  1814 10/06/21  1353 09/16/21  1101   WBC 8.1 7.3 9.1 10.2   HGB 14.2 12.8* 14.5 14.8    300 355 350        BMP RESULTS:  Recent Labs   Lab Test 10/11/21  1814 10/06/21  1353 09/16/21  1101    138 140   POTASSIUM 4.0 3.6 3.9   CHLORIDE 106 106 110*   CO2 24 22 24   ANIONGAP 7 10 6   * 109* 111*   BUN 18 15 17   CR 0.90 0.76 0.81   GFRESTIMATED >90 >90 >90       CALCIUM RESULTS:  Recent Labs   Lab Test 10/11/21  1814 10/06/21  1353 09/16/21  1101   ANITA 8.8 9.7 9.0       PTH RESULTS:  No results for input(s): PTHI in the last 98099 hours.    HGB A1C RESULTS:  No results found for: A1C    UA RESULTS:   Recent Labs   Lab Test 07/05/22  0959 04/05/22  0904 03/08/22  0330   SG 1.025 1.010 1.010   URINEPH 6.5 6.5 8.0*   NITRITE Negative Negative Negative   RBCU 0-2 0-2 4*   WBCU 0-5 0-5 >182*       PSA RESULTS  Prostate Specific Antigen Screen   Date Value Ref Range Status   10/08/2021 2.88 0.00 - 4.00 ug/L Final     PSA Tumor Marker   Date Value Ref Range Status   07/05/2022 1.91 0.00 - 4.50 ng/mL Final         Recent Imaging Report    I personally reviewed all applicable imaging and went over the below findings with patient.    Results for orders placed or performed during the hospital encounter of 07/03/22   Cervical spine CT w/o contrast    Narrative    EXAM: CT CERVICAL SPINE W/O CONTRAST  LOCATION: [facility]  DATE/TIME: 7/3/2022 10:01 AM    INDICATION: pain, torticollis; Neck pain; No applicable indication  COMPARISON: None.  TECHNIQUE: Routine CT Cervical Spine without IV contrast.  Multiplanar reformats. Dose reduction techniques were used.    FINDINGS:  No evidence of acute fracture or subluxation of the cervical spine by CT imaging. Anterolisthesis of C4 on C5 measuring 2 mm. Anterior marginal osteophytes at C3/C4-C6/C7. The vertebral bodies of the cervical spine otherwise have normal stature and   alignment. Multilevel degenerative disc disease cervical spine with disc height loss, most pronounced at C4/C5-C6/C7. The partially imaged intracranial contents are unremarkable. No prevertebral soft tissue swelling. The partially imaged lung apices are   unremarkable.     Craniovertebral junction and C1-C2: The odontoid process is well approximated with the anterior body of C1 and well aligned between the lateral masses of C1.    C2-C3: No posterior disc bulge or spinal canal narrowing. No neural foraminal narrowing.     C3-C4: No posterior disc bulge or spinal canal narrowing. Uncovertebral joint disease and facet arthropathy with severe left neural foraminal narrowing. No right neural foraminal narrowing.     C4-C5: Broad bar disc osteophyte complex with mild spinal canal narrowing. Uncovertebral joint disease and facet arthropathy with severe left and moderate neural foraminal narrowing.     C5-C6: Broad bar disc osteophyte complex with mild spinal canal narrowing. Uncovertebral joint disease and facet arthropathy with severe bilateral neural foraminal narrowing.     C6-C7: Broad bar disc osteophyte complex with mild spinal canal narrowing. Uncovertebral joint disease and facet arthropathy with severe bilateral neural foraminal narrowing.     C7-T1: No posterior disc bulge or spinal canal narrowing. No neural foraminal narrowing.       Impression    IMPRESSION:  1.  No evidence of acute fracture or subluxation of the cervical spine by CT imaging.  2.  Degenerative cervical spondylosis as described above.              Assessment/Plan   61 year old year old person with chronic pelvic pain and  urinary symptoms  -I suggest that he undergo video urodynamics to further characterize voiding and determine whether or not there is any evidence of obstruction  -I also made contact with  and will share my impression.  I do suspect there may be a role for sacral neuromodulation but will defer this to her expertise.    CC:  [employer]    >20 minutes spent exclusive to any listed procedure on the clinical encounter date doing chart review, history and exam, documentation and further activities as noted above        How would you like to obtain your AVS? MyChart  If the video visit is dropped, the invitation should be resent by: Text to cell phone: [phone]  Will anyone else be joining your video visit? No        Video-Visit Details    Video Start Time: 9:47    Type of service:  Video Visit    Video End Time:9:56    Originating Location (pt. Location): Home    Distant Location (provider location):  [facility] [place]     Platform used for Video Visit: [employer]    Sincerely,    Francis Jean-Baptiste MD

## 2022-07-22 ENCOUNTER — TELEPHONE (OUTPATIENT)
Dept: PALLIATIVE MEDICINE | Facility: CLINIC | Age: 62
End: 2022-07-22

## 2022-07-22 NOTE — TELEPHONE ENCOUNTER
Received fax from [employer] asking for documentation and signature. Placed in Dr.Snitzer rizo. Will fax back when is complete.     Telly Glover MA

## 2022-07-25 ENCOUNTER — TELEPHONE (OUTPATIENT)
Dept: GASTROENTEROLOGY | Facility: CLINIC | Age: 62
End: 2022-07-25

## 2022-07-26 ENCOUNTER — TELEPHONE (OUTPATIENT)
Dept: PALLIATIVE MEDICINE | Facility: CLINIC | Age: 62
End: 2022-07-26

## 2022-07-26 ENCOUNTER — TELEPHONE (OUTPATIENT)
Dept: FAMILY MEDICINE | Facility: CLINIC | Age: 62
End: 2022-07-26

## 2022-07-26 ENCOUNTER — RADIOLOGY INJECTION OFFICE VISIT (OUTPATIENT)
Dept: PALLIATIVE MEDICINE | Facility: CLINIC | Age: 62
End: 2022-07-26
Attending: PAIN MEDICINE
Payer: COMMERCIAL

## 2022-07-26 VITALS
OXYGEN SATURATION: 96 % | RESPIRATION RATE: 16 BRPM | DIASTOLIC BLOOD PRESSURE: 98 MMHG | SYSTOLIC BLOOD PRESSURE: 161 MMHG | HEART RATE: 91 BPM

## 2022-07-26 DIAGNOSIS — K62.89 RECTAL PAIN: ICD-10-CM

## 2022-07-26 DIAGNOSIS — M53.3 COCCYDYNIA: ICD-10-CM

## 2022-07-26 PROCEDURE — 64999 UNLISTED PX NERVOUS SYSTEM: CPT | Performed by: PAIN MEDICINE

## 2022-07-26 RX ORDER — TRIAMCINOLONE ACETONIDE 40 MG/ML
40 INJECTION, SUSPENSION INTRA-ARTICULAR; INTRAMUSCULAR ONCE
Status: COMPLETED | OUTPATIENT
Start: 2022-07-26 | End: 2022-07-26

## 2022-07-26 RX ADMIN — TRIAMCINOLONE ACETONIDE 40 MG: 40 INJECTION, SUSPENSION INTRA-ARTICULAR; INTRAMUSCULAR at 10:19

## 2022-07-26 ASSESSMENT — PAIN SCALES - GENERAL: PAINLEVEL: EXTREME PAIN (9)

## 2022-07-26 NOTE — PROGRESS NOTES
Discharge Information    IV Discontiued Time:  NA    Amount of Fluid Infused:  NA    Discharge Criteria = When patient returns to baseline or as per MD order    Consciousness:  Pt is fully awake    Circulation:  BP +/- 20% of pre-procedure level    Respiration:  Patient is able to breathe deeply    O2 Sat:  Patient is able to maintain O2 Sat >92% on room air    Activity:  Moves 4 extremities on command    Ambulation:  Patient is able to stand and walk or stand and pivot into wheelchair    Dressing:  Clean/dry or No Dressing    Notes:   Discharge instructions and AVS given to patient    Patient meets criteria for discharge?  YES    Admitted to PCU?  No    Responsible adult present to accompany patient home?  Yes    Signature/Title:    Max Lima RN  RN Care Coordinator  [facility] [place]

## 2022-07-26 NOTE — PATIENT INSTRUCTIONS
[facility]   Procedure Discharge Instructions    Today you saw:    Dr. Arian Prescott      You had a:  ganglion impar block     Medications used:  Bupivacaine  Omnipaque    Kenalog       After you go home:  Do some activities today to see if you have pain relief.  If you have pain relief, write down:  how long it lasted AND  the percentage of relief you had during that time.   Call the clinic in a couple of days with an update.   The next step will be determined based on your response to the injection.  You may resume your normal diet.       Care of Puncture Site:  If you have a bandaid on your puncture site, you may remove it the next morning  You may shower   No bath tubs, whirlpools or swimming for at least 24 hours      Activity:  You may go back to normal activity in 24 hours  Avoid strenuous activity for the first 24 hours.  Be cautious numbness and/or weakness in the may occur for up to 6-8 hours after the procedure due to effect of the numbing medication used.  Do not drive for 6 hours.  The effect of the numbing medication could slow you reflexes.    Pain:   You may have a mild to moderate increase in pain for several days following the injection.  It may take up to 14 days for the steroid medication to start working although you may feel the effect as early as a few days after the procedure.  You may use ice packs for 10-15 minutes, 3 to 4 times a day at the injection site for comfort.  Do not use heat to painful areas for 6 to 8 hours.  This will give the numbing medication time to wear off and prevent you from accidentally burning your skin.    Medicines:  You may resume all medications  If you are taking a different blood thinner, please follow the directions given to you by the Pain Clinic RN for restarting this medication.  For minor pain, you may use anti-inflammatory medications - (such as Ibuprofen, Aleve, Advil) or Acetaminophen (Tylenol) for pain control if  necessary.  Resume your Warfarin/Coumadin at your regular dose tonight. Follow up with your provider to have your INR rechecked  Resume your Plavix/Clopidogrel and Aspirin in 12 hours.      Call the Pain Clinic if you experience any of the following:    You have chills or a fever greater than 100 F   Swelling, bleeding, redness, drainage, or warmth at the injection site  Progressive weakness or numbness in your legs or arms  Loss of bowel or bladder function  Unusual headache that is not relieved by Tylenol or other pain medication  Unusual new onset of pain that is not improving      If you have questions call:    Pain Clinic @ [phone] during business hours,   Monday-Friday 8 - 4:30 pm    Provider Line @ [phone] after hours

## 2022-07-26 NOTE — PROGRESS NOTES
Pre procedure Diagnosis: coccyx pain, rectal, buttock pain   Post procedure Diagnosis: Same  Procedure performed: ganglion impar block   Anesthesia: none  Complications: none  Operators: Arian Prescott MD      Indications:   Pt presenting for a ganglion impar block.  They have a history of pain over his coccyx rectal pain perineal pain. They tried conservative therapy including meds.       Options/alternatives, benefits and risks were discussed with the patient including bleeding, infection, no pain relief, tissue trauma, exposure to radiation, reaction to medications weakness, numbness, bowel injury.  Questions were answered to her satisfaction and she agrees to proceed. Voluntary informed consent was obtained and signed.      Vitals were reviewed: Yes  Allergies were reviewed:  Yes   Medications were reviewed:  Yes   Pre-procedure pain score: 10/10     Procedure:  After getting informed consent, patient was brought into the procedure suite and was placed in a prone position on the procedure table.   A Pause for the Cause was performed.  Patient was prepped and draped in sterile fashion.      The patient identified areas of pain around her tailbone, and these were marked.  Fluroscopy was then used to identify these locations.      A 25 gauge 2 inch needle was attached to IV tubing.  Bupivacaine 4. ml 0.5% and 40 mg of Kenalog was used.  The two locations along the coccyx were noted.  These were below the level of the sacrococcygeal ligament, although may be related to ligamentous structures attached to the coccyx.  At each of these locations, 2ml of bupivacaine and 20mg kenalog was injected.  These locations were verified in AP and lateral fluoro, to determine location and depth.       A total of 4ml of bupivacaine and 40mg kenalog was utilized     The patient tolerated the procedure well, and was taken to the recovery room.    Images were saved to PACS.     Post-procedure pain score: 9/10  Follow-up includes:    -f/u phone call in one week    Arian Prescott MD  [facility]

## 2022-07-26 NOTE — TELEPHONE ENCOUNTER
Patient Quality Outreach    Patient is due for the following:   Hypertension -  BP check  Colon Cancer Screening -  Colonoscopy  Physical  - Due after never done     NEXT STEPS:   Patient has upcoming appointment, these items will be addressed at that time.    Type of outreach:    Chart review performed, no outreach needed.      Questions for provider review:    None     Millie Sherwood MA

## 2022-07-26 NOTE — NURSING NOTE
Pre-procedure Intake  If YES to any questions or NO to having a   Please complete laminated checklist and leave on the computer keyboard for Provider, verbally inform provider if able.    For SCS Trial, RFA's or any sedation procedure:  Have you been fasting? Yes    If yes, for how long?     Are you taking any any blood thinners such as Coumadin, Warfarin, Jantoven, Pradaxa Xarelto, Eliquis, Edoxaban, Enoxaparin, Lovenox, Heparin, Arixtra, Fondaparinux, or Fragmin? OR Antiplatelet medication such as Plavix, Brilinta, or Effient?   No     If yes, when did you take your last dose?     Do you take aspirin?  No    If cervical procedure, have you held aspirin for 6 days?       Do you have any allergies to contrast dye, iodine, steroid and/or numbing medications?  NO    Are you currently taking antibiotics or have an active infection?  NO    Have you had a fever/elevated temperature within the past week? NO    Are you currently taking oral steroids? NO    Do you have a ? Yes    Are you pregnant or breastfeeding?  Not Applicable    Have you received the COVID-19 vaccine? No     If yes, was it your 1st, 2nd or only dose needed?     Date of most recent vaccine:     Notify provider and RNs if systolic BP >170, diastolic BP >100, P >100 or O2 sats < 90%

## 2022-07-28 ENCOUNTER — PRE VISIT (OUTPATIENT)
Dept: UROLOGY | Facility: CLINIC | Age: 62
End: 2022-07-28

## 2022-08-02 ENCOUNTER — PATIENT OUTREACH (OUTPATIENT)
Dept: CARE COORDINATION | Facility: CLINIC | Age: 62
End: 2022-08-02

## 2022-08-02 ASSESSMENT — ACTIVITIES OF DAILY LIVING (ADL): DEPENDENT_IADLS:: TRANSPORTATION;INDEPENDENT

## 2022-08-04 NOTE — PROGRESS NOTES
1. Pelvic floor dysfunction  Advised to keep upcoming appointment with urology .   - oxyCODONE (ROXICODONE) 5 MG tablet; Take 1 tablet (5 mg) by mouth daily as needed for severe pain  Dispense: 15 tablet; Refill: 0  - MR Pelvis Musclular Tissue wo & w Contrast; Future    2. Screen for colon cancer  Scheduled for upcoming colonoscopy    3. Essential hypertension  Stable.  Continue with current medications.     Zhang Luciano is a 61 year old  presenting for the following health issues:  Pain      History of Present Illness       Reason for visit:  Pelvic floor issues    He eats 0-1 servings of fruits and vegetables daily.He consumes 0 sweetened beverage(s) daily.He exercises with enough effort to increase his heart rate 10 to 19 minutes per day.  He exercises with enough effort to increase his heart rate 3 or less days per week.   He is taking medications regularly.    Today's PHQ-9         PHQ-9 Total Score: 7    PHQ-9 Q9 Thoughts of better off dead/self-harm past 2 weeks :   Not at all    How difficult have these problems made it for you to do your work, take care of things at home, or get along with other people: Very difficult  Today's CHRISSY-7 Score: 4       Pain History:  When did you first notice your pain? - Chronic Pain   Have you seen this provider for your pain in the past?   Yes   Where in your body do you have pain? Pelvic floor  Are you seeing anyone else for your pain? Yes - physical therapy Dr. Millie Mcbride    PHQ-9 SCORE 2/15/2022 5/13/2022 8/5/2022   PHQ-9 Total Score MyChart 3 (Minimal depression) 11 (Moderate depression) 7 (Mild depression)   PHQ-9 Total Score 3 11 7       CHRISSY-7 SCORE 1/4/2022 8/5/2022   Total Score 3 (minimal anxiety) 4 (minimal anxiety)   Total Score 3 4           PEG Score 1/4/2022 6/29/2022 8/5/2022   PEG Total Score 10 10 10         PDMP Review       Value Time User    State PDMP site checked  Yes 7/3/2022 10:43 AM Jose Pérez MD        Last CSA Agreement:    CSA -- Patient Level:    CSA: None found at the patient level.       Last UDS:             1. Hypertension f/u: Currently on lisinopril.  Chronic condition.  Tolerating medications.     2. Pelvic floor dysfunction: Patient is currently being followed by urology and scheduled for upcoming procedure for urology.  Patient continues to be in discomfort.  Sharp burning pain from rectum to penis.  Ongoing for past 1.5 years.  Patient was seen by pain specialist and did not noticed any improvement with ganglion block and pudendal block.      Review of Systems   Constitutional: Negative for chills and fever.   HENT: Negative for congestion, ear pain, hearing loss and sore throat.    Respiratory: Negative for cough and shortness of breath.    Cardiovascular: Negative for chest pain, palpitations and peripheral edema.   Genitourinary:        States of pelvic pain   Musculoskeletal: Negative for arthralgias, joint swelling and myalgias.   Skin: Negative for rash.   Neurological: Negative for dizziness, weakness, headaches and paresthesias.   Psychiatric/Behavioral: Negative for mood changes. The patient is not nervous/anxious.             Objective    /87 (BP Location: Left arm, Patient Position: Sitting, Cuff Size: Adult Regular)   Pulse 97   Temp 97.5  F (36.4  C) (Tympanic)   Resp 20   Wt 88.5 kg (195 lb)   SpO2 98%   BMI 32.45 kg/m    Body mass index is 32.45 kg/m .  Physical Exam  Constitutional:       General: He is not in acute distress.     Appearance: He is well-developed.   HENT:      Head: Normocephalic and atraumatic.      Nose: Nose normal.   Eyes:      Conjunctiva/sclera: Conjunctivae normal.   Neck:      Trachea: No tracheal deviation.   Cardiovascular:      Rate and Rhythm: Normal rate and regular rhythm.      Heart sounds: Normal heart sounds.   Pulmonary:      Effort: Pulmonary effort is normal.      Breath sounds: No wheezing.   Musculoskeletal:         General: Normal range of motion.       Cervical back: Normal range of motion.   Skin:     Findings: No erythema or rash.   Neurological:      Mental Status: He is alert and oriented to person, place, and time.   Psychiatric:         Behavior: Behavior normal.          MR pelvis  Impression:  Postsurgical changes of left total hip arthroplasty with metallic  susceptibility artifact partially compromising assessment.  1. No ischial tuberosity bursitis or collection on either side.   2. Suspect right anterosuperior acetabular labral tearing.

## 2022-08-05 ENCOUNTER — OFFICE VISIT (OUTPATIENT)
Dept: FAMILY MEDICINE | Facility: CLINIC | Age: 62
End: 2022-08-05
Payer: COMMERCIAL

## 2022-08-05 VITALS
WEIGHT: 195 LBS | OXYGEN SATURATION: 98 % | HEART RATE: 97 BPM | SYSTOLIC BLOOD PRESSURE: 123 MMHG | TEMPERATURE: 97.5 F | DIASTOLIC BLOOD PRESSURE: 87 MMHG | RESPIRATION RATE: 20 BRPM | BODY MASS INDEX: 32.45 KG/M2

## 2022-08-05 DIAGNOSIS — M62.89 PELVIC FLOOR DYSFUNCTION: Primary | ICD-10-CM

## 2022-08-05 DIAGNOSIS — Z12.11 SCREEN FOR COLON CANCER: ICD-10-CM

## 2022-08-05 DIAGNOSIS — I10 ESSENTIAL HYPERTENSION: ICD-10-CM

## 2022-08-05 PROCEDURE — 99214 OFFICE O/P EST MOD 30 MIN: CPT | Performed by: FAMILY MEDICINE

## 2022-08-05 RX ORDER — OXYCODONE HYDROCHLORIDE 5 MG/1
5 TABLET ORAL DAILY PRN
Qty: 15 TABLET | Refills: 0 | Status: SHIPPED | OUTPATIENT
Start: 2022-08-05 | End: 2022-09-08

## 2022-08-05 ASSESSMENT — ANXIETY QUESTIONNAIRES
GAD7 TOTAL SCORE: 4
GAD7 TOTAL SCORE: 4
6. BECOMING EASILY ANNOYED OR IRRITABLE: NOT AT ALL
GAD7 TOTAL SCORE: 4
2. NOT BEING ABLE TO STOP OR CONTROL WORRYING: NOT AT ALL
7. FEELING AFRAID AS IF SOMETHING AWFUL MIGHT HAPPEN: NOT AT ALL
7. FEELING AFRAID AS IF SOMETHING AWFUL MIGHT HAPPEN: NOT AT ALL
5. BEING SO RESTLESS THAT IT IS HARD TO SIT STILL: SEVERAL DAYS
4. TROUBLE RELAXING: NEARLY EVERY DAY
8. IF YOU CHECKED OFF ANY PROBLEMS, HOW DIFFICULT HAVE THESE MADE IT FOR YOU TO DO YOUR WORK, TAKE CARE OF THINGS AT HOME, OR GET ALONG WITH OTHER PEOPLE?: VERY DIFFICULT
1. FEELING NERVOUS, ANXIOUS, OR ON EDGE: NOT AT ALL
IF YOU CHECKED OFF ANY PROBLEMS ON THIS QUESTIONNAIRE, HOW DIFFICULT HAVE THESE PROBLEMS MADE IT FOR YOU TO DO YOUR WORK, TAKE CARE OF THINGS AT HOME, OR GET ALONG WITH OTHER PEOPLE: VERY DIFFICULT
3. WORRYING TOO MUCH ABOUT DIFFERENT THINGS: NOT AT ALL

## 2022-08-05 ASSESSMENT — PAIN SCALES - GENERAL: PAINLEVEL: WORST PAIN (10)

## 2022-08-05 ASSESSMENT — PATIENT HEALTH QUESTIONNAIRE - PHQ9
SUM OF ALL RESPONSES TO PHQ QUESTIONS 1-9: 7
10. IF YOU CHECKED OFF ANY PROBLEMS, HOW DIFFICULT HAVE THESE PROBLEMS MADE IT FOR YOU TO DO YOUR WORK, TAKE CARE OF THINGS AT HOME, OR GET ALONG WITH OTHER PEOPLE: VERY DIFFICULT
SUM OF ALL RESPONSES TO PHQ QUESTIONS 1-9: 7

## 2022-08-05 NOTE — PATIENT INSTRUCTIONS
Claudy Luciano,    Thank you for allowing [facility] to manage your care.    I sent your prescriptions to your pharmacy.    Keep your upcoming urology and colonoscopy appointment.     I ordered a pelvic MRI, please call diagnostic imaging [phone] to schedule your test.    For your convenience, test results are released as soon as they are available  Please allow 1-2 business days for me to send you a comment about your results.  If not done so, I encourage you to login into [employer] ([email or web address] to review your results in real time.     If you have any questions or concerns, please feel free to call us at [phone].    Sincerely,    Dr. Pro    Did you know?      You can schedule a video visit for follow-up appointments as well as future appointments for certain conditions.  Please see the below link.     [email or web address]    If you have not already done so,  I encourage you to sign up for [employer] ([email or web address]).  This will allow you to review your results, securely communicate with a provider, and schedule virtual visits as well.

## 2022-08-05 NOTE — LETTER
My Asthma Action Plan    Name: Mustapha Negrete   YOB: 1960  Date: 8/5/2022   My doctor: Silver Pro,    My clinic: [facility]        My Rescue Medicine:   Albuterol inhaler (Proair/Ventolin/Proventil HFA)  2-4 puffs EVERY 4 HOURS as needed. Use a spacer if recommended by your provider.   My Asthma Severity:   Intermittent / Exercise Induced  Know your asthma triggers: hay fever             GREEN ZONE   Good Control    I feel good    No cough or wheeze    Can work, sleep and play without asthma symptoms       Take your asthma control medicine every day.     1. If exercise triggers your asthma, take your rescue medication    15 minutes before exercise or sports, and    During exercise if you have asthma symptoms  2. Spacer to use with inhaler: If you have a spacer, make sure to use it with your inhaler             YELLOW ZONE Getting Worse  I have ANY of these:    I do not feel good    Cough or wheeze    Chest feels tight    Wake up at night   1. Keep taking your Green Zone medications  2. Start taking your rescue medicine:    every 20 minutes for up to 1 hour. Then every 4 hours for 24-48 hours.  3. If you stay in the Yellow Zone for more than 12-24 hours, contact your doctor.  4. If you do not return to the Green Zone in 12-24 hours or you get worse, start taking your oral steroid medicine if prescribed by your provider.           RED ZONE Medical Alert - Get Help  I have ANY of these:    I feel awful    Medicine is not helping    Breathing getting harder    Trouble walking or talking    Nose opens wide to breathe       1. Take your rescue medicine NOW  2. If your provider has prescribed an oral steroid medicine, start taking it NOW  3. Call your doctor NOW  4. If you are still in the Red Zone after 20 minutes and you have not reached your doctor:    Take your rescue medicine again and    Call 911 or go to the emergency room right away    See your regular doctor within 2 weeks  of an Emergency Room or Urgent Care visit for follow-up treatment.          Annual Reminders:  Meet with Asthma Educator,  Flu Shot in the Fall, consider Pneumonia Vaccination for patients with asthma (aged 19 and older).    Pharmacy: [facility] [id number] [place] [address]    Electronically signed by Silver Pro DO   Date: 08/05/22                    Asthma Triggers  How To Control Things That Make Your Asthma Worse    Triggers are things that make your asthma worse.  Look at the list below to help you find your triggers and   what you can do about them. You can help prevent asthma flare-ups by staying away from your triggers.      Trigger                                                          What you can do   Cigarette Smoke  Tobacco smoke can make asthma worse. Do not allow smoking in your home, car or around you.  Be sure no one smokes at a child s day care or school.  If you smoke, ask your health care provider for ways to help you quit.  Ask family members to quit too.  Ask your health care provider for a referral to Quit Plan to help you quit smoking, or call 8-417-732-PLAN.     Colds, Flu, Bronchitis  These are common triggers of asthma. Wash your hands often.  Don t touch your eyes, nose or mouth.  Get a flu shot every year.     Dust Mites  These are tiny bugs that live in cloth or carpet. They are too small to see. Wash sheets and blankets in hot water every week.   Encase pillows and mattress in dust mite proof covers.  Avoid having carpet if you can. If you have carpet, vacuum weekly.   Use a dust mask and HEPA vacuum.   Pollen and Outdoor Mold  Some people are allergic to trees, grass, or weed pollen, or molds. Try to keep your windows closed.  Limit time out doors when pollen count is high.   Ask you health care provider about taking medicine during allergy season.     Animal Dander  Some people are allergic to skin flakes, urine or saliva from pets with fur or feathers. Keep pets  with fur or feathers out of your home.    If you can t keep the pet outdoors, then keep the pet out of your bedroom.  Keep the bedroom door closed.  Keep pets off cloth furniture and away from stuffed toys.     Mice, Rats, and Cockroaches  Some people are allergic to the waste from these pests.   Cover food and garbage.  Clean up spills and food crumbs.  Store grease in the refrigerator.   Keep food out of the bedroom.   Indoor Mold  This can be a trigger if your home has high moisture. Fix leaking faucets, pipes, or other sources of water.   Clean moldy surfaces.  Dehumidify basement if it is damp and smelly.   Smoke, Strong Odors, and Sprays  These can reduce air quality. Stay away from strong odors and sprays, such as perfume, powder, hair spray, paints, smoke incense, paint, cleaning products, candles and new carpet.   Exercise or Sports  Some people with asthma have this trigger. Be active!  Ask your doctor about taking medicine before sports or exercise to prevent symptoms.    Warm up for 5-10 minutes before and after sports or exercise.     Other Triggers of Asthma  Cold air:  Cover your nose and mouth with a scarf.  Sometimes laughing or crying can be a trigger.  Some medicines and food can trigger asthma.

## 2022-08-08 ENCOUNTER — ALLIED HEALTH/NURSE VISIT (OUTPATIENT)
Dept: UROLOGY | Facility: CLINIC | Age: 62
End: 2022-08-08
Payer: COMMERCIAL

## 2022-08-08 VITALS
BODY MASS INDEX: 32.49 KG/M2 | WEIGHT: 195 LBS | SYSTOLIC BLOOD PRESSURE: 155 MMHG | HEART RATE: 83 BPM | HEIGHT: 65 IN | DIASTOLIC BLOOD PRESSURE: 97 MMHG

## 2022-08-08 DIAGNOSIS — R10.2 PELVIC PAIN IN MALE: Primary | ICD-10-CM

## 2022-08-08 LAB
ALBUMIN UR-MCNC: NEGATIVE MG/DL
APPEARANCE UR: CLEAR
BILIRUB UR QL STRIP: NEGATIVE
COLOR UR AUTO: YELLOW
GLUCOSE UR STRIP-MCNC: NEGATIVE MG/DL
HGB UR QL STRIP: ABNORMAL
KETONES UR STRIP-MCNC: NEGATIVE MG/DL
LEUKOCYTE ESTERASE UR QL STRIP: NEGATIVE
NITRATE UR QL: NEGATIVE
PH UR STRIP: 7 [PH] (ref 5–8)
SP GR UR STRIP: 1.01 (ref 1–1.03)
UROBILINOGEN UR STRIP-ACNC: 0.2 E.U./DL

## 2022-08-08 PROCEDURE — 81003 URINALYSIS AUTO W/O SCOPE: CPT | Performed by: NURSE PRACTITIONER

## 2022-08-08 PROCEDURE — 51741 ELECTRO-UROFLOWMETRY FIRST: CPT | Performed by: NURSE PRACTITIONER

## 2022-08-08 PROCEDURE — 51797 INTRAABDOMINAL PRESSURE TEST: CPT | Performed by: NURSE PRACTITIONER

## 2022-08-08 PROCEDURE — 74455 X-RAY URETHRA/BLADDER: CPT | Performed by: NURSE PRACTITIONER

## 2022-08-08 PROCEDURE — 51784 ANAL/URINARY MUSCLE STUDY: CPT | Performed by: NURSE PRACTITIONER

## 2022-08-08 PROCEDURE — 81003 URINALYSIS AUTO W/O SCOPE: CPT | Performed by: PATHOLOGY

## 2022-08-08 PROCEDURE — 51728 CYSTOMETROGRAM W/VP: CPT | Performed by: NURSE PRACTITIONER

## 2022-08-08 PROCEDURE — 51600 INJECTION FOR BLADDER X-RAY: CPT | Performed by: NURSE PRACTITIONER

## 2022-08-08 RX ORDER — LIDOCAINE HYDROCHLORIDE 20 MG/ML
JELLY TOPICAL ONCE
Status: COMPLETED | OUTPATIENT
Start: 2022-08-08 | End: 2022-08-08

## 2022-08-08 RX ORDER — CIPROFLOXACIN 500 MG/1
500 TABLET, FILM COATED ORAL ONCE
Status: COMPLETED | OUTPATIENT
Start: 2022-08-08 | End: 2022-08-08

## 2022-08-08 RX ADMIN — LIDOCAINE HYDROCHLORIDE: 20 JELLY TOPICAL at 13:28

## 2022-08-08 RX ADMIN — CIPROFLOXACIN 500 MG: 500 TABLET, FILM COATED ORAL at 13:27

## 2022-08-08 ASSESSMENT — ENCOUNTER SYMPTOMS
PARESTHESIAS: 0
WEAKNESS: 0
FEVER: 0
COUGH: 0
SORE THROAT: 0
DIZZINESS: 0
ARTHRALGIAS: 0
JOINT SWELLING: 0
HEADACHES: 0
MYALGIAS: 0
CHILLS: 0
NERVOUS/ANXIOUS: 0
SHORTNESS OF BREATH: 0
PALPITATIONS: 0

## 2022-08-08 ASSESSMENT — PAIN SCALES - GENERAL: PAINLEVEL: NO PAIN (0)

## 2022-08-08 NOTE — PATIENT INSTRUCTIONS
Follow up with Dr. Tello as scheduled.    It was a pleasure meeting with you today.  Thank you for allowing me and my team the privilege of caring for you today.  YOU are the reason we are here, and I truly hope we provided you with the excellent service you deserve.  Please let us know if there is anything else we can do for you so that we can be sure you are leaving completely satisfied with your care experience.      Mei Davis, CMA

## 2022-08-08 NOTE — PROGRESS NOTES
PREPROCEDURE DIAGNOSES:    1. BPH s/p Urolift and longstanding pelvic pain     POSTPROCEDURE DIAGNOSES:  -Normal bladder capacity (total volume voided = 375 mL).  -UDCs x3 throughout the study, reaching ~55 cm H20. He leaks with the second two contractions. DLPP 40 cm H2O.   -Pdet pressure returns to baseline between episodes of DO.   -With permission to void, he appears to attempt entirely through Valsalva effort. During his second attempt from a standing position, he continues to Valsalva void, with an apparent weak rise in detrusor pressure. He voids a third time into a private uroflow to completely empty his bladder. Weak flow rate (Qmax 6.4-11.1 mL/s) with an intermittent flow curve  -Mild increase in EMG activity during voiding, which likely correlates with Valsalva effort.  -BOOI is 26.7 which is equivocal for bladder outlet obstruction.  -Fluoroscopy reveals a moderately-trabeculated bladder wall with a moderate right-sided diverticulum.  No vesicoureteral reflux was observed.  The bladder neck was open during filling and during voiding. Of note, there appears to be contrast collecting in the prostatic urethra, likely 2/2 his history of Urolift.    PROCEDURE:    -Sterile urethral catheterization for measurement of postvoid residual urine volume.  -Complex filling cystometrogram with measurement of bladder and rectal pressures.  -Complex voiding cystometrogram with measurement of bladder and rectal pressures.  -Electromyography of the pelvic floor during urodynamics.  -Fluoroscopic imaging of the bladder during urodynamics, at least 3 views.    -Interpretation of urodynamics and flouroscopic imaging.      INDICATIONS FOR PROCEDURE:  Mr. Mustapha Ngerete is a pleasant 61 year old male with BPH s/p Urolift and longstanding pelvic pain. Video urodynamic assessment is requested today by Dr. Jean-Baptiste to better characterize Mr. Mustapha Negrete's voiding dysfunction.      VOIDING DIARY:  Not completed.    DESCRIPTION OF  PROCEDURE:  Risks, benefits, and alternatives to urodynamics were discussed with the patient and he wished to proceed.  Urodynamics are planned to better assess the primary etiology for Mr. Negrete's urologic dysfunction.  After informed consent was obtained, the patient was taken to the procedure room where the study was initiated. Findings below.     PRE-STUDY UROFLOWMETRY:  Postvoid residual by catheter: 460 mL.  Pretest urine dipstick was negative for leukocytes and nitrites.    Next a 7F double-lumen urodynamics catheter was inserted into the bladder under sterile technique via the urethra.  A 7F abdominal manometry catheter was placed in the rectum.  EMG pads were placed on both sides of the anal verge.  The bladder was filled with 200 mL of Isovue at 30 mL/minute and serial pressures were recorded.  With coughing there was an appropriate rise in vesical and abdominal pressures with no change in detrusor pressure, confirming good study catheter placement.    DURING THE FILLING PHASE:  First sensation: 122 mL.  Capacity based on total volume voided at completion of study: 375 mL    Uninhibited detrusor contractions: UDC started at fill volume of ~100 mL, reaching Pdet of 28 cm H2O. He has a second UDC at volume of 162 mL, which reaches Pdet 55 cm H2O at which time he begins to leak a small volume and then begins to attempt Valsalva to void more urine before being instructed to try and hold it. This episode too resolves, with pressures back to baseline, but a third UDC begins at fill volume of 296 mL and reaches ~40 cm H2O. He again begins to leak and is given permission to empty his bladder.   Compliance: Good, between episodes of DO, as described above.  Continence: DOI, as described above. DLPP 40 cm H2O.   EMG: Concordant during filling.    DURING THE VOIDING PHASE:  As described above, he is given permission to void during his third episode of DO, but he appears to attempt to void entirely through Valsalva  effort, and his Pdet pressure does not rise beyond that point. This yielded the following data:     Maximum detrusor contraction with void: Pdet pressure at 40 cm H2O when he begins to void with Valsalva effort.  Voided volume: 128 mL.  Maximum flow rate: 6.4 mL/sec.  Average flow rate: 3.0 mL/sec.  Postvoid Residual: 145 mL.  EMG activity: Mild activity, which may correlate with Valsalva.  Character of voiding curve: Intermittent.  BOOI: 26.7 (suggesting equivocal obstruction - see key below)  [obstructed (WELCH index [BOOI] ? 40); equivocal (no definite   obstruction; BOOI 20-40); and no obstruction (BOOI ? 20)]    As he was unable to empty his bladder from a seated position, he was assisted to stand for a second attempt. While standing, he continued to Valsalva void, though it appears there may be a weak underlying rise in detrusor pressure. He voided an additional 110 mL.     Catheters were then removed, and he was allowed to void one additional time into a private uroflow, which yielded the following:    Voided volume: 137 mL  Qmax: 11.1 mL/s  Qmean: 4.2 mL/s  Character of voiding curve: Intermittent  PVR: 0 mL    FLUOROSCOPIC IMAGING OF THE BLADDER DURING URODYNAMICS:  Please note, image numbers on UDS tracings correlate with iSite series numbers on PACS images. Fluoroscopy during today's procedure demonstrated a moderately-trabeculated bladder wall with a moderate right-sided diverticulum.  No vesicoureteral reflux was observed.  The bladder neck was open during filling and during voiding. Of note, there appears to be contrast collecting in the prostatic urethra, likely 2/2 his history of Urolift.  At the completion of the study, all catheters were removed and the patient was brought back into the consultation room to further discuss today's study results.      ASSESSMENT/PLAN:  Mr. Mustapha Negrete is a pleasant 61 year old male with BPH s/p Urolift and longstanding pelvic pain who demonstrated the following  findings today on urodynamic evaluation:    -Normal bladder capacity (total volume voided = 375 mL).  -UDCs x3 throughout the study, reaching ~55 cm H20. He leaks with the second two contractions. DLPP 40 cm H2O.   -Pdet pressure returns to baseline between episodes of DO.   -With permission to void, he appears to attempt entirely through Valsalva effort. During his second attempt from a standing position, he continues to Valsalva void, with an apparent weak rise in detrusor pressure. He voids a third time into a private uroflow to completely empty his bladder. Weak flow rate (Qmax 6.4-11.1 mL/s) with an intermittent flow curve  -Mild increase in EMG activity during voiding, which likely correlates with Valsalva effort.  -BOOI is 26.7 which is equivocal for bladder outlet obstruction.  -Fluoroscopy reveals a moderately-trabeculated bladder wall with a moderate right-sided diverticulum.  No vesicoureteral reflux was observed.  The bladder neck was open during filling and during voiding. Of note, there appears to be contrast collecting in the prostatic urethra, likely 2/2 his history of Urolift.    The patient will follow up as scheduled with Dr. Tello to further discuss today's study results and make plans for how best to proceed.      - A single Cipro was provided for UTI prophylaxis following completion of today's study per department protocol.  The risk of UTI with VUDS is low at ~2.5-3%.      Thank you for allowing me to participate in the care of Mr. Mustapha Negrete and please don't hesitate to contact me with any questions or concerns.      This procedure was performed under a collaborative agreement with Dr. Leo Johansen, Professor and  of Urology, [facility].    MICAELA Palm, CNP  Department of Urology

## 2022-08-12 ENCOUNTER — ANCILLARY PROCEDURE (OUTPATIENT)
Dept: MRI IMAGING | Facility: CLINIC | Age: 62
End: 2022-08-12
Attending: FAMILY MEDICINE
Payer: COMMERCIAL

## 2022-08-12 DIAGNOSIS — M62.89 PELVIC FLOOR DYSFUNCTION: ICD-10-CM

## 2022-08-12 PROCEDURE — 72195 MRI PELVIS W/O DYE: CPT | Performed by: RADIOLOGY

## 2022-08-19 ENCOUNTER — OFFICE VISIT (OUTPATIENT)
Dept: FAMILY MEDICINE | Facility: CLINIC | Age: 62
End: 2022-08-19
Payer: COMMERCIAL

## 2022-08-19 VITALS
TEMPERATURE: 97.1 F | HEART RATE: 91 BPM | SYSTOLIC BLOOD PRESSURE: 137 MMHG | DIASTOLIC BLOOD PRESSURE: 83 MMHG | OXYGEN SATURATION: 96 % | BODY MASS INDEX: 32.89 KG/M2 | WEIGHT: 197.4 LBS | HEIGHT: 65 IN | RESPIRATION RATE: 18 BRPM

## 2022-08-19 DIAGNOSIS — Z13.1 SCREENING FOR DIABETES MELLITUS: ICD-10-CM

## 2022-08-19 DIAGNOSIS — Z01.818 PREOP GENERAL PHYSICAL EXAM: Primary | ICD-10-CM

## 2022-08-19 DIAGNOSIS — I10 ESSENTIAL HYPERTENSION: ICD-10-CM

## 2022-08-19 DIAGNOSIS — M62.89 PELVIC FLOOR DYSFUNCTION: ICD-10-CM

## 2022-08-19 DIAGNOSIS — G47.33 OSA (OBSTRUCTIVE SLEEP APNEA): ICD-10-CM

## 2022-08-19 LAB
ANION GAP SERPL CALCULATED.3IONS-SCNC: 6 MMOL/L (ref 3–14)
BUN SERPL-MCNC: 23 MG/DL (ref 7–30)
CALCIUM SERPL-MCNC: 9.3 MG/DL (ref 8.5–10.1)
CHLORIDE BLD-SCNC: 107 MMOL/L (ref 94–109)
CO2 SERPL-SCNC: 28 MMOL/L (ref 20–32)
CREAT SERPL-MCNC: 0.89 MG/DL (ref 0.66–1.25)
GFR SERPL CREATININE-BSD FRML MDRD: >90 ML/MIN/1.73M2
GLUCOSE BLD-MCNC: 110 MG/DL (ref 70–99)
HGB BLD-MCNC: 14.5 G/DL (ref 13.3–17.7)
POTASSIUM BLD-SCNC: 4.6 MMOL/L (ref 3.4–5.3)
SODIUM SERPL-SCNC: 141 MMOL/L (ref 133–144)

## 2022-08-19 PROCEDURE — 83036 HEMOGLOBIN GLYCOSYLATED A1C: CPT | Performed by: STUDENT IN AN ORGANIZED HEALTH CARE EDUCATION/TRAINING PROGRAM

## 2022-08-19 PROCEDURE — 85018 HEMOGLOBIN: CPT | Performed by: STUDENT IN AN ORGANIZED HEALTH CARE EDUCATION/TRAINING PROGRAM

## 2022-08-19 PROCEDURE — 80048 BASIC METABOLIC PNL TOTAL CA: CPT | Performed by: STUDENT IN AN ORGANIZED HEALTH CARE EDUCATION/TRAINING PROGRAM

## 2022-08-19 PROCEDURE — 99214 OFFICE O/P EST MOD 30 MIN: CPT | Performed by: STUDENT IN AN ORGANIZED HEALTH CARE EDUCATION/TRAINING PROGRAM

## 2022-08-19 PROCEDURE — 36415 COLL VENOUS BLD VENIPUNCTURE: CPT | Performed by: STUDENT IN AN ORGANIZED HEALTH CARE EDUCATION/TRAINING PROGRAM

## 2022-08-19 PROCEDURE — 93000 ELECTROCARDIOGRAM COMPLETE: CPT | Performed by: STUDENT IN AN ORGANIZED HEALTH CARE EDUCATION/TRAINING PROGRAM

## 2022-08-19 ASSESSMENT — PATIENT HEALTH QUESTIONNAIRE - PHQ9
SUM OF ALL RESPONSES TO PHQ QUESTIONS 1-9: 13
10. IF YOU CHECKED OFF ANY PROBLEMS, HOW DIFFICULT HAVE THESE PROBLEMS MADE IT FOR YOU TO DO YOUR WORK, TAKE CARE OF THINGS AT HOME, OR GET ALONG WITH OTHER PEOPLE: NOT DIFFICULT AT ALL
SUM OF ALL RESPONSES TO PHQ QUESTIONS 1-9: 13

## 2022-08-19 ASSESSMENT — PAIN SCALES - GENERAL: PAINLEVEL: WORST PAIN (10)

## 2022-08-19 NOTE — PROGRESS NOTES
[facility]  [address]  [place] [address]  Phone: [phone]  Primary Provider: Silver Pro  Pre-op Performing Provider: EREN FLEMING      PREOPERATIVE EVALUATION:  Today's date: 8/19/2022    Mustapha Negrete is a 61 year old male who presents for a preoperative evaluation.    Surgical Information:  Surgery/Procedure: COLONOSCOPY  Surgery Location: [facility]  Surgeon: Veena Mendzoa MD  Surgery Date: 8/31/2022  Time of Surgery: 2:30PM   Where patient plans to recover: At home with family  Fax number for surgical facility: Note does not need to be faxed, will be available electronically in Epic.    Type of Anesthesia Anticipated: Monitor Anesthesia Care     Assessment & Plan     The proposed surgical procedure is considered INTERMEDIATE risk.    1. Preop general physical exam    - EKG 12-lead complete w/read - Clinics  - Basic metabolic panel  (Ca, Cl, CO2, Creat, Gluc, K, Na, BUN); Future  - Hemoglobin; Future  - Asymptomatic COVID-19 Virus (Coronavirus) by PCR; Future  - Hemoglobin  - Basic metabolic panel  (Ca, Cl, CO2, Creat, Gluc, K, Na, BUN)    2. MARIAN (obstructive sleep apnea)  He is not complaint with CPAP   pt is educated on the complications of untreated MARIAN including but not limited  to A.fib, hypertension, stroke, pulmonary hypertension and heart failure.      3. Pelvic floor dysfunction  Managed by urology    4. Essential hypertension  Controlled with lisinopril    5. Screening for diabetes mellitus    - Hemoglobin A1c; Future           Risks and Recommendations:  The patient has the following additional risks and recommendations for perioperative complications:   - No identified additional risk factors other than previously addressed    Medication Instructions:  NPO, he can take his medications after the procedure     RECOMMENDATION:  APPROVAL GIVEN to proceed with proposed procedure, without further diagnostic  evaluation.      {    Subjective     HPI related to upcoming procedure: Patient is having colonoscopy and he will be using MAC.      Preop Questions 8/19/2022   1. Have you ever had a heart attack or stroke? No   2. Have you ever had surgery on your heart or blood vessels, such as a stent placement, a coronary artery bypass, or surgery on an artery in your head, neck, heart, or legs? No   3. Do you have chest pain with activity? No   4. Do you have a history of  heart failure? No   5. Do you currently have a cold, bronchitis or symptoms of other infection? No   6. Do you have a cough, shortness of breath, or wheezing? No   7. Do you or anyone in your family have previous history of blood clots? No   8. Do you or does anyone in your family have a serious bleeding problem such as prolonged bleeding following surgeries or cuts? No   9. Have you ever had problems with anemia or been told to take iron pills? No   10. Have you had any abnormal blood loss such as black, tarry or bloody stools? No   11. Have you ever had a blood transfusion? No   12. Are you willing to have a blood transfusion if it is medically needed before, during, or after your surgery? Yes   13. Have you or any of your relatives ever had problems with anesthesia? YES , patient had reaction to versed 25 years ago   14. Do you have sleep apnea, excessive snoring or daytime drowsiness? YES , he has sleep apnea that he is not compliant with CPAP   14a. Do you have a CPAP machine? No   15. Do you have any artifical heart valves or other implanted medical devices like a pacemaker, defibrillator, or continuous glucose monitor? No   16. Do you have artificial joints? YES , hip replacement in the left hip and   17. Are you allergic to latex? No     Health Care Directive:  Patient does not have a Health Care Directive or Living Will: Patient states has Advance Directive and will bring in a copy to clinic.    Preoperative Review of :   reviewed - controlled  substances reflected in medication list.      Status of Chronic Conditions:  See problem list for active medical problems.  Problems all longstanding and stable, except as noted/documented.  See ROS for pertinent symptoms related to these conditions.      Review of Systems  Constitutional, neuro, ENT, endocrine, pulmonary, cardiac, gastrointestinal, genitourinary, musculoskeletal, integument and psychiatric systems are negative, except as otherwise noted.    Patient Active Problem List    Diagnosis Date Noted     Pelvic floor dysfunction 05/13/2022     Priority: Medium     Pudendal neuralgia 05/13/2022     Priority: Medium     Coccydynia 05/13/2022     Priority: Medium     Change in bowel habits 01/03/2022     Priority: Medium     Complete tear of left rotator cuff 01/03/2022     Priority: Medium     Pruritus ani 01/03/2022     Priority: Medium     Tear of right acetabular labrum, sequela 10/28/2021     Priority: Medium     Lumbar disc herniation with radiculopathy 10/28/2021     Priority: Medium     MRI 10/21  2.  At L4-L5, there is a right paracentral slightly caudally directed disc extrusion that narrows the right lateral recess and causes mass effect on the descending/traversing right L5 nerve root.  3.  At L3-L4, there is a right paracentral disc protrusion superimposed on disc bulge with narrowing of the right lateral recess and mass effect on the descending/traversing right L4 nerve root.       Acute prostatitis 10/06/2021     Priority: Medium     Rectal pain 10/06/2021     Priority: Medium     Penile pain 10/06/2021     Priority: Medium     Perineal pain in male 10/06/2021     Priority: Medium     Anal fissure 08/26/2021     Priority: Medium     Constipation 08/26/2021     Priority: Medium     Urinary retention 08/16/2021     Priority: Medium     Neuropathy 08/16/2021     Priority: Medium     Diverticular disease of large intestine 08/03/2021     Priority: Medium     Benign prostatic hyperplasia with urinary  obstruction 07/12/2021     Priority: Medium     Calculus of gallbladder with chronic cholecystitis without obstruction 05/25/2021     Priority: Medium     MARIAN (obstructive sleep apnea) 05/03/2021     Priority: Medium     On BiPAP, see scans.       Symptomatic cholelithiasis 04/06/2021     Priority: Medium     COVID-19 virus infection 04/06/2021     Priority: Medium     Hepatic steatosis 03/26/2021     Priority: Medium     Psoriatic arthritis (H) 03/15/2021     Priority: Medium     Arthritis of left acromioclavicular joint 08/05/2019     Priority: Medium     Lower urinary tract symptoms 05/13/2019     Priority: Medium     Mild intermittent asthma, uncomplicated 04/05/2017     Priority: Medium     OA (osteoarthritis) of hip 05/19/2015     Priority: Medium     ASHD (arteriosclerotic heart disease) 10/25/2013     Priority: Medium     Hyperlipidemia 10/25/2013     Priority: Medium     Reflux esophagitis 04/18/2013     Priority: Medium     Hiatal hernia 04/18/2013     Priority: Medium     Insomnia, unspecified 02/27/2013     Priority: Medium     GERD (gastroesophageal reflux disease) 10/16/2012     Priority: Medium     Low testosterone 10/16/2012     Priority: Medium     Displacement of lumbar intervertebral disc without myelopathy 09/12/2011     Priority: Medium      Past Medical History:   Diagnosis Date     MARIAN (obstructive sleep apnea)      Pelvic floor dysfunction      Past Surgical History:   Procedure Laterality Date     BACK SURGERY       CHOLECYSTECTOMY       ENT SURGERY      tonsillectomy     GENITOURINARY SURGERY      TURP     JOINT REPLACEMENT, HIP RT/LT Left      ORTHOPEDIC SURGERY      wrist surgery, carpal tunnel 2005     PROSTATE SURGERY      Urolift     ROTATOR CUFF REPAIR RT/LT Left      Current Outpatient Medications   Medication Sig Dispense Refill     albuterol (PROAIR HFA/PROVENTIL HFA/VENTOLIN HFA) 108 (90 Base) MCG/ACT inhaler Inhale 2 puffs into the lungs every 6 hours (Patient not taking: No sig  reported)       gabapentin (NEURONTIN) 300 MG capsule Take 1 capsule (300 mg) by mouth 2 times daily 60 capsule 11     lisinopril (ZESTRIL) 10 MG tablet Take 1 tablet (10 mg) by mouth daily 90 tablet 3     oxyCODONE (ROXICODONE) 5 MG tablet Take 1 tablet (5 mg) by mouth daily as needed for severe pain 15 tablet 0     terazosin (HYTRIN) 1 MG capsule TAKE 1 CAPSULE (1 MG) BY MOUTH DAILY TAKE UP TO 5 MG DAILY. 450 capsule 0       Allergies   Allergen Reactions     Droperidol Other (See Comments)     Extrapyramidal Side Effect     Fenofibrate Headache and Diarrhea              Fentanyl      Other reaction(s): N&V hard to wake up     Keflex [Cephalexin] Itching     Lactose GI Disturbance         Other reaction(s): GI upset     Midazolam      Other reaction(s): N&V, Hard to wake up     Monosodium Glutamate      Other reaction(s): diarrhea, headaches     Pcn [Penicillins] Other (See Comments)     Feels warm and flushed, but tolerates Cephalexin     Atorvastatin Palpitations     Other reaction(s): palpitations     Sertraline Palpitations         Other reaction(s): Unknown     Simvastatin Palpitations     Other reaction(s): palpitations     Sulfa Drugs Headache and Rash     Burning    Other reaction(s): Rash  Other reaction(s): rash and headache     Tetracycline Rash     Burning    Other reaction(s): rash        Social History     Tobacco Use     Smoking status: Former Smoker     Packs/day: 1.00     Years: 5.00     Pack years: 5.00     Quit date: 1999     Years since quittin.7     Smokeless tobacco: Never Used   Substance Use Topics     Alcohol use: Yes     Comment: very little     Family History   Problem Relation Age of Onset     Cerebrovascular Disease Mother      Hypertension Mother      Diabetes Mother      Liver Cancer Mother      Cancer Father 84        liver cancer     Prostate Cancer Father      History   Drug Use     Types: Marijuana         Objective     There were no vitals taken for this  visit.    Physical Exam    GENERAL APPEARANCE: healthy, alert and no distress     EYES: EOMI,  PERRL     HENT: ear canals and TM's normal and nose and mouth without ulcers or lesions, Mallampati 2     NECK: no adenopathy, no asymmetry, masses, or scars and thyroid normal to palpation     RESP: lungs clear to auscultation - no rales, rhonchi or wheezes     CV: regular rates and rhythm, normal S1 S2, no S3 or S4 and no murmur, click or rub     ABDOMEN:  soft, nontender, no HSM or masses and bowel sounds normal     MS: extremities normal- no gross deformities noted, no evidence of inflammation in joints, FROM in all extremities.     SKIN: no suspicious lesions or rashes     NEURO: Normal strength and tone, sensory exam grossly normal, mentation intact and speech normal     PSYCH: mentation appears normal. and affect normal/bright     LYMPHATICS: No cervical adenopathy    Recent Labs   Lab Test 11/11/21  1050 11/11/21  1049 10/11/21  1814 10/06/21  1353   HGB  --  14.2 12.8* 14.5   PLT  --  347 300 355   INR 0.92  --   --   --    NA  --   --  137 138   POTASSIUM  --   --  4.0 3.6   CR  --   --  0.90 0.76        Diagnostics:  Labs pending at this time.  Results will be reviewed when available.   EKG: Normal Sinus Rhythm, normal axis, normal intervals, no acute ST/T changes c/w ischemia, unchanged from previous tracings    Revised Cardiac Risk Index (RCRI):  The patient has the following serious cardiovascular risks for perioperative complications:   - No serious cardiac risks = 0 points     RCRI Interpretation: 0 points: Class I (very low risk - 0.4% complication rate)        Signed Electronically by: Crystal Zurita MD  Copy of this evaluation report is provided to requesting physician.

## 2022-08-19 NOTE — PATIENT INSTRUCTIONS
Preparing for Your Surgery  Getting started  A nurse will call you to review your health history and instructions. They will give you an arrival time based on your scheduled surgery time. Please be ready to share:    Your doctor's clinic name and phone number    Your medical, surgical and anesthesia history    A list of allergies and sensitivities    A list of medicines, including herbal treatments and over-the-counter drugs    Whether the patient has a legal guardian (ask how to send us the papers in advance)  Please tell us if you're pregnant--or if there's any chance you might be pregnant. Some surgeries may injure a fetus (unborn baby), so they require a pregnancy test. Surgeries that are safe for a fetus don't always need a test, and you can choose whether to have one.   If you have a child who's having surgery, please ask for a copy of Preparing for Your Child's Surgery.    Preparing for surgery    Within 30 days of surgery: Have a pre-op exam (sometimes called an H&P, or History and Physical). This can be done at a clinic or pre-operative center.  ? If you're having a , you may not need this exam. Talk to your care team.    At your pre-op exam, talk to your care team about all medicines you take. If you need to stop any medicines before surgery, ask when to start taking them again.  ? We do this for your safety. Many medicines can make you bleed too much during surgery. Some change how well surgery (anesthesia) drugs work.    Call your insurance company to let them know you're having surgery. (If you don't have insurance, call [phone].)    Call your clinic if there's any change in your health. This includes signs of a cold or flu (sore throat, runny nose, cough, rash, fever). It also includes a scrape or scratch near the surgery site.    If you have questions on the day of surgery, call your hospital or surgery center.  COVID testing  You may need to be tested for COVID-19 before having  surgery. If so, we will give you instructions.  Eating and drinking guidelines  For your safety: Unless your surgeon tells you otherwise, follow the guidelines below.    Eat and drink as usual until 8 hours before surgery. After that, no food or milk.    Drink clear liquids until 2 hours before surgery. These are liquids you can see through, like water, Gatorade and Propel Water. You may also have black coffee and tea (no cream or milk).    Nothing by mouth within 2 hours of surgery. This includes gum, candy and breath mints.    If you drink alcohol: Stop drinking it the night before surgery.    If your care team tells you to take medicine on the morning of surgery, it's okay to take it with a sip of water.  Preventing infection    Shower or bathe the night before and morning of your surgery. Follow the instructions your clinic gave you. (If no instructions, use regular soap.)    Don't shave or clip hair near your surgery site. We'll remove the hair if needed.    Don't smoke or vape the morning of surgery. You may chew nicotine gum up to 2 hours before surgery. A nicotine patch is okay.  ? Note: Some surgeries require you to completely quit smoking and nicotine. Check with your surgeon.    Your care team will make every effort to keep you safe from infection. We will:  ? Clean our hands often with soap and water (or an alcohol-based hand rub).  ? Clean the skin at your surgery site with a special soap that kills germs.  ? Give you a special gown to keep you warm. (Cold raises the risk of infection.)  ? Wear special hair covers, masks, gowns and gloves during surgery.  ? Give antibiotic medicine, if prescribed. Not all surgeries need antibiotics.  What to bring on the day of surgery    Photo ID and insurance card    Copy of your health care directive, if you have one    Glasses and hearing aides (bring cases)  ? You can't wear contacts during surgery    Inhaler and eye drops, if you use them (tell us about these when  you arrive)    CPAP machine or breathing device, if you use them    A few personal items, if spending the night    If you have . . .  ? A pacemaker, ICD (cardiac defibrillator) or other implant: Bring the ID card.  ? An implanted stimulator: Bring the remote control.  ? A legal guardian: Bring a copy of the certified (court-stamped) guardianship papers.  Please remove any jewelry, including body piercings. Leave jewelry and other valuables at home.  If you're going home the day of surgery    You must have a responsible adult drive you home. They should stay with you overnight as well.    If you don't have someone to stay with you, and you aren't safe to go home alone, we may keep you overnight. Insurance often won't pay for this.  After surgery  If it's hard to control your pain or you need more pain medicine, please call your surgeon's office.  Questions?   If you have any questions for your care team, list them here: _________________________________________________________________________________________________________________________________________________________________________ ____________________________________ ____________________________________ ____________________________________  For informational purposes only. Not to replace the advice of your health care provider. Copyright   2003, 2019 [facility]. All rights reserved. Clinically reviewed by Pema Gimenez MD. CRAZE [id number] - REV 07/21.

## 2022-08-22 DIAGNOSIS — R73.03 PREDIABETES: Primary | ICD-10-CM

## 2022-08-22 LAB — HBA1C MFR BLD: 5.9 % (ref 0–5.6)

## 2022-08-28 ENCOUNTER — PATIENT OUTREACH (OUTPATIENT)
Dept: CARE COORDINATION | Facility: CLINIC | Age: 62
End: 2022-08-28

## 2022-08-30 RX ORDER — ONDANSETRON 2 MG/ML
4 INJECTION INTRAMUSCULAR; INTRAVENOUS
Status: CANCELLED | OUTPATIENT
Start: 2022-08-30

## 2022-08-30 RX ORDER — LIDOCAINE 40 MG/G
CREAM TOPICAL
Status: CANCELLED | OUTPATIENT
Start: 2022-08-30

## 2022-08-31 ENCOUNTER — ANESTHESIA (OUTPATIENT)
Dept: GASTROENTEROLOGY | Facility: CLINIC | Age: 62
End: 2022-08-31
Payer: COMMERCIAL

## 2022-08-31 ENCOUNTER — ANESTHESIA EVENT (OUTPATIENT)
Dept: GASTROENTEROLOGY | Facility: CLINIC | Age: 62
End: 2022-08-31
Payer: COMMERCIAL

## 2022-08-31 ENCOUNTER — HOSPITAL ENCOUNTER (OUTPATIENT)
Facility: CLINIC | Age: 62
Discharge: HOME | End: 2022-08-31
Attending: COLON & RECTAL SURGERY | Admitting: COLON & RECTAL SURGERY
Payer: COMMERCIAL

## 2022-08-31 VITALS
OXYGEN SATURATION: 98 % | WEIGHT: 197 LBS | BODY MASS INDEX: 32.82 KG/M2 | HEIGHT: 65 IN | HEART RATE: 80 BPM | RESPIRATION RATE: 18 BRPM | DIASTOLIC BLOOD PRESSURE: 90 MMHG | SYSTOLIC BLOOD PRESSURE: 143 MMHG

## 2022-08-31 DIAGNOSIS — Z12.11 ENCOUNTER FOR SCREENING COLONOSCOPY: Primary | ICD-10-CM

## 2022-08-31 LAB — COLONOSCOPY: NORMAL

## 2022-08-31 PROCEDURE — 250N000009 HC RX 250: Performed by: NURSE ANESTHETIST, CERTIFIED REGISTERED

## 2022-08-31 PROCEDURE — 45378 DIAGNOSTIC COLONOSCOPY: CPT | Performed by: COLON & RECTAL SURGERY

## 2022-08-31 PROCEDURE — 258N000003 HC RX IP 258 OP 636: Performed by: NURSE ANESTHETIST, CERTIFIED REGISTERED

## 2022-08-31 PROCEDURE — 370N000017 HC ANESTHESIA TECHNICAL FEE, PER MIN: Performed by: COLON & RECTAL SURGERY

## 2022-08-31 PROCEDURE — 250N000011 HC RX IP 250 OP 636: Performed by: NURSE ANESTHETIST, CERTIFIED REGISTERED

## 2022-08-31 PROCEDURE — 999N000010 HC STATISTIC ANES STAT CODE-CRNA PER MINUTE: Performed by: COLON & RECTAL SURGERY

## 2022-08-31 RX ORDER — DEXMEDETOMIDINE HYDROCHLORIDE 4 UG/ML
INJECTION, SOLUTION INTRAVENOUS PRN
Status: DISCONTINUED | OUTPATIENT
Start: 2022-08-31 | End: 2022-08-31

## 2022-08-31 RX ORDER — SODIUM CHLORIDE, SODIUM LACTATE, POTASSIUM CHLORIDE, CALCIUM CHLORIDE 600; 310; 30; 20 MG/100ML; MG/100ML; MG/100ML; MG/100ML
INJECTION, SOLUTION INTRAVENOUS CONTINUOUS PRN
Status: DISCONTINUED | OUTPATIENT
Start: 2022-08-31 | End: 2022-08-31

## 2022-08-31 RX ORDER — LIDOCAINE HYDROCHLORIDE 20 MG/ML
INJECTION, SOLUTION INFILTRATION; PERINEURAL PRN
Status: DISCONTINUED | OUTPATIENT
Start: 2022-08-31 | End: 2022-08-31

## 2022-08-31 RX ORDER — ONDANSETRON 2 MG/ML
INJECTION INTRAMUSCULAR; INTRAVENOUS PRN
Status: DISCONTINUED | OUTPATIENT
Start: 2022-08-31 | End: 2022-08-31

## 2022-08-31 RX ORDER — PROPOFOL 10 MG/ML
INJECTION, EMULSION INTRAVENOUS PRN
Status: DISCONTINUED | OUTPATIENT
Start: 2022-08-31 | End: 2022-08-31

## 2022-08-31 RX ADMIN — PROPOFOL 200 MCG/KG/MIN: 10 INJECTION, EMULSION INTRAVENOUS at 14:27

## 2022-08-31 RX ADMIN — SODIUM CHLORIDE, POTASSIUM CHLORIDE, SODIUM LACTATE AND CALCIUM CHLORIDE: 600; 310; 30; 20 INJECTION, SOLUTION INTRAVENOUS at 14:24

## 2022-08-31 RX ADMIN — DEXMEDETOMIDINE HYDROCHLORIDE 12 MCG: 200 INJECTION INTRAVENOUS at 14:24

## 2022-08-31 RX ADMIN — ONDANSETRON 4 MG: 2 INJECTION INTRAMUSCULAR; INTRAVENOUS at 14:25

## 2022-08-31 RX ADMIN — PROPOFOL 50 MG: 10 INJECTION, EMULSION INTRAVENOUS at 14:25

## 2022-08-31 RX ADMIN — LIDOCAINE HYDROCHLORIDE 40 MG: 20 INJECTION, SOLUTION INFILTRATION; PERINEURAL at 14:25

## 2022-08-31 ASSESSMENT — ACTIVITIES OF DAILY LIVING (ADL): ADLS_ACUITY_SCORE: 35

## 2022-08-31 NOTE — ANESTHESIA CARE TRANSFER NOTE
Patient: Mustapha Negrete    Procedure: Procedure(s):  COLONOSCOPY       Diagnosis: Screen for colon cancer [Z12.11]  Diagnosis Additional Information: No value filed.    Anesthesia Type:   MAC     Note:    Oropharynx: oropharynx clear of all foreign objects  Level of Consciousness: awake  Oxygen Supplementation: room air    Independent Airway: airway patency satisfactory and stable  Dentition: dentition unchanged  Vital Signs Stable: post-procedure vital signs reviewed and stable  Report to RN Given: handoff report given  Patient transferred to: PACU    Handoff Report: Identifed the Patient, Identified the Reponsible Provider, Reviewed the pertinent medical history, Discussed the surgical course, Reviewed Intra-OP anesthesia mangement and issues during anesthesia, Set expectations for post-procedure period and Allowed opportunity for questions and acknowledgement of understanding        Electronically Signed By: MICAELA Meredith CRNA  August 31, 2022  2:48 PM

## 2022-08-31 NOTE — ANESTHESIA PREPROCEDURE EVALUATION
Prior to Admission medications    Medication Sig Start Date End Date Taking? Authorizing Provider   albuterol (PROAIR HFA/PROVENTIL HFA/VENTOLIN HFA) 108 (90 Base) MCG/ACT inhaler Inhale 2 puffs into the lungs every 6 hours   Yes Reported, Patient   gabapentin (NEURONTIN) 300 MG capsule Take 1 capsule (300 mg) by mouth 2 times daily 6/29/22  Yes Arian Prescott MD   lisinopril (ZESTRIL) 10 MG tablet Take 1 tablet (10 mg) by mouth daily 7/5/22  Yes Silver Pro DO   oxyCODONE (ROXICODONE) 5 MG tablet Take 1 tablet (5 mg) by mouth daily as needed for severe pain 8/5/22  Yes Silver Pro DO   terazosin (HYTRIN) 1 MG capsule TAKE 1 CAPSULE (1 MG) BY MOUTH DAILY TAKE UP TO 5 MG DAILY. 4/11/22  Yes Francis Jean-Baptiste MD     No current Epic-ordered facility-administered medications on file.     No current [facility]-ordered outpatient medications on file.       No results for input(s): ABO, RH in the last 05225 hours.  No results for input(s): HCG in the last 39137 hours.  Recent Results (from the past 744 hour(s))   XR Surgery JACOB L/T 5 Min Fluoro w Stills    Narrative    This exam was marked as non-reportable because it will not be read by a   radiologist or a [place] non-radiologist provider.         MR Pelvis Bone wo Contrast    Narrative    MR pelvis without contrast 8/12/2022 12:43 PM    Techniques: Multiplanar multisequence imaging of the pelvis was  obtained without  administration of  intravenous contrast using  routing [facility] protocol.    History: History of chronic pelvic pain and previous MR pelvis right  anterosuperior acetabular labral tearing.; Pelvic floor dysfunction     Comparison: MR 10/24/2021, CT 10/6/2021    Findings:    Postsurgical changes of left total hip arthroplasty with metallic  susceptibility artifact compromising assessment of the region.    Osseous structures  Osseous structures: No fracture, stress reaction, avascular necrosis,  or focal osseous lesion is  seen.    Degenerative change of right hip with subchondral edema like marrow  signal intensity in the acetabular roof, progressed since prior.  Suspect right anterosuperior acetabular labral tearing.    Degenerative changes of visualized lower lumbar spine with severe disc  space loss at the L5-S1, moderate disc space of L4-L5 with trace  anterolisthesis at L4-L5.    Internal derangement of joints are not well assessed owing to chosen  field of view, especially around the left hip due to susceptibility  artifact.    Joint and Periarticular soft tissue:    Sacroiliac joints and pubic symphysis are congruent.    Joint effusion: A physiologic amount of joint fluid in bilateral hip.    Bursal effusion: Minimal nonspecific edema over the greater  trochanter. No substantial iliopsoas or trochanteric bursal effusion.  Right greater than left trace ischial tuberosity bursal fluid.    Muscles and tendons  Muscles and tendons: Proximal hamstrings, right rectus femoris, right  sartorius, and right iliopsoas tendons intact. The right hip abductors  are intact bilaterally. The visualized adductor muscles are  unremarkable bilaterally.  Mild asymmetric muscle bulk loss of the  left piriformis, presumably related to prior surgery. Bilateral  relatively symmetric fatty infiltration of the gluteus minimus.    Nerves:  The visualized course of the sciatic nerves are unremarkable  bilaterally.    Other Findings:  Foci of susceptibility artifacts in the prostate, consistent with  prior surgery. Extensive colonic diverticulosis.      Impression    Impression:  1. Progression right hip osteoarthrosis with subchondral edema.  2. Suspect right anterosuperior acetabular labral tearing    KELVIN MILDRED         SYSTEM ID:  [id number]   Anesthesia Pre-Procedure Evaluation    Patient: Mustapha Negrete   MRN: [id number] : 1960        Procedure : Procedure(s):  COLONOSCOPY          Past Medical History:   Diagnosis Date     MARIAN (obstructive  sleep apnea)     havne't used cpap since 2018     Pelvic floor dysfunction       Past Surgical History:   Procedure Laterality Date     BACK SURGERY       CHOLECYSTECTOMY       ENT SURGERY      tonsillectomy     GENITOURINARY SURGERY      TURP     JOINT REPLACEMENT, HIP RT/LT Left      ORTHOPEDIC SURGERY      wrist surgery, carpal tunnel 2005     PROSTATE SURGERY      Urolift     ROTATOR CUFF REPAIR RT/LT Left       Allergies   Allergen Reactions     Droperidol Other (See Comments)     Extrapyramidal Side Effect     Fenofibrate Headache and Diarrhea              Fentanyl      Other reaction(s): N&V hard to wake up     Keflex [Cephalexin] Itching     Lactose GI Disturbance         Other reaction(s): GI upset     Midazolam      Other reaction(s): N&V, Hard to wake up     Monosodium Glutamate      Other reaction(s): diarrhea, headaches     Pcn [Penicillins] Other (See Comments)     Feels warm and flushed, but tolerates Cephalexin     Atorvastatin Palpitations     Other reaction(s): palpitations     Sertraline Palpitations         Other reaction(s): Unknown     Simvastatin Palpitations     Other reaction(s): palpitations     Sulfa Drugs Headache and Rash     Burning    Other reaction(s): Rash  Other reaction(s): rash and headache     Tetracycline Rash     Burning    Other reaction(s): rash      Social History     Tobacco Use     Smoking status: Former Smoker     Packs/day: 1.00     Years: 5.00     Pack years: 5.00     Quit date: 1999     Years since quittin.8     Smokeless tobacco: Never Used   Substance Use Topics     Alcohol use: Yes     Comment: very little      Wt Readings from Last 1 Encounters:   22 89.4 kg (197 lb)        Anesthesia Evaluation   Pt has had prior anesthetic.     No history of anesthetic complications       ROS/MED HX  ENT/Pulmonary:     (+) sleep apnea, doesn't use CPAP, Intermittent, asthma     Neurologic:       Cardiovascular:     (+) --CAD ---    METS/Exercise Tolerance:      Hematologic:       Musculoskeletal:       GI/Hepatic:     (+) GERD, hiatal hernia, liver disease,     Renal/Genitourinary:       Endo:       Psychiatric/Substance Use:       Infectious Disease:       Malignancy:       Other:            Physical Exam    Airway        Mallampati: I    Neck ROM: full     Respiratory Devices and Support         Dental  no notable dental history         Cardiovascular   cardiovascular exam normal          Pulmonary   pulmonary exam normal                OUTSIDE LABS:  CBC:   Lab Results   Component Value Date    WBC 8.1 11/11/2021    WBC 7.3 10/11/2021    HGB 14.5 08/19/2022    HGB 14.2 11/11/2021    HCT 42.7 11/11/2021    HCT 36.4 (L) 10/11/2021     11/11/2021     10/11/2021     BMP:   Lab Results   Component Value Date     08/19/2022     10/11/2021    POTASSIUM 4.6 08/19/2022    POTASSIUM 4.0 10/11/2021    CHLORIDE 107 08/19/2022    CHLORIDE 106 10/11/2021    CO2 28 08/19/2022    CO2 24 10/11/2021    BUN 23 08/19/2022    BUN 18 10/11/2021    CR 0.89 08/19/2022    CR 0.90 10/11/2021     (H) 08/19/2022     (H) 10/11/2021     COAGS:   Lab Results   Component Value Date    INR 0.92 11/11/2021     POC: No results found for: BGM, HCG, HCGS  HEPATIC:   Lab Results   Component Value Date    ALBUMIN 3.0 (L) 10/11/2021    PROTTOTAL 6.0 (L) 10/11/2021    ALT 29 10/11/2021    AST 10 10/11/2021    ALKPHOS 30 (L) 10/11/2021    BILITOTAL 0.8 10/11/2021     OTHER:   Lab Results   Component Value Date    A1C 5.9 (H) 08/19/2022    ANITA 9.3 08/19/2022    TSH 1.88 11/09/2011    CRP <2.9 10/06/2021    SED 10 10/06/2021       Anesthesia Plan    ASA Status:  2   NPO Status:  NPO Appropriate    Anesthesia Type: MAC.     - Reason for MAC: immobility needed, straight local not clinically adequate              Consents    Anesthesia Plan(s) and associated risks, benefits, and realistic alternatives discussed. Questions answered and patient/representative(s) expressed  understanding.    - Discussed:     - Discussed with:  Patient         Postoperative Care    Pain management: IV analgesics.   PONV prophylaxis: Ondansetron (or other 5HT-3)     Comments:                Gatito Gonzáles MD

## 2022-09-06 ENCOUNTER — PRE VISIT (OUTPATIENT)
Dept: UROLOGY | Facility: CLINIC | Age: 62
End: 2022-09-06

## 2022-09-06 ENCOUNTER — PATIENT OUTREACH (OUTPATIENT)
Dept: CARE COORDINATION | Facility: CLINIC | Age: 62
End: 2022-09-06

## 2022-09-06 ASSESSMENT — ACTIVITIES OF DAILY LIVING (ADL): DEPENDENT_IADLS:: TRANSPORTATION;INDEPENDENT

## 2022-09-06 NOTE — TELEPHONE ENCOUNTER
Reason for Visit: Follow-up on UDS    Diagnosis: Pelvic pain in male    Orders/Procedures/Records: in system    Contact Patient: n/a    Rooming Requirements: Shavon Turner  09/06/22  4:19 PM

## 2022-09-06 NOTE — PROGRESS NOTES
Clinic Care Coordination Contact    Follow Up Progress Note      Assessment: The RN CC nurse care coordinator contacted the patient by phone for a follow up call.  The patient states that he has recently undergone a colonoscopy as well as some other tests and is looking forward to seeing his primary care provider.  Patient states that he is doing all of the exercises that he has been instructed to.  He has also been going to a special type of massage to try and loosen up those muscles.  Its not something that he can afford to do for a long time as the cost is $200 per visit.    Medication review was completed with the patient and marked as reviewed.  Health maintenance was reviewed and questions were answered with the patient.  The assessment for hypertension was completed with the patient today as well as the social determinants of health and they were marked as reviewed.      Care Gaps:    Health Maintenance Due   Topic Date Due     PREVENTIVE CARE VISIT  Never done     INFLUENZA VACCINE (1) Never done       Care Gaps Last addressed on 9/6/22    Care Plans  Care Plan: General urination without pain     Problem: HP GENERAL PROBLEM     Goal: General Goal - urinate on my own without difficulty.  Decrease bilateral buttocks and thigh pain .     Start Date: 11/2/2021 Expected End Date: 8/2/2023    This Visit's Progress: 30%    Note:     Goal Statement #1: I will be able to urinate on my own without difficulty .    I will decrease bilateral buttocks and thigh pain in the next 3-6  months   Barriers: constant pain   Strengths: Motivated   Patient expressed understanding of goal: Yes  Action steps to achieve this goal:  1. I will keep future Primary Care ( recently switched to the [facility] ),Urology, Pain Clinic appointments and will make a future Neurology appointment   2. I will drink plenty of water to keep my urine clear and yellow  3. I will take Tylenol/Ibuprofen and Gabapentin as directed   4. I will  walk a block when able to increase strength   5. I will continue dry needling 3 times a week  6. I will keep future appointments scheduled with [facility] specialist in [place] June 6, 8 and 10th                        Intervention/Education provided during outreach: The patient has very good understanding of the disease process that they do know.     Outreach Frequency: monthly        Plan:   1.  The patient will continue to follow-up with the recommended appointments.  2.  The patient will continue the exercises that he has been given by physical therapy of varying types.  3.  The patient will continue to work on increasing his strength and stamina as tolerated.    RN CC Nurse Care Coordinator will follow up in 4 weeks.          Sebastian Jimenez MSN, RN, PHN, CCM   Primary Care Clinical RN Care Coordinator  [facility]  9/6/2022   2:35 PM  [email or web address]  Office: [phone]

## 2022-09-07 ENCOUNTER — VIRTUAL VISIT (OUTPATIENT)
Dept: UROLOGY | Facility: CLINIC | Age: 62
End: 2022-09-07
Payer: COMMERCIAL

## 2022-09-07 DIAGNOSIS — R33.9 URINARY RETENTION: Primary | ICD-10-CM

## 2022-09-07 PROCEDURE — 99215 OFFICE O/P EST HI 40 MIN: CPT | Mod: 95 | Performed by: UROLOGY

## 2022-09-07 NOTE — PROGRESS NOTES
Reason for visit:  F/u on urinary symptoms.    Clinical Data.  Mr. Negrete is a 61 year old male with pelvic floor dysfunction and urinary retention.  This all started in middle of May of 2021 he had a cholecystectomy and tehn had Singles in the summer.  He then went into retention and required a catheter for 4 months.  He is able to void a little better but continues to have difficulty voiding.  He also has some pelvic discomfort.  He uses gabapentin.  He also continues to go to PFPT.  He has tried amitriptyline and carbamazepine.     He was started on rectal valium but he didn't tolerate at all.  He also had some sacral adjustment which he also doesn't feel like it helped.    Reviewed Dr. Jean-Baptiste's note from 7/19/22. He did not feel removal of the urolift clips would help.     Reviewed the results of the UDS testing from 8/8/22 with the patient:    -Normal bladder capacity (total volume voided = 375 mL).  -UDCs x3 throughout the study, reaching ~55 cm H20. He leaks with the second two contractions. DLPP 40 cm H2O.   -Pdet pressure returns to baseline between episodes of DO.   -With permission to void, he appears to attempt entirely through Valsalva effort. During his second attempt from a standing position, he continues to Valsalva void, with an apparent weak rise in detrusor pressure. He voids a third time into a private uroflow to completely empty his bladder. Weak flow rate (Qmax 6.4-11.1 mL/s) with an intermittent flow curve  -Mild increase in EMG activity during voiding, which likely correlates with Valsalva effort.  -BOOI is 26.7 which is equivocal for bladder outlet obstruction.  -Fluoroscopy reveals a moderately-trabeculated bladder wall with a moderate right-sided diverticulum.  No vesicoureteral reflux was observed.  The bladder neck was open during filling and during voiding. Of note, there appears to be contrast collecting in the prostatic urethra, likely 2/2 his history of Urolift.    10/24/21 pelvic  MRI  Impression:  Postsurgical changes of left total hip arthroplasty with metallic  susceptibility artifact partially compromising assessment.  1. No ischial tuberosity bursitis or collection on either side.   2. Suspect right anterosuperior acetabular labral tearing.      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  He also has some pelvic floor dysfunction and has exhausted many therapies wihtout avail or relief. He has been trying pelvic floor PT as well and it has not been helping. We discussed options of SNS in detail.   He would like to proceed with a trial.  We discussed the different options in detail including rechargeable vs. Non-rechargeable and he would like to do the medtronic and is aiming for the micro rechargeable.  -Will plan for a trial with Medtronic    Nayan Tello MD  [facility] [place]      ==========================      Additional Coding Information:    Time spent:  49 minutes spent on the date of the encounter doing chart review, history and exam, documentation and further activities per the note

## 2022-09-07 NOTE — LETTER
9/7/2022       RE: Mustapha Negrete  [address] [address]     Dear Colleague,    Thank you for referring your patient, Mustapha Negrete, to the [facility] [place] at [facility]. Please see a copy of my visit note below.    Mustapha is a 61 year old who is being evaluated via a billable video visit.      How would you like to obtain your AVS? MyChart  If the video visit is dropped, the invitation should be resent by: Text to cell phone: [phone]  Will anyone else be joining your video visit? No        Video-Visit Details    Video Start Time: 9:46 AM    Type of service:  Video Visit    Video End Time:10:05 AM    Originating Location (pt. Location): Home    Distant Location (provider location):  [facility] [place]     Platform used for Video Visit: [employer]    Reason for visit:  F/u on urinary symptoms.    Clinical Data.  Mr. Negrete is a 61 year old male with pelvic floor dysfunction and urinary retention.  This all started in middle of May of 2021 he had a cholecystectomy and tehn had Singles in the summer.  He then went into retention and required a catheter for 4 months.  He is able to void a little better but continues to have difficulty voiding.  He also has some pelvic discomfort.  He uses gabapentin.  He also continues to go to PFPT.  He has tried amitriptyline and carbamazepine.     He was started on rectal valium but he didn't tolerate at all.  He also had some sacral adjustment which he also doesn't feel like it helped.    Reviewed Dr. Jean-Baptiste's note from 7/19/22. He did not feel removal of the urolift clips would help.     Reviewed the results of the UDS testing from 8/8/22 with the patient:    -Normal bladder capacity (total volume voided = 375 mL).  -UDCs x3 throughout the study, reaching ~55 cm H20. He leaks with the second two contractions. DLPP 40 cm H2O.   -Pdet pressure returns to baseline  between episodes of DO.   -With permission to void, he appears to attempt entirely through Valsalva effort. During his second attempt from a standing position, he continues to Valsalva void, with an apparent weak rise in detrusor pressure. He voids a third time into a private uroflow to completely empty his bladder. Weak flow rate (Qmax 6.4-11.1 mL/s) with an intermittent flow curve  -Mild increase in EMG activity during voiding, which likely correlates with Valsalva effort.  -BOOI is 26.7 which is equivocal for bladder outlet obstruction.  -Fluoroscopy reveals a moderately-trabeculated bladder wall with a moderate right-sided diverticulum.  No vesicoureteral reflux was observed.  The bladder neck was open during filling and during voiding. Of note, there appears to be contrast collecting in the prostatic urethra, likely 2/2 his history of Urolift.    10/24/21 pelvic MRI  Impression:  Postsurgical changes of left total hip arthroplasty with metallic  susceptibility artifact partially compromising assessment.  1. No ischial tuberosity bursitis or collection on either side.   2. Suspect right anterosuperior acetabular labral tearing.      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  He also has some pelvic floor dysfunction and has exhausted many therapies wihtout avail or relief. He has been trying pelvic floor PT as well and it has not been helping. We discussed options of SNS in detail.   He would like to proceed with a trial.  We discussed the different options in detail including rechargeable vs. Non-rechargeable and he would like to do the medtronic and is aiming for the micro rechargeable.  -Will plan for a trial with Medtronic    Nayan Tello MD  [facility] [place]      ==========================      Additional  Coding Information:    Time spent:  49 minutes spent on the date of the encounter doing chart review, history and exam, documentation and further activities per the note

## 2022-09-07 NOTE — PROGRESS NOTES
Mustapha is a 61 year old who is being evaluated via a billable video visit.      How would you like to obtain your AVS? MyChart  If the video visit is dropped, the invitation should be resent by: Text to cell phone: [phone]  Will anyone else be joining your video visit? No        Video-Visit Details    Video Start Time: 9:46 AM    Type of service:  Video Visit    Video End Time:10:05 AM    Originating Location (pt. Location): Home    Distant Location (provider location):  [facility]     Platform used for Video Visit: [employer]

## 2022-09-07 NOTE — NURSING NOTE
Chief Complaint   Patient presents with     Video Visit     Discussion with urination issues. UDS Follow up

## 2022-09-08 ENCOUNTER — OFFICE VISIT (OUTPATIENT)
Dept: FAMILY MEDICINE | Facility: CLINIC | Age: 62
End: 2022-09-08
Payer: COMMERCIAL

## 2022-09-08 VITALS
TEMPERATURE: 97.7 F | BODY MASS INDEX: 32.68 KG/M2 | WEIGHT: 196.4 LBS | SYSTOLIC BLOOD PRESSURE: 170 MMHG | HEART RATE: 71 BPM | OXYGEN SATURATION: 98 % | DIASTOLIC BLOOD PRESSURE: 92 MMHG | RESPIRATION RATE: 20 BRPM

## 2022-09-08 DIAGNOSIS — G89.29 CHRONIC RIGHT SHOULDER PAIN: ICD-10-CM

## 2022-09-08 DIAGNOSIS — M25.511 CHRONIC RIGHT SHOULDER PAIN: ICD-10-CM

## 2022-09-08 DIAGNOSIS — I10 ESSENTIAL HYPERTENSION: ICD-10-CM

## 2022-09-08 DIAGNOSIS — M62.89 PELVIC FLOOR DYSFUNCTION: Primary | ICD-10-CM

## 2022-09-08 DIAGNOSIS — M25.50 GENERALIZED JOINT PAIN: ICD-10-CM

## 2022-09-08 DIAGNOSIS — F11.90 CHRONIC, CONTINUOUS USE OF OPIOIDS: ICD-10-CM

## 2022-09-08 LAB
CRP SERPL-MCNC: 7.6 MG/L (ref 0–8)
ERYTHROCYTE [SEDIMENTATION RATE] IN BLOOD BY WESTERGREN METHOD: 10 MM/HR (ref 0–20)

## 2022-09-08 PROCEDURE — 36415 COLL VENOUS BLD VENIPUNCTURE: CPT | Performed by: FAMILY MEDICINE

## 2022-09-08 PROCEDURE — 99214 OFFICE O/P EST MOD 30 MIN: CPT | Performed by: FAMILY MEDICINE

## 2022-09-08 PROCEDURE — 86140 C-REACTIVE PROTEIN: CPT | Performed by: FAMILY MEDICINE

## 2022-09-08 PROCEDURE — 86038 ANTINUCLEAR ANTIBODIES: CPT | Performed by: FAMILY MEDICINE

## 2022-09-08 PROCEDURE — 85652 RBC SED RATE AUTOMATED: CPT | Performed by: FAMILY MEDICINE

## 2022-09-08 RX ORDER — OXYCODONE HYDROCHLORIDE 5 MG/1
5 TABLET ORAL DAILY PRN
Qty: 15 TABLET | Refills: 0 | Status: SHIPPED | OUTPATIENT
Start: 2022-09-08 | End: 2022-10-07

## 2022-09-08 RX ORDER — LISINOPRIL 20 MG/1
20 TABLET ORAL DAILY
Qty: 90 TABLET | Refills: 3 | Status: SHIPPED | OUTPATIENT
Start: 2022-09-08 | End: 2023-01-05

## 2022-09-08 ASSESSMENT — ANXIETY QUESTIONNAIRES
2. NOT BEING ABLE TO STOP OR CONTROL WORRYING: NOT AT ALL
7. FEELING AFRAID AS IF SOMETHING AWFUL MIGHT HAPPEN: NOT AT ALL
8. IF YOU CHECKED OFF ANY PROBLEMS, HOW DIFFICULT HAVE THESE MADE IT FOR YOU TO DO YOUR WORK, TAKE CARE OF THINGS AT HOME, OR GET ALONG WITH OTHER PEOPLE?: NOT DIFFICULT AT ALL
IF YOU CHECKED OFF ANY PROBLEMS ON THIS QUESTIONNAIRE, HOW DIFFICULT HAVE THESE PROBLEMS MADE IT FOR YOU TO DO YOUR WORK, TAKE CARE OF THINGS AT HOME, OR GET ALONG WITH OTHER PEOPLE: NOT DIFFICULT AT ALL
5. BEING SO RESTLESS THAT IT IS HARD TO SIT STILL: NOT AT ALL
3. WORRYING TOO MUCH ABOUT DIFFERENT THINGS: NOT AT ALL
GAD7 TOTAL SCORE: 2
7. FEELING AFRAID AS IF SOMETHING AWFUL MIGHT HAPPEN: NOT AT ALL
4. TROUBLE RELAXING: MORE THAN HALF THE DAYS
6. BECOMING EASILY ANNOYED OR IRRITABLE: NOT AT ALL
GAD7 TOTAL SCORE: 2
1. FEELING NERVOUS, ANXIOUS, OR ON EDGE: NOT AT ALL
GAD7 TOTAL SCORE: 2

## 2022-09-08 ASSESSMENT — ENCOUNTER SYMPTOMS
ARTHRALGIAS: 1
PALPITATIONS: 0
HEADACHES: 0
CHILLS: 0
WEAKNESS: 0
SHORTNESS OF BREATH: 0
PARESTHESIAS: 0
COUGH: 0
JOINT SWELLING: 0
FEVER: 0
DIZZINESS: 0
NERVOUS/ANXIOUS: 0
SORE THROAT: 0
MYALGIAS: 0

## 2022-09-08 ASSESSMENT — PATIENT HEALTH QUESTIONNAIRE - PHQ9
SUM OF ALL RESPONSES TO PHQ QUESTIONS 1-9: 2
SUM OF ALL RESPONSES TO PHQ QUESTIONS 1-9: 2
10. IF YOU CHECKED OFF ANY PROBLEMS, HOW DIFFICULT HAVE THESE PROBLEMS MADE IT FOR YOU TO DO YOUR WORK, TAKE CARE OF THINGS AT HOME, OR GET ALONG WITH OTHER PEOPLE: NOT DIFFICULT AT ALL

## 2022-09-08 ASSESSMENT — PAIN SCALES - GENERAL: PAINLEVEL: WORST PAIN (10)

## 2022-09-08 NOTE — PATIENT INSTRUCTIONS
Claudy Luciano,    Thank you for allowing [facility] to manage your care.    I ordered some blood work, please go to the laboratory to get your laboratory studies.    I sent your prescriptions to your pharmacy.    I ordered a shoulder MRI, please call diagnostic imaging [phone] to schedule your test.    For your high blood pressure, I am increasing your lisinopril to 20 mg daily.     For your convenience, test results are released as soon as they are available  Please allow 1-2 business days for me to send you a comment about your results.  If not done so, I encourage you to login into [email or web address] [email or web address] to review your results in real time.     If you have any questions or concerns, please feel free to call us at [phone].    Sincerely,    Dr. Pro    Did you know?      You can schedule a video visit for follow-up appointments as well as future appointments for certain conditions.  Please see the below link.     [email or web address]    If you have not already done so,  I encourage you to sign up for [email or web address] ([email or web address]).  This will allow you to review your results, securely communicate with a provider, and schedule virtual visits as well.

## 2022-09-08 NOTE — PROGRESS NOTES
1. Pelvic floor dysfunction  Keep upcoming appointment with urology.  Continue with pelvic PT.  CSA signed  - oxyCODONE (ROXICODONE) 5 MG tablet; Take 1 tablet (5 mg) by mouth daily as needed for severe pain  Dispense: 15 tablet; Refill: 0    2. Chronic right shoulder pain  Concerning for rotator cuff pathology  - MR Shoulder Right w/o Contrast; Future    3. Generalized joint pain  - CRP, inflammation; Future  - Anti Nuclear Angelica IgG by IFA with Reflex; Future  - ESR: Erythrocyte sedimentation rate; Future  - ESR: Erythrocyte sedimentation rate  - Anti Nuclear Angelica IgG by IFA with Reflex  - CRP, inflammation    4. Essential hypertension  Not well controlled.  Would like to increase lisinopril to 20 mg daily.   - lisinopril (ZESTRIL) 20 MG tablet; Take 1 tablet (20 mg) by mouth daily  Dispense: 90 tablet; Refill: 3    6. Chronic, continuous use of opioids  CSA signed.       Zhang Luciano is a 61 year old, presenting for the following health issues:  Pain and Results (Colonoscopy)      History of Present Illness       Reason for visit:  Pelvic floor issues    He eats 0-1 servings of fruits and vegetables daily.He consumes 1 sweetened beverage(s) daily.He exercises with enough effort to increase his heart rate 10 to 19 minutes per day.    He is taking medications regularly.    Today's PHQ-9         PHQ-9 Total Score: 2    PHQ-9 Q9 Thoughts of better off dead/self-harm past 2 weeks :   Not at all    How difficult have these problems made it for you to do your work, take care of things at home, or get along with other people: Not difficult at all  Today's CHRISSY-7 Score: 2       Pain History:  When did you first notice your pain? - Chronic Pain   Have you seen this provider for your pain in the past?   Yes   Where in your body do you have pain? Pelvic pain  Are you seeing anyone else for your pain? Yes - Lizet Faye - Message Therapist    PHQ-9 SCORE 8/5/2022 8/19/2022 9/8/2022   PHQ-9 Total Score MyChart 7 (Mild  depression) 13 (Moderate depression) 2 (Minimal depression)   PHQ-9 Total Score 7 13 2       CHRISSY-7 SCORE 1/4/2022 8/5/2022 9/8/2022   Total Score 3 (minimal anxiety) 4 (minimal anxiety) 2 (minimal anxiety)   Total Score 3 4 2           PEG Score 6/29/2022 8/5/2022 9/8/2022   PEG Total Score 10 10 9.33     PDMP Review       Value Time User    State PDMP site checked  Yes 7/3/2022 10:43 AM Jose Pérez MD        Last CSA Agreement:   CSA -- Patient Level:    CSA: None found at the patient level.       Last UDS:           Discuss colonoscopy results - had done 8/31/2022    Pain History:  When did you first notice your pain? - Chronic Pain   Have you seen this provider for your pain in the past?   No   Where in your body do your have pain? Right shoulder  Are you seeing anyone else for your pain? No  What makes your pain better? Nothing  What makes your pain worse? Lifting, overuse  How has pain affected your ability to work? Not currently working - unrelated to pain  Who lives in your household? Self    PHQ-9 SCORE 8/5/2022 8/19/2022 9/8/2022   PHQ-9 Total Score MyChart 7 (Mild depression) 13 (Moderate depression) 2 (Minimal depression)   PHQ-9 Total Score 7 13 2       CHRISSY-7 SCORE 1/4/2022 8/5/2022 9/8/2022   Total Score 3 (minimal anxiety) 4 (minimal anxiety) 2 (minimal anxiety)   Total Score 3 4 2       PEG Score 6/29/2022 8/5/2022 9/8/2022   PEG Total Score 10 10 9.33       1. Pelvic floor dysfunction: Patient is currently seen PT in which patient feels like it is helping.  It is expensive.  Patient was seen by urology and recommended to have SNS.     2. Right shoulder pain: Hurting with certain ROM.  Ongoing for the past 4-5 months.  Patient is interested in MRI.     Review of Systems   Constitutional: Negative for chills and fever.   HENT: Negative for congestion, ear pain, hearing loss and sore throat.    Respiratory: Negative for cough and shortness of breath.    Cardiovascular: Negative for chest  pain, palpitations and peripheral edema.   Genitourinary:        States of pelvic pain   Musculoskeletal: Positive for arthralgias (right shoulder and generalized joint pain). Negative for joint swelling and myalgias.   Skin: Negative for rash.   Neurological: Negative for dizziness, weakness, headaches and paresthesias.   Psychiatric/Behavioral: Negative for mood changes. The patient is not nervous/anxious.           Objective    BP (!) 173/95 (BP Location: Left arm, Patient Position: Sitting, Cuff Size: Adult Large)   Pulse 71   Temp 97.7  F (36.5  C) (Tympanic)   Resp 20   Wt 89.1 kg (196 lb 6.4 oz)   SpO2 98%   BMI 32.68 kg/m    Body mass index is 32.68 kg/m .  Physical Exam  Constitutional:       General: He is not in acute distress.     Appearance: He is well-developed.   HENT:      Head: Normocephalic and atraumatic.      Nose: Nose normal.   Eyes:      Conjunctiva/sclera: Conjunctivae normal.   Neck:      Trachea: No tracheal deviation.   Cardiovascular:      Rate and Rhythm: Normal rate and regular rhythm.      Heart sounds: Normal heart sounds.   Pulmonary:      Effort: Pulmonary effort is normal.      Breath sounds: No wheezing.   Musculoskeletal:         General: Normal range of motion.      Cervical back: Normal range of motion.      Comments: Normal symmetry, good ROM in regards to flexion, external rotation (arms at side), external rotation (arms abducted at 90 deg), and internal rotation, negative Neer impingement sign, positive Landa, positive aprehension Apprehension sign     Skin:     Findings: No erythema or rash.   Neurological:      Mental Status: He is alert and oriented to person, place, and time.   Psychiatric:         Behavior: Behavior normal.       8/12/22  Impression:  1. Progression right hip osteoarthrosis with subchondral edema.  2. Suspect right anterosuperior acetabular labral tearing

## 2022-09-08 NOTE — LETTER
Opioid / Opioid Plus Controlled Substance Agreement    This is an agreement between you and your provider about the safe and appropriate use of controlled substance/opioids prescribed by your care team. Controlled substances are medicines that can cause physical and mental dependence (abuse).    There are strict laws about having and using these medicines. We here at [facility] are committing to working with you in your efforts to get better. To support you in this work, we ll help you schedule regular office appointments for medicine refills. If we must cancel or change your appointment for any reason, we ll make sure you have enough medicine to last until your next appointment.     As a Provider, I will:    Listen carefully to your concerns and treat you with respect.     Recommend a treatment plan that I believe is in your best interest. This plan may involve therapies other than opioid pain medication.     Talk with you often about the possible benefits, and the risk of harm of any medicine that we prescribe for you.     Provide a plan on how to taper (discontinue or go off) using this medicine if the decision is made to stop its use.    As a Patient, I understand that opioid(s):     Are a controlled substance prescribed by my care team to help me function or work and manage my condition(s).     Are strong medicines and can cause serious side effects such as:    Drowsiness, which can seriously affect my driving ability    A lower breathing rate, enough to cause death    Harm to my thinking ability     Depression     Abuse of and addiction to this medicine    Need to be taken exactly as prescribed. Combining opioids with certain medicines or chemicals (such as illegal drugs, sedatives, sleeping pills, and benzodiazepines) can be dangerous or even fatal. If I stop opioids suddenly, I may have severe withdrawal symptoms.    Do not work for all types of pain nor for all patients. If they re not helpful, I may  be asked to stop them.        The risks, benefits and side effects of these medicine(s) were explained to me. I agree that:  1. I will take part in other treatments as advised by my care team. This may be psychiatry or counseling, physical therapy, behavioral therapy, group treatment or a referral to a specialist.     2. I will keep all my appointments. I understand that this is part of the monitoring of opioids. My care team may require an office visit for EVERY opioid/controlled substance refill. If I miss appointments or don t follow instructions, my care team may stop my medicine.    3. I will take my medicines as prescribed. I will not change the dose or schedule unless my care team tells me to. There will be no refills if I run out early.     4. I may be asked to come to the clinic and complete a urine drug test or complete a pill count at any time. If I don t give a urine sample or participate in a pill count, the care team may stop my medicine.    5. I will only receive prescriptions from this clinic for chronic pain. If I am treated by another provider for acute pain issues, I will tell them that I am taking opioid pain medication for chronic pain and that I have a treatment agreement with this provider. I will inform my [facility] care team within one business day if I am given a prescription for any pain medication by another healthcare provider. My [facility] care team can contact other providers and pharmacists about my use of any medicines.    6. It is up to me to make sure that I don t run out of my medicines on weekends or holidays. If my care team is willing to refill my opioid prescription without a visit, I must request refills only during office hours. Refills may take up to 3 business days to process. I will use one pharmacy to fill all my opioid and other controlled substance prescriptions. I will notify the clinic about any changes to my insurance or medication  availability.    7. I am responsible for my prescriptions. If the medicine/prescription is lost, stolen or destroyed, it will not be replaced. I also agree not to share controlled substance medicines with anyone.    8. I am aware I should not use any illegal or recreational drugs. I agree not to drink alcohol unless my care team says I can.       9. If I enroll in the [place] Medical Cannabis program, I will tell my care team prior to my next refill.     10. I will tell my care team right away if I become pregnant, have a new medical problem treated outside of my regular clinic, or have a change in my medications.    11. I understand that this medicine can affect my thinking, judgment and reaction time. Alcohol and drugs affect the brain and body, which can affect the safety of my driving. Being under the influence of alcohol or drugs can affect my decision-making, behaviors, personal safety, and the safety of others. Driving while impaired (DWI) can occur if a person is driving, operating, or in physical control of a car, motorcycle, boat, snowmobile, ATV, motorbike, off-road vehicle, or any other motor vehicle ([place] Statute 169A.20). I understand the risk if I choose to drive or operate any vehicle or machinery.    I understand that if I do not follow any of the conditions above, my prescriptions or treatment may be stopped or changed.          Opioids  What You Need to Know    What are opioids?   Opioids are pain medicines that must be prescribed by a doctor. They are also known as narcotics.     Examples are:   1. morphine (MS Contin, Robyn)  2. oxycodone (Oxycontin)  3. oxycodone and acetaminophen (Percocet)  4. hydrocodone and acetaminophen (Vicodin, Norco)   5. fentanyl patch (Duragesic)   6. hydromorphone (Dilaudid)   7. methadone  8. codeine (Tylenol #3)     What do opioids do well?   Opioids are best for severe short-term pain such as after a surgery or injury. They may work well for cancer pain. They may  help some people with long-lasting (chronic) pain.     What do opioids NOT do well?   Opioids never get rid of pain entirely, and they don t work well for most patients with chronic pain. Opioids don t reduce swelling, one of the causes of pain.                                    Other ways to manage chronic pain and improve function include:       Treat the health problem that may be causing pain    Anti-inflammation medicines, which reduce swelling and tenderness, such as ibuprofen (Advil, Motrin) or naproxen (Aleve)    Acetaminophen (Tylenol)    Antidepressants and anti-seizure medicines, especially for nerve pain    Topical treatments such as patches or creams    Injections or nerve blocks    Chiropractic or osteopathic treatment    Acupuncture, massage, deep breathing, meditation, visual imagery, aromatherapy    Use heat or ice at the pain site    Physical therapy     Exercise    Stop smoking    Take part in therapy       Risks and side effects     Talk to your doctor before you start or decide to keep taking opioids. Possible side effects include:      Lowering your breathing rate enough to cause death    Overdose, including death, especially if taking higher than prescribed doses    Worse depression symptoms; less pleasure in things you usually enjoy    Feeling tired or sluggish    Slower thoughts or cloudy thinking    Being more sensitive to pain over time; pain is harder to control    Trouble sleeping or restless sleep    Changes in hormone levels (for example, less testosterone)    Changes in sex drive or ability to have sex    Constipation    Unsafe driving    Itching and sweating    Dizziness    Nausea, throwing up and dry mouth    What else should I know about opioids?    Opioids may lead to dependence, tolerance, or addiction.      Dependence means that if you stop or reduce the medicine too quickly, you will have withdrawal symptoms. These include loose poop (diarrhea), jitters, flu-like symptoms,  nervousness and tremors. Dependence is not the same as addiction.                       Tolerance means needing higher doses over time to get the same effect. This may increase the chance of serious side effects.      Addiction is when people improperly use a substance that harms their body, their mind or their relations with others. Use of opiates can cause a relapse of addiction if you have a history of drug or alcohol abuse.      People who have used opioids for a long time may have a lower quality of life, worse depression, higher levels of pain and more visits to doctors.    You can overdose on opioids. Take these steps to lower your risk of overdose:    1. Recognize the signs:  Signs of overdose include decrease or loss of consciousness (blackout), slowed breathing, trouble waking up and blue lips. If someone is worried about overdose, they should call 911.    2. Talk to your doctor about Narcan (naloxone).   If you are at risk for overdose, you may be given a prescription for Narcan. This medicine very quickly reverses the effects of opioids.   If you overdose, a friend or family member can give you Narcan while waiting for the ambulance. They need to know the signs of overdose and how to give Narcan.     3. Don't use alcohol or street drugs.   Taking them with opioids can cause death.    4. Do not take any of these medicines unless your doctor says it s OK. Taking these with opioids can cause death:    Benzodiazepines, such as lorazepam (Ativan), alprazolam (Xanax) or diazepam (Valium)    Muscle relaxers, such as cyclobenzaprine (Flexeril)    Sleeping pills like zolpidem (Ambien)     Other opioids      How to keep you and other people safe while taking opioids:    1. Never share your opioids with others.  Opioid medicines are regulated by the Drug Enforcement Agency (ALISSA). Selling or sharing medications is a criminal act.    2. Be sure to store opioids in a secure place, locked up if possible. Young children  can easily swallow them and overdose.    3. When you are traveling with your medicines, keep them in the original bottles. If you use a pill box, be sure you also carry a copy of your medicine list from your clinic or pharmacy.    4. Safe disposal of opioids    Most pharmacies have places to get rid of medicine, called disposal kiosks. Medicine disposal options are also available in every [facility]. Search your county and  medication disposal  to find more options. You can find more details at:  [email or web address]     I agree that my provider, clinic care team, and pharmacy may work with any city, state or federal law enforcement agency that investigates the misuse, sale, or other diversion of my controlled medicine. I will allow my provider to discuss my care with, or share a copy of, this agreement with any other treating provider, pharmacy or emergency room where I receive care.    I have read this agreement and have asked questions about anything I did not understand.    _______________________________________________________  Patient Signature - Mustapha Negrete _____________________                   Date     _______________________________________________________  Provider Signature - Silver Pro DO   _____________________                   Date     _______________________________________________________  Witness Signature (required if provider not present while patient signing)   _____________________                   Date

## 2022-09-09 LAB — ANA SER QL IF: NEGATIVE

## 2022-09-10 ENCOUNTER — LAB (OUTPATIENT)
Dept: LAB | Facility: CLINIC | Age: 62
End: 2022-09-10
Attending: STUDENT IN AN ORGANIZED HEALTH CARE EDUCATION/TRAINING PROGRAM
Payer: COMMERCIAL

## 2022-09-10 DIAGNOSIS — Z01.818 PREOP GENERAL PHYSICAL EXAM: ICD-10-CM

## 2022-09-10 PROCEDURE — U0003 INFECTIOUS AGENT DETECTION BY NUCLEIC ACID (DNA OR RNA); SEVERE ACUTE RESPIRATORY SYNDROME CORONAVIRUS 2 (SARS-COV-2) (CORONAVIRUS DISEASE [COVID-19]), AMPLIFIED PROBE TECHNIQUE, MAKING USE OF HIGH THROUGHPUT TECHNOLOGIES AS DESCRIBED BY CMS-2020-01-R: HCPCS

## 2022-09-10 PROCEDURE — U0005 INFEC AGEN DETEC AMPLI PROBE: HCPCS

## 2022-09-11 LAB — SARS-COV-2 RNA RESP QL NAA+PROBE: NEGATIVE

## 2022-09-16 ENCOUNTER — ANCILLARY PROCEDURE (OUTPATIENT)
Dept: MRI IMAGING | Facility: CLINIC | Age: 62
End: 2022-09-16
Attending: FAMILY MEDICINE
Payer: COMMERCIAL

## 2022-09-16 DIAGNOSIS — G89.29 CHRONIC RIGHT SHOULDER PAIN: ICD-10-CM

## 2022-09-16 DIAGNOSIS — M25.511 CHRONIC RIGHT SHOULDER PAIN: ICD-10-CM

## 2022-09-16 PROCEDURE — 73221 MRI JOINT UPR EXTREM W/O DYE: CPT | Mod: RT | Performed by: RADIOLOGY

## 2022-09-19 DIAGNOSIS — M75.101 TEAR OF RIGHT SUPRASPINATUS TENDON: Primary | ICD-10-CM

## 2022-09-20 ENCOUNTER — TELEPHONE (OUTPATIENT)
Dept: UROLOGY | Facility: CLINIC | Age: 62
End: 2022-09-20

## 2022-09-20 ENCOUNTER — TELEPHONE (OUTPATIENT)
Dept: FAMILY MEDICINE | Facility: CLINIC | Age: 62
End: 2022-09-20

## 2022-09-20 PROBLEM — R33.9 URINARY RETENTION: Status: ACTIVE | Noted: 2021-08-16

## 2022-09-20 NOTE — TELEPHONE ENCOUNTER
Call center called with pt on the line. Pt asking for a callback about surgery. Writer sending message to Razia.

## 2022-09-20 NOTE — TELEPHONE ENCOUNTER
Forms received from:  Physical Therapy   Phone number listed: [phone]   Fax listed: [phone]  Date received: 9/14/22  Form description: Referral form eval & treat/pelvic rehab  Once forms are completed, please return to Physical Therapy via fax [phone].  Is patient requesting to be contacted when forms are completed: na  Phone: na  Form placed:  To Dr. Inocencia Cheng

## 2022-09-20 NOTE — ANESTHESIA POSTPROCEDURE EVALUATION
Patient: Mustapha Negrete    Procedure: Procedure(s):  COLONOSCOPY       Anesthesia Type:  MAC    Note:  Disposition: Outpatient   Postop Pain Control: Uneventful            Sign Out: Well controlled pain   PONV: No   Neuro/Psych: Uneventful            Sign Out: Acceptable/Baseline neuro status   Airway/Respiratory: Uneventful            Sign Out: Acceptable/Baseline resp. status   CV/Hemodynamics: Uneventful            Sign Out: Acceptable CV status; No obvious hypovolemia; No obvious fluid overload   Other NRE: NONE   DID A NON-ROUTINE EVENT OCCUR? No           Last vitals:  Vitals Value Taken Time   /92 09/08/22 1107   Temp 36.5  C (97.7  F) 09/08/22 1040   Pulse 71 09/08/22 1040   Resp 20 09/08/22 1040   SpO2 98 % 09/08/22 1040       Electronically Signed By: Gatito Gonzáles MD  September 20, 2022  2:16 PM

## 2022-09-20 NOTE — TELEPHONE ENCOUNTER
Patient is scheduled for surgery with Dr. Tello     Spoke with: Patient via phone     Date of Surgery: Monday October 17, 2022     Location: [facility] OR      Informed patient they will need an adult : Yes     Pre-op: Yes      H&P: Patient to schedule with PCP      Pre-procedure COVID-19 Test: Home test     Post-op: Monday October 24, 2022 and Wednesday October 26, 2022    Additional imaging/appointments:     Additional comments:      Surgery packet: Sent via mail 9/20/22    Patient is aware that surgery time is tentative to change and to expect a call 3-1 business days from Pre Admission Nursing for instructions and arrival time

## 2022-09-26 ENCOUNTER — TELEPHONE (OUTPATIENT)
Dept: FAMILY MEDICINE | Facility: CLINIC | Age: 62
End: 2022-09-26

## 2022-09-26 NOTE — TELEPHONE ENCOUNTER
Forms received from:  [facility]  Phone number listed: [phone]   Fax listed: [phone]  Date received: 9/22/22  Form description: Referral form  Once forms are completed, please return to [facility] via fax [phone].  Is patient requesting to be contacted when forms are completed: na  Phone: na  Form placed:  To Dr. Inocencia Cheng

## 2022-09-30 ENCOUNTER — MEDICAL CORRESPONDENCE (OUTPATIENT)
Dept: FAMILY MEDICINE | Facility: CLINIC | Age: 62
End: 2022-09-30

## 2022-10-03 ENCOUNTER — PATIENT OUTREACH (OUTPATIENT)
Dept: CARE COORDINATION | Facility: CLINIC | Age: 62
End: 2022-10-03

## 2022-10-03 NOTE — PROGRESS NOTES
Clinic Care Coordination Contact  [facility]/Voicemail       Clinical Data: Care Coordinator Outreach  Outreach attempted x 1.  Left message on patient's voicemail with call back information and requested return call.  Plan: Care Coordinator sent care coordination introduction letter on 3/18/22 via "[employer] Care Coordinator will try to reach patient again in 3-5 business days.    Sebastian Jimenez MSN, RN, PHN, CCM   Primary Care Clinical RN Care Coordinator  [facility]  10/3/2022   12:38 PM  [email or web address]  Office: [phone]

## 2022-10-06 NOTE — PROGRESS NOTES
1. Pelvic floor dysfunction  Advised to keep upcoming for SNS placement.  If no improvement, consider orthopedic referral.   - oxyCODONE (ROXICODONE) 5 MG tablet; Take 1 tablet (5 mg) by mouth daily as needed for severe pain  Dispense: 30 tablet; Refill: 0    2. High priority for 2019-nCoV vaccine      Zhang Luciano is a 61 year old, presenting for the following health issues:  Hypertension (Follow up /), Health Maintenance (Declines flu and covid vaccines), and Rectal Problem (Burning and pain - recheck)      History of Present Illness       Reason for visit:  Burning urinating nerve pain    He eats 0-1 servings of fruits and vegetables daily.He consumes 1 sweetened beverage(s) daily.He exercises with enough effort to increase his heart rate 10 to 19 minutes per day.  He exercises with enough effort to increase his heart rate 3 or less days per week.   He is taking medications regularly.  Today's CHRISSY-7 Score: 0       Hypertension Follow-up      Do you check your blood pressure regularly outside of the clinic? Yes     Are you following a low salt diet? No    Are your blood pressures ever more than 140 on the top number (systolic) OR more   than 90 on the bottom number (diastolic), for example 140/90? Yes      1. Pelvic discomfort: Patient is scheduled for upcoming sacral nerve stimulator.  This has been a chronic condition.  Patient does take oxycodone as needed for the pain.  Patient has been seen by pain management and had bilateral pudendal nerve block injections.     Review of Systems   Constitutional: Negative for chills and fever.   HENT: Negative for congestion, ear pain, hearing loss and sore throat.    Respiratory: Negative for cough and shortness of breath.    Cardiovascular: Negative for chest pain, palpitations and peripheral edema.   Genitourinary:        Pelvic and perineal pain   Musculoskeletal: Negative for arthralgias, joint swelling and myalgias.   Skin: Negative for rash.   Neurological:  Negative for dizziness, weakness, headaches and paresthesias.   Psychiatric/Behavioral: Negative for mood changes. The patient is not nervous/anxious.           Objective    BP (!) 148/80   Pulse 76   Temp 98.4  F (36.9  C) (Tympanic)   Resp 20   Wt 89.6 kg (197 lb 9.6 oz)   SpO2 98%   BMI 32.88 kg/m    Body mass index is 32.88 kg/m .  Physical Exam  Constitutional:       General: He is not in acute distress.     Appearance: He is well-developed and well-nourished.   HENT:      Head: Normocephalic and atraumatic.      Nose: Nose normal.   Eyes:      Extraocular Movements: EOM normal.      Conjunctiva/sclera: Conjunctivae normal.   Neck:      Trachea: No tracheal deviation.   Cardiovascular:      Rate and Rhythm: Normal rate and regular rhythm.      Heart sounds: Normal heart sounds.   Pulmonary:      Effort: Pulmonary effort is normal.      Breath sounds: No wheezing.   Musculoskeletal:         General: Normal range of motion.      Cervical back: Normal range of motion.   Skin:     Findings: No erythema or rash.   Neurological:      Mental Status: He is alert and oriented to person, place, and time.   Psychiatric:         Mood and Affect: Mood and affect normal.         Behavior: Behavior normal.        8/12/22  Impression:  1. Progression right hip osteoarthrosis with subchondral edema.  2. Suspect right anterosuperior acetabular labral tearing

## 2022-10-07 ENCOUNTER — OFFICE VISIT (OUTPATIENT)
Dept: FAMILY MEDICINE | Facility: CLINIC | Age: 62
End: 2022-10-07
Payer: COMMERCIAL

## 2022-10-07 VITALS
RESPIRATION RATE: 20 BRPM | HEART RATE: 76 BPM | OXYGEN SATURATION: 98 % | DIASTOLIC BLOOD PRESSURE: 80 MMHG | BODY MASS INDEX: 32.88 KG/M2 | WEIGHT: 197.6 LBS | SYSTOLIC BLOOD PRESSURE: 148 MMHG | TEMPERATURE: 98.4 F

## 2022-10-07 DIAGNOSIS — M62.89 PELVIC FLOOR DYSFUNCTION: Primary | ICD-10-CM

## 2022-10-07 DIAGNOSIS — Z23 HIGH PRIORITY FOR 2019-NCOV VACCINE: ICD-10-CM

## 2022-10-07 PROCEDURE — 99213 OFFICE O/P EST LOW 20 MIN: CPT | Performed by: FAMILY MEDICINE

## 2022-10-07 RX ORDER — OXYCODONE HYDROCHLORIDE 5 MG/1
5 TABLET ORAL DAILY PRN
Qty: 30 TABLET | Refills: 0 | Status: SHIPPED | OUTPATIENT
Start: 2022-10-07 | End: 2022-11-28

## 2022-10-07 ASSESSMENT — ANXIETY QUESTIONNAIRES
2. NOT BEING ABLE TO STOP OR CONTROL WORRYING: NOT AT ALL
7. FEELING AFRAID AS IF SOMETHING AWFUL MIGHT HAPPEN: NOT AT ALL
6. BECOMING EASILY ANNOYED OR IRRITABLE: NOT AT ALL
7. FEELING AFRAID AS IF SOMETHING AWFUL MIGHT HAPPEN: NOT AT ALL
4. TROUBLE RELAXING: NOT AT ALL
GAD7 TOTAL SCORE: 0
3. WORRYING TOO MUCH ABOUT DIFFERENT THINGS: NOT AT ALL
GAD7 TOTAL SCORE: 0
5. BEING SO RESTLESS THAT IT IS HARD TO SIT STILL: NOT AT ALL
GAD7 TOTAL SCORE: 0
1. FEELING NERVOUS, ANXIOUS, OR ON EDGE: NOT AT ALL

## 2022-10-07 ASSESSMENT — PAIN SCALES - GENERAL: PAINLEVEL: WORST PAIN (10)

## 2022-10-07 NOTE — PATIENT INSTRUCTIONS
Claudy Luciano,    Thank you for allowing [facility] to manage your care.    I sent your prescriptions to your pharmacy.    If you have any questions or concerns, please feel free to call us at [phone].    Sincerely,    Dr. Pro    Did you know?      You can schedule a video visit for follow-up appointments as well as future appointments for certain conditions.  Please see the below link.     [email or web address]    If you have not already done so,  I encourage you to sign up for [employer] ([email or web address]).  This will allow you to review your results, securely communicate with a provider, and schedule virtual visits as well.

## 2022-10-10 ENCOUNTER — PATIENT OUTREACH (OUTPATIENT)
Dept: CARE COORDINATION | Facility: CLINIC | Age: 62
End: 2022-10-10

## 2022-10-10 ASSESSMENT — ENCOUNTER SYMPTOMS
WEAKNESS: 0
PALPITATIONS: 0
FEVER: 0
SORE THROAT: 0
JOINT SWELLING: 0
CHILLS: 0
PARESTHESIAS: 0
MYALGIAS: 0
DIZZINESS: 0
ARTHRALGIAS: 0
NERVOUS/ANXIOUS: 0
SHORTNESS OF BREATH: 0
COUGH: 0
HEADACHES: 0

## 2022-10-10 NOTE — PROGRESS NOTES
Clinic Care Coordination Contact  [facility]/Voicemail       Clinical Data: Care Coordinator Outreach  Outreach attempted x 3.  Left message on patient's voicemail with call back information and requested return call.  Plan: Care Coordinator will send unable to contact letter with care coordinator contact information via KIYAT[employer]. Care Coordinator will try to reach patient again in 1 month.    Sebastian Jimenez MSN, RN, PHN, CCM   Primary Care Clinical RN Care Coordinator  [facility]  10/10/2022   3:18 PM  [email or web address]  Office: [phone]

## 2022-10-10 NOTE — LETTER
[facility] CARE COORDINATION  [address]  [place] [address]    October 10, 2022    Mustapha Negrete  [address] [address] [place] [address]      Dear Mustapha,    I have been attempting to reach you since our last contact. I would like to continue to work with you and provide any additional support you may need on achieving your health care related goals. I would appreciate if you would give me a call at [phone] to let me know if you would like to continue working together. I know that there are many things that can affect our ability to communicate and I hope we can continue to work together.    All of us at the [facility] are invested in your health and are here to assist you in meeting your goals.     Sincerely,    Sebastian Jimenez MSN, RN, PHN, [facility]   Primary Care Clinical RN Care Coordinator  [facility]  10/10/2022   3:17 PM  [email or web address]  Office: [phone]

## 2022-10-10 NOTE — LETTER
[facility]  Patient Centered Plan of Care  About Me:        Patient Name:  Mustapha Negrete    YOB: 1960  Age:         61 year old   Rustam MRN:    [id number] Telephone Information:  Home Phone [phone]   Mobile [phone]       Address:  [address] [place] [address] [place] [address] Email address:  [email or web address]"      Emergency Contact(s)    Name Relationship Lgl Grd Work Phone Home Phone Mobile Phone   STEFANO AYOUB Son   [phone]           Primary language:  English     needed? No   [place] [facility]:  [phone] op. 1  Other communication barriers:None    Preferred Method of Communication:     Current living arrangement: I live in a private home (Patient stated he lives with his adult son)    Mobility Status/ Medical Equipment: Independent        Health Maintenance  Health Maintenance Reviewed: Due/Overdue   Health Maintenance Due   Topic Date Due     YEARLY PREVENTIVE VISIT  Never done     HEPATITIS B IMMUNIZATION (1 of 3 - 3-dose series) Never done     INFLUENZA VACCINE (1) Never done           My Access Plan  Medical Emergency 911   Primary Clinic Line [facility] [phone]   24 Hour Appointment Line [phone] or  [phone]) (toll-free)   24 Hour Nurse Line [phone] (toll-free)   Preferred Urgent Care [facility], [phone]     Preferred Hospital [place], [place]  [phone]     Preferred Pharmacy [facility] [employer] [facility] [place] - [address]     Behavioral Health Crisis Line The National Suicide Prevention Lifeline at 1-923.421.2510 or Text/Call 458             My Care Team Members  Patient Care Team       Relationship Specialty Notifications Start End    Silver Pro DO PCP - General Family Medicine Admissions 7/6/22     Phone: [phone] Fax: [phone]         [address] [address] [place] [address]     JACOB Oneill MD Assigned Surgical Provider   9/26/21     Phone: [phone] Pager: [phone] Fax: [phone] [address] [address]    Viki Hanson DO Assigned PCP   9/30/21     Phone: [phone] Pager: [phone] Fax: [phone] [address] [facility] [address]    Matty Ruiz MD MD Neurological Surgery  10/18/21     Phone: [phone] Fax: [phone]         [address] [facility] [address]    Max Rico MD Referring Physician Family Medicine  10/18/21     Phone: [phone] Fax: [phone] [address] [place] [address]    Kristal Drummond MD Assigned Neuroscience Provider   10/31/21     Phone: [phone] Pager: [phone] Fax: [phone]        [address] [facility] [address] [facility] [address]    Sebastian Rock, RN Lead Care Coordinator Primary Care -  Admissions 11/2/21     Phone: [phone] Fax: [phone]                My Care Plans  Self Management and Treatment Plan  Care Plan  Care Plan: General urination without pain     Problem: HP GENERAL PROBLEM     Goal: General Goal - urinate on my own without difficulty.  Decrease bilateral buttocks and thigh pain .     Start Date: 11/2/2021 Expected End Date: 8/2/2023    This Visit's Progress: 30%    Note:     Goal Statement #1: I will be able to urinate on my own without difficulty .    I will decrease bilateral buttocks and thigh pain in the next 3-6  months   Barriers: constant pain   Strengths: Motivated   Patient expressed understanding of goal: Yes  Action steps to achieve this goal:  1. I will keep future Primary Care ( recently switched to the [facility] ),Urology, Pain Clinic appointments and will make a future Neurology appointment   2. I will drink plenty of water to keep my urine clear and yellow  3. I will take Tylenol/Ibuprofen and Gabapentin as directed   4. I will walk a block when able to increase strength   5. I will continue dry  needling 3 times a week  6. I will keep future appointments scheduled with [facility] specialist in [place] June 6, 8 and 10th                         Action Plans on File:   Asthma                   Advance Care Plans/Directives Type:   No data recorded    My Medical and Care Information  Problem List   Patient Active Problem List   Diagnosis     Acute prostatitis     Rectal pain     Penile pain     Perineal pain in male     Urinary retention     Symptomatic cholelithiasis     Reflux esophagitis     Psoriatic arthritis (H)     MARIAN (obstructive sleep apnea)     OA (osteoarthritis) of hip     Neuropathy     Mild intermittent asthma, uncomplicated     Insomnia, unspecified     GERD (gastroesophageal reflux disease)     Hepatic steatosis     Benign prostatic hyperplasia with urinary obstruction     Anal fissure     Tear of right acetabular labrum, sequela     Lumbar disc herniation with radiculopathy     Lower urinary tract symptoms     Arthritis of left acromioclavicular joint     ASHD (arteriosclerotic heart disease)     Calculus of gallbladder with chronic cholecystitis without obstruction     Change in bowel habits     Complete tear of left rotator cuff     Constipation     COVID-19 virus infection     Displacement of lumbar intervertebral disc without myelopathy     Diverticular disease of large intestine     Hiatal hernia     Hyperlipidemia     Low testosterone     Pruritus ani     Pelvic floor dysfunction     Pudendal neuralgia     Coccydynia     Chronic, continuous use of opioids      Current Medications and Allergies:  See printed Medication Report.    Care Coordination Start Date: 11/2/2021   Frequency of Care Coordination: monthly     Form Last Updated: 10/10/2022

## 2022-10-17 ENCOUNTER — HOSPITAL ENCOUNTER (OUTPATIENT)
Facility: AMBULATORY SURGERY CENTER | Age: 62
Discharge: HOME | End: 2022-10-17
Attending: UROLOGY | Admitting: UROLOGY
Payer: COMMERCIAL

## 2022-10-17 ENCOUNTER — ANCILLARY PROCEDURE (OUTPATIENT)
Dept: RADIOLOGY | Facility: AMBULATORY SURGERY CENTER | Age: 62
End: 2022-10-17
Attending: UROLOGY
Payer: COMMERCIAL

## 2022-10-17 VITALS
TEMPERATURE: 98.8 F | HEART RATE: 88 BPM | SYSTOLIC BLOOD PRESSURE: 139 MMHG | DIASTOLIC BLOOD PRESSURE: 89 MMHG | RESPIRATION RATE: 18 BRPM | OXYGEN SATURATION: 96 %

## 2022-10-17 DIAGNOSIS — R33.9 URINARY RETENTION: ICD-10-CM

## 2022-10-17 PROCEDURE — C1897 LEAD, NEUROSTIM TEST KIT: HCPCS

## 2022-10-17 PROCEDURE — 64561 IMPLANT NEUROELECTRODES: CPT | Mod: 50 | Performed by: UROLOGY

## 2022-10-17 PROCEDURE — 64561 IMPLANT NEUROELECTRODES: CPT | Mod: RT

## 2022-10-17 DEVICE — LEAD BASIC EVALUATION 306001: Type: IMPLANTABLE DEVICE | Site: BACK | Status: FUNCTIONAL

## 2022-10-17 DEVICE — KIT BASIC EVALUATION 309201: Type: IMPLANTABLE DEVICE | Site: BACK | Status: FUNCTIONAL

## 2022-10-17 RX ORDER — BUPIVACAINE HYDROCHLORIDE 2.5 MG/ML
INJECTION, SOLUTION EPIDURAL; INFILTRATION; INTRACAUDAL PRN
Status: DISCONTINUED | OUTPATIENT
Start: 2022-10-17 | End: 2022-10-17 | Stop reason: HOSPADM

## 2022-10-17 NOTE — DISCHARGE INSTRUCTIONS
"[facility]  Home Care Following Your Procedure    Call a doctor if you have signs of infection (fever, growing tenderness at the surgery site, a large amount of drainage or bleeding, severe pain, foul-smelling drainage, redness, swelling).  Today you received a Marcaine or bupivacaine block to numb the nerves near your surgery site.  This is a block using local anesthetic or \"numbing\" medication injected around the nerves to anesthetize or \"numb\" the area supplied by those nerves.  This block is injected into the muscle layer near your surgical site.  The medication may numb the location where you had surgery for 6-18 hours, but may last up to 24 hours.  If your surgical site is an arm or leg you should be careful with your affected limb, since it is possible to injure your limb without being aware of it due to the numbing.  Until full feeling returns, you should guard against bumping or hitting your limb, and avoid extreme hot or cold temperatures on the skin.  As the block wears off, the feeling will return as a tingling or prickly sensation near your surgical site.  You will experience more discomfort from your incision as the feeling returns.  You may want to take a pain pill (a narcotic or Tylenol if this was prescribed by your surgeon) when you start to experience mild pain before the pain becomes more severe.  If your pain medications do not control your pain you should notifiy your surgeon.    Tylenol/Acetaminophen Consumption  To help encourage the safe use of acetaminophen, the makers of TYLENOL  have lowered the maximum daily dose for single-ingredient Extra Strength TYLENOL  (acetaminophen) products sold in the [place] from 8 pills per day (4,000 mg) to 6 pills per day (3,000 mg). The dosing interval has also changed from 2 pills every 4-6 hours to 2 pills every 6 hours.  If you feel your pain relief is insufficient, you may take Tylenol/Acetaminophen in addition to " your narcotic pain medication.   Be careful not to exceed 3,000 mg of Tylenol/Acetaminophen in a 24 hour period from all sources.  If you are taking extra strength Tylenol/acetaminophen (500 mg), the maximum dose is 6 tablets in 24 hours.  If you are taking regular strength acetaminophen (325 mg), the maximum dose is 9 tablets in 24 hours.    Your doctor is:     Dr. Nayan Tello, Prostate and Urology: [phone]        After hours and weekends dial [phone] and ask for the resident on call for:  Prostate Urology  For emergency care, call the:  [place]:  [phone] (TTY for hearing impaired: [phone])

## 2022-10-17 NOTE — OP NOTE
Urology Operative Summary     Pre-operative diagnosis: Urinary hesitancy, pelvic floor dysfunction   Post-operative diagnosis: Same   Procedure: Percutaneous Neural Exam  Interpretation of fluoroscopic imaging      Surgeon: Nayan Tello MD   Assistant(s): Arian Ying MD      Anesthesia: Local anesthesia       Estimated blood loss: Less than 10 ml   Complications: None   Condition: Patient taken to recovery in stable condition.            Indication:  Mr. Mustapha Negrete is a 61 year old male with idiopathic urinary retention that is intermittent but some urinary hesitancy that persisted despite being s/p Urolift and no evidence of urinary obstruction.  He also has some pelvic floor dysfunction and has exhausted many therapies wihtout avail or relief.  After discussing further management options, the patient has elected to proceed with a percutaneous neural examination as a trial for a permanent interstim implant.    Procedure:  The patient was identified correctly, consented and placed in the prone position.  The patient's sacral area was prepped and draped in the usual sterile fashion. Using the fluoroscope in the AP position the medial aspects of the sacral foramena were identified and marked.  The fluoroscope was then placed in the lateral position and the S3 foramen was identified and marked.  Using marcaine with bicarb bilateral insertion sites were injected to anesthetize the skin.  Once this was achieved a 3 1/2 inch needle was inserted on the left side until it was passed through the S3 foramen as seen on fluoroscopy.      Next the needle was tested and the patient had a toe and johnathan response at about 0.2 mA and a sensory response at 0.2 mA in the anal area. The same was then done on the right and a johnathan response was seen at about 0.8 mA and sensory response at 0.8 mA in the perineum area. There was no toe response on the right side. The right side was higher and more medial in the foramen.    At  this point the stilette was removed from the insertion needle and the electrode was passed until the 3 1/2 inch janiya on both sides.  The needle was pulled out leaving the electrode in place.  These were secured down with steri-strips and a dressing was applied.    All appropriate wires were put in place and attached to the generator and the [employer] representative went over instructions with the patient.    PACU testing  L 0.5 mA sensation from within rectum  R 1.3 mA sensation from more outside of the rectum     Plan: Mr. Mustapha Negrete will f/u with nursing visit in ~ 1 week for lead removal and to go over results of the trial.    Arian Ying MD  Urology Resident    Patient was seen, evaluated and plan was formulated in conjunction with me and I agree with the above.  I was present for the entire procedure.  Nayan Tello MD

## 2022-10-19 ENCOUNTER — TELEPHONE (OUTPATIENT)
Dept: UROLOGY | Facility: CLINIC | Age: 62
End: 2022-10-19

## 2022-10-19 ENCOUNTER — PRE VISIT (OUTPATIENT)
Dept: UROLOGY | Facility: CLINIC | Age: 62
End: 2022-10-19

## 2022-10-19 NOTE — TELEPHONE ENCOUNTER
Reason for Visit: Post-op    Diagnosis: Urinary retention    Rooming Requirements: Shavon Turner  10/19/22  1:31 PM

## 2022-10-19 NOTE — TELEPHONE ENCOUNTER
[facility]    Phone Message    May a detailed message be left on voicemail: yes     Reason for Call: Other: Mustapha is calling stating that he was sent home with a bunch of gauze and that he thought he was supposed to be cleaning it and changing it and what not, but states that there are wires and is wondering if he should really be pulling on it and such. Please review and call back, thanks.     Action Taken: Other: csc uro    Travel Screening: Not Applicable

## 2022-10-19 NOTE — TELEPHONE ENCOUNTER
Not sure why the nurses are giving gauze to the patient. Notified the patient not to touch his incision. Patient verbalized understanding    Bev Arnold, RN, BSN  Care Coordinator Urology

## 2022-10-20 DIAGNOSIS — R33.9 URINARY RETENTION: ICD-10-CM

## 2022-10-20 RX ORDER — TERAZOSIN 1 MG/1
1 CAPSULE ORAL DAILY
Qty: 450 CAPSULE | Refills: 0 | Status: SHIPPED | OUTPATIENT
Start: 2022-10-20 | End: 2022-12-09

## 2022-10-20 NOTE — TELEPHONE ENCOUNTER
Medication prescribed by urology.  Patient currently being seen by urology.  Will send to urology to assist with med refill.

## 2022-10-20 NOTE — TELEPHONE ENCOUNTER
terazosin (HYTRIN) 1 MG capsule      Last Written Prescription Date:  4-11-22  Last Fill Quantity: 450,   # refills: 0  Last Office Visit : 9-7-22  Future Office visit:  10-26-22   t:0

## 2022-10-22 ENCOUNTER — HEALTH MAINTENANCE LETTER (OUTPATIENT)
Age: 62
End: 2022-10-22

## 2022-10-24 ENCOUNTER — ALLIED HEALTH/NURSE VISIT (OUTPATIENT)
Dept: UROLOGY | Facility: CLINIC | Age: 62
End: 2022-10-24
Payer: COMMERCIAL

## 2022-10-24 DIAGNOSIS — R33.9 URINARY RETENTION: Primary | ICD-10-CM

## 2022-10-24 PROCEDURE — 99024 POSTOP FOLLOW-UP VISIT: CPT

## 2022-10-24 NOTE — PROGRESS NOTES
Chief Complaint   Patient presents with     Allied Health Visit     PNE lead removal       Patient Active Problem List   Diagnosis     Acute prostatitis     Rectal pain     Penile pain     Perineal pain in male     Urinary retention     Symptomatic cholelithiasis     Reflux esophagitis     Psoriatic arthritis (H)     MARIAN (obstructive sleep apnea)     OA (osteoarthritis) of hip     Neuropathy     Mild intermittent asthma, uncomplicated     Insomnia, unspecified     GERD (gastroesophageal reflux disease)     Hepatic steatosis     Benign prostatic hyperplasia with urinary obstruction     Anal fissure     Tear of right acetabular labrum, sequela     Lumbar disc herniation with radiculopathy     Lower urinary tract symptoms     Arthritis of left acromioclavicular joint     ASHD (arteriosclerotic heart disease)     Calculus of gallbladder with chronic cholecystitis without obstruction     Change in bowel habits     Complete tear of left rotator cuff     Constipation     COVID-19 virus infection     Displacement of lumbar intervertebral disc without myelopathy     Diverticular disease of large intestine     Hiatal hernia     Hyperlipidemia     Low testosterone     Pruritus ani     Pelvic floor dysfunction     Pudendal neuralgia     Coccydynia     Chronic, continuous use of opioids       Allergies   Allergen Reactions     Droperidol Other (See Comments)     Extrapyramidal Side Effect     Fenofibrate Headache and Diarrhea              Fentanyl      Other reaction(s): N&V hard to wake up     Keflex [Cephalexin] Itching     Lactose GI Disturbance         Other reaction(s): GI upset     Midazolam      Other reaction(s): N&V, Hard to wake up     Monosodium Glutamate      Other reaction(s): diarrhea, headaches     Pcn [Penicillins] Other (See Comments)     Feels warm and flushed, but tolerates Cephalexin     Atorvastatin Palpitations     Other reaction(s): palpitations     Sertraline Palpitations         Other reaction(s):  Unknown     Simvastatin Palpitations     Other reaction(s): palpitations     Sulfa Drugs Headache and Rash     Burning    Other reaction(s): Rash  Other reaction(s): rash and headache     Tetracycline Rash     Burning    Other reaction(s): rash       Current Outpatient Medications   Medication Sig Dispense Refill     albuterol (PROAIR HFA/PROVENTIL HFA/VENTOLIN HFA) 108 (90 Base) MCG/ACT inhaler Inhale 2 puffs into the lungs every 6 hours       gabapentin (NEURONTIN) 300 MG capsule Take 1 capsule (300 mg) by mouth 2 times daily 60 capsule 11     lisinopril (ZESTRIL) 20 MG tablet Take 1 tablet (20 mg) by mouth daily 90 tablet 3     oxyCODONE (ROXICODONE) 5 MG tablet Take 1 tablet (5 mg) by mouth daily as needed for severe pain 30 tablet 0     terazosin (HYTRIN) 1 MG capsule Take 1 capsule (1 mg) by mouth daily Take up to 5 mg daily. 450 capsule 0       Social History     Tobacco Use     Smoking status: Former     Packs/day: 1.00     Years: 5.00     Pack years: 5.00     Types: Cigarettes     Quit date: 1999     Years since quittin.9     Smokeless tobacco: Never   Vaping Use     Vaping Use: Never used   Substance Use Topics     Alcohol use: Yes     Comment: very little     Drug use: Yes     Types: Marijuana       Mustapha Negrete comes into clinic today at the request of Dr. Nayan eTllo for lead removal.    Patient diagnosis: Urinary retention     Mustapha Negrete presents to clinic today for removal of PNE leads. Patient stated that the device never worked. Leads were removed without difficulty. Lead site showed no signs of infection. Patient to follow up with Dr. Tello.     This service provided today was under the direct supervision of Dr. Ag, who was available if needed.     Dionicio Fontenot EMT  10/24/2022  11:31 AM

## 2022-10-26 ENCOUNTER — VIRTUAL VISIT (OUTPATIENT)
Dept: UROLOGY | Facility: CLINIC | Age: 62
End: 2022-10-26
Payer: COMMERCIAL

## 2022-10-26 DIAGNOSIS — R33.9 URINARY RETENTION: Primary | ICD-10-CM

## 2022-10-26 DIAGNOSIS — R10.2 PELVIC PAIN: ICD-10-CM

## 2022-10-26 PROCEDURE — 99024 POSTOP FOLLOW-UP VISIT: CPT | Mod: 95 | Performed by: UROLOGY

## 2022-10-26 ASSESSMENT — PATIENT HEALTH QUESTIONNAIRE - PHQ9
SUM OF ALL RESPONSES TO PHQ QUESTIONS 1-9: 4
10. IF YOU CHECKED OFF ANY PROBLEMS, HOW DIFFICULT HAVE THESE PROBLEMS MADE IT FOR YOU TO DO YOUR WORK, TAKE CARE OF THINGS AT HOME, OR GET ALONG WITH OTHER PEOPLE: EXTREMELY DIFFICULT
SUM OF ALL RESPONSES TO PHQ QUESTIONS 1-9: 4

## 2022-10-26 NOTE — PROGRESS NOTES
Reason for visit:  F/u on urinary symptoms  And PNE 10/17/22    Clinical Data.  Mr. Negrete is a 61 year old male with pelvic floor dysfunction and urinary retention.  This all started in middle of May of 2021 he had a cholecystectomy and tehn had Singles in the summer.  He then went into retention and required a catheter for 4 months.  He is able to void a little better but continues to have difficulty voiding.  He also has some pelvic discomfort.  He uses gabapentin.  He also continues to go to PFPT.  He has tried amitriptyline and carbamazepine.     He was started on rectal valium but he didn't tolerate at all.  He also had some sacral adjustment which he also doesn't feel like it helped.    UDS testing from 8/8/22 with the patient:    -Normal bladder capacity (total volume voided = 375 mL).  -UDCs x3 throughout the study, reaching ~55 cm H20. He leaks with the second two contractions. DLPP 40 cm H2O.   -Pdet pressure returns to baseline between episodes of DO.   -With permission to void, he appears to attempt entirely through Valsalva effort. During his second attempt from a standing position, he continues to Valsalva void, with an apparent weak rise in detrusor pressure. He voids a third time into a private uroflow to completely empty his bladder. Weak flow rate (Qmax 6.4-11.1 mL/s) with an intermittent flow curve  -Mild increase in EMG activity during voiding, which likely correlates with Valsalva effort.  -BOOI is 26.7 which is equivocal for bladder outlet obstruction.  -Fluoroscopy reveals a moderately-trabeculated bladder wall with a moderate right-sided diverticulum.  No vesicoureteral reflux was observed.  The bladder neck was open during filling and during voiding. Of note, there appears to be contrast collecting in the prostatic urethra, likely 2/2 his history of Urolift.    10/24/21 pelvic MRI  Impression:  Postsurgical changes of left total hip arthroplasty with metallic  susceptibility artifact  partially compromising assessment.  1. No ischial tuberosity bursitis or collection on either side.   2. Suspect right anterosuperior acetabular labral tearing.    He tried a PNE but it did not help at all.  He actually thinks he may have some degree of cauda equina syndrome and will be following up with [facility].    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  He also has some pelvic floor dysfunction and has exhausted many therapies wihtout avail or relief. He has been trying pelvic floor PT as well and it has not been helping. He tried SNS trial but it did not help.  -pt. Will f/u with [facility] and neurology which is appropriate since there seems to be a neurological origin to this.  -if not helped then consider referral to [facility].    Nayan Tello MD  [facility] UROLOGY CLINIC [place]      ==========================      Additional Coding Information:    Time spent:  23 minutes spent on the date of the encounter doing chart review, history and exam, documentation and further activities per the note          Answers for HPI/ROS submitted by the patient on 10/26/2022  If you checked off any problems, how difficult have these problems made it for you to do your work, take care of things at home, or get along with other people?: Extremely difficult  PHQ9 TOTAL SCORE: 4

## 2022-10-26 NOTE — PROGRESS NOTES
Mustapha is a 61 year old who is being evaluated via a billable video visit.      How would you like to obtain your AVS? MyChart  If the video visit is dropped, the invitation should be resent by: Text to cell phone: [phone]  Will anyone else be joining your video visit? No        Video-Visit Details    Video Start Time: 10:41 AM    Type of service:  Video Visit    Video End Time:10:49 AM    Originating Location (pt. Location): Home        Distant Location (provider location):  Off-site    Platform used for Video Visit: CrystalWell

## 2022-10-26 NOTE — LETTER
10/26/2022       RE: Mustapha Negrete  [address] [facility] [address]     Dear Colleague,    Thank you for referring your patient, Mustapha Negrete, to the [facility] UROLOGY CLINIC [place] at [facility]. Please see a copy of my visit note below.    Mustapha is a 61 year old who is being evaluated via a billable video visit.      How would you like to obtain your AVS? MyChart  If the video visit is dropped, the invitation should be resent by: Text to cell phone: [phone]  Will anyone else be joining your video visit? No        Video-Visit Details    Video Start Time: 10:41 AM    Type of service:  Video Visit    Video End Time:10:49 AM    Originating Location (pt. Location): Home        Distant Location (provider location):  Off-site    Platform used for Video Visit: [employer]    Reason for visit:  F/u on urinary symptoms  And PNE 10/17/22    Clinical Data.  Mr. Negrete is a 61 year old male with pelvic floor dysfunction and urinary retention.  This all started in middle of May of 2021 he had a cholecystectomy and tehn had Singles in the summer.  He then went into retention and required a catheter for 4 months.  He is able to void a little better but continues to have difficulty voiding.  He also has some pelvic discomfort.  He uses gabapentin.  He also continues to go to PFPT.  He has tried amitriptyline and carbamazepine.     He was started on rectal valium but he didn't tolerate at all.  He also had some sacral adjustment which he also doesn't feel like it helped.    UDS testing from 8/8/22 with the patient:    -Normal bladder capacity (total volume voided = 375 mL).  -UDCs x3 throughout the study, reaching ~55 cm H20. He leaks with the second two contractions. DLPP 40 cm H2O.   -Pdet pressure returns to baseline between episodes of DO.   -With permission to void, he appears to attempt entirely through Valsalva effort. During his second attempt from a standing  position, he continues to Valsalva void, with an apparent weak rise in detrusor pressure. He voids a third time into a private uroflow to completely empty his bladder. Weak flow rate (Qmax 6.4-11.1 mL/s) with an intermittent flow curve  -Mild increase in EMG activity during voiding, which likely correlates with Valsalva effort.  -BOOI is 26.7 which is equivocal for bladder outlet obstruction.  -Fluoroscopy reveals a moderately-trabeculated bladder wall with a moderate right-sided diverticulum.  No vesicoureteral reflux was observed.  The bladder neck was open during filling and during voiding. Of note, there appears to be contrast collecting in the prostatic urethra, likely 2/2 his history of Urolift.    10/24/21 pelvic MRI  Impression:  Postsurgical changes of left total hip arthroplasty with metallic  susceptibility artifact partially compromising assessment.  1. No ischial tuberosity bursitis or collection on either side.   2. Suspect right anterosuperior acetabular labral tearing.    He tried a PNE but it did not help at all.  He actually thinks he may have some degree of cauda equina syndrome and will be following up with [facility].    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  He also has some pelvic floor dysfunction and has exhausted many therapies wihtout avail or relief. He has been trying pelvic floor PT as well and it has not been helping. He tried SNS trial but it did not help.  -pt. Will f/u with [facility] and neurology which is appropriate since there seems to be a neurological origin to this.  -if not helped then consider referral to [facility].    Nayan Tello MD  [facility] UROLOGY CLINIC [place]      ==========================      Additional Coding Information:    Time spent:  23 minutes spent on the date of the encounter  doing chart review, history and exam, documentation and further activities per the note          Answers for HPI/ROS submitted by the patient on 10/26/2022  If you checked off any problems, how difficult have these problems made it for you to do your work, take care of things at home, or get along with other people?: Extremely difficult  PHQ9 TOTAL SCORE: 4

## 2022-10-26 NOTE — PATIENT INSTRUCTIONS
-pt. Will f/u with [place] and neurology  -if not helped then consider referral to [facility].

## 2022-10-28 ENCOUNTER — TRANSFERRED RECORDS (OUTPATIENT)
Dept: HEALTH INFORMATION MANAGEMENT | Facility: CLINIC | Age: 62
End: 2022-10-28

## 2022-11-03 ENCOUNTER — TELEPHONE (OUTPATIENT)
Dept: FAMILY MEDICINE | Facility: CLINIC | Age: 62
End: 2022-11-03

## 2022-11-03 NOTE — TELEPHONE ENCOUNTER
Forms received from: [facility]    Phone number listed: [phone]   Fax listed: [phone]  Date received: 10/31/22  Form description: Progress update Recertification of outpatient PT  Once forms are completed, please return to [facility] via fax [phone].  Is patient requesting to be contacted when forms are completed: na  Phone: na  Form placed:  To Dr. Inocencia Cheng

## 2022-11-04 ENCOUNTER — OFFICE VISIT (OUTPATIENT)
Dept: FAMILY MEDICINE | Facility: CLINIC | Age: 62
End: 2022-11-04
Payer: COMMERCIAL

## 2022-11-04 VITALS
BODY MASS INDEX: 33.45 KG/M2 | RESPIRATION RATE: 14 BRPM | SYSTOLIC BLOOD PRESSURE: 142 MMHG | TEMPERATURE: 97.5 F | DIASTOLIC BLOOD PRESSURE: 82 MMHG | WEIGHT: 201 LBS | HEART RATE: 89 BPM | OXYGEN SATURATION: 100 %

## 2022-11-04 DIAGNOSIS — R35.0 URINARY FREQUENCY: Primary | ICD-10-CM

## 2022-11-04 DIAGNOSIS — M53.3 COCCYDYNIA: ICD-10-CM

## 2022-11-04 LAB
ALBUMIN UR-MCNC: NEGATIVE MG/DL
APPEARANCE UR: CLEAR
BILIRUB UR QL STRIP: NEGATIVE
COLOR UR AUTO: YELLOW
GLUCOSE UR STRIP-MCNC: NEGATIVE MG/DL
HGB UR QL STRIP: NEGATIVE
KETONES UR STRIP-MCNC: NEGATIVE MG/DL
LEUKOCYTE ESTERASE UR QL STRIP: NEGATIVE
NITRATE UR QL: NEGATIVE
PH UR STRIP: 7 [PH] (ref 5–7)
SP GR UR STRIP: 1.02 (ref 1–1.03)
UROBILINOGEN UR STRIP-ACNC: 0.2 E.U./DL

## 2022-11-04 PROCEDURE — 99213 OFFICE O/P EST LOW 20 MIN: CPT | Performed by: PHYSICIAN ASSISTANT

## 2022-11-04 PROCEDURE — 81003 URINALYSIS AUTO W/O SCOPE: CPT | Performed by: PHYSICIAN ASSISTANT

## 2022-11-04 ASSESSMENT — ANXIETY QUESTIONNAIRES
1. FEELING NERVOUS, ANXIOUS, OR ON EDGE: NOT AT ALL
7. FEELING AFRAID AS IF SOMETHING AWFUL MIGHT HAPPEN: NOT AT ALL
GAD7 TOTAL SCORE: 0
4. TROUBLE RELAXING: NOT AT ALL
7. FEELING AFRAID AS IF SOMETHING AWFUL MIGHT HAPPEN: NOT AT ALL
IF YOU CHECKED OFF ANY PROBLEMS ON THIS QUESTIONNAIRE, HOW DIFFICULT HAVE THESE PROBLEMS MADE IT FOR YOU TO DO YOUR WORK, TAKE CARE OF THINGS AT HOME, OR GET ALONG WITH OTHER PEOPLE: NOT DIFFICULT AT ALL
GAD7 TOTAL SCORE: 0
3. WORRYING TOO MUCH ABOUT DIFFERENT THINGS: NOT AT ALL
8. IF YOU CHECKED OFF ANY PROBLEMS, HOW DIFFICULT HAVE THESE MADE IT FOR YOU TO DO YOUR WORK, TAKE CARE OF THINGS AT HOME, OR GET ALONG WITH OTHER PEOPLE?: NOT DIFFICULT AT ALL
2. NOT BEING ABLE TO STOP OR CONTROL WORRYING: NOT AT ALL
6. BECOMING EASILY ANNOYED OR IRRITABLE: NOT AT ALL
5. BEING SO RESTLESS THAT IT IS HARD TO SIT STILL: NOT AT ALL

## 2022-11-04 ASSESSMENT — ASTHMA QUESTIONNAIRES
ACT_TOTALSCORE: 25
QUESTION_4 LAST FOUR WEEKS HOW OFTEN HAVE YOU USED YOUR RESCUE INHALER OR NEBULIZER MEDICATION (SUCH AS ALBUTEROL): NOT AT ALL
ACT_TOTALSCORE: 25
QUESTION_2 LAST FOUR WEEKS HOW OFTEN HAVE YOU HAD SHORTNESS OF BREATH: NOT AT ALL
QUESTION_1 LAST FOUR WEEKS HOW MUCH OF THE TIME DID YOUR ASTHMA KEEP YOU FROM GETTING AS MUCH DONE AT WORK, SCHOOL OR AT HOME: NONE OF THE TIME
QUESTION_5 LAST FOUR WEEKS HOW WOULD YOU RATE YOUR ASTHMA CONTROL: COMPLETELY CONTROLLED
QUESTION_3 LAST FOUR WEEKS HOW OFTEN DID YOUR ASTHMA SYMPTOMS (WHEEZING, COUGHING, SHORTNESS OF BREATH, CHEST TIGHTNESS OR PAIN) WAKE YOU UP AT NIGHT OR EARLIER THAN USUAL IN THE MORNING: NOT AT ALL

## 2022-11-04 ASSESSMENT — PAIN SCALES - GENERAL: PAINLEVEL: WORST PAIN (10)

## 2022-11-04 NOTE — PATIENT INSTRUCTIONS
Mary Luciano,    Thank you for allowing [facility] to manage your care.    I am unsure of the cause of your symptoms, but your workup is reassuring.    If you develop worsening/changing symptoms at any time, please call 911 or go to the emergency department for evaluation.    I made a referral to neurosurgery/spine at [place]. Please call them at the number you have today to check in on whether they will be seeing you.    If you have any questions or concerns, please feel free to call us at [phone]    Sincerely,    Marito Mancilla PA-C    Did you know?      You can schedule a video visit for follow-up appointments as well as future appointments for certain conditions.  Please see the below link.     [email or web address]    If you have not already done so,  I encourage you to sign up for [employer] ([email or web address]).  This will allow you to review your results, securely communicate with a provider, and schedule virtual visits as well.    
09:00

## 2022-11-04 NOTE — PROGRESS NOTES
"  Assessment & Plan   Problem List Items Addressed This Visit        Nervous and Auditory    Coccydynia    Relevant Orders    Spine  Referral   Other Visit Diagnoses     Urinary frequency    -  Primary    Relevant Orders    UA Macro with Reflex to Micro and Culture - lab collect (Completed)    Spine  Referral         Likely neuropathic pain to the left groin from unknown chronic etiology. MRI of lumbar spine last year when he had these symptoms was negative for cauda equina and other worrisome processes.  No signs of urinary tract infection on UA today.  I did place a referral to spine and the [facility], although it appears through care everywhere that they are planning on seeing him.  He will call them today to inquire when he will be able to be evaluated.    Complete history and physical exam as below. AF with normal VS except for elevated bp, which they will monitor at home and contact us if >140/90mmHg greater than 50% of the time.    DDx and Dx discussed with and explained to the pt to their satisfaction.  All questions were answered at this time. Pt expressed understanding of and agreement with this dx, tx, and plan. No further workup warranted and standard medication warnings given. I have given the patient a list of pertinent indications for re-evaluation. Will go to the Emergency Department if symptoms worsen or new concerning symptoms arise. Patient left in no apparent distress.     Ordering of each unique test  24 minutes spent on the date of the encounter doing chart review, history and exam, documentation and further activities per the note     BMI:   Estimated body mass index is 33.45 kg/m  as calculated from the following:    Height as of 8/31/22: 1.651 m (5' 5\").    Weight as of this encounter: 91.2 kg (201 lb).     See Patient Instructions    Return for a recheck as needed, or call 911/go to an ER anytime if worsening.    DARVIN Wiggins  [facility] " MEGAN Luciano is a 61 year old presenting for the following health issues:  UTI      History of Present Illness       Reason for visit:  Urinary bladder infection    He eats 0-1 servings of fruits and vegetables daily.He consumes 1 sweetened beverage(s) daily.He exercises with enough effort to increase his heart rate 10 to 19 minutes per day.  He exercises with enough effort to increase his heart rate 4 days per week.   He is taking medications regularly.  Today's CHRISSY-7 Score: 0     Chronic left sided perineal pain from unknown etiology with exacerbation of penile pain over the last few days. History of UTIs. No fever/chills, abdominal pain, flank pain, nausea/vomiting, bowel symptoms or other symptoms.  He is working to get in with the [facility] for a second opinion as he has not had relief with pelvic floor therapy, pudendal nerve block, and several medical therapies.  He has chronic urinary/bowel hesitancy and incontinence.    Review of Systems   Constitutional, HEENT, cardiovascular, pulmonary, gi and gu systems are negative, except as otherwise noted.      Objective    BP (!) 142/82   Pulse 89   Temp 97.5  F (36.4  C) (Tympanic)   Resp 14   Wt 91.2 kg (201 lb)   SpO2 100%   BMI 33.45 kg/m    Body mass index is 33.45 kg/m .  Physical Exam  Vitals and nursing note reviewed.   Constitutional:       General: He is not in acute distress.     Appearance: He is not ill-appearing or diaphoretic.   HENT:      Head: Normocephalic and atraumatic.      Mouth/Throat:      Mouth: Mucous membranes are moist.   Eyes:      Conjunctiva/sclera: Conjunctivae normal.   Cardiovascular:      Rate and Rhythm: Normal rate and regular rhythm.      Heart sounds: Normal heart sounds. No murmur heard.    No friction rub. No gallop.   Pulmonary:      Effort: Pulmonary effort is normal. No respiratory distress.      Breath sounds: Normal breath sounds. No stridor. No wheezing, rhonchi or rales.   Abdominal:       General: Bowel sounds are normal. There is no distension.      Palpations: Abdomen is soft. There is no mass.      Tenderness: There is no abdominal tenderness. There is no right CVA tenderness, left CVA tenderness, guarding or rebound.      Hernia: No hernia is present.   Skin:     General: Skin is warm and dry.   Neurological:      General: No focal deficit present.      Mental Status: He is alert. Mental status is at baseline.   Psychiatric:         Mood and Affect: Mood normal.         Behavior: Behavior normal.        Results for orders placed or performed in visit on 11/04/22   UA Macro with Reflex to Micro and Culture - lab collect     Status: Normal    Specimen: Urine, Midstream   Result Value Ref Range    Color Urine Yellow Colorless, Straw, Light Yellow, Yellow    Appearance Urine Clear Clear    Glucose Urine Negative Negative, 1000 , >=2000 mg/dL    Bilirubin Urine Negative Negative    Ketones Urine Negative Negative, 160  mg/dL    Specific Gravity Urine 1.025 1.003 - 1.035    Blood Urine Negative Negative    pH Urine 7.0 5.0 - 7.0    Protein Albumin Urine Negative Negative, 300 , >=2000 mg/dL    Urobilinogen Urine 0.2 0.2, 1.0 E.U./dL    Nitrite Urine Negative Negative    Leukocyte Esterase Urine Negative Negative    Narrative    Microscopic not indicated

## 2022-11-08 ENCOUNTER — TELEPHONE (OUTPATIENT)
Dept: FAMILY MEDICINE | Facility: CLINIC | Age: 62
End: 2022-11-08

## 2022-11-08 NOTE — TELEPHONE ENCOUNTER
Reason for Call:  Other call back    Detailed comments: needs a referral from Dr. Pro to be sent to the [place] [facility] in [place] at [address] [facility].    Phone Number Patient can be reached at: Cell number on file:    Telephone Information:   Mobile [phone]     an we leave a detailed message on this number? YES    Call taken on 11/8/2022 at 12:24 PM by Kristal Rosales

## 2022-11-09 ENCOUNTER — PATIENT OUTREACH (OUTPATIENT)
Dept: CARE COORDINATION | Facility: CLINIC | Age: 62
End: 2022-11-09

## 2022-11-09 NOTE — PROGRESS NOTES
Clinic Care Coordination Contact    Follow Up Progress Note      Assessment: The RN CC nurse care coordinator contacted the patient by phone for a follow up call.  The patient stated that he was seen by [facility] today and was very impressed by the provider.  The provider ordered an EMG and a full MRI to happen in the next couple of weeks.  The patient is hoping that these tests will give information on where this pain is coming from.  The patient is concerned that the referral went to the clinic.      Medication review was completed with the patient and marked as reviewed.  Health maintenance was reviewed and questions were answered with the patient.  The assessment for hypertension was completed with the patient today as well as the social determinants of health and they were marked as reviewed.       Care Gaps:    Health Maintenance Due   Topic Date Due     YEARLY PREVENTIVE VISIT  Never done     INFLUENZA VACCINE (1) Never done       Care Gaps Last addressed on 11/9/22    Care Plans  Care Plan: General urination without pain     Problem: HP GENERAL PROBLEM     Goal: General Goal - urinate on my own without difficulty.  Decrease bilateral buttocks and thigh pain .     Start Date: 11/2/2021 Expected End Date: 8/2/2023    This Visit's Progress: 30% Recent Progress: 30%    Note:     Goal Statement #1: I will be able to urinate on my own without difficulty .    I will decrease bilateral buttocks and thigh pain in the next 3-6  months   Barriers: constant pain   Strengths: Motivated   Patient expressed understanding of goal: Yes  Action steps to achieve this goal:  1. I will keep future Primary Care ( recently switched to the [facility] ),Urology, Pain Clinic appointments and will make a future Neurology appointment   2. I will drink plenty of water to keep my urine clear and yellow  3. I will take Tylenol/Ibuprofen and Gabapentin as directed   4. I will walk a block when able to  increase strength   5. I will continue dry needling 3 times a week  6. I will keep future appointments scheduled with [facility] specialist in [place] June 6, 8 and 10th                        Intervention/Education provided during outreach: Reviewed the information that the patient received from the [facility].  He was also concerned regarding the referral that he needs for this visit and the upcoming tests.     Outreach Frequency: monthly        Plan:   1.  The patient will check on the referral prior to tests.  2.  The patient will drink plenty of water to stay hydrated.  3.  The patient will take all medications as prescribed by the providers.    RN CC Nurse Care Coordinator will follow up in 30 days.        Sebastian Jimenez MSN, RN, PHN, CCM   Primary Care Clinical RN Care Coordinator  [facility]  11/9/2022   3:40 PM  [email or web address]  Office: [phone]

## 2022-11-09 NOTE — TELEPHONE ENCOUNTER
Patient has referral from Marito BOSTON for [facility] NEUROSURGERY.    Mihaela Hua RN BSN  [facility]

## 2022-11-09 NOTE — TELEPHONE ENCOUNTER
Patient calling regarding this message. He is at the clinic and they need the referral fax to [phone] asap.     Thank you,  [place] Robles RN

## 2022-11-14 RX ORDER — TERAZOSIN 1 MG/1
2 CAPSULE ORAL AT BEDTIME
Qty: 180 CAPSULE | Refills: 3 | Status: SHIPPED | OUTPATIENT
Start: 2022-11-14 | End: 2022-12-09

## 2022-11-14 RX ORDER — TERAZOSIN 1 MG/1
1 CAPSULE ORAL DAILY
Qty: 450 CAPSULE | Refills: 0 | OUTPATIENT
Start: 2022-11-14

## 2022-11-14 NOTE — TELEPHONE ENCOUNTER
Pt calls into clinic. He is taking 1 or 2 mg daily.     Pt agrees to provide best insurance coverage to send rx as 2mg daily, 90 tabs, 3 refills    Med sent

## 2022-11-14 NOTE — TELEPHONE ENCOUNTER
Voicemail left for pt to return call direct to discuss how he is taking and what best way to refill. Will deny for now until able to best prescribe for coverage and care. Will wait for return call.     CARMEN Garcia  Care Coordinator  [phone]

## 2022-11-23 DIAGNOSIS — M62.89 PELVIC FLOOR DYSFUNCTION: ICD-10-CM

## 2022-11-23 DIAGNOSIS — F11.90 CHRONIC, CONTINUOUS USE OF OPIOIDS: ICD-10-CM

## 2022-11-23 NOTE — TELEPHONE ENCOUNTER
.Reason for Call:  Medication or medication refill:    Do you use a [facility]?  Name of the pharmacy and phone number for the current request: [facility] [id number] [place] [facility] [place] - [address]    Name of the medication requested: oxyCODONE (ROXICODONE) 5 MG tablet    Other request: Patient has an appointment on December 3rd and is wondering if you can refill the prescription until then because patient is almost out of pills     Can we leave a detailed message on this number? YES    Phone number patient can be reached at: Home number on file [phone] (home)    Best Time: Any    Call taken on 11/23/2022 at 12:21 PM by Suzanne Max

## 2022-11-28 RX ORDER — OXYCODONE HYDROCHLORIDE 5 MG/1
5 TABLET ORAL DAILY PRN
Qty: 30 TABLET | Refills: 0 | Status: SHIPPED | OUTPATIENT
Start: 2022-11-28 | End: 2022-12-26

## 2022-12-05 NOTE — PROGRESS NOTES
Appointment source: Established Patient  Patient name: Mustapha Negrete  Urology Staff: Alex Oneill MD    Subjective: This is a 61 year old year old male returning for follow up of symptoms of bladder outlet obstruction.    He reports that he has been emptying his bladder relatively well lately.  He occasionally does have to do some pushing to get the urine out but for the most part urination is satisfactory.    He has not been depending on intermittent catheterization to any significant degree.    Continues to be challenged by his back issues and will be considering an appointment with Dr. Sarthak Waggoner for further back intervention.    Objective: Postvoid residual was 71 mL.    Assessment: Reasonably good voiding on a combination of finasteride and tamsulosin.    Plan: Return in 1 month for follow-up.    Total time 15 minutes     Anxious

## 2022-12-09 ENCOUNTER — OFFICE VISIT (OUTPATIENT)
Dept: FAMILY MEDICINE | Facility: CLINIC | Age: 62
End: 2022-12-09
Payer: COMMERCIAL

## 2022-12-09 ENCOUNTER — PATIENT OUTREACH (OUTPATIENT)
Dept: CARE COORDINATION | Facility: CLINIC | Age: 62
End: 2022-12-09

## 2022-12-09 VITALS
OXYGEN SATURATION: 96 % | SYSTOLIC BLOOD PRESSURE: 150 MMHG | DIASTOLIC BLOOD PRESSURE: 86 MMHG | TEMPERATURE: 98.2 F | RESPIRATION RATE: 20 BRPM | BODY MASS INDEX: 33.63 KG/M2 | HEART RATE: 109 BPM | WEIGHT: 197 LBS | HEIGHT: 64 IN

## 2022-12-09 DIAGNOSIS — R90.89 ABNORMAL IMAGING OF CENTRAL NERVOUS SYSTEM: ICD-10-CM

## 2022-12-09 DIAGNOSIS — M79.2: Primary | ICD-10-CM

## 2022-12-09 DIAGNOSIS — R33.9 URINARY RETENTION: ICD-10-CM

## 2022-12-09 PROCEDURE — 99213 OFFICE O/P EST LOW 20 MIN: CPT | Performed by: FAMILY MEDICINE

## 2022-12-09 RX ORDER — TERAZOSIN 1 MG/1
1 CAPSULE ORAL 3 TIMES DAILY
Refills: 0 | COMMUNITY
Start: 2022-12-09 | End: 2023-01-27

## 2022-12-09 RX ORDER — GABAPENTIN 300 MG/1
CAPSULE ORAL
Qty: 150 CAPSULE | Refills: 3 | Status: SHIPPED | OUTPATIENT
Start: 2022-12-09 | End: 2023-08-03

## 2022-12-09 ASSESSMENT — ENCOUNTER SYMPTOMS
HEADACHES: 0
ARTHRALGIAS: 0
WEAKNESS: 0
SHORTNESS OF BREATH: 0
PARESTHESIAS: 0
MYALGIAS: 0
SORE THROAT: 0
PALPITATIONS: 0
CHILLS: 0
FEVER: 0
DIZZINESS: 0
COUGH: 0
JOINT SWELLING: 0
NERVOUS/ANXIOUS: 0

## 2022-12-09 NOTE — PROGRESS NOTES
"1. Neuropathic pain of flank, unspecified laterality  - gabapentin (NEURONTIN) 300 MG capsule; 2 tablets tid  Dispense: 150 capsule; Refill: 3    2. Urinary retention  - terazosin (HYTRIN) 1 MG capsule; Take 1 capsule (1 mg) by mouth 3 times daily; Refill: 0    3. Abnormal imaging of central nervous system  - gabapentin (NEURONTIN) 300 MG capsule; 2 tablets tid  Dispense: 150 capsule; Refill: 3      Zhang Luciano is a 62 year old, presenting for the following health issues:  Recheck Medication, Pain, and Medication Reconciliation      HPI     Medication Followup of pain managment    Taking Medication as prescribed: yes    Side Effects:  None    Medication Helping Symptoms:  Pain management has made changes and now on additional medications as well.    1. Chronic pelvic pain: Seen by neurology and urology.  Currently gabapentin 300 mg five times per day.  Possible nerve injection in the future by urologist.  No improvement in regards to nerve stimulator.     2. Hypertension f/u: Currently on lisinopril.  Tolerating medication.     Review of Systems   Constitutional: Negative for chills and fever.   HENT: Negative for congestion, ear pain, hearing loss and sore throat.    Respiratory: Negative for cough and shortness of breath.    Cardiovascular: Negative for chest pain, palpitations and peripheral edema.   Musculoskeletal: Negative for arthralgias, joint swelling and myalgias.   Skin: Negative for rash.   Neurological: Negative for dizziness, weakness, headaches and paresthesias.   Psychiatric/Behavioral: Negative for mood changes. The patient is not nervous/anxious.             Objective    BP (!) 148/100   Pulse 109   Temp 98.2  F (36.8  C) (Tympanic)   Resp 20   Ht 1.618 m (5' 3.7\")   Wt 89.4 kg (197 lb)   SpO2 96%   BMI 34.13 kg/m    Body mass index is 34.13 kg/m .  Physical Exam  Constitutional:       General: He is not in acute distress.  HENT:      Head: Normocephalic and atraumatic.   Eyes:      " Extraocular Movements: EOM normal.      Conjunctiva/sclera: Conjunctivae normal.   Cardiovascular:      Rate and Rhythm: Normal rate and regular rhythm.      Heart sounds: Normal heart sounds. No murmur heard.  Pulmonary:      Effort: Pulmonary effort is normal. No respiratory distress.      Breath sounds: No wheezing or rales.   Musculoskeletal:         General: Normal range of motion.   Skin:     Findings: No rash.   Neurological:      Mental Status: He is alert and oriented to person, place, and time.

## 2022-12-09 NOTE — PATIENT INSTRUCTIONS
Claudy Luciano,    Thank you for allowing [facility] to manage your care.    I sent your prescriptions to your pharmacy.    If you have any questions or concerns, please feel free to call us at [phone].    Sincerely,    Dr. Pro    Did you know?      You can schedule a video visit for follow-up appointments as well as future appointments for certain conditions.  Please see the below link.     [email or web address]    If you have not already done so,  I encourage you to sign up for [email or web address] ([email or web address]).  This will allow you to review your results, securely communicate with a provider, and schedule virtual visits as well.

## 2022-12-09 NOTE — PROGRESS NOTES
Clinic Care Coordination Contact  [facility]/Voicemail       Clinical Data: Care Coordinator Outreach  Outreach attempted x 1.  Left message on patient's voicemail with call back information and requested return call.  Plan: Care Coordinator sent care coordination introduction letter on 3/18/22 via [employer]. Care Coordinator will try to reach patient again in 10 business days.    Sebastian Jimenez MSN, RN, PHN, CCM   Primary Care Clinical RN Care Coordinator  [facility]  12/9/2022   2:47 PM  [email or web address]  Office: [phone]

## 2022-12-10 ENCOUNTER — HEALTH MAINTENANCE LETTER (OUTPATIENT)
Age: 62
End: 2022-12-10

## 2022-12-14 ENCOUNTER — TRANSFERRED RECORDS (OUTPATIENT)
Dept: HEALTH INFORMATION MANAGEMENT | Facility: CLINIC | Age: 62
End: 2022-12-14

## 2022-12-19 ENCOUNTER — PATIENT OUTREACH (OUTPATIENT)
Dept: CARE COORDINATION | Facility: CLINIC | Age: 62
End: 2022-12-19

## 2022-12-19 NOTE — PROGRESS NOTES
Clinic Care Coordination Contact    Follow Up Progress Note      Assessment: The RN CC nurse care coordinator contacted the patient by phone for a follow up call.  The patient had just returned from getting an epidural injection.  The patient has been recommended to see a spine specialist.  The patient states that the provider performing the injection stated that he saw a very bulging disc that could be the source of his pain.  The patient is looking to see if he gets any results from the injection and to relay that to his provider.    Medication review was completed with the patient and marked as reviewed.  Health maintenance was reviewed and questions were answered with the patient.  The assessment for hypertension was completed with the patient today as well as the social determinants of health and they were marked as reviewed.      Care Gaps:    Health Maintenance Due   Topic Date Due     YEARLY PREVENTIVE VISIT  Never done     COVID-19 Vaccine (1) Never done     ZOSTER IMMUNIZATION (1 of 2) Never done     INFLUENZA VACCINE (1) Never done       Care Gaps Last addressed on 12/19/22    Care Plans  Care Plan: General urination without pain     Problem: HP GENERAL PROBLEM     Goal: General Goal - urinate on my own without difficulty.  Decrease bilateral buttocks and thigh pain .     Start Date: 11/2/2021 Expected End Date: 8/2/2023    This Visit's Progress: 40% Recent Progress: 30%    Note:     Goal Statement #1: I will be able to urinate on my own without difficulty .    I will decrease bilateral buttocks and thigh pain in the next 3-6  months   Barriers: constant pain   Strengths: Motivated   Patient expressed understanding of goal: Yes  Action steps to achieve this goal:  1. I will keep future Primary Care ( recently switched to the [facility] ),Urology, Pain Clinic appointments and will make a future Neurology appointment   2. I will drink plenty of water to keep my urine clear and yellow  3. I will  take Tylenol/Ibuprofen and Gabapentin as directed   4. I will walk a block when able to increase strength   5. I will continue dry needling 3 times a week  6. I will keep future appointments scheduled with [facility] specialist in [place] June 6, 8 and 10th                        Intervention/Education provided during outreach: The RN LAVELL reviewed the care gaps that the patient has.  He stated that his provider reviewed that with him the last time he was in and suggested that in about 6 months he should schedule an appointment and they can discuss that with him.         Outreach Frequency: monthly        Plan:   1.  The patient will monitor for the results of the epidural injection and notify his provider.  2.  The patient will make a future appointment with the spine specialist.  3.  The patient will continue to take his medications as prescribed by the providers.    RN CC Nurse Care Coordinator will follow up in 30 days.          Sebastian Jimenez MSN, RN, PHN, CCM   Primary Care Clinical RN Care Coordinator  [facility]  12/19/2022   10:13 AM  [email or web address]  Office: [phone]

## 2022-12-26 ENCOUNTER — TELEPHONE (OUTPATIENT)
Dept: FAMILY MEDICINE | Facility: CLINIC | Age: 62
End: 2022-12-26

## 2022-12-26 DIAGNOSIS — M62.89 PELVIC FLOOR DYSFUNCTION: ICD-10-CM

## 2022-12-26 RX ORDER — OXYCODONE HYDROCHLORIDE 5 MG/1
5 TABLET ORAL DAILY PRN
Qty: 30 TABLET | Refills: 0 | Status: SHIPPED | OUTPATIENT
Start: 2022-12-27 | End: 2023-01-05

## 2022-12-26 NOTE — TELEPHONE ENCOUNTER
Rx sent.  Advised patient will need to schedule an in person appointment to discuss CSA (controlled substance agreement) if patient plans to take oxycodone long term.    1. Pelvic floor dysfunction  - oxyCODONE (ROXICODONE) 5 MG tablet; Take 1 tablet (5 mg) by mouth daily as needed for severe pain (7-10)  Dispense: 30 tablet; Refill: 0

## 2022-12-26 NOTE — TELEPHONE ENCOUNTER
Patient would like to get a refill on his Oxycodone 5 mg he is leaving around the 3 rd of January

## 2023-01-05 ENCOUNTER — OFFICE VISIT (OUTPATIENT)
Dept: FAMILY MEDICINE | Facility: CLINIC | Age: 63
End: 2023-01-05
Payer: COMMERCIAL

## 2023-01-05 VITALS
BODY MASS INDEX: 34.03 KG/M2 | HEART RATE: 87 BPM | OXYGEN SATURATION: 94 % | RESPIRATION RATE: 14 BRPM | WEIGHT: 196.4 LBS | SYSTOLIC BLOOD PRESSURE: 144 MMHG | DIASTOLIC BLOOD PRESSURE: 82 MMHG | TEMPERATURE: 98.1 F

## 2023-01-05 DIAGNOSIS — I10 ESSENTIAL HYPERTENSION: ICD-10-CM

## 2023-01-05 DIAGNOSIS — F11.90 CHRONIC, CONTINUOUS USE OF OPIOIDS: ICD-10-CM

## 2023-01-05 DIAGNOSIS — M62.89 PELVIC FLOOR DYSFUNCTION: Primary | ICD-10-CM

## 2023-01-05 DIAGNOSIS — L40.50 PSORIATIC ARTHRITIS (H): ICD-10-CM

## 2023-01-05 LAB
ALBUMIN SERPL-MCNC: 3.7 G/DL (ref 3.4–5)
ALP SERPL-CCNC: 37 U/L (ref 40–150)
ALT SERPL W P-5'-P-CCNC: 42 U/L (ref 0–70)
AMPHETAMINES UR QL: NOT DETECTED
ANION GAP SERPL CALCULATED.3IONS-SCNC: 5 MMOL/L (ref 3–14)
AST SERPL W P-5'-P-CCNC: 17 U/L (ref 0–45)
BARBITURATES UR QL SCN: NOT DETECTED
BENZODIAZ UR QL SCN: NOT DETECTED
BILIRUB SERPL-MCNC: 1 MG/DL (ref 0.2–1.3)
BUN SERPL-MCNC: 20 MG/DL (ref 7–30)
BUPRENORPHINE UR QL: NOT DETECTED
CALCIUM SERPL-MCNC: 9.4 MG/DL (ref 8.5–10.1)
CANNABINOIDS UR QL: DETECTED
CHLORIDE BLD-SCNC: 106 MMOL/L (ref 94–109)
CO2 SERPL-SCNC: 30 MMOL/L (ref 20–32)
COCAINE UR QL SCN: NOT DETECTED
CREAT SERPL-MCNC: 0.94 MG/DL (ref 0.66–1.25)
D-METHAMPHET UR QL: NOT DETECTED
GFR SERPL CREATININE-BSD FRML MDRD: >90 ML/MIN/1.73M2
GLUCOSE BLD-MCNC: 123 MG/DL (ref 70–99)
METHADONE UR QL SCN: NOT DETECTED
OPIATES UR QL SCN: NOT DETECTED
OXYCODONE UR QL SCN: DETECTED
PCP UR QL SCN: NOT DETECTED
POTASSIUM BLD-SCNC: 4.1 MMOL/L (ref 3.4–5.3)
PROPOXYPH UR QL: NOT DETECTED
PROT SERPL-MCNC: 7 G/DL (ref 6.8–8.8)
SODIUM SERPL-SCNC: 141 MMOL/L (ref 133–144)
TRICYCLICS UR QL SCN: NOT DETECTED

## 2023-01-05 PROCEDURE — 99213 OFFICE O/P EST LOW 20 MIN: CPT | Performed by: FAMILY MEDICINE

## 2023-01-05 PROCEDURE — 80306 DRUG TEST PRSMV INSTRMNT: CPT | Performed by: FAMILY MEDICINE

## 2023-01-05 PROCEDURE — 36415 COLL VENOUS BLD VENIPUNCTURE: CPT | Performed by: FAMILY MEDICINE

## 2023-01-05 PROCEDURE — 80053 COMPREHEN METABOLIC PANEL: CPT | Performed by: FAMILY MEDICINE

## 2023-01-05 RX ORDER — OXYCODONE HYDROCHLORIDE 5 MG/1
5 TABLET ORAL DAILY PRN
Qty: 30 TABLET | Refills: 0 | Status: SHIPPED | OUTPATIENT
Start: 2023-01-26 | End: 2023-03-09

## 2023-01-05 RX ORDER — LISINOPRIL 10 MG/1
10 TABLET ORAL DAILY
Qty: 90 TABLET | Refills: 3 | Status: SHIPPED | OUTPATIENT
Start: 2023-01-05 | End: 2023-08-03

## 2023-01-05 ASSESSMENT — ENCOUNTER SYMPTOMS
ARTHRALGIAS: 0
FEVER: 0
CHILLS: 0
PARESTHESIAS: 0
JOINT SWELLING: 0
WEAKNESS: 0
SORE THROAT: 0
SHORTNESS OF BREATH: 0
MYALGIAS: 0
HEADACHES: 0
DIZZINESS: 0
COUGH: 0
NERVOUS/ANXIOUS: 0
PALPITATIONS: 0

## 2023-01-05 ASSESSMENT — PAIN SCALES - GENERAL: PAINLEVEL: WORST PAIN (10)

## 2023-01-05 NOTE — LETTER
Opioid / Opioid Plus Controlled Substance Agreement    This is an agreement between you and your provider about the safe and appropriate use of controlled substance/opioids prescribed by your care team. Controlled substances are medicines that can cause physical and mental dependence (abuse).    There are strict laws about having and using these medicines. We here at [facility] are committing to working with you in your efforts to get better. To support you in this work, we ll help you schedule regular office appointments for medicine refills. If we must cancel or change your appointment for any reason, we ll make sure you have enough medicine to last until your next appointment.     As a Provider, I will:    Listen carefully to your concerns and treat you with respect.     Recommend a treatment plan that I believe is in your best interest. This plan may involve therapies other than opioid pain medication.     Talk with you often about the possible benefits, and the risk of harm of any medicine that we prescribe for you.     Provide a plan on how to taper (discontinue or go off) using this medicine if the decision is made to stop its use.    As a Patient, I understand that opioid(s):     Are a controlled substance prescribed by my care team to help me function or work and manage my condition(s).     Are strong medicines and can cause serious side effects such as:    Drowsiness, which can seriously affect my driving ability    A lower breathing rate, enough to cause death    Harm to my thinking ability     Depression     Abuse of and addiction to this medicine    Need to be taken exactly as prescribed. Combining opioids with certain medicines or chemicals (such as illegal drugs, sedatives, sleeping pills, and benzodiazepines) can be dangerous or even fatal. If I stop opioids suddenly, I may have severe withdrawal symptoms.    Do not work for all types of pain nor for all patients. If they re not helpful, I may  be asked to stop them.        The risks, benefits and side effects of these medicine(s) were explained to me. I agree that:  1. I will take part in other treatments as advised by my care team. This may be psychiatry or counseling, physical therapy, behavioral therapy, group treatment or a referral to a specialist.     2. I will keep all my appointments. I understand that this is part of the monitoring of opioids. My care team may require an office visit for EVERY opioid/controlled substance refill. If I miss appointments or don t follow instructions, my care team may stop my medicine.    3. I will take my medicines as prescribed. I will not change the dose or schedule unless my care team tells me to. There will be no refills if I run out early.     4. I may be asked to come to the clinic and complete a urine drug test or complete a pill count at any time. If I don t give a urine sample or participate in a pill count, the care team may stop my medicine.    5. I will only receive prescriptions from this clinic for chronic pain. If I am treated by another provider for acute pain issues, I will tell them that I am taking opioid pain medication for chronic pain and that I have a treatment agreement with this provider. I will inform my [facility] care team within one business day if I am given a prescription for any pain medication by another healthcare provider. My [facility] care team can contact other providers and pharmacists about my use of any medicines.    6. It is up to me to make sure that I don t run out of my medicines on weekends or holidays. If my care team is willing to refill my opioid prescription without a visit, I must request refills only during office hours. Refills may take up to 3 business days to process. I will use one pharmacy to fill all my opioid and other controlled substance prescriptions. I will notify the clinic about any changes to my insurance or medication  availability.    7. I am responsible for my prescriptions. If the medicine/prescription is lost, stolen or destroyed, it will not be replaced. I also agree not to share controlled substance medicines with anyone.    8. I am aware I should not use any illegal or recreational drugs. I agree not to drink alcohol unless my care team says I can.       9. If I enroll in the [place] [facility] Cannabis program, I will tell my care team prior to my next refill.     10. I will tell my care team right away if I become pregnant, have a new medical problem treated outside of my regular clinic, or have a change in my medications.    11. I understand that this medicine can affect my thinking, judgment and reaction time. Alcohol and drugs affect the brain and body, which can affect the safety of my driving. Being under the influence of alcohol or drugs can affect my decision-making, behaviors, personal safety, and the safety of others. Driving while impaired (DWI) can occur if a person is driving, operating, or in physical control of a car, motorcycle, boat, snowmobile, ATV, motorbike, off-road vehicle, or any other motor vehicle ([place] Statute 169A.20). I understand the risk if I choose to drive or operate any vehicle or machinery.    I understand that if I do not follow any of the conditions above, my prescriptions or treatment may be stopped or changed.          Opioids  What You Need to Know    What are opioids?   Opioids are pain medicines that must be prescribed by a doctor. They are also known as narcotics.     Examples are:   1. morphine (MS Contin, Robyn)  2. oxycodone (Oxycontin)  3. oxycodone and acetaminophen (Percocet)  4. hydrocodone and acetaminophen (Vicodin, Norco)   5. fentanyl patch (Duragesic)   6. hydromorphone (Dilaudid)   7. methadone  8. codeine (Tylenol #3)     What do opioids do well?   Opioids are best for severe short-term pain such as after a surgery or injury. They may work well for cancer pain. They may  help some people with long-lasting (chronic) pain.     What do opioids NOT do well?   Opioids never get rid of pain entirely, and they don t work well for most patients with chronic pain. Opioids don t reduce swelling, one of the causes of pain.                                    Other ways to manage chronic pain and improve function include:       Treat the health problem that may be causing pain    Anti-inflammation medicines, which reduce swelling and tenderness, such as ibuprofen (Advil, Motrin) or naproxen (Aleve)    Acetaminophen (Tylenol)    Antidepressants and anti-seizure medicines, especially for nerve pain    Topical treatments such as patches or creams    Injections or nerve blocks    Chiropractic or osteopathic treatment    Acupuncture, massage, deep breathing, meditation, visual imagery, aromatherapy    Use heat or ice at the pain site    Physical therapy     Exercise    Stop smoking    Take part in therapy       Risks and side effects     Talk to your doctor before you start or decide to keep taking opioids. Possible side effects include:      Lowering your breathing rate enough to cause death    Overdose, including death, especially if taking higher than prescribed doses    Worse depression symptoms; less pleasure in things you usually enjoy    Feeling tired or sluggish    Slower thoughts or cloudy thinking    Being more sensitive to pain over time; pain is harder to control    Trouble sleeping or restless sleep    Changes in hormone levels (for example, less testosterone)    Changes in sex drive or ability to have sex    Constipation    Unsafe driving    Itching and sweating    Dizziness    Nausea, throwing up and dry mouth    What else should I know about opioids?    Opioids may lead to dependence, tolerance, or addiction.      Dependence means that if you stop or reduce the medicine too quickly, you will have withdrawal symptoms. These include loose poop (diarrhea), jitters, flu-like symptoms,  nervousness and tremors. Dependence is not the same as addiction.                       Tolerance means needing higher doses over time to get the same effect. This may increase the chance of serious side effects.      Addiction is when people improperly use a substance that harms their body, their mind or their relations with others. Use of opiates can cause a relapse of addiction if you have a history of drug or alcohol abuse.      People who have used opioids for a long time may have a lower quality of life, worse depression, higher levels of pain and more visits to doctors.    You can overdose on opioids. Take these steps to lower your risk of overdose:    1. Recognize the signs:  Signs of overdose include decrease or loss of consciousness (blackout), slowed breathing, trouble waking up and blue lips. If someone is worried about overdose, they should call 911.    2. Talk to your doctor about Narcan (naloxone).   If you are at risk for overdose, you may be given a prescription for Narcan. This medicine very quickly reverses the effects of opioids.   If you overdose, a friend or family member can give you Narcan while waiting for the ambulance. They need to know the signs of overdose and how to give Narcan.     3. Don't use alcohol or street drugs.   Taking them with opioids can cause death.    4. Do not take any of these medicines unless your doctor says it s OK. Taking these with opioids can cause death:    Benzodiazepines, such as lorazepam (Ativan), alprazolam (Xanax) or diazepam (Valium)    Muscle relaxers, such as cyclobenzaprine (Flexeril)    Sleeping pills like zolpidem (Ambien)     Other opioids      How to keep you and other people safe while taking opioids:    1. Never share your opioids with others.  Opioid medicines are regulated by the Drug Enforcement Agency (ALISSA). Selling or sharing medications is a criminal act.    2. Be sure to store opioids in a secure place, locked up if possible. Young children  can easily swallow them and overdose.    3. When you are traveling with your medicines, keep them in the original bottles. If you use a pill box, be sure you also carry a copy of your medicine list from your clinic or pharmacy.    4. Safe disposal of opioids    Most pharmacies have places to get rid of medicine, called disposal kiosks. Medicine disposal options are also available in every [facility]. Search your county and  medication disposal  to find more options. You can find more details at:  [email or web address]     I agree that my provider, clinic care team, and pharmacy may work with any city, state or federal law enforcement agency that investigates the misuse, sale, or other diversion of my controlled medicine. I will allow my provider to discuss my care with, or share a copy of, this agreement with any other treating provider, pharmacy or emergency room where I receive care.    I have read this agreement and have asked questions about anything I did not understand.    _______________________________________________________  Patient Signature - Mustapha Negrete _____________________                   Date     _______________________________________________________  Provider Signature - Silver Pro DO   _____________________                   Date     _______________________________________________________  Witness Signature (required if provider not present while patient signing)   _____________________                   Date

## 2023-01-05 NOTE — PROGRESS NOTES
1. Pelvic floor dysfunction  Patient is scheduled to follow-up with orthopedics.   - oxyCODONE (ROXICODONE) 5 MG tablet; Take 1 tablet (5 mg) by mouth daily as needed for severe pain (7-10)  Dispense: 30 tablet; Refill: 0    2. Psoriatic arthritis (H)  Stable. Currently not on any medications.     3. Essential hypertension  Has not been compliant with medications.   - lisinopril (ZESTRIL) 10 MG tablet; Take 1 tablet (10 mg) by mouth daily  Dispense: 90 tablet; Refill: 3  - Comprehensive metabolic panel (BMP + Alb, Alk Phos, ALT, AST, Total. Bili, TP); Future  - Comprehensive metabolic panel (BMP + Alb, Alk Phos, ALT, AST, Total. Bili, TP)    4. Chronic, continuous use of opioids  - Drug Abuse Screen Panel 13, Urine (Pain Care Package) - lab collect; Future  - Drug Abuse Screen Panel 13, Urine (Pain Care Package) - lab collect      Zhang Luciano is a 62 year old, presenting for the following health issues:  Recheck Medication      HPI     Medication Followup of pain management    Taking Medication as prescribed: yes    Side Effects:  Harden stools    Medication Helping Symptoms:  Helps a little bit    1. Chronic pelvic pain: Patient was recently seen by orthopedics and scheduled for a repeat MRI.  Scheduled for a follow-up 1/12/23 with Dr. Jose Stubbs MD.     2. Hypertension f/u: Patient stopped lisinopril but he is willing to take the 10 mg dosage.     Review of Systems   Constitutional: Negative for chills and fever.   HENT: Negative for congestion, ear pain, hearing loss and sore throat.    Respiratory: Negative for cough and shortness of breath.    Cardiovascular: Negative for chest pain, palpitations and peripheral edema.   Musculoskeletal: Negative for arthralgias, joint swelling and myalgias.   Skin: Negative for rash.   Neurological: Negative for dizziness, weakness, headaches and paresthesias.   Psychiatric/Behavioral: Negative for mood changes. The patient is not nervous/anxious.            Objective    BP (!) 144/82   Pulse 87   Temp 98.1  F (36.7  C) (Tympanic)   Resp 14   Wt 89.1 kg (196 lb 6.4 oz)   SpO2 94%   BMI 34.03 kg/m    Body mass index is 34.03 kg/m .  Physical Exam  Constitutional:       General: He is not in acute distress.  HENT:      Head: Normocephalic and atraumatic.   Eyes:      Conjunctiva/sclera: Conjunctivae normal.   Cardiovascular:      Rate and Rhythm: Normal rate and regular rhythm.      Heart sounds: Normal heart sounds. No murmur heard.  Pulmonary:      Effort: Pulmonary effort is normal. No respiratory distress.      Breath sounds: No wheezing or rales.   Musculoskeletal:         General: Normal range of motion.   Skin:     Findings: No rash.   Neurological:      Mental Status: He is alert and oriented to person, place, and time.

## 2023-01-05 NOTE — PATIENT INSTRUCTIONS
Claudy Luciano,    Thank you for allowing [facility] to manage your care.    I ordered some blood work and urine sample, please go to the laboratory to get your laboratory studies.    I sent your prescriptions to your pharmacy.    If you have any questions or concerns, please feel free to call us at [phone].    Sincerely,    Dr. Pro    Did you know?      You can schedule a video visit for follow-up appointments as well as future appointments for certain conditions.  Please see the below link.     [email or web address]    If you have not already done so,  I encourage you to sign up for [facility] [employer] ([email or web address]).  This will allow you to review your results, securely communicate with a provider, and schedule virtual visits as well.

## 2023-01-05 NOTE — LETTER
January 11, 2023      Mustapha Negrete  [address] [address] [facility] [address]        Dear ,    We are writing to inform you of your test results.    We see you have not read your mychart messages.     -Liver and gallbladder tests are normal (ALT,AST, Alk phos, bilirubin), kidney function is normal (Cr, GFR), sodium is normal, potassium is normal, calcium is normal, glucose is normal.  -Drug screen showed expected results.     This is reassuring news.      Resulted Orders   Comprehensive metabolic panel (BMP + Alb, Alk Phos, ALT, AST, Total. Bili, TP)   Result Value Ref Range    Sodium 141 133 - 144 mmol/L    Potassium 4.1 3.4 - 5.3 mmol/L    Chloride 106 94 - 109 mmol/L    Carbon Dioxide (CO2) 30 20 - 32 mmol/L    Anion Gap 5 3 - 14 mmol/L    Urea Nitrogen 20 7 - 30 mg/dL    Creatinine 0.94 0.66 - 1.25 mg/dL    Calcium 9.4 8.5 - 10.1 mg/dL    Glucose 123 (H) 70 - 99 mg/dL    Alkaline Phosphatase 37 (L) 40 - 150 U/L    AST 17 0 - 45 U/L    ALT 42 0 - 70 U/L    Protein Total 7.0 6.8 - 8.8 g/dL    Albumin 3.7 3.4 - 5.0 g/dL    Bilirubin Total 1.0 0.2 - 1.3 mg/dL    GFR Estimate >90 >60 mL/min/1.73m2      Comment:      Effective December 21, 2021 eGFRcr in adults is calculated using the 2021 CKD-EPI creatinine equation which includes age and gender (Luz Elena et al., [place], DOI: 10.1056/LQOJgm0792791)   Drug Abuse Screen Panel 13, Urine (Pain Care Package) - lab collect   Result Value Ref Range    Cannabinoids (07-giq-8-carboxy-9-THC) Detected (A) Not Detected, Indeterminate      Comment:      Cutoff for a positive cannabinoid is greater than 50 ng/ml.  This is an unconfirmed screening result to be used for medical purposes only.     Phencyclidine Not Detected Not Detected, Indeterminate      Comment:      Cutoff for a negative PCP is 25 ng/mL or less.    Cocaine (Benzoylecgonine) Not Detected Not Detected, Indeterminate      Comment:      Cutoff for a negative cocaine is 150 ng/ml or less.    Methamphetamine  (d-Methamphetamine) Not Detected Not Detected, Indeterminate      Comment:      Cutoff for a negative methamphetamine is 500 ng/ml or less.    Opiates (Morphine) Not Detected Not Detected, Indeterminate      Comment:      Cutoff for a negative opiate is 100 ng/ml or less.    Amphetamine (d-Amphetamine) Not Detected Not Detected, Indeterminate      Comment:      Cutoff for a negative amphetamine is 500 ng/mL or less.    Benzodiazepines (Nordiazepam) Not Detected Not Detected, Indeterminate      Comment:      Cutoff for a negative benzodiazepine is 150 ng/ml or less.    Tricyclic Antidepressants (Desipramine) Not Detected Not Detected, Indeterminate      Comment:      Cutoff for a negative tricyclic antidepressant is 300 ng/ml or less.    Methadone Not Detected Not Detected, Indeterminate      Comment:      Cutoff for a negative methadone is 200 ng/ml or less.    Barbiturates (Butalbital) Not Detected Not Detected, Indeterminate      Comment:      Cutoff for a negative barbituate is 200 ng/ml or less.    Oxycodone Detected (A) Not Detected, Indeterminate      Comment:      Cutoff for a positive oxycodone is greater than 100 ng/ml.  This is an unconfirmed screening result to be used for medical purposes only.     Propoxyphene (Norpropoxyphene) Not Detected Not Detected, Indeterminate      Comment:      Cutoff for a negative propoxyphene is 300 ng/ml or less.    Buprenorphine Not Detected Not Detected, Indeterminate      Comment:      Cutoff for a negative buprenorphine is 10 ng/ml or less.       If you have any questions or concerns, please call the clinic at the number listed above.       Sincerely,      Silver Pro DO/mp

## 2023-01-16 ENCOUNTER — TELEPHONE (OUTPATIENT)
Dept: RADIOLOGY | Facility: CLINIC | Age: 63
End: 2023-01-16
Payer: COMMERCIAL

## 2023-01-16 NOTE — TELEPHONE ENCOUNTER
"Patient came into clinic today needing information about the location of the injection her received \"last spring sometime\".  He is currently seeing a surgeon at [facility] who is requesting to know exactly where injection was because Mustapha states it worked really well.  Patient was given med. Release form but he would also like to speak to someone regarding this.  "

## 2023-01-18 ENCOUNTER — PATIENT OUTREACH (OUTPATIENT)
Dept: CARE COORDINATION | Facility: CLINIC | Age: 63
End: 2023-01-18
Payer: COMMERCIAL

## 2023-01-18 NOTE — LETTER
[facility]  Patient Centered Plan of Care  About Me:        Patient Name:  Mustapha Negrete    YOB: 1960  Age:         62 year old   Rustam MRN:    [id number] Telephone Information:  Home Phone [phone]   Mobile [phone]       Address:  [address] [place] [address] Email address:  [email or web address]      Emergency Contact(s)    Name Relationship Lgl Grd Work Phone Home Phone Mobile Phone   STEFANO AYOUB Son   [phone]           Primary language:  English     needed? No   [employer]:  [phone] op. 1  Other communication barriers:None    Preferred Method of Communication:     Current living arrangement: I live in a private home (Patient stated he lives with his adult son)    Mobility Status/ Medical Equipment: Independent        Health Maintenance  Health Maintenance Reviewed: Due/Overdue   Health Maintenance Due   Topic Date Due     YEARLY PREVENTIVE VISIT  Never done     COVID-19 Vaccine (1) Never done     ZOSTER IMMUNIZATION (1 of 2) Never done     INFLUENZA VACCINE (1) Never done           My Access Plan  Medical Emergency 911   Primary Clinic Line [facility] [phone]   24 Hour Appointment Line [phone] or  [phone]) (toll-free)   24 Hour Nurse Line [phone] (toll-free)   Preferred Urgent Care [facility], [phone]     [facility] [place], [place]  [phone]     Preferred Pharmacy [facility] - [place], [place] - [address]     Behavioral Health Crisis Line The National Suicide Prevention Lifeline at 1-894.905.9510 or Text/Call 398             My Care Team Members  Patient Care Team       Relationship Specialty Notifications Start End    Silver Pro DO PCP - General Family Medicine Admissions 7/6/22     Phone: [phone] Fax: [phone]         [address]  [facility] [address]    JACOB Oneill MD Assigned Surgical Provider   9/26/21     Phone: [phone] Pager: [phone] Fax: [phone] [address] [facility] [address]    Viki Hanson DO Assigned PCP   9/30/21     Phone: [phone] Pager: [phone] Fax: [phone] [address] [facility] [address]    Matty Ruiz MD MD Neurological Surgery  10/18/21     Phone: [phone] Fax: [phone] [address] [facility] [address]    Max Rico MD Referring Physician Family Medicine  10/18/21     Phone: [phone] Fax: [phone] [address] [place] [address]    Kristal Drummond MD Assigned Neuroscience Provider   10/31/21     Phone: [phone] Pager: [phone] Fax: [phone]        [address] [facility] [address] [facility] [address]    Sebastian Rock, CARMEN Lead Care Coordinator Primary Care -  Admissions 11/2/21     Phone: [phone] Fax: [phone]        Silver Pro DO Assigned Pain Medication Provider   1/9/23     Phone: [phone] Fax: [phone]         [address] [facility] [address]            My Care Plans  Self Management and Treatment Plan  Care Plan  Care Plan: General urination without pain     Problem: HP GENERAL PROBLEM     Goal: General Goal - urinate on my own without difficulty.  Decrease bilateral buttocks and thigh pain .     Start Date: 11/2/2021 Expected End Date: 8/2/2023    This Visit's Progress: 40% Recent Progress: 30%    Note:     Goal Statement #1: I will be able to urinate on my own without difficulty .    I will decrease bilateral buttocks and thigh pain in the next 3-6  months   Barriers: constant pain   Strengths: Motivated   Patient expressed understanding of goal: Yes  Action steps to achieve this goal:  1. I will keep future Primary Care ( recently switched to the [facility] ),Urology, Pain Clinic appointments and will make a future Neurology appointment    2. I will drink plenty of water to keep my urine clear and yellow  3. I will take Tylenol/Ibuprofen and Gabapentin as directed   4. I will walk a block when able to increase strength   5. I will continue dry needling 3 times a week  6. I will keep future appointments scheduled with [facility] specialist in [place] June 6, 8 and 10th                         Action Plans on File:   Asthma                   Advance Care Plans/Directives Type:   No data recorded    My Medical and Care Information  Problem List   Patient Active Problem List   Diagnosis     Acute prostatitis     Rectal pain     Penile pain     Perineal pain in male     Urinary retention     Symptomatic cholelithiasis     Reflux esophagitis     Psoriatic arthritis (H)     MARIAN (obstructive sleep apnea)     OA (osteoarthritis) of hip     Neuropathy     Mild intermittent asthma, uncomplicated     Insomnia, unspecified     GERD (gastroesophageal reflux disease)     Hepatic steatosis     Benign prostatic hyperplasia with urinary obstruction     Anal fissure     Tear of right acetabular labrum, sequela     Lumbar disc herniation with radiculopathy     Lower urinary tract symptoms     Arthritis of left acromioclavicular joint     ASHD (arteriosclerotic heart disease)     Calculus of gallbladder with chronic cholecystitis without obstruction     Change in bowel habits     Complete tear of left rotator cuff     Constipation     COVID-19 virus infection     Displacement of lumbar intervertebral disc without myelopathy     Diverticular disease of large intestine     Hiatal hernia     Hyperlipidemia     Low testosterone     Pruritus ani     Pelvic floor dysfunction     Pudendal neuralgia     Coccydynia     Chronic, continuous use of opioids      Current Medications and Allergies:  See printed Medication Report.    Care Coordination Start Date: 11/2/2021   Frequency of Care Coordination: monthly     Form Last Updated: 01/18/2023

## 2023-01-18 NOTE — TELEPHONE ENCOUNTER
Called pt.   Reviewed his injection history.  He was also given the # to medical records to make sure the paperwork gets sent over.  The release of information is already in the chart.      CARMEN Mercado-BSN  [facility]

## 2023-01-18 NOTE — PROGRESS NOTES
Clinic Care Coordination Contact    Follow Up Progress Note   ED visit Urinary retention   10/29/2021 PCP referral   Financial Support     Assessment:  Patient took himself off the Flomax and continues to void small amounts with the same burning    Dr Oneill/Urologist wants the patient to see his younger partner    Not able to get in for a Cysto until April 26  Patient is on a waiting list to get in sooner  Patient continues therapy/dry needling and next Wednesday the therapist will concentrate on the pelvis deeper massage in that area   Patient lives in [place] and so PT [place] and  dry needling in [facility]   Patient has not self catheterized  himself for 2 months   Pain in prostates and buttocks Prostate surgery in the past and might have a lot of scare tissue     Care Gaps:    Health Maintenance Due   Topic Date Due     PREVENTIVE CARE VISIT  Never done     ASTHMA ACTION PLAN  Never done     COVID-19 Vaccine (1) Never done     Pneumococcal Vaccine: Pediatrics (0 to 5 Years) and At-Risk Patients (6 to 64 Years) (1 of 2 - PPSV23) Never done     HIV SCREENING  Never done     HEPATITIS C SCREENING  Never done     LIPID  Never done     ZOSTER IMMUNIZATION (1 of 2) Never done     INFLUENZA VACCINE (1) Never done       Care Gap Goal set: Yes    Goals addressed this encounter:   Goals Addressed                    This Visit's Progress       Medical (pt-stated)   0%      Goal Statement #2: I will complete the Health Maintenance due in the next 1-3 months   Health Maintenance Due   Topic Date Due     PREVENTIVE CARE VISIT  Never done     ASTHMA ACTION PLAN  Never done     ADVANCE CARE PLANNING  Never done     Pneumococcal Vaccine: Pediatrics (0 to 5 Years) and At-Risk Patients (6 to 64 Years) (1 of 2 - PPSV23) Never done     COVID-19 Vaccine (1) Never done     HIV SCREENING  Never done     HEPATITIS C SCREENING  Never done     LIPID  Never done     ZOSTER IMMUNIZATION (1 of 2) Never done     INFLUENZA VACCINE (1)  Never done      Date Goal set: 11/2/2021  Barriers: None identified   Strengths: agrees with the plan   Date to Achieve By: 2/2/2022  Patient expressed understanding of goal: Yes  Action steps to achieve this goal:  1. I will discuss with my provider at a future visit            Pain Management (pt-stated)   90%      Goal Statement #1: I will be able to urinate on my own without difficulty .    I will decrease bilateral buttocks and thigh pain in the next 3-6  months   Date Goal set: 11/2/2021  Barriers: constant pain   Strengths: Motivated   Date to Achieve By: 5/2/2022  Patient expressed understanding of goal: Yes  Action steps to achieve this goal:  1. I will keep future Primary Care ,Urology, Pain Clinic appointments and will make a future Neurology appointment   2. I will drink plenty of water to keep my urine clear and yellow  3. I will take Tylenol/Ibuprofen and Gabapentin as directed   4. I will walk a block when able to increase strength   5. I will continue dry needling 3 times a week               Intervention/Education provided during outreach: Continues to push fluids      Outreach Frequency: 2 weeks    Plan:   Patient will continue dry needling and PT as scheduled    Care Coordinator will follow up in 1-2 weeks   [facility]   Omaira Daigle RN, Care Coordinator   [facility]   E-mail [email or web address]   [phone]   Difficult living condition - lives with daughter's exboyfriend grandmother, has sister that lives in [place], difficulty affording medications

## 2023-01-18 NOTE — PROGRESS NOTES
Clinic Care Coordination Contact  [facility]/Voicemail       Clinical Data: Care Coordinator Outreach  Outreach attempted x 1.  Left message on patient's voicemail with call back information and requested return call.  Plan: Care Coordinator sent care coordination introduction letter on 3/18/22 via [employer]. Care Coordinator will try to reach patient again in 3-5 business days.    Sebastian Jimenez MSN, RN, PHN, CCM   Primary Care Clinical RN Care Coordinator  [facility]  1/18/2023   12:37 PM  [email or web address]  Office: [phone]

## 2023-01-25 ENCOUNTER — PATIENT OUTREACH (OUTPATIENT)
Dept: CARE COORDINATION | Facility: CLINIC | Age: 63
End: 2023-01-25
Payer: MEDICAID

## 2023-01-25 NOTE — PROGRESS NOTES
Clinic Care Coordination Contact    Follow Up Progress Note      Assessment: The RN CC nurse care coordinator contacted the patient by phone for a follow-up call.  At this time the patient is working with urology down at [facility] in [place].  And he is also working with orthopedics here in the [facility].  The patient is scheduled to have a steroid injection done today with the orthopedic doing it at this time.  The patient is feeling that with the orthopedic doing the injection it will be going exactly where he wants it to be.  Per the patient the orthopedic wants to know how long the relief lasts as this is the nerve that he feels is causing the patient the pain.    Medication review was completed with the patient and marked as reviewed.  Health maintenance was reviewed and questions were answered with the patient.  The assessment for hypertension was completed with the patient today as well as the social determinants of health and they were marked as reviewed.      Care Gaps:    Health Maintenance Due   Topic Date Due     YEARLY PREVENTIVE VISIT  Never done     COVID-19 Vaccine (1) Never done     ZOSTER IMMUNIZATION (1 of 2) Never done     INFLUENZA VACCINE (1) Never done       Care Gaps Last addressed on 1/25/23    Care Plans  Care Plan: General urination without pain     Problem: HP GENERAL PROBLEM     Goal: General Goal - urinate on my own without difficulty.  Decrease bilateral buttocks and thigh pain .     Start Date: 11/2/2021 Expected End Date: 8/2/2023    This Visit's Progress: 40% Recent Progress: 40%    Note:     Goal Statement #1: I will be able to urinate on my own without difficulty .    I will decrease bilateral buttocks and thigh pain in the next 3-6  months   Barriers: constant pain   Strengths: Motivated   Patient expressed understanding of goal: Yes  Action steps to achieve this goal:  1. I will keep future Primary Care ( recently switched to the [facility] ),Urology, Pain  Clinic appointments and will make a future Neurology appointment   2. I will drink plenty of water to keep my urine clear and yellow  3. I will take Tylenol/Ibuprofen and Gabapentin as directed   4. I will walk a block when able to increase strength   5. I will continue dry needling 3 times a week  6. I will keep future appointments scheduled with [facility] specialist in [place] June 6, 8 and 10th                        Intervention/Education provided during outreach: The RN CC reviewed with the patient the actions to keep his pain down.     Outreach Frequency: monthly        Plan:   1.  The patient will take all medications as prescribed by the providers.  2.  The patient will make certain that he is drinking plenty of fluids.  3.  The patient will make and attend all future appointments with his providers.    RN CC Nurse Care Coordinator will follow up in 30 days.          Sebastian Jimenez MSN, RN, PHN, CCM   Primary Care Clinical RN Care Coordinator  [facility]  1/25/2023   9:51 AM  [email or web address]  Office: [phone]

## 2023-01-27 DIAGNOSIS — R33.9 URINARY RETENTION: ICD-10-CM

## 2023-01-27 NOTE — TELEPHONE ENCOUNTER
terazosin (HYTRIN) 2 MG capsule  Last Written Prescription Date:  ?  Last Fill Quantity: ?,   # refills: ?  Last Office Visit :  10/26/2022  Future Office visit:  None    Routing refill request to provider for review/approval because:  Medication is reported/historical      Valerie Krause RN  Central Triage Red Flags/Med Refills

## 2023-01-30 ENCOUNTER — TELEPHONE (OUTPATIENT)
Dept: FAMILY MEDICINE | Facility: CLINIC | Age: 63
End: 2023-01-30
Payer: MEDICAID

## 2023-01-30 RX ORDER — TERAZOSIN 1 MG/1
1 CAPSULE ORAL DAILY
Qty: 90 CAPSULE | Refills: 1 | Status: SHIPPED | OUTPATIENT
Start: 2023-01-30 | End: 2023-03-23

## 2023-01-30 NOTE — TELEPHONE ENCOUNTER
RN left message to return call to clinic [phone] and referred patient to check for refill at pharmacy (no PHI left on voicemail)    Michaela Yan RN on 1/30/2023 at 12:08 PM

## 2023-02-23 ENCOUNTER — PATIENT OUTREACH (OUTPATIENT)
Dept: CARE COORDINATION | Facility: CLINIC | Age: 63
End: 2023-02-23
Payer: MEDICAID

## 2023-02-23 NOTE — PROGRESS NOTES
Clinic Care Coordination Contact  [facility]/Voicemail       Clinical Data: Care Coordinator Outreach  Outreach attempted x 1.  Left message on patient's voicemail with call back information and requested return call.  Plan: Care Coordinator sent care coordination introduction letter on 3/18/22 via [employer]. Care Coordinator will try to reach patient again in 1 month.    The patient is currently on vacation.  The RN CC will place the next call for 1 month out.  That will need to be an annual assessment.          Sebastian Jimenez MSN, RN, PHN, CCM   Primary Care Clinical RN Care Coordinator  [facility]  2/23/2023   2:51 PM  [email or web address]  Office: [phone]

## 2023-03-08 ENCOUNTER — MEDICAL CORRESPONDENCE (OUTPATIENT)
Dept: HEALTH INFORMATION MANAGEMENT | Facility: CLINIC | Age: 63
End: 2023-03-08

## 2023-03-09 ENCOUNTER — TRANSFERRED RECORDS (OUTPATIENT)
Dept: HEALTH INFORMATION MANAGEMENT | Facility: CLINIC | Age: 63
End: 2023-03-09

## 2023-03-09 ENCOUNTER — ANCILLARY PROCEDURE (OUTPATIENT)
Dept: GENERAL RADIOLOGY | Facility: CLINIC | Age: 63
End: 2023-03-09
Attending: ORTHOPAEDIC SURGERY
Payer: COMMERCIAL

## 2023-03-09 DIAGNOSIS — M62.89 PELVIC FLOOR DYSFUNCTION: ICD-10-CM

## 2023-03-09 DIAGNOSIS — M53.3 SACROILIAC PAIN: ICD-10-CM

## 2023-03-09 PROCEDURE — 72190 X-RAY EXAM OF PELVIS: CPT | Mod: TC | Performed by: RADIOLOGY

## 2023-03-09 RX ORDER — OXYCODONE HYDROCHLORIDE 5 MG/1
5 TABLET ORAL DAILY PRN
Qty: 30 TABLET | Refills: 0 | Status: SHIPPED | OUTPATIENT
Start: 2023-03-09 | End: 2023-08-03

## 2023-03-09 NOTE — TELEPHONE ENCOUNTER
Patient came into clinic 3/9/2023, requesting this medication refill.   Silvana Gallego Registration.  Thank you!

## 2023-03-20 ENCOUNTER — ANCILLARY PROCEDURE (OUTPATIENT)
Dept: GENERAL RADIOLOGY | Facility: CLINIC | Age: 63
End: 2023-03-20
Attending: ORTHOPAEDIC SURGERY
Payer: COMMERCIAL

## 2023-03-20 DIAGNOSIS — M54.50 LUMBAR PAIN: ICD-10-CM

## 2023-03-20 DIAGNOSIS — M54.16 LUMBAR RADICULOPATHY: ICD-10-CM

## 2023-03-20 PROCEDURE — 72110 X-RAY EXAM L-2 SPINE 4/>VWS: CPT | Mod: TC | Performed by: RADIOLOGY

## 2023-03-22 ENCOUNTER — TELEPHONE (OUTPATIENT)
Dept: FAMILY MEDICINE | Facility: CLINIC | Age: 63
End: 2023-03-22
Payer: MEDICAID

## 2023-03-22 DIAGNOSIS — R33.9 URINARY RETENTION: ICD-10-CM

## 2023-03-22 NOTE — TELEPHONE ENCOUNTER
Patient came into clinic requesting medication refill, keep getting told that it is too soon taking medication twice a day, and will be out of medication in three day's. Please call [phone] if any questions.  Silvana Gallego Registration.  Thank you!

## 2023-03-22 NOTE — TELEPHONE ENCOUNTER
Routing refill request high priority to PCP, due to patient's message below.    Requested Prescriptions   Pending Prescriptions Disp Refills     terazosin (HYTRIN) 1 MG capsule 90 capsule 1     Sig: Take 1 capsule (1 mg) by mouth daily       There is no refill protocol information for this order          Kit CARMEN Martin

## 2023-03-23 ENCOUNTER — OFFICE VISIT (OUTPATIENT)
Dept: FAMILY MEDICINE | Facility: CLINIC | Age: 63
End: 2023-03-23
Payer: COMMERCIAL

## 2023-03-23 VITALS
OXYGEN SATURATION: 98 % | DIASTOLIC BLOOD PRESSURE: 72 MMHG | BODY MASS INDEX: 31.02 KG/M2 | RESPIRATION RATE: 16 BRPM | SYSTOLIC BLOOD PRESSURE: 138 MMHG | HEART RATE: 99 BPM | HEIGHT: 65 IN | WEIGHT: 186.2 LBS | TEMPERATURE: 98.3 F

## 2023-03-23 DIAGNOSIS — R33.9 URINARY RETENTION: ICD-10-CM

## 2023-03-23 DIAGNOSIS — M54.41 CHRONIC RIGHT-SIDED LOW BACK PAIN WITH RIGHT-SIDED SCIATICA: ICD-10-CM

## 2023-03-23 DIAGNOSIS — G89.29 CHRONIC RIGHT-SIDED LOW BACK PAIN WITH RIGHT-SIDED SCIATICA: ICD-10-CM

## 2023-03-23 DIAGNOSIS — Z01.818 PREOP GENERAL PHYSICAL EXAM: Primary | ICD-10-CM

## 2023-03-23 LAB
ANION GAP SERPL CALCULATED.3IONS-SCNC: 3 MMOL/L (ref 3–14)
BUN SERPL-MCNC: 19 MG/DL (ref 7–30)
CALCIUM SERPL-MCNC: 9.3 MG/DL (ref 8.5–10.1)
CHLORIDE BLD-SCNC: 107 MMOL/L (ref 94–109)
CO2 SERPL-SCNC: 29 MMOL/L (ref 20–32)
CREAT SERPL-MCNC: 0.77 MG/DL (ref 0.66–1.25)
ERYTHROCYTE [DISTWIDTH] IN BLOOD BY AUTOMATED COUNT: 13 % (ref 10–15)
GFR SERPL CREATININE-BSD FRML MDRD: >90 ML/MIN/1.73M2
GLUCOSE BLD-MCNC: 119 MG/DL (ref 70–99)
HCT VFR BLD AUTO: 43.5 % (ref 40–53)
HGB BLD-MCNC: 14.1 G/DL (ref 13.3–17.7)
MCH RBC QN AUTO: 29.4 PG (ref 26.5–33)
MCHC RBC AUTO-ENTMCNC: 32.4 G/DL (ref 31.5–36.5)
MCV RBC AUTO: 91 FL (ref 78–100)
PLATELET # BLD AUTO: 335 10E3/UL (ref 150–450)
POTASSIUM BLD-SCNC: 4.2 MMOL/L (ref 3.4–5.3)
RBC # BLD AUTO: 4.79 10E6/UL (ref 4.4–5.9)
SODIUM SERPL-SCNC: 139 MMOL/L (ref 133–144)
WBC # BLD AUTO: 8.7 10E3/UL (ref 4–11)

## 2023-03-23 PROCEDURE — 36415 COLL VENOUS BLD VENIPUNCTURE: CPT | Performed by: PHYSICIAN ASSISTANT

## 2023-03-23 PROCEDURE — 85027 COMPLETE CBC AUTOMATED: CPT | Performed by: PHYSICIAN ASSISTANT

## 2023-03-23 PROCEDURE — 99214 OFFICE O/P EST MOD 30 MIN: CPT | Performed by: PHYSICIAN ASSISTANT

## 2023-03-23 PROCEDURE — 80048 BASIC METABOLIC PNL TOTAL CA: CPT | Performed by: PHYSICIAN ASSISTANT

## 2023-03-23 RX ORDER — TERAZOSIN 1 MG/1
1 CAPSULE ORAL 2 TIMES DAILY
Qty: 180 CAPSULE | Refills: 3 | Status: SHIPPED | OUTPATIENT
Start: 2023-03-23 | End: 2023-03-24

## 2023-03-23 ASSESSMENT — ANXIETY QUESTIONNAIRES
5. BEING SO RESTLESS THAT IT IS HARD TO SIT STILL: NOT AT ALL
GAD7 TOTAL SCORE: 1
7. FEELING AFRAID AS IF SOMETHING AWFUL MIGHT HAPPEN: NOT AT ALL
3. WORRYING TOO MUCH ABOUT DIFFERENT THINGS: NOT AT ALL
7. FEELING AFRAID AS IF SOMETHING AWFUL MIGHT HAPPEN: NOT AT ALL
1. FEELING NERVOUS, ANXIOUS, OR ON EDGE: NOT AT ALL
2. NOT BEING ABLE TO STOP OR CONTROL WORRYING: NOT AT ALL
GAD7 TOTAL SCORE: 1
6. BECOMING EASILY ANNOYED OR IRRITABLE: NOT AT ALL
4. TROUBLE RELAXING: SEVERAL DAYS
IF YOU CHECKED OFF ANY PROBLEMS ON THIS QUESTIONNAIRE, HOW DIFFICULT HAVE THESE PROBLEMS MADE IT FOR YOU TO DO YOUR WORK, TAKE CARE OF THINGS AT HOME, OR GET ALONG WITH OTHER PEOPLE: VERY DIFFICULT
8. IF YOU CHECKED OFF ANY PROBLEMS, HOW DIFFICULT HAVE THESE MADE IT FOR YOU TO DO YOUR WORK, TAKE CARE OF THINGS AT HOME, OR GET ALONG WITH OTHER PEOPLE?: VERY DIFFICULT

## 2023-03-23 ASSESSMENT — PAIN SCALES - GENERAL: PAINLEVEL: WORST PAIN (10)

## 2023-03-23 NOTE — TELEPHONE ENCOUNTER
Patient notified and voiced understanding and agreement.    Mihaela Hua RN BSN  [facility]

## 2023-03-23 NOTE — PROGRESS NOTES
[facility] [place]  [address] [facility] Center[facility]swers for HPI/ROS submitted by the patient on 3/23/2023  CHRISSY 7 TOTAL SCORE: 1      [place] [address]  Phone: [phone]  Primary Provider: Silver Pro  Pre-op Performing Provider: KEILY IYER      PREOPERATIVE EVALUATION:  Today's date: 3/23/2023    Mustapha Negrete is a 62 year old male who presents for a preoperative evaluation.  Additional Questions 3/23/2023   Roomed by Mei Browne CMA   Accompanied by No one     Patient Reported Additional Medications 3/23/2023   Patient reports taking the following new medications None     Surgical Information:  Surgery/Procedure: lumbosacral fusion  Surgery Location: [facility]  Surgeon: Dr. Cohen of [facility]  Surgery Date: 4/10/23  Time of Surgery: TBD   Where patient plans to recover: At home with family  Fax number for surgical facility: [phone]     Assessment & Plan     The proposed surgical procedure is considered INTERMEDIATE risk.    Problem List Items Addressed This Visit    None  Visit Diagnoses     Preop general physical exam    -  Primary    Relevant Orders    Basic metabolic panel  (Ca, Cl, CO2, Creat, Gluc, K, Na, BUN)    CBC with platelets    Chronic right-sided low back pain with right-sided sciatica        Relevant Orders    Basic metabolic panel  (Ca, Cl, CO2, Creat, Gluc, K, Na, BUN)    CBC with platelets                 Risks and Recommendations:  The patient has the following additional risks and recommendations for perioperative complications:   - No identified additional risk factors other than previously addressed    Medication Instructions:  Patient is to take all scheduled medications on the day of surgery EXCEPT for modifications listed below:   -no supplements the day of the surgery   - ACE/ARB: May be continued on the day of surgery.    - Opioids: Continue without modification.   - pregabalin, gabapentin: Continue without modification.   -  ibuprofen (Advil, Motrin): HOLD 1 day before surgery.    - naproxen (Aleve, Naprosyn): HOLD 4 days before surgery.     RECOMMENDATION:  APPROVAL GIVEN to proceed with proposed procedure    Ordering of each unique test    Subjective     HPI related to upcoming procedure: chronic low back pain with radiation into RLE. Having a fusion performed at [place].    Preop Questions 3/23/2023   1. Have you ever had a heart attack or stroke? No   2. Have you ever had surgery on your heart or blood vessels, such as a stent placement, a coronary artery bypass, or surgery on an artery in your head, neck, heart, or legs? No   3. Do you have chest pain with activity? No   4. Do you have a history of  heart failure? No   5. Do you currently have a cold, bronchitis or symptoms of other infection? No   6. Do you have a cough, shortness of breath, or wheezing? No   7. Do you or anyone in your family have previous history of blood clots? No   8. Do you or does anyone in your family have a serious bleeding problem such as prolonged bleeding following surgeries or cuts? No   9. Have you ever had problems with anemia or been told to take iron pills? No   10. Have you had any abnormal blood loss such as black, tarry or bloody stools? No   11. Have you ever had a blood transfusion? No   12. Are you willing to have a blood transfusion if it is medically needed before, during, or after your surgery? Yes   13. Have you or any of your relatives ever had problems with anesthesia? YES - patient had lightheadedness and difficulty waking up. Feels this was due to Versed. Post op hypoglycemia?   14. Do you have sleep apnea, excessive snoring or daytime drowsiness? YES - sleep apnea   14a. Do you have a CPAP machine? No. No apnea when supine.   15. Do you have any artifical heart valves or other implanted medical devices like a pacemaker, defibrillator, or continuous glucose monitor? No   16. Do you have artificial joints? YES - left hip, left shoulder    17. Are you allergic to latex? No       Health Care Directive:  Patient does not have a Health Care Directive or Living Will: Patient states has Advance Directive and will bring in a copy to clinic.    Preoperative Review of :   reviewed - controlled substances reflected in medication list.    Status of Chronic Conditions:  HYPERTENSION - Patient has longstanding history of HTN , currently denies any symptoms referable to elevated blood pressure. Specifically denies chest pain, palpitations, dyspnea, orthopnea, PND or peripheral edema. Blood pressure readings have been in normal range. Current medication regimen is as listed below. Patient denies any side effects of medication.       Review of Systems  CONSTITUTIONAL: NEGATIVE for fever, chills, change in weight  INTEGUMENTARY/SKIN: NEGATIVE for worrisome rashes, moles or lesions  EYES: NEGATIVE for vision changes or irritation  ENT/MOUTH: NEGATIVE for ear, mouth and throat problems  RESP: NEGATIVE for significant cough or SOB  CV: NEGATIVE for chest pain, palpitations or peripheral edema  GI: mild nausea and constipation on oxycodone NEGATIVE for abdominal pain, heartburn  : NEGATIVE for frequency, dysuria, or hematuria  MUSCULOSKELETAL: NEGATIVE for significant arthralgias or myalgia  NEURO: NEGATIVE for weakness, dizziness or paresthesias  ENDOCRINE: NEGATIVE for temperature intolerance, skin/hair changes  HEME: NEGATIVE for bleeding problems  PSYCHIATRIC: NEGATIVE for changes in mood or affect    Patient Active Problem List    Diagnosis Date Noted     Chronic, continuous use of opioids 09/08/2022     Priority: Medium     Patient is followed by Silver Pro DO for ongoing prescription of pain medication.  All refills should only be approved by this provider, or covering partner.    Medication(s): oxycodone 5 mg  Maximum quantity per month: 30  Clinic visit frequency required: Q 3 months   PDMP Review       Value Time User    State PDMP site checked   Yes 7/3/2022 10:43 AM Jose Pérez MD        Controlled substance agreement:  CSA -- Patient Level:    Controlled Substance Agreement - Opioid - Scan on 9/8/2022 11:41 AM       Pain Clinic evaluation in the past: No    Opioid Risk Tool Total Score(s):  No flowsheet data found.  Last [facility] website verification:  done on 9/8/22   [email or web address]         Pelvic floor dysfunction 05/13/2022     Priority: Medium     Pudendal neuralgia 05/13/2022     Priority: Medium     Coccydynia 05/13/2022     Priority: Medium     Change in bowel habits 01/03/2022     Priority: Medium     Complete tear of left rotator cuff 01/03/2022     Priority: Medium     Pruritus ani 01/03/2022     Priority: Medium     Tear of right acetabular labrum, sequela 10/28/2021     Priority: Medium     Lumbar disc herniation with radiculopathy 10/28/2021     Priority: Medium     MRI 10/21  2.  At L4-L5, there is a right paracentral slightly caudally directed disc extrusion that narrows the right lateral recess and causes mass effect on the descending/traversing right L5 nerve root.  3.  At L3-L4, there is a right paracentral disc protrusion superimposed on disc bulge with narrowing of the right lateral recess and mass effect on the descending/traversing right L4 nerve root.       Acute prostatitis 10/06/2021     Priority: Medium     Rectal pain 10/06/2021     Priority: Medium     Penile pain 10/06/2021     Priority: Medium     Perineal pain in male 10/06/2021     Priority: Medium     Anal fissure 08/26/2021     Priority: Medium     Constipation 08/26/2021     Priority: Medium     Urinary retention 08/16/2021     Priority: Medium     Neuropathy 08/16/2021     Priority: Medium     Diverticular disease of large intestine 08/03/2021     Priority: Medium     Benign prostatic hyperplasia with urinary obstruction 07/12/2021     Priority: Medium     Calculus of gallbladder with chronic cholecystitis without obstruction 05/25/2021      Priority: Medium     MARIAN (obstructive sleep apnea) 05/03/2021     Priority: Medium     On BiPAP, see scans.       Symptomatic cholelithiasis 04/06/2021     Priority: Medium     COVID-19 virus infection 04/06/2021     Priority: Medium     Hepatic steatosis 03/26/2021     Priority: Medium     Psoriatic arthritis (H) 03/15/2021     Priority: Medium     Arthritis of left acromioclavicular joint 08/05/2019     Priority: Medium     Lower urinary tract symptoms 05/13/2019     Priority: Medium     Mild intermittent asthma, uncomplicated 04/05/2017     Priority: Medium     OA (osteoarthritis) of hip 05/19/2015     Priority: Medium     ASHD (arteriosclerotic heart disease) 10/25/2013     Priority: Medium     Hyperlipidemia 10/25/2013     Priority: Medium     Reflux esophagitis 04/18/2013     Priority: Medium     Hiatal hernia 04/18/2013     Priority: Medium     Insomnia, unspecified 02/27/2013     Priority: Medium     GERD (gastroesophageal reflux disease) 10/16/2012     Priority: Medium     Low testosterone 10/16/2012     Priority: Medium     Displacement of lumbar intervertebral disc without myelopathy 09/12/2011     Priority: Medium      Past Medical History:   Diagnosis Date     MARIAN (obstructive sleep apnea)     havne't used cpap since 2018     Pelvic floor dysfunction      Past Surgical History:   Procedure Laterality Date     BACK SURGERY       CHOLECYSTECTOMY       COLONOSCOPY N/A 8/31/2022    Procedure: COLONOSCOPY;  Surgeon: Veena Mendoza MD;  Location:  GI     ENT SURGERY      tonsillectomy     GENITOURINARY SURGERY      TURP     IMPLANT STIMULATOR SACRAL NERVE PERCUTANEOUS TRIAL N/A 10/17/2022    Procedure: INSERTION, NEUROSTIMULATOR, SACRAL, PERCUTANEOUS, FOR TRIAL (trial with Instapio);  Surgeon: Nayan Tello MD;  [place] [facility] [place]     JOINT REPLACEMENT, HIP RT/LT Left      ORTHOPEDIC SURGERY      wrist surgery, carpal tunnel 2005     PROSTATE SURGERY      Urolift     ROTATOR CUFF REPAIR RT/LT  Left      Current Outpatient Medications   Medication Sig Dispense Refill     albuterol (PROAIR HFA/PROVENTIL HFA/VENTOLIN HFA) 108 (90 Base) MCG/ACT inhaler Inhale 2 puffs into the lungs every 6 hours       lisinopril (ZESTRIL) 10 MG tablet Take 1 tablet (10 mg) by mouth daily 90 tablet 3     oxyCODONE (ROXICODONE) 5 MG tablet Take 1 tablet (5 mg) by mouth daily as needed for severe pain (7-10) 30 tablet 0     terazosin (HYTRIN) 1 MG capsule Take 1 capsule (1 mg) by mouth daily 90 capsule 1     gabapentin (NEURONTIN) 300 MG capsule 2 tablets tid (Patient not taking: Reported on 3/23/2023) 150 capsule 3       Allergies   Allergen Reactions     Droperidol Other (See Comments)     Extrapyramidal Side Effect     Fenofibrate Headache and Diarrhea              Fentanyl      Other reaction(s): N&V hard to wake up     Keflex [Cephalexin] Itching     Lactose GI Disturbance         Other reaction(s): GI upset     Midazolam      Other reaction(s): N&V, Hard to wake up     Monosodium Glutamate      Other reaction(s): diarrhea, headaches     Pcn [Penicillins] Other (See Comments)     Feels warm and flushed, but tolerates Cephalexin     Atorvastatin Palpitations     Other reaction(s): palpitations     Sertraline Palpitations         Other reaction(s): Unknown     Simvastatin Palpitations     Other reaction(s): palpitations     Sulfa Drugs Headache and Rash     Burning    Other reaction(s): Rash  Other reaction(s): rash and headache     Tetracycline Rash     Burning    Other reaction(s): rash        Social History     Tobacco Use     Smoking status: Former     Packs/day: 1.00     Years: 5.00     Pack years: 5.00     Types: Cigarettes     Quit date: 1999     Years since quittin.3     Smokeless tobacco: Never   Substance Use Topics     Alcohol use: Yes     Comment: very little     Family History   Problem Relation Age of Onset     Cerebrovascular Disease Mother      Hypertension Mother      Diabetes Mother      Liver  "Cancer Mother      Cancer Father 84        liver cancer     Prostate Cancer Father      History   Drug Use     Types: Marijuana     Comment: gummies         Objective     /72   Pulse 99   Temp 98.3  F (36.8  C) (Tympanic)   Resp 16   Ht 1.651 m (5' 5\")   Wt 84.5 kg (186 lb 3.2 oz)   SpO2 98%   BMI 30.99 kg/m      Physical Exam    GENERAL APPEARANCE: healthy, alert and no distress     EYES: EOMI,  PERRL     HENT: nose and mouth without ulcers or lesions     NECK: no adenopathy, no asymmetry, masses, or scars and thyroid normal to palpation     RESP: lungs clear to auscultation - no rales, rhonchi or wheezes     CV: regular rates and rhythm, normal S1 S2, no S3 or S4 and no murmur, click or rub     ABDOMEN:  soft, nontender, no HSM or masses and bowel sounds normal     MS: No CVA or midline spinal tenderness. extremities normal- no gross deformities noted, no evidence of inflammation in joints, FROM in all extremities.     SKIN: no suspicious lesions or rashes     NEURO: Able to toe lift, heel walk and knee bend. Normal strength and tone, sensory exam grossly normal, mentation intact and speech normal     PSYCH: mentation appears normal. and affect normal/bright     LYMPHATICS: No cervical adenopathy    Recent Labs   Lab Test 01/05/23  1454 08/19/22  0952 11/11/21  1050 11/11/21  1049 10/11/21  1814   HGB  --  14.5  --  14.2 12.8*   PLT  --   --   --  347 300   INR  --   --  0.92  --   --     141  --   --  137   POTASSIUM 4.1 4.6  --   --  4.0   CR 0.94 0.89  --   --  0.90   A1C  --  5.9*  --   --   --         Diagnostics:  Results for orders placed or performed in visit on 03/23/23   CBC with platelets     Status: Normal   Result Value Ref Range    WBC Count 8.7 4.0 - 11.0 10e3/uL    RBC Count 4.79 4.40 - 5.90 10e6/uL    Hemoglobin 14.1 13.3 - 17.7 g/dL    Hematocrit 43.5 40.0 - 53.0 %    MCV 91 78 - 100 fL    MCH 29.4 26.5 - 33.0 pg    MCHC 32.4 31.5 - 36.5 g/dL    RDW 13.0 10.0 - 15.0 %    " Platelet Count 335 150 - 450 10e3/uL   Basic metabolic panel  (Ca, Cl, CO2, Creat, Gluc, K, Na, BUN)     Status: Abnormal   Result Value Ref Range    Sodium 139 133 - 144 mmol/L    Potassium 4.2 3.4 - 5.3 mmol/L    Chloride 107 94 - 109 mmol/L    Carbon Dioxide (CO2) 29 20 - 32 mmol/L    Anion Gap 3 3 - 14 mmol/L    Urea Nitrogen 19 7 - 30 mg/dL    Creatinine 0.77 0.66 - 1.25 mg/dL    Calcium 9.3 8.5 - 10.1 mg/dL    Glucose 119 (H) 70 - 99 mg/dL    GFR Estimate >90 >60 mL/min/1.73m2     No EKG required, no history of coronary heart disease, significant arrhythmia, peripheral arterial disease or other structural heart disease.    Revised Cardiac Risk Index (RCRI):  The patient has the following serious cardiovascular risks for perioperative complications:   - No serious cardiac risks = 0 points     RCRI Interpretation: 0 points: Class I (very low risk - 0.4% complication rate)           Signed Electronically by: DARVIN Wiggins  Copy of this evaluation report is provided to requesting physician.

## 2023-03-23 NOTE — PATIENT INSTRUCTIONS
Mary Luciano,    Thank you for allowing [facility] to manage your care.    Skip all supplements the day of your surgery. No ibuprofen, aspirin, naproxen or other NSAIDs for one week prior to your surgery. Otherwise, take your meds as prescribed.    If you develop worsening/changing symptoms at any time, please call 911 or go to the emergency department for evaluation.    I ordered some lab work, please go to the laboratory to get your studies.    Please allow 1-2 business days for our office to contact you in regards to your laboratory/radiological studies.  If not done so, I encourage you to login into [email or web address] [email or web address] to review your results as well.     Drink 8-10 glasses of fluid daily to stay well-hydrated.    If you have any questions or concerns, please feel free to call us at [phone]    Sincerely,    Marito Mancilla PA-C    Did you know?      You can schedule a video visit for follow-up appointments as well as future appointments for certain conditions.  Please see the below link.     [email or web address]    If you have not already done so,  I encourage you to sign up for [email or web address] ([email or web address]).  This will allow you to review your results, securely communicate with a provider, and schedule virtual visits as well.    For informational purposes only. Not to replace the advice of your health care provider. Copyright   2003, 2019 [facility]. All rights reserved. Clinically reviewed by Pema Gimenez MD. [place] [id number] - REV 12/22.  Preparing for Your Surgery  Getting started  A nurse will call you to review your health history and instructions. They will give you an arrival time based on your scheduled surgery time. Please be ready to share:  Your doctor's clinic name and phone number  Your medical, surgical, and anesthesia history  A list of allergies and sensitivities  A list of medicines,  including herbal treatments and over-the-counter drugs  Whether the patient has a legal guardian (ask how to send us the papers in advance)  Please tell us if you're pregnant--or if there's any chance you might be pregnant. Some surgeries may injure a fetus (unborn baby), so they require a pregnancy test. Surgeries that are safe for a fetus don't always need a test, and you can choose whether to have one.   If you have a child who's having surgery, please ask for a copy of Preparing for Your Child's Surgery.    Preparing for surgery  Within 10 to 30 days of surgery: Have a pre-op exam (sometimes called an H&P, or History and Physical). This can be done at a clinic or pre-operative center.  If you're having a , you may not need this exam. Talk to your care team.  At your pre-op exam, talk to your care team about all medicines you take. If you need to stop any medicines before surgery, ask when to start taking them again.  We do this for your safety. Many medicines can make you bleed too much during surgery. Some change how well surgery (anesthesia) drugs work.  Call your insurance company to let them know you're having surgery. (If you don't have insurance, call [phone].)  Call your clinic if there's any change in your health. This includes signs of a cold or flu (sore throat, runny nose, cough, rash, fever). It also includes a scrape or scratch near the surgery site.  If you have questions on the day of surgery, call your hospital or surgery center.  Eating and drinking guidelines  For your safety: Unless your surgeon tells you otherwise, follow the guidelines below.  Eat and drink as usual until 8 hours before you arrive for surgery. After that, no food or milk.  Drink clear liquids until 2 hours before you arrive. These are liquids you can see through, like water, Gatorade, and Propel Water. They also include plain black coffee and tea (no cream or milk), candy, and breath mints. You can spit out gum  when you arrive.  If you drink alcohol: Stop drinking it the night before surgery.  If your care team tells you to take medicine on the morning of surgery, it's okay to take it with a sip of water.  Preventing infection  Shower or bathe the night before and morning of your surgery. Follow the instructions your clinic gave you. (If no instructions, use regular soap.)  Don't shave or clip hair near your surgery site. We'll remove the hair if needed.  Don't smoke or vape the morning of surgery. You may chew nicotine gum up to 2 hours before surgery. A nicotine patch is okay.  Note: Some surgeries require you to completely quit smoking and nicotine. Check with your surgeon.  Your care team will make every effort to keep you safe from infection. We will:  Clean our hands often with soap and water (or an alcohol-based hand rub).  Clean the skin at your surgery site with a special soap that kills germs.  Give you a special gown to keep you warm. (Cold raises the risk of infection.)  Wear special hair covers, masks, gowns and gloves during surgery.  Give antibiotic medicine, if prescribed. Not all surgeries need antibiotics.  What to bring on the day of surgery  Photo ID and insurance card  Copy of your health care directive, if you have one  Glasses and hearing aids (bring cases)  You can't wear contacts during surgery  Inhaler and eye drops, if you use them (tell us about these when you arrive)  CPAP machine or breathing device, if you use them  A few personal items, if spending the night  If you have . . .  A pacemaker, ICD (cardiac defibrillator) or other implant: Bring the ID card.  An implanted stimulator: Bring the remote control.  A legal guardian: Bring a copy of the certified (court-stamped) guardianship papers.  Please remove any jewelry, including body piercings. Leave jewelry and other valuables at home.  If you're going home the day of surgery  You must have a responsible adult drive you home. They should stay  with you overnight as well.  If you don't have someone to stay with you, and you aren't safe to go home alone, we may keep you overnight. Insurance often won't pay for this.  After surgery  If it's hard to control your pain or you need more pain medicine, please call your surgeon's office.  Questions?   If you have any questions for your care team, list them here: _________________________________________________________________________________________________________________________________________________________________________ ____________________________________ ____________________________________ ____________________________________

## 2023-03-24 ENCOUNTER — PATIENT OUTREACH (OUTPATIENT)
Dept: CARE COORDINATION | Facility: CLINIC | Age: 63
End: 2023-03-24
Payer: MEDICAID

## 2023-03-24 RX ORDER — TERAZOSIN 2 MG/1
2 CAPSULE ORAL AT BEDTIME
Qty: 90 CAPSULE | Refills: 3 | Status: SHIPPED | OUTPATIENT
Start: 2023-03-24 | End: 2023-08-03

## 2023-03-24 ASSESSMENT — ACTIVITIES OF DAILY LIVING (ADL): DEPENDENT_IADLS:: TRANSPORTATION;INDEPENDENT

## 2023-03-24 NOTE — LETTER
[facility]  Patient Centered Plan of Care  About Me:        Patient Name:  Mustapha Negrete    YOB: 1960  Age:         62 year old   Rustam MRN:    [id number] Telephone Information:  Home Phone [phone]   Mobile [phone]       Address:  [id number] [place] [address] Email address:  [email or web address]      Emergency Contact(s)    Name Relationship Lgl Grd Work Phone Home Phone Mobile Phone   STEFANO AYOUB Son   [phone]           Primary language:  English     needed? No   [employer]:  [phone] op. 1  Other communication barriers:None    Preferred Method of Communication:     Current living arrangement: I live in a private home (Patient stated he lives with his adult son)    Mobility Status/ Medical Equipment: Independent        Health Maintenance  Health Maintenance Reviewed: Due/Overdue   Health Maintenance Due   Topic Date Due     YEARLY PREVENTIVE VISIT  Never done     COVID-19 Vaccine (1) Never done     ZOSTER IMMUNIZATION (1 of 2) Never done     INFLUENZA VACCINE (1) Never done           My Access Plan  Medical Emergency 911   Primary Clinic Line [facility] [phone]   24 Hour Appointment Line [phone] or  [phone]) (toll-free)   24 Hour Nurse Line [phone] (toll-free)   Preferred Urgent Care [facility], [phone]     [facility] [place], [place]  [phone]     Preferred Pharmacy [facility] [address] [facility] - [place], [place] - [address]     Behavioral Health Crisis Line The National Suicide Prevention Lifeline at 1-671.897.9908 or Text/Call 098             My Care Team Members  Patient Care Team       Relationship Specialty Notifications Start End    Silver Pro DO PCP - General Family Medicine Admissions 7/6/22     Phone: [phone] Fax: [phone]         [address] [facility] [address]  [facility] [address]    JACOB Oneill MD Assigned Surgical Provider   9/26/21     Phone: [phone] Pager: [phone] Fax: [phone] [address] [facility] [address]    Matty Ruiz MD MD Neurological Surgery  10/18/21     Phone: [phone] Fax: [phone]         [address] [facility] [address]    Max Rico MD Referring Physician Family Medicine  10/18/21     Phone: [phone] Fax: [phone] [address] [facility] [address] [place] [address]    Kristal Drummond MD Assigned Neuroscience Provider   10/31/21     Phone: [phone] Pager: [phone] Fax: [phone]        [address] [address]    Sebastian Rock RN Lead Care Coordinator Primary Care - CC Admissions 11/2/21     Phone: [phone] Fax: [phone]        Silver Pro DO Assigned PCP   1/7/23     Phone: [phone] Fax: [phone]         [address] [facility] [address] [facility] [address]    Silver Pro DO Assigned Pain Medication Provider   1/9/23     Phone: [phone] Fax: [phone]         [address] [facility] [address] [facility] [address]            My Care Plans  Self Management and Treatment Plan  Care Plan  Care Plan: General urination without pain     Problem: HP GENERAL PROBLEM     Goal: General Goal - urinate on my own without difficulty.  Decrease bilateral buttocks and thigh pain .     Start Date: 11/2/2021 Expected End Date: 8/2/2023    This Visit's Progress: 40% Recent Progress: 40%    Note:     Goal Statement #1: I will be able to urinate on my own without difficulty .    I will decrease bilateral buttocks and thigh pain in the next 3-6  months   Barriers: constant pain   Strengths: Motivated   Patient expressed understanding of goal: Yes  Action steps to achieve this goal:  1. I will keep future Primary Care ( recently switched to the [facility] ),Urology, Pain Clinic appointments and will make a future Neurology appointment   2. I  will drink plenty of water to keep my urine clear and yellow  3. I will take Tylenol/Ibuprofen and Gabapentin as directed   4. I will walk a block when able to increase strength   5. I will continue dry needling 3 times a week  6. I will keep future appointments scheduled with [facility] specialist in [place] June 6, 8 and 10th                         Action Plans on File:   Asthma                   Advance Care Plans/Directives Type:   No data recorded    My Medical and Care Information  Problem List   Patient Active Problem List   Diagnosis     Acute prostatitis     Rectal pain     Penile pain     Perineal pain in male     Urinary retention     Symptomatic cholelithiasis     Reflux esophagitis     Psoriatic arthritis (H)     MARIAN (obstructive sleep apnea)     OA (osteoarthritis) of hip     Neuropathy     Mild intermittent asthma, uncomplicated     Insomnia, unspecified     GERD (gastroesophageal reflux disease)     Hepatic steatosis     Benign prostatic hyperplasia with urinary obstruction     Anal fissure     Tear of right acetabular labrum, sequela     Lumbar disc herniation with radiculopathy     Lower urinary tract symptoms     Arthritis of left acromioclavicular joint     ASHD (arteriosclerotic heart disease)     Calculus of gallbladder with chronic cholecystitis without obstruction     Change in bowel habits     Complete tear of left rotator cuff     Constipation     COVID-19 virus infection     Displacement of lumbar intervertebral disc without myelopathy     Diverticular disease of large intestine     Hiatal hernia     Hyperlipidemia     Low testosterone     Pruritus ani     Pelvic floor dysfunction     Pudendal neuralgia     Coccydynia     Chronic, continuous use of opioids      Current Medications and Allergies:  See printed Medication Report.    Care Coordination Start Date: 11/2/2021   Frequency of Care Coordination: monthly     Form Last Updated: 03/24/2023

## 2023-03-24 NOTE — TELEPHONE ENCOUNTER
I called and spoke to patient, he said [facility] is setting this up now, I read him the new dosing and reminded him it is a higher mg per tab so only take once daily.    Patient verbalized understanding of and agreement with plan.    Mei Mendez RN  [facility]

## 2023-03-24 NOTE — PROGRESS NOTES
Clinic Care Coordination Contact    Clinic Care Coordination Contact  OUTREACH    Referral Information:  Referral Source: PCP    Primary Diagnosis: Genitourinary Disorders    Chief Complaint   Patient presents with     Clinic Care Coordination - Follow-up     RN CC nurse care coordinator        Universal Utilization: The patient uses the [facility] system and the [facility] or the [facility].  Clinic Utilization  Difficulty keeping appointments:: No  Compliance Concerns: No  No-Show Concerns: No  No PCP office visit in Past Year: No  Utilization    Hospital Admissions  1             ED Visits  1             No Show Count (past year)  0                Current as of: 3/23/2023 11:41 PM              Clinical Concerns:  Current Medical Concerns: The patient is scheduled for 4/10/2023 for back fusion surgery.  Per the patient one of the providers that was doing his injections encouraged him to see a surgeon and when he went they suggested fusion surgery.  The patient is encouraged by the fact that many of his burning pain issues are related to the nerves that are being compressed.  Therefore he is very hopeful that having those nerves noncompressed that some of the pain will be gone.  The patient will have his son moving home for a couple of weeks to help care for him.  The patient has a walker and a cane at home, and he will be doing PT hopefully at home to begin with.  The RN CC nurse care coordinator will monitor for the patient to be discharged from [facility] following his surgery and contact him to make sure things are going smoothly.    Patient Active Problem List   Diagnosis     Acute prostatitis     Rectal pain     Penile pain     Perineal pain in male     Urinary retention     Symptomatic cholelithiasis     Reflux esophagitis     Psoriatic arthritis (H)     MARIAN (obstructive sleep apnea)     OA (osteoarthritis) of hip     Neuropathy     Mild intermittent asthma, uncomplicated     Insomnia,  unspecified     GERD (gastroesophageal reflux disease)     Hepatic steatosis     Benign prostatic hyperplasia with urinary obstruction     Anal fissure     Tear of right acetabular labrum, sequela     Lumbar disc herniation with radiculopathy     Lower urinary tract symptoms     Arthritis of left acromioclavicular joint     ASHD (arteriosclerotic heart disease)     Calculus of gallbladder with chronic cholecystitis without obstruction     Change in bowel habits     Complete tear of left rotator cuff     Constipation     COVID-19 virus infection     Displacement of lumbar intervertebral disc without myelopathy     Diverticular disease of large intestine     Hiatal hernia     Hyperlipidemia     Low testosterone     Pruritus ani     Pelvic floor dysfunction     Pudendal neuralgia     Coccydynia     Chronic, continuous use of opioids       Current Behavioral Concerns: None currently noted.  Education Provided to patient: Reviewed with the patient the need for physical therapy following his surgery.  Pain  Pain (GOAL):: Yes  Type: Chronic (>3mo)  Location of chronic pain:: rectum and penis  Radiating: Yes  Location pain radiates to: rectum and penis  Progression: Constant  Description of pain: Burning, Numb  Chronic pain severity:: 10  Limitation of routine activities due to chronic pain:: Yes  Description: Able to do moderate activities  Alleviating Factors: Rest, Stretching  Aggravating Factors: Activity  Health Maintenance Reviewed: Due/Overdue   Health Maintenance Due   Topic Date Due     YEARLY PREVENTIVE VISIT  Never done     COVID-19 Vaccine (1) Never done     ZOSTER IMMUNIZATION (1 of 2) Never done     INFLUENZA VACCINE (1) Never done       Clinical Pathway: None    Medication Management:  Medication review status: Medications reviewed and no changes reported per patient.        Patient is knowledgeable on medications and is adherent.  No financial concerns reported at this time.  Medication review was completed  with the patient and there are no questions or concerns at this time.       Functional Status:  Dependent ADLs:: Independent  Dependent IADLs:: Transportation, Independent  Bed or wheelchair confined:: No  Mobility Status: Independent  Fallen 2 or more times in the past year?: No  Any fall with injury in the past year?: No    Living Situation:  Current living arrangement:: I live in a private home (Patient stated he lives with his adult son)  Type of residence:: Private home - stairs    Lifestyle & Psychosocial Needs:    Social Determinants of Health     Tobacco Use: Medium Risk     Smoking Tobacco Use: Former     Smokeless Tobacco Use: Never     Passive Exposure: Not on file   Alcohol Use: Not on file   Financial Resource Strain: Low Risk      Difficulty of Paying Living Expenses: Not hard at all   Food Insecurity: No Food Insecurity     Worried About Running Out of Food in the Last Year: Never true     Ran Out of Food in the Last Year: Never true   Transportation Needs: No Transportation Needs     Lack of Transportation (Medical): No     Lack of Transportation (Non-Medical): No   Physical Activity: Not on file   Stress: Not on file   Social Connections: Not on file   Intimate Partner Violence: Not on file   Depression: Not at risk     PHQ-2 Score: 0   Housing Stability: Not on file     Diet:: Regular  Inadequate nutrition (GOAL):: No  Tube Feeding: No  Inadequate activity/exercise (GOAL):: No  Significant changes in sleep pattern (GOAL): No  Transportation means:: Family     Mormon or spiritual beliefs that impact treatment:: No  Mental health DX:: No  Mental health management concern (GOAL):: No  Informal Support system:: Children             Resources and Interventions:  Current Resources:      Community Resources: Financial/Insurance  Supplies Currently Used at Home: None  Equipment Currently Used at Home: none  Employment Status: employed full-time, disabled         Advance Care Plan/Directive  Advanced  Care Plans/Directives on file:: No    Referrals Placed: Community Resources, County Resources, Financial Services, Transportation, Senior Linkage Line, Mental Health         Care Plan:  Care Plan: General urination without pain     Problem: HP GENERAL PROBLEM     Goal: General Goal - urinate on my own without difficulty.  Decrease bilateral buttocks and thigh pain .     Start Date: 11/2/2021 Expected End Date: 8/2/2023    This Visit's Progress: 40% Recent Progress: 40%    Note:     Goal Statement #1: I will be able to urinate on my own without difficulty .    I will decrease bilateral buttocks and thigh pain in the next 3-6  months   Barriers: constant pain   Strengths: Motivated   Patient expressed understanding of goal: Yes  Action steps to achieve this goal:  1. I will keep future Primary Care ( recently switched to the [facility] ),Urology, Pain Clinic appointments and will make a future Neurology appointment   2. I will drink plenty of water to keep my urine clear and yellow  3. I will take Tylenol/Ibuprofen and Gabapentin as directed   4. I will walk a block when able to increase strength   5. I will continue dry needling 3 times a week  6. I will keep future appointments scheduled with [facility] specialist in [place] June 6, 8 and 10th                        Patient/Caregiver understanding: The patient has a very good understanding of the disease process as well as the expectations following surgery.    Outreach Frequency: monthly      Plan: 1.  The patient will make certain that he has completed his preop exams with the PCP as well as with [place] regarding his bladder.  2.  The patient will make certain that he is drinking enough fluids and not becoming dehydrated especially prior to surgery.  3.  The patient will take all medications as prescribed by the providers.          Sebastian Jimenez MSN, RN, PHN, CCM   Primary Care Clinical RN Care Coordinator  [facility]  3/24/2023   12:25  [email or web address]  Office: [phone]

## 2023-03-24 NOTE — TELEPHONE ENCOUNTER
Please advise patient that I changed the prescription terazosin to 2 mg at night time from terazosin 1 mg twice a day.

## 2023-04-14 NOTE — PROGRESS NOTES
Chronic condition that is well controlled on current home regimen    - Resume home amlodipine and losartan as tolerated by BP     Mustapha Negrete is a 61 year old year old patient who comes in today for a Blood Pressure check because of new medication and ongoing blood pressure monitoring.    Vital Signs as repeated by RN /80, HR 76.    Patient is taking medication as prescribed  Patient is tolerating medications well.    Patient is monitoring Blood Pressure at home.  Average readings if yes are:this morning /88, Patient has not been recording his readings at home.    Current complaints: none  Disposition:  patient to continue with the same medication.Pt instructed to await provider response.    Pt states Terazosin is working well and helping with urinary issues. Pt questioning if dose can be increased.    Mihaela Birmingham RN

## 2023-04-19 ENCOUNTER — OFFICE VISIT (OUTPATIENT)
Dept: FAMILY MEDICINE | Facility: CLINIC | Age: 63
End: 2023-04-19
Payer: COMMERCIAL

## 2023-04-19 VITALS
RESPIRATION RATE: 20 BRPM | DIASTOLIC BLOOD PRESSURE: 64 MMHG | HEART RATE: 107 BPM | HEIGHT: 65 IN | SYSTOLIC BLOOD PRESSURE: 126 MMHG | TEMPERATURE: 98.6 F | OXYGEN SATURATION: 99 % | BODY MASS INDEX: 31.72 KG/M2 | WEIGHT: 190.4 LBS

## 2023-04-19 DIAGNOSIS — D64.9 POSTOPERATIVE ANEMIA: ICD-10-CM

## 2023-04-19 DIAGNOSIS — Z98.1 S/P LUMBAR SPINAL FUSION: Primary | ICD-10-CM

## 2023-04-19 DIAGNOSIS — K56.0 PARALYTIC ILEUS (H): ICD-10-CM

## 2023-04-19 LAB
BASOPHILS # BLD AUTO: 0 10E3/UL (ref 0–0.2)
BASOPHILS NFR BLD AUTO: 0 %
EOSINOPHIL # BLD AUTO: 0.6 10E3/UL (ref 0–0.7)
EOSINOPHIL NFR BLD AUTO: 5 %
ERYTHROCYTE [DISTWIDTH] IN BLOOD BY AUTOMATED COUNT: 12.6 % (ref 10–15)
HCT VFR BLD AUTO: 30.4 % (ref 40–53)
HGB BLD-MCNC: 9.8 G/DL (ref 13.3–17.7)
IMM GRANULOCYTES # BLD: 0.2 10E3/UL
IMM GRANULOCYTES NFR BLD: 2 %
LYMPHOCYTES # BLD AUTO: 1.1 10E3/UL (ref 0.8–5.3)
LYMPHOCYTES NFR BLD AUTO: 9 %
MCH RBC QN AUTO: 29.8 PG (ref 26.5–33)
MCHC RBC AUTO-ENTMCNC: 32.2 G/DL (ref 31.5–36.5)
MCV RBC AUTO: 92 FL (ref 78–100)
MONOCYTES # BLD AUTO: 0.9 10E3/UL (ref 0–1.3)
MONOCYTES NFR BLD AUTO: 8 %
NEUTROPHILS # BLD AUTO: 9.1 10E3/UL (ref 1.6–8.3)
NEUTROPHILS NFR BLD AUTO: 76 %
NRBC # BLD AUTO: 0 10E3/UL
NRBC BLD AUTO-RTO: 0 /100
PLATELET # BLD AUTO: 503 10E3/UL (ref 150–450)
RBC # BLD AUTO: 3.29 10E6/UL (ref 4.4–5.9)
WBC # BLD AUTO: 11.9 10E3/UL (ref 4–11)

## 2023-04-19 PROCEDURE — 36415 COLL VENOUS BLD VENIPUNCTURE: CPT | Performed by: PHYSICIAN ASSISTANT

## 2023-04-19 PROCEDURE — 99213 OFFICE O/P EST LOW 20 MIN: CPT | Performed by: PHYSICIAN ASSISTANT

## 2023-04-19 PROCEDURE — 85025 COMPLETE CBC W/AUTO DIFF WBC: CPT | Performed by: PHYSICIAN ASSISTANT

## 2023-04-19 RX ORDER — AMOXICILLIN 250 MG
1-4 CAPSULE ORAL
COMMUNITY
Start: 2023-04-14 | End: 2023-12-03

## 2023-04-19 RX ORDER — TERAZOSIN 2 MG/1
2 CAPSULE ORAL
COMMUNITY
End: 2023-08-03

## 2023-04-19 RX ORDER — ACETAMINOPHEN 500 MG
500-1000 TABLET ORAL EVERY 6 HOURS PRN
COMMUNITY

## 2023-04-19 RX ORDER — PANTOPRAZOLE SODIUM 20 MG/1
20 TABLET, DELAYED RELEASE ORAL
COMMUNITY
Start: 2023-04-15 | End: 2023-08-03

## 2023-04-19 ASSESSMENT — PAIN SCALES - GENERAL: PAINLEVEL: EXTREME PAIN (9)

## 2023-04-19 NOTE — PROGRESS NOTES
"      Zhang Luciano is a 62 year old, presenting for the following health issues:  Hospital F/U (4/10/23 - 4/15/23/[facility]/Lumbar spine surgery) and Urinary Problem (Would like to be on flomax again)        3/23/2023     9:26 AM   Additional Questions   Roomed by Mei Browne CMA   Accompanied by No one     [facility]       Hospital Follow-up Visit:    Hospital/Nursing Home/IP Rehab Facility: [facility]  Date of Admission: 4/10/23  Date of Discharge: 4/15/23  Reason(s) for Admission: Lumbar Spine Surgery    Was your hospitalization related to COVID-19? No   Problems taking medications regularly:  None  Medication changes since discharge: None  Problems adhering to non-medication therapy:  None    Complications:  Ileus.  Appetite returning. Lose stools. No profound abd pain.     History of bph. Has a leon catheter in place. Followed by urology down at [place].  No cough or sob. No calf swelling. No fevers.   Some incisional site discomfort.   Still noting his previous radicular symptoms.   Summary of hospitalization:  CareEverywhere information obtained and reviewed  Diagnostic Tests/Treatments reviewed.  Follow up needed: none  Only using oxycodone prn.    Review of Systems   Constitutional, HEENT, cardiovascular, pulmonary, GI, , musculoskeletal, neuro, skin, endocrine and psych systems are negative, except as otherwise noted.      Objective    /64   Pulse 107   Temp 98.6  F (37  C) (Temporal)   Resp 20   Ht 1.651 m (5' 5\")   Wt 86.4 kg (190 lb 6.4 oz)   SpO2 99%   BMI 31.68 kg/m    Body mass index is 31.68 kg/m .  Physical Exam     Eye exam - right eye normal lid, conjunctiva, cornea, pupil and fundus, left eye normal lid, conjunctiva, cornea, pupil and fundus.  Thyroid not palpable, not enlarged, no nodules detected.  CHEST:chest clear to IPPA, no tachypnea, retractions or cyanosis and S1, S2 normal, no murmur, no gallop, rate regular.  Scrotal hematoma has improved.  Incision sited healing well with " out signs of infection.    Mustapha was seen today for hospital f/u and urinary problem.    Diagnoses and all orders for this visit:    S/P lumbar spinal fusion    Postoperative anemia  -     CBC with platelets and differential; Future    Paralytic ileus (H)      Follow up with urology in 6 wks.  Follow up with surgeon in 5 days.

## 2023-04-20 ENCOUNTER — TELEPHONE (OUTPATIENT)
Dept: UROLOGY | Facility: CLINIC | Age: 63
End: 2023-04-20
Payer: MEDICAID

## 2023-04-20 NOTE — TELEPHONE ENCOUNTER
Health Call Center    Phone Message    May a detailed message be left on voicemail: yes     Reason for Call: Pt called and stated he had back surgery on 4/10/23 and had catheter placed. Pt was told to see Urologist. Pt would like to change providers and be seen at [place] [facility]. Pt also needs to be seen for retention. Writer unable to schedule and change providers per protocol. Please give pt a call back to schedule. Thank you.    Action Taken: Other: URO    Travel Screening: Not Applicable

## 2023-04-25 ENCOUNTER — PATIENT OUTREACH (OUTPATIENT)
Dept: CARE COORDINATION | Facility: CLINIC | Age: 63
End: 2023-04-25
Payer: MEDICAID

## 2023-04-25 ASSESSMENT — ACTIVITIES OF DAILY LIVING (ADL): DEPENDENT_IADLS:: TRANSPORTATION;INDEPENDENT

## 2023-04-25 NOTE — PROGRESS NOTES
Clinic Care Coordination Contact  [facility]: Post-Discharge Note  SITUATION                                                      Admission:    Admission Date: 04/10/23   Reason for Admission: planned surgical procedure  Discharge:   Discharge Date: 04/15/23  Discharge Diagnosis: fusion surgery of the back.    BACKGROUND                                                      Per hospital discharge summary and inpatient provider notes:  Post discharge from fusion surgery from pain clinic at [facility].     ASSESSMENT      Enrollment  Outreach Frequency: monthly    Discharge Assessment  How are you doing now that you are home?: very concerned with the continued swelling of the testicles.  Remains in a great deal of pain.  How are your symptoms? (Red Flag symptoms escalate to triage hotline per guidelines): Unchanged  Do you feel your condition is stable enough to be safe at home until your provider visit?: Yes  Does the patient have their discharge instructions? : Yes  Does the patient have questions regarding their discharge instructions? : No  Were you started on any new medications or were there changes to any of your previous medications? : Yes  Does the patient have all of their medications?: Yes  Do you have questions regarding any of your medications? : No  Do you have all of your needed medical supplies or equipment (DME)?  (i.e. oxygen tank, CPAP, cane, etc.): Yes  Discharge follow-up appointment scheduled within 14 calendar days? : Yes  Discharge Follow Up Appointment Date: 04/24/23  Discharge Follow Up Appointment Scheduled with?: Specialty Care Provider         Post-op (Clinicians Only)  Did the patient have surgery or a procedure: Yes  Incision: healing  Drainage: No  Bleeding: none  Fever: Yes  Chills: No  Redness: No  Warmth: No  Swelling: No  Incision site pain: Yes  Closure: staple  Eating & Drinking: eating and drinking without complaints/concerns  PO Intake: soft foods  Bowel  Function: constipation  Urinary Status: indwelling urinary catheter    Care Management       Care Mgmt General Assessment  Referral  Referral Source: PCP  Health Care Home/Utilization  Preferred Hospital: [place], [place]  [phone]  Preferred Urgent Care: [facility], [phone]  Living Situation  Current living arrangement:: I live in a private home (Patient stated he lives with his adult son)  Type of residence:: Private home - stairs  Resources  Patient receiving home care services:: No  Community Resources: Financial/Insurance  Supplies Currently Used at Home: None  Equipment Currently Used at Home: none  Referrals Placed: Community Resources;County Resources;Financial Services;Transportation;Senior Linkage Line;Mental Health  Employment Status: employed full-time;disabled  Psychosocial  Pentecostalism or spiritual beliefs that impact treatment:: No  Mental health DX:: No  Mental health management concern (GOAL):: No  Informal Support system:: Children  Functional Status  Dependent ADLs:: Independent  Dependent IADLs:: Transportation;Independent  Bed or wheelchair confined:: No  Mobility Status: Independent  Fallen 2 or more times in the past year?: No  Any fall with injury in the past year?: No  Advance Care Plan/Directive  Advanced Care Plans/Directives on file:: No and     Care Mgmt Encounter Assessment  Preventative Care  Routine Health maintenance Reviewed: Due/Overdue   Health Maintenance Due   Topic Date Due     YEARLY PREVENTIVE VISIT  Never done     COVID-19 Vaccine (1) Never done     ZOSTER IMMUNIZATION (1 of 2) Never done     INFLUENZA VACCINE (1) Never done     ASTHMA CONTROL TEST  05/04/2023       Clinic Utilization  Difficulty keeping appointments:: No  Compliance Concerns: No  No-Show Concerns: No  No PCP office visit in Past Year: No  Transportation  Transportation means:: Family     Primary Diagnosis  Primary Diagnosis: Genitourinary  Disorders  Barriers in Communication  Other concerns:: None  How confident are you filling out medical forms by yourself:: Extremely  Pain  Pain (GOAL):: Yes  Type: Chronic (>3mo)  Location of chronic pain:: rectum and penis  Radiating: Yes  Location pain radiates to: rectum and penis  Progression: Constant  Description of pain: Burning;Numb  Chronic pain severity:: 10  Limitation of routine activities due to chronic pain:: Yes  Description: Able to do moderate activities  Alleviating Factors: Rest;Stretching  Aggravating Factors: Activity  Medication Review  Medication adherence problem (GOAL):: No  Knowledgeable about how to use meds:: Yes  Medication side effects suspected:: No  Diet/Exercise/Sleep  Diet:: Regular  Inadequate nutrition (GOAL):: No  Tube Feeding: No  Inadequate activity/exercise (GOAL):: No  Significant changes in sleep pattern (GOAL): No    PLAN                                                      Outpatient Plan:  1.  The patient will work on increasing his strength and stamina by getting up and walking multiple times each hour.  2.  The patient will take all medications as prescribed by the providers including the pain medications.  3.  The patient will monitor the swelling of the testicles and the lower legs and report changes to the surgeon.      No future appointments.      For any urgent concerns, please contact our 24 hour nurse triage line: [phone] ([phone])         Sebastian Jimenez MSN, RN, PHN, CCM   Primary Care Clinical RN Care Coordinator  [facility]  4/25/2023   11:46 AM  [email or web address]  Office: [phone]

## 2023-05-01 NOTE — TELEPHONE ENCOUNTER
Patient was seen today by [facility].    Bev Arnold, MANJEETN, RN  Care Coordinator Urology  [phone]

## 2023-05-23 ENCOUNTER — OFFICE VISIT (OUTPATIENT)
Dept: FAMILY MEDICINE | Facility: CLINIC | Age: 63
End: 2023-05-23
Payer: COMMERCIAL

## 2023-05-23 VITALS
OXYGEN SATURATION: 96 % | RESPIRATION RATE: 20 BRPM | DIASTOLIC BLOOD PRESSURE: 84 MMHG | TEMPERATURE: 97.9 F | SYSTOLIC BLOOD PRESSURE: 136 MMHG | HEART RATE: 103 BPM | HEIGHT: 65 IN | BODY MASS INDEX: 30.39 KG/M2 | WEIGHT: 182.4 LBS

## 2023-05-23 DIAGNOSIS — Z13.1 SCREENING FOR DIABETES MELLITUS: ICD-10-CM

## 2023-05-23 DIAGNOSIS — F11.20 EPISODIC OPIOID DEPENDENCE (H): ICD-10-CM

## 2023-05-23 DIAGNOSIS — J30.2 SEASONAL ALLERGIC RHINITIS, UNSPECIFIED TRIGGER: ICD-10-CM

## 2023-05-23 DIAGNOSIS — F11.20 UNCOMPLICATED OPIOID DEPENDENCE (H): ICD-10-CM

## 2023-05-23 DIAGNOSIS — G62.9 NEUROPATHY: Primary | ICD-10-CM

## 2023-05-23 DIAGNOSIS — F11.20 CONTINUOUS OPIOID DEPENDENCE (H): ICD-10-CM

## 2023-05-23 DIAGNOSIS — F11.90 CHRONIC, CONTINUOUS USE OF OPIOIDS: ICD-10-CM

## 2023-05-23 LAB
FOLATE SERPL-MCNC: 10.9 NG/ML (ref 4.6–34.8)
HBA1C MFR BLD: 5.5 % (ref 0–5.6)
VIT B12 SERPL-MCNC: 690 PG/ML (ref 232–1245)

## 2023-05-23 PROCEDURE — 82607 VITAMIN B-12: CPT | Performed by: FAMILY MEDICINE

## 2023-05-23 PROCEDURE — 36415 COLL VENOUS BLD VENIPUNCTURE: CPT | Performed by: FAMILY MEDICINE

## 2023-05-23 PROCEDURE — 82746 ASSAY OF FOLIC ACID SERUM: CPT | Performed by: FAMILY MEDICINE

## 2023-05-23 PROCEDURE — 83036 HEMOGLOBIN GLYCOSYLATED A1C: CPT | Performed by: FAMILY MEDICINE

## 2023-05-23 PROCEDURE — 99213 OFFICE O/P EST LOW 20 MIN: CPT | Performed by: FAMILY MEDICINE

## 2023-05-23 RX ORDER — FLUTICASONE PROPIONATE 50 MCG
1 SPRAY, SUSPENSION (ML) NASAL DAILY
Qty: 16 G | Refills: 3 | Status: SHIPPED | OUTPATIENT
Start: 2023-05-23 | End: 2023-07-14

## 2023-05-23 ASSESSMENT — ENCOUNTER SYMPTOMS
WEAKNESS: 0
NUMBNESS: 1
CHILLS: 0
DIZZINESS: 0
HEADACHES: 0
ARTHRALGIAS: 0
PARESTHESIAS: 0
COUGH: 0
NERVOUS/ANXIOUS: 0
PALPITATIONS: 0
FEVER: 0
SORE THROAT: 0
JOINT SWELLING: 0
SHORTNESS OF BREATH: 0
MYALGIAS: 0

## 2023-05-23 ASSESSMENT — ASTHMA QUESTIONNAIRES
QUESTION_4 LAST FOUR WEEKS HOW OFTEN HAVE YOU USED YOUR RESCUE INHALER OR NEBULIZER MEDICATION (SUCH AS ALBUTEROL): TWO OR THREE TIMES PER WEEK
ACT_TOTALSCORE: 18
ACT_TOTALSCORE: 18
QUESTION_2 LAST FOUR WEEKS HOW OFTEN HAVE YOU HAD SHORTNESS OF BREATH: ONCE OR TWICE A WEEK
QUESTION_5 LAST FOUR WEEKS HOW WOULD YOU RATE YOUR ASTHMA CONTROL: WELL CONTROLLED
QUESTION_3 LAST FOUR WEEKS HOW OFTEN DID YOUR ASTHMA SYMPTOMS (WHEEZING, COUGHING, SHORTNESS OF BREATH, CHEST TIGHTNESS OR PAIN) WAKE YOU UP AT NIGHT OR EARLIER THAN USUAL IN THE MORNING: TWO OR THREE NIGHTS A WEEK
QUESTION_1 LAST FOUR WEEKS HOW MUCH OF THE TIME DID YOUR ASTHMA KEEP YOU FROM GETTING AS MUCH DONE AT WORK, SCHOOL OR AT HOME: NONE OF THE TIME

## 2023-05-23 ASSESSMENT — ANXIETY QUESTIONNAIRES
6. BECOMING EASILY ANNOYED OR IRRITABLE: NOT AT ALL
GAD7 TOTAL SCORE: 3
GAD7 TOTAL SCORE: 3
2. NOT BEING ABLE TO STOP OR CONTROL WORRYING: NOT AT ALL
7. FEELING AFRAID AS IF SOMETHING AWFUL MIGHT HAPPEN: NOT AT ALL
IF YOU CHECKED OFF ANY PROBLEMS ON THIS QUESTIONNAIRE, HOW DIFFICULT HAVE THESE PROBLEMS MADE IT FOR YOU TO DO YOUR WORK, TAKE CARE OF THINGS AT HOME, OR GET ALONG WITH OTHER PEOPLE: NOT DIFFICULT AT ALL
4. TROUBLE RELAXING: NEARLY EVERY DAY
1. FEELING NERVOUS, ANXIOUS, OR ON EDGE: NOT AT ALL
7. FEELING AFRAID AS IF SOMETHING AWFUL MIGHT HAPPEN: NOT AT ALL
3. WORRYING TOO MUCH ABOUT DIFFERENT THINGS: NOT AT ALL
8. IF YOU CHECKED OFF ANY PROBLEMS, HOW DIFFICULT HAVE THESE MADE IT FOR YOU TO DO YOUR WORK, TAKE CARE OF THINGS AT HOME, OR GET ALONG WITH OTHER PEOPLE?: NOT DIFFICULT AT ALL
5. BEING SO RESTLESS THAT IT IS HARD TO SIT STILL: NOT AT ALL

## 2023-05-23 ASSESSMENT — PAIN SCALES - GENERAL: PAINLEVEL: WORST PAIN (10)

## 2023-05-23 NOTE — LETTER
May 30, 2023      Mustapha Negrete  [address] [facility] [address]        Dear ,    We are writing to inform you of your test results.    Your recent results show the following:  -A1C (diabetic test) is normal and indicates that your blood sugar has been in a normal range the last 3 months.  You do not have diabetes.  -B12 and folate levels are normal.  This is not attributing to your neuropathy (nerve pain).     Regards,    Dr. Pro    Resulted Orders   Hemoglobin A1c   Result Value Ref Range    Hemoglobin A1C 5.5 0.0 - 5.6 %      Comment:      Normal <5.7%   Prediabetes 5.7-6.4%    Diabetes 6.5% or higher     Note: Adopted from ADA consensus guidelines.   Vitamin B12   Result Value Ref Range    Vitamin B12 690 232 - 1,245 pg/mL   Folate   Result Value Ref Range    Folic Acid 10.9 4.6 - 34.8 ng/mL       If you have any questions or concerns, please call the clinic at the number listed above.       Sincerely,      Silver Pro, DO

## 2023-05-23 NOTE — PROGRESS NOTES
Answers for HPI/ROS submitted by the patient on 5/23/2023  CHRISSY 7 TOTAL SCORE: 3      1. Neuropathy  S/P lumbar infusion on 4/10/23.  Patient declines PT referral.   - Vitamin B12; Future  - Folate; Future    2. Uncomplicated opioid dependence (H)  Advised to keep upcoming appointment with pain management.  PDMP reviewed.     3. Screening for diabetes mellitus  - Hemoglobin A1c; Future    4. Seasonal allergic rhinitis, unspecified trigger  - fluticasone (FLONASE) 50 MCG/ACT nasal spray; Spray 1 spray into both nostrils daily  Dispense: 16 g; Refill: 3    5. F11.2 - Continuous opioid dependence (H)    6. F11.2 - Episodic opioid dependence (H)    7. F11.9 - Chronic, continuous use of opioids      Zhang Luciano is a 62 year old, presenting for the following health issues:  Hospital F/U ([facility]/5/17/23/[facility]I)        5/23/2023     8:44 AM   Additional Questions   Roomed by Katie Quintana CMA   Accompanied by None         5/23/2023     8:46 AM   Patient Reported Additional Medications   Patient reports taking the following new medications tamsulosin .4mg     HPI             1.Hospital follow-up: S/P lumbar spinal fusion on 4/10-4/15 complicated by postop UTI infection due to BPH and ileus.  Treated with IV abx for UTI and required NG tube for ileus.  Had leon catheter placed for couple weeks.  Patient is scheduled for urodynamics study with urology.  Patient is currently taking senna for constipation.  Last bowel movement every day to every other day.  Continues to have burning sensation.  As of today, he does not feel like the pain is improved after the surgery.  Scheduled to follow-up pain management in [place].  Patient states of 10/10 pain and does not get sleep.  Gets 4-5 hours sleep for night.  Continues to have burning sensation.  Patient declines physical therapy at this time.  Scheduled a follow-up with surgeon next month.     Review of Systems   Constitutional: Negative for chills and fever.  "  HENT: Negative for congestion, ear pain, hearing loss and sore throat.    Respiratory: Negative for cough and shortness of breath.    Cardiovascular: Negative for chest pain, palpitations and peripheral edema.   Musculoskeletal: Negative for arthralgias, joint swelling and myalgias.   Skin: Negative for rash.   Neurological: Positive for numbness (involving pelvic region). Negative for dizziness, weakness, headaches and paresthesias.   Psychiatric/Behavioral: Negative for mood changes. The patient is not nervous/anxious.             Objective    /84   Pulse 103   Temp 97.9  F (36.6  C) (Temporal)   Resp 20   Ht 1.645 m (5' 4.75\")   Wt 82.7 kg (182 lb 6.4 oz)   SpO2 96%   BMI 30.59 kg/m    Body mass index is 30.59 kg/m .  Physical Exam  Constitutional:       General: He is not in acute distress.     Appearance: He is well-developed.   HENT:      Head: Normocephalic and atraumatic.      Nose: Nose normal.   Eyes:      Conjunctiva/sclera: Conjunctivae normal.   Neck:      Trachea: No tracheal deviation.   Cardiovascular:      Rate and Rhythm: Normal rate and regular rhythm.      Heart sounds: Normal heart sounds.   Pulmonary:      Effort: Pulmonary effort is normal.      Breath sounds: No wheezing.   Abdominal:      Comments: Well healed surgical involving abdominal and lower back region   Musculoskeletal:         General: Normal range of motion.      Cervical back: Normal range of motion.   Skin:     Findings: No erythema or rash.   Neurological:      Mental Status: He is alert and oriented to person, place, and time.   Psychiatric:         Behavior: Behavior normal.              "

## 2023-05-23 NOTE — PATIENT INSTRUCTIONS
Claudy Luciano,    Thank you for allowing [facility] to manage your care.    I ordered some blood work, please go to the laboratory to get your laboratory studies.    I sent your prescriptions to your pharmacy.    Please keep your upcoming appointment pain management, urology, and spine specialist.     For your convenience, test results are released as soon as they are available  Please allow 1-2 business days for me to send you a comment about your results.  If not done so, I encourage you to login into [employer] ([email or web address] to review your results in real time.     If you have any questions or concerns, please feel free to call us at [phone].    Sincerely,    Dr. Pro    Did you know?      You can schedule a video visit for follow-up appointments as well as future appointments for certain conditions.  Please see the below link.     [email or web address]    If you have not already done so,  I encourage you to sign up for [employer] ([email or web address]).  This will allow you to review your results, securely communicate with a provider, and schedule virtual visits as well.

## 2023-05-24 ENCOUNTER — TRANSFERRED RECORDS (OUTPATIENT)
Dept: HEALTH INFORMATION MANAGEMENT | Facility: CLINIC | Age: 63
End: 2023-05-24
Payer: MEDICAID

## 2023-05-26 ENCOUNTER — PATIENT OUTREACH (OUTPATIENT)
Dept: CARE COORDINATION | Facility: CLINIC | Age: 63
End: 2023-05-26
Payer: MEDICAID

## 2023-05-26 NOTE — PROGRESS NOTES
Clinic Care Coordination Contact  [facility]/Voicemail       Clinical Data: Care Coordinator Outreach  Outreach attempted x 1.  Left message on patient's voicemail with call back information and requested return call.  Plan: Care Coordinator sent care coordination introduction letter on 3/18/22 via pickrset. Care Coordinator will try to reach patient again in 10 business days.    Sebastian Jimenez MSN, RN, PHN, CCM   Primary Care Clinical RN Care Coordinator  [facility]  5/26/2023   2:49 PM  [email or web address]  Office: [phone]

## 2023-05-31 ENCOUNTER — TRANSFERRED RECORDS (OUTPATIENT)
Dept: HEALTH INFORMATION MANAGEMENT | Facility: CLINIC | Age: 63
End: 2023-05-31
Payer: MEDICAID

## 2023-06-16 ENCOUNTER — PATIENT OUTREACH (OUTPATIENT)
Dept: CARE COORDINATION | Facility: CLINIC | Age: 63
End: 2023-06-16
Payer: MEDICAID

## 2023-06-16 NOTE — PROGRESS NOTES
Clinic Care Coordination Contact    Follow Up Progress Note      Assessment: The RN CC nurse care coordinator contacted the patient by phone for a follow-up call.  Patient states that he is no longer taking the oxycodone and is now using Belbuca instead.  Patient had his surgery at [place] a month ago and he spoke with his surgeon the other day because the pain is not any different.  At this point in time he is seeing [facility] pain clinic in [place] and they wanted to do an injection immediately and the surgeon said no.  The surgeon wants to do an intensive MRI to see if he has missed anything first and then he will consider an injection.    Medication review was completed with the patient and marked as reviewed.  Health maintenance was reviewed and questions were answered with the patient.  The assessment for hypertension was completed with the patient today as well as the social determinants of health and they were marked as reviewed.      Care Gaps:    Health Maintenance Due   Topic Date Due     YEARLY PREVENTIVE VISIT  Never done     COVID-19 Vaccine (1) Never done     ZOSTER IMMUNIZATION (1 of 2) Never done       Care Gaps Last addressed on 6/16/23    Care Plans  Care Plan: General urination without pain     Problem: HP GENERAL PROBLEM     Goal: General Goal - urinate on my own without difficulty.  Decrease bilateral buttocks and thigh pain .     Start Date: 11/2/2021 Expected End Date: 8/2/2024    This Visit's Progress: 60% Recent Progress: 50%    Note:     Goal Statement #1: I will be able to urinate on my own without difficulty .    I will decrease bilateral buttocks and thigh pain in the next 3-6  months   Barriers: constant pain   Strengths: Motivated   Patient expressed understanding of goal: Yes  Action steps to achieve this goal:  1. I will keep future Primary Care ( recently switched to the [facility] ),Urology, Pain Clinic appointments and will make a future Neurology appointment   2. I will  drink plenty of water to keep my urine clear and yellow  3. I will take Tylenol/Ibuprofen and Gabapentin as directed   4. I will walk a block when able to increase strength   5. I will continue dry needling 3 times a week  6. I will keep future appointments scheduled with [facility] in [place] June 6, 8 and 10th                    Care Plan: General - increase strength and stamina     Problem: HP GENERAL PROBLEM     Goal: General Goal - increase strength and stamina     Start Date: 4/25/2023 Expected End Date: 4/25/2024    This Visit's Progress: 20%    Note:     Barriers: recent surgical procedure on his back.  Strengths: engaged in care coordination  Patient expressed understanding of goal: yes  Action steps to achieve this goal:  1. I will get up and walk frequently to prevent blood clots.  2. I will elevate my legs as often as possible to reduce the swelling in the lower legs.  3. I will monitor the swelling of the testicles and the lower legs for any increase.                          Intervention/Education provided during outreach: The RN CC nurse care coordinator spoke with the patient regarding the swelling that he sometimes gets in his lower legs and to make sure that he is elevating them multiple times during the day.     Outreach Frequency: monthly        Plan:   1.  The patient will get up and walk frequently during the day to avoid getting any types of blood clots in his legs.  2.  The patient will work with his surgeon as far as when and if an injection is to be considered.  3.  The patient will take all medications as prescribed by the providers.    RN CC Nurse Care Coordinator will follow up in 30 days.          Sebastian Jimenez MSN, RN, PHN, CCM   Primary Care Clinical RN Care Coordinator  [facility]  6/16/2023   1:06 PM  [email or web address]  Office: [phone]

## 2023-06-16 NOTE — LETTER
[facility]  Patient Centered Plan of Care  About Me:        Patient Name:  Mustapha Negrete    YOB: 1960  Age:         62 year old   Rustam MRN:    [id number] Telephone Information:  Home Phone [phone]   Mobile [phone]       Address:  [address] [place] [address] [place] [address] Email address:  [email or web address]      Emergency Contact(s)    Name Relationship Lgl Grd Work Phone Home Phone Mobile Phone   STEFANO AYOUB Son   [phone]           Primary language:  English     needed? No   [employer]:  [phone] op. 1  Other communication barriers:None    Preferred Method of Communication:     Current living arrangement: I live in a private home (Patient stated he lives with his adult son)    Mobility Status/ Medical Equipment: Independent        Health Maintenance  Health Maintenance Reviewed: Due/Overdue   Health Maintenance Due   Topic Date Due    YEARLY PREVENTIVE VISIT  Never done    COVID-19 Vaccine (1) Never done    ZOSTER IMMUNIZATION (1 of 2) Never done           My Access Plan  Medical Emergency 911   Primary Clinic Line [facility] [phone]   24 Hour Appointment Line [phone] or  [phone]) (toll-free)   24 Hour Nurse Line [phone] (toll-free)   Preferred Urgent Care [facility], [phone]       Preferred Hospital [place], [place]  [phone]       Preferred Pharmacy [facility] [place] - [address]     Behavioral Health Crisis Line The National Suicide Prevention Lifeline at 1-603.881.9647 or Text/Call 978             My Care Team Members  Patient Care Team         Relationship Specialty Notifications Start End    Silver Pro DO PCP - General Family Medicine Admissions 7/6/22     Phone: [phone] Fax: [phone] [address] [place] [address]    Matty Ruiz MD  MD Neurological Surgery  10/18/21     Phone: [phone] Fax: [phone] [address] [facility] [address]    Max Rico MD Referring Physician Family Medicine  10/18/21     Phone: [phone] Fax: [phone] [address] [place] [address]    Sebastian Rock, RN Lead Care Coordinator Primary Care - CC Admissions 11/2/21     Phone: [phone] Fax: [phone]        Silver Pro DO Assigned PCP   1/7/23     Phone: [phone] Fax: [phone] [address] [facility] [address] [facility] [address]    Silver Pro DO Assigned Pain Medication Provider   1/9/23     Phone: [phone] Fax: [phone]         [address] [facility] [address] [facility] [address]    Nayan Tello MD Assigned Surgical Provider   3/25/23     Phone: [phone] Fax: [phone]         [address] [place] [address]              My Care Plans  Self Management and Treatment Plan  Care Plan  Care Plan: General urination without pain       Problem: HP GENERAL PROBLEM       Goal: General Goal - urinate on my own without difficulty.  Decrease bilateral buttocks and thigh pain .       Start Date: 11/2/2021 Expected End Date: 8/2/2024    This Visit's Progress: 60% Recent Progress: 50%    Note:     Goal Statement #1: I will be able to urinate on my own without difficulty .    I will decrease bilateral buttocks and thigh pain in the next 3-6  months   Barriers: constant pain   Strengths: Motivated   Patient expressed understanding of goal: Yes  Action steps to achieve this goal:  1. I will keep future Primary Care ( recently switched to the [facility] ),Urology, Pain Clinic appointments and will make a future Neurology appointment   2. I will drink plenty of water to keep my urine clear and yellow  3. I will take Tylenol/Ibuprofen and Gabapentin as directed   4. I will walk a block when able to increase strength   5. I will continue dry needling 3 times a  week  6. I will keep future appointments scheduled with [facility] specialist in [place] June 6, 8 and 10th                            Care Plan: General - increase strength and stamina       Problem: HP GENERAL PROBLEM       Goal: General Goal - increase strength and stamina       Start Date: 4/25/2023 Expected End Date: 4/25/2024    This Visit's Progress: 20%    Note:     Barriers: recent surgical procedure on his back.  Strengths: engaged in care coordination  Patient expressed understanding of goal: yes  Action steps to achieve this goal:  1. I will get up and walk frequently to prevent blood clots.  2. I will elevate my legs as often as possible to reduce the swelling in the lower legs.  3. I will monitor the swelling of the testicles and the lower legs for any increase.                                 Action Plans on File:   Asthma                   Advance Care Plans/Directives Type:   No data recorded    My Medical and Care Information  Problem List   Patient Active Problem List   Diagnosis    Acute prostatitis    Rectal pain    Penile pain    Perineal pain in male    Urinary retention    Symptomatic cholelithiasis    Reflux esophagitis    Psoriatic arthritis (H)    MARIAN (obstructive sleep apnea)    OA (osteoarthritis) of hip    Neuropathy    Mild intermittent asthma, uncomplicated    Insomnia, unspecified    GERD (gastroesophageal reflux disease)    Hepatic steatosis    Benign prostatic hyperplasia with urinary obstruction    Anal fissure    Tear of right acetabular labrum, sequela    Lumbar disc herniation with radiculopathy    Lower urinary tract symptoms    Arthritis of left acromioclavicular joint    ASHD (arteriosclerotic heart disease)    Calculus of gallbladder with chronic cholecystitis without obstruction    Change in bowel habits    Complete tear of left rotator cuff    Constipation    COVID-19 virus infection    Displacement of lumbar intervertebral disc without myelopathy    Diverticular disease  of large intestine    Hiatal hernia    Hyperlipidemia    Low testosterone    Pruritus ani    Pelvic floor dysfunction    Pudendal neuralgia    Coccydynia    Uncomplicated opioid dependence (H)    F11.2 - Continuous opioid dependence (H)    F11.2 - Episodic opioid dependence (H)    F11.9 - Chronic, continuous use of opioids      Current Medications and Allergies:  See printed Medication Report.    Care Coordination Start Date: 11/2/2021   Frequency of Care Coordination: monthly       Form Last Updated: 06/16/2023

## 2023-06-21 ENCOUNTER — TRANSFERRED RECORDS (OUTPATIENT)
Dept: HEALTH INFORMATION MANAGEMENT | Facility: CLINIC | Age: 63
End: 2023-06-21
Payer: MEDICAID

## 2023-07-14 ENCOUNTER — PATIENT OUTREACH (OUTPATIENT)
Dept: CARE COORDINATION | Facility: CLINIC | Age: 63
End: 2023-07-14
Payer: MEDICAID

## 2023-07-14 DIAGNOSIS — J30.2 SEASONAL ALLERGIC RHINITIS, UNSPECIFIED TRIGGER: ICD-10-CM

## 2023-07-14 RX ORDER — FLUTICASONE PROPIONATE 50 MCG
SPRAY, SUSPENSION (ML) NASAL
Qty: 16 ML | Refills: 3 | Status: SHIPPED | OUTPATIENT
Start: 2023-07-14 | End: 2024-01-11

## 2023-07-14 NOTE — PROGRESS NOTES
Clinic Care Coordination Contact    Follow Up Progress Note      Assessment: The RN CC nurse care coordinator contacted the patient by phone for a follow up call.  The patient recently received an injection and feels that he has had no results from the injection even right post injection with the lidocaine.  The patient has scheduled an appointment to meet with his surgeon again as well as the pain clinic.  The patient also states that he has many tests for his bladder coming up and the only concern he has with that is that they want him off his Flomax for 3 days.    Medication review was completed with the patient and marked as reviewed.  Health maintenance was reviewed and questions were answered with the patient.  The assessment for hypertension was completed with the patient today as well as the social determinants of health and they were marked as reviewed.      Care Gaps:    Health Maintenance Due   Topic Date Due     YEARLY PREVENTIVE VISIT  Never done     COVID-19 Vaccine (1) Never done     ZOSTER IMMUNIZATION (1 of 2) Never done     ASTHMA ACTION PLAN  08/05/2023       Care Gaps Last addressed on 7/14/23    Care Plans  Care Plan: General urination without pain     Problem: HP GENERAL PROBLEM     Goal: General Goal - urinate on my own without difficulty.  Decrease bilateral buttocks and thigh pain .     Start Date: 11/2/2021 Expected End Date: 8/2/2024    This Visit's Progress: 60% Recent Progress: 60%    Note:     Goal Statement #1: I will be able to urinate on my own without difficulty .    I will decrease bilateral buttocks and thigh pain in the next 3-6  months   Barriers: constant pain   Strengths: Motivated   Patient expressed understanding of goal: Yes  Action steps to achieve this goal:  1. I will keep future Primary Care ( recently switched to the [facility] ),Urology, Pain Clinic appointments and will make a future Neurology appointment   2. I will drink plenty of water to keep my  urine clear and yellow  3. I will take Tylenol/Ibuprofen and Gabapentin as directed   4. I will walk a block when able to increase strength   5. I will continue dry needling 3 times a week  6. I will keep future appointments scheduled with [facility] specialist in [place] June 6, 8 and 10th                    Care Plan: General - increase strength and stamina     Problem: HP GENERAL PROBLEM     Goal: General Goal - increase strength and stamina     Start Date: 4/25/2023 Expected End Date: 4/25/2024    This Visit's Progress: 30% Recent Progress: 20%    Note:     Barriers: recent surgical procedure on his back.  Strengths: engaged in care coordination  Patient expressed understanding of goal: yes  Action steps to achieve this goal:  1. I will get up and walk frequently to prevent blood clots.  2. I will elevate my legs as often as possible to reduce the swelling in the lower legs.  3. I will monitor the swelling of the testicles and the lower legs for any increase.                          Intervention/Education provided during outreach: Reviewed with the patient the care gaps and encouraged the patient to make his yearly preventative visit.     Outreach Frequency: monthly        Plan:   1.  The patient will make and attend all recommended follow-up appointments.  2.  The patient will take all medications as prescribed by the providers.  3.  The patient will work to increase his strength and stamina as he recovers from his back surgery.    RN CC Nurse Care Coordinator will follow up in 30 days.          Sebastian Jimenez MSN, RN, PHN, [facility]   Primary Care Clinical RN Care Coordinator  [facility]  7/14/2023   2:59 PM  [email or web address]  Office: [phone]

## 2023-07-27 ENCOUNTER — TRANSFERRED RECORDS (OUTPATIENT)
Dept: HEALTH INFORMATION MANAGEMENT | Facility: CLINIC | Age: 63
End: 2023-07-27
Payer: MEDICAID

## 2023-08-02 ENCOUNTER — TRANSFERRED RECORDS (OUTPATIENT)
Dept: HEALTH INFORMATION MANAGEMENT | Facility: CLINIC | Age: 63
End: 2023-08-02
Payer: MEDICAID

## 2023-08-03 ENCOUNTER — OFFICE VISIT (OUTPATIENT)
Dept: FAMILY MEDICINE | Facility: CLINIC | Age: 63
End: 2023-08-03
Payer: COMMERCIAL

## 2023-08-03 VITALS
WEIGHT: 192 LBS | HEIGHT: 65 IN | HEART RATE: 106 BPM | RESPIRATION RATE: 20 BRPM | OXYGEN SATURATION: 96 % | TEMPERATURE: 98.5 F | BODY MASS INDEX: 31.99 KG/M2 | SYSTOLIC BLOOD PRESSURE: 150 MMHG | DIASTOLIC BLOOD PRESSURE: 90 MMHG

## 2023-08-03 DIAGNOSIS — I10 ESSENTIAL HYPERTENSION: ICD-10-CM

## 2023-08-03 DIAGNOSIS — M62.89 PELVIC FLOOR DYSFUNCTION: ICD-10-CM

## 2023-08-03 DIAGNOSIS — Z01.818 PREOP GENERAL PHYSICAL EXAM: Primary | ICD-10-CM

## 2023-08-03 PROCEDURE — 99214 OFFICE O/P EST MOD 30 MIN: CPT | Performed by: FAMILY MEDICINE

## 2023-08-03 RX ORDER — LISINOPRIL 20 MG/1
20 TABLET ORAL DAILY
Qty: 90 TABLET | Refills: 3 | Status: SHIPPED | OUTPATIENT
Start: 2023-08-03 | End: 2023-09-26

## 2023-08-03 ASSESSMENT — ENCOUNTER SYMPTOMS
SORE THROAT: 0
CHILLS: 0
JOINT SWELLING: 0
NERVOUS/ANXIOUS: 0
COUGH: 0
PARESTHESIAS: 0
PALPITATIONS: 0
MYALGIAS: 0
HEADACHES: 0
FEVER: 0
DIZZINESS: 0
WEAKNESS: 0
ARTHRALGIAS: 0
SHORTNESS OF BREATH: 0

## 2023-08-03 ASSESSMENT — ANXIETY QUESTIONNAIRES
2. NOT BEING ABLE TO STOP OR CONTROL WORRYING: NOT AT ALL
5. BEING SO RESTLESS THAT IT IS HARD TO SIT STILL: NOT AT ALL
1. FEELING NERVOUS, ANXIOUS, OR ON EDGE: NOT AT ALL
3. WORRYING TOO MUCH ABOUT DIFFERENT THINGS: NOT AT ALL
IF YOU CHECKED OFF ANY PROBLEMS ON THIS QUESTIONNAIRE, HOW DIFFICULT HAVE THESE PROBLEMS MADE IT FOR YOU TO DO YOUR WORK, TAKE CARE OF THINGS AT HOME, OR GET ALONG WITH OTHER PEOPLE: SOMEWHAT DIFFICULT
GAD7 TOTAL SCORE: 0
GAD7 TOTAL SCORE: 0
6. BECOMING EASILY ANNOYED OR IRRITABLE: NOT AT ALL
7. FEELING AFRAID AS IF SOMETHING AWFUL MIGHT HAPPEN: NOT AT ALL
4. TROUBLE RELAXING: NOT AT ALL

## 2023-08-03 ASSESSMENT — ASTHMA QUESTIONNAIRES: ACT_TOTALSCORE: 24

## 2023-08-03 NOTE — PROGRESS NOTES
[facility]  [address] [facility]  [place] [address]  Phone: [phone]  Primary Provider: Silver Pro  Pre-op Performing Provider: SILVER PRO      PREOPERATIVE EVALUATION:  Today's date: 8/3/2023    Mustapha Negrete is a 62 year old male who presents for a preoperative evaluation.      8/3/2023     1:10 PM   Additional Questions   Roomed by MP   Accompanied by NA         8/3/2023     1:10 PM   Patient Reported Additional Medications   Patient reports taking the following new medications flomax, tizanadine       Surgical Information:  Surgery/Procedure: Place catheter in bladder, block placed  Surgery Location: [facility]  Surgeon: unknown  Surgery Date: 8/11/23  Time of Surgery: 9am  Where patient plans to recover: At home with family  Fax number for surgical facility: [phone]    Assessment & Plan     The proposed surgical procedure is considered INTERMEDIATE risk.    1. Preop general physical exam    2. Pelvic floor dysfunction    3. Essential hypertension  Would like to increase to 20 mg daily.   - lisinopril (ZESTRIL) 20 MG tablet; Take 1 tablet (20 mg) by mouth daily  Dispense: 90 tablet; Refill: 3       - No identified additional risk factors other than previously addressed    Antiplatelet or Anticoagulation Medication Instructions:   - aspirin: Discontinue aspirin 7-10 days prior to procedure to reduce bleeding risk. It should be resumed postoperatively.     Additional Medication Instructions:   - ibuprofen (Advil, Motrin): HOLD 1 day before surgery.     RECOMMENDATION:  APPROVAL GIVEN to proceed with proposed procedure, without further diagnostic evaluation.    Subjective       HPI related to upcoming procedure: 63 yo M with pelvic floor dysfunction        8/3/2023     1:03 PM   Preop Questions   1. Have you ever had a heart attack or stroke? No   2. Have you ever had surgery on your heart or blood vessels, such as a stent placement, a coronary artery  bypass, or surgery on an artery in your head, neck, heart, or legs? No   3. Do you have chest pain with activity? No   4. Do you have a history of  heart failure? No   5. Do you currently have a cold, bronchitis or symptoms of other infection? No   6. Do you have a cough, shortness of breath, or wheezing? No   7. Do you or anyone in your family have previous history of blood clots? No   8. Do you or does anyone in your family have a serious bleeding problem such as prolonged bleeding following surgeries or cuts? No   9. Have you ever had problems with anemia or been told to take iron pills? No   10. Have you had any abnormal blood loss such as black, tarry or bloody stools? No   11. Have you ever had a blood transfusion? No   12. Are you willing to have a blood transfusion if it is medically needed before, during, or after your surgery? Yes   13. Have you or any of your relatives ever had problems with anesthesia? YES - Did tolerate versed due tachycardia   14. Do you have sleep apnea, excessive snoring or daytime drowsiness? Mild   15. Do you have any artifical heart valves or other implanted medical devices like a pacemaker, defibrillator, or continuous glucose monitor? No   16. Do you have artificial joints? YES - Left hip replacement and left rotator cuff repair   17. Are you allergic to latex? No       Health Care Directive:  Patient does not have a Health Care Directive or Living Will: Patient states has Advance Directive and will bring in a copy to clinic.    Preoperative Review of :   reviewed - controlled substances reflected in medication list.      Status of Chronic Conditions:  HYPERTENSION - Patient has longstanding history of HTN , currently denies any symptoms referable to elevated blood pressure. Specifically denies chest pain, palpitations, dyspnea, orthopnea, PND or peripheral edema. Blood pressure readings have not been in normal range. Would like to increase lisinopril to 20 mg daily.  Current medication regimen is as listed below. Patient denies any side effects of medication.     Review of Systems   Constitutional:  Negative for chills and fever.   HENT:  Negative for congestion, ear pain, hearing loss and sore throat.    Respiratory:  Negative for cough and shortness of breath.    Cardiovascular:  Negative for chest pain, palpitations and peripheral edema.   Musculoskeletal:  Negative for arthralgias, joint swelling and myalgias.   Skin:  Negative for rash.   Neurological:  Negative for dizziness, weakness, headaches and paresthesias.   Psychiatric/Behavioral:  Negative for mood changes. The patient is not nervous/anxious.          Patient Active Problem List    Diagnosis Date Noted    F11.2 - Continuous opioid dependence (H) 05/23/2023     Priority: Medium    F11.2 - Episodic opioid dependence (H) 05/23/2023     Priority: Medium    F11.9 - Chronic, continuous use of opioids 05/23/2023     Priority: Medium    Uncomplicated opioid dependence (H)      Priority: Medium     Patient is followed by Silver Pro DO for ongoing prescription of pain medication.  All refills should only be approved by this provider, or covering partner.    Medication(s): oxycodone 5 mg  Maximum quantity per month: 30  Clinic visit frequency required: Q 3 months   PDMP Review         Value Time User    State PDMP site checked  Yes 7/3/2022 10:43 AM Jose Pérez MD   Controlled substance agreement:  CSA -- Patient Level:    Controlled Substance Agreement - Opioid - Scan on 9/8/2022 11:41 AM     Pain Clinic evaluation in the past: No    Opioid Risk Tool Total Score(s):  No flowsheet data found.  Last [place] website verification:  done on 9/8/22   [email or web address]        Pelvic floor dysfunction 05/13/2022     Priority: Medium    Pudendal neuralgia 05/13/2022     Priority: Medium    Coccydynia 05/13/2022     Priority: Medium    Change in bowel habits 01/03/2022     Priority: Medium     Complete tear of left rotator cuff 01/03/2022     Priority: Medium    Pruritus ani 01/03/2022     Priority: Medium    Tear of right acetabular labrum, sequela 10/28/2021     Priority: Medium    Lumbar disc herniation with radiculopathy 10/28/2021     Priority: Medium     MRI 10/21  2.  At L4-L5, there is a right paracentral slightly caudally directed disc extrusion that narrows the right lateral recess and causes mass effect on the descending/traversing right L5 nerve root.  3.  At L3-L4, there is a right paracentral disc protrusion superimposed on disc bulge with narrowing of the right lateral recess and mass effect on the descending/traversing right L4 nerve root.      Acute prostatitis 10/06/2021     Priority: Medium    Rectal pain 10/06/2021     Priority: Medium    Penile pain 10/06/2021     Priority: Medium    Perineal pain in male 10/06/2021     Priority: Medium    Anal fissure 08/26/2021     Priority: Medium    Constipation 08/26/2021     Priority: Medium    Urinary retention 08/16/2021     Priority: Medium    Neuropathy 08/16/2021     Priority: Medium    Diverticular disease of large intestine 08/03/2021     Priority: Medium    Benign prostatic hyperplasia with urinary obstruction 07/12/2021     Priority: Medium    Calculus of gallbladder with chronic cholecystitis without obstruction 05/25/2021     Priority: Medium    MARIAN (obstructive sleep apnea) 05/03/2021     Priority: Medium     On BiPAP, see scans.      Symptomatic cholelithiasis 04/06/2021     Priority: Medium    COVID-19 virus infection 04/06/2021     Priority: Medium    Hepatic steatosis 03/26/2021     Priority: Medium    Psoriatic arthritis (H) 03/15/2021     Priority: Medium    Arthritis of left acromioclavicular joint 08/05/2019     Priority: Medium    Lower urinary tract symptoms 05/13/2019     Priority: Medium    Mild intermittent asthma, uncomplicated 04/05/2017     Priority: Medium    OA (osteoarthritis) of hip 05/19/2015     Priority: Medium     ASHD (arteriosclerotic heart disease) 10/25/2013     Priority: Medium    Hyperlipidemia 10/25/2013     Priority: Medium    Reflux esophagitis 04/18/2013     Priority: Medium    Hiatal hernia 04/18/2013     Priority: Medium    Insomnia, unspecified 02/27/2013     Priority: Medium    GERD (gastroesophageal reflux disease) 10/16/2012     Priority: Medium    Low testosterone 10/16/2012     Priority: Medium    Displacement of lumbar intervertebral disc without myelopathy 09/12/2011     Priority: Medium      Past Medical History:   Diagnosis Date    MARIAN (obstructive sleep apnea)     havne't used cpap since 2018    Pelvic floor dysfunction      Past Surgical History:   Procedure Laterality Date    BACK SURGERY      CHOLECYSTECTOMY      COLONOSCOPY N/A 8/31/2022    Procedure: COLONOSCOPY;  Surgeon: Veena Mendoza MD;  Location:  GI    ENT SURGERY      tonsillectomy    GENITOURINARY SURGERY      TURP    IMPLANT STIMULATOR SACRAL NERVE PERCUTANEOUS TRIAL N/A 10/17/2022    Procedure: INSERTION, NEUROSTIMULATOR, SACRAL, PERCUTANEOUS, FOR TRIAL (trial with Sqwiggle);  Surgeon: Nayan Tello MD;  Location: [facility] OR    JOINT REPLACEMENT, HIP RT/LT Left     ORTHOPEDIC SURGERY      wrist surgery, carpal tunnel 2005    PROSTATE SURGERY      Urolift    ROTATOR CUFF REPAIR RT/LT Left      Current Outpatient Medications   Medication Sig Dispense Refill    acetaminophen (TYLENOL) 500 MG tablet Take 500-1,000 mg by mouth every 6 hours as needed for mild pain      albuterol (PROAIR HFA/PROVENTIL HFA/VENTOLIN HFA) 108 (90 Base) MCG/ACT inhaler Inhale 2 puffs into the lungs every 6 hours      ASPIRIN NOT PRESCRIBED (INTENTIONAL) Please choose reason not prescribed from choices below.      Buprenorphine HCl (BELBUCA) 300 MCG FILM buccal film Place 300 mcg inside cheek every 12 hours      Ergocalciferol (VITAMIN D2 PO)       fluticasone (FLONASE) 50 MCG/ACT nasal spray INSTILL 1 SPRAY INTO BOTH NOSTRILS DAILY 16 mL 3     lisinopril (ZESTRIL) 10 MG tablet Take 1 tablet (10 mg) by mouth daily 90 tablet 3    oxyCODONE (ROXICODONE) 5 MG tablet Take 1 tablet (5 mg) by mouth daily as needed for severe pain (7-10) 30 tablet 0    senna-docusate (SENOKOT-S/PERICOLACE) 8.6-50 MG tablet Take 1-4 tablets by mouth      gabapentin (NEURONTIN) 300 MG capsule 2 tablets tid 150 capsule 3    pantoprazole (PROTONIX) 20 MG EC tablet Take 20 mg by mouth      terazosin (HYTRIN) 2 MG capsule Take 2 mg by mouth      terazosin (HYTRIN) 2 MG capsule Take 1 capsule (2 mg) by mouth At Bedtime 90 capsule 3       Allergies   Allergen Reactions    Droperidol Other (See Comments)     Extrapyramidal Side Effect    Fenofibrate Headache and Diarrhea             Fentanyl      Other reaction(s): N&V hard to wake up    Keflex [Cephalexin] Itching    Lactose GI Disturbance         Other reaction(s): GI upset    Midazolam      Other reaction(s): N&V, Hard to wake up    Monosodium Glutamate      Other reaction(s): diarrhea, headaches    Pcn [Penicillins] Other (See Comments)     Feels warm and flushed, but tolerates Cephalexin    Atorvastatin Palpitations     Other reaction(s): palpitations    Sertraline Palpitations         Other reaction(s): Unknown    Simvastatin Palpitations     Other reaction(s): palpitations    Sulfa Antibiotics Headache and Rash     Burning    Other reaction(s): Rash  Other reaction(s): rash and headache    Tetracycline Rash     Burning    Other reaction(s): rash        Social History     Tobacco Use    Smoking status: Former     Packs/day: 1.00     Years: 5.00     Pack years: 5.00     Types: Cigarettes     Quit date: 1999     Years since quittin.7     Passive exposure: Never    Smokeless tobacco: Never   Substance Use Topics    Alcohol use: Yes     Comment: very little     Family History   Problem Relation Age of Onset    Cerebrovascular Disease Mother     Hypertension Mother     Diabetes Mother     Liver Cancer Mother     Cancer Father  "84        liver cancer    Prostate Cancer Father      History   Drug Use    Types: Marijuana     Comment: gummies         Objective     BP (!) 152/90   Pulse 106   Temp 98.5  F (36.9  C) (Temporal)   Resp 20   Ht 1.645 m (5' 4.75\")   Wt 87.1 kg (192 lb)   SpO2 96%   BMI 32.20 kg/m      Physical Exam  Constitutional:       General: He is not in acute distress.     Appearance: He is well-developed.   HENT:      Head: Normocephalic and atraumatic.      Nose: Nose normal.   Eyes:      Conjunctiva/sclera: Conjunctivae normal.   Neck:      Trachea: No tracheal deviation.   Cardiovascular:      Rate and Rhythm: Normal rate and regular rhythm.      Heart sounds: Normal heart sounds.   Pulmonary:      Effort: Pulmonary effort is normal.      Breath sounds: No wheezing.   Musculoskeletal:         General: Normal range of motion.      Cervical back: Normal range of motion.   Skin:     Findings: No erythema or rash.   Neurological:      Mental Status: He is alert and oriented to person, place, and time.   Psychiatric:         Behavior: Behavior normal.           Recent Labs   Lab Test 05/23/23  0937 04/19/23  1017 03/23/23  1016 01/05/23  1454 08/19/22  0952 11/11/21  1050   HGB  --  9.8* 14.1  --  14.5  --    PLT  --  503* 335  --   --   --    INR  --   --   --   --   --  0.92   NA  --   --  139 141 141  --    POTASSIUM  --   --  4.2 4.1 4.6  --    CR  --   --  0.77 0.94 0.89  --    A1C 5.5  --   --   --  5.9*  --         Diagnostics:  No labs were ordered during this visit.   No EKG required for low risk surgery (cataract, skin procedure, breast biopsy, etc).    Revised Cardiac Risk Index (RCRI):  The patient has the following serious cardiovascular risks for perioperative complications:   - No serious cardiac risks = 0 points     RCRI Interpretation: 0 points: Class I (very low risk - 0.4% complication rate)         Signed Electronically by: Silver Pro DO  Copy of this evaluation report is provided to requesting " physician.

## 2023-08-03 NOTE — PATIENT INSTRUCTIONS
Claudy Luciano,    Thank you for allowing [facility] to manage your care.    Please bring a copy of your advanced directive to our clinic.     If you have any questions or concerns, please feel free to call us at [phone].    Sincerely,    Dr. Pro    Did you know?      You can schedule a video visit for follow-up appointments as well as future appointments for certain conditions.  Please see the below link.     [email or web address]    If you have not already done so,  I encourage you to sign up for [employer] ([email or web address]).  This will allow you to review your results, securely communicate with a provider, and schedule virtual visits as well.      For informational purposes only. Not to replace the advice of your health care provider. Copyright   2003, 2019 [facility]. All rights reserved. Clinically reviewed by Pema Gimenez MD. Giferent [id number] - REV 12/22.  Preparing for Your Surgery  Getting started  A nurse will call you to review your health history and instructions. They will give you an arrival time based on your scheduled surgery time. Please be ready to share:  Your doctor's clinic name and phone number  Your medical, surgical, and anesthesia history  A list of allergies and sensitivities  A list of medicines, including herbal treatments and over-the-counter drugs  Whether the patient has a legal guardian (ask how to send us the papers in advance)  Please tell us if you're pregnant--or if there's any chance you might be pregnant. Some surgeries may injure a fetus (unborn baby), so they require a pregnancy test. Surgeries that are safe for a fetus don't always need a test, and you can choose whether to have one.   If you have a child who's having surgery, please ask for a copy of Preparing for Your Child's Surgery.    Preparing for surgery  Within 10 to 30 days of surgery: Have a pre-op exam (sometimes called an H&P, or History and  Physical). This can be done at a clinic or pre-operative center.  If you're having a , you may not need this exam. Talk to your care team.  At your pre-op exam, talk to your care team about all medicines you take. If you need to stop any medicines before surgery, ask when to start taking them again.  We do this for your safety. Many medicines can make you bleed too much during surgery. Some change how well surgery (anesthesia) drugs work.  Call your insurance company to let them know you're having surgery. (If you don't have insurance, call [phone].)  Call your clinic if there's any change in your health. This includes signs of a cold or flu (sore throat, runny nose, cough, rash, fever). It also includes a scrape or scratch near the surgery site.  If you have questions on the day of surgery, call your hospital or surgery center.  Eating and drinking guidelines  For your safety: Unless your surgeon tells you otherwise, follow the guidelines below.  Eat and drink as usual until 8 hours before you arrive for surgery. After that, no food or milk.  Drink clear liquids until 2 hours before you arrive. These are liquids you can see through, like water, Gatorade, and Propel Water. They also include plain black coffee and tea (no cream or milk), candy, and breath mints. You can spit out gum when you arrive.  If you drink alcohol: Stop drinking it the night before surgery.  If your care team tells you to take medicine on the morning of surgery, it's okay to take it with a sip of water.  Preventing infection  Shower or bathe the night before and morning of your surgery. Follow the instructions your clinic gave you. (If no instructions, use regular soap.)  Don't shave or clip hair near your surgery site. We'll remove the hair if needed.  Don't smoke or vape the morning of surgery. You may chew nicotine gum up to 2 hours before surgery. A nicotine patch is okay.  Note: Some surgeries require you to completely quit  smoking and nicotine. Check with your surgeon.  Your care team will make every effort to keep you safe from infection. We will:  Clean our hands often with soap and water (or an alcohol-based hand rub).  Clean the skin at your surgery site with a special soap that kills germs.  Give you a special gown to keep you warm. (Cold raises the risk of infection.)  Wear special hair covers, masks, gowns and gloves during surgery.  Give antibiotic medicine, if prescribed. Not all surgeries need antibiotics.  What to bring on the day of surgery  Photo ID and insurance card  Copy of your health care directive, if you have one  Glasses and hearing aids (bring cases)  You can't wear contacts during surgery  Inhaler and eye drops, if you use them (tell us about these when you arrive)  CPAP machine or breathing device, if you use them  A few personal items, if spending the night  If you have . . .  A pacemaker, ICD (cardiac defibrillator) or other implant: Bring the ID card.  An implanted stimulator: Bring the remote control.  A legal guardian: Bring a copy of the certified (court-stamped) guardianship papers.  Please remove any jewelry, including body piercings. Leave jewelry and other valuables at home.  If you're going home the day of surgery  You must have a responsible adult drive you home. They should stay with you overnight as well.  If you don't have someone to stay with you, and you aren't safe to go home alone, we may keep you overnight. Insurance often won't pay for this.  After surgery  If it's hard to control your pain or you need more pain medicine, please call your surgeon's office.  Questions?   If you have any questions for your care team, list them here: _________________________________________________________________________________________________________________________________________________________________________ ____________________________________ ____________________________________  ____________________________________    How to Take Your Medication Before Surgery  Do not take lisinopril the morning of surgery.  Do not take advil the day before surgery

## 2023-08-10 ENCOUNTER — TRANSFERRED RECORDS (OUTPATIENT)
Dept: HEALTH INFORMATION MANAGEMENT | Facility: CLINIC | Age: 63
End: 2023-08-10
Payer: MEDICAID

## 2023-08-18 ENCOUNTER — HOSPITAL ENCOUNTER (EMERGENCY)
Facility: CLINIC | Age: 63
Discharge: HOME | End: 2023-08-18
Attending: EMERGENCY MEDICINE | Admitting: EMERGENCY MEDICINE
Payer: COMMERCIAL

## 2023-08-18 ENCOUNTER — OFFICE VISIT (OUTPATIENT)
Dept: URGENT CARE | Facility: URGENT CARE | Age: 63
End: 2023-08-18
Payer: COMMERCIAL

## 2023-08-18 ENCOUNTER — APPOINTMENT (OUTPATIENT)
Dept: CT IMAGING | Facility: CLINIC | Age: 63
End: 2023-08-18
Attending: EMERGENCY MEDICINE
Payer: COMMERCIAL

## 2023-08-18 VITALS
HEART RATE: 75 BPM | DIASTOLIC BLOOD PRESSURE: 92 MMHG | TEMPERATURE: 98.2 F | OXYGEN SATURATION: 97 % | RESPIRATION RATE: 12 BRPM | SYSTOLIC BLOOD PRESSURE: 132 MMHG

## 2023-08-18 VITALS
HEART RATE: 88 BPM | RESPIRATION RATE: 20 BRPM | SYSTOLIC BLOOD PRESSURE: 144 MMHG | WEIGHT: 185 LBS | HEIGHT: 64 IN | DIASTOLIC BLOOD PRESSURE: 94 MMHG | TEMPERATURE: 97.5 F | OXYGEN SATURATION: 97 % | BODY MASS INDEX: 31.58 KG/M2

## 2023-08-18 DIAGNOSIS — R68.83 CHILLS: ICD-10-CM

## 2023-08-18 DIAGNOSIS — Z96.0 URINARY CATHETER IN PLACE: ICD-10-CM

## 2023-08-18 DIAGNOSIS — R10.31 RLQ ABDOMINAL PAIN: Primary | ICD-10-CM

## 2023-08-18 DIAGNOSIS — Z93.59 SUPRAPUBIC CATHETER (H): ICD-10-CM

## 2023-08-18 DIAGNOSIS — S30.1XXA ABDOMINAL WALL HEMATOMA, INITIAL ENCOUNTER: ICD-10-CM

## 2023-08-18 LAB
ALBUMIN SERPL BCG-MCNC: 4.2 G/DL (ref 3.5–5.2)
ALBUMIN UR-MCNC: 100 MG/DL
ALBUMIN UR-MCNC: 30 MG/DL
ALP SERPL-CCNC: 42 U/L (ref 40–129)
ALT SERPL W P-5'-P-CCNC: 20 U/L (ref 0–70)
AMORPH CRY #/AREA URNS HPF: ABNORMAL /HPF
ANION GAP SERPL CALCULATED.3IONS-SCNC: 11 MMOL/L (ref 7–15)
APPEARANCE UR: ABNORMAL
APPEARANCE UR: ABNORMAL
AST SERPL W P-5'-P-CCNC: 20 U/L (ref 0–45)
BACTERIA #/AREA URNS HPF: ABNORMAL /HPF
BASOPHILS # BLD AUTO: 0 10E3/UL (ref 0–0.2)
BASOPHILS NFR BLD AUTO: 1 %
BILIRUB SERPL-MCNC: 0.5 MG/DL
BILIRUB UR QL STRIP: NEGATIVE
BILIRUB UR QL STRIP: NEGATIVE
BUN SERPL-MCNC: 16.9 MG/DL (ref 8–23)
CALCIUM SERPL-MCNC: 10 MG/DL (ref 8.8–10.2)
CAOX CRY #/AREA URNS HPF: ABNORMAL /HPF
CHLORIDE SERPL-SCNC: 103 MMOL/L (ref 98–107)
COLOR UR AUTO: YELLOW
COLOR UR AUTO: YELLOW
CREAT SERPL-MCNC: 0.81 MG/DL (ref 0.67–1.17)
DEPRECATED HCO3 PLAS-SCNC: 26 MMOL/L (ref 22–29)
EOSINOPHIL # BLD AUTO: 0.1 10E3/UL (ref 0–0.7)
EOSINOPHIL NFR BLD AUTO: 2 %
ERYTHROCYTE [DISTWIDTH] IN BLOOD BY AUTOMATED COUNT: 14.4 % (ref 10–15)
GFR SERPL CREATININE-BSD FRML MDRD: >90 ML/MIN/1.73M2
GLUCOSE SERPL-MCNC: 107 MG/DL (ref 70–99)
GLUCOSE UR STRIP-MCNC: NEGATIVE MG/DL
GLUCOSE UR STRIP-MCNC: NEGATIVE MG/DL
HCT VFR BLD AUTO: 42.7 % (ref 40–53)
HGB BLD-MCNC: 13.7 G/DL (ref 13.3–17.7)
HGB UR QL STRIP: ABNORMAL
HGB UR QL STRIP: ABNORMAL
HYALINE CASTS: 1 /LPF
IMM GRANULOCYTES # BLD: 0 10E3/UL
IMM GRANULOCYTES NFR BLD: 0 %
KETONES UR STRIP-MCNC: NEGATIVE MG/DL
KETONES UR STRIP-MCNC: NEGATIVE MG/DL
LEUKOCYTE ESTERASE UR QL STRIP: ABNORMAL
LEUKOCYTE ESTERASE UR QL STRIP: ABNORMAL
LYMPHOCYTES # BLD AUTO: 1.1 10E3/UL (ref 0.8–5.3)
LYMPHOCYTES NFR BLD AUTO: 16 %
MCH RBC QN AUTO: 27.2 PG (ref 26.5–33)
MCHC RBC AUTO-ENTMCNC: 32.1 G/DL (ref 31.5–36.5)
MCV RBC AUTO: 85 FL (ref 78–100)
MONOCYTES # BLD AUTO: 0.5 10E3/UL (ref 0–1.3)
MONOCYTES NFR BLD AUTO: 7 %
MUCOUS THREADS #/AREA URNS LPF: PRESENT /LPF
MUCOUS THREADS #/AREA URNS LPF: PRESENT /LPF
NEUTROPHILS # BLD AUTO: 5.2 10E3/UL (ref 1.6–8.3)
NEUTROPHILS NFR BLD AUTO: 74 %
NITRATE UR QL: NEGATIVE
NITRATE UR QL: NEGATIVE
NRBC # BLD AUTO: 0 10E3/UL
NRBC BLD AUTO-RTO: 0 /100
PH UR STRIP: 7 [PH] (ref 5–7)
PH UR STRIP: 7 [PH] (ref 5–7)
PLATELET # BLD AUTO: 371 10E3/UL (ref 150–450)
POTASSIUM SERPL-SCNC: 4.7 MMOL/L (ref 3.4–5.3)
PROT SERPL-MCNC: 7 G/DL (ref 6.4–8.3)
RBC # BLD AUTO: 5.04 10E6/UL (ref 4.4–5.9)
RBC #/AREA URNS AUTO: ABNORMAL /HPF
RBC URINE: 87 /HPF
SODIUM SERPL-SCNC: 140 MMOL/L (ref 136–145)
SP GR UR STRIP: 1.02 (ref 1–1.03)
SP GR UR STRIP: 1.02 (ref 1–1.03)
SQUAMOUS #/AREA URNS AUTO: ABNORMAL /LPF
SQUAMOUS EPITHELIAL: <1 /HPF
UROBILINOGEN UR STRIP-ACNC: 0.2 E.U./DL
UROBILINOGEN UR STRIP-MCNC: NORMAL MG/DL
WBC # BLD AUTO: 7.1 10E3/UL (ref 4–11)
WBC #/AREA URNS AUTO: ABNORMAL /HPF
WBC URINE: 4 /HPF

## 2023-08-18 PROCEDURE — 81001 URINALYSIS AUTO W/SCOPE: CPT | Performed by: NURSE PRACTITIONER

## 2023-08-18 PROCEDURE — 99285 EMERGENCY DEPT VISIT HI MDM: CPT | Mod: 25 | Performed by: EMERGENCY MEDICINE

## 2023-08-18 PROCEDURE — 74177 CT ABD & PELVIS W/CONTRAST: CPT

## 2023-08-18 PROCEDURE — 250N000009 HC RX 250: Performed by: EMERGENCY MEDICINE

## 2023-08-18 PROCEDURE — 81001 URINALYSIS AUTO W/SCOPE: CPT | Performed by: EMERGENCY MEDICINE

## 2023-08-18 PROCEDURE — 99284 EMERGENCY DEPT VISIT MOD MDM: CPT | Performed by: EMERGENCY MEDICINE

## 2023-08-18 PROCEDURE — 85025 COMPLETE CBC W/AUTO DIFF WBC: CPT | Performed by: EMERGENCY MEDICINE

## 2023-08-18 PROCEDURE — 80053 COMPREHEN METABOLIC PANEL: CPT | Performed by: EMERGENCY MEDICINE

## 2023-08-18 PROCEDURE — 99214 OFFICE O/P EST MOD 30 MIN: CPT | Performed by: NURSE PRACTITIONER

## 2023-08-18 PROCEDURE — 36415 COLL VENOUS BLD VENIPUNCTURE: CPT | Performed by: EMERGENCY MEDICINE

## 2023-08-18 PROCEDURE — 250N000011 HC RX IP 250 OP 636: Performed by: EMERGENCY MEDICINE

## 2023-08-18 RX ORDER — IOPAMIDOL 755 MG/ML
91 INJECTION, SOLUTION INTRAVASCULAR ONCE
Status: COMPLETED | OUTPATIENT
Start: 2023-08-18 | End: 2023-08-18

## 2023-08-18 RX ADMIN — IOPAMIDOL 91 ML: 755 INJECTION, SOLUTION INTRAVENOUS at 14:18

## 2023-08-18 RX ADMIN — SODIUM CHLORIDE 63 ML: 9 INJECTION, SOLUTION INTRAVENOUS at 14:19

## 2023-08-18 ASSESSMENT — ACTIVITIES OF DAILY LIVING (ADL)
ADLS_ACUITY_SCORE: 35
ADLS_ACUITY_SCORE: 35

## 2023-08-18 NOTE — DISCHARGE INSTRUCTIONS
Follow-up as scheduled    Return for fever, vomiting, progressive pain, swelling, redness, purulent discharge or any other concern

## 2023-08-18 NOTE — PROGRESS NOTES
Assessment & Plan     RLQ abdominal pain    - UA Macroscopic with reflex to Microscopic and Culture - Lab Collect  - UA Macroscopic with reflex to Microscopic and Culture - Lab Collect  - UA Microscopic with Reflex to Culture    Urinary catheter in place    Chills       Reviewed UA during visit showing no obvious UTI. Recommend higher level of care in emergency room for severe RLQ abd pain with chills and sweats with complicated medical history, as stat labs and advanced imaging indicated. Patient agreeable and is discharged in stable condition.       Nicky Paul NP  [facility]    Zhang Luciano is a 62 year old male who presents to clinic today for the following health issues:  Chief Complaint   Patient presents with    Infection     Per pt Hx of back surgery , and the urologist trouble voiding. Hx of numbness from the waist down. Urologist - cath with leg bag,done last friday experiencing burning pain, with chills, and fever. Sore next to his cath and bag insertion.      Patient presents for evaluation of chills and sweats. He went to emergency room today and left due to wait time.     He states he has had a rough 3 years with COVID, shingles on bilateral thighs, gall bladder surgery close together. He has had several injections in his back and back surgery. He has had many catheters and has had seen 3 urologists and feels confident with his current urologist.     He has had a catheter for the past week and when inserted he developed chills and sweats. No known fever. He has had burning when voiding for a long time. Associated symptoms: red sore approx 3 cm in right pubis.     Denies nausea, emesis, hematuria. He has 7/10 lower abdominal pain. He has no back pain. Last BM this morning was normal.     No history of kidney stone. He still has appendix.     Problem list, Medication list, Allergies, and Medical history reviewed in EPIC.    ROS:  Review of systems negative except  for noted above        Objective    BP (!) 132/92   Pulse 75   Temp 98.2  F (36.8  C) (Tympanic)   Resp 12   SpO2 97%   Physical Exam  Constitutional:       General: He is not in acute distress.     Appearance: He is not toxic-appearing or diaphoretic.   Abdominal:      General: Bowel sounds are normal. There is no distension.      Palpations: Abdomen is soft.      Tenderness: There is abdominal tenderness in the right lower quadrant. There is no right CVA tenderness, left CVA tenderness or guarding.      Comments: Severe RLQ abd pain   Genitourinary:     Comments: Catheter in place with dressing covering pubis  Lymphadenopathy:      Cervical: No cervical adenopathy.   Skin:     General: Skin is warm and dry.   Neurological:      Mental Status: He is alert.              Labs:  Results for orders placed or performed in visit on 08/18/23   UA Macroscopic with reflex to Microscopic and Culture - Lab Collect     Status: Abnormal    Specimen: Urine, Clean Catch   Result Value Ref Range    Color Urine Yellow Colorless, Straw, Light Yellow, Yellow    Appearance Urine Slightly Cloudy (A) Clear    Glucose Urine Negative Negative mg/dL    Bilirubin Urine Negative Negative    Ketones Urine Negative Negative mg/dL    Specific Gravity Urine 1.025 1.003 - 1.035    Blood Urine Moderate (A) Negative    pH Urine 7.0 5.0 - 7.0    Protein Albumin Urine 100 (A) Negative mg/dL    Urobilinogen Urine 0.2 0.2, 1.0 E.U./dL    Nitrite Urine Negative Negative    Leukocyte Esterase Urine Trace (A) Negative   UA Microscopic with Reflex to Culture     Status: Abnormal   Result Value Ref Range    Bacteria Urine Few (A) None Seen /HPF    RBC Urine 25-50 (A) 0-2 /HPF /HPF    WBC Urine 0-5 0-5 /HPF /HPF    Squamous Epithelials Urine Few (A) None Seen /LPF    Mucus Urine Present (A) None Seen /LPF    Amorphous Crystals Urine Moderate (A) None Seen /HPF    Calcium Oxalate Crystals Urine Few (A) None Seen /HPF    Narrative    Urine Culture not  indicated

## 2023-08-18 NOTE — ED TRIAGE NOTES
Pt had catheter placed 1 week ago, pt has been having abd pain, was seen in outside  and sent to ED for eval.      Triage Assessment       Row Name 08/18/23 1224       Triage Assessment (Adult)    Airway WDL WDL       Respiratory WDL    Respiratory WDL WDL       Skin Circulation/Temperature WDL    Skin Circulation/Temperature WDL WDL       Cardiac WDL    Cardiac WDL WDL       Peripheral/Neurovascular WDL    Peripheral Neurovascular WDL WDL       Cognitive/Neuro/Behavioral WDL    Cognitive/Neuro/Behavioral WDL WDL

## 2023-08-18 NOTE — ED PROVIDER NOTES
History     Chief Complaint   Patient presents with    Abdominal Pain     HPI  Mustapha Negrete is a 62 year old male who presents 1 week postop suprapubic catheter placement.  Describes pain along the right mariajose-incisional region of the abdomen that radiates down central suprapubic region into the dorsal penis.  Denies penile discharge.  Describes some chills without measured fever.  Appetite intact no nausea vomiting bowel movements baseline.  Following with urology, scheduled for follow-up this week with pain clinic.  Has had difficulty with urinary retention and pelvic pain after AP lumbar fusion.    Allergies:  Allergies   Allergen Reactions    Droperidol Other (See Comments)     Extrapyramidal Side Effect    Fenofibrate Headache and Diarrhea             Fentanyl      Other reaction(s): N&V hard to wake up    Keflex [Cephalexin] Itching    Lactose GI Disturbance         Other reaction(s): GI upset    Midazolam      Other reaction(s): N&V, Hard to wake up    Monosodium Glutamate      Other reaction(s): diarrhea, headaches    Pcn [Penicillins] Other (See Comments)     Feels warm and flushed, but tolerates Cephalexin    Atorvastatin Palpitations     Other reaction(s): palpitations    Sertraline Palpitations         Other reaction(s): Unknown    Simvastatin Palpitations     Other reaction(s): palpitations    Sulfa Antibiotics Headache and Rash     Burning    Other reaction(s): Rash  Other reaction(s): rash and headache    Tetracycline Rash     Burning    Other reaction(s): rash       Problem List:    Patient Active Problem List    Diagnosis Date Noted    F11.2 - Continuous opioid dependence (H) 05/23/2023     Priority: Medium    F11.2 - Episodic opioid dependence (H) 05/23/2023     Priority: Medium    F11.9 - Chronic, continuous use of opioids 05/23/2023     Priority: Medium    Uncomplicated opioid dependence (H)      Priority: Medium     Patient is followed by [facility] DO for ongoing prescription of pain  medication.  All refills should only be approved by this provider, or covering partner.    Medication(s): oxycodone 5 mg  Maximum quantity per month: 30  Clinic visit frequency required: Q 3 months   PDMP Review         Value Time User    State PDMP site checked  Yes 7/3/2022 10:43 AM Jose Pérez MD   Controlled substance agreement:  CSA -- Patient Level:    Controlled Substance Agreement - Opioid - Scan on 9/8/2022 11:41 AM     Pain Clinic evaluation in the past: No    Opioid Risk Tool Total Score(s):  No flowsheet data found.  Last [facility] website verification:  done on 9/8/22   [email or web address]        Pelvic floor dysfunction 05/13/2022     Priority: Medium    Pudendal neuralgia 05/13/2022     Priority: Medium    Coccydynia 05/13/2022     Priority: Medium    Change in bowel habits 01/03/2022     Priority: Medium    Complete tear of left rotator cuff 01/03/2022     Priority: Medium    Pruritus ani 01/03/2022     Priority: Medium    Tear of right acetabular labrum, sequela 10/28/2021     Priority: Medium    Lumbar disc herniation with radiculopathy 10/28/2021     Priority: Medium     MRI 10/21  2.  At L4-L5, there is a right paracentral slightly caudally directed disc extrusion that narrows the right lateral recess and causes mass effect on the descending/traversing right L5 nerve root.  3.  At L3-L4, there is a right paracentral disc protrusion superimposed on disc bulge with narrowing of the right lateral recess and mass effect on the descending/traversing right L4 nerve root.      Acute prostatitis 10/06/2021     Priority: Medium    Rectal pain 10/06/2021     Priority: Medium    Penile pain 10/06/2021     Priority: Medium    Perineal pain in male 10/06/2021     Priority: Medium    Anal fissure 08/26/2021     Priority: Medium    Constipation 08/26/2021     Priority: Medium    Urinary retention 08/16/2021     Priority: Medium    Neuropathy 08/16/2021     Priority: Medium     Diverticular disease of large intestine 08/03/2021     Priority: Medium    Benign prostatic hyperplasia with urinary obstruction 07/12/2021     Priority: Medium    Calculus of gallbladder with chronic cholecystitis without obstruction 05/25/2021     Priority: Medium    MARIAN (obstructive sleep apnea) 05/03/2021     Priority: Medium     On BiPAP, see scans.      Symptomatic cholelithiasis 04/06/2021     Priority: Medium    COVID-19 virus infection 04/06/2021     Priority: Medium    Hepatic steatosis 03/26/2021     Priority: Medium    Psoriatic arthritis (H) 03/15/2021     Priority: Medium    Arthritis of left acromioclavicular joint 08/05/2019     Priority: Medium    Lower urinary tract symptoms 05/13/2019     Priority: Medium    Mild intermittent asthma, uncomplicated 04/05/2017     Priority: Medium    OA (osteoarthritis) of hip 05/19/2015     Priority: Medium    ASHD (arteriosclerotic heart disease) 10/25/2013     Priority: Medium    Hyperlipidemia 10/25/2013     Priority: Medium    Reflux esophagitis 04/18/2013     Priority: Medium    Hiatal hernia 04/18/2013     Priority: Medium    Insomnia, unspecified 02/27/2013     Priority: Medium    GERD (gastroesophageal reflux disease) 10/16/2012     Priority: Medium    Low testosterone 10/16/2012     Priority: Medium    Displacement of lumbar intervertebral disc without myelopathy 09/12/2011     Priority: Medium        Past Medical History:    Past Medical History:   Diagnosis Date    MARIAN (obstructive sleep apnea)     Pelvic floor dysfunction        Past Surgical History:    Past Surgical History:   Procedure Laterality Date    BACK SURGERY      CHOLECYSTECTOMY      COLONOSCOPY N/A 8/31/2022    Procedure: COLONOSCOPY;  Surgeon: Veena Mendoza MD;  Location:  GI    ENT SURGERY      tonsillectomy    GENITOURINARY SURGERY      TURP    IMPLANT STIMULATOR SACRAL NERVE PERCUTANEOUS TRIAL N/A 10/17/2022    Procedure: INSERTION, NEUROSTIMULATOR, SACRAL, PERCUTANEOUS, FOR  "TRIAL (trial with medtronic);  Surgeon: Nayan Tello MD;  Location: [facility] OR    JOINT REPLACEMENT, HIP RT/LT Left     ORTHOPEDIC SURGERY      wrist surgery, carpal tunnel 2005    PROSTATE SURGERY      Urolift    ROTATOR CUFF REPAIR RT/LT Left        Family History:    Family History   Problem Relation Age of Onset    Cerebrovascular Disease Mother     Hypertension Mother     Diabetes Mother     Liver Cancer Mother     Cancer Father 84        liver cancer    Prostate Cancer Father        Social History:  Marital Status:   [4]  Social History     Tobacco Use    Smoking status: Former     Packs/day: 1.00     Years: 5.00     Pack years: 5.00     Types: Cigarettes     Quit date: 1999     Years since quittin.7     Passive exposure: Never    Smokeless tobacco: Never   Vaping Use    Vaping Use: Never used   Substance Use Topics    Alcohol use: Yes     Comment: very little    Drug use: Yes     Types: Marijuana     Comment: gummies        Medications:    acetaminophen (TYLENOL) 500 MG tablet  albuterol (PROAIR HFA/PROVENTIL HFA/VENTOLIN HFA) 108 (90 Base) MCG/ACT inhaler  ASPIRIN NOT PRESCRIBED (INTENTIONAL)  Buprenorphine HCl (BELBUCA) 300 MCG FILM buccal film  Ergocalciferol (VITAMIN D2 PO)  fluticasone (FLONASE) 50 MCG/ACT nasal spray  lisinopril (ZESTRIL) 20 MG tablet  senna-docusate (SENOKOT-S/PERICOLACE) 8.6-50 MG tablet  tiZANidine (ZANAFLEX) 4 MG tablet          Review of Systems    Physical Exam   BP: (!) 145/94  Pulse: 85  Temp: 97.5  F (36.4  C)  Resp: 20  Height: 162.6 cm (5' 4\")  Weight: 83.9 kg (185 lb)  SpO2: 97 %      Physical Exam  Nontoxic-appearing no respiratory distress alert and oriented  Skin Pink warm dry  Abdomen is soft bowel sounds positive site of suprapubic catheter is unremarkable, there is no surrounding induration warmth erythema or purulent discharge, there is some fullness to palpation just to the right of the incision which she has associated tenderness and reproduces " pain complaint, pain and tenderness radiates into mid suprapubic region.  External genitalia are normal to inspection.  No inguinal swelling or redness.  ED Course                 Procedures              Results for orders placed or performed during the hospital encounter of 08/18/23 (from the past 24 hour(s))   CBC with platelets differential    Narrative    The following orders were created for panel order CBC with platelets differential.  Procedure                               Abnormality         Status                     ---------                               -----------         ------                     CBC with platelets and d...[[id number]]                      Final result                 Please view results for these tests on the individual orders.   Comprehensive metabolic panel   Result Value Ref Range    Sodium 140 136 - 145 mmol/L    Potassium 4.7 3.4 - 5.3 mmol/L    Chloride 103 98 - 107 mmol/L    Carbon Dioxide (CO2) 26 22 - 29 mmol/L    Anion Gap 11 7 - 15 mmol/L    Urea Nitrogen 16.9 8.0 - 23.0 mg/dL    Creatinine 0.81 0.67 - 1.17 mg/dL    Calcium 10.0 8.8 - 10.2 mg/dL    Glucose 107 (H) 70 - 99 mg/dL    Alkaline Phosphatase 42 40 - 129 U/L    AST 20 0 - 45 U/L    ALT 20 0 - 70 U/L    Protein Total 7.0 6.4 - 8.3 g/dL    Albumin 4.2 3.5 - 5.2 g/dL    Bilirubin Total 0.5 <=1.2 mg/dL    GFR Estimate >90 >60 mL/min/1.73m2   CBC with platelets and differential   Result Value Ref Range    WBC Count 7.1 4.0 - 11.0 10e3/uL    RBC Count 5.04 4.40 - 5.90 10e6/uL    Hemoglobin 13.7 13.3 - 17.7 g/dL    Hematocrit 42.7 40.0 - 53.0 %    MCV 85 78 - 100 fL    MCH 27.2 26.5 - 33.0 pg    MCHC 32.1 31.5 - 36.5 g/dL    RDW 14.4 10.0 - 15.0 %    Platelet Count 371 150 - 450 10e3/uL    % Neutrophils 74 %    % Lymphocytes 16 %    % Monocytes 7 %    % Eosinophils 2 %    % Basophils 1 %    % Immature Granulocytes 0 %    NRBCs per 100 WBC 0 <1 /100    Absolute Neutrophils 5.2 1.6 - 8.3 10e3/uL    Absolute Lymphocytes 1.1  0.8 - 5.3 10e3/uL    Absolute Monocytes 0.5 0.0 - 1.3 10e3/uL    Absolute Eosinophils 0.1 0.0 - 0.7 10e3/uL    Absolute Basophils 0.0 0.0 - 0.2 10e3/uL    Absolute Immature Granulocytes 0.0 <=0.4 10e3/uL    Absolute NRBCs 0.0 10e3/uL   UA with Microscopic reflex to Culture    Specimen: Urine, Clean Catch   Result Value Ref Range    Color Urine Yellow Colorless, Straw, Light Yellow, Yellow    Appearance Urine Slightly Cloudy (A) Clear    Glucose Urine Negative Negative mg/dL    Bilirubin Urine Negative Negative    Ketones Urine Negative Negative mg/dL    Specific Gravity Urine 1.019 1.003 - 1.035    Blood Urine Small (A) Negative    pH Urine 7.0 5.0 - 7.0    Protein Albumin Urine 30 (A) Negative mg/dL    Urobilinogen Urine Normal Normal, 2.0 mg/dL    Nitrite Urine Negative Negative    Leukocyte Esterase Urine Trace (A) Negative    Mucus Urine Present (A) None Seen /LPF    RBC Urine 87 (H) <=2 /HPF    WBC Urine 4 <=5 /HPF    Squamous Epithelials Urine <1 <=1 /HPF    Hyaline Casts Urine 1 <=2 /LPF    Narrative    Urine Culture not indicated   CT Abdomen Pelvis w Contrast    Narrative    CT ABDOMEN PELVIS WITH CONTRAST 8/18/2023 2:35 PM    CLINICAL HISTORY: Abdominal pain. One week status post suprapubic  catheter placement, pain and tenderness.  Incisional and suprapubic,  evaluate for abdominal wall infection/other.    TECHNIQUE: CT scan of the abdomen and pelvis was performed following  injection of IV contrast. Multiplanar reformats were obtained. Dose  reduction techniques were used.  CONTRAST: 91 mL Isovue 370    COMPARISON: MR pelvis 8/12/2022.    FINDINGS:   LOWER CHEST: No infiltrates or effusions.    HEPATOBILIARY: Left hepatic lobe cyst and subcentimeter hypodensity  too small to characterize, likely benign. No suspicious hepatic  observation. Status post cholecystectomy.    PANCREAS: No significant mass, duct dilatation, or inflammatory  change.    SPLEEN: Normal size.    ADRENAL GLANDS: Mild nonspecific  adrenal gland thickening. No nodule.    KIDNEYS/BLADDER: Bilateral parapelvic cysts, and right mid pole cyst;  no follow-up necessary. No collecting system dilatation.    Suprapubic catheter with retention balloon centered within the urinary  bladder. No surrounding drainable organized fluid collection.  Asymmetric expansion of the right abdominal rectus muscle (3/162).  Tiny punctate hyperdensity medial within the right rectus (3/144) is  nonspecific and could reflect small surgical material versus vascular  nature.    BOWEL: Diverticulosis in the colon. No acute inflammatory change. No  obstruction.     PELVIC ORGANS: Not well evaluated due to left hip arthroplasty streak  artifact. Prostate calcifications and probable therapy seeds.     ADDITIONAL FINDINGS: Trace left lower quadrant fluid. No organized  fluid collection or free air. Left retroperitoneal surgical clips.  Prominent left external iliac lymph node measuring 8 mm (3/141),  previously 3 mm. Nonaneurysmal abdominal aorta with moderate  atherosclerosis.    MUSCULOSKELETAL: Left hip arthroplasty. Lumbar fusion hardware.      Impression    IMPRESSION:   1.  Right abdominal wall rectus muscle hematoma (maximal thickness 2.8  cm). This study is not tailored for the evaluation of active bleeding.  2.  Appropriately positioned suprapubic catheter. No organized  drainable fluid collection.   3.  Prominent left external iliac lymph node (8 mm), slightly  increased from 8/12/2022, probably reactive. Attention on follow-up.  4.  Extensive diverticulosis.     DAVIDSON ELIZONDO MD         SYSTEM ID:  [id number]       Medications   iopamidol (ISOVUE-370) solution 91 mL (91 mLs Intravenous $Given 8/18/23 1418)   sodium chloride 0.9 % bag 500mL for CT scan flush use (63 mLs Intravenous $Given 8/18/23 1419)       Assessments & Plan (with Medical Decision Making)  Pain and tenderness to the right of recent suprapubic catheter placement site.  Usual differential  considered included but not limited to hematoma, abdominal wall abscess versus other.  CT scan as above consistent with right rectus sheath hematoma.  White count normal afebrile.  No indication for further evaluation.  Microscopic hematuria is present but no evidence for infection, consistent with recent placement of suprapubic catheter.  Follow-up pain clinic this week.  Return criteria reviewed.     I have reviewed the nursing notes.    I have reviewed the findings, diagnosis, plan and need for follow up with the patient.        New Prescriptions    No medications on file       Final diagnoses:   Abdominal wall hematoma, initial encounter   Suprapubic catheter (H)       8/18/2023   [facility] EMERGENCY DEPT       Max Mcarthur MD  08/18/23 7171

## 2023-08-18 NOTE — PATIENT INSTRUCTIONS
Go to emergency room for further evaluation of RLQ abd pain, chills and sweats since catheter inserted a week ago

## 2023-08-22 ENCOUNTER — PATIENT OUTREACH (OUTPATIENT)
Dept: CARE COORDINATION | Facility: CLINIC | Age: 63
End: 2023-08-22
Payer: MEDICAID

## 2023-08-22 NOTE — PROGRESS NOTES
Clinic Care Coordination Contact  Follow Up Progress Note      Assessment: The RN CC nurse care coordinator contacted the patient by phone for a follow-up call.  The patient just recently had a supra pubic catheter placed into his bladder to try and get all of the muscles in that area to relax.  The plan is for the muscles to relax and then for the surgeon to be able to remove the catheter and hopefully not have the burning pain.  Right now the patient has burning pain in the sciatic area as well as the piriform muscle area.  His goal is to get this pain reduced and to be able to sit and stand and walk without the burning pain.  The patient is working with a new neurologist and has been told that he is top-notch.    Medication review was completed with the patient and marked as reviewed.  Health maintenance was reviewed and questions were answered with the patient.  The assessment for hypertension was completed with the patient today as well as the social determinants of health and they were marked as reviewed.      Care Gaps:    Health Maintenance Due   Topic Date Due    YEARLY PREVENTIVE VISIT  Never done    COVID-19 Vaccine (1) Never done    HEPATITIS A IMMUNIZATION (1 of 2 - Risk 2-dose series) Never done    ZOSTER IMMUNIZATION (1 of 2) Never done    ASTHMA ACTION PLAN  08/05/2023       Care Gaps Last addressed on 8/22/23    Care Plans  Care Plan: General urination without pain       Problem: HP GENERAL PROBLEM       Goal: General Goal - urinate on my own without difficulty.  Decrease bilateral buttocks and thigh pain .       Start Date: 11/2/2021 Expected End Date: 8/2/2024    This Visit's Progress: 60% Recent Progress: 60%    Note:     Goal Statement #1: I will be able to urinate on my own without difficulty .    I will decrease bilateral buttocks and thigh pain in the next 3-6  months   Barriers: constant pain   Strengths: Motivated   Patient expressed understanding of goal: Yes  Action steps to achieve this  goal:  1. I will keep future Primary Care ( recently switched to the [facility] ),Urology, Pain Clinic appointments and will make a future Neurology appointment   2. I will drink plenty of water to keep my urine clear and yellow  3. I will take Tylenol/Ibuprofen and Gabapentin as directed   4. I will walk a block when able to increase strength   5. I will continue dry needling 3 times a week  6. I will keep future appointments scheduled with [facility] specialist in [place] June 6, 8 and 10th                            Care Plan: General - increase strength and stamina       Problem: HP GENERAL PROBLEM       Goal: General Goal - increase strength and stamina       Start Date: 4/25/2023 Expected End Date: 4/25/2024    This Visit's Progress: 30% Recent Progress: 30%    Note:     Barriers: recent surgical procedure on his back.  Strengths: engaged in care coordination  Patient expressed understanding of goal: yes  Action steps to achieve this goal:  1. I will get up and walk frequently to prevent blood clots.  2. I will elevate my legs as often as possible to reduce the swelling in the lower legs.  3. I will monitor the swelling of the testicles and the lower legs for any increase.                                Intervention/Education provided during outreach: The RN CC encouraged the patient to get up and walk as often as he can while trying to keep the burning pain under control.     Outreach Frequency: monthly        Plan:   1.  The patient will take all medications as prescribed by the providers.  2.  The patient will get up and walk as often as he can tolerate to prevent blood clots from forming.  3.  The patient will make and attend all recommended follow-up appointments with the providers.    RN CC Nurse Care Coordinator will follow up in 30 days.            Sebastian Jimenez MSN, RN, PHN, CCM   Primary Care Clinical RN Care Coordinator  [facility]  8/22/2023   3:11  [email or web address]  Office: [phone]

## 2023-08-22 NOTE — LETTER
[facility]  Patient Centered Plan of Care  About Me:        Patient Name:  Mustapha Negrete    YOB: 1960  Age:         62 year old   Rustam MRN:    [id number] Telephone Information:  Home Phone [phone]   Mobile [phone]       [place]  [address] [address] Email address:  [email or web address]      Emergency Contact(s)    Name Relationship Lgl Grd Work Phone Home Phone Mobile Phone   STEFANO AYOUB Son   [phone]           Primary language:  English     needed? No   [employer]:  [phone] op. 1  Other communication barriers:None    Preferred Method of Communication:     Current living arrangement: I live in a private home (Patient stated he lives with his adult son)    Mobility Status/ Medical Equipment: Independent        Health Maintenance  Health Maintenance Reviewed: Due/Overdue   Health Maintenance Due   Topic Date Due    YEARLY PREVENTIVE VISIT  Never done    COVID-19 Vaccine (1) Never done    HEPATITIS A IMMUNIZATION (1 of 2 - Risk 2-dose series) Never done    ZOSTER IMMUNIZATION (1 of 2) Never done    ASTHMA ACTION PLAN  08/05/2023           My Access Plan  Medical Emergency 911   Primary Clinic Line [facility] [phone]   24 Hour Appointment Line [phone] or  [phone]) (toll-free)   24 Hour Nurse Line [phone] (toll-free)   Preferred Urgent Care [facility], [phone]     [place], [place]  [phone]     Preferred Pharmacy [id number] [place] [facility] [place], [place] - [address]     Behavioral Health Crisis Line The National Suicide Prevention Lifeline at 1-127.696.5929 or Text/Call 008             My Care Team Members  Patient Care Team         Relationship Specialty Notifications Start End    Silver Pro DO PCP - General Family Medicine Admissions 7/6/22     Phone: [phone]  Fax: [phone]         [address] [facility] [address] [facility] [address]    Matty Ruiz MD MD Neurological Surgery  10/18/21     Phone: [phone] Fax: [phone]         [address] [facility] [address]    Max Rico MD Referring Physician Family Medicine  10/18/21     Phone: [phone] Fax: [phone] [address] [place] [address]    Sebastian Rock, RN Lead Care Coordinator Primary Care - CC Admissions 11/2/21     Phone: [phone] Fax: [phone]        Silver Pro DO Assigned PCP   1/7/23     Phone: [phone] Fax: [phone]         [address] [facility] [address] [facility] [address]    Silver Pro DO Assigned Pain Medication Provider   1/9/23     Phone: [phone] Fax: [phone]         [address] [facility] [address] [facility] [address]    Nayan Tello MD Assigned Surgical Provider   3/25/23     Phone: [phone] Fax: [phone]         [address] [place] [address]              My Care Plans  Self Management and Treatment Plan  Care Plan  Care Plan: General urination without pain       Problem: HP GENERAL PROBLEM       Goal: General Goal - urinate on my own without difficulty.  Decrease bilateral buttocks and thigh pain .       Start Date: 11/2/2021 Expected End Date: 8/2/2024    This Visit's Progress: 60% Recent Progress: 60%    Note:     Goal Statement #1: I will be able to urinate on my own without difficulty .    I will decrease bilateral buttocks and thigh pain in the next 3-6  months   Barriers: constant pain   Strengths: Motivated   Patient expressed understanding of goal: Yes  Action steps to achieve this goal:  1. I will keep future Primary Care ( recently switched to the [facility] ),Urology, Pain Clinic appointments and will make a future Neurology appointment   2. I will drink plenty of water to keep my urine clear and yellow  3. I will take Tylenol/Ibuprofen and Gabapentin as directed    4. I will walk a block when able to increase strength   5. I will continue dry needling 3 times a week  6. I will keep future appointments scheduled with [facility] specialist in [place] June 6, 8 and 10th                            Care Plan: General - increase strength and stamina       Problem: HP GENERAL PROBLEM       Goal: General Goal - increase strength and stamina       Start Date: 4/25/2023 Expected End Date: 4/25/2024    This Visit's Progress: 30% Recent Progress: 30%    Note:     Barriers: recent surgical procedure on his back.  Strengths: engaged in care coordination  Patient expressed understanding of goal: yes  Action steps to achieve this goal:  1. I will get up and walk frequently to prevent blood clots.  2. I will elevate my legs as often as possible to reduce the swelling in the lower legs.  3. I will monitor the swelling of the testicles and the lower legs for any increase.                                 Action Plans on File:   Asthma                   Advance Care Plans/Directives Type:   No data recorded    My Medical and Care Information  Problem List   Patient Active Problem List   Diagnosis    Acute prostatitis    Rectal pain    Penile pain    Perineal pain in male    Urinary retention    Symptomatic cholelithiasis    Reflux esophagitis    Psoriatic arthritis (H)    MARIAN (obstructive sleep apnea)    OA (osteoarthritis) of hip    Neuropathy    Mild intermittent asthma, uncomplicated    Insomnia, unspecified    GERD (gastroesophageal reflux disease)    Hepatic steatosis    Benign prostatic hyperplasia with urinary obstruction    Anal fissure    Tear of right acetabular labrum, sequela    Lumbar disc herniation with radiculopathy    Lower urinary tract symptoms    Arthritis of left acromioclavicular joint    ASHD (arteriosclerotic heart disease)    Calculus of gallbladder with chronic cholecystitis without obstruction    Change in bowel habits    Complete tear of left rotator cuff     Constipation    COVID-19 virus infection    Displacement of lumbar intervertebral disc without myelopathy    Diverticular disease of large intestine    Hiatal hernia    Hyperlipidemia    Low testosterone    Pruritus ani    Pelvic floor dysfunction    Pudendal neuralgia    Coccydynia    Uncomplicated opioid dependence (H)    F11.2 - Continuous opioid dependence (H)    F11.2 - Episodic opioid dependence (H)    F11.9 - Chronic, continuous use of opioids      Current Medications and Allergies:  See printed Medication Report.    Care Coordination Start Date: 11/2/2021   Frequency of Care Coordination: monthly     Form Last Updated: 08/22/2023

## 2023-08-25 ENCOUNTER — OFFICE VISIT (OUTPATIENT)
Dept: FAMILY MEDICINE | Facility: CLINIC | Age: 63
End: 2023-08-25
Payer: COMMERCIAL

## 2023-08-25 VITALS
HEIGHT: 64 IN | WEIGHT: 184.2 LBS | RESPIRATION RATE: 18 BRPM | SYSTOLIC BLOOD PRESSURE: 132 MMHG | BODY MASS INDEX: 31.45 KG/M2 | OXYGEN SATURATION: 100 % | HEART RATE: 96 BPM | DIASTOLIC BLOOD PRESSURE: 76 MMHG | TEMPERATURE: 97.7 F

## 2023-08-25 DIAGNOSIS — I10 ESSENTIAL HYPERTENSION: ICD-10-CM

## 2023-08-25 DIAGNOSIS — R10.2 PERINEAL PAIN IN MALE: Primary | ICD-10-CM

## 2023-08-25 PROCEDURE — 99213 OFFICE O/P EST LOW 20 MIN: CPT | Performed by: FAMILY MEDICINE

## 2023-08-25 ASSESSMENT — ANXIETY QUESTIONNAIRES
4. TROUBLE RELAXING: NOT AT ALL
6. BECOMING EASILY ANNOYED OR IRRITABLE: NOT AT ALL
7. FEELING AFRAID AS IF SOMETHING AWFUL MIGHT HAPPEN: NOT AT ALL
IF YOU CHECKED OFF ANY PROBLEMS ON THIS QUESTIONNAIRE, HOW DIFFICULT HAVE THESE PROBLEMS MADE IT FOR YOU TO DO YOUR WORK, TAKE CARE OF THINGS AT HOME, OR GET ALONG WITH OTHER PEOPLE: NOT DIFFICULT AT ALL
GAD7 TOTAL SCORE: 0
5. BEING SO RESTLESS THAT IT IS HARD TO SIT STILL: NOT AT ALL
IF YOU CHECKED OFF ANY PROBLEMS ON THIS QUESTIONNAIRE, HOW DIFFICULT HAVE THESE PROBLEMS MADE IT FOR YOU TO DO YOUR WORK, TAKE CARE OF THINGS AT HOME, OR GET ALONG WITH OTHER PEOPLE: NOT DIFFICULT AT ALL
6. BECOMING EASILY ANNOYED OR IRRITABLE: NOT AT ALL
4. TROUBLE RELAXING: NOT AT ALL
3. WORRYING TOO MUCH ABOUT DIFFERENT THINGS: NOT AT ALL
5. BEING SO RESTLESS THAT IT IS HARD TO SIT STILL: NOT AT ALL
7. FEELING AFRAID AS IF SOMETHING AWFUL MIGHT HAPPEN: NOT AT ALL
1. FEELING NERVOUS, ANXIOUS, OR ON EDGE: NOT AT ALL
2. NOT BEING ABLE TO STOP OR CONTROL WORRYING: NOT AT ALL
2. NOT BEING ABLE TO STOP OR CONTROL WORRYING: NOT AT ALL
1. FEELING NERVOUS, ANXIOUS, OR ON EDGE: NOT AT ALL
GAD7 TOTAL SCORE: 0
3. WORRYING TOO MUCH ABOUT DIFFERENT THINGS: NOT AT ALL
GAD7 TOTAL SCORE: 0

## 2023-08-25 ASSESSMENT — ENCOUNTER SYMPTOMS
ARTHRALGIAS: 0
SHORTNESS OF BREATH: 0
CHILLS: 0
WEAKNESS: 0
SORE THROAT: 0
PARESTHESIAS: 0
COUGH: 0
HEADACHES: 0
PALPITATIONS: 0
FEVER: 0
MYALGIAS: 0
NERVOUS/ANXIOUS: 0
DIZZINESS: 0
JOINT SWELLING: 0

## 2023-08-25 ASSESSMENT — PAIN SCALES - GENERAL: PAINLEVEL: WORST PAIN (10)

## 2023-08-25 NOTE — PROGRESS NOTES
1. Perineal pain in male  S/P Suprapubic insertion on 8/11/23 complicated by hematoma.  Most recent CT scan and urine results reviewed.  Advised to follow-up with urology and pain clinic.     2. Essential hypertension  Improved.  Continue with lisinopril.       Zhang Luciano is a 62 year old, presenting for the following health issues:  RECHECK (Bladder) and Hypertension      8/25/2023     9:06 AM   Additional Questions   Roomed by Jesenia   Accompanied by Self       History of Present Illness       Reason for visit:  Prostate pain    He eats 0-1 servings of fruits and vegetables daily.He consumes 0 sweetened beverage(s) daily.He exercises with enough effort to increase his heart rate 10 to 19 minutes per day.  He exercises with enough effort to increase his heart rate 3 or less days per week.   He is taking medications regularly.       Hypertension Follow-up    Do you check your blood pressure regularly outside of the clinic? Yes   Are you following a low salt diet? Yes  Are your blood pressures ever more than 140 on the top number (systolic) OR more   than 90 on the bottom number (diastolic), for example 140/90? Yes    Patient here to follow up on his bladder concerns.     ER follow-up 8/18/23: Patient developed suprapubic pain s/p suprapubic catheter placement on 8/11/23.  CT scan consistent with a suprapubic hematoma and urine did not show any acute findings.  As of today, the right sided hematoma has improved.  As of today, states of urinary urgency involving penis after the procedure.  Patient is currently being followed by pain clinic and prescribed Belbuca.     2. Hypertension f/unit(s): Currently on lisinopril.     Review of Systems   Constitutional:  Negative for chills and fever.   HENT:  Negative for congestion, ear pain, hearing loss and sore throat.    Respiratory:  Negative for cough and shortness of breath.    Cardiovascular:  Negative for chest pain, palpitations and peripheral edema.  "  Musculoskeletal:  Negative for arthralgias, joint swelling and myalgias.   Skin:  Negative for rash.   Neurological:  Negative for dizziness, weakness, headaches and paresthesias.   Psychiatric/Behavioral:  Negative for mood changes. The patient is not nervous/anxious.             Objective    /76   Pulse 96   Temp 97.7  F (36.5  C) (Temporal)   Resp 18   Ht 1.626 m (5' 4\")   Wt 83.6 kg (184 lb 3.2 oz)   SpO2 100%   BMI 31.62 kg/m    Body mass index is 31.62 kg/m .  Physical Exam  Constitutional:       General: He is not in acute distress.     Appearance: He is well-developed.   HENT:      Head: Normocephalic and atraumatic.      Nose: Nose normal.   Eyes:      Conjunctiva/sclera: Conjunctivae normal.   Neck:      Trachea: No tracheal deviation.   Cardiovascular:      Rate and Rhythm: Normal rate and regular rhythm.      Heart sounds: Normal heart sounds.   Pulmonary:      Effort: Pulmonary effort is normal.      Breath sounds: No wheezing.   Abdominal:      Comments: Suprapubic catheter in placed appears C/D/I   Genitourinary:     Comments: : normal appearing penis and scrotum  Musculoskeletal:         General: Normal range of motion.      Cervical back: Normal range of motion.   Skin:     Findings: No erythema or rash.   Neurological:      Mental Status: He is alert and oriented to person, place, and time.   Psychiatric:         Behavior: Behavior normal.            8/18/23  IMPRESSION:   1.  Right abdominal wall rectus muscle hematoma (maximal thickness 2.8  cm). This study is not tailored for the evaluation of active bleeding.  2.  Appropriately positioned suprapubic catheter. No organized  drainable fluid collection.   3.  Prominent left external iliac lymph node (8 mm), slightly  increased from 8/12/2022, probably reactive. Attention on follow-up.  4.  Extensive diverticulosis.         "

## 2023-08-25 NOTE — PATIENT INSTRUCTIONS
Claudy Luciano,    Thank you for allowing [facility] to manage your care.    Please keep your upcoming appointment with urologist.     If you have any questions or concerns, please feel free to call us at [phone].    Sincerely,    Dr. Pro    Did you know?      You can schedule a video visit for follow-up appointments as well as future appointments for certain conditions.  Please see the below link.     [email or web address]    If you have not already done so,  I encourage you to sign up for [email or web address] ([email or web address]).  This will allow you to review your results, securely communicate with a provider, and schedule virtual visits as well.

## 2023-08-29 ENCOUNTER — TRANSFERRED RECORDS (OUTPATIENT)
Dept: HEALTH INFORMATION MANAGEMENT | Facility: CLINIC | Age: 63
End: 2023-08-29

## 2023-09-05 ENCOUNTER — TRANSCRIBE ORDERS (OUTPATIENT)
Dept: OTHER | Age: 63
End: 2023-09-05

## 2023-09-05 ENCOUNTER — TRANSFERRED RECORDS (OUTPATIENT)
Dept: HEALTH INFORMATION MANAGEMENT | Facility: CLINIC | Age: 63
End: 2023-09-05

## 2023-09-05 ENCOUNTER — OFFICE VISIT (OUTPATIENT)
Dept: URGENT CARE | Facility: URGENT CARE | Age: 63
End: 2023-09-05
Payer: COMMERCIAL

## 2023-09-05 VITALS
WEIGHT: 185 LBS | DIASTOLIC BLOOD PRESSURE: 89 MMHG | SYSTOLIC BLOOD PRESSURE: 142 MMHG | HEART RATE: 85 BPM | TEMPERATURE: 99.6 F | OXYGEN SATURATION: 99 % | BODY MASS INDEX: 31.76 KG/M2 | RESPIRATION RATE: 18 BRPM

## 2023-09-05 DIAGNOSIS — M54.9 UPPER BACK PAIN: Primary | ICD-10-CM

## 2023-09-05 DIAGNOSIS — R10.2 PELVIC AND PERINEAL PAIN: Primary | ICD-10-CM

## 2023-09-05 PROCEDURE — 99213 OFFICE O/P EST LOW 20 MIN: CPT | Performed by: FAMILY MEDICINE

## 2023-09-05 ASSESSMENT — PAIN SCALES - GENERAL: PAINLEVEL: WORST PAIN (10)

## 2023-09-05 NOTE — PROGRESS NOTES
(M54.9) Upper back pain  (primary encounter diagnosis)  Comment:     Likely musculoskeletal pain, medial to the scapula, alternating sides.  May have come on after playing golf.    Plan:   Advised that he increase his tizanidine to 3-4 times per day.  Try heat.  Massage would help.  He seemed to have some benefit from wrapping and he was provided with a wide Ace wrap.  To ER advised if he has severe pain.      CHIEF COMPLAINT    Upper back pain      HISTORY    This patient has had sharp, spasm-like pain on alternating sides of his upper back for 3 to 4 days.  It seems to have come on after he played a couple rounds of golf earlier.  Pain is nonradiating.  Not accompanied by weakness or syncope.    He is a chronic pain patient.  Uses buprenorphine.  He is on tizanidine 4 mg twice per day.    He has had low back surgery.      REVIEW OF SYSTEMS    No fever or chills.  No SOB or cough.  No chest pain or palpitations.  No nausea or abdominal pain.  No rashes.      EXAM  BP (!) 142/89   Pulse 85   Temp 99.6  F (37.6  C) (Tympanic)   Resp 18   Wt 83.9 kg (185 lb)   SpO2 99%   BMI 31.76 kg/m      Alert, not in distress.  No cervical tenderness.  Lungs are clear.  Some palpable tenderness medial to right and left scapula with some muscular tightness palpable.  Abdomen nontender.  Ambulating comfortably.

## 2023-09-21 ENCOUNTER — PATIENT OUTREACH (OUTPATIENT)
Dept: CARE COORDINATION | Facility: CLINIC | Age: 63
End: 2023-09-21
Payer: MEDICAID

## 2023-09-21 NOTE — PROGRESS NOTES
Clinic Care Coordination Contact  Follow Up Progress Note      Assessment: The RN CC nurse care coordinator contacted the patient by phone for a follow up call.  The patient stated that the suprapubic catheter was removed.  The patient will now be having a procedure to insert an electrode to see if this would be successful in decreasing the amount of pain that the patient is having.  The patient states that he is looking forward to seeing if this will work or not.  Otherwise the patient states that the pain is not changed at all.     Care Gaps:    Health Maintenance Due   Topic Date Due    YEARLY PREVENTIVE VISIT  Never done    COVID-19 Vaccine (1) Never done    HEPATITIS A IMMUNIZATION (1 of 2 - Risk 2-dose series) Never done    ZOSTER IMMUNIZATION (1 of 2) Never done    ASTHMA ACTION PLAN  08/05/2023    INFLUENZA VACCINE (1) Never done       Care Gaps Last addressed on 9/21/23    Care Plans  Care Plan: General urination without pain       Problem: HP GENERAL PROBLEM       Goal: General Goal - urinate on my own without difficulty.  Decrease bilateral buttocks and thigh pain .       Start Date: 11/2/2021 Expected End Date: 8/2/2024    This Visit's Progress: 60% Recent Progress: 60%    Note:     Goal Statement #1: I will be able to urinate on my own without difficulty .    I will decrease bilateral buttocks and thigh pain in the next 3-6  months   Barriers: constant pain   Strengths: Motivated   Patient expressed understanding of goal: Yes  Action steps to achieve this goal:  1. I will keep future Primary Care ( recently switched to the [facility] ),Urology, Pain Clinic appointments and will make a future Neurology appointment   2. I will drink plenty of water to keep my urine clear and yellow  3. I will take Tylenol/Ibuprofen and Gabapentin as directed   4. I will walk a block when able to increase strength   5. I will continue dry needling 3 times a week  6. I will keep future appointments scheduled  with [facility] specialist in [place] June 6, 8 and 10th                            Care Plan: General - increase strength and stamina       Problem: HP GENERAL PROBLEM       Goal: General Goal - increase strength and stamina       Start Date: 4/25/2023 Expected End Date: 4/25/2024    This Visit's Progress: 30% Recent Progress: 30%    Note:     Barriers: recent surgical procedure on his back.  Strengths: engaged in care coordination  Patient expressed understanding of goal: yes  Action steps to achieve this goal:  1. I will get up and walk frequently to prevent blood clots.  2. I will elevate my legs as often as possible to reduce the swelling in the lower legs.  3. I will monitor the swelling of the testicles and the lower legs for any increase.                                Intervention/Education provided during outreach: Reviewed with the patient the importance of moving to prevent blood clots.      Outreach Frequency: monthly        Plan:    The patient will continue to move to prevent blood clots.   The patient will take all medications as prescribed by the providers.   The patient will make and attend all follow up appointments as recommended by the providers.    RN CC Nurse Care Coordinator will follow up in 30 days.          Sebastian Jimenez MSN, RN, PHN, [facility]   Primary Care Clinical RN Care Coordinator  [facility]  9/21/2023   3:13 PM  [email or web address]  Office: [phone]

## 2023-09-26 ENCOUNTER — OFFICE VISIT (OUTPATIENT)
Dept: FAMILY MEDICINE | Facility: CLINIC | Age: 63
End: 2023-09-26
Payer: COMMERCIAL

## 2023-09-26 VITALS
HEIGHT: 65 IN | SYSTOLIC BLOOD PRESSURE: 136 MMHG | HEART RATE: 89 BPM | WEIGHT: 186.6 LBS | RESPIRATION RATE: 16 BRPM | DIASTOLIC BLOOD PRESSURE: 80 MMHG | OXYGEN SATURATION: 96 % | TEMPERATURE: 97.3 F | BODY MASS INDEX: 31.09 KG/M2

## 2023-09-26 DIAGNOSIS — E55.9 VITAMIN D DEFICIENCY: ICD-10-CM

## 2023-09-26 DIAGNOSIS — I10 ESSENTIAL HYPERTENSION: ICD-10-CM

## 2023-09-26 DIAGNOSIS — Z01.818 PREOP GENERAL PHYSICAL EXAM: Primary | ICD-10-CM

## 2023-09-26 DIAGNOSIS — M62.89 PELVIC FLOOR DYSFUNCTION: ICD-10-CM

## 2023-09-26 LAB — DEPRECATED CALCIDIOL+CALCIFEROL SERPL-MC: 64 UG/L (ref 20–75)

## 2023-09-26 PROCEDURE — 82306 VITAMIN D 25 HYDROXY: CPT | Performed by: FAMILY MEDICINE

## 2023-09-26 PROCEDURE — 99214 OFFICE O/P EST MOD 30 MIN: CPT | Performed by: FAMILY MEDICINE

## 2023-09-26 PROCEDURE — 36415 COLL VENOUS BLD VENIPUNCTURE: CPT | Performed by: FAMILY MEDICINE

## 2023-09-26 RX ORDER — LISINOPRIL 10 MG/1
10 TABLET ORAL DAILY
COMMUNITY
Start: 2023-09-26 | End: 2023-11-27

## 2023-09-26 ASSESSMENT — ANXIETY QUESTIONNAIRES
7. FEELING AFRAID AS IF SOMETHING AWFUL MIGHT HAPPEN: NOT AT ALL
GAD7 TOTAL SCORE: 0
2. NOT BEING ABLE TO STOP OR CONTROL WORRYING: NOT AT ALL
5. BEING SO RESTLESS THAT IT IS HARD TO SIT STILL: NOT AT ALL
3. WORRYING TOO MUCH ABOUT DIFFERENT THINGS: NOT AT ALL
4. TROUBLE RELAXING: NOT AT ALL
6. BECOMING EASILY ANNOYED OR IRRITABLE: NOT AT ALL
IF YOU CHECKED OFF ANY PROBLEMS ON THIS QUESTIONNAIRE, HOW DIFFICULT HAVE THESE PROBLEMS MADE IT FOR YOU TO DO YOUR WORK, TAKE CARE OF THINGS AT HOME, OR GET ALONG WITH OTHER PEOPLE: SOMEWHAT DIFFICULT
1. FEELING NERVOUS, ANXIOUS, OR ON EDGE: NOT AT ALL
GAD7 TOTAL SCORE: 0

## 2023-09-26 ASSESSMENT — PATIENT HEALTH QUESTIONNAIRE - PHQ9: SUM OF ALL RESPONSES TO PHQ QUESTIONS 1-9: 0

## 2023-09-26 ASSESSMENT — ENCOUNTER SYMPTOMS
HEADACHES: 0
SORE THROAT: 0
JOINT SWELLING: 0
PARESTHESIAS: 0
MYALGIAS: 0
PALPITATIONS: 0
FEVER: 0
DIZZINESS: 0
SHORTNESS OF BREATH: 0
COUGH: 0
NERVOUS/ANXIOUS: 0
ARTHRALGIAS: 0
WEAKNESS: 0
CHILLS: 0

## 2023-09-26 NOTE — PROGRESS NOTES
[facility]  [address]  [place] [address]  Phone: [phone]  Primary Provider: Silver Pro  Pre-op Performing Provider: SILVER PRO      PREOPERATIVE EVALUATION:  Today's date: 9/26/2023    Mustapha is a 62 year old male who presents for a preoperative evaluation.      9/26/2023     8:37 AM   Additional Questions   Roomed by MP   Accompanied by BUCK         9/26/2023     8:37 AM   Patient Reported Additional Medications   Patient reports taking the following new medications flomax       Surgical Information:  Surgery/Procedure: electrode placement in bladder  Surgery Location: [facility]  Surgeon: Manisha  Surgery Date: 10/4 and 10/18  Time of Surgery: tbd  Where patient plans to recover: At home with family  Fax number for surgical facility: [phone]    Assessment & Plan     The proposed surgical procedure is considered INTERMEDIATE risk.      1. Preop general physical exam    2. Pelvic floor dysfunction    3. Essential hypertension  - lisinopril (ZESTRIL) 10 MG tablet; Take 1 tablet (10 mg) by mouth daily    4. Vitamin D deficiency  - Vitamin D Deficiency; Future       - No identified additional risk factors other than previously addressed    Antiplatelet or Anticoagulation Medication Instructions:   - Patient is on no antiplatelet or anticoagulation medications.    Additional Medication Instructions:  Patient is to take all scheduled medications on the day of surgery    RECOMMENDATION:  APPROVAL GIVEN to proceed with proposed procedure, without further diagnostic evaluation.    Subjective       HPI related to upcoming procedure: 61 yo M with history of chronic pelvic pain and dysfunction scheduled for upcoming interstim electrode recharge trial.         9/26/2023     8:30 AM   Preop Questions   1. Have you ever had a heart attack or stroke? No   2. Have you ever had surgery on your heart or blood vessels, such as a stent  placement, a coronary artery bypass, or surgery on an artery in your head, neck, heart, or legs? No   3. Do you have chest pain with activity? No   4. Do you have a history of  heart failure? No   5. Do you currently have a cold, bronchitis or symptoms of other infection? No   6. Do you have a cough, shortness of breath, or wheezing? No   7. Do you or anyone in your family have previous history of blood clots? No   8. Do you or does anyone in your family have a serious bleeding problem such as prolonged bleeding following surgeries or cuts? No   9. Have you ever had problems with anemia or been told to take iron pills? No   10. Have you had any abnormal blood loss such as black, tarry or bloody stools? No   11. Have you ever had a blood transfusion? No   12. Are you willing to have a blood transfusion if it is medically needed before, during, or after your surgery? Yes   13. Have you or any of your relatives ever had problems with anesthesia? YES - Allergy to Fentanyl and Midazolam   14. Do you have sleep apnea, excessive snoring or daytime drowsiness? Yes - Does not use CPAP   14a. Do you have a CPAP machine? Yes   15. Do you have any artifical heart valves or other implanted medical devices like a pacemaker, defibrillator, or continuous glucose monitor? No   16. Do you have artificial joints? YES - History of left hip replacement   17. Are you allergic to latex? No       Health Care Directive:  Patient does not have a Health Care Directive or Living Will: Discussed advance care planning with patient; information given to patient to review.    Preoperative Review of :   reviewed - controlled substances reflected in medication list.      Status of Chronic Conditions:  HYPERTENSION - Patient has longstanding history of HTN , currently denies any symptoms referable to elevated blood pressure. Specifically denies chest pain, palpitations, dyspnea, orthopnea, PND or peripheral edema. Blood pressure readings have  been in normal range. Current medication regimen is as listed below. Patient denies any side effects of medication.     History of chronic pain - Currently on Belbuca for chronic neuropathic pain.     Review of Systems   Constitutional:  Negative for chills and fever.   HENT:  Negative for congestion, ear pain, hearing loss and sore throat.    Respiratory:  Negative for cough and shortness of breath.    Cardiovascular:  Negative for chest pain, palpitations and peripheral edema.   Musculoskeletal:  Negative for arthralgias, joint swelling and myalgias.   Skin:  Negative for rash.   Neurological:  Negative for dizziness, weakness, headaches and paresthesias.   Psychiatric/Behavioral:  Negative for mood changes. The patient is not nervous/anxious.          Patient Active Problem List    Diagnosis Date Noted    F11.2 - Continuous opioid dependence (H) 05/23/2023     Priority: Medium    F11.2 - Episodic opioid dependence (H) 05/23/2023     Priority: Medium    F11.9 - Chronic, continuous use of opioids 05/23/2023     Priority: Medium    Uncomplicated opioid dependence (H)      Priority: Medium     Patient is followed by Silver Pro DO for ongoing prescription of pain medication.  All refills should only be approved by this provider, or covering partner.    Medication(s): oxycodone 5 mg  Maximum quantity per month: 30  Clinic visit frequency required: Q 3 months   PDMP Review         Value Time User    State PDMP site checked  Yes 7/3/2022 10:43 AM Jose Pérez MD   Controlled substance agreement:  CSA -- Patient Level:    Controlled Substance Agreement - Opioid - Scan on 9/8/2022 11:41 AM     Pain Clinic evaluation in the past: No    Opioid Risk Tool Total Score(s):  No flowsheet data found.  Last [place] website verification:  done on 9/8/22   [email or web address]        Pelvic floor dysfunction 05/13/2022     Priority: Medium    Pudendal neuralgia 05/13/2022     Priority: Medium     Coccydynia 05/13/2022     Priority: Medium    Change in bowel habits 01/03/2022     Priority: Medium    Complete tear of left rotator cuff 01/03/2022     Priority: Medium    Pruritus ani 01/03/2022     Priority: Medium    Tear of right acetabular labrum, sequela 10/28/2021     Priority: Medium    Lumbar disc herniation with radiculopathy 10/28/2021     Priority: Medium     MRI 10/21  2.  At L4-L5, there is a right paracentral slightly caudally directed disc extrusion that narrows the right lateral recess and causes mass effect on the descending/traversing right L5 nerve root.  3.  At L3-L4, there is a right paracentral disc protrusion superimposed on disc bulge with narrowing of the right lateral recess and mass effect on the descending/traversing right L4 nerve root.      Acute prostatitis 10/06/2021     Priority: Medium    Rectal pain 10/06/2021     Priority: Medium    Penile pain 10/06/2021     Priority: Medium    Perineal pain in male 10/06/2021     Priority: Medium    Anal fissure 08/26/2021     Priority: Medium    Constipation 08/26/2021     Priority: Medium    Urinary retention 08/16/2021     Priority: Medium    Neuropathy 08/16/2021     Priority: Medium    Diverticular disease of large intestine 08/03/2021     Priority: Medium    Benign prostatic hyperplasia with urinary obstruction 07/12/2021     Priority: Medium    Calculus of gallbladder with chronic cholecystitis without obstruction 05/25/2021     Priority: Medium    MARIAN (obstructive sleep apnea) 05/03/2021     Priority: Medium     On BiPAP, see scans.      Symptomatic cholelithiasis 04/06/2021     Priority: Medium    COVID-19 virus infection 04/06/2021     Priority: Medium    Hepatic steatosis 03/26/2021     Priority: Medium    Psoriatic arthritis (H) 03/15/2021     Priority: Medium    Arthritis of left acromioclavicular joint 08/05/2019     Priority: Medium    Lower urinary tract symptoms 05/13/2019     Priority: Medium    Mild intermittent asthma,  uncomplicated 04/05/2017     Priority: Medium    OA (osteoarthritis) of hip 05/19/2015     Priority: Medium    ASHD (arteriosclerotic heart disease) 10/25/2013     Priority: Medium    Hyperlipidemia 10/25/2013     Priority: Medium    Reflux esophagitis 04/18/2013     Priority: Medium    Hiatal hernia 04/18/2013     Priority: Medium    Insomnia, unspecified 02/27/2013     Priority: Medium    GERD (gastroesophageal reflux disease) 10/16/2012     Priority: Medium    Low testosterone 10/16/2012     Priority: Medium    Displacement of lumbar intervertebral disc without myelopathy 09/12/2011     Priority: Medium      Past Medical History:   Diagnosis Date    MARIAN (obstructive sleep apnea)     havne't used cpap since 2018    Pelvic floor dysfunction      Past Surgical History:   Procedure Laterality Date    BACK SURGERY      CHOLECYSTECTOMY      COLONOSCOPY N/A 8/31/2022    Procedure: COLONOSCOPY;  Surgeon: Veena Mendoza MD;  Location:  GI    ENT SURGERY      tonsillectomy    GENITOURINARY SURGERY      TURP    IMPLANT STIMULATOR SACRAL NERVE PERCUTANEOUS TRIAL N/A 10/17/2022    Procedure: INSERTION, NEUROSTIMULATOR, SACRAL, PERCUTANEOUS, FOR TRIAL (trial with [employer]);  Surgeon: Nayan Tello MD;  [place] [facility] [place]    JOINT REPLACEMENT, HIP RT/LT Left     ORTHOPEDIC SURGERY      wrist surgery, carpal tunnel 2005    PROSTATE SURGERY      Urolift    ROTATOR CUFF REPAIR RT/LT Left      Current Outpatient Medications   Medication Sig Dispense Refill    acetaminophen (TYLENOL) 500 MG tablet Take 500-1,000 mg by mouth every 6 hours as needed for mild pain      albuterol (PROAIR HFA/PROVENTIL HFA/VENTOLIN HFA) 108 (90 Base) MCG/ACT inhaler Inhale 2 puffs into the lungs every 6 hours      ASPIRIN NOT PRESCRIBED (INTENTIONAL) Please choose reason not prescribed from choices below.      Buprenorphine HCl (BELBUCA) 300 MCG FILM buccal film Place 300 mcg inside cheek every 12 hours      Ergocalciferol (VITAMIN D2 PO)        fluticasone (FLONASE) 50 MCG/ACT nasal spray INSTILL 1 SPRAY INTO BOTH NOSTRILS DAILY 16 mL 3    lisinopril (ZESTRIL) 20 MG tablet Take 1 tablet (20 mg) by mouth daily 90 tablet 3    senna-docusate (SENOKOT-S/PERICOLACE) 8.6-50 MG tablet Take 1-4 tablets by mouth      tiZANidine (ZANAFLEX) 4 MG tablet Take 1 tablet (4 mg) by mouth 2 times daily as needed for muscle spasms         Allergies   Allergen Reactions    Droperidol Other (See Comments)     Extrapyramidal Side Effect    Fenofibrate Headache and Diarrhea             Fentanyl      Other reaction(s): N&V hard to wake up    Keflex [Cephalexin] Itching    Lactose GI Disturbance         Other reaction(s): GI upset    Midazolam      Other reaction(s): N&V, Hard to wake up    Monosodium Glutamate      Other reaction(s): diarrhea, headaches    Pcn [Penicillins] Other (See Comments)     Feels warm and flushed, but tolerates Cephalexin    Atorvastatin Palpitations     Other reaction(s): palpitations    Sertraline Palpitations         Other reaction(s): Unknown    Simvastatin Palpitations     Other reaction(s): palpitations    Sulfa Antibiotics Headache and Rash     Burning    Other reaction(s): Rash  Other reaction(s): rash and headache    Tetracycline Rash     Burning    Other reaction(s): rash        Social History     Tobacco Use    Smoking status: Former     Packs/day: 1.00     Years: 5.00     Pack years: 5.00     Types: Cigarettes     Quit date: 1999     Years since quittin.8     Passive exposure: Never    Smokeless tobacco: Never   Substance Use Topics    Alcohol use: Yes     Comment: very little     Family History   Problem Relation Age of Onset    Cerebrovascular Disease Mother     Hypertension Mother     Diabetes Mother     Liver Cancer Mother     Cancer Father 84        liver cancer    Prostate Cancer Father      History   Drug Use    Types: Marijuana     Comment: gummies         Objective     /80   Pulse 89   Temp 97.3  F (36.3  C)  "(Temporal)   Resp 16   Ht 1.65 m (5' 4.96\")   Wt 84.6 kg (186 lb 9.6 oz)   SpO2 96%   BMI 31.09 kg/m      Physical Exam  Constitutional:       General: He is not in acute distress.  HENT:      Head: Normocephalic and atraumatic.   Eyes:      Conjunctiva/sclera: Conjunctivae normal.   Cardiovascular:      Rate and Rhythm: Normal rate and regular rhythm.      Heart sounds: Normal heart sounds. No murmur heard.  Pulmonary:      Effort: Pulmonary effort is normal. No respiratory distress.      Breath sounds: No wheezing or rales.   Musculoskeletal:         General: Normal range of motion.   Skin:     Findings: No rash.   Neurological:      Mental Status: He is alert and oriented to person, place, and time.           Recent Labs   Lab Test 08/18/23  1327 05/23/23  0937 04/19/23  1017 03/23/23  1016 01/05/23  1454 08/19/22  0952 11/11/21  1050   HGB 13.7  --  9.8* 14.1  --  14.5  --      --  503* 335  --   --   --    INR  --   --   --   --   --   --  0.92     --   --  139   < > 141  --    POTASSIUM 4.7  --   --  4.2   < > 4.6  --    CR 0.81  --   --  0.77   < > 0.89  --    A1C  --  5.5  --   --   --  5.9*  --     < > = values in this interval not displayed.        Diagnostics:  No labs were ordered during this visit.   No EKG required, no history of coronary heart disease, significant arrhythmia, peripheral arterial disease or other structural heart disease.    Revised Cardiac Risk Index (RCRI):  The patient has the following serious cardiovascular risks for perioperative complications:   - No serious cardiac risks = 0 points     RCRI Interpretation: 0 points: Class I (very low risk - 0.4% complication rate)         Signed Electronically by: Silver Pro DO  Copy of this evaluation report is provided to requesting physician.      "

## 2023-09-26 NOTE — PATIENT INSTRUCTIONS
Claudy Luciano,    Thank you for allowing [facility] to manage your care.    I ordered some blood work, please go to the laboratory to get your laboratory studies.    For your convenience, test results are released as soon as they are available  Please allow 1-2 business days for me to send you a comment about your results.  If not done so, I encourage you to login into [employer] ([email or web address] to review your results in real time.     If you have any questions or concerns, please feel free to call us at [phone].    Sincerely,    Dr. Pro    Did you know?      You can schedule a video visit for follow-up appointments as well as future appointments for certain conditions.  Please see the below link.     [email or web address]    If you have not already done so,  I encourage you to sign up for [employer] ([email or web address]).  This will allow you to review your results, securely communicate with a provider, and schedule virtual visits as well.      Preparing for Your Surgery  Getting started  A nurse will call you to review your health history and instructions. They will give you an arrival time based on your scheduled surgery time. Please be ready to share:  Your doctor's clinic name and phone number  Your medical, surgical, and anesthesia history  A list of allergies and sensitivities  A list of medicines, including herbal treatments and over-the-counter drugs  Whether the patient has a legal guardian (ask how to send us the papers in advance)  Please tell us if you're pregnant--or if there's any chance you might be pregnant. Some surgeries may injure a fetus (unborn baby), so they require a pregnancy test. Surgeries that are safe for a fetus don't always need a test, and you can choose whether to have one.   If you have a child who's having surgery, please ask for a copy of Preparing for Your Child's Surgery.    Preparing for  surgery  Within 10 to 30 days of surgery: Have a pre-op exam (sometimes called an H&P, or History and Physical). This can be done at a clinic or pre-operative center.  If you're having a , you may not need this exam. Talk to your care team.  At your pre-op exam, talk to your care team about all medicines you take. If you need to stop any medicines before surgery, ask when to start taking them again.  We do this for your safety. Many medicines can make you bleed too much during surgery. Some change how well surgery (anesthesia) drugs work.  Call your insurance company to let them know you're having surgery. (If you don't have insurance, call [phone].)  Call your clinic if there's any change in your health. This includes signs of a cold or flu (sore throat, runny nose, cough, rash, fever). It also includes a scrape or scratch near the surgery site.  If you have questions on the day of surgery, call your hospital or surgery center.  Eating and drinking guidelines  For your safety: Unless your surgeon tells you otherwise, follow the guidelines below.  Eat and drink as usual until 8 hours before you arrive for surgery. After that, no food or milk.  Drink clear liquids until 2 hours before you arrive. These are liquids you can see through, like water, Gatorade, and [employer] Water. They also include plain black coffee and tea (no cream or milk), candy, and breath mints. You can spit out gum when you arrive.  If you drink alcohol: Stop drinking it the night before surgery.  If your care team tells you to take medicine on the morning of surgery, it's okay to take it with a sip of water.  Preventing infection  Shower or bathe the night before and morning of your surgery. Follow the instructions your clinic gave you. (If no instructions, use regular soap.)  Don't shave or clip hair near your surgery site. We'll remove the hair if needed.  Don't smoke or vape the morning of surgery. You may chew nicotine gum up to 2  hours before surgery. A nicotine patch is okay.  Note: Some surgeries require you to completely quit smoking and nicotine. Check with your surgeon.  Your care team will make every effort to keep you safe from infection. We will:  Clean our hands often with soap and water (or an alcohol-based hand rub).  Clean the skin at your surgery site with a special soap that kills germs.  Give you a special gown to keep you warm. (Cold raises the risk of infection.)  Wear special hair covers, masks, gowns and gloves during surgery.  Give antibiotic medicine, if prescribed. Not all surgeries need antibiotics.  What to bring on the day of surgery  Photo ID and insurance card  Copy of your health care directive, if you have one  Glasses and hearing aids (bring cases)  You can't wear contacts during surgery  Inhaler and eye drops, if you use them (tell us about these when you arrive)  CPAP machine or breathing device, if you use them  A few personal items, if spending the night  If you have . . .  A pacemaker, ICD (cardiac defibrillator) or other implant: Bring the ID card.  An implanted stimulator: Bring the remote control.  A legal guardian: Bring a copy of the certified (court-stamped) guardianship papers.  Please remove any jewelry, including body piercings. Leave jewelry and other valuables at home.  If you're going home the day of surgery  You must have a responsible adult drive you home. They should stay with you overnight as well.  If you don't have someone to stay with you, and you aren't safe to go home alone, we may keep you overnight. Insurance often won't pay for this.  After surgery  If it's hard to control your pain or you need more pain medicine, please call your surgeon's office.  Questions?   If you have any questions for your care team, list them here:  _________________________________________________________________________________________________________________________________________________________________________ ____________________________________ ____________________________________ ____________________________________  For informational purposes only. Not to replace the advice of your health care provider. Copyright   2003, 2019 [facility]. All rights reserved. Clinically reviewed by Pema Gimenez MD. [employer] 480603 - REV 12/22.    How to Take Your Medication Before Surgery  - Take all of your medications before surgery as usual

## 2023-09-28 ENCOUNTER — THERAPY VISIT (OUTPATIENT)
Dept: PHYSICAL THERAPY | Facility: CLINIC | Age: 63
End: 2023-09-28
Payer: COMMERCIAL

## 2023-09-28 DIAGNOSIS — R10.2 PELVIC AND PERINEAL PAIN: ICD-10-CM

## 2023-09-28 DIAGNOSIS — K62.89 RECTAL PAIN: ICD-10-CM

## 2023-09-28 DIAGNOSIS — N48.89 PENILE PAIN: ICD-10-CM

## 2023-09-28 DIAGNOSIS — R10.2 PELVIC PAIN IN MALE: ICD-10-CM

## 2023-09-28 DIAGNOSIS — M62.89 PELVIC FLOOR DYSFUNCTION: Primary | ICD-10-CM

## 2023-09-28 PROCEDURE — 97162 PT EVAL MOD COMPLEX 30 MIN: CPT | Mod: GP | Performed by: PHYSICAL THERAPIST

## 2023-09-28 PROCEDURE — 97530 THERAPEUTIC ACTIVITIES: CPT | Mod: GP | Performed by: PHYSICAL THERAPIST

## 2023-09-28 PROCEDURE — 97110 THERAPEUTIC EXERCISES: CPT | Mod: GP | Performed by: PHYSICAL THERAPIST

## 2023-09-28 PROCEDURE — 97140 MANUAL THERAPY 1/> REGIONS: CPT | Mod: GP | Performed by: PHYSICAL THERAPIST

## 2023-09-28 NOTE — PROGRESS NOTES
PHYSICAL THERAPY EVALUATION  Type of Visit: Evaluation    See electronic medical record for Abuse and Falls Screening details.    Subjective       Presenting condition or subjective complaint: Pt was a  for 45 years. Isn't sure if that affected it at all. Started out with COVID 2020, was laying on the cough for a while and butt and legs went numb. Progressively got worse and 2 weeks following, got shingles in R leg from knee to buttocks and then into the L leg. After the shingles is when he started to get all this significant pain in his legs. Also, got his gallbladder out around that same time. Has been progressively worsening since then. Did have urolift procedure 2/28/2020 for retention, etc and about 9 months after that procedure, also notes that is around the time all these sx's started. Is also wondering if there's a clip out of place. Had lumbar 3,4,5, S1-2 fusion. Notes they had to do an anterior and posterior approach. Thought that would help his pain but it didn't do anything. Did have Interstim but didn't see any relief from it so they took it out. Is going in for another one with a different MD and different approach on Oct 4th. Right now, in terms of his current sx's. Burning pain right up through the penis and the rectum. Feels like he needs to go to the bathroom and will go but doesn't help. Significant burning with bowel movements as well. Feels like the pain starts through the rectum and goes into the penis. HS's also feel very tight and the burning pain goes down B LE's to foot. Describes this as a burning and pain sensation as well. L PARKER 10 years ago. L hip has been a little more sore recently as well. Feels like everything is tight and won't let go. The only time he gets a normal flow is when he is golfing and things are a little more loosened up. Did see Tia in [place] however his insurance didn't cover her so went and saw a PF PT in [facility]. Did help but not lasting. Did do some  exercises which didn't seem to help much.   Date of onset: 09/28/20    Relevant medical history:     Dates & types of surgery: lower back april 10 2023 ROTARCUFF 2019 HIP REPLACEMENT 2014 URLIFT CLIPS AROUND PROSTATE 2019    Prior diagnostic imaging/testing results: MRI; CT scan     Prior therapy history for the same diagnosis, illness or injury:        Prior Level of Function  Transfers:   Ambulation:   ADL:   IADL:     Living Environment  Social support: Alone   Type of home: House   Stairs to enter the home:         Ramp: No   Stairs inside the home: Yes 15 Is there a railing: Yes   Help at home: None  Equipment owned:       Employment:      Hobbies/Interests:      Patient goals for therapy: sitting standing or laying down without pain    Pain assessment:      Objective      PELVIC EVALUATION  ADDITIONAL HISTORY:  Sex assigned at birth: Male  Gender identity: Male    Pronouns: He/Him/His      Bladder History:  Feels bladder filling: No  Triggers for feeling of inability to wait to go to the bathroom: Yes sitting or laying down then getting up feel severe buring that i have to go  How long can you wait to urinate: 20 seconds  Gets up at night to urinate: Yes 5 timesor more  Can stop the flow of urine when urinating: Sometimes  Volume of urine usually released: Small Feels like he goes to the bathroom every couple hours.   Other issues: Straining to pass urine; Slow or hesitant urine stream; Trouble emptying bladder completely; Dribbling after urinating; Bladder infections  Number of bladder infections in last 12 months: 3  Fluid intake per day: 60 onces water 80 ml 1 pop/day, V8 energy day, occasional alcohol on the weekend but notes the alcohol makes him worse.   Medications taken for bladder: Yes flowmax 1 day Does really help. If he doesn't take it, doesn't know if he'd go at all.  Activities causing urine leak: Hurrying to the bathroom due to a strong urge to urinate (pee)  Sometimes can't make it. Will use a  bedside urinal at night due to not being able to get to the bathroom.   Amount of urine typically leaked:  Amount depends on how far the bathroom is.  Pads used to help with leaking:          Bowel History:  Frequency of bowel movement: 1 day to everyother day  Consistency of stool: Soft    Ignores the urge to defecate: No  Other bowel issues: Pain when pooping; Straining to have bowel movement, Taking Senna and trying to eat enough fiber. Hasn't been taking Senna as much anymore as it seems to make him burn more.   Length of time spent trying to have a bowel movement: 10 minutes    Sexual Function History:  Sexual orientation: Straight    Sexually active: No  Lubrication used: Yes Yes  Pelvic pain: Walking; Standing; Sitting; Certain positions; Rectal exams hurt all the time more with pressure on the buttucks when sitting  Pain or difficulty with orgasms/erection/ejaculation: Yes sharp pain, Couple of times had pain with erection and ejaculation but no longer active because of the pain.   State of menopause:    Hormone medications: No      Have you been diagnosed with pelvic prolapse or abdominal separation: No, Do you get regular exercise: Yes, Have you tried pelvic floor strengthening exercises for 4 weeks: Yes, Do you have any history of trauma that is relevant to your care that you d like to share: No    Discussed reason for referral regarding pelvic health needs and external/internal pelvic floor muscle examination with patient/guardian.  Opportunity provided to ask questions and verbal consent for assessment and intervention was given.    PAIN: Pain Level at Rest: 9/10  Pain Level with Use: 9/10  Pain Location: Perineum and down into legs  Pain Quality: Burning, Sharp, and needle like, tingling  Pain Frequency: constant  Pain is Worst: same  Pain is Exacerbated By: standing, sitting, driving  Pain is Relieved By: stretch, squat position, keeping himself moving, internal release but wasn't long and would only  last about a half a day.  Pain Progression: Worsened  POSTURE: Standing Posture: Rounded shoulders, Forward head, Lordosis decreased  Sitting Posture: Sits with poor overall posture. Pt moves throughout the treatment due to pain with prolonged positioning in one position.   LUMBAR SCREEN:   (Degrees) Left AROM Left PROM  Right AROM Right PROM   Hip Flexion  WNL + pain and burning  WNL + pain and burning   Hip Extension       Hip Abduction       Hip Adduction       Hip Internal Rotation  Restricted with PARKER  Very restricted - pain   Hip External Rotation  Restricted with PARKER  Very restricted - pain   Knee Flexion  WNL  WNL   Knee Extension  WNL  WNL   Lumbar Side glide Restricted due to fusion Restricted due to fusion   Lumbar Flexion To mid shin + pain in HS's and rectal area   Lumbar Extension -50% +/- pain   Pain:   End feel:   HIP SCREEN:  Strength:   Pain: - none + mild ++ moderate +++ severe  Strength Scale: 0-5/5 Left Right   Hip Flexion 4- 4-   Hip Extension     Hip Abduction     Hip Adduction     Hip Internal Rotation 5 5   Hip External Rotation 5 5   Knee Flexion 5 5   Knee Extension 5 5     Decreased sensation noted throughout R LE with light touch.  Functional Strength Testing:     PELVIC/SI SCREEN:   Normal pelvic alignment. Positive stork test B with significant restriction and minimal to no SI mobility noted B. Pt also did have slight balance difficulty initially with lifting leg B. L sacral torsion with decreased sacral mobility noted.  PAIN PROVOCATION TEST:   PELVIS/SI SPECIAL TESTS:   BREATHING SYMMETRY: Decreased rib cage mobility, forward trunk lean with poor rib cage expansion and decreased abdominal breathing as well.     PELVIC EXAM  External Visual Inspection:  At rest: Elevated perineal body  With voluntary pelvic floor contraction: Perineal elevation, slight  Relaxation of PFM: Non-relaxation  With intra-abdominal pressure: Bearing down as defecation: No change    Integumentary:   Anal:  decreased rugae noted  Penile: unremarkable    External Digital Palpation per Perineum:   Ischiocavernosis: Tightness, Tenderness, Pain  Bulbo cavernosis: Tightness, Tenderness, Pain  Transverse perineal: Tightness, Tenderness, Pain  Levator ani: Tightness, Tenderness, Pain  Perineal body: Tightness, Tenderness, Pain  Coccyx: Tightness, Tenderness, Pain  Public symphysis: Unremarkable  Testicles: Tenderness, Pain  Spermatic cord: Tightness, Tenderness, Pain, L>R    Scar:   Location/Type:   Mobility:     Internal Digital Palpation:  Per Vagina:      Per Rectum:  Tenderness  Tone: high  Digital Muscle Performance: P (Power): 1/5 with poor relaxation    Compensations: Abdominals, Adductors, Gluts, Breath holding, significant tension noted throughout body with cue to contract the pelvic floor. With cue to bear down, no lengthening noted and again, held breath.  Relaxation Post-Contraction: Non-relaxation      Pelvic Organ Prolapse:       ABDOMINAL ASSESSMENT  Diastasis Rectus Abdominis (GERA):      Abdominal Activation/Strength: SLR: normal    Scar:   Location/Type: Abdominal scar from umbilicus to pubic bone  Mobility:  slight lack of mobility without pain    Fascial Tension/Restriction/Tone: HypomobileDecreased mobility noted through inguinal canal on L especially at deep inguinal ring region.     BIOFEEDBACK:  Position:   Surface Electrodes:     Abdominals:     Perianals:       DERMATOMES:   DTR S:     Assessment & Plan   CLINICAL IMPRESSIONS  Medical Diagnosis: Pelvic pain    Treatment Diagnosis: High tone pelvic pain   Impression/Assessment: Patient is a 62 year old male with Pelvic pain complaints.  The following significant findings have been identified: Pain, Decreased ROM/flexibility, Decreased joint mobility, Decreased strength, Impaired balance, Decreased proprioception, Impaired gait, Impaired muscle performance, Decreased activity tolerance, and Impaired posture. These impairments interfere with their  ability to perform self care tasks, work tasks, recreational activities, household chores, driving , household mobility, and community mobility as compared to previous level of function.     Clinical Decision Making (Complexity):  Clinical Presentation: Evolving/Changing  Clinical Presentation Rationale: based on medical and personal factors listed in PT evaluation  Clinical Decision Making (Complexity): Moderate complexity    PLAN OF CARE  Treatment Interventions:  Modalities: Biofeedback, Dry Needling  Interventions: Manual Therapy, Neuromuscular Re-education, Therapeutic Activity, Therapeutic Exercise, Self-Care/Home Management    Long Term Goals     PT Goal 1  Goal Identifier: Pelvic pain  Goal Description: Pt to be able to sit with pain < 4/10  Rationale: to maximize safety and independence with performance of ADLs and functional tasks;to maximize safety and independence within the home;to maximize safety and independence within the community;to maximize safety and independence with transportation;to maximize safety and independence with self cares  Target Date: 12/21/23      Frequency of Treatment: 1-2x/wk  Duration of Treatment: 12 weeks    Recommended Referrals to Other Professionals:   Education Assessment:   Learner/Method: No Barriers to Learning    Risks and benefits of evaluation/treatment have been explained.   Patient/Family/caregiver agrees with Plan of Care.     Evaluation Time:     PT Eval, Moderate Complexity Minutes (16902): 40       Signing Clinician: Celeste Taveras, PT      [facility]                                                                                   OUTPATIENT PHYSICAL THERAPY      PLAN OF TREATMENT FOR OUTPATIENT REHABILITATION   Patient's Last Name, First Name, Mustapha Riggs YOB: 1960   Provider's Name   [facility]   Medical Record No.  [id number]     Onset Date: 09/28/20  Start of Care Date:        Medical Diagnosis:  Pelvic pain      PT Treatment Diagnosis:  High tone pelvic pain Plan of Treatment  Frequency/Duration: 1-2x/wk/ 12 weeks    Certification date from   to           See note for plan of treatment details and functional goals     Celeste Taveras, PT                         I CERTIFY THE NEED FOR THESE SERVICES FURNISHED UNDER        THIS PLAN OF TREATMENT AND WHILE UNDER MY CARE .             Physician Signature               Date    X_____________________________________________________                    Referring Provider:  Brunilda Watkins      Initial Assessment  See Epic Evaluation-

## 2023-10-05 ENCOUNTER — THERAPY VISIT (OUTPATIENT)
Dept: PHYSICAL THERAPY | Facility: CLINIC | Age: 63
End: 2023-10-05
Payer: COMMERCIAL

## 2023-10-05 ENCOUNTER — TRANSFERRED RECORDS (OUTPATIENT)
Dept: HEALTH INFORMATION MANAGEMENT | Facility: CLINIC | Age: 63
End: 2023-10-05

## 2023-10-05 DIAGNOSIS — K62.89 RECTAL PAIN: Primary | ICD-10-CM

## 2023-10-05 DIAGNOSIS — M62.89 PELVIC FLOOR DYSFUNCTION: ICD-10-CM

## 2023-10-05 DIAGNOSIS — N48.89 PENILE PAIN: ICD-10-CM

## 2023-10-05 DIAGNOSIS — R10.2 PELVIC PAIN IN MALE: ICD-10-CM

## 2023-10-05 PROCEDURE — 99207 PR NO CHARGE LOS: CPT | Mod: GP | Performed by: PHYSICAL THERAPIST

## 2023-10-10 ENCOUNTER — TRANSFERRED RECORDS (OUTPATIENT)
Dept: HEALTH INFORMATION MANAGEMENT | Facility: CLINIC | Age: 63
End: 2023-10-10
Payer: MEDICAID

## 2023-10-19 ENCOUNTER — THERAPY VISIT (OUTPATIENT)
Dept: PHYSICAL THERAPY | Facility: CLINIC | Age: 63
End: 2023-10-19
Payer: COMMERCIAL

## 2023-10-19 DIAGNOSIS — N48.89 PENILE PAIN: ICD-10-CM

## 2023-10-19 DIAGNOSIS — K62.89 RECTAL PAIN: Primary | ICD-10-CM

## 2023-10-19 DIAGNOSIS — M62.89 PELVIC FLOOR DYSFUNCTION: ICD-10-CM

## 2023-10-19 DIAGNOSIS — R10.2 PELVIC PAIN IN MALE: ICD-10-CM

## 2023-10-19 PROCEDURE — 97140 MANUAL THERAPY 1/> REGIONS: CPT | Mod: GP | Performed by: PHYSICAL THERAPIST

## 2023-10-24 ENCOUNTER — THERAPY VISIT (OUTPATIENT)
Dept: PHYSICAL THERAPY | Facility: CLINIC | Age: 63
End: 2023-10-24
Payer: COMMERCIAL

## 2023-10-24 ENCOUNTER — PATIENT OUTREACH (OUTPATIENT)
Dept: CARE COORDINATION | Facility: CLINIC | Age: 63
End: 2023-10-24

## 2023-10-24 DIAGNOSIS — M62.89 PELVIC FLOOR DYSFUNCTION: ICD-10-CM

## 2023-10-24 DIAGNOSIS — R10.2 PELVIC PAIN IN MALE: ICD-10-CM

## 2023-10-24 DIAGNOSIS — N48.89 PENILE PAIN: ICD-10-CM

## 2023-10-24 DIAGNOSIS — K62.89 RECTAL PAIN: Primary | ICD-10-CM

## 2023-10-24 PROCEDURE — 97112 NEUROMUSCULAR REEDUCATION: CPT | Mod: GP | Performed by: PHYSICAL THERAPIST

## 2023-10-24 PROCEDURE — 97140 MANUAL THERAPY 1/> REGIONS: CPT | Mod: GP | Performed by: PHYSICAL THERAPIST

## 2023-10-24 NOTE — PROGRESS NOTES
Clinic Care Coordination Contact  [facility]       Clinical Data: Care Coordinator Outreach  Outreach attempted x 1.  Left message with the patient with call back information and requested return call.  Plan: Care Coordinator sent care coordination introduction letter on 3/18/22 via [email or web address]. Care Coordinator will try to reach patient again in 10 business days.    Sebastian Jimenez MSN, RN, PHN, CCM   Primary Care Clinical RN Care Coordinator  [facility]  10/24/2023   10:05 AM  [email or web address]  Office: [phone]

## 2023-10-26 ENCOUNTER — TRANSFERRED RECORDS (OUTPATIENT)
Dept: HEALTH INFORMATION MANAGEMENT | Facility: CLINIC | Age: 63
End: 2023-10-26
Payer: MEDICAID

## 2023-10-31 ENCOUNTER — THERAPY VISIT (OUTPATIENT)
Dept: PHYSICAL THERAPY | Facility: CLINIC | Age: 63
End: 2023-10-31
Payer: COMMERCIAL

## 2023-10-31 DIAGNOSIS — K62.89 RECTAL PAIN: Primary | ICD-10-CM

## 2023-10-31 DIAGNOSIS — M62.89 PELVIC FLOOR DYSFUNCTION: ICD-10-CM

## 2023-10-31 DIAGNOSIS — N48.89 PENILE PAIN: ICD-10-CM

## 2023-10-31 DIAGNOSIS — R10.2 PELVIC PAIN IN MALE: ICD-10-CM

## 2023-10-31 PROCEDURE — 97530 THERAPEUTIC ACTIVITIES: CPT | Mod: GP | Performed by: PHYSICAL THERAPIST

## 2023-10-31 PROCEDURE — 20561 NDL INSJ W/O NJX 3+ MUSC: CPT | Performed by: PHYSICAL THERAPIST

## 2023-11-02 ENCOUNTER — TRANSFERRED RECORDS (OUTPATIENT)
Dept: HEALTH INFORMATION MANAGEMENT | Facility: CLINIC | Age: 63
End: 2023-11-02
Payer: MEDICAID

## 2023-11-07 ENCOUNTER — THERAPY VISIT (OUTPATIENT)
Dept: PHYSICAL THERAPY | Facility: CLINIC | Age: 63
End: 2023-11-07
Payer: COMMERCIAL

## 2023-11-07 DIAGNOSIS — M62.89 PELVIC FLOOR DYSFUNCTION: ICD-10-CM

## 2023-11-07 DIAGNOSIS — R10.2 PELVIC PAIN IN MALE: ICD-10-CM

## 2023-11-07 DIAGNOSIS — K62.89 RECTAL PAIN: Primary | ICD-10-CM

## 2023-11-07 DIAGNOSIS — N48.89 PENILE PAIN: ICD-10-CM

## 2023-11-07 PROCEDURE — 97530 THERAPEUTIC ACTIVITIES: CPT | Mod: GP | Performed by: PHYSICAL THERAPIST

## 2023-11-07 NOTE — PROGRESS NOTES
11/07/23 0500   Appointment Info   Signing clinician's name / credentials Celeste Taveras, MPT, PRPC   Total/Authorized Visits E & T (12)   Visits Used 6   Medical Diagnosis Pelvic pain   PT Tx Diagnosis High tone pelvic pain   Quick Adds Certification   Progress Note/Certification   Start of Care Date 09/28/23   Onset of illness/injury or Date of Surgery 09/28/20   Therapy Frequency 1x/wk   Predicted Duration 12 weeks   Certification date from 09/28/23   Certification date to 12/21/23   Progress Note Completed Date 09/28/23   PT Goal 1   Goal Identifier Pelvic pain   Goal Description Pt to be able to sit with pain < 4/10   Rationale to maximize safety and independence with performance of ADLs and functional tasks;to maximize safety and independence within the home;to maximize safety and independence within the community;to maximize safety and independence with transportation;to maximize safety and independence with self cares   Goal Progress No change. Pain remains severe at 10/10   Target Date 12/21/23   Subjective Report   Subjective Report Pt reports things are still not going well. Pt has now been wearing depends due to the constant leakage and going through the pads. Was up 8-10 times last night due to the burning pain. Was only emptying a small amount each time as he doesn't feel like he can empty his bladder. Leakage is mostly with urge.  Notes that when he was golfing on Sat, had a couple beers and more water and actually felt like he emptied better. Stream was also stronger. Burning remained the same. Pt also notes that his urine is very cloudy and wonders about an infection. Didn't notice any change with the dry needling either. Saw neurosurgeon yesterday who wasn't sure where pain was stemming from as well. Suggested a couple more injections.   Objective Measures   Objective Measures Objective Measure 1   Objective Measure 1   Objective Measure Pelvic floor palpation   Details Did not do internal  work today.   Therapeutic Activity   Ther Act 1 Spent full appt today discussing pain levels, lack of overall improvement, visits with MD's and suggestions moving forward. Due to patient's history of UTI's and feeling like he had another one, did ask about seeing an infectious disease MD. He has not done this before and wondered about it himself. He is schedule with PCP next week and urologist at the end of the month which I think are both good ideas. Did discuss that if he feels he has a bladder infection, he should see someone sooner than next week. At this point, PT doesn't seem to be changing any of his sx's and in some ways, things are worse in that the UI is worse. Recommended that we hold on PT right now and have patient follow up with MD for further assessment. Pt was in agreement with this plan. Will then follow up with PT following MD appts if appropriate.   Plan   Home program Continue with HEP as tolerated.   Updates to plan of care Will hold on PT at this time due to lack of progress and worsening UI, lack of complete emptying, and burning pain. Pt was in agreement and will FU with PT if/when appropriate.         PLAN  Will hold on PT at this time and recommend that patient follow up with PCP and urologist at this time.   Discontinue per this PN 3/1/24  Beginning/End Dates of Progress Note Reporting Period:  09/28/23 to 11/07/2023    Referring Provider:  Brunilda Watkins

## 2023-11-08 ENCOUNTER — PATIENT OUTREACH (OUTPATIENT)
Dept: CARE COORDINATION | Facility: CLINIC | Age: 63
End: 2023-11-08
Payer: MEDICAID

## 2023-11-08 NOTE — PROGRESS NOTES
Clinic Care Coordination Contact  [facility]/Voicemail    Clinical Data: Care Coordinator Outreach    Outreach Documentation Number of Outreach Attempt   11/8/2023   9:38 AM 2       Left message on patient's voicemail with call back information and requested return call.    Plan: Care Coordinator will send unable to contact letter with care coordinator contact information via [employer]. Care Coordinator will try to reach patient again in 1 month.    Sebastian Jimenez MSN, RN, PHN, [facility]   Primary Care Clinical RN Care Coordinator  [facility]  11/8/2023   9:38 AM  [email or web address]  Office: [phone]

## 2023-11-08 NOTE — LETTER
[facility] CARE COORDINATION  [address]  [place] [address]    November 8, 2023    Mustapha Negrete  [address] [place] [address]      Dear Mustapha,    I have been attempting to reach you since our last contact. I would like to continue to work with you and provide any additional support you may need on achieving your health care related goals. I would appreciate if you would give me a call at [phone] to let me know if you would like to continue working together. I know that there are many things that can affect our ability to communicate and I hope we can continue to work together.    All of us at the [facility] are invested in your health and are here to assist you in meeting your goals.     Sincerely,    Sebastian Jimenez MSN, RN, PHN, [facility]   Primary Care Clinical RN Care Coordinator  [facility]  11/8/2023   9:37 AM  [email or web address]  Office: [phone]

## 2023-11-13 ENCOUNTER — TELEPHONE (OUTPATIENT)
Dept: FAMILY MEDICINE | Facility: CLINIC | Age: 63
End: 2023-11-13
Payer: MEDICAID

## 2023-11-13 DIAGNOSIS — R39.89 URINE DISCOLORATION: Primary | ICD-10-CM

## 2023-11-13 NOTE — TELEPHONE ENCOUNTER
Spoke with Mustapha. Lab only appointment scheduled on 11/14/203 at 0900 AM.     Hailey Major MA

## 2023-11-13 NOTE — TELEPHONE ENCOUNTER
Please call patient to inform him that I ordered a urine test due to his urinary symptoms.  Advise to schedule a laboratory only appointment at his earliest convenience. Advised to keep upcoming appointment with me.

## 2023-11-13 NOTE — TELEPHONE ENCOUNTER
----- Message from Celeste Taveras PT sent at 11/7/2023  9:09 AM CST -----  Regarding: Patient update  Mustapha Zheng was referred to me by the urologist. We have worked together for about 6 visits with no significant improvement in pain or urinary issues. In fact, his UI is actually a little worse at this time. He's also complaining that he can not completely empty his bladder and the burning remains significant. He also notes his urine is very cloudy and looks different. He is wondering about another infection. He is scheduled with you for next week. I did suggest he see someone for a UA if he felt there was an infection. I'm really at a loss with him right now and don't think PT is helping. We did elect to cancel all his appts today and have him follow up with you and the urologist. Let me know if you have any additional questions!    Thanks,  Celeste Taveras, ALE, PRPC  [facility]

## 2023-11-14 ENCOUNTER — LAB (OUTPATIENT)
Dept: LAB | Facility: CLINIC | Age: 63
End: 2023-11-14
Payer: COMMERCIAL

## 2023-11-14 DIAGNOSIS — R39.89 URINE DISCOLORATION: ICD-10-CM

## 2023-11-14 DIAGNOSIS — N39.0 ACUTE UTI (URINARY TRACT INFECTION): Primary | ICD-10-CM

## 2023-11-14 LAB
ALBUMIN UR-MCNC: 30 MG/DL
APPEARANCE UR: ABNORMAL
BACTERIA #/AREA URNS HPF: ABNORMAL /HPF
BILIRUB UR QL STRIP: NEGATIVE
COLOR UR AUTO: YELLOW
GLUCOSE UR STRIP-MCNC: NEGATIVE MG/DL
HGB UR QL STRIP: ABNORMAL
KETONES UR STRIP-MCNC: NEGATIVE MG/DL
LEUKOCYTE ESTERASE UR QL STRIP: ABNORMAL
NITRATE UR QL: POSITIVE
PH UR STRIP: 7 [PH] (ref 5–7)
RBC #/AREA URNS AUTO: ABNORMAL /HPF
SP GR UR STRIP: 1.02 (ref 1–1.03)
UROBILINOGEN UR STRIP-ACNC: 0.2 E.U./DL
WBC #/AREA URNS AUTO: >100 /HPF
WBC CLUMPS #/AREA URNS HPF: PRESENT /HPF

## 2023-11-14 PROCEDURE — 87086 URINE CULTURE/COLONY COUNT: CPT

## 2023-11-14 PROCEDURE — 81001 URINALYSIS AUTO W/SCOPE: CPT

## 2023-11-14 PROCEDURE — 87088 URINE BACTERIA CULTURE: CPT

## 2023-11-14 PROCEDURE — 87186 SC STD MICRODIL/AGAR DIL: CPT

## 2023-11-14 RX ORDER — CIPROFLOXACIN 250 MG/1
250 TABLET, FILM COATED ORAL 2 TIMES DAILY
Qty: 10 TABLET | Refills: 0 | Status: SHIPPED | OUTPATIENT
Start: 2023-11-14 | End: 2023-11-14

## 2023-11-14 RX ORDER — CIPROFLOXACIN 500 MG/1
500 TABLET, FILM COATED ORAL 2 TIMES DAILY
Qty: 14 TABLET | Refills: 0 | Status: SHIPPED | OUTPATIENT
Start: 2023-11-14 | End: 2023-11-15

## 2023-11-15 ENCOUNTER — TELEPHONE (OUTPATIENT)
Dept: FAMILY MEDICINE | Facility: CLINIC | Age: 63
End: 2023-11-15

## 2023-11-15 ENCOUNTER — OFFICE VISIT (OUTPATIENT)
Dept: FAMILY MEDICINE | Facility: CLINIC | Age: 63
End: 2023-11-15
Payer: COMMERCIAL

## 2023-11-15 VITALS
SYSTOLIC BLOOD PRESSURE: 142 MMHG | HEART RATE: 88 BPM | TEMPERATURE: 98.1 F | OXYGEN SATURATION: 97 % | BODY MASS INDEX: 32.06 KG/M2 | RESPIRATION RATE: 20 BRPM | WEIGHT: 187.8 LBS | HEIGHT: 64 IN | DIASTOLIC BLOOD PRESSURE: 76 MMHG

## 2023-11-15 DIAGNOSIS — N39.0 UTI DUE TO KLEBSIELLA SPECIES: ICD-10-CM

## 2023-11-15 DIAGNOSIS — N39.0 ACUTE UTI (URINARY TRACT INFECTION): ICD-10-CM

## 2023-11-15 DIAGNOSIS — N39.0 ACUTE UTI (URINARY TRACT INFECTION): Primary | ICD-10-CM

## 2023-11-15 DIAGNOSIS — B96.89 UTI DUE TO KLEBSIELLA SPECIES: ICD-10-CM

## 2023-11-15 LAB
ANION GAP SERPL CALCULATED.3IONS-SCNC: 11 MMOL/L (ref 7–15)
BACTERIA UR CULT: ABNORMAL
BUN SERPL-MCNC: 17.8 MG/DL (ref 8–23)
CALCIUM SERPL-MCNC: 9.8 MG/DL (ref 8.8–10.2)
CHLORIDE SERPL-SCNC: 102 MMOL/L (ref 98–107)
CREAT SERPL-MCNC: 0.81 MG/DL (ref 0.67–1.17)
DEPRECATED HCO3 PLAS-SCNC: 24 MMOL/L (ref 22–29)
EGFRCR SERPLBLD CKD-EPI 2021: >90 ML/MIN/1.73M2
ERYTHROCYTE [DISTWIDTH] IN BLOOD BY AUTOMATED COUNT: 12.6 % (ref 10–15)
GLUCOSE SERPL-MCNC: 98 MG/DL (ref 70–99)
HCT VFR BLD AUTO: 46.4 % (ref 40–53)
HGB BLD-MCNC: 14.9 G/DL (ref 13.3–17.7)
MCH RBC QN AUTO: 29.2 PG (ref 26.5–33)
MCHC RBC AUTO-ENTMCNC: 32.1 G/DL (ref 31.5–36.5)
MCV RBC AUTO: 91 FL (ref 78–100)
PLATELET # BLD AUTO: 324 10E3/UL (ref 150–450)
POTASSIUM SERPL-SCNC: 4.6 MMOL/L (ref 3.4–5.3)
PSA SERPL DL<=0.01 NG/ML-MCNC: 3.04 NG/ML (ref 0–4.5)
RBC # BLD AUTO: 5.1 10E6/UL (ref 4.4–5.9)
SODIUM SERPL-SCNC: 137 MMOL/L (ref 135–145)
WBC # BLD AUTO: 7.7 10E3/UL (ref 4–11)

## 2023-11-15 PROCEDURE — G0103 PSA SCREENING: HCPCS | Performed by: PHYSICIAN ASSISTANT

## 2023-11-15 PROCEDURE — 80048 BASIC METABOLIC PNL TOTAL CA: CPT | Performed by: PHYSICIAN ASSISTANT

## 2023-11-15 PROCEDURE — 99214 OFFICE O/P EST MOD 30 MIN: CPT | Performed by: PHYSICIAN ASSISTANT

## 2023-11-15 PROCEDURE — 36415 COLL VENOUS BLD VENIPUNCTURE: CPT | Performed by: PHYSICIAN ASSISTANT

## 2023-11-15 PROCEDURE — 85027 COMPLETE CBC AUTOMATED: CPT | Performed by: PHYSICIAN ASSISTANT

## 2023-11-15 RX ORDER — NITROFURANTOIN 25; 75 MG/1; MG/1
100 CAPSULE ORAL 2 TIMES DAILY
Qty: 14 CAPSULE | Refills: 0 | Status: SHIPPED | OUTPATIENT
Start: 2023-11-15 | End: 2023-11-21

## 2023-11-15 RX ORDER — PHENAZOPYRIDINE HYDROCHLORIDE 100 MG/1
100 TABLET, FILM COATED ORAL 3 TIMES DAILY PRN
Qty: 6 TABLET | Refills: 0 | Status: SHIPPED | OUTPATIENT
Start: 2023-11-15 | End: 2023-12-03

## 2023-11-15 RX ORDER — CIPROFLOXACIN 500 MG/1
500 TABLET, FILM COATED ORAL 2 TIMES DAILY
Qty: 42 TABLET | Refills: 0 | Status: SHIPPED | OUTPATIENT
Start: 2023-11-15 | End: 2023-11-15

## 2023-11-15 RX ORDER — ACETAMINOPHEN AND CODEINE PHOSPHATE 300; 60 MG/1; MG/1
1 TABLET ORAL
COMMUNITY
Start: 2023-11-02 | End: 2023-12-01

## 2023-11-15 RX ORDER — FLOMAX 0.4 MG/1
0.4 CAPSULE ORAL
COMMUNITY
End: 2024-10-03

## 2023-11-15 RX ORDER — GRANULES FOR ORAL 3 G/1
3 POWDER ORAL ONCE
Qty: 1 PACKET | Refills: 0 | Status: SHIPPED | OUTPATIENT
Start: 2023-11-15 | End: 2023-11-15

## 2023-11-15 ASSESSMENT — PAIN SCALES - GENERAL: PAINLEVEL: WORST PAIN (10)

## 2023-11-15 NOTE — PATIENT INSTRUCTIONS
Mary Luciano,    Thank you for allowing [facility] to manage your care.    I am unsure of the cause of your symptoms, but this could be a prostate infection.     If you develop worsening/changing symptoms at any time, please be seen in clinic/urgent care or call 911/go to the emergency department for evaluation as we discussed.    I ordered some lab work. Please go to the laboratory to get your studies.    I sent your prescriptions to your pharmacy. Take a probiotic pill, eat live culture yogurt (Greek yogurt or Activia), drink kefir daily for one week after finishing the antibiotics to encourage growth of good bacteria in your system.    Please allow 1-2 business days for our office to contact you in regards to your laboratory/radiological studies.  If not done so, I encourage you to login into [email or web address] ([email or web address] to review your results as well.     For your pain, please use Tylenol 650mg every 6 hours. You may use 400mg of ibuprofen between doses of Tylenol.     Max acetaminophen (Tylenol) 3,000mg/24 hours  Max ibuprofen 2,000mg/24 hours    If you have any questions or concerns, please feel free to call us at [phone]    Sincerely,    Marito Mancilla PA-C    Did you know?      You can schedule a video visit for follow-up appointments as well as future appointments for certain conditions.  Please see the below link.     [email or web address]    If you have not already done so,  I encourage you to sign up for [employer] ([email or web address]).  This will allow you to review your results, securely communicate with a provider, and schedule virtual visits as well.

## 2023-11-15 NOTE — TELEPHONE ENCOUNTER
"I went through the provider lab notes. Patient wanted to let the provider know that he is very allergic to SULFA. I educated him that this allergy is on file and the provider would get \"pop up's\" before sending the medication if there is any allergy to the one being prescribed. Along with this the pharmacy also typically gets these pop ups.       Patient is wondering where this is coming from? The bladder? The urethra?   Please clarify if any thoughts.     I also educated him to give us a call if he is feeling worse or does not feel better in the next few days. He will plan on attending the appt 11/17/23.      Michaela Yan RN on 11/15/2023 at 4:19 PM    "

## 2023-11-15 NOTE — PROGRESS NOTES
Assessment & Plan   Problem List Items Addressed This Visit    None  Visit Diagnoses       Acute UTI (urinary tract infection)        Relevant Medications    ciprofloxacin (CIPRO) 500 MG tablet    phenazopyridine (PYRIDIUM) 100 MG tablet    Other Relevant Orders    Adult Urology  Referral    PSA, screen    CBC with platelets (Completed)    Basic metabolic panel  (Ca, Cl, CO2, Creat, Gluc, K, Na, BUN)           Mustapha Negrete is a 62 year old male here with groin pain, urinary urgency, frequency and dysuria.    Exam without any abdominal or CVAT. Pt is afebrile & has not been vomiting. UA from yesterday is suggestive of UTI. Will treat with extended course of Cipro for empiric prostatitis coverage. UC pending. FU with PCP in 2 days for recheck at already scheduled appointment given the degree of discomfort he is having. Low suspicion for cellulitis or Kenneth's gangrene/nec fasciitis at this point. Referral to urology sent to try to move up his appt in 2 weeks with [facility]. If symptoms worsen, develop worsening pain/fevers/vomiting go to ER for further treatment.     Complete history and physical exam as below. Afebrile with normal vital signs except for elevated bp, which they will monitor at home and contact us if >140/90mmHg on average.    DDx and Dx discussed with and explained to the pt to their satisfaction.  All questions were answered at this time. Pt expressed understanding of and agreement with this dx, tx, and plan. No further workup warranted and standard medication warnings given. I have given the patient a list of pertinent indications for re-evaluation. Will go to the Emergency Department if symptoms worsen or new concerning symptoms arise. Patient left in no apparent distress.     Ordering of each unique test  Prescription drug management  38 minutes spent by me on the date of the encounter doing chart review, history and exam, documentation and further activities per the note     BMI:  "  Estimated body mass index is 32.24 kg/m  as calculated from the following:    Height as of this encounter: 1.626 m (5' 4\").    Weight as of this encounter: 85.2 kg (187 lb 12.8 oz).     See Patient Instructions    DARVIN Wiggins [facility] MEGAN Luciano is a 62 year old, presenting for the following health issues:  Infection (Rectal, and bladder /)      11/15/2023     9:20 AM   Additional Questions   Roomed by Jesenia   Accompanied by Self       History of Present Illness       Reason for visit:  Rectum and penis pain    He eats 0-1 servings of fruits and vegetables daily.He consumes 1 sweetened beverage(s) daily.He exercises with enough effort to increase his heart rate 10 to 19 minutes per day.  He exercises with enough effort to increase his heart rate 3 or less days per week.   He is taking medications regularly.  Patient here for rectal and bladder infection/ pain ongoing for many months, but worse over the last 2 weeks. Urinary frequency,dysuria, and incomplete emptying. Has perineal pain as well. No fevers, chills, n/v/d, or other symptoms. Was doing pelvic floor PT. Family history of prostate CA. Pain 8/10 currently and non-radiating.     Review of Systems   Constitutional, HEENT, cardiovascular, pulmonary, gi and gu systems are negative, except as otherwise noted.      Objective    BP (!) 142/76   Pulse 88   Temp 98.1  F (36.7  C) (Temporal)   Resp 20   Ht 1.626 m (5' 4\")   Wt 85.2 kg (187 lb 12.8 oz)   SpO2 97%   BMI 32.24 kg/m    Body mass index is 32.24 kg/m .  Physical Exam  Vitals and nursing note reviewed.   Constitutional:       General: He is not in acute distress.     Appearance: He is not ill-appearing or diaphoretic.   HENT:      Head: Normocephalic and atraumatic.      Mouth/Throat:      Mouth: Mucous membranes are moist.   Eyes:      Conjunctiva/sclera: Conjunctivae normal.   Cardiovascular:      Rate and Rhythm: Normal rate and regular rhythm.      " Heart sounds: Normal heart sounds. No murmur heard.     No friction rub. No gallop.   Pulmonary:      Effort: Pulmonary effort is normal. No respiratory distress.      Breath sounds: Normal breath sounds. No stridor. No wheezing, rhonchi or rales.   Abdominal:      General: Bowel sounds are normal. There is no distension.      Palpations: Abdomen is soft. There is no mass.      Tenderness: There is no abdominal tenderness. There is no right CVA tenderness, left CVA tenderness, guarding or rebound.      Hernia: No hernia is present.      Comments: Surgical wounds to back look well healed and are non-tender.   Genitourinary:     Comments: Circumcised. Perineum, scrotum/contents and penis non-tender.  No erythema, warmth, crepitus, or other overlying signs of trauma or infection. No inguinal adenopathy or hernia.       Skin:     General: Skin is warm and dry.   Neurological:      General: No focal deficit present.      Mental Status: He is alert. Mental status is at baseline.   Psychiatric:         Mood and Affect: Mood normal.         Behavior: Behavior normal.          Results for orders placed or performed in visit on 11/15/23   CBC with platelets     Status: Normal   Result Value Ref Range    WBC Count 7.7 4.0 - 11.0 10e3/uL    RBC Count 5.10 4.40 - 5.90 10e6/uL    Hemoglobin 14.9 13.3 - 17.7 g/dL    Hematocrit 46.4 40.0 - 53.0 %    MCV 91 78 - 100 fL    MCH 29.2 26.5 - 33.0 pg    MCHC 32.1 31.5 - 36.5 g/dL    RDW 12.6 10.0 - 15.0 %    Platelet Count 324 150 - 450 10e3/uL

## 2023-11-15 NOTE — TELEPHONE ENCOUNTER
----- Message from Silver Pro DO sent at 11/15/2023  3:38 PM CST -----  Please call patient.  Recent urine cultures was positive for Klebsiella, a resistant infection which normally requires IV antibiotics.  Stop cipro.  Rx for Fosfomycin and Macrobid.  Please go over instructions with patient.  Would like to repeat urine in one week.  If no resolution, we may need to schedule IV abx through infusion clinic or [facility].

## 2023-11-16 ENCOUNTER — TELEPHONE (OUTPATIENT)
Dept: FAMILY MEDICINE | Facility: CLINIC | Age: 63
End: 2023-11-16
Payer: MEDICAID

## 2023-11-16 NOTE — TELEPHONE ENCOUNTER
"Patient walked into clinic to discuss (phone was not working right).   He already took the fosfomycin yesterday, stopped the cipro, and is on the macrobid.    He still wants to see Dr. Pro tomorrow as he is \"pretty sure this plan won't work\".    Mei PRICE RN  [facility] Triage    "

## 2023-11-16 NOTE — TELEPHONE ENCOUNTER
I see patient is scheduled to see Dr. Pro tomorrow.    Attempted to call patient at home/mobile number, no answer, left message on voicemail; patient was instructed to return call to [facility] at [phone].  Phone encounter started for call back/communications.    Mei PRICE RN  [facility] Triage

## 2023-11-17 ENCOUNTER — OFFICE VISIT (OUTPATIENT)
Dept: FAMILY MEDICINE | Facility: CLINIC | Age: 63
End: 2023-11-17
Payer: COMMERCIAL

## 2023-11-17 VITALS
BODY MASS INDEX: 32.88 KG/M2 | OXYGEN SATURATION: 97 % | HEIGHT: 64 IN | DIASTOLIC BLOOD PRESSURE: 80 MMHG | HEART RATE: 101 BPM | SYSTOLIC BLOOD PRESSURE: 124 MMHG | RESPIRATION RATE: 18 BRPM | WEIGHT: 192.6 LBS | TEMPERATURE: 98.5 F

## 2023-11-17 DIAGNOSIS — B96.89 UTI DUE TO KLEBSIELLA SPECIES: Primary | ICD-10-CM

## 2023-11-17 DIAGNOSIS — F11.10 CHRONIC USE OF NONPRESCRIPTION OPIATE DRUGS (H): ICD-10-CM

## 2023-11-17 DIAGNOSIS — R10.2 PERINEAL PAIN IN MALE: ICD-10-CM

## 2023-11-17 DIAGNOSIS — N39.0 UTI DUE TO KLEBSIELLA SPECIES: Primary | ICD-10-CM

## 2023-11-17 PROCEDURE — 99213 OFFICE O/P EST LOW 20 MIN: CPT | Performed by: FAMILY MEDICINE

## 2023-11-17 ASSESSMENT — ANXIETY QUESTIONNAIRES
GAD7 TOTAL SCORE: 7
IF YOU CHECKED OFF ANY PROBLEMS ON THIS QUESTIONNAIRE, HOW DIFFICULT HAVE THESE PROBLEMS MADE IT FOR YOU TO DO YOUR WORK, TAKE CARE OF THINGS AT HOME, OR GET ALONG WITH OTHER PEOPLE: SOMEWHAT DIFFICULT
5. BEING SO RESTLESS THAT IT IS HARD TO SIT STILL: SEVERAL DAYS
3. WORRYING TOO MUCH ABOUT DIFFERENT THINGS: SEVERAL DAYS
1. FEELING NERVOUS, ANXIOUS, OR ON EDGE: SEVERAL DAYS
6. BECOMING EASILY ANNOYED OR IRRITABLE: SEVERAL DAYS
GAD7 TOTAL SCORE: 7
4. TROUBLE RELAXING: SEVERAL DAYS
7. FEELING AFRAID AS IF SOMETHING AWFUL MIGHT HAPPEN: SEVERAL DAYS
2. NOT BEING ABLE TO STOP OR CONTROL WORRYING: SEVERAL DAYS

## 2023-11-17 ASSESSMENT — PAIN SCALES - GENERAL: PAINLEVEL: WORST PAIN (10)

## 2023-11-17 ASSESSMENT — ENCOUNTER SYMPTOMS
NERVOUS/ANXIOUS: 0
PALPITATIONS: 0
PARESTHESIAS: 0
FREQUENCY: 1
MYALGIAS: 0
ARTHRALGIAS: 0
DIZZINESS: 0
DIFFICULTY URINATING: 1
SHORTNESS OF BREATH: 0
CHILLS: 0
SORE THROAT: 0
JOINT SWELLING: 0
WEAKNESS: 0
HEADACHES: 0
FEVER: 0
COUGH: 0

## 2023-11-17 NOTE — PROGRESS NOTES
1. UTI due to Klebsiella species  Most recent cultures reviewed.  Stressed the importance of completion of antibiotics and repeat urine next week.     2. Perineal pain in male  Chronic and non-improving.  Seen by neurosugery and felt this was not related to his lumbar spine despite his multiple injections.  Patient is also followed by surgery and had Interstim Stage 1 placement int he past.    3. Chronic use of nonprescription opiate drugs (H)  History of low back pain with multi-level fusion.  Currently on Belbuca.     - Pain Management  Referral; Future      Subjective   Mustapha is a 62 year old, presenting for the following health issues:  RECHECK (Bladder, and rectum)      11/17/2023     9:39 AM   Additional Questions   Roomed by Jesenia   Accompanied by Self       History of Present Illness       Reason for visit:  Rectum and penis pain    He eats 0-1 servings of fruits and vegetables daily.He consumes 1 sweetened beverage(s) daily.He exercises with enough effort to increase his heart rate 10 to 19 minutes per day.  He exercises with enough effort to increase his heart rate 3 or less days per week.   He is taking medications regularly.         Genitourinary - Male  Onset/Duration: Over 1 week  Description:   Dysuria (painful urination): YES}  Hematuria (blood in urine): No  Frequency: YES  Waking at night to urinate: YES  Hesitancy (delay in urine): YES  Retention (unable to empty): YES  Decrease in urinary flow: YES  Incontinence: No  Progression of Symptoms:  worsening  Accompanying Signs & Symptoms:  Fever: No  Back/Flank pain: YES  Urethral discharge: No  Testicle lumps/masses/pain: No  Nausea and/or vomiting: YES  Abdominal pain: YES  History:   History of frequent UTI s: YES  History of kidney stones: No  History of hernias: No  Personal or Family history of Prostate problems: YES  Sexually active: No  Precipitating or alleviating factors: None  Therapies tried and outcome: course of antibiotics -  "Macrobid, pyridium , and Tylenol      Perineum pain: Worsening of pain.  Burning sensation.  Chronic condition for the past three years.  Patient had the colonoscopy, multiple MRIs, and CT myelogram in the past.  Recent UA was concerning for Klebsiella.  Was initially treated cipro and switched to macrobid and fosfamycin.  States of chills.  States of 10/10 pain.  Patient is scheduled follow-up with urology at the end of month.      Review of Systems   Constitutional:  Negative for chills and fever.   HENT:  Negative for congestion, ear pain, hearing loss and sore throat.    Respiratory:  Negative for cough and shortness of breath.    Cardiovascular:  Negative for chest pain, palpitations and peripheral edema.   Genitourinary:  Positive for difficulty urinating and frequency.        Perineum pain   Musculoskeletal:  Negative for arthralgias, joint swelling and myalgias.   Skin:  Negative for rash.   Neurological:  Negative for dizziness, weakness, headaches and paresthesias.   Psychiatric/Behavioral:  Negative for mood changes. The patient is not nervous/anxious.             Objective    /80   Pulse 101   Temp 98.5  F (36.9  C) (Temporal)   Resp 18   Ht 1.626 m (5' 4\")   Wt 87.4 kg (192 lb 9.6 oz)   SpO2 97%   BMI 33.06 kg/m    Body mass index is 33.06 kg/m .  Physical Exam  Constitutional:       General: He is not in acute distress.     Appearance: He is well-developed.   HENT:      Head: Normocephalic and atraumatic.      Nose: Nose normal.   Eyes:      Conjunctiva/sclera: Conjunctivae normal.   Neck:      Trachea: No tracheal deviation.   Cardiovascular:      Rate and Rhythm: Normal rate and regular rhythm.      Heart sounds: Normal heart sounds.   Pulmonary:      Effort: Pulmonary effort is normal.      Breath sounds: No wheezing.   Genitourinary:     Penis: Normal.       Testes: Normal.      Prostate: Normal.   Musculoskeletal:         General: Normal range of motion.      Cervical back: Normal " range of motion.   Skin:     Findings: No erythema or rash.   Neurological:      Mental Status: He is alert and oriented to person, place, and time.   Psychiatric:         Behavior: Behavior normal.         IMPRESSION:   1.  Right abdominal wall rectus muscle hematoma (maximal thickness 2.8  cm). This study is not tailored for the evaluation of active bleeding.  2.  Appropriately positioned suprapubic catheter. No organized  drainable fluid collection.   3.  Prominent left external iliac lymph node (8 mm), slightly  increased from 8/12/2022, probably reactive. Attention on follow-up.  4.  Extensive diverticulosis.

## 2023-11-17 NOTE — PATIENT INSTRUCTIONS
Claudy Luciano,    Thank you for allowing [facility] to manage your care.    Please keep your upcoming urology.    Please complete your antibiotic course and repeat urine next week.     I made a pain management referral, they will be calling in approximately 1 week to set up your appointment.  If you do not hear from them, please call the specialty number on your after visit.     If you have any questions or concerns, please feel free to call us at [phone].    Sincerely,    Dr. Pro    Did you know?      You can schedule a video visit for follow-up appointments as well as future appointments for certain conditions.  Please see the below link.     [email or web address]    If you have not already done so,  I encourage you to sign up for [employer] ([email or web address]).  This will allow you to review your results, securely communicate with a provider, and schedule virtual visits as well.

## 2023-11-21 ENCOUNTER — LAB (OUTPATIENT)
Dept: LAB | Facility: CLINIC | Age: 63
End: 2023-11-21
Payer: COMMERCIAL

## 2023-11-21 DIAGNOSIS — N39.0 ACUTE UTI (URINARY TRACT INFECTION): ICD-10-CM

## 2023-11-21 DIAGNOSIS — B96.89 UTI DUE TO KLEBSIELLA SPECIES: ICD-10-CM

## 2023-11-21 DIAGNOSIS — N39.0 UTI DUE TO KLEBSIELLA SPECIES: ICD-10-CM

## 2023-11-21 LAB
ALBUMIN UR-MCNC: NEGATIVE MG/DL
APPEARANCE UR: CLEAR
BILIRUB UR QL STRIP: NEGATIVE
COLOR UR AUTO: YELLOW
GLUCOSE UR STRIP-MCNC: NEGATIVE MG/DL
HGB UR QL STRIP: NEGATIVE
KETONES UR STRIP-MCNC: NEGATIVE MG/DL
LEUKOCYTE ESTERASE UR QL STRIP: ABNORMAL
MUCOUS THREADS #/AREA URNS LPF: PRESENT /LPF
NITRATE UR QL: NEGATIVE
PH UR STRIP: 6.5 [PH] (ref 5–7)
RBC #/AREA URNS AUTO: ABNORMAL /HPF
SP GR UR STRIP: 1.02 (ref 1–1.03)
SQUAMOUS #/AREA URNS AUTO: ABNORMAL /LPF
UROBILINOGEN UR STRIP-ACNC: 0.2 E.U./DL
WBC #/AREA URNS AUTO: ABNORMAL /HPF

## 2023-11-21 PROCEDURE — 81001 URINALYSIS AUTO W/SCOPE: CPT

## 2023-11-21 RX ORDER — NITROFURANTOIN 25; 75 MG/1; MG/1
100 CAPSULE ORAL 2 TIMES DAILY
Qty: 6 CAPSULE | Refills: 0 | Status: SHIPPED | OUTPATIENT
Start: 2023-11-21 | End: 2023-11-24

## 2023-11-27 ENCOUNTER — OFFICE VISIT (OUTPATIENT)
Dept: FAMILY MEDICINE | Facility: CLINIC | Age: 63
End: 2023-11-27
Payer: COMMERCIAL

## 2023-11-27 VITALS
TEMPERATURE: 98.1 F | BODY MASS INDEX: 31.96 KG/M2 | HEIGHT: 65 IN | DIASTOLIC BLOOD PRESSURE: 84 MMHG | HEART RATE: 94 BPM | WEIGHT: 191.8 LBS | RESPIRATION RATE: 20 BRPM | OXYGEN SATURATION: 97 % | SYSTOLIC BLOOD PRESSURE: 160 MMHG

## 2023-11-27 DIAGNOSIS — R10.32 ABDOMINAL PAIN, LEFT LOWER QUADRANT: Primary | ICD-10-CM

## 2023-11-27 DIAGNOSIS — R10.2 PELVIC PAIN IN MALE: ICD-10-CM

## 2023-11-27 DIAGNOSIS — I10 ESSENTIAL HYPERTENSION: ICD-10-CM

## 2023-11-27 LAB
ALBUMIN UR-MCNC: NEGATIVE MG/DL
APPEARANCE UR: CLEAR
BILIRUB UR QL STRIP: NEGATIVE
COLOR UR AUTO: YELLOW
GLUCOSE UR STRIP-MCNC: NEGATIVE MG/DL
HGB UR QL STRIP: ABNORMAL
KETONES UR STRIP-MCNC: NEGATIVE MG/DL
LEUKOCYTE ESTERASE UR QL STRIP: ABNORMAL
NITRATE UR QL: NEGATIVE
PH UR STRIP: 7 [PH] (ref 5–7)
RBC #/AREA URNS AUTO: NORMAL /HPF
SP GR UR STRIP: >=1.03 (ref 1–1.03)
UROBILINOGEN UR STRIP-ACNC: 0.2 E.U./DL
WBC #/AREA URNS AUTO: NORMAL /HPF

## 2023-11-27 PROCEDURE — 81001 URINALYSIS AUTO W/SCOPE: CPT | Performed by: PHYSICIAN ASSISTANT

## 2023-11-27 PROCEDURE — 99214 OFFICE O/P EST MOD 30 MIN: CPT | Performed by: PHYSICIAN ASSISTANT

## 2023-11-27 RX ORDER — LISINOPRIL 10 MG/1
10 TABLET ORAL DAILY
Qty: 90 TABLET | Refills: 3 | Status: SHIPPED | OUTPATIENT
Start: 2023-11-27 | End: 2024-10-03

## 2023-11-27 ASSESSMENT — PAIN SCALES - GENERAL: PAINLEVEL: WORST PAIN (10)

## 2023-11-27 NOTE — PROGRESS NOTES
"  Assessment & Plan   Problem List Items Addressed This Visit          Nervous and Auditory    Pelvic pain in male    Relevant Orders    UA Macroscopic with reflex to Microscopic and Culture - Lab Collect (Completed)    UA Microscopic with Reflex to Culture (Completed)     Other Visit Diagnoses       Abdominal pain, left lower quadrant    -  Primary    Essential hypertension        Relevant Medications    lisinopril (ZESTRIL) 10 MG tablet           LLQ discomfort of unknown etiology. Seems a separate issue from his chronic perineal pain. CT reassuring. He declined blood draw. UA normal. Will monitor. Lisinopril refilled and he has been out of this med for a few days, likely the cause of his elevated bp today. Follow up with primary or myself in the next 1-2 weeks if not improving.    Complete history and physical exam as below. Afebrile with normal vital signs except for elevated bp.    DDx and Dx discussed with and explained to the pt to their satisfaction.  All questions were answered at this time. Pt expressed understanding of and agreement with this dx, tx, and plan. No further workup warranted and standard medication warnings given. I have given the patient a list of pertinent indications for re-evaluation. Will go to the Emergency Department if symptoms worsen or new concerning symptoms arise. Patient left in no apparent distress.     Ordering of each unique test     BMI:   Estimated body mass index is 31.72 kg/m  as calculated from the following:    Height as of this encounter: 1.656 m (5' 5.2\").    Weight as of this encounter: 87 kg (191 lb 12.8 oz).     See Patient Instructions    DARVIN Wiggins [facility] MEGAN Luciano is a 62 year old, presenting for the following health issues:  RECHECK and Refill Request        11/27/2023     9:03 AM   Additional Questions   Roomed by Hailey   Accompanied by n/a         11/27/2023     9:03 AM   Patient Reported Additional " "Medications   Patient reports taking the following new medications Miralax       History of Present Illness       Reason for visit:  Rectum and penis pain    He eats 0-1 servings of fruits and vegetables daily.He consumes 1 sweetened beverage(s) daily.He exercises with enough effort to increase his heart rate 10 to 19 minutes per day.  He exercises with enough effort to increase his heart rate 3 or less days per week.   He is taking medications regularly.   LLQ abd pain and hardening since lumbar surgery last spring. CT in 8/2023 showed a rectus muscle hematoma in the RLQ. Has a CT ordered from an [facility] surgeon, but he would like to get the CT done in the  [employer] system for insurance purposes. Has follow up with urology tomorrow to discuss his ongoing chronic perineal pain. He has found succ      Review of Systems   Constitutional, HEENT, cardiovascular, pulmonary, gi and gu systems are negative, except as otherwise noted.      Objective    BP (!) 160/84   Pulse 94   Temp 98.1  F (36.7  C) (Temporal)   Resp 20   Ht 1.656 m (5' 5.2\")   Wt 87 kg (191 lb 12.8 oz)   SpO2 97%   BMI 31.72 kg/m    Body mass index is 31.72 kg/m .  Physical Exam  Vitals and nursing note reviewed.   Constitutional:       General: He is not in acute distress.     Appearance: He is not ill-appearing or diaphoretic.   HENT:      Head: Normocephalic and atraumatic.      Mouth/Throat:      Mouth: Mucous membranes are moist.   Eyes:      Conjunctiva/sclera: Conjunctivae normal.   Cardiovascular:      Rate and Rhythm: Normal rate and regular rhythm.      Heart sounds: Normal heart sounds. No murmur heard.     No friction rub. No gallop.   Pulmonary:      Effort: Pulmonary effort is normal. No respiratory distress.      Breath sounds: Normal breath sounds. No stridor. No wheezing, rhonchi or rales.   Abdominal:      General: Bowel sounds are normal. There is no distension.      Palpations: Abdomen is soft. There is no mass.      Tenderness: " There is abdominal tenderness (very mild LLQ tenderness with remainder of exam benign). There is no right CVA tenderness, left CVA tenderness, guarding or rebound.      Hernia: No hernia is present.   Skin:     General: Skin is warm and dry.   Neurological:      General: No focal deficit present.      Mental Status: He is alert. Mental status is at baseline.   Psychiatric:         Mood and Affect: Mood normal.         Behavior: Behavior normal.        Results for orders placed or performed in visit on 11/27/23   UA Macroscopic with reflex to Microscopic and Culture - Lab Collect     Status: Abnormal    Specimen: Urine, Clean Catch   Result Value Ref Range    Color Urine Yellow Colorless, Straw, Light Yellow, Yellow    Appearance Urine Clear Clear    Glucose Urine Negative Negative mg/dL    Bilirubin Urine Negative Negative    Ketones Urine Negative Negative mg/dL    Specific Gravity Urine >=1.030 1.003 - 1.035    Blood Urine Trace (A) Negative    pH Urine 7.0 5.0 - 7.0    Protein Albumin Urine Negative Negative mg/dL    Urobilinogen Urine 0.2 0.2, 1.0 E.U./dL    Nitrite Urine Negative Negative    Leukocyte Esterase Urine Small (A) Negative   UA Microscopic with Reflex to Culture     Status: Normal   Result Value Ref Range    RBC Urine 0-2 0-2 /HPF /HPF    WBC Urine 0-5 0-5 /HPF /HPF    Narrative    Urine Culture not indicated

## 2023-11-27 NOTE — PATIENT INSTRUCTIONS
Mary Luciano,    Thank you for allowing [facility] to manage your care.    I am unsure of the cause of your symptoms, but your exam is reassuring. We will see what our workup shows.     If you develop worsening/changing symptoms at any time, please be seen in clinic/urgent care or call 911/go to the emergency department for evaluation as we discussed.    I sent your prescriptions to your pharmacy.    I ordered a CT of your abdomen. Please call diagnostic imaging [phone] to schedule your test.    Please allow 1-2 business days for our office to contact you in regards to your laboratory/radiological studies.  If not done so, I encourage you to login into [employer] ([email or web address] to review your results as well.     If you have any questions or concerns, please feel free to call us at [phone]    Sincerely,    Marito Mancilla PA-C    Did you know?      You can schedule a video visit for follow-up appointments as well as future appointments for certain conditions.  Please see the below link.     [email or web address]    If you have not already done so,  I encourage you to sign up for [employer] ([email or web address]).  This will allow you to review your results, securely communicate with a provider, and schedule virtual visits as well.

## 2023-11-27 NOTE — LETTER
My Asthma Action Plan    Name: Mustapha Negrete   YOB: 1960  Date: 11/27/2023   My doctor: DARVIN Wiggins   My clinic: [facility]        My Rescue Medicine:   Albuterol inhaler (Proair/Ventolin/Proventil HFA)  2-4 puffs EVERY 4 HOURS as needed. Use a spacer if recommended by your provider.   My Asthma Severity:   Intermittent / Exercise Induced  Know your asthma triggers: pollens and seasonal allergies (fall)             GREEN ZONE   Good Control  I feel good  No cough or wheeze  Can work, sleep and play without asthma symptoms       Take your asthma control medicine every day.     If exercise triggers your asthma, take your rescue medication  15 minutes before exercise or sports, and  During exercise if you have asthma symptoms  Spacer to use with inhaler: If you have a spacer, make sure to use it with your inhaler             YELLOW ZONE Getting Worse  I have ANY of these:  I do not feel good  Cough or wheeze  Chest feels tight  Wake up at night   Keep taking your Green Zone medications  Start taking your rescue medicine:  every 20 minutes for up to 1 hour. Then every 4 hours for 24-48 hours.  If you stay in the Yellow Zone for more than 12-24 hours, contact your doctor.  If you do not return to the Green Zone in 12-24 hours or you get worse, start taking your oral steroid medicine if prescribed by your provider.           RED ZONE Medical Alert - Get Help  I have ANY of these:  I feel awful  Medicine is not helping  Breathing getting harder  Trouble walking or talking  Nose opens wide to breathe       Take your rescue medicine NOW  If your provider has prescribed an oral steroid medicine, start taking it NOW  Call your doctor NOW  If you are still in the Red Zone after 20 minutes and you have not reached your doctor:  Take your rescue medicine again and  Call 911 or go to the emergency room right away    See your regular doctor within 2 weeks of an Emergency Room or Urgent  Care visit for follow-up treatment.          Annual Reminders:  Meet with Asthma Educator,  Flu Shot in the Fall, consider Pneumonia Vaccination for patients with asthma (aged 19 and older).    Pharmacy: [facility] [id number] [place] [address] [facility]    Electronically signed by DARVIN Wiggins   Date: 11/27/23                    Asthma Triggers  How To Control Things That Make Your Asthma Worse    Triggers are things that make your asthma worse.  Look at the list below to help you find your triggers and   what you can do about them. You can help prevent asthma flare-ups by staying away from your triggers.      Trigger                                                          What you can do   Cigarette Smoke  Tobacco smoke can make asthma worse. Do not allow smoking in your home, car or around you.  Be sure no one smokes at a child s day care or school.  If you smoke, ask your health care provider for ways to help you quit.  Ask family members to quit too.  Ask your health care provider for a referral to Quit Plan to help you quit smoking, or call 5-563-721-PLAN.     Colds, Flu, Bronchitis  These are common triggers of asthma. Wash your hands often.  Don t touch your eyes, nose or mouth.  Get a flu shot every year.     Dust Mites  These are tiny bugs that live in cloth or carpet. They are too small to see. Wash sheets and blankets in hot water every week.   Encase pillows and mattress in dust mite proof covers.  Avoid having carpet if you can. If you have carpet, vacuum weekly.   Use a dust mask and HEPA vacuum.   Pollen and Outdoor Mold  Some people are allergic to trees, grass, or weed pollen, or molds. Try to keep your windows closed.  Limit time out doors when pollen count is high.   Ask you health care provider about taking medicine during allergy season.     Animal Dander  Some people are allergic to skin flakes, urine or saliva from pets with fur or feathers. Keep pets with fur or feathers out of  your home.    If you can t keep the pet outdoors, then keep the pet out of your bedroom.  Keep the bedroom door closed.  Keep pets off cloth furniture and away from stuffed toys.     Mice, Rats, and Cockroaches  Some people are allergic to the waste from these pests.   Cover food and garbage.  Clean up spills and food crumbs.  Store grease in the refrigerator.   Keep food out of the bedroom.   Indoor Mold  This can be a trigger if your home has high moisture. Fix leaking faucets, pipes, or other sources of water.   Clean moldy surfaces.  Dehumidify basement if it is damp and smelly.   Smoke, Strong Odors, and Sprays  These can reduce air quality. Stay away from strong odors and sprays, such as perfume, powder, hair spray, paints, smoke incense, paint, cleaning products, candles and new carpet.   Exercise or Sports  Some people with asthma have this trigger. Be active!  Ask your doctor about taking medicine before sports or exercise to prevent symptoms.    Warm up for 5-10 minutes before and after sports or exercise.     Other Triggers of Asthma  Cold air:  Cover your nose and mouth with a scarf.  Sometimes laughing or crying can be a trigger.  Some medicines and food can trigger asthma.

## 2023-11-28 ENCOUNTER — TRANSFERRED RECORDS (OUTPATIENT)
Dept: HEALTH INFORMATION MANAGEMENT | Facility: CLINIC | Age: 63
End: 2023-11-28

## 2023-11-29 ENCOUNTER — HOSPITAL ENCOUNTER (OUTPATIENT)
Dept: CT IMAGING | Facility: CLINIC | Age: 63
Discharge: HOME | End: 2023-11-29
Attending: PHYSICIAN ASSISTANT | Admitting: PHYSICIAN ASSISTANT
Payer: COMMERCIAL

## 2023-11-29 DIAGNOSIS — R10.32 ABDOMINAL PAIN, LEFT LOWER QUADRANT: ICD-10-CM

## 2023-11-29 PROCEDURE — 74177 CT ABD & PELVIS W/CONTRAST: CPT

## 2023-11-29 PROCEDURE — 250N000011 HC RX IP 250 OP 636: Performed by: PHYSICIAN ASSISTANT

## 2023-11-29 PROCEDURE — 250N000009 HC RX 250: Performed by: PHYSICIAN ASSISTANT

## 2023-11-29 RX ORDER — IOPAMIDOL 755 MG/ML
94 INJECTION, SOLUTION INTRAVASCULAR ONCE
Status: COMPLETED | OUTPATIENT
Start: 2023-11-29 | End: 2023-11-29

## 2023-11-29 RX ADMIN — IOPAMIDOL 94 ML: 755 INJECTION, SOLUTION INTRAVENOUS at 17:26

## 2023-11-29 RX ADMIN — SODIUM CHLORIDE 64 ML: 9 INJECTION, SOLUTION INTRAVENOUS at 17:26

## 2023-12-05 ENCOUNTER — PATIENT OUTREACH (OUTPATIENT)
Dept: CARE COORDINATION | Facility: CLINIC | Age: 63
End: 2023-12-05

## 2023-12-05 NOTE — PROGRESS NOTES
Clinic Care Coordination Contact  Follow Up Progress Note      Assessment: The RN CC nurse care coordinator contacted the patient by phone for a follow-up call.  The patient uses the [facility], [place] [facility], [facility], as well as [facility].  The patient currently is working with a doctor out of [place] to see if he is a candidate for the surgery that would reduce the pain that he has in the perineum by removing the nerve that is there.  It is felt that this is similar to carpal tunnel when the nerve gets crushed in that area causing pain and burning or numb sensations.  So the patient currently is working on getting all of his records transferred.  And ensuring that he has the correct insurance as they do not take Medicare and he is eligible for Medicare in 3 days.  Per the patient he states that the correct supplemental insurance that allowed him to go out of [facility] would cover this procedure.    Care Gaps:    Health Maintenance Due   Topic Date Due    YEARLY PREVENTIVE VISIT  Never done    COVID-19 Vaccine (1) Never done    HEPATITIS A IMMUNIZATION (1 of 2 - Risk 2-dose series) Never done    ZOSTER IMMUNIZATION (1 of 2) Never done    RSV VACCINE (Pregnancy & 60+) (1 - 1-dose 60+ series) Never done    INFLUENZA VACCINE (1) Never done    CONTROLLED SUBSTANCE AGREEMENT FOR CHRONIC PAIN MANAGEMENT  01/05/2024       Care Gaps Last addressed on 12/5/23    Care Plans  Care Plan: General urination without pain       Problem: HP GENERAL PROBLEM       Goal: General Goal - urinate on my own without difficulty.  Decrease bilateral buttocks and thigh pain .       Start Date: 11/2/2021 Expected End Date: 8/2/2024    This Visit's Progress: 60% Recent Progress: 60%    Note:     Goal Statement #1: I will be able to urinate on my own without difficulty .    I will decrease bilateral buttocks and thigh pain in the next 3-6  months   Barriers: constant pain    Strengths: Motivated   Patient expressed understanding of goal: Yes  Action steps to achieve this goal:  1. I will keep future Primary Care ( recently switched to the [facility] ),Urology, Pain Clinic appointments and will make a future Neurology appointment   2. I will drink plenty of water to keep my urine clear and yellow  3. I will take Tylenol/Ibuprofen and Gabapentin as directed   4. I will walk a block when able to increase strength   5. I will continue dry needling 3 times a week  6. I will keep future appointments scheduled with [facility] specialist in [place] June 6, 8 and 10th completed.  7. I will work with the doctor in [place] regarding surgery to release the nerve.                             Care Plan: General - increase strength and stamina       Problem: HP GENERAL PROBLEM       Goal: General Goal - increase strength and stamina       Start Date: 4/25/2023 Expected End Date: 4/25/2024    This Visit's Progress: 30% Recent Progress: 30%    Note:     Barriers: recent surgical procedure on his back.  Strengths: engaged in care coordination  Patient expressed understanding of goal: yes  Action steps to achieve this goal:  1. I will get up and walk frequently to prevent blood clots.  2. I will elevate my legs as often as possible to reduce the swelling in the lower legs.  3. I will monitor the swelling of the testicles and the lower legs for any increase.                              Care Plan: Chronic Pain       Problem: Chronic Pain is Not Self-Managed       Goal: Demonstrate improved self-management of chronic pain       Start Date: 12/5/2023                          Care Plan: General - AWV       Problem: HP GENERAL PROBLEM       Goal: General Goal - AWV       Start Date: 12/5/2023    Note:     Goal Statement: I will complete my annual wellness visit.    Barriers: constant pain  Strengths: engaged in care coordination    Patient expressed understanding of goal: yes  Action steps  to achieve this goal:  1. I will schedule my annual wellness visit; [phone] ([phone])  2. I will attend my annual wellness visit.  3. I will contact my Care Management or clinic team if I have barriers to attending my annual wellness visit.                                 Intervention/Education provided during outreach: Reviewed with the patient the questions to ask as far as having the insurance to cover this procedure.          Plan:   1.  The patient will make and attend his annual wellness visit.  2.  The patient will take all medications as prescribed by the providers.  3.  The patient will work on continuing to increase his strength and stamina.    RN CC Nurse Care Coordinator will follow up in 30 days.          Sebastian Jimenez MSN, RN, PHN, [facility]   Primary Care Clinical RN Care Coordinator  [facility]  12/5/2023   10:46 AM  [email or web address]  Office: [phone]

## 2023-12-05 NOTE — LETTER
[facility]  Patient Centered Plan of Care  About Me:        Patient Name:  Mustapha Negrete    YOB: 1960  Age:         62 year old   Rustam MRN:    [id number] Telephone Information:  Home Phone [phone]   Mobile [phone]       Address:  [address] [place] [address] Email address:  [email or web address]      Emergency Contact(s)    Name Relationship Lgl Grd Work Phone Home Phone Mobile Phone   STEFANO AYOUB Son   [phone]           Primary language:  English     needed? No   [employer]:  [phone] op. 1  Other communication barriers:None    Preferred Method of Communication:     Current living arrangement: I live in a private home (Patient stated he lives with his adult son)    Mobility Status/ Medical Equipment: Independent        Health Maintenance  Health Maintenance Reviewed: Due/Overdue   Health Maintenance Due   Topic Date Due    YEARLY PREVENTIVE VISIT  Never done    COVID-19 Vaccine (1) Never done    HEPATITIS A IMMUNIZATION (1 of 2 - Risk 2-dose series) Never done    ZOSTER IMMUNIZATION (1 of 2) Never done    RSV VACCINE (Pregnancy & 60+) (1 - 1-dose 60+ series) Never done    INFLUENZA VACCINE (1) Never done    CONTROLLED SUBSTANCE AGREEMENT FOR CHRONIC PAIN MANAGEMENT  01/05/2024           My Access Plan  Medical Emergency 911   Primary Clinic Line [facility] [phone]   24 Hour Appointment Line [phone] or  [phone] ([phone]) (toll-free)   24 Hour Nurse Line [phone] (toll-free)   Preferred Urgent Care [facility], [phone]     Preferred Hospital [place], [place]  [phone]     Preferred Pharmacy [id number] [employer] [facility] [place] - [address]     Behavioral Health Crisis Line The National Suicide Prevention Lifeline at 1-130.629.6311 or Text/Call 108           My Care Team Members  Patient Care Team          Relationship Specialty Notifications Start End    Silver Pro DO PCP - General Family Medicine Admissions 7/6/22     Phone: [phone] Fax: [phone]         [address] [address] [facility] [address] [facility] [facility] [address]    Matty Ruiz MD MD Neurological Surgery  10/18/21     Phone: [phone] Fax: [phone]         [address] [facility] [facility] [address]    Max Rico MD Referring Physician Family Medicine  10/18/21     Phone: [phone] Fax: [phone] [address] [place] [address]    Sebastian Rock, RN Lead Care Coordinator Primary Care -  Admissions 11/2/21     Phone: [phone] Fax: [phone]        Silver Pro DO Assigned PCP   1/7/23     Phone: [phone] Fax: [phone]         [address] [facility] [address] [facility] [address]    Nayan Tello MD Assigned Surgical Provider   3/25/23     Phone: [phone] Fax: [phone]         [address] [facility] [address]    Destiny He APRN CNP Nurse Practitioner Pain Medicine  11/17/23     Phone: [phone] Fax: [phone]         [facility] [address]    Silver Pro DO Physician Family Medicine  11/17/23     Phone: [phone] Fax: [phone]         [address] [facility] [address] [facility] [address]                My Care Plans  Self Management and Treatment Plan    Care Plan  Care Plan: General urination without pain       Problem: HP GENERAL PROBLEM       Goal: General Goal - urinate on my own without difficulty.  Decrease bilateral buttocks and thigh pain .       Start Date: 11/2/2021 Expected End Date: 8/2/2024    This Visit's Progress: 60% Recent Progress: 60%    Note:     Goal Statement #1: I will be able to urinate on my own without difficulty .    I will decrease bilateral buttocks and thigh pain in the next 3-6  months   Barriers: constant pain   Strengths: Motivated   Patient expressed understanding of goal: Yes  Action steps to achieve this  goal:  1. I will keep future Primary Care ( recently switched to the [facility] ),Urology, Pain Clinic appointments and will make a future Neurology appointment   2. I will drink plenty of water to keep my urine clear and yellow  3. I will take Tylenol/Ibuprofen and Gabapentin as directed   4. I will walk a block when able to increase strength   5. I will continue dry needling 3 times a week  6. I will keep future appointments scheduled with [facility] specialist in [place] June 6, 8 and 10th completed.  7. I will work with the doctor in [place] regarding surgery to release the nerve.                             Care Plan: General - increase strength and stamina       Problem: HP GENERAL PROBLEM       Goal: General Goal - increase strength and stamina       Start Date: 4/25/2023 Expected End Date: 4/25/2024    This Visit's Progress: 30% Recent Progress: 30%    Note:     Barriers: recent surgical procedure on his back.  Strengths: engaged in care coordination  Patient expressed understanding of goal: yes  Action steps to achieve this goal:  1. I will get up and walk frequently to prevent blood clots.  2. I will elevate my legs as often as possible to reduce the swelling in the lower legs.  3. I will monitor the swelling of the testicles and the lower legs for any increase.                              Care Plan: Chronic Pain       Problem: Chronic Pain is Not Self-Managed       Goal: Demonstrate improved self-management of chronic pain       Start Date: 12/5/2023                          Care Plan: General - AWV       Problem: HP GENERAL PROBLEM       Goal: General Goal - AWV       Start Date: 12/5/2023    Note:     Goal Statement: I will complete my annual wellness visit.    Barriers: constant pain  Strengths: engaged in care coordination    Patient expressed understanding of goal: yes  Action steps to achieve this goal:  1. I will schedule my annual wellness visit; [phone] ([phone])  2. I  will attend my annual wellness visit.  3. I will contact my Care Management or clinic team if I have barriers to attending my annual wellness visit.                                 Action Plans on File:   Asthma  Asthma                Advance Care Plans/Directives:   Advanced Care Plan/Directives on file:   No    Discussed with patient/caregiver(s): No data recorded           My Medical and Care Information  Problem List   Patient Active Problem List   Diagnosis    Acute prostatitis    Rectal pain    Penile pain    Pelvic pain in male    Urinary retention    Symptomatic cholelithiasis    Reflux esophagitis    Psoriatic arthritis (H)    MARIAN (obstructive sleep apnea)    OA (osteoarthritis) of hip    Neuropathy    Mild intermittent asthma, uncomplicated    Insomnia, unspecified    GERD (gastroesophageal reflux disease)    Hepatic steatosis    Benign prostatic hyperplasia with urinary obstruction    Anal fissure    Tear of right acetabular labrum, sequela    Lumbar disc herniation with radiculopathy    Lower urinary tract symptoms    Arthritis of left acromioclavicular joint    ASHD (arteriosclerotic heart disease)    Calculus of gallbladder with chronic cholecystitis without obstruction    Change in bowel habits    Complete tear of left rotator cuff    Constipation    COVID-19 virus infection    Displacement of lumbar intervertebral disc without myelopathy    Diverticular disease of large intestine    Hiatal hernia    Hyperlipidemia    Low testosterone    Pruritus ani    Pelvic floor dysfunction    Pudendal neuralgia    Coccydynia    Uncomplicated opioid dependence (H)    F11.2 - Continuous opioid dependence (H)    F11.2 - Episodic opioid dependence (H)    F11.9 - Chronic, continuous use of opioids      Current Medications and Allergies:  See printed Medication Report.    Care Coordination Start Date: 11/2/2021   Frequency of Care Coordination: monthly     Form Last Updated: 12/05/2023

## 2023-12-13 ENCOUNTER — LAB (OUTPATIENT)
Dept: LAB | Facility: CLINIC | Age: 63
End: 2023-12-13
Payer: COMMERCIAL

## 2023-12-13 ENCOUNTER — OFFICE VISIT (OUTPATIENT)
Dept: PALLIATIVE MEDICINE | Facility: CLINIC | Age: 63
End: 2023-12-13
Attending: NURSE PRACTITIONER
Payer: COMMERCIAL

## 2023-12-13 VITALS — DIASTOLIC BLOOD PRESSURE: 87 MMHG | SYSTOLIC BLOOD PRESSURE: 141 MMHG | HEART RATE: 72 BPM

## 2023-12-13 DIAGNOSIS — R10.2 PERINEAL PAIN IN MALE, CHRONIC: Primary | ICD-10-CM

## 2023-12-13 DIAGNOSIS — F11.10 CHRONIC USE OF NONPRESCRIPTION OPIATE DRUGS (H): ICD-10-CM

## 2023-12-13 DIAGNOSIS — G89.29 PERINEAL PAIN IN MALE, CHRONIC: Primary | ICD-10-CM

## 2023-12-13 DIAGNOSIS — Z51.81 ENCOUNTER FOR THERAPEUTIC DRUG MONITORING: ICD-10-CM

## 2023-12-13 DIAGNOSIS — G62.9 NEUROPATHY: ICD-10-CM

## 2023-12-13 LAB — CREAT UR-MCNC: 122 MG/DL

## 2023-12-13 PROCEDURE — 99215 OFFICE O/P EST HI 40 MIN: CPT | Performed by: NURSE PRACTITIONER

## 2023-12-13 PROCEDURE — G0481 DRUG TEST DEF 8-14 CLASSES: HCPCS

## 2023-12-13 PROCEDURE — 93000 ELECTROCARDIOGRAM COMPLETE: CPT | Performed by: NURSE PRACTITIONER

## 2023-12-13 ASSESSMENT — PAIN SCALES - GENERAL: PAINLEVEL: WORST PAIN (10)

## 2023-12-13 NOTE — PROGRESS NOTES
"[facility]     Date of Visit: 12/13/2023    CHIEF COMPLAINT:    Chief Complaint   Patient presents with    Pain            REASON FOR VISIT:    Mustapha Negrete is a 63 year old male who is seen in consultation today at the request of Silver Pro DO (PCP) for evaluation of his pain issues and recommendations for management; with specific emphasis on:      Chronic use of nonprescription opiate drugs (H) [F11.10]          Referring Provider Comments:  \" Perineal pain in male: Chronic and non-improving.  Seen by neurosugery and felt this was not related to his lumbar spine despite his multiple injections.  Patient is also followed by surgery and had Interstim Stage 1 placement int he past.  Chronic use of nonprescription opiate drugs (H): History of low back pain with multi-level fusion.  Currently on Belbuca.\"     Question Answer   Reason for Referral: Comprehensive Pain Evaluation   Are there any red flags that may impact the assessment or management of the patient? Patient has Already been Evaluated/Treated at a Pain Clinic   Please Specify: 61 yo M with chronic perineum pain and currently on Belbuca prescribed by [facility].   Provider, please review opioid agreement in the process instructions above. Do you agree to these terms? Yes       Current controlled substance medications, if any, are being prescribed by: Multiple providers    Primary Care Provider is Silver Pro      Review of [place] Prescription Monitoring Program ():  reviewed on 12/13/23. No concern for abuse or misuse of controlled medications based on this report. (Belbuca 600mcg, APAP-Codeine #3, Oxycodone)            Subjective    HISTORY OF PRESENT ILLNESS:  Onset/Progression:   Mustapha Negrete is a 63 year old male with history of  peudendal neuropathy    Pain started with COVID infection three years. Shingles developed after COVID resolved. Experienced numbness in perineum and coccyx " "area. Pain progressed to sharp, burning constant over a period of several months. Pain radiates posteriorly into BLEs. He had lumbar fusion in April 2023 with no benefit. Has history of prostate issues and urolift surgery. He reports urinary and bowel retention after urolift procedure. He has been evaluated by [facility] and expects to have peudendal nerve entrapment release. Past history includes LESIs, Lumbar fusion.   Can fall asleep okay but wakes with burning pain and sensation of full bladder; 5-6 hours of broken sleep per night.         Pain quality: Burning, Miserable, Numb, Penetrating, Shooting, Stabbing, Throbbing, and Unbearable   Pain timing: Constant    Pain score today: Worst Pain (10)   Pain rating: intensity ranges from 10/10 to 10/10, and averages 10/10 on a 0-10 scale.  Aggravating factors include: same position too long  Relieving factors include: epsom salts, frequent         Past Pain Treatments:   Pain Clinic:   Yes    Physical therapy: Yes  - including pelvic floor PT   Psychologist: No   Chiropractor: Yes - helped; this year   Acupuncture: Yes - not sure if helpful   Pharmacotherapy:    Opioids: Yes     Non-opioids:  Yes  TENs Unit/electric stim: Yes not helpful   Heat/Cold: Yes heat helps       Injections/clinic procedures: Yes   LESI (multiple since 2022)   Percutaneous suprapubic catheter ([facility]) 8/11/23   Neurostimulator - Sacral 10/17/22    Surgeries related to pain: Yes   L4-5 and L5-S1 Lumbar fusion 4/2023   Prostate surgery (Urolift)     Current Pain Relevant Medications:    - Tylenol #3 - one per day PRN   - Acetaminophen 650mg daily   - Belbuca 600mcg - will one film 3-4 times per week   - has tried THC edibles: helps with pain but feels \"spacey\"       Previous Pain Relevant Medications: (H--helped; HI--Helped initially; SWH--Somewhat helpful; NH--No help; W--worse; SE--side effects; ?--Unsure if helpful)     Opioids: Hydrocodone (SWH), Oxycodone (SWH), Tramadol (H)   NSAIDS: " Ibuprofen (H)  Muscle Relaxants: Cyclobenzaprine (?? H), Tizanidine (SE)   Neuroleptics: Gabapentin (HI - SE), Pregabalin (SE)    Pain Antidepressants/ Anxiolytics: Duloxetine (SE)  Sleep Aids: None   Migraine medications: None)  Topical: Lidocaine (NH)  Adjuvant medications:Acetaminophen (H)       Past Medical History   He  has a past medical history of MARIAN (obstructive sleep apnea) and Pelvic floor dysfunction.   Past Surgical History   He  has a past surgical history that includes ENT surgery; Abdomen surgery; orthopedic surgery; rotator cuff repair rt/lt (Left); Prostate surgery; joint replacement, hip rt/lt (Left); back surgery; Cholecystectomy; Colonoscopy (N/A, 2022); and Implant stimulator sacral nerve percutaneous trial (N/A, 10/17/2022).   Social History   Social History     Tobacco Use    Smoking status: Former     Packs/day: 1.00     Years: 5.00     Additional pack years: 0.00     Total pack years: 5.00     Types: Cigarettes     Quit date: 1999     Years since quittin.1     Passive exposure: Never    Smokeless tobacco: Never   Vaping Use    Vaping Use: Never used   Substance Use Topics    Alcohol use: Yes     Comment: very little    Drug use: Yes     Types: Marijuana     Comment: gummies      Social History     Social History Narrative    Not on file        Current Outpatient Medications   Medication    acetaminophen (TYLENOL) 500 MG tablet    albuterol (PROAIR HFA/PROVENTIL HFA/VENTOLIN HFA) 108 (90 Base) MCG/ACT inhaler    ASPIRIN NOT PRESCRIBED (INTENTIONAL)    Buprenorphine HCl (BELBUCA) 300 MCG FILM buccal film    Ergocalciferol (VITAMIN D2 PO)    FLOMAX 0.4 MG capsule    fluticasone (FLONASE) 50 MCG/ACT nasal spray    lisinopril (ZESTRIL) 10 MG tablet     No current facility-administered medications for this visit.     Allergies   Allergen Reactions    Droperidol Other (See Comments)     Extrapyramidal Side Effect    Fenofibrate Headache and Diarrhea             Fentanyl      Other  reaction(s): N&V hard to wake up    Keflex [Cephalexin] Itching    Lactose GI Disturbance         Other reaction(s): GI upset    Midazolam      Other reaction(s): N&V, Hard to wake up    Monosodium Glutamate      Other reaction(s): diarrhea, headaches    Pcn [Penicillins] Other (See Comments)     Feels warm and flushed, but tolerates Cephalexin    Atorvastatin Palpitations     Other reaction(s): palpitations    Sertraline Palpitations         Other reaction(s): Unknown    Simvastatin Palpitations     Other reaction(s): palpitations    Sulfa Antibiotics Headache and Rash     Burning    Other reaction(s): Rash  Other reaction(s): rash and headache    Tetracycline Rash     Burning    Other reaction(s): rash        Medications and Allergies reviewed. Yes         REVIEW OF SYSTEMS:   ROS: 10 point ROS neg other than the symptoms noted above in the HPI.     The patient otherwise denies red flag symptoms, such as: thunderclap headache, bowel or bladder incontinence, parasthesias, weakness, saddle anesthesia, unintentional weight loss, or fever/chills/sweats.     Objective   OBJECTIVE    Vitals:    12/13/23 0802   BP: (!) 141/87   Pulse: 72         Appearance:   A&O. Patient is appropriate. Patient is in NAD.      HHENT:  Normocephalic, atraumatic. Eyes without conjunctival injection or jaundice. Neck supple.     Pulmonary:  Normal effort; no cough or audible wheeze.       Skin: No obvious rash, lesions, or petechiae of exposed skin.    Psych:  Alert, without lethargy or stupor. Speech fluent. Appropriate affect. Mood normal. Able to follow commands without difficulty. Normal mood, judgement and behavior   Tearful or anxious affect: NO   Suicidal or homicidal ideation: NO        Gait pattern:   Patient has an antalgic gait pattern:YES  Gait favors the neither side.  Mobility and/or assistive devices? NO        Strength:  Knee ext: R: 5/5  L: 5/5  Knee flex: R: 5/5  L: 5/5  Dorsiflexion: R: 5/5  L: 5/5  Plantarflexion: R:  5/5  L: 5/5  Great toe ext:  R: 5/5  L: 5/5       Neurological:   Deep Tendon Reflex exam:    Patella:  R:  2/4   L: 2/4      Sensory exam:  (Lower extremities)   Light touch: normal    Allodynia: absent    Dysethesia: present sacral region    Hyperalgesia: present sacral region     Cervical spine:  Tenderness in the cervical spine at midline. No  Tenderness in the cervical paraspinal muscles. No    Thoracic spine:   Kyphosis. No   Tenderness in the thoracic spine at midline. No  Tenderness in the thoracic paraspinal muscles. Yes    Lumbar/Sacral spine:  Range of motion by visual estimation   Forward Flexion:  80 degrees, painful   Ext: 15 degrees, painful   Rotation/ext to right: pain free  Rotation/ext to left: pain free  Tenderness in the lumbar spine at midline. Yes  Tenderness in the lumbar paraspinal muscles.Yes  Straight leg exam:  Right: negative    Left:  negative  Neural Slump test:  Right: negative    Left:  negative  June/Abilio's test:     Right: positive    Left:  positive  Passive internal rotation:   Right: positive    Left: positive   Active external rotation:    Right: positive    Left: positive  Tenderness over SI joint:     Right: positive    Left:  positive  Tenderness over Trochanteric Bursa:    Right: negative    Left: negative      Diagnostic Testing:  Labs:      Creatinine   Date Value Ref Range Status   11/15/2023 0.81 0.67 - 1.17 mg/dL Final   09/06/2011 1.1 mg/dL      GFR Estimate   Date Value Ref Range Status   11/15/2023 >90 >60 mL/min/1.73m2 Final     Alkaline Phosphatase   Date Value Ref Range Status   08/18/2023 42 40 - 129 U/L Final     AST   Date Value Ref Range Status   08/18/2023 20 0 - 45 U/L Final     Comment:     Reference intervals for this test were updated on 6/12/2023 to more accurately reflect our healthy population. There may be differences in the flagging of prior results with similar values performed with this method. Interpretation of those prior results can be made  "in the context of the updated reference intervals.     ALT   Date Value Ref Range Status   08/18/2023 20 0 - 70 U/L Final     Comment:     Reference intervals for this test were updated on 6/12/2023 to more accurately reflect our healthy population. There may be differences in the flagging of prior results with similar values performed with this method. Interpretation of those prior results can be made in the context of the updated reference intervals.                Assessment & Plan    ASSESSMENT AND PLAN:      Mustapha Negrete is a 63 year old male with a past medical history significant for post-COVID neuropathy, post-herpetic neuralgia and s/p L4-5 L5-S1 fusion who presents with complaints of severe perineal pain and lumbar radiculopathy into BLEs.     (R10.2,  G89.29) Perineal pain in male, chronic  (primary encounter diagnosis)  Mustapha has severe neuropathic pain of perineum region that impacts release of bowel/bladder. He is awaiting evaluation by Specialist for pudendal nerve entrapment release in late January 2024. He does not take any medications, including Belbuca, consistently. Reports he does use marijuana for severe pain; uses Belbuca every 2-3 days. He has tried several medications (Gabapentin, Lyrica, Butrans patch, Duloxetine) and reports minimal long-term benefit; most meds cause \"racing heart\", \"upset stomach\" or increased constipation. We discussed the importance of consistent medication dosing to prevent pain from escalating. I will not be taking over prescribing today. Will initiate UDS and EKG today. Return in 2-3 weeks for CSA.  It is not clear how patient ended up with Belbuca for pain management; he denies past history of opioid or substance use disorder.  Future Considerations: Would continue to prescribe Belbuca if patient demonstrates consistent dosing;  may consider ketamine troches; topiramate?  Patient verbalizes understanding and agreement with plan.  Plan: Adult Pain Clinic Follow-Up " Order            (G62.9) Neuropathy  (F11.10) Chronic use of nonprescription opiate drugs (H)  (Z51.81) Encounter for therapeutic drug monitoring  Plan: Adult Pain Clinic Follow-Up Order, Drug         Confirmation Panel Urine with Creatinine, EKG         12-lead complete w/read - Clinics            UDS ordered on 12/13/23    Visit discussion: Patient does not have a provider to prescribe additional buprenorphine. He was followed at [facility] and cannot continue there 2/2 Medicare insurance changes.          PATIENT INSTRUCTIONS:     The following recommendations were given to the patient. Diagnosis, treatment options, risks, benefits, and alternatives were discussed, and all questions were answered. Self-care instructions given. The patient expressed understanding of the plan for management. I am recommending a multidisciplinary treatment plan to help this patient better manage his pain.     Remember to request ALL medication refills 5 days before you run out.    Continue Current Treatment Plan with:   - Chiropractic Care  - Acupuncture     New Orders: (Last used Marijuana yesterday; Used Belbucca yesterday; Used Tylenol #3 yesterday)   - Urine drug screen   - EKG in clinic today       Medication Management:   - Continue current medications as prescribed      Return to Clinic:   -  30-minute In-Person Appointment with Destiny He DNP, MICAELA, DENISSE in 2-3 weeks, or sooner if needed           Review of Electronic Chart, Relevant Records/History: Today I have also reviewed available medical information in the patient's medical record at [facility]) and Care Everywhere (if available), including relevant provider notes, laboratory work, and imaging. Please see the [facility] health questionnaire which the patient completed and reviewed with me in detail.       MICAELA Herman, ZEUS, DENISSE   [facility]     ___________________________________________________________________    BILLING TIME DOCUMENTATION:   TOTAL TIME includes:   Time spent preparing to see the patient: 10 minutes (reviewing records and tests)  Time spend face to face with the patient: 46 minutes  Time spent ordering tests, medications, procedures and referrals: 0 minutes  Time spent Referring and communicating with other healthcare professionals: 0 minutes  Documenting clinical information in Epic: 5 minutes    The total TIME spent on this patient on the day of the appointment was 61 minutes.

## 2023-12-13 NOTE — PATIENT INSTRUCTIONS
PATIENT INSTRUCTIONS:   The following recommendations were given to the patient. Diagnosis, treatment options, risks, benefits, and alternatives were discussed, and all questions were answered. Self-care instructions given. The patient expressed understanding of the plan for management. I am recommending a multidisciplinary treatment plan to help this patient better manage his pain.   Remember to request ALL medication refills 5 days before you run out.  Continue Current Treatment Plan with:   - Chiropractic Care  - Acupuncture   New Orders: (Last used Marijuana yesterday; Used Belbucca yesterday; Used Tylenol #3 yesterday)   - Urine drug screen   - EKG in clinic today     Medication Management:   - Continue current medications as prescribed    Return to Clinic:   -  30-minute In-Person Appointment with Destiny He, ZEUS, MICAELA, HONGC in 2-3 weeks, or sooner if needed       ----------------------------------------------------------------  Clinic Number:  [phone]   Call with any questions about your care and for scheduling assistance.   Calls are returned Monday through Friday between 8 AM and 4:30 PM. We usually get back to you within 2 business days depending on the issue/request.    If we are prescribing your medications:  For opioid medication refills, call the clinic or send a "[employer] message 7 days in advance.  Please include:  Name of requested medication  Name of the pharmacy.  For non-opioid medications, call your pharmacy directly to request a refill. Please allow 3-4 days to be processed.   Per [place] State Law:  All controlled substance prescriptions must be filled within 30 days of being written.    For those controlled substances allowing refills, pickup must occur within 30 days of last fill.      We believe regular attendance is key to your success in our program!    Any time you are unable to keep your appointment we ask that you call us at least 24 hours in advance to cancel.This will allow us to  offer the appointment time to another patient.   Multiple missed appointments may lead to dismissal from the clinic.

## 2023-12-13 NOTE — NURSING NOTE
8/25/2023     9:08 AM 11/17/2023     9:40 AM 12/13/2023     8:03 AM   PEG Score   PEG Total Score 10 10 10

## 2023-12-15 ENCOUNTER — MYC MEDICAL ADVICE (OUTPATIENT)
Dept: FAMILY MEDICINE | Facility: CLINIC | Age: 63
End: 2023-12-15
Payer: MEDICARE

## 2023-12-15 NOTE — TELEPHONE ENCOUNTER
Patient Quality Outreach    Patient is due for the following:   Physical Annual Wellness Visit      Topic Date Due    COVID-19 Vaccine (1) Never done    Hepatitis A Vaccine (1 of 2 - Risk 2-dose series) Never done    Zoster (Shingles) Vaccine (1 of 2) Never done    Flu Vaccine (1) Never done    Pneumococcal Vaccine (1 - PCV) 12/08/2023       Next Steps:   Schedule a Annual Wellness Visit    Type of outreach:    Sent [employer] message.      Questions for provider review:    None           Jesenia Lopez MA

## 2023-12-17 LAB
BUPRENORPHINE UR CFM-MCNC: 7 NG/ML
BUPRENORPHINE/CREAT UR: 6 NG/MG {CREAT}
CODEINE UR CFM-MCNC: 172 NG/ML
CODEINE/CREAT UR: 141 NG/MG {CREAT}
MORPHINE UR CFM-MCNC: 112 NG/ML
MORPHINE/CREAT UR: 92 NG/MG {CREAT}
NORBUPRENORPHINE UR CFM-MCNC: 18 NG/ML
NORBUPRENORPHINE/CREAT UR: 15 NG/MG {CREAT}

## 2024-01-09 NOTE — PROGRESS NOTES
"[facility]       Date of Visit: 1/9/2024    CHIEF COMPLAINT:   Chief Complaint   Patient presents with    Pain       Subjective   SUBJECTIVE    INTERVAL HISTORY:   Mustapha Negrete is a 63 year old male last seen on 12/13/23.  They are a patient of mine seen in follow up today for perineal pain and lumbar radiculopathy.       Pain score today: Worst Pain (10)     Since the last visit, Mustapha Negrete reports:   He is taking Belbucca 600mcg in mid-morning and 1/2 dose in evening.  He tried acupuncture and did not find significant relief. He is scheduled for surgical consultation for pudendal nerve release on 1/24/24. He reports some pain relief with Tylenol #3 and Belbucca.  His current goal is to manage pain until potential surgical procedure with a pudendal nerve specialist in [place].  He does admit to THC use 2-3 times per day; offers relief from severe pain.        Recommendations/ Plan at the last visit included:  Mustapha Negrete is a 63 year old male with a past medical history significant for post-COVID neuropathy, post-herpetic neuralgia and s/p L4-5 L5-S1 fusion who presents with complaints of severe perineal pain and lumbar radiculopathy into BLEs.   (R10.2,  G89.29) Perineal pain in male, chronic  (primary encounter diagnosis)  Mustapha has severe neuropathic pain of perineum region that impacts release of bowel/bladder. He is awaiting evaluation by Specialist for pudendal nerve entrapment release in late January 2024. He does not take any medications, including Belbuca, consistently. Reports he does use marijuana for severe pain; uses Belbuca every 2-3 days. He has tried several medications (Gabapentin, Lyrica, Butrans patch, Duloxetine) and reports minimal long-term benefit; most meds cause \"racing heart\", \"upset stomach\" or increased constipation. We discussed the importance of consistent medication dosing to prevent pain from escalating. I will not be taking over prescribing today. " Will initiate UDS and EKG today. Return in 2-3 weeks for CSA.  It is not clear how patient ended up with Belbuca for pain management; he denies past history of opioid or substance use disorder.  Future Considerations: Would continue to prescribe Belbuca if patient demonstrates consistent dosing;  may consider ketamine troches; topiramate?  Patient verbalizes understanding and agreement with plan.       HPI from initial visit on 12/13/23:   Mustapha Negrete is a 63 year old male with history of  peudendal neuropathy    Pain started with COVID infection three years. Shingles developed after COVID resolved. Experienced numbness in perineum and coccyx area. Pain progressed to sharp, burning constant over a period of several months. Pain radiates posteriorly into BLEs. He had lumbar fusion in April 2023 with no benefit. Has history of prostate issues and urolift surgery. He reports urinary and bowel retention after urolift procedure. He has been evaluated by [facility] and expects to have peudendal nerve entrapment release. Past history includes LESIs, Lumbar fusion.   Can fall asleep okay but wakes with burning pain and sensation of full bladder; 5-6 hours of broken sleep per night.            REVIEW OF SYSTEMS:   ROS: 10 point ROS neg other than the symptoms noted above in the HPI.     The patient otherwise denies red flag symptoms, such as: thunderclap headache, bowel or bladder incontinence, parasthesias, weakness, saddle anesthesia, unintentional weight loss, or fever/chills/sweats.       Medical History: Any changes in medical history since they were last seen? No    Medications and Allergies:   Current Outpatient Medications   Medication    acetaminophen (TYLENOL) 500 MG tablet    albuterol (PROAIR HFA/PROVENTIL HFA/VENTOLIN HFA) 108 (90 Base) MCG/ACT inhaler    Buprenorphine HCl (BELBUCA) 300 MCG FILM buccal film    Ergocalciferol (VITAMIN D2 PO)    FLOMAX 0.4 MG capsule    lisinopril (ZESTRIL) 10 MG tablet     ASPIRIN NOT PRESCRIBED (INTENTIONAL)    fluticasone (FLONASE) 50 MCG/ACT nasal spray     No current facility-administered medications for this visit.      Allergies   Allergen Reactions    Droperidol Other (See Comments)     Extrapyramidal Side Effect    Fenofibrate Headache and Diarrhea             Fentanyl      Other reaction(s): N&V hard to wake up    Keflex [Cephalexin] Itching    Lactose GI Disturbance         Other reaction(s): GI upset    Midazolam      Other reaction(s): N&V, Hard to wake up    Monosodium Glutamate      Other reaction(s): diarrhea, headaches    Pcn [Penicillins] Other (See Comments)     Feels warm and flushed, but tolerates Cephalexin    Atorvastatin Palpitations     Other reaction(s): palpitations    Sertraline Palpitations         Other reaction(s): Unknown    Simvastatin Palpitations     Other reaction(s): palpitations    Sulfa Antibiotics Headache and Rash     Burning    Other reaction(s): Rash  Other reaction(s): rash and headache    Tetracycline Rash     Burning    Other reaction(s): rash        Review of [place] Prescription Monitoring Program ():  reviewed on 1/10/24. No concern for abuse or misuse of controlled medications based on this report. (Codeine, Belbuca)   Current MME: 45       Annual Controlled Substance Agreement due date: 1/10/25   Annual UDS due date:  1/10/25     THE 4 As OF OPIOID MAINTENANCE ANALGESIA    Analgesia: Is pain relief clinically significant? YES   Activity: Is patient functional and able to perform Activities of Daily Living? YES   Adverse effects: Is patient free from adverse side effects from opiates? NO - constipation   Adherence to Rx protocol: Is patient adhering to Controlled Substance Agreement and taking medications ONLY as ordered? Yes     Is Narcan prescribed for opiate use >50 MME daily or concurrent use of opiates and benzodiazepines? YES          Objective    OBJECTIVE:    Physical Exam  Vitals:    01/10/24 1107   BP: 128/74   Pulse:  93        Appearance:   A&O. Patient is appropriate.   Patient is in NAD.      HHENT:  Normocephalic, atraumatic. Eyes without conjunctival injection or jaundice. Neck supple. No obvious neck masses.     Pulmonary:  Normal effort; no cough or audible wheeze      Skin: No obvious rash, lesions, or petechiae of exposed skin.    Neuro: Cranial nerves grossly intact.  Mentation and speech appropriate for age.     Psych:  Alert, without lethargy or stupor. Speech fluent. Appropriate affect. Mood normal. Able to follow commands without difficulty. Normal mood, judgement and behavior.      Gait pattern:   Patient has an antalgic gait pattern:YES  Gait favors the neither side.  Mobility and/or assistive devices? NO            Diagnostic Tests/ Imaging/ Labs resulted since last visit:   12/13/23 - UDS consistent with codeine and belbucca   12/13/23 - EKG - normal sinus rhythm; QT interval within parameters         Assessment & Plan    ASSESSMENT AND PLAN:     Mustapha Negrete is seen in follow up today at the pain clinic for severe perineal pain.     Visit discussion: Patient is scheduled for surgical consult in approximately 2 weeks with a specialist in [place] to evaluate potential for pudendal nerve release.  He does demonstrate significant discomfort and neuropathic pain.  He does admit to cannabis use for some relief.  Urine drug screen was consistent with his report and prescribed occasions.  I advised that he continue Belbuca with consistent twice daily dosing; 600 mcg film in a.m. and half of 600 mcg film midday with the other half at bedtime.  Patient advised that refill of Tylenol #3 is approved for severe intractable breakthrough pain maximum of 2/day.  He states he would use this less often than daily.  No early refills and no dose escalation will be approved.  Future refill for Tylenol #3 is dependent upon surgical consult in 2 weeks.  Patient verbalizes understanding and agreement with plan.  [facility]; return to clinic in 2 months.    (R10.2,  G89.29) Perineal pain in male, chronic  (primary encounter diagnosis)  Plan: Buprenorphine HCl (BELBUCA) 600 MCG FILM buccal        film, acetaminophen-codeine (TYLENOL #3) 300-30        MG per tablet, Adult Pain Clinic Follow-Up         Order          (M79.18) Myofascial muscle pain  Plan: Buprenorphine HCl (BELBUCA) 600 MCG FILM buccal        film, acetaminophen-codeine (TYLENOL #3) 300-30        MG per tablet, Adult Pain Clinic Follow-Up         Order          (F11.90) Chronic, continuous use of opioids  Plan: naloxone (NARCAN) 4 MG/0.1ML nasal spray, Adult        Pain Clinic Follow-Up Order                   PATIENT INSTRUCTIONS:     The following recommendations were given to the patient. Diagnosis, treatment options, risks, benefits, and alternatives were discussed; all questions were answered. Self-care instructions were given. The patient expressed understanding of the plan for management. I am recommending a multidisciplinary treatment plan to help this patient better manage his pain.     Medication Management:   - REFILL placed for Belbuca 600mcg films - apply one film into cheek every 12 hours  - For severe breakthrough pain: Tylenol #3 (Acetaminophen w/Codeine) - take 1 tab by mouth every 8-12 hrs; maximum of 2 tabs per day.     Return to Clinic:   -  30-minute Video or In-Person Appointment with Destiny He DNP, MICAELA, FNLENOREC in 2 months, or sooner if needed      ___________________________________________________________________    BILLING TIME DOCUMENTATION:   TOTAL TIME includes:   Time spent preparing to see the patient: 5 minutes (reviewing records and tests)  Time spend face to face with the patient: 24 minutes  Time spent ordering tests, medications, procedures and referrals: 0 minutes  Time spent Referring and communicating with other healthcare professionals: 0 minutes  Documenting clinical information in  Epic: 3 minutes    The total TIME spent on this patient on the day of the appointment was 32 minutes.     ___________________________________________________________________    Review of Electronic Chart: Today I have also reviewed available medical information in the patient's medical record at [facility] ([facility]) and Care Everywhere (if available), including relevant provider notes, laboratory work, and imaging.     Destiny He, MICAELA, DNP, FNP-C   [facility]

## 2024-01-10 ENCOUNTER — TELEPHONE (OUTPATIENT)
Dept: FAMILY MEDICINE | Facility: CLINIC | Age: 64
End: 2024-01-10

## 2024-01-10 ENCOUNTER — OFFICE VISIT (OUTPATIENT)
Dept: PALLIATIVE MEDICINE | Facility: CLINIC | Age: 64
End: 2024-01-10
Payer: MEDICARE

## 2024-01-10 VITALS — HEART RATE: 93 BPM | DIASTOLIC BLOOD PRESSURE: 74 MMHG | SYSTOLIC BLOOD PRESSURE: 128 MMHG

## 2024-01-10 DIAGNOSIS — G89.29 PERINEAL PAIN IN MALE, CHRONIC: Primary | ICD-10-CM

## 2024-01-10 DIAGNOSIS — R10.2 PERINEAL PAIN IN MALE, CHRONIC: Primary | ICD-10-CM

## 2024-01-10 DIAGNOSIS — F11.90 CHRONIC, CONTINUOUS USE OF OPIOIDS: ICD-10-CM

## 2024-01-10 DIAGNOSIS — M79.18 MYOFASCIAL MUSCLE PAIN: ICD-10-CM

## 2024-01-10 PROCEDURE — 99214 OFFICE O/P EST MOD 30 MIN: CPT | Performed by: NURSE PRACTITIONER

## 2024-01-10 RX ORDER — ACETAMINOPHEN AND CODEINE PHOSPHATE 300; 30 MG/1; MG/1
1 TABLET ORAL EVERY 8 HOURS PRN
Qty: 10 TABLET | Refills: 0 | Status: SHIPPED | OUTPATIENT
Start: 2024-01-10 | End: 2024-03-13

## 2024-01-10 ASSESSMENT — PAIN SCALES - GENERAL: PAINLEVEL: WORST PAIN (10)

## 2024-01-10 ASSESSMENT — PATIENT HEALTH QUESTIONNAIRE - PHQ9: SUM OF ALL RESPONSES TO PHQ QUESTIONS 1-9: 11

## 2024-01-10 NOTE — TELEPHONE ENCOUNTER
- please let patient know that a visit is needed to discuss symptoms and orders.  Please encourage patient to do an Evisit or schedule other visit type    Mustapha Negrete [phone]  Dorys Jones MA9 minutes ago (11:47 AM)     TD  pt wants  UA lab  for   bladder infections     Giselle Sommers RN  [facility]

## 2024-01-10 NOTE — NURSING NOTE
11/17/2023     9:40 AM 12/13/2023     8:03 AM 1/10/2024    11:08 AM   PEG Score   PEG Total Score 10 10 10

## 2024-01-10 NOTE — LETTER
Opioid / Opioid Plus Controlled Substance Agreement    This is an agreement between you and your provider about the safe and appropriate use of controlled substance/opioids prescribed by your care team. Controlled substances are medicines that can cause physical and mental dependence (abuse).    There are strict laws about having and using these medicines. We here at [facility] are committing to working with you in your efforts to get better. To support you in this work, we ll help you schedule regular office appointments for medicine refills. If we must cancel or change your appointment for any reason, we ll make sure you have enough medicine to last until your next appointment.     As a Provider, I will:  Listen carefully to your concerns and treat you with respect.   Recommend a treatment plan that I believe is in your best interest. This plan may involve therapies other than opioid pain medication.   Talk with you often about the possible benefits, and the risk of harm of any medicine that we prescribe for you.   Provide a plan on how to taper (discontinue or go off) using this medicine if the decision is made to stop its use.    As a Patient, I understand that opioid(s):   Are a controlled substance prescribed by my care team to help me function or work and manage my condition(s).   Are strong medicines and can cause serious side effects such as:  Drowsiness, which can seriously affect my driving ability  A lower breathing rate, enough to cause death  Harm to my thinking ability   Depression   Abuse of and addiction to this medicine  Need to be taken exactly as prescribed. Combining opioids with certain medicines or chemicals (such as illegal drugs, sedatives, sleeping pills, and benzodiazepines) can be dangerous or even fatal. If I stop opioids suddenly, I may have severe withdrawal symptoms.  Do not work for all types of pain nor for all patients. If they re not helpful, I may be asked to stop  them.        The risks, benefits and side effects of these medicine(s) were explained to me. I agree that:  I will take part in other treatments as advised by my care team. This may be psychiatry or counseling, physical therapy, behavioral therapy, group treatment or a referral to a specialist.     I will keep all my appointments. I understand that this is part of the monitoring of opioids. My care team may require an office visit for EVERY opioid/controlled substance refill. If I miss appointments or don t follow instructions, my care team may stop my medicine.    I will take my medicines as prescribed. I will not change the dose or schedule unless my care team tells me to. There will be no refills if I run out early.     I may be asked to come to the clinic and complete a urine drug test or complete a pill count at any time. If I don t give a urine sample or participate in a pill count, the care team may stop my medicine.    I will only receive prescriptions from this clinic for chronic pain. If I am treated by another provider for acute pain issues, I will tell them that I am taking opioid pain medication for chronic pain and that I have a treatment agreement with this provider. I will inform my [facility] care team within one business day if I am given a prescription for any pain medication by another healthcare provider. My [facility] care team can contact other providers and pharmacists about my use of any medicines.    It is up to me to make sure that I don t run out of my medicines on weekends or holidays. If my care team is willing to refill my opioid prescription without a visit, I must request refills only during office hours. Refills may take up to 3 business days to process. I will use one pharmacy to fill all my opioid and other controlled substance prescriptions. I will notify the clinic about any changes to my insurance or medication availability.    I am responsible for my  prescriptions. If the medicine/prescription is lost, stolen or destroyed, it will not be replaced. I also agree not to share controlled substance medicines with anyone.    I am aware I should not use any illegal or recreational drugs. I agree not to drink alcohol unless my care team says I can.       If I enroll in the [place] Medical Cannabis program, I will tell my care team prior to my next refill.     I will tell my care team right away if I become pregnant, have a new medical problem treated outside of my regular clinic, or have a change in my medications.    I understand that this medicine can affect my thinking, judgment and reaction time. Alcohol and drugs affect the brain and body, which can affect the safety of my driving. Being under the influence of alcohol or drugs can affect my decision-making, behaviors, personal safety, and the safety of others. Driving while impaired (DWI) can occur if a person is driving, operating, or in physical control of a car, motorcycle, boat, snowmobile, ATV, motorbike, off-road vehicle, or any other motor vehicle ([place] Statute 169A.20). I understand the risk if I choose to drive or operate any vehicle or machinery.    I understand that if I do not follow any of the conditions above, my prescriptions or treatment may be stopped or changed.          Opioids  What You Need to Know    What are opioids?   Opioids are pain medicines that must be prescribed by a doctor. They are also known as narcotics.     Examples are:   morphine (MS Contin, Robyn)  oxycodone (Oxycontin)  oxycodone and acetaminophen (Percocet)  hydrocodone and acetaminophen (Vicodin, Norco)   fentanyl patch (Duragesic)   hydromorphone (Dilaudid)   methadone  codeine (Tylenol #3)     What do opioids do well?   Opioids are best for severe short-term pain such as after a surgery or injury. They may work well for cancer pain. They may help some people with long-lasting (chronic) pain.     What do opioids NOT do  well?   Opioids never get rid of pain entirely, and they don t work well for most patients with chronic pain. Opioids don t reduce swelling, one of the causes of pain.                                    Other ways to manage chronic pain and improve function include:     Treat the health problem that may be causing pain  Anti-inflammation medicines, which reduce swelling and tenderness, such as ibuprofen (Advil, Motrin) or naproxen (Aleve)  Acetaminophen (Tylenol)  Antidepressants and anti-seizure medicines, especially for nerve pain  Topical treatments such as patches or creams  Injections or nerve blocks  Chiropractic or osteopathic treatment  Acupuncture, massage, deep breathing, meditation, visual imagery, aromatherapy  Use heat or ice at the pain site  Physical therapy   Exercise  Stop smoking  Take part in therapy       Risks and side effects     Talk to your doctor before you start or decide to keep taking opioids. Possible side effects include:    Lowering your breathing rate enough to cause death  Overdose, including death, especially if taking higher than prescribed doses  Worse depression symptoms; less pleasure in things you usually enjoy  Feeling tired or sluggish  Slower thoughts or cloudy thinking  Being more sensitive to pain over time; pain is harder to control  Trouble sleeping or restless sleep  Changes in hormone levels (for example, less testosterone)  Changes in sex drive or ability to have sex  Constipation  Unsafe driving  Itching and sweating  Dizziness  Nausea, throwing up and dry mouth    What else should I know about opioids?    Opioids may lead to dependence, tolerance, or addiction.    Dependence means that if you stop or reduce the medicine too quickly, you will have withdrawal symptoms. These include loose poop (diarrhea), jitters, flu-like symptoms, nervousness and tremors. Dependence is not the same as addiction.                     Tolerance means needing higher doses over time to  get the same effect. This may increase the chance of serious side effects.    Addiction is when people improperly use a substance that harms their body, their mind or their relations with others. Use of opiates can cause a relapse of addiction if you have a history of drug or alcohol abuse.    People who have used opioids for a long time may have a lower quality of life, worse depression, higher levels of pain and more visits to doctors.    You can overdose on opioids. Take these steps to lower your risk of overdose:    Recognize the signs:  Signs of overdose include decrease or loss of consciousness (blackout), slowed breathing, trouble waking up and blue lips. If someone is worried about overdose, they should call 911.    Talk to your doctor about Narcan (naloxone).   If you are at risk for overdose, you may be given a prescription for Narcan. This medicine very quickly reverses the effects of opioids.   If you overdose, a friend or family member can give you Narcan while waiting for the ambulance. They need to know the signs of overdose and how to give Narcan.     Don't use alcohol or street drugs.   Taking them with opioids can cause death.    Do not take any of these medicines unless your doctor says it s OK. Taking these with opioids can cause death:  Benzodiazepines, such as lorazepam (Ativan), alprazolam (Xanax) or diazepam (Valium)  Muscle relaxers, such as cyclobenzaprine (Flexeril)  Sleeping pills like zolpidem (Ambien)   Other opioids      How to keep you and other people safe while taking opioids:    Never share your opioids with others.  Opioid medicines are regulated by the Drug Enforcement Agency (ALISSA). Selling or sharing medications is a criminal act.    2. Be sure to store opioids in a secure place, locked up if possible. Young children can easily swallow them and overdose.    3. When you are traveling with your medicines, keep them in the original bottles. If you use a pill box, be sure you also  carry a copy of your medicine list from your clinic or pharmacy.    4. Safe disposal of opioids    Most pharmacies have places to get rid of medicine, called disposal kiosks. Medicine disposal options are also available in every [facility]. Search your county and  medication disposal  to find more options. You can find more details at:  https://[email or web address].[email or web address]./living-green/managing-unwanted-medications     I agree that my provider, clinic care team, and pharmacy may work with any city, state or federal law enforcement agency that investigates the misuse, sale, or other diversion of my controlled medicine. I will allow my provider to discuss my care with, or share a copy of, this agreement with any other treating provider, pharmacy or emergency room where I receive care.    I have read this agreement and have asked questions about anything I did not understand.    _______________________________________________________  Patient Signature - Mustapha Negrete _____________________                   Date     _______________________________________________________  Provider Signature - MICAELA OSHEA CNP   _____________________                   Date     _______________________________________________________  Witness Signature (required if provider not present while patient signing)   _____________________                   Date

## 2024-01-10 NOTE — PROGRESS NOTES
01/10/24 1108   PEG: A Thee-Item Scale Assessing Pain Intensity and Interference        0 = No pain / No interference    10 = Pain as bad as you can imagine / Completely interferes   What number best describes your pain on average in the past week? 10   What number best describes how, during the past week, pain has interfered with your enjoyment of life? 10   What number best describes how, during the past week, pain has interfered with your general activity? 10   PEG Total Score 10

## 2024-01-10 NOTE — PATIENT INSTRUCTIONS
PATIENT INSTRUCTIONS:   The following recommendations were given to the patient. Diagnosis, treatment options, risks, benefits, and alternatives were discussed; all questions were answered. Self-care instructions were given. The patient expressed understanding of the plan for management. I am recommending a multidisciplinary treatment plan to help this patient better manage his pain.   Medication Management:   - REFILL placed for Belbuca 600mcg films - apply one film into cheek every 12 hours  - For severe breakthrough pain: Tylenol #3 (Acetaminophen w/Codeine) - take 1 tab by mouth every 8-12 hrs; maximum of 2 tabs per day.   Return to Clinic:   -  30-minute Video or In-Person Appointment with Destiny He, ZEUS, MICAELA, DENISSE in 2 months, or sooner if needed      ----------------------------------------------------------------  Clinic Number:  [phone]   Call with any questions about your care and for scheduling assistance.   Calls are returned Monday through Friday between 8 AM and 4:30 PM. We usually get back to you within 2 business days depending on the issue/request.    If we are prescribing your medications:  For opioid medication refills, call the clinic or send a [employer] message 7 days in advance.  Please include:  Name of requested medication  Name of the pharmacy.  For non-opioid medications, call your pharmacy directly to request a refill. Please allow 3-4 days to be processed.   Per [place] State Law:  All controlled substance prescriptions must be filled within 30 days of being written.    For those controlled substances allowing refills, pickup must occur within 30 days of last fill.      We believe regular attendance is key to your success in our program!    Any time you are unable to keep your appointment we ask that you call us at least 24 hours in advance to cancel.This will allow us to offer the appointment time to another patient.   Multiple missed appointments may lead to dismissal from the  clinic.

## 2024-01-10 NOTE — LETTER
Opioid / Opioid Plus Controlled Substance Agreement    This is an agreement between you and your provider about the safe and appropriate use of controlled substance/opioids prescribed by your care team. Controlled substances are medicines that can cause physical and mental dependence (abuse).    There are strict laws about having and using these medicines. We here at [facility] are committing to working with you in your efforts to get better. To support you in this work, we ll help you schedule regular office appointments for medicine refills. If we must cancel or change your appointment for any reason, we ll make sure you have enough medicine to last until your next appointment.     As a Provider, I will:  Listen carefully to your concerns and treat you with respect.   Recommend a treatment plan that I believe is in your best interest. This plan may involve therapies other than opioid pain medication.   Talk with you often about the possible benefits, and the risk of harm of any medicine that we prescribe for you.   Provide a plan on how to taper (discontinue or go off) using this medicine if the decision is made to stop its use.    As a Patient, I understand that opioid(s):   Are a controlled substance prescribed by my care team to help me function or work and manage my condition(s).   Are strong medicines and can cause serious side effects such as:  Drowsiness, which can seriously affect my driving ability  A lower breathing rate, enough to cause death  Harm to my thinking ability   Depression   Abuse of and addiction to this medicine  Need to be taken exactly as prescribed. Combining opioids with certain medicines or chemicals (such as illegal drugs, sedatives, sleeping pills, and benzodiazepines) can be dangerous or even fatal. If I stop opioids suddenly, I may have severe withdrawal symptoms.  Do not work for all types of pain nor for all patients. If they re not helpful, I may be asked to stop  them.      The risks, benefits and side effects of these medicine(s) were explained to me. I agree that:  I will take part in other treatments as advised by my care team. This may be psychiatry or counseling, physical therapy, behavioral therapy, group treatment or a referral to a specialist.     I will keep all my appointments. I understand that this is part of the monitoring of opioids. My care team may require an office visit for EVERY opioid/controlled substance refill. If I miss appointments or don t follow instructions, my care team may stop my medicine.    I will take my medicines as prescribed. I will not change the dose or schedule unless my care team tells me to. There will be no refills if I run out early.     I may be asked to come to the clinic and complete a urine drug test or complete a pill count at any time. If I don t give a urine sample or participate in a pill count, the care team may stop my medicine.    I will only receive prescriptions from this clinic for chronic pain. If I am treated by another provider for acute pain issues, I will tell them that I am taking opioid pain medication for chronic pain and that I have a treatment agreement with this provider. I will inform my [facility] care team within one business day if I am given a prescription for any pain medication by another healthcare provider. My [facility] care team can contact other providers and pharmacists about my use of any medicines.    It is up to me to make sure that I don t run out of my medicines on weekends or holidays. If my care team is willing to refill my opioid prescription without a visit, I must request refills only during office hours. Refills may take up to 3 business days to process. I will use one pharmacy to fill all my opioid and other controlled substance prescriptions. I will notify the clinic about any changes to my insurance or medication availability.    I am responsible for my prescriptions.  If the medicine/prescription is lost, stolen or destroyed, it will not be replaced. I also agree not to share controlled substance medicines with anyone.    I am aware I should not use any illegal or recreational drugs. I agree not to drink alcohol unless my care team says I can.       If I enroll in the [place] [facility] program, I will tell my care team prior to my next refill.     I will tell my care team right away if I become pregnant, have a new medical problem treated outside of my regular clinic, or have a change in my medications.    I understand that this medicine can affect my thinking, judgment and reaction time. Alcohol and drugs affect the brain and body, which can affect the safety of my driving. Being under the influence of alcohol or drugs can affect my decision-making, behaviors, personal safety, and the safety of others. Driving while impaired (DWI) can occur if a person is driving, operating, or in physical control of a car, motorcycle, boat, snowmobile, ATV, motorbike, off-road vehicle, or any other motor vehicle ([place] Statute 169A.20). I understand the risk if I choose to drive or operate any vehicle or machinery.    I understand that if I do not follow any of the conditions above, my prescriptions or treatment may be stopped or changed.          Opioids  What You Need to Know    What are opioids?   Opioids are pain medicines that must be prescribed by a doctor. They are also known as narcotics.     Examples are:   morphine (MS Contin, Robyn)  oxycodone (Oxycontin)  oxycodone and acetaminophen (Percocet)  hydrocodone and acetaminophen (Vicodin, Norco)   fentanyl patch (Duragesic)   hydromorphone (Dilaudid)   methadone  codeine (Tylenol #3)     What do opioids do well?   Opioids are best for severe short-term pain such as after a surgery or injury. They may work well for cancer pain. They may help some people with long-lasting (chronic) pain.     What do opioids NOT do well?   Opioids  never get rid of pain entirely, and they don t work well for most patients with chronic pain. Opioids don t reduce swelling, one of the causes of pain.                                    Other ways to manage chronic pain and improve function include:     Treat the health problem that may be causing pain  Anti-inflammation medicines, which reduce swelling and tenderness, such as ibuprofen (Advil, Motrin) or naproxen (Aleve)  Acetaminophen (Tylenol)  Antidepressants and anti-seizure medicines, especially for nerve pain  Topical treatments such as patches or creams  Injections or nerve blocks  Chiropractic or osteopathic treatment  Acupuncture, massage, deep breathing, meditation, visual imagery, aromatherapy  Use heat or ice at the pain site  Physical therapy   Exercise  Stop smoking  Take part in therapy       Risks and side effects     Talk to your doctor before you start or decide to keep taking opioids. Possible side effects include:    Lowering your breathing rate enough to cause death  Overdose, including death, especially if taking higher than prescribed doses  Worse depression symptoms; less pleasure in things you usually enjoy  Feeling tired or sluggish  Slower thoughts or cloudy thinking  Being more sensitive to pain over time; pain is harder to control  Trouble sleeping or restless sleep  Changes in hormone levels (for example, less testosterone)  Changes in sex drive or ability to have sex  Constipation  Unsafe driving  Itching and sweating  Dizziness  Nausea, throwing up and dry mouth    What else should I know about opioids?    Opioids may lead to dependence, tolerance, or addiction.    Dependence means that if you stop or reduce the medicine too quickly, you will have withdrawal symptoms. These include loose poop (diarrhea), jitters, flu-like symptoms, nervousness and tremors. Dependence is not the same as addiction.                     Tolerance means needing higher doses over time to get the same  effect. This may increase the chance of serious side effects.    Addiction is when people improperly use a substance that harms their body, their mind or their relations with others. Use of opiates can cause a relapse of addiction if you have a history of drug or alcohol abuse.    People who have used opioids for a long time may have a lower quality of life, worse depression, higher levels of pain and more visits to doctors.    You can overdose on opioids. Take these steps to lower your risk of overdose:    Recognize the signs:  Signs of overdose include decrease or loss of consciousness (blackout), slowed breathing, trouble waking up and blue lips. If someone is worried about overdose, they should call 911.    Talk to your doctor about Narcan (naloxone).   If you are at risk for overdose, you may be given a prescription for Narcan. This medicine very quickly reverses the effects of opioids.   If you overdose, a friend or family member can give you Narcan while waiting for the ambulance. They need to know the signs of overdose and how to give Narcan.     Don't use alcohol or street drugs.   Taking them with opioids can cause death.    Do not take any of these medicines unless your doctor says it s OK. Taking these with opioids can cause death:  Benzodiazepines, such as lorazepam (Ativan), alprazolam (Xanax) or diazepam (Valium)  Muscle relaxers, such as cyclobenzaprine (Flexeril)  Sleeping pills like zolpidem (Ambien)   Other opioids      How to keep you and other people safe while taking opioids:    Never share your opioids with others.  Opioid medicines are regulated by the Drug Enforcement Agency (ALISSA). Selling or sharing medications is a criminal act.    2. Be sure to store opioids in a secure place, locked up if possible. Young children can easily swallow them and overdose.    3. When you are traveling with your medicines, keep them in the original bottles. If you use a pill box, be sure you also carry a copy  of your medicine list from your clinic or pharmacy.    4. Safe disposal of opioids    Most pharmacies have places to get rid of medicine, called disposal kiosks. Medicine disposal options are also available in every [facility]. Search your county and  medication disposal  to find more options. You can find more details at:  [email or web address]     I agree that my provider, clinic care team, and pharmacy may work with any city, state or federal law enforcement agency that investigates the misuse, sale, or other diversion of my controlled medicine. I will allow my provider to discuss my care with, or share a copy of, this agreement with any other treating provider, pharmacy or emergency room where I receive care.    I have read this agreement and have asked questions about anything I did not understand.    _______________________________________________________  Patient Signature - Mustapha Negrete _____________________                   Date     _______________________________________________________  Provider Signature - MICAELA OSHEA CNP   _____________________                   Date     _______________________________________________________  Witness Signature (required if provider not present while patient signing)   _____________________                   Date

## 2024-01-12 ENCOUNTER — LAB (OUTPATIENT)
Dept: LAB | Facility: CLINIC | Age: 64
End: 2024-01-12
Payer: MEDICARE

## 2024-01-12 ENCOUNTER — VIRTUAL VISIT (OUTPATIENT)
Dept: FAMILY MEDICINE | Facility: CLINIC | Age: 64
End: 2024-01-12
Payer: MEDICARE

## 2024-01-12 DIAGNOSIS — F11.20 CONTINUOUS OPIOID DEPENDENCE (H): ICD-10-CM

## 2024-01-12 DIAGNOSIS — N30.00 ACUTE CYSTITIS WITHOUT HEMATURIA: Primary | ICD-10-CM

## 2024-01-12 DIAGNOSIS — L40.50 PSORIATIC ARTHRITIS (H): ICD-10-CM

## 2024-01-12 DIAGNOSIS — N30.00 ACUTE CYSTITIS WITHOUT HEMATURIA: ICD-10-CM

## 2024-01-12 LAB
ALBUMIN UR-MCNC: NEGATIVE MG/DL
APPEARANCE UR: CLEAR
BACTERIA #/AREA URNS HPF: ABNORMAL /HPF
BILIRUB UR QL STRIP: NEGATIVE
COLOR UR AUTO: YELLOW
GLUCOSE UR STRIP-MCNC: NEGATIVE MG/DL
HGB UR QL STRIP: NEGATIVE
KETONES UR STRIP-MCNC: NEGATIVE MG/DL
LEUKOCYTE ESTERASE UR QL STRIP: ABNORMAL
MUCOUS THREADS #/AREA URNS LPF: PRESENT /LPF
NITRATE UR QL: NEGATIVE
PH UR STRIP: 5.5 [PH] (ref 5–7)
RBC #/AREA URNS AUTO: ABNORMAL /HPF
SP GR UR STRIP: 1.02 (ref 1–1.03)
SQUAMOUS #/AREA URNS AUTO: ABNORMAL /LPF
UROBILINOGEN UR STRIP-ACNC: 0.2 E.U./DL
WBC #/AREA URNS AUTO: ABNORMAL /HPF

## 2024-01-12 PROCEDURE — 81001 URINALYSIS AUTO W/SCOPE: CPT

## 2024-01-12 PROCEDURE — 99442 PR PHYSICIAN TELEPHONE EVALUATION 11-20 MIN: CPT | Mod: 93 | Performed by: STUDENT IN AN ORGANIZED HEALTH CARE EDUCATION/TRAINING PROGRAM

## 2024-01-12 RX ORDER — GRANULES FOR ORAL 3 G/1
3 POWDER ORAL ONCE
Qty: 1 PACKET | Refills: 0 | Status: SHIPPED | OUTPATIENT
Start: 2024-01-12 | End: 2024-01-12

## 2024-01-12 NOTE — PROGRESS NOTES
Mustapha is a 63 year old who is being evaluated via a billable telephone visit.      What phone number would you like to be contacted at? [phone]  How would you like to obtain your AVS? Kelly    Distant Location (provider location):  On-site    Assessment & Plan     1. Acute cystitis without hematuria  History of UTI due to Klebsiella  - fosfomycin (MONUROL) 3 g Packet; Take 1 packet (3 g) by mouth once for 1 dose  Dispense: 1 packet; Refill: 0  - UA Macroscopic with reflex to Microscopic and Culture - Lab Collect; Future  I strongly advised patient to drop urine sample before starting antibiotic.  2. Psoriatic arthritis (H)  Stable , not on medication currently  3. Continuous opioid dependence (H)  Managed by pain management      Crystal Zurita MD  [facility] MEGAN Luciano is a 63 year old, presenting for the following health issues:  UTI      1/12/2024    10:45 AM   Additional Questions   Roomed by Vicki CRONIN         1/12/2024    10:45 AM   Patient Reported Additional Medications   Patient reports taking the following new medications No new medications to add       HPI     Genitourinary - Male  Onset/Duration: 2 weeks  Description:   Dysuria (painful urination): YES}  Hematuria (blood in urine): No  Frequency: YES  Waking at night to urinate: YES  Hesitancy (delay in urine): YES  Retention (unable to empty): YES  Decrease in urinary flow: YES  Incontinence: YES  Progression of Symptoms:  worsening  Accompanying Signs & Symptoms:  Fever: No  Back/Flank pain: YES  Urethral discharge: No  Testicle lumps/masses/pain: YES  Nausea and/or vomiting: No  Abdominal pain: No  History:   History of frequent UTI s: YES  History of kidney stones: No  History of hernias: No  Personal or Family history of Prostate problems: YES  Sexually active: No  Precipitating or alleviating factors: None  Therapies tried and outcome: none    Patient has had multiple urinary infection.  He follows  with pain management for back pain. He has history of psoriatic arthritis not on medication.    Review of Systems         Objective           Vitals:  No vitals were obtained today due to virtual visit.    Physical Exam   healthy, alert, and no distress  PSYCH: Alert and oriented times 3; coherent speech, normal   rate and volume, able to articulate logical thoughts, able   to abstract reason, no tangential thoughts, no hallucinations   or delusions  His affect is normal  RESP: No cough, no audible wheezing, able to talk in full sentences  Remainder of exam unable to be completed due to telephone visits            Phone call duration: 20 minutes

## 2024-01-12 NOTE — PATIENT INSTRUCTIONS
Claudy Luciano,    Thank you for allowing [facility] to manage your care.    I ordered some work, please go to the laboratory to get your laboratory studies.      I sent your prescriptions to your pharmacy. Do not start until you give urine sample      For your convenience, test results are released as soon as they are available  Please allow 1-2 business days for me to send you a comment about your results.  If not done so, I encourage you to login into [email or web address] to review your results in real time.     If you have any questions or concerns, please feel free to call us at [phone].    Sincerely,    Dr. Zurita    Did you know?      You can schedule a video visit for follow-up appointments as well as future appointments for certain conditions.  Please see the below link.     [email or web address]    If you have not already done so,  I encourage you to sign up for [email or web address] ([email or web address]).  This will allow you to review your results, securely communicate with a provider, and schedule virtual visits as well.

## 2024-01-14 ENCOUNTER — HEALTH MAINTENANCE LETTER (OUTPATIENT)
Age: 64
End: 2024-01-14

## 2024-01-16 ENCOUNTER — TELEPHONE (OUTPATIENT)
Dept: FAMILY MEDICINE | Facility: CLINIC | Age: 64
End: 2024-01-16
Payer: MEDICARE

## 2024-01-16 NOTE — TELEPHONE ENCOUNTER
Routing to provider.    Patient calling to check on UA/UC results from 1/12/24.    Results not yet reviewed.    Patient still with symptoms of UTI including burning and frequency.    Asking if he needs an antibiotic.    Christine M Klisch, RN

## 2024-01-16 NOTE — TELEPHONE ENCOUNTER
I called patient with result, there is no evidence of infection. S/p a dose of Fosfomycin. Patient has extensive history of pelvic pain. 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 He is meeting with a specialist in that field in few weeks.  I advised him to use pyridium for pain.

## 2024-01-18 ENCOUNTER — TELEPHONE (OUTPATIENT)
Dept: FAMILY MEDICINE | Facility: CLINIC | Age: 64
End: 2024-01-18
Payer: MEDICARE

## 2024-01-18 DIAGNOSIS — R10.2 PELVIC PAIN IN MALE: Primary | ICD-10-CM

## 2024-01-18 NOTE — TELEPHONE ENCOUNTER
Pt calling requesting a referral for pelvic floor-Orthopedic rehab incontinence specialist with in [place]   Provider: Val Molina     States that  is aware of his condition in regards to this.      Please call patient if this referral can be placed with out an appt.    Please call [phone]      Mirela Knwoles    [facility]

## 2024-01-19 NOTE — TELEPHONE ENCOUNTER
RN relayed providers message to patient. Patient verbalized understanding. RN sent referral phone number to patient in a [employer] message.      Patient wanted to give an FYI to provider as he states you have been seeing him for this concern. Patient has been seeing a MD/Surgeon in [place] virtually about pelvic pain. 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 Patient will have an appointment with them next week to determine if he will go to [place] to have surgery. Patient states there are not any doctors in [place] who do this procedure.       Riya Montes RN on 1/19/2024 at 11:11 AM

## 2024-01-22 ENCOUNTER — PATIENT OUTREACH (OUTPATIENT)
Dept: CARE COORDINATION | Facility: CLINIC | Age: 64
End: 2024-01-22

## 2024-01-22 ENCOUNTER — TELEPHONE (OUTPATIENT)
Dept: FAMILY MEDICINE | Facility: CLINIC | Age: 64
End: 2024-01-22

## 2024-01-22 ENCOUNTER — THERAPY VISIT (OUTPATIENT)
Dept: PHYSICAL THERAPY | Facility: CLINIC | Age: 64
End: 2024-01-22
Attending: FAMILY MEDICINE
Payer: MEDICARE

## 2024-01-22 DIAGNOSIS — R10.2 PELVIC PAIN IN MALE: Primary | ICD-10-CM

## 2024-01-22 DIAGNOSIS — R10.2 PELVIC PAIN IN MALE: ICD-10-CM

## 2024-01-22 PROCEDURE — 999N000104 HC STATISTIC NO CHARGE: Performed by: PHYSICAL THERAPIST

## 2024-01-22 NOTE — TELEPHONE ENCOUNTER
Patient came into clinic 1/22/24 needs the Pelvic referral worded differently coming from the PT provider. Have explaining sheet at  that has the correct format of referral   Silvana Galo.  Thank you!

## 2024-01-22 NOTE — PROGRESS NOTES
"Physical Therapy Note    Patient Name: Mustapha Negrete  MR#: [id number]  : 1960  MD name: Silver Pro DO  Pt seen: for No Charge Visit   Diagnosis: Pelvic Pain      Pt presented for evaluation today.  His MD order/referral is for Pelvic Pain.  His insurance contract, per his ICT says he will need an ABN signed prior to PT eval and treat, as he is not verified coverage for Pelvic Pain (not of spinal origin).  Spent 30 mins explaining this to the patient and giving him written information from his ICT.  Told pt he could call his insurance and see if it will be covered as he has Urge Incontinence with this pelvic pain, and Incontinence is covered by his insurance.  Pt will need to get Incontinence added to his order by his MD.  Pt is asking multiple questions re: what he needs to do, but states \"I do not have money for this, so I cannot sign this form.\"  Did verbally read him the Estimated Hospital Pricing for his expected services.  Pt again states \"I cannot afford that amount, so I cannot sign the form.\"     Val Molina, PT, MA  #[id number]  [facility]  Rehabilitation Dept - [place]  [phone]    "

## 2024-01-23 ENCOUNTER — TELEPHONE (OUTPATIENT)
Dept: PALLIATIVE MEDICINE | Facility: CLINIC | Age: 64
End: 2024-01-23
Payer: MEDICARE

## 2024-01-23 DIAGNOSIS — R10.2 PERINEAL PAIN IN MALE, CHRONIC: ICD-10-CM

## 2024-01-23 DIAGNOSIS — M79.18 MYOFASCIAL MUSCLE PAIN: ICD-10-CM

## 2024-01-23 DIAGNOSIS — G89.29 PERINEAL PAIN IN MALE, CHRONIC: ICD-10-CM

## 2024-01-23 NOTE — TELEPHONE ENCOUNTER
Medication refill information reviewed.     T#3 last due:  Fill on 1/10/24; Start on 1/10/24   Due date:  TBD. Sig does not specify.      Prescriptions prepped for review.     Jess RN-BSN  [facility] Center[facility]

## 2024-01-23 NOTE — TELEPHONE ENCOUNTER
Received fax from pharmacy requesting refill(s) for:    acetaminophen-codeine (TYLENOL #3) 300-30 MG per tablet      Last dispensed from pharmacy on 1/10/24    Patient's last office/virtual visit by prescribing provider on 1/10/24  Next office/virtual appointment scheduled for 3/31/24    Last urine drug screen date 12/13/23  Current opioid agreement on file? Yes Date of opioid agreement: 1/10/24    E-prescribe to:    [facility] - [place], [place] - [address]    Will route to [facility] for review and preparation of prescription(s).

## 2024-01-25 RX ORDER — ACETAMINOPHEN AND CODEINE PHOSPHATE 300; 30 MG/1; MG/1
1 TABLET ORAL EVERY 8 HOURS PRN
Qty: 10 TABLET | Refills: 0 | OUTPATIENT
Start: 2024-01-25

## 2024-01-25 NOTE — TELEPHONE ENCOUNTER
Called pt and relayed MICAELA Herman CNP's message.  He is frustrated stating he is at a standstill.  He states that 10 T#3 tabs are not enough.  Spoke w/ him about using his belbuca on a regular basis. He says he does and knows that this medication is not the answer.    Acknowledged that he is currently doctoring to find a origination of his pain and that the process can be difficult.    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 He is  hoping for surgery. The provider there is waitning on more records from his neurologist and urology before proceeding.    He asked about injections.  Reviewed his injection hx. Let him  know that writer will pose this question but it is doubtful that there would be a new injeciton option at this time.     He is doing massage, stretching and acupuncture.     He has seen a pelvic floor specialistHe was asked if he can get on flexeril?  A muscle relaxer    Pharmacy:  [facility]

## 2024-01-25 NOTE — TELEPHONE ENCOUNTER
"I will not refill at this time. He needs to be consistent with Belbuca dosing for pain control.     \"Patient advised that refill of Tylenol #3 is approved for severe intractable breakthrough pain maximum of 2/day.  He states he would use this less often than daily.  No early refills and no dose escalation will be approved.  Future refill for Tylenol #3 is dependent upon surgical consult in 2 weeks.  Patient verbalizes understanding and agreement with plan.  Patient to communicate surgical plan through MyChart; return to clinic in 2 months.\"    "

## 2024-02-08 NOTE — TELEPHONE ENCOUNTER
Spoke with patient on 2/8/2024.  Patient states that he bowel incontinence.  Will make a pelvic PT with the correct order.

## 2024-02-09 ENCOUNTER — PATIENT OUTREACH (OUTPATIENT)
Dept: GERIATRIC MEDICINE | Facility: CLINIC | Age: 64
End: 2024-02-09
Payer: MEDICARE

## 2024-02-09 NOTE — TELEPHONE ENCOUNTER
Destiny He, MICAELA CNP  Pain Nurse2 hours ago (11:58 AM)     1. Please confirm that he is actually taking belbuca films BID as prescribed  2. If he is, then I could give him the option of increasing from 600mcg BID to 750mcg BID  3. Once we know the above, I will send a refill and also start cyclobenzaprine.  4. He is also able to ask for refill of T# 3, max 2/day. But I will not refill it early. If he overuses it and asks for early refill again, I will not continue to prescribe T# 3.     Spoke with patient and he is actually cutting his Belbuca 600 mcg films in half and taking 300/300 mcg daily. He reports he had some side effects including racing heartbeat with the 600 mcg film and felt it was a little strong for him. He is doing better on the 300 mcg dose with no side effects and does not want a refill of this medication at this time as he has a lot of films left.    He was prescribed tizanidine 4 MG by one of his providers and has been taking that once daily. He feels this medication helps more than the others. He does not need a refill of this. I did add tizanidine to his med list.     He does not want a refill of the T3. He reports it wasn't helpful. The tizanidine helps more then that medication did.     He is seeing a spine doctor at [facility] and has an MRI tomorrow and will follow with a pudendal nerve block nest week.    Nothing needed at this time. He will update at his 03/13/24 office visit.    Routing update to provider as an LILIANA Panda, MANJEETN, RN  Care Coordinator  [facility] [place]

## 2024-02-09 NOTE — TELEPHONE ENCOUNTER
Patient called back, and stated that someone called him from PCPs office and stated that they needed more information on the referral.  He stated that he talked to PCP yesterday, and this was taken care of.    Advised to call us back, with any further concerns or questions.    Patient stated understanding and agreeable with the plan of care.     Carolina SINGER RN  Triage Nurse  [facility]

## 2024-02-09 NOTE — PROGRESS NOTES
[facility] Contact    Member became effective with  Nicci on 2/1/24 with Carolynn MSC+.  Previous Health Plan: MA/Fee For Service  Previous Care System:  NA  Previous care coordinators name and number: NA  Waiver Type: N/A  Last MMIS Entry: Date NA and Type NA  MMIS visit date (and type) if different from above: NA  Services Listed in MMIS:   UTF received: No UTF to request  Mailed welcome letter and [facility] When to Contact Your Care Coordinator  Address/Phone discrepancy: NA  MnChoices: BUCK Young  [facility]  Case Management Specialist  [phone]

## 2024-02-12 ENCOUNTER — PATIENT OUTREACH (OUTPATIENT)
Dept: GERIATRIC MEDICINE | Facility: CLINIC | Age: 64
End: 2024-02-12
Payer: MEDICARE

## 2024-02-12 ENCOUNTER — THERAPY VISIT (OUTPATIENT)
Dept: PHYSICAL THERAPY | Facility: CLINIC | Age: 64
End: 2024-02-12
Attending: FAMILY MEDICINE
Payer: MEDICARE

## 2024-02-12 DIAGNOSIS — R10.2 PELVIC PAIN IN MALE: Primary | ICD-10-CM

## 2024-02-12 DIAGNOSIS — R15.9 FECAL INCONTINENCE: ICD-10-CM

## 2024-02-12 PROCEDURE — 97110 THERAPEUTIC EXERCISES: CPT | Mod: GP | Performed by: PHYSICAL THERAPIST

## 2024-02-12 PROCEDURE — 97162 PT EVAL MOD COMPLEX 30 MIN: CPT | Mod: GP | Performed by: PHYSICAL THERAPIST

## 2024-02-12 PROCEDURE — 97140 MANUAL THERAPY 1/> REGIONS: CPT | Mod: GP | Performed by: PHYSICAL THERAPIST

## 2024-02-12 NOTE — PROGRESS NOTES
[facility] Care Coordination Contact    02/12/24: contacted member to schedule an initial HV.  Left message with CC's contact information and request return call.    Tammy Ocampo RN  [facility]  [phone]

## 2024-02-12 NOTE — PROGRESS NOTES
PHYSICAL THERAPY EVALUATION  Type of Visit: Evaluation    See electronic medical record for Abuse and Falls Screening details.    Subjective     Pt has a 3 yr h/o pain in buttock/tailbone and PFM. At that time, pt had Covid and was hospitalized with breathing issues and had numbness in buttocks. Pt was unable to work after this illness. About 1 month post-Covid, pt had a (R) posterior thigh rash/redness that he thought was Shingles and was treated with Gabapentin. After it resolved on (R) it moved to (L). To mcgraw this surgery pt had LB Surgey 4/2023 and this did not help. Currently pt recently saw PCP and told continues to have this severe pelvic pain and inability to fully empty bladder and bowel, and he is referred to PT.   Presenting condition or subjective complaint: incontinence of having stools and urinating severe pain  Date of onset: 01/22/24    Relevant medical history: Arthritis; Bladder or bowel problems; Cold or hot arm or leg; Incontinence; Numbness or tingling in perianal area; Osteoarthritis ;   Active Problem List (from MD Note):  BPH (benign prostatic hyperplasia)   Dyslipidemia   GERD (gastroesophageal reflux disease)   Hepatic steatosis   Hiatal hernia   Insomnia, unspecified   MARIAN (obstructive sleep apnea)   OA (osteoarthritis) of hip   Mild intermittent asthma, uncomplicated   Psoriatic arthritis (HCC)   Urinary retention   Neuropathy   Chronic, continuous use of opioids   Lumbar disc herniation with radiculopathy   Pelvic floor dysfunction   Perineal pain in male   Pudendal neuralgia   Dates & types of surgery: several 4/23 back surgery rotator cuff surgey 2019?? hip replacment 2015 urolift surgery 2019    Prior diagnostic imaging/testing results: MRI; CT scan; EMG     Prior therapy history for the same diagnosis, illness or injury: Yes pelvic floor therapist    Prior Level of Function  Transfers: Independent  Ambulation: Independent  ADL: Independent  IADL: independent all HH chores, yard  "work and community mobility    Living Environment  Social support:     Type of home:     Stairs to enter the home:         Ramp:     Stairs inside the home:         Help at home:    Equipment owned:       Employment:     Retired  (45 yrs [employer])  Hobbies/Interests:  Golf, Fishing    Patient goals for therapy: stop sever pain urinating    Pain assessment:  \"It is 10/10 all the time now.\"      Objective      PELVIC EVALUATION  ADDITIONAL HISTORY:  Sex assigned at birth: Male  Gender identity: Male    Pronouns: He/Him/His      Bladder History:  Feels bladder filling: Yes (gets strong urges)  Triggers for feeling of inability to wait to go to the bathroom: Yes going from sitting to standing  How long can you wait to urinate: sometimes seconds and sometimes 30 mins  Gets up at night to urinate: Yes 5  Can stop the flow of urine when urinating: Yes  Volume of urine usually released: Small   Other issues: Straining to pass urine; Slow or hesitant urine stream; Trouble emptying bladder completely; Dribbling after urinating; Bladder infections  Number of bladder infections in last 12 months: 4  Fluid intake per day: water = 60 oz/day coffee/caffeine = 12 oz/day Alcohol = rare  Medications taken for bladder: Yes Tamsulosin   Activities causing urine leak: Hurrying to the bathroom due to a strong urge to urinate (pee)    Amount of urine typically leaked: Few drops  Pads used to help with leaking: Yes I use this many pads per day: 1      Bowel History:  Frequency of bowel movement: every other day  Consistency of stool: Soft    Ignores the urge to defecate: No  Other bowel issues: Pain when pooping  Length of time spent trying to have a bowel movement: 5 mins    Sexual Function History:  Sexual orientation: Straight    Sexually active: No  Lubrication used:      Pelvic pain: Walking; Standing; Sitting; Certain positions    Pain or difficulty with orgasms/erection/ejaculation: Yes NA  State of menopause:    Hormone " medications:        Do you get regular exercise: Yes, I do this type of exercise: Goes to gym and does Upper Body Lifting, some cardio, Have you tried pelvic floor strengthening exercises for 4 weeks: Yes, Do you have any history of trauma that is relevant to your care that you d like to share: No    Discussed reason for referral regarding pelvic health needs and external/internal pelvic floor muscle examination with patient/guardian.  Opportunity provided to ask questions and verbal consent for assessment and intervention was given.    POSTURE: Standing Posture: Forward head, initial stand keeps knees bent (?) but able to straighten with cues  Sitting Posture: Forward head, anterior and left trunk lean. Tends to sit on (L) buttock.   LUMBAR SCREEN: AROM WFL  HIP SCREEN:  Strength: WFL   Functional Strength Testing:  WFL for  squat, lunge and reaching for ADLs    PELVIC/SI SCREEN:  Sacroiliac Provocation Test: positive    PAIN PROVOCATION TEST:  pain with SIJ compression and distraction  PELVIS/SI SPECIAL TESTS:  generally tight in all mvmts, guards  BREATHING SYMMETRY: Asymmetrical, Decreased rib cage mobility    PELVIC EXAM  External Visual Inspection:  Mild visible clenching with gluteal contraction    Integumentary:   Anal: Hemorrhoids, Skin irritation    External Digital Palpation per Perineum:   Transverse perineal: Tightness, Tenderness, Pain  Levator ani: Tightness, Tenderness, Pain    Scar:   Location/Type: NA  Mobility:  NA    Internal Digital Palpation:  Per Rectum:  Tenderness  Myofascial Resistance to Palpation: Firm, Taut  Digital Muscle Performance: 5/5 contraction power  Relaxation Post-Contraction: Partial/delayed relaxation    ABDOMINAL ASSESSMENT  Diastasis Rectus Abdominis (GERA):  Location not assessed today    Abdominal Activation/Strength:  WNL    Scar:   Location/Type: Lumbar scarring from surgery  Mobility: WNL    Fascial Tension/Restriction: Hypomobile    DERMATOMES:  not assessed; has hx of  poster (R) sciatica that pt had rash and felt was shingles (?)  DTR S: not tested today    Assessment & Plan   CLINICAL IMPRESSIONS  Medical Diagnosis: Pelvic pain in male    Treatment Diagnosis: Pelvic Floor Muscle Dysfunction   Impression/Assessment: Patient is a 63 year old male with pelvic pain and urinary urge UI complaints.  The following significant findings have been identified: Pain, Decreased ROM/flexibility, Decreased joint mobility, Impaired sensation, Impaired muscle performance, Decreased activity tolerance, and Impaired posture. These impairments interfere with their ability to perform self care tasks, recreational activities, household chores, and driving  as compared to previous level of function.     Clinical Decision Making (Complexity):  Clinical Presentation: Unstable/Unpredictable   Clinical Presentation Rationale: based on medical and personal factors listed in PT evaluation  Clinical Decision Making (Complexity): Moderate complexity    PLAN OF CARE  Treatment Interventions:  Modalities: Biofeedback, E-stim, Ultrasound  Interventions: Manual Therapy, Neuromuscular Re-education, Therapeutic Activity, Therapeutic Exercise, Self-Care/Home Management    Long Term Goals     PT Goal 1  Goal Identifier: STG  Goal Description: 1)Pt will be able to sit with 6/10 or less pain in 4 weeks.  Target Date: 03/11/24  PT Goal 2  Goal Identifier: STG  Goal Description: 2)Pt will have a daily BM with 5/10 burning pain or less in 4 weeks.  Target Date: 03/11/24  PT Goal 3  Goal Identifier: LTG  Goal Description: 3)Pt will be able to sit on any surface with 2/10, in 8 weeks.  Target Date: 04/08/24  PT Goal 4  Goal Identifier: LTG  Goal Description: 4)Pt will have daily BMs without burning pain, in 8 weeks.  Target Date: 04/08/24  PT Goal 5  Goal Identifier: LTG  Goal Description: 5)Pt will be indep in a HEP to prevent return of symptoms, in 8 weeks.  Target Date: 04/08/24      Frequency of Treatment: 1x per week;  weaning to every other week  Duration of Treatment: 8 weeks    Recommended Referrals to Other Professionals:  none  Education Assessment:   Learner/Method: Patient;Listening;Reading;Demonstration;Pictures/Video;No Barriers to Learning    Risks and benefits of evaluation/treatment have been explained.   Patient/Family/caregiver agrees with Plan of Care.     Evaluation Time:     PT Eval, Moderate Complexity Minutes (05733): 25   Present: Not applicable     Signing Clinician: Val Molina PT      [facility]                                                                                   OUTPATIENT PHYSICAL THERAPY      PLAN OF TREATMENT FOR OUTPATIENT REHABILITATION   Patient's Last Name, First Name, Mustapha Riggs YOB: 1960   Provider's Name   [facility]   Medical Record No.  [id number]     Onset Date: 01/22/24  Start of Care Date: 02/12/24     Medical Diagnosis:  Pelvic pain in male      PT Treatment Diagnosis:  Pelvic Floor Muscle Dysfunction Plan of Treatment  Frequency/Duration: 1x per week; weaning to every other week/ 8 weeks    Certification date from 02/12/24 to 04/08/24         See note for plan of treatment details and functional goals     Val Molina, PT                         I CERTIFY THE NEED FOR THESE SERVICES FURNISHED UNDER        THIS PLAN OF TREATMENT AND WHILE UNDER MY CARE     (Physician attestation of this document indicates review and certification of the therapy plan).              Referring Provider:  [facility]    Initial Assessment  See Epic Evaluation- Start of Care Date: 02/12/24

## 2024-02-19 ENCOUNTER — PATIENT OUTREACH (OUTPATIENT)
Dept: GERIATRIC MEDICINE | Facility: CLINIC | Age: 64
End: 2024-02-19
Payer: MEDICARE

## 2024-02-19 NOTE — PROGRESS NOTES
[facility] Care Coordination Contact    Called member to schedule annual HRA home visit. HRA has been scheduled for 2/21/24.    Tammy Ocampo RN  [facility]  [phone]

## 2024-02-21 ENCOUNTER — PATIENT OUTREACH (OUTPATIENT)
Dept: CARE COORDINATION | Facility: CLINIC | Age: 64
End: 2024-02-21
Payer: MEDICARE

## 2024-02-21 ENCOUNTER — PATIENT OUTREACH (OUTPATIENT)
Dept: GERIATRIC MEDICINE | Facility: CLINIC | Age: 64
End: 2024-02-21
Payer: MEDICARE

## 2024-02-21 ASSESSMENT — ACTIVITIES OF DAILY LIVING (ADL): DEPENDENT_IADLS:: INDEPENDENT

## 2024-02-21 NOTE — PROGRESS NOTES
[facility] Care Coordination Contact    [facility] Initial Assessment     Home visit for Initial Health Risk Assessment with Mustaphaallison Negrete completed on February 21, 2024    Type of residence:: Private home - stairs  Current living arrangement:: I live alone, I live in a private home     Assessment completed with:: Patient    Current Care Plan  Member currently receiving the following home care services:  None   Member currently receiving the following community resources: None    Medication Review  Medication reconciliation completed in Epic: Yes  Medication set-up & administration: Independent-does not set up.  Self-administers medications.  Medication Risk Assessment Medication (1 or more, place referral to MTM): N/A: No risk factors identified  MTM Referral Placed: No: No risk factors idenified    Mental/Behavioral Health   Depression Screening:           Mental health DX:: No        No current MH services-will place referral for  N/A    Falls Assessment:   Fallen 2 or more times in the past year?: No   Any fall with injury in the past year?: No    ADL/IADL Dependencies:   Dependent ADLs:: Independent  Dependent IADLs:: Independent    Health Plan sponsored benefits: [employer] MSC+: Shared information regarding preventative health screening and health plan supplemental benefits/incentives. Reviewed medication disposal form.    PCA Assessment completed at visit: Not Applicable     Elderly Waiver Eligibility: No-does not meet criteria    Care Plan & Recommendations: Complete HCD    See LTCC for detailed assessment information.    Follow-Up Plan: Member informed of future contact, plan to f/u with member with a 6 month telephone assessment.  Contact information shared with member and family, encouraged member to call with any questions or concerns at any time.    [place] care continuum providers: Please see Snapshot and Care Management Flowsheets for Specific details of care plan.    This CC note routed to  PCP, [facility].     Tammy Ocampo RN  [facility]  [phone]

## 2024-02-21 NOTE — Clinical Note
Met with Mustapha for an initial home visit.  He reports independence in all ADL/IADL's.  No falls reported.  Has chronic pain and is looking into going to [place] to consult with MD who may be able to help with his pain.  No requests or indications for services/equipment at this time.  Mustapha reported that he may be losing his Medical Assistance due to income being too high.  Referred him to  at [facility].  Also gave him phone number to [facility].  Thank you, Tammy Ocampo RN [facility] [phone]

## 2024-02-21 NOTE — PROGRESS NOTES
Clinic Care Coordination Contact  Care Coordination Clinician Chart Review    Situation: Patient chart reviewed by care coordinator.    Background:  The patient is now part of [facility].    Assessment: Upon chart review, patient is not a candidate for Primary Care Clinic Care Coordination enrollment due to reason stated below:  Patient is receiving duplicative services from [facility].    Plan/Recommendations: Clinic Care Coordination Referral/order cancelled. RN/SW CC will perform no further monitoring/outreaches at this time and will remain available as needed. If new needs arise, a new Care Coordination Referral may be placed.    Sebastian Jimenez MSN, RN, PHN, CCM   Primary Care Clinical RN Care Coordinator  [facility]  2/21/2024   10:51 AM  [email or web address]  Office: [phone]

## 2024-02-23 ENCOUNTER — PATIENT OUTREACH (OUTPATIENT)
Dept: GERIATRIC MEDICINE | Facility: CLINIC | Age: 64
End: 2024-02-23
Payer: MEDICARE

## 2024-02-23 NOTE — LETTER
February 23, 2024    MUSTAPHA WASHINGTON  [address] [address] [address]        Dear Mustapha:    At [facility], we re dedicated to improving your health and wellness. Enclosed is the Care Plan developed with you on 2/21/24. Please review the Care Plan carefully.    As a reminder, during your visit we talked about:  Ways to manage your physical and mental health  Using health care to maintain and improve your health   Your preventive care needs     Remember to contact your care coordinator if you:  Are hospitalized, or plan to be hospitalized   Have a fall    Have a change in your physical or mental health  Need help finding support or services    If you have questions, or don t agree with your Care Plan, call me at [phone]. You can also call me if your needs change. TTY users, call the [place] [employer] at (611) or 1-908.959.4939 (auaqfs-jv-mziskg relay service).    Sincerely,        Tammy Ocampo RN  [phone]  [email or web address]    [id number] accepted    [id number] (07/2022)

## 2024-02-23 NOTE — LETTER
[facility] Advance Care Planning       Mustapha Negrete  [address] [address] [place] [address]      Dear Mustapha    You shared with me your interest in receiving information on Advance Care Planning and Health Care Directives. Discussing and making decisions about this part of our health is very important.  A Health Care Directive is a written document that outlines your goals, values, beliefs and choices for health care and medical treatment in the event you are unable to speak for yourself.     We greatly value the opportunity to assist you in documenting your choices and to honor your   wishes. We ve enclosed forms to help you get started thinking about your values and goals. We have several options for additional resources:     Health Care Directives and Advance Care Planning resources can be viewed and printed   for free at our web site:  [email or web address].     Free group classes on Advance Care Planning and completing a Health Care Directive are available at multiple locations and times. These classes are led by trained staff who will provide information and guide you through a Health Care Directive.  They can also review, notarize and add your Health Care Directive to your medical record. Loa for a class at [email or web address] or by calling [employer] at [phone] or toll free [phone].    COPIES of completed Health Care Directives can be brought or mailed to any of our   locations, including the address listed below. You can also email a copy to [email or web address] .    Email or call me at the contact information listed below for questions, assistance, or to   make an appointment to discuss creating a Health Care Directive. You can also contact   our [facility] BabyWatch Department for questions or assistance.       Sincerely,     Tammy Ocampo RN

## 2024-02-23 NOTE — PROGRESS NOTES
[facility] Care Coordination Contact    Received after visit chart from care coordinator.  Completed following tasks: Mailed Consent to Communicate form , Mailed copy of care plan/support plan to member, Mailed Safe Medication Disposal , Sent the following resources/forms: health care directive forms , and Uploaded consent to communicate form(s) to Sonia Young  [facility]  Case Management Specialist  [phone]

## 2024-03-01 PROBLEM — N48.89 PENILE PAIN: Status: RESOLVED | Noted: 2021-10-06 | Resolved: 2024-03-01

## 2024-03-01 PROBLEM — M62.89 PELVIC FLOOR DYSFUNCTION: Status: RESOLVED | Noted: 2022-05-13 | Resolved: 2024-03-01

## 2024-03-01 PROBLEM — K62.89 RECTAL PAIN: Status: RESOLVED | Noted: 2021-10-06 | Resolved: 2024-03-01

## 2024-03-13 ENCOUNTER — OFFICE VISIT (OUTPATIENT)
Dept: PALLIATIVE MEDICINE | Facility: CLINIC | Age: 64
End: 2024-03-13
Payer: MEDICARE

## 2024-03-13 VITALS — SYSTOLIC BLOOD PRESSURE: 167 MMHG | HEART RATE: 60 BPM | DIASTOLIC BLOOD PRESSURE: 86 MMHG

## 2024-03-13 DIAGNOSIS — G58.8 PUDENDAL NEURALGIA: Primary | ICD-10-CM

## 2024-03-13 DIAGNOSIS — R10.2 PERINEAL PAIN IN MALE, CHRONIC: ICD-10-CM

## 2024-03-13 DIAGNOSIS — G89.29 PERINEAL PAIN IN MALE, CHRONIC: ICD-10-CM

## 2024-03-13 PROCEDURE — 99214 OFFICE O/P EST MOD 30 MIN: CPT | Performed by: NURSE PRACTITIONER

## 2024-03-13 ASSESSMENT — PAIN SCALES - GENERAL: PAINLEVEL: WORST PAIN (10)

## 2024-03-13 NOTE — PROGRESS NOTES
"[facility]       Date of Visit: 3/13/2024    CHIEF COMPLAINT:   Chief Complaint   Patient presents with    Pain       Subjective   SUBJECTIVE    INTERVAL HISTORY:   Mustapha eNgrete is a 63 year old male seen for INITIAL EVALUATION on 12/13/23; last seen for FOLLOW UP on 1/10/24.  They are a patient of mine and returning today for perineal pain.       Pain score today: Worst Pain (10)   Pain rating: intensity ranges from 10/10 to 10/10, and averages 10/10 on a 0-10 scale.     Since the last visit, Mustapha Negrete reports:   He had a consultation with specialist in [place] regarding pudendal nerve surgery.  He is scheduled with an interventional neurologist, Dr. Zak Mcarthur, at [facility] in [place] today for at CT Pudendal Nerve Block.    Taking Belbuca 600mcg film one film per day; does not improve pain but \"takes the edge off\". He reports 7-8 films remaining today; reports  using 1/2 film BID.  Last fill was 1/15/24. Has been trying acupuncture; completed 12 appointments and is getting some relief. Has also tried 12 visits of red light therapy; not helpful.   Had an old prescription of tizanidine; finds some relief.  Using Dulcolax and Senna daily; moves bowels every 3-4 days.         Recommendations/ Plan at the last visit included:  Visit discussion: Patient is scheduled for surgical consult in approximately 2 weeks with a specialist in [place] to evaluate potential for pudendal nerve release.  He does demonstrate significant discomfort and neuropathic pain.  He does admit to cannabis use for some relief.  Urine drug screen was consistent with his report and prescribed occasions.  I advised that he continue Belbuca with consistent twice daily dosing; 600 mcg film in a.m. and half of 600 mcg film midday with the other half at bedtime.  Patient advised that refill of Tylenol #3 is approved for severe intractable breakthrough pain maximum of 2/day.  He states he would use this less often " than daily.  No early refills and no dose escalation will be approved.  Future refill for Tylenol #3 is dependent upon surgical consult in 2 weeks.  Patient verbalizes understanding and agreement with plan.  Patient to communicate surgical plan through [employer]; return to clinic in 2 months.  Medication Management:   - REFILL placed for Belbuca 600mcg films - apply one film into cheek every 12 hours  - For severe breakthrough pain: Tylenol #3 (Acetaminophen w/Codeine) - take 1 tab by mouth every 8-12 hrs; maximum of 2 tabs per day.        Interval history from last visit on 1/10/24:   Pain score today: Worst Pain (10)   Since the last visit, Mustapha Negrete reports:   He is taking Belbucca 600mcg in mid-morning and 1/2 dose in evening.  He tried acupuncture and did not find significant relief. He is scheduled for surgical consultation for pudendal nerve release on 1/24/24. He reports some pain relief with Tylenol #3 and Belbucca.  His current goal is to manage pain until potential surgical procedure with a pudendal nerve specialist in [place].  He does admit to THC use 2-3 times per day; offers relief from severe pain.    HPI from initial visit on 12/13/23:   Mustapha Negrete is a 63 year old male with history of  peudendal neuropathy    Pain started with COVID infection three years. Shingles developed after COVID resolved. Experienced numbness in perineum and coccyx area. Pain progressed to sharp, burning constant over a period of several months. Pain radiates posteriorly into BLEs. He had lumbar fusion in April 2023 with no benefit. Has history of prostate issues and urolift surgery. He reports urinary and bowel retention after urolift procedure. He has been evaluated by [facility] and expects to have peudendal nerve entrapment release. Past history includes LESIs, Lumbar fusion.   Can fall asleep okay but wakes with burning pain and sensation of full bladder; 5-6 hours of broken sleep per night.         REVIEW OF  SYSTEMS:   ROS: 10 point ROS neg other than the symptoms noted above in the HPI.     The patient otherwise denies red flag symptoms, such as: thunderclap headache, bowel or bladder incontinence, parasthesias, weakness, saddle anesthesia, unintentional weight loss, or fever/chills/sweats.       Medical History: Any changes in medical history since they were last seen? No    Medications and Allergies:   Current Outpatient Medications   Medication    acetaminophen (TYLENOL) 500 MG tablet    albuterol (PROAIR HFA/PROVENTIL HFA/VENTOLIN HFA) 108 (90 Base) MCG/ACT inhaler    Buprenorphine HCl (BELBUCA) 600 MCG FILM buccal film    Ergocalciferol (VITAMIN D2 PO)    FLOMAX 0.4 MG capsule    naloxone (NARCAN) 4 MG/0.1ML nasal spray    tiZANidine (ZANAFLEX) 4 MG tablet    acetaminophen-codeine (TYLENOL #3) 300-30 MG per tablet    lisinopril (ZESTRIL) 10 MG tablet     No current facility-administered medications for this visit.      Allergies   Allergen Reactions    Droperidol Other (See Comments)     Extrapyramidal Side Effect    Fenofibrate Headache and Diarrhea             Fentanyl      Other reaction(s): N&V hard to wake up    Keflex [Cephalexin] Itching    Lactose GI Disturbance         Other reaction(s): GI upset    Midazolam      Other reaction(s): N&V, Hard to wake up    Monosodium Glutamate      Other reaction(s): diarrhea, headaches    Pcn [Penicillins] Other (See Comments)     Feels warm and flushed, but tolerates Cephalexin    Atorvastatin Palpitations     Other reaction(s): palpitations    Sertraline Palpitations         Other reaction(s): Unknown    Simvastatin Palpitations     Other reaction(s): palpitations    Sulfa Antibiotics Headache and Rash     Burning    Other reaction(s): Rash  Other reaction(s): rash and headache    Tetracycline Rash     Burning    Other reaction(s): rash        Review of [place] Prescription Monitoring Program ():  reviewed on 3/13/24. No concern for abuse or misuse of  controlled medications based on this report. (Belbuca, Tylenol #3)   Current MME: 0-36 / day (depending on patient compliance with Belbuca)        CSA due date: 1/10/25   UDS due date:  12/23/24     THE 4 As OF OPIOID MAINTENANCE ANALGESIA    Analgesia: Is pain relief clinically significant? NO   Activity: Is patient functional and able to perform Activities of Daily Living? YES   Adverse effects: Is patient free from adverse side effects from opiates? NO   Adherence to Rx protocol: Is patient adhering to Controlled Substance Agreement and taking medications ONLY as ordered? NO    Is Narcan prescribed for opiate use >50 MME daily or concurrent use of opiates and benzodiazepines? N/A          Objective    OBJECTIVE:    Physical Exam  Vitals:    03/13/24 0927   BP: (!) 167/86   Pulse: 60        Appearance:   A&O. Patient is appropriate.   Patient is in NAD.      HHENT:  Normocephalic, atraumatic. Eyes without conjunctival injection or jaundice. Neck supple. No obvious neck masses.     Pulmonary:  Normal effort; no cough or audible wheeze      Skin: No obvious rash, lesions, or petechiae of exposed skin.    Neuro: Cranial nerves grossly intact.  Mentation and speech appropriate for age.     Psych:  Alert, without lethargy or stupor. Speech fluent. Appropriate affect. Mood normal. Able to follow commands without difficulty. Normal mood, judgement and behavior.        Gait pattern:   Patient has an antalgic gait pattern:YES  Gait favors the neither side.  Mobility and/or assistive devices? NO                Diagnostic Tests/ Imaging/ Labs resulted since last visit:   MRI Left Hip 2/10/24  FINDINGS:  Pelvis osseous structures:  Sacrum: No fracture or destructive osseous lesion is seen of the imaged portions of the sacrum.  Sacroiliac joints: No convincing evidence of sacroiliitis of the imaged portions of the sacroiliac joints.  Pubic rami: Unremarkable.  Symphysis pubis: There is no evidence of acute osteitis pubis.  Hip  joint:  There are surgical changes status post total left hip arthroplasty. No periprosthetic fracture, dislocation, loosening, or osteolysis is seen.  Myotendinous structures:  Gluteus abductors: The gluteus minimus and medius tendons are unremarkable.  Rectus abdominis-adductor longus aponeurosis, adductors, and rectus abdominis: Unremarkable.  Hamstrings: There is mild tendinopathy and ill-defined low-grade partial tearing of the left conjoined hamstring tendon at the left ischial tuberosity attachment.  Flexors: The iliopsoas and rectus femoris tendons are intact.  Quadratus femoris muscle: Unremarkable.  Gluteal aponeurotic fascia and IT band: Unremarkable.  Pelvic soft tissues:  Susceptibility artifact within the prostate, unchanged compared to previous MRI 2/10/2024.  No mass lesion compressing upon the left pudendal nerve is seen.      MRI Right Hip 2/15/24  FINDINGS:    Hip joint:  Physiologic hip effusion. There is chondromalacia and chondral heterogeneity about the hip with grade 3 chondral thinning anterosuperiorly and centrally (coronal series 117 image 10, and sagittal series 121 image 14). Additional small region of chondromalacia and grade 3 thinning is seen posterosuperiorly. No intra-articular bodies.    Labrum:  Attenuation and degenerative tearing is seen throughout the anterior and anterosuperior labrum. No evidence of paralabral ganglion cyst.  Proximal femur:  No femoral occult fracture, stress injury, marrow edema or osteonecrosis.  No convincing femoral cam morphology.  Mild osseous spurring is seen along the inferior margin of the femoral head/neck.    Acetabulum:  No subchondral cysts, periacetabular ossicles or marrow edema.  Ligamentum teres: Ligamentum teres is intact and unremarkable.    Pelvis osseous structures:  Sacral ala and sacroiliac joints: No stress/insufficiency fractures or marrow edema/pathology. No demonstrable sacroiliitis.  Pubic rami and pubic symphysis: No  stress/insufficiency fractures or marrow edema/pathology. Normal alignment without hypertrophy or evidence of ongoing osteitis pubis.    Myotendinous structures:  Gluteus abductors: Mild tendinosis of the gluteus minimus tendon at the greater trochanter attachment, without tear. Gluteus medius appears intact.  Adductors: No demonstrable tendinopathy or strain/tear.  Hamstrings: Mild tendinosis with low-grade undersurface tearing/deep fiber stripping of the common hamstrings tendons at the ischial tuberosity origin.  Flexors: Intact iliopsoas and rectus femoris, without strain/tear.  External rotators: Intact, without demonstrable ischiofemoral impingement.  Gluteal aponeurotic fascia and IT band: Unremarkable.    Bursae:  Mild soft tissue thickening involving the region of the greater trochanter bursa without discrete bursitis.    Intrapelvic contents:  Free fluid: No free fluid seen within the pelvis.  Pelvic viscera: Regions of susceptibility artifact in the expected location of the prostate in keeping with history of prior surgery. A posterior bladder diverticulum is noted measuring up to 1.5 cm (sagittal series 122 image 6).    Lymph nodes: No lymphadenopathy by MRI size criteria.    Neurovascular structures: No discrete cyst, mass or other compression upon the portions visualized of sciatic or femoral nerves. While the pudendal nerve is not well visualized due to size, no compressing mass or well-defined varicosity is seen along the expected trajectory of the nerve.    Lumbar spine: Susceptibility artifact involving the L4-S1 level in keeping with history of prior surgery.  Susceptibility artifact related to contralateral left total hip arthroplasty.    IMPRESSION:  1. Right hip joint degenerative change with grade 3 chondral thinning as described above predominantly involving the anterosuperior periphery and posterosuperior articular cartilage. Degenerative fraying and tearing of the anterior and  anterosuperior labrum. No expansile joint effusion.  2. Mild gluteus minimus insertional tendinosis on the greater trochanter, without tear. Mild thickening and inflammation in the greater trochanteric bursa without discrete bursitis.  3. Mild common hamstrings tendinosis at the ischial tuberosity origin, with low-grade undersurface tearing/deep fiber stripping.  4. While the pudendal nerve is not well visualized due to size, no compressing mass or well-defined varicosity is seen along the expected trajectory of the nerve.  5. Susceptibility artifact in the expected location of the prostate keeping with history of prior surgery.  6. Small posterior bladder diverticulum.      IMPRESSION:    1. Surgical changes status post total left hip arthroplasty without prosthetic fracture, dislocation, loosening, or osteolysis.  2. Mild tendinopathy and ill-defined low-grade partial tearing of the left conjoined hamstring tendon at the left ischial tuberosity attachment.  3. No mass lesion compressing upon the left pudendal nerve.  4. No fracture, osseous stress injury, or tendinous injury of the left hip.        Assessment & Plan    ASSESSMENT AND PLAN:     Mustapha Negrete is seen in follow up today at the pain clinic for pudendal neuralgia.     Visit discussion: Mustapha has initiated interventional pain management with [facility] pain providers.  He reports no significant improvement in pain with use of buprenorphine.  Although, he historically has not taken buprenorphine dosing as prescribed citing constipation as a significant issue.  At this time, I do not recommend any opioid medications for management of his chronic pain.  Opioids will only worsen his constipation and are not indicated for neuropathic pain.  Discontinued orders for Belbuca 600 mcg films and no plans to refill Tylenol 3.  Acupuncture has been beneficial; strongly encouraged ongoing acupuncture treatment.  Continue with [facility] pain providers for interventional  treatment of the pudendal nerve pain.  There is no need to return to this clinic for ongoing pain management.  Patient verbalizes understanding and agreement with plan.      (G58.8) Pudendal neuralgia  (primary encounter diagnosis)  (R10.2,  G89.29) Perineal pain in male, chronic             PATIENT INSTRUCTIONS:     The following recommendations were given to the patient. Diagnosis, treatment options, risks, benefits, and alternatives were discussed; all questions were answered. Self-care instructions were given. The patient expressed understanding of the plan for management. I am recommending a multidisciplinary treatment plan to help this patient better manage his pain.     Medication Management:   - STOP taking Belbuca 600mcg films because they are not helpful for your pain control      Continue Current Treatment Plan with:   - [facility] for interventional treatment of pudendal nerve pain   - Acupuncture  - Regular exercise as tolerated        Return to Clinic:   -  No additional follow-up with Pain Management Program. Return to your referring provider for ongoing care.      ___________________________________________________________________    BILLING TIME DOCUMENTATION:   TOTAL TIME includes:   Time spent preparing to see the patient: 5 minutes (reviewing records and tests)  Time spend face to face with the patient: 27 minutes  Time spent ordering tests, medications, procedures and referrals: 0 minutes  Time spent Referring and communicating with other healthcare professionals: 0 minutes  Documenting clinical information in Epic: 5 minutes    The total TIME spent on this patient on the day of the appointment was 37 minutes.     ___________________________________________________________________    Review of Electronic Chart: Today I have also reviewed available medical information in the patient's medical record at [facility] ([facility]) and Care Everywhere (if available), including relevant  provider notes, laboratory work, and imaging.     MICAELA Herman, ZEUS, FNP-C   [facility]

## 2024-03-13 NOTE — PATIENT INSTRUCTIONS
PATIENT INSTRUCTIONS:   The following recommendations were given to the patient. Diagnosis, treatment options, risks, benefits, and alternatives were discussed; all questions were answered. Self-care instructions were given. The patient expressed understanding of the plan for management. I am recommending a multidisciplinary treatment plan to help this patient better manage his pain.   Medication Management:   - STOP taking Belbuca 600mcg films because they are not helpful for your pain control    Continue Current Treatment Plan with:   - [facility] for interventional treatment of pudendal nerve pain   - Acupuncture  - Regular exercise as tolerated     Return to Clinic:   -  No additional follow-up with Pain Management Program. Return to your referring provider for ongoing care.

## 2024-03-13 NOTE — NURSING NOTE
12/13/2023     8:03 AM 1/10/2024    11:08 AM 3/13/2024     9:33 AM   PEG Score   PEG Total Score 10 10 10

## 2024-03-13 NOTE — PROGRESS NOTES
03/13/24 0933   PEG: A Thee-Item Scale Assessing Pain Intensity and Interference        0 = No pain / No interference    10 = Pain as bad as you can imagine / Completely interferes   What number best describes your pain on average in the past week? 10   What number best describes how, during the past week, pain has interfered with your enjoyment of life? 10   What number best describes how, during the past week, pain has interfered with your general activity? 10   PEG Total Score 10

## 2024-03-19 ENCOUNTER — TELEPHONE (OUTPATIENT)
Dept: PALLIATIVE MEDICINE | Facility: OTHER | Age: 64
End: 2024-03-19
Payer: MEDICARE

## 2024-03-19 DIAGNOSIS — G58.8 PUDENDAL NEURALGIA: Primary | ICD-10-CM

## 2024-03-19 DIAGNOSIS — K62.89 RECTAL PAIN: ICD-10-CM

## 2024-03-19 DIAGNOSIS — M79.18 MYOFASCIAL MUSCLE PAIN: ICD-10-CM

## 2024-03-19 NOTE — TELEPHONE ENCOUNTER
"[facility] Call Center    Phone Message    May a detailed message be left on voicemail: yes     Reason for Call: Pt said that since he stopped taking the Belbuca, he realized that it was helping him because now he is in such \"intense pain\".  Pt wants to know if he can start back up on the Belbuca.  Please call Pt back to discuss.  Thanks.  "

## 2024-03-19 NOTE — TELEPHONE ENCOUNTER
Pt wants to restart belbuca as he realizes now that it was benefical    Per MICAELA Herman CNP's 3/13/24/2024 visit plan:              Medication Management:   -STOP taking Belbuca 600mcg films because they are not helpful for your pain control              Return to Clinic:   -  No additional follow-up with Pain Management Program. Return to your referring provider for ongoing care.      Routed to provider    Jess RN-BSN  [facility]   [phone]

## 2024-03-20 NOTE — TELEPHONE ENCOUNTER
Destiny He, ZEUS  Pain Nurse20 hours ago (5:47 PM)     LO  I had essentially discharged patient because he is following up with [facility] for pudendal neuropathy. He has not been compliant with consistent dosing of Belbuca since before he presented to my office. He also overused Tylenol #3 and requested a refill nearly two weeks early; even though I told him no early refills. At our last appointment, he states he gets better pain control from marijuana than anything else. Even if he was cutting in half, he is still not refilling on a cycle that shows consistent use. In addition, he stated that it is not helpful.    He states that he has been consistent with Belbuca; however, PDMP shows that he has not. He also had significant dose escalation from June through September 2023 and now reports he takes 1/2 films.  5/24/24       Belbuca 300mcg #60  6/21/23       Belbuca 300mcg #60  7/27/23       Belbuca 450mcg #30  8/10/23       Belbuca 450mcg #60  9/8/23         Belbuca 600mcg #30  10/06/23     Belbuca 600mcg #60  1/15/24       Belbcua 600mcg #60        If I resume Belbuca, it will be at a lower dose and I expect him to comply with BID dosing and more frequent UDS cycle. This is not a PRN medication and he has been using it inconsistently for several months.    Destiny He, MICAELA, ZEUS, FNP-C

## 2024-03-21 ENCOUNTER — OFFICE VISIT (OUTPATIENT)
Dept: FAMILY MEDICINE | Facility: CLINIC | Age: 64
End: 2024-03-21
Payer: MEDICARE

## 2024-03-21 VITALS
DIASTOLIC BLOOD PRESSURE: 70 MMHG | BODY MASS INDEX: 32.52 KG/M2 | SYSTOLIC BLOOD PRESSURE: 124 MMHG | TEMPERATURE: 98.1 F | HEIGHT: 65 IN | WEIGHT: 195.2 LBS | RESPIRATION RATE: 18 BRPM | HEART RATE: 98 BPM | OXYGEN SATURATION: 97 %

## 2024-03-21 DIAGNOSIS — R30.0 DYSURIA: Primary | ICD-10-CM

## 2024-03-21 LAB
ALBUMIN UR-MCNC: ABNORMAL MG/DL
APPEARANCE UR: CLEAR
BACTERIA #/AREA URNS HPF: ABNORMAL /HPF
BILIRUB UR QL STRIP: NEGATIVE
COLOR UR AUTO: YELLOW
GLUCOSE UR STRIP-MCNC: NEGATIVE MG/DL
HGB UR QL STRIP: NEGATIVE
KETONES UR STRIP-MCNC: NEGATIVE MG/DL
LEUKOCYTE ESTERASE UR QL STRIP: ABNORMAL
NITRATE UR QL: NEGATIVE
PH UR STRIP: 6 [PH] (ref 5–7)
RBC #/AREA URNS AUTO: ABNORMAL /HPF
SP GR UR STRIP: 1.02 (ref 1–1.03)
UROBILINOGEN UR STRIP-ACNC: 0.2 E.U./DL
WBC #/AREA URNS AUTO: ABNORMAL /HPF

## 2024-03-21 PROCEDURE — 81001 URINALYSIS AUTO W/SCOPE: CPT | Performed by: PHYSICIAN ASSISTANT

## 2024-03-21 PROCEDURE — 87086 URINE CULTURE/COLONY COUNT: CPT | Performed by: PHYSICIAN ASSISTANT

## 2024-03-21 PROCEDURE — 99213 OFFICE O/P EST LOW 20 MIN: CPT | Performed by: PHYSICIAN ASSISTANT

## 2024-03-21 RX ORDER — RESPIRATORY SYNCYTIAL VIRUS VACCINE 120MCG/0.5
0.5 KIT INTRAMUSCULAR ONCE
Qty: 1 EACH | Refills: 0 | Status: CANCELLED | OUTPATIENT
Start: 2024-03-21 | End: 2024-03-21

## 2024-03-21 ASSESSMENT — PAIN SCALES - GENERAL: PAINLEVEL: WORST PAIN (10)

## 2024-03-21 ASSESSMENT — ASTHMA QUESTIONNAIRES
ACT_TOTALSCORE: 23
QUESTION_4 LAST FOUR WEEKS HOW OFTEN HAVE YOU USED YOUR RESCUE INHALER OR NEBULIZER MEDICATION (SUCH AS ALBUTEROL): ONCE A WEEK OR LESS
QUESTION_5 LAST FOUR WEEKS HOW WOULD YOU RATE YOUR ASTHMA CONTROL: WELL CONTROLLED
QUESTION_3 LAST FOUR WEEKS HOW OFTEN DID YOUR ASTHMA SYMPTOMS (WHEEZING, COUGHING, SHORTNESS OF BREATH, CHEST TIGHTNESS OR PAIN) WAKE YOU UP AT NIGHT OR EARLIER THAN USUAL IN THE MORNING: NOT AT ALL
QUESTION_2 LAST FOUR WEEKS HOW OFTEN HAVE YOU HAD SHORTNESS OF BREATH: NOT AT ALL
QUESTION_1 LAST FOUR WEEKS HOW MUCH OF THE TIME DID YOUR ASTHMA KEEP YOU FROM GETTING AS MUCH DONE AT WORK, SCHOOL OR AT HOME: NONE OF THE TIME
ACT_TOTALSCORE: 23

## 2024-03-21 NOTE — TELEPHONE ENCOUNTER
[facility] Call Center    Phone Message    May a detailed message be left on voicemail: yes     Reason for Call: Other: Patient calling back to speak with a nurse regarding his medication.      Action Taken: Message routed to:  Other: [facility]    Travel Screening: Not Applicable

## 2024-03-21 NOTE — TELEPHONE ENCOUNTER
"Contacted patient to relay provider message.   Patient responds \" \"See what I'm taking ... What I'm doing I have 600 and I cut them in half and I take one at night.\"     \"It takes the edge off a little bit.... I am taking it consistently.\"   \"I had an injection two hours.. three hours ago for the pudendal nerve and then I see the doctor that ordered them up on the second.\"     Patient states he would be agreeable to taking medication at lower dose.  Writer reiterates expectations of more frequent labs and medication compliance.  Patient verbalizes understanding and agreement with this.     Routing to provider to review and advise.     Yanira Lynn RN  [facility]  [phone]    "

## 2024-03-21 NOTE — Clinical Note
Claudy Dixon, I [facility] Mustapha [facility]. I told him I would copy you on our visit as he is hoping to continue on Belbuca and it appears you have been managing this. He has been breaking his doses in half to ration what he has left. Wanted you to be aware.  Marito Ta

## 2024-03-21 NOTE — TELEPHONE ENCOUNTER
Pt needs to be aware that we are not a walk-in clinic.    Called pt. And left message on pt's personal voice mail to call back for the plan.  It was mentioned that this clinic is not a walk-in clinic and to [employer] retargeting or [phone].      Jess RN-BSN  [facility]   [phone]

## 2024-03-21 NOTE — PATIENT INSTRUCTIONS
Mary Luciano,    Thank you for allowing [facility] to manage your care.    I am unsure of the cause of your symptoms, but your exam is reassuring. We will see what our workup shows.     If you develop worsening/changing symptoms at any time, please be seen in clinic/urgent care.    Please allow 1-2 business days for our office to contact you in regards to your laboratory/radiological studies.  If not done so, I encourage you to login into [employer] ([email or web address] to review your results as well.     If you have any questions or concerns, please feel free to call us at [phone]    Sincerely,    Marito Mancilla PA-C    Did you know?      You can schedule a video visit for follow-up appointments as well as future appointments for certain conditions.  Please see the below link.     [email or web address]    If you have not already done so,  I encourage you to sign up for [employer] ([email or web address]).  This will allow you to review your results, securely communicate with a provider, and schedule virtual visits as well.

## 2024-03-21 NOTE — PROGRESS NOTES
Assessment & Plan   Problem List Items Addressed This Visit    None  Visit Diagnoses       Dysuria    -  Primary    Relevant Orders    UA Macroscopic with reflex to Microscopic and Culture - Lab Collect (Completed)    UA Microscopic with Reflex to Culture (Completed)    Urine Culture Aerobic Bacterial - lab collect           Mustapha Negrete is a 63 year old male here with chronic pelvic pain and dysuria likely due to chronic pain from pudendal neuralgia.    Exam without any abdominal, pelvic or flank tenderness. Pt is afebrile & has not been vomiting. UA is not suggestive of UTI. UC also reassuring. FU with me/PCP in 3 days for recheck as needed. If symptoms worsen, develop back pain/fevers/vomiting go to ER for further treatment. Otherwise, patient is on a waiting list for reported pudendal ablation in [place] with a specialist and he has a call in to his pain specialist to discuss getting back on Belbuca, though it sounds as if he has been rationing what he has left. Per notes, pain was hoping to start him on a lower dose, so I would be more comfortable for this to be managed by them until dose is stable.    Complete history and physical exam as below. Afebrile with normal vital signs.    DDx and Dx discussed with and explained to the pt to their satisfaction.  All questions were answered at this time. Pt expressed understanding of and agreement with this dx, tx, and plan. No further workup warranted and standard medication warnings given. I have given the patient a list of pertinent indications for re-evaluation. Will go to the Emergency Department if symptoms worsen or new concerning symptoms arise. Patient left in no apparent distress.     Ordering of each unique test  27 minutes spent by me on the date of the encounter doing chart review, history and exam, documentation and further activities per the note     BMI  Estimated body mass index is 32.25 kg/m  as calculated from the following:    Height as of this  "encounter: 1.657 m (5' 5.24\").    Weight as of this encounter: 88.5 kg (195 lb 3.2 oz).     See Patient Instructions      Zhang Luciano is a 63 year old, presenting for the following health issues:  Urinary Problem        3/21/2024     8:15 AM   Additional Questions   Roomed by Padmini Carroll CMA   Accompanied by N/A         3/21/2024     8:15 AM   Patient Reported Additional Medications   Patient reports taking the following new medications Nitraoxide     History of Present Illness       Reason for visit:  Infections possible    He eats 0-1 servings of fruits and vegetables daily.He consumes 1 sweetened beverage(s) daily.He exercises with enough effort to increase his heart rate 60 or more minutes per day.  He exercises with enough effort to increase his heart rate 4 days per week.   He is taking medications regularly.     Patient is coming in today with burning with urination worse over the last ~6 months. No rashes or sores in groin. On and off fevers, no discharge, orange-like urine sometimes, no abdominal pain, no back pain.  There is also itching. Pudendal nerve block last week gave him a few hours of relief. May be having an ablation in the near future.     Review of Systems  Constitutional, HEENT, cardiovascular, pulmonary, gi and gu systems are negative, except as otherwise noted.      Objective    /70   Pulse 98   Temp 98.1  F (36.7  C) (Temporal)   Resp 18   Ht 1.657 m (5' 5.24\")   Wt 88.5 kg (195 lb 3.2 oz)   SpO2 97%   BMI 32.25 kg/m    Body mass index is 32.25 kg/m .  Physical Exam  Vitals and nursing note reviewed. Exam conducted with a chaperone present (Milady Lindsey RN).   Constitutional:       General: He is not in acute distress.     Appearance: He is not ill-appearing or diaphoretic.   HENT:      Head: Normocephalic and atraumatic.      Mouth/Throat:      Mouth: Mucous membranes are moist.   Eyes:      Conjunctiva/sclera: Conjunctivae normal.   Cardiovascular:      Rate and " Rhythm: Normal rate and regular rhythm.      Heart sounds: Normal heart sounds. No murmur heard.     No friction rub. No gallop.   Pulmonary:      Effort: Pulmonary effort is normal. No respiratory distress.      Breath sounds: Normal breath sounds. No stridor. No wheezing, rhonchi or rales.   Abdominal:      General: Bowel sounds are normal. There is no distension.      Palpations: Abdomen is soft. There is no mass.      Tenderness: There is no abdominal tenderness. There is no right CVA tenderness, left CVA tenderness, guarding or rebound.      Hernia: No hernia is present.   Genitourinary:     Comments: Circumcised. No blood or discharge at the urethral meatus. No inguinal hernia or adenopathy. Scrotal contents non-tender. No crepitus or tenderness to the perineum.  Skin:     General: Skin is warm and dry.   Neurological:      General: No focal deficit present.      Mental Status: He is alert. Mental status is at baseline.   Psychiatric:         Mood and Affect: Mood normal.         Behavior: Behavior normal.          Results for orders placed or performed in visit on 03/21/24   UA Macroscopic with reflex to Microscopic and Culture - Lab Collect     Status: Abnormal    Specimen: Urine, Midstream   Result Value Ref Range    Color Urine Yellow Colorless, Straw, Light Yellow, Yellow    Appearance Urine Clear Clear    Glucose Urine Negative Negative mg/dL    Bilirubin Urine Negative Negative    Ketones Urine Negative Negative mg/dL    Specific Gravity Urine 1.025 1.003 - 1.035    Blood Urine Negative Negative    pH Urine 6.0 5.0 - 7.0    Protein Albumin Urine Trace (A) Negative mg/dL    Urobilinogen Urine 0.2 0.2, 1.0 E.U./dL    Nitrite Urine Negative Negative    Leukocyte Esterase Urine Trace (A) Negative   UA Microscopic with Reflex to Culture     Status: Abnormal   Result Value Ref Range    Bacteria Urine Few (A) None Seen /HPF    RBC Urine 2-5 (A) 0-2 /HPF /HPF    WBC Urine 5-10 (A) 0-5 /HPF /HPF    Narrative     Urine Culture not indicated   Urine Culture Aerobic Bacterial - lab collect     Status: None    Specimen: Urine, Midstream   Result Value Ref Range    Culture <10,000 CFU/mL Mixture of Urogenital Allison            Signed Electronically by: DARVIN Wiggins

## 2024-03-21 NOTE — TELEPHONE ENCOUNTER
Eric Tobar   Creation Time: 3/21/2024  7:54 AM     Patient stopped in clinic to let us know he is unable to stop the medication of Belbcua 600mcg and will need to get a refill - he said the pain is too intense not to take the medication - he also stated he didn't realize how much it was actually helping.  Patient stated he sent a message a few days ago and hasn't heard back.  He would like a return call on this and it is ok to leave a detailed message if he doesn't answer.     Thank you,  Eric Tobar - PSC

## 2024-03-22 LAB — BACTERIA UR CULT: NORMAL

## 2024-03-22 NOTE — TELEPHONE ENCOUNTER
3/22/2024 4:22 PM     REFILL REQUEST:     This is a patient of mine. PDMP and Chart have been reviewed; sending in order for Belbuca 150mcg q12h.    Refilled for 30 day supply.  Script e-Prescribed to pharmacy    MICAELA Herman, DNP, FNP-C

## 2024-04-05 NOTE — LETTER
February 9, 2024    MUSTAPHA WASHINGTON  [address] [address] [address]    Dear  Mustapha,    Welcome to [facility] s MSC+ health program. My name is Tammy Ocampo RN. I am your MSC+ care coordinator. You are eligible for Care Coordination through [facility] MSC+ plan.    As your care coordinator, we ll:  Meet to go over your care coordination benefits  Talk about your physical and mental health care needs   Review your preventative care needs  Create a plan that meets your needs with the services you choose    What happens next?  I ll call you soon to introduce myself and tell you more about my role. We ll then plan time to go over your health and safety needs. Our goal is to keep you as healthy and independent as possible.    Soon, you will receive a new MSC+ member identification (ID) card from [facility]. When you receive it, please use this card along with your [place] Health Care Programs card and Prescription Drug Coverage Program card. When you receive, it please use this card where you get your health services. If you have Medicare, you will need to show your Medicare card when you get health services.    The [facility] care coordination program is voluntary and offered to you at no cost. If you wish to stop being in the care coordination program or have questions, call me at [phone]. If you reach my voicemail, leave a message and your phone number. TTY users, call the [place] Relay at 330 or [phone] (dvepwc-gg-ntyevf relay service).    Sincerely,      Tammy Ocampo RN  [phone]  [email or web address]    [id number] accepted   [id number]       [id number] (07/2022)                               ----- Message from Severo Pineda NP sent at 4/2/2024  6:41 PM CDT -----  Hi nurses    Please call patient with normal CMP, CBC lab results.      ThanksValeriy

## 2024-04-10 ENCOUNTER — OFFICE VISIT (OUTPATIENT)
Dept: PALLIATIVE MEDICINE | Facility: CLINIC | Age: 64
End: 2024-04-10
Attending: NURSE PRACTITIONER
Payer: MEDICARE

## 2024-04-10 VITALS — DIASTOLIC BLOOD PRESSURE: 90 MMHG | SYSTOLIC BLOOD PRESSURE: 148 MMHG | HEART RATE: 71 BPM

## 2024-04-10 DIAGNOSIS — G89.29 PERINEAL PAIN IN MALE, CHRONIC: Primary | ICD-10-CM

## 2024-04-10 DIAGNOSIS — M79.18 MYOFASCIAL MUSCLE PAIN: ICD-10-CM

## 2024-04-10 DIAGNOSIS — R10.2 PERINEAL PAIN IN MALE, CHRONIC: Primary | ICD-10-CM

## 2024-04-10 DIAGNOSIS — F11.90 CHRONIC, CONTINUOUS USE OF OPIOIDS: ICD-10-CM

## 2024-04-10 PROCEDURE — 99213 OFFICE O/P EST LOW 20 MIN: CPT | Performed by: NURSE PRACTITIONER

## 2024-04-10 ASSESSMENT — PAIN SCALES - GENERAL: PAINLEVEL: WORST PAIN (10)

## 2024-04-10 NOTE — PATIENT INSTRUCTIONS
PATIENT INSTRUCTIONS:     I am recommending a multidisciplinary treatment plan to help this patient better manage his pain. The following recommendations were given to the patient. Diagnosis, treatment options, risks, benefits, and alternatives were discussed; all questions were answered. Self-care instructions were given. The patient expressed understanding of the plan for management.   Medication Management:   - INCREASE dose of Belbuca to 150mcg film - place one film inside cheek every 8 hours; max 3 per day for chronic pain . You have enough medication for 7 days and would be due for a refill next week. If you have better pain control with three (3) films per day, then I will renew your next refill for Belbuca 150mcg three times per day. If this does not improve your pain, then I will send your next refill as Belbuca 300mcg one film every 12 hours (2 per day).   Continue Current Treatment Plan with:   - Interventional injections through [facility]     Return to Clinic:   -  30-minute Video or In-Person Appointment with Destiny He, ZEUS, APRN, HONGC in 2 months, or sooner if needed      ----------------------------------------------------------------  Clinic Number:  [phone]   Call with any questions about your care and for scheduling assistance.   Calls are returned Monday through Friday between 8 AM and 4:30 PM. We usually get back to you within 2 business days depending on the issue/request.    If we are prescribing your medications:  For opioid medication refills, call the clinic or send a [employer] message 7 days in advance.  Please include:  Name of requested medication  Name of the pharmacy.  For non-opioid medications, call your pharmacy directly to request a refill. Please allow 3-4 days to be processed.   Per [place] State Law:  All controlled substance prescriptions must be filled within 30 days of being written.    For those controlled substances allowing refills, pickup must occur within 30  days of last fill.      We believe regular attendance is key to your success in our program!    Any time you are unable to keep your appointment we ask that you call us at least 24 hours in advance to cancel.This will allow us to offer the appointment time to another patient.   Multiple missed appointments may lead to dismissal from the clinic.

## 2024-04-10 NOTE — PROGRESS NOTES
04/10/24 0945   PEG: A Thee-Item Scale Assessing Pain Intensity and Interference        0 = No pain / No interference    10 = Pain as bad as you can imagine / Completely interferes   What number best describes your pain on average in the past week? 10   What number best describes how, during the past week, pain has interfered with your enjoyment of life? 10   What number best describes how, during the past week, pain has interfered with your general activity? 10   PEG Total Score 10

## 2024-04-10 NOTE — NURSING NOTE
1/10/2024    11:08 AM 3/13/2024     9:33 AM 4/10/2024     9:45 AM   PEG Score   PEG Total Score 10 10 10

## 2024-04-10 NOTE — PROGRESS NOTES
"[facility]         Date of Visit: 4/10/2024    CHIEF COMPLAINT:   Chief Complaint   Patient presents with    Pain       Subjective   SUBJECTIVE    INTERVAL HISTORY:   Mustapha Negrete is a 63 year old male seen for INITIAL EVALUATION on 12/13/23; last seen for FOLLOW UP on 3/13/24.  They are a patient of mine and returning today for pudendal neuralgia.      Since the last visit, Mustapha Negrete reports:   Pain score today: Worst Pain (10)   Pain rating: intensity ranges from 10/10 to 10/10, and averages 10/10 on a 0-10 scale.   Mustapha underwent lateral femoral cutaneous nerve block on 3/22/24 at [facility] and experienced mild benefit with improved muscle spasm and anesthesia of pudendal region for several hours afterward.    Today, he reports Belbuca was more effective than he expected and he resumed Belbuca 150mcg at 11am and 11pm. He did not like the \"high\" side effects of taking Belbuca 600mcg BID; which is why he was taking 1/2 films. On April 25, 2024 he is scheduled for CT guided pud[facility]al [facility] block at [facility].         Recommendations/ Plan at the last visit included:  Visit discussion: Mustapha has initiated interventional pain management with [facility] pain providers.  He reports no significant improvement in pain with use of buprenorphine.  Although, he historically has not taken buprenorphine dosing as prescribed citing constipation as a significant issue.  At this time, I do not recommend any opioid medications for management of his chronic pain.  Opioids will only worsen his constipation and are not indicated for neuropathic pain.  Discontinued orders for Belbuca 600 mcg films and no plans to refill Tylenol 3.  Acupuncture has been beneficial; strongly encouraged ongoing acupuncture treatment.  Continue with [facility] pain providers for interventional treatment of the pudendal nerve pain.  There is no need to return to this clinic for ongoing pain management.  Patient verbalizes " "understanding and agreement with plan.   Medication Management:   - STOP taking Belbuca 600mcg films because they are not helpful for your pain control    Continue Current Treatment Plan with:   - [facility] for interventional treatment of pudendal nerve pain   - Acupuncture  - Regular exercise as tolerated     Return to Clinic:   -  No additional follow-up with Pain Management Program. Return to your referring provider for ongoing care.           Interval history from last visit on 3/13/24:   Pain score today: Worst Pain (10)   Pain rating: intensity ranges from 10/10 to 10/10, and averages 10/10 on a 0-10 scale.   Since the last visit, Mustapha BAUMANN Caden reports:   He had a consultation with specialist in [place] regarding pudendal nerve surgery.  He is scheduled with an interventional neurologist, Dr. Zak Mcarthur, at [facility] in [place] today for at CT Pudendal Nerve Block.    Taking Belbuca 600mcg film one film per day; does not improve pain but \"takes the edge off\". He reports 7-8 films remaining today; reports  using 1/2 film BID.  Last fill was 1/15/24. Has been trying acupuncture; completed 12 appointments and is getting some relief. Has also tried 12 visits of red light therapy; not helpful.   Had an old prescription of tizanidine; finds some relief.  Using Dulcolax and Senna daily; moves bowels every 3-4 days.             REVIEW OF SYSTEMS:   ROS: 10 point ROS neg other than the symptoms noted above in the HPI.     The patient otherwise denies red flag symptoms, such as: thunderclap headache, bowel or bladder incontinence, parasthesias, weakness, saddle anesthesia, unintentional weight loss, or fever/chills/sweats.     Medical History: Any changes in medical history since they were last seen? No    Medications and Allergies reviewed. Yes     Review of [place] Prescription Monitoring Program ():  reviewed on 4/10/24. No concern for abuse or misuse of controlled medications based on this " report. Controlled substances prescribed in the past 12 months include: (Belbuca 150cmg)     Current MME: 9 / day    Current controlled substance medications, if any, are being prescribed by: Pain Management    Primary Care Provider is [facility]           Objective    OBJECTIVE:    Physical Exam  Vitals:    04/10/24 0941   BP: (!) 148/90   Pulse: 71        Appearance:   A&O. Patient is appropriate.   Patient is in NAD.      HHENT:  Normocephalic, atraumatic. Eyes without conjunctival injection or jaundice. Neck supple. No obvious neck masses.     Pulmonary:  Normal effort; no cough or audible wheeze      Skin: No obvious rash, lesions, or petechiae of exposed skin.    Neuro: Cranial nerves grossly intact.  Mentation and speech appropriate for age.     Psych:  Alert, without lethargy or stupor. Speech fluent. Appropriate affect. Mood normal. Able to follow commands without difficulty. Normal mood, judgement and behavior.        Diagnostic Tests/ Imaging/ Labs resulted since last visit:   None       Assessment & Plan    ASSESSMENT:     Visit discussion: Mustapha Negrete is seen in follow up today at the pain clinic for chronic pudendal pain. He continues to work through the process of diagnostic nerve blocks through [facility] interventional providers to determine the exact cause and location of his pain. Buprenorphine was resumed on 3/22/24 with Belbuca 150mcg film BID. He reports he is consistently taking this dose and did try one extra film for severe pain on 2-3 occasions. This was helpful. I am approving increase of Belbuca 150mcg to q8 hrs until next refill. Increased dose will mean he needs a refill in 7 days. He is instructed to contact the clinic with update on pain control on 4/12/24 to determine whether I will renew Belbuca 150mcg TID vs Belbuca 300mcg BID. Return in 2-3 months. Patient verbalizes understanding and agreement with plan.              PLAN:      Medication Management:   - INCREASE dose of  Belbuca to 150mcg film - place one film inside cheek every 8 hours; max 3 per day for chronic pain . You have enough medication for 7 days and would be due for a refill next week. If you have better pain control with three (3) films per day, then I will renew your next refill for Belbuca 150mcg three times per day. If this does not improve your pain, then I will send your next refill as Belbuca 300mcg one film every 12 hours (2 per day).     Continue Current Treatment Plan with:   - Interventional injections through [facility]       Return to Clinic:   -  30-minute Video or In-Person Appointment with Destiny He, ZEUS, MICAELA, DENISSE in 2 months, or sooner if needed        Future Considerations:   - Okay to increase taper to Belbuca 300mg BID         VISIT DIAGNOSIS:   (R10.2,  G89.29) Perineal pain in male, chronic  (primary encounter diagnosis)  Plan: Adult Pain Clinic Follow-Up Order          (M79.18) Myofascial muscle pain  Plan: Adult Pain Clinic Follow-Up Order          (F11.90) Chronic, continuous use of opioids  Plan: Adult Pain Clinic Follow-Up Order              ___________________________________________________________________    BILLING TIME DOCUMENTATION:   TOTAL TIME includes:   Time spent preparing to see the patient: 5 minutes (reviewing records and tests)  Time spend face to face with the patient: 18 minutes  Time spent ordering tests, medications, procedures and referrals: 0 minutes  Time spent Referring and communicating with other healthcare professionals: 0 minutes  Documenting clinical information in Epic: 5 minutes    The total TIME spent on this patient on the day of the appointment was 28 minutes.     ___________________________________________________________________    Review of Electronic Chart: Today I have also reviewed available medical information in the patient's medical record at [facility] and Care Everywhere (if available), including relevant provider notes,  laboratory work, and imaging.     MICAELA Herman, ZEUS, FNP-C   [facility]

## 2024-04-19 ENCOUNTER — TELEPHONE (OUTPATIENT)
Dept: PALLIATIVE MEDICINE | Facility: CLINIC | Age: 64
End: 2024-04-19
Payer: MEDICARE

## 2024-04-19 DIAGNOSIS — M79.18 MYOFASCIAL MUSCLE PAIN: ICD-10-CM

## 2024-04-19 DIAGNOSIS — G58.8 PUDENDAL NEURALGIA: ICD-10-CM

## 2024-04-19 DIAGNOSIS — K62.89 RECTAL PAIN: ICD-10-CM

## 2024-04-19 NOTE — TELEPHONE ENCOUNTER
Refills requested for Buprenorphine HCl (BELBUCA) 150 MCG FILM buccal film     Last filled 3/22/24    [facility] - [place], [place] - [address]

## 2024-04-22 ENCOUNTER — TELEPHONE (OUTPATIENT)
Dept: PALLIATIVE MEDICINE | Facility: CLINIC | Age: 64
End: 2024-04-22
Payer: MEDICARE

## 2024-04-22 NOTE — TELEPHONE ENCOUNTER
"Returned call to patient regarding information from Refill encounter on this date.     Regarding efficacy of Belbuca patient states \"I can get by with it\"     Yanira Lynn RN  [facility]  [phone]    "

## 2024-04-22 NOTE — TELEPHONE ENCOUNTER
Received call from patient requesting refill(s) of   Buprenorphine HCl (BELBUCA) 150 MCG FILM buccal film  Last dispensed from pharmacy on: Mar. 22, 2024       Patient's last office/virtual visit by prescribing provider on: Apr. 10, 2024   Next office/virtual appointment scheduled for: June. 12, 2024       UDT: Dec. 13, 2023   CSA: Sebastian. 10, 2024     E-prescribe to   [facility] [address] [place] [address] - [address] [facility],    Will route to nursing Soldier for review and preparation of prescription(s).

## 2024-04-22 NOTE — TELEPHONE ENCOUNTER
4/22/2024 9:44 AM     REFILL REQUEST:     This is a patient of mine. PDMP and Chart have been reviewed; refill request is appropriate.    Refilled for 30 day supply.  Script e-Prescribed to pharmacy    MICAELA Herman, ZEUS, FNP-C

## 2024-04-22 NOTE — TELEPHONE ENCOUNTER
[facility] Call Center    Phone Message    May a detailed message be left on voicemail: no     Reason for Call: Other: Patient called because they received a call and was unsure what the call was about. Cannot see in notes. Please call patient back.     Action Taken: Message routed to:  Other: [facility]    Travel Screening: Not Applicable

## 2024-04-22 NOTE — TELEPHONE ENCOUNTER
Medication refill information reviewed.     Due date for Buprenorphine HCl (BELBUCA) 150 MCG FILM buccal film  is 4/21/2024 per last ordered date.      Patient was increased to 150mcg Q8H at [facility] on 4/10/2024.  Previously noted on TE from 4/12/2024 that patient had not yet appreciated benefit but was encouraged to allow more time for increased dose to be effective and requested return call to clinic with update.    LVM for patient requesting return call to clinic to discuss.     Prescriptions prepped for review.     Will route to provider.

## 2024-04-23 ENCOUNTER — TELEPHONE (OUTPATIENT)
Dept: PALLIATIVE MEDICINE | Facility: CLINIC | Age: 64
End: 2024-04-23

## 2024-04-23 NOTE — TELEPHONE ENCOUNTER
[facility] Call Center    Phone Message    May a detailed message be left on voicemail: yes     Reason for Call: Medication Question or concern regarding medication   Prescription Clarification  Name of Medication: Buprenorphine HCl (BELBUCA) 150 MCG FILM buccal film   Prescribing Provider: Ying    Pharmacy: [facility] - [place], [place] - [address] [place] [address]    What on the order needs clarification? Patient stated that the pharmacy said he will need a PA for the medication. Pharmacy wasn't entirely sure why but was thinking it was probably due to the change in dosage.       Action Taken: Message routed to:  Other: Pain    Travel Screening: Not Applicable

## 2024-04-23 NOTE — TELEPHONE ENCOUNTER
Retail Pharmacy Prior Authorization Team   Phone: [phone]    PA Initiation    Medication: BELBUCA 150 MCG BU [employer]  Insurance Company: [employer] - Phone [phone] Fax [phone]  Pharmacy Filling the Rx: [facility] - [place], [place] - [facility]  Filling Pharmacy Phone: [phone]  Filling Pharmacy Fax:    Start Date: 4/23/2024

## 2024-04-23 NOTE — TELEPHONE ENCOUNTER
A PA has been started in a different encounter and sent on to our PA team to initiate.   Writer routed again to team and ask that this be expected.    Routed to MICAELA Herman CNP to determine a plan in the interim.    Jess RN-BSN  [facility] [place]   [phone]

## 2024-04-23 NOTE — TELEPHONE ENCOUNTER
Prior Authorization Specialty Medication Request    Medication/Dose: Buprenorphine HCl (BELBUCA) 150 MCG FILM buccal film for TID dosing.  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 PA:  Previously Tried and Failed:  ...    Important Lab Values: ...  Rationale: ...    Insurance   Primary: MEDICARE - MEDICARE  Subscriber:  Mustapha Negrete  Subscriber ID:[id number]  Relationship:Self  Member:Mustapha Negrete  Member ID:[id number]  LOB:None  Plan year:  1/1/2024 - Onward  Effective dates:  12/1/2023 - Onward    Secondary (if applicable):  ARE - [facility] MEDICARE SUPPLEMENT  Subscriber:  Mustapha Negrete  Subscriber ID:[id number]  Relationship:Self  Member:Mustapha Negrete  Member ID:[id number]  LOB:None  Plan year:  1/1/2024 - Onward  Effective dates:  1/1/2024 - Onward  Group number:  [id number]      Pharmacy Information (if different than what is on RX)  Name:  ...  Phone:  ...  Fax:...

## 2024-04-24 NOTE — TELEPHONE ENCOUNTER
Destiny eH DNP  You20 hours ago (3:08 PM)     Okay to fill for BID dosing until TID is approved through PA. I don't want to give higher film dose because patient was up to 600mcg and reportedly cutting them but not taking consistently.    Can you prep a short fill for Belbuca 150mcg BID without messing up PA process?    MICAELA Herman, ZEUS, FNP-C

## 2024-04-24 NOTE — TELEPHONE ENCOUNTER
Prior Authorization Approval    Medication: BELBUCA 150 MCG BU FILM  Authorization Effective Date: 1/1/2024  Authorization Expiration Date: 12/31/2024  Approved Dose/Quantity:   Reference #:     Insurance Company: [employer] - Phone [phone] Fax [phone]  Expected CoPay: $    CoPay Card Available:      Financial Assistance Needed:   Which Pharmacy is filling the prescription: [facility] - [place], [place] - [address] [place] [address]  Pharmacy Notified: Yes  Patient Notified: **Instructed pharmacy to notify patient when script is ready to /ship.**

## 2024-04-25 NOTE — TELEPHONE ENCOUNTER
The PA for belbuca 150mcg TID dosing has already been approved and per [place] pt already picked it up.     Called pt. He confirmed he was now able to fill the belbuca #90 films for the TID dosing.  He will start this and provide an update at his next refill request.    The below script has been discontinued:  Buprenorphine HCl (BELBUCA) 150 MCG FILM buccal film (Discontinued) 28 Film 0 4/24/2024 4/25/2024 No   Sig - Route: Place 1 Film (150 mcg) inside cheek every 12 hours - Buccal   Reason for Discontinue: Not filled/taken by Patient (No AVS)   E-Prescribing Status: Receipt confirmed by pharmacy (4/24/2024  1:44 PM CDT)   E-Cancel Status: Request approved by pharmacy (4/25/2024  9:29 AM CDT)       E-Cancel Status Note: No medication has been dispensed to the patient       Jess RN-BSN  [facility]   [phone]

## 2024-06-05 ENCOUNTER — PATIENT OUTREACH (OUTPATIENT)
Dept: CARE COORDINATION | Facility: CLINIC | Age: 64
End: 2024-06-05

## 2024-06-05 ENCOUNTER — OFFICE VISIT (OUTPATIENT)
Dept: FAMILY MEDICINE | Facility: CLINIC | Age: 64
End: 2024-06-05
Payer: MEDICARE

## 2024-06-05 VITALS
SYSTOLIC BLOOD PRESSURE: 126 MMHG | TEMPERATURE: 98.4 F | BODY MASS INDEX: 33.9 KG/M2 | HEIGHT: 64 IN | RESPIRATION RATE: 18 BRPM | WEIGHT: 198.6 LBS | DIASTOLIC BLOOD PRESSURE: 88 MMHG | OXYGEN SATURATION: 98 % | HEART RATE: 98 BPM

## 2024-06-05 DIAGNOSIS — R30.0 DYSURIA: ICD-10-CM

## 2024-06-05 DIAGNOSIS — R07.0 THROAT PAIN: Primary | ICD-10-CM

## 2024-06-05 LAB
ALBUMIN UR-MCNC: NEGATIVE MG/DL
APPEARANCE UR: CLEAR
BILIRUB UR QL STRIP: NEGATIVE
COLOR UR AUTO: YELLOW
DEPRECATED S PYO AG THROAT QL EIA: NEGATIVE
GLUCOSE UR STRIP-MCNC: NEGATIVE MG/DL
GROUP A STREP BY PCR: NOT DETECTED
HGB UR QL STRIP: NEGATIVE
KETONES UR STRIP-MCNC: NEGATIVE MG/DL
LEUKOCYTE ESTERASE UR QL STRIP: ABNORMAL
NITRATE UR QL: NEGATIVE
PH UR STRIP: 6.5 [PH] (ref 5–7)
RBC #/AREA URNS AUTO: NORMAL /HPF
SP GR UR STRIP: 1.02 (ref 1–1.03)
UROBILINOGEN UR STRIP-ACNC: 1 E.U./DL
WBC #/AREA URNS AUTO: NORMAL /HPF

## 2024-06-05 PROCEDURE — 87635 SARS-COV-2 COVID-19 AMP PRB: CPT | Performed by: PHYSICIAN ASSISTANT

## 2024-06-05 PROCEDURE — 81001 URINALYSIS AUTO W/SCOPE: CPT | Performed by: PHYSICIAN ASSISTANT

## 2024-06-05 PROCEDURE — 99213 OFFICE O/P EST LOW 20 MIN: CPT | Performed by: PHYSICIAN ASSISTANT

## 2024-06-05 PROCEDURE — 87651 STREP A DNA AMP PROBE: CPT | Performed by: PHYSICIAN ASSISTANT

## 2024-06-05 RX ORDER — FLUTICASONE PROPIONATE 50 MCG
1 SPRAY, SUSPENSION (ML) NASAL DAILY
Qty: 9.9 ML | Refills: 0 | Status: SHIPPED | OUTPATIENT
Start: 2024-06-05

## 2024-06-05 RX ORDER — RESPIRATORY SYNCYTIAL VIRUS VACCINE 120MCG/0.5
0.5 KIT INTRAMUSCULAR ONCE
Qty: 1 EACH | Refills: 0 | Status: CANCELLED | OUTPATIENT
Start: 2024-06-05 | End: 2024-06-05

## 2024-06-05 ASSESSMENT — PAIN SCALES - GENERAL: PAINLEVEL: EXTREME PAIN (8)

## 2024-06-05 ASSESSMENT — ENCOUNTER SYMPTOMS: SORE THROAT: 1

## 2024-06-05 NOTE — PROGRESS NOTES
Assessment & Plan   Problem List Items Addressed This Visit    None  Visit Diagnoses       Throat pain    -  Primary    Relevant Medications    fluticasone (FLONASE) 50 MCG/ACT nasal spray    Other Relevant Orders    Streptococcus A Rapid Screen w/Reflex to PCR - Clinic Collect (Completed)    Group A Streptococcus PCR Throat Swab    Symptomatic COVID-19 Virus (Coronavirus) by PCR Nose    Dysuria        Relevant Orders    UA Macroscopic with reflex to Microscopic and Culture - Lab Collect (Completed)    UA Microscopic with Reflex to Culture (Completed)           Impression is likely viral URI including COVID-19. Will order COVID-19 PCR. Rapid strep neg, with PCR pending. Appears well and non-toxic and I have low suspicion for impending airway obstruction or respiratory distress at this point.  He will push p.o. fluids, use over-the-counter meds for symptoms, complete a course of Flonase and Zyrtec and follow-up with us in 1-2 weeks if not improving or urgent care/the ER if symptoms worsen/change at any time. As for his urinary symptoms, there is no clear UTI or hematuria on UA and I feel this is likely due to his ongoing chronic groin pain from pudendal neuralgia. Continue with plan to have nerve device placed in 5 days at [facility]. He will contact his surgical team to see if they have any concerns with his current symptoms, but I do not.    Complete history and physical exam as below. Afebrile with normal vital signs.    DDx and Dx discussed with and explained to the pt to their satisfaction.  All questions were answered at this time. Pt expressed understanding of and agreement with this dx, tx, and plan. No further workup warranted and standard medication warnings given. I have given the patient a list of pertinent indications for re-evaluation. Will go to the Emergency Department if symptoms worsen or new concerning symptoms arise. Patient left in no apparent distress.       Ordering of each unique  "test  Prescription drug management  24 minutes spent by me on the date of the encounter doing chart review, history and exam, documentation and further activities per the note     BMI  Estimated body mass index is 34.2 kg/m  as calculated from the following:    Height as of this encounter: 1.623 m (5' 3.9\").    Weight as of this encounter: 90.1 kg (198 lb 9.6 oz).     See Patient Instructions      Zhang Luciano is a 63 year old, presenting for the following health issues:  Pharyngitis and Urinary Problem        6/5/2024    10:50 AM   Additional Questions   Roomed by Padmini Carroll CMA   Accompanied by N/A         6/5/2024    10:50 AM   Patient Reported Additional Medications   Patient reports taking the following new medications No new medications     History of Present Illness       Reason for visit:  Sore throat and burning while urinating  Symptom onset:  3-7 days ago  Symptom intensity:  Moderate  Symptom progression:  Staying the same  Had these symptoms before:  Yes  Has tried/received treatment for these symptoms:  Yes  Previous treatment was successful:  No  What makes it worse:  No  What makes it better:  No    He eats 0-1 servings of fruits and vegetables daily.He consumes 1 sweetened beverage(s) daily.He exercises with enough effort to increase his heart rate 30 to 60 minutes per day.  He exercises with enough effort to increase his heart rate 4 days per week.   He is taking medications regularly.     Patient would like to have urine tested as well for a possible UTI. Has a procedure scheduled for a device to help with groin pain at [facility] in 5 days. Denies any fever, chills, nausea, flank pain or other symptoms unless listed below.    Acute Illness  Acute illness concerns: Sore throat, at first on right and now on left side of throat. Feels similar to when he had symptomatic seasonal allergies in the past.  Onset/Duration: 1 week  Symptoms:  Fever: No  Chills/Sweats: No  Headache (location?): " "No  Sinus Pressure: No  Conjunctivitis:  No  Ear Pain: no  Rhinorrhea: No  Congestion: No  Sore Throat: YES  Cough: no  Wheeze: No  Decreased Appetite: No  Nausea: No  Vomiting: No  Diarrhea: No  Dysuria/Freq.: YES, chronic  Dysuria or Hematuria: No  Fatigue/Achiness: YES  Sick/Strep Exposure: No  Therapies tried and outcome: None      Review of Systems  Constitutional, HEENT, cardiovascular, pulmonary, gi and gu systems are negative, except as otherwise noted.      Objective    BP (!) 160/90   Pulse 98   Temp 98.4  F (36.9  C) (Temporal)   Resp 18   Ht 1.623 m (5' 3.9\")   Wt 90.1 kg (198 lb 9.6 oz)   SpO2 98%   BMI 34.20 kg/m    Body mass index is 34.2 kg/m .  Physical Exam  Vitals and nursing note reviewed.   Constitutional:       General: He is not in acute distress.     Appearance: He is not ill-appearing or diaphoretic.   HENT:      Head: Normocephalic and atraumatic.      Right Ear: Tympanic membrane, ear canal and external ear normal.      Left Ear: Tympanic membrane, ear canal and external ear normal.      Nose: Nose normal.      Mouth/Throat:      Mouth: Mucous membranes are moist.      Pharynx: Oropharynx is clear.      Comments: No trismus, voice abnormalities or asymmetry to the oropharynx.  Eyes:      Conjunctiva/sclera: Conjunctivae normal.   Cardiovascular:      Rate and Rhythm: Normal rate and regular rhythm.      Heart sounds: Normal heart sounds. No murmur heard.     No friction rub. No gallop.   Pulmonary:      Effort: Pulmonary effort is normal. No respiratory distress.      Breath sounds: Normal breath sounds. No stridor. No wheezing, rhonchi or rales.   Abdominal:      General: Bowel sounds are normal. There is no distension.      Palpations: Abdomen is soft. There is no mass.      Tenderness: There is no abdominal tenderness. There is no right CVA tenderness, left CVA tenderness, guarding or rebound.      Hernia: No hernia is present.   Musculoskeletal:      Cervical back: Neck supple. " No rigidity.   Lymphadenopathy:      Cervical: No cervical adenopathy.   Skin:     General: Skin is warm and dry.   Neurological:      General: No focal deficit present.      Mental Status: He is alert. Mental status is at baseline.   Psychiatric:         Mood and Affect: Mood normal.         Behavior: Behavior normal.          Results for orders placed or performed in visit on 06/05/24   UA Macroscopic with reflex to Microscopic and Culture - Lab Collect     Status: Abnormal    Specimen: Urine, Midstream   Result Value Ref Range    Color Urine Yellow Colorless, Straw, Light Yellow, Yellow    Appearance Urine Clear Clear    Glucose Urine Negative Negative mg/dL    Bilirubin Urine Negative Negative    Ketones Urine Negative Negative mg/dL    Specific Gravity Urine 1.020 1.003 - 1.035    Blood Urine Negative Negative    pH Urine 6.5 5.0 - 7.0    Protein Albumin Urine Negative Negative mg/dL    Urobilinogen Urine 1.0 0.2, 1.0 E.U./dL    Nitrite Urine Negative Negative    Leukocyte Esterase Urine Trace (A) Negative   UA Microscopic with Reflex to Culture     Status: Normal   Result Value Ref Range    RBC Urine 0-2 0-2 /HPF /HPF    WBC Urine 0-5 0-5 /HPF /HPF    Narrative    Urine Culture not indicated   Streptococcus A Rapid Screen w/Reflex to PCR - Clinic Collect     Status: Normal    Specimen: Throat; Swab   Result Value Ref Range    Group A Strep antigen Negative Negative           Signed Electronically by: DARVIN Wiggins

## 2024-06-05 NOTE — PATIENT INSTRUCTIONS
Mary Luciano,    Thank you for allowing [facility] to manage your care.    Call your surgical team at [facility] to let them know about your sore throat. I have no issue with you getting the procedure done.    I am unsure of the cause of your symptoms, but your exam is reassuring. We will see what our workup shows. Take Zyrtec and Flonase or their generic equivalents over the counter as directed.    If you develop worsening/changing symptoms at any time, please be seen in clinic/urgent care.    I ordered some lab work. Please go to the laboratory to get your studies.    I sent your prescriptions to your pharmacy.    Please allow 1-2 business days for our office to contact you in regards to your laboratory/radiological studies.  If not done so, I encourage you to login into [employer] ([email or web address] to review your results as well.     Drink 8-10 glasses of fluid daily to stay well-hydrated.    If you have any questions or concerns, please feel free to call us at [phone]    Sincerely,    Marito Mancilla PA-C    Did you know?      You can schedule a video visit for follow-up appointments as well as future appointments for certain conditions.  Please see the below link.     [email or web address]    If you have not already done so,  I encourage you to sign up for [employer] ([email or web address]).  This will allow you to review your results, securely communicate with a provider, and schedule virtual visits as well.

## 2024-06-06 LAB — SARS-COV-2 RNA RESP QL NAA+PROBE: NEGATIVE

## 2024-06-12 ENCOUNTER — OFFICE VISIT (OUTPATIENT)
Dept: PALLIATIVE MEDICINE | Facility: CLINIC | Age: 64
End: 2024-06-12
Payer: MEDICARE

## 2024-06-12 VITALS — OXYGEN SATURATION: 95 % | DIASTOLIC BLOOD PRESSURE: 95 MMHG | SYSTOLIC BLOOD PRESSURE: 164 MMHG | HEART RATE: 76 BPM

## 2024-06-12 DIAGNOSIS — G58.8 PUDENDAL NEURALGIA: Primary | ICD-10-CM

## 2024-06-12 DIAGNOSIS — K62.89 RECTAL PAIN: ICD-10-CM

## 2024-06-12 DIAGNOSIS — M79.18 MYOFASCIAL MUSCLE PAIN: ICD-10-CM

## 2024-06-12 PROCEDURE — 99213 OFFICE O/P EST LOW 20 MIN: CPT | Performed by: NURSE PRACTITIONER

## 2024-06-12 NOTE — PROGRESS NOTES
"[facility]         Date of Visit: 6/12/2024    CHIEF COMPLAINT:   Chief Complaint   Patient presents with    Pain       Subjective   SUBJECTIVE    INTERVAL HISTORY:   Mustapha Negrete is a 63 year old male seen for INITIAL EVALUATION on 3/22/24; last seen for FOLLOW UP on 4/10/24.  They are a patient of mine and returning today for pudendal neuralgia.       Since the last visit, Mustapha Negrete reports:   Pain score today: 8  Pain rating: intensity ranges from 7/10 to 10/10, and averages 9/10 on a 0-10 scale.   On 6/10/24, he had insertion of DRG stimulator for a 1-week trial for left-sided pudendal pain. Reports initial relief but then drove 2-hours to return home; pain has not improved significantly. He will be working with [employer] to adjust stimulator programming later today. At this time, burning pain continues with no improvement. States he was instructed to stop taking Belbuca before anesthesia for placement of stimulator leads. Buprenorphine helps \"take the edge off\" the pain.         Recommendations/ Plan at the last visit included:  Visit discussion: Mustapha Negrete is seen in follow up today at the pain clinic for chronic pudendal pain. He continues to work through the process of diagnostic nerve blocks through [facility] interventional providers to determine the exact cause and location of his pain. Buprenorphine was resumed on 3/22/24 with Belbuca 150mcg film BID. He reports he is consistently taking this dose and did try one extra film for severe pain on 2-3 occasions. This was helpful. I am approving increase of Belbuca 150mcg to q8 hrs until next refill. Increased dose will mean he needs a refill in 7 days. He is instructed to contact the clinic with update on pain control on 4/12/24 to determine whether I will renew Belbuca 150mcg TID vs Belbuca 300mcg BID. Return in 2-3 months. Patient verbalizes understanding and agreement with plan.    PLAN:    Medication Management: " "  - INCREASE dose of Belbuca to 150mcg film - place one film inside cheek every 8 hours; max 3 per day for chronic pain . You have enough medication for 7 days and would be due for a refill next week. If you have better pain control with three (3) films per day, then I will renew your next refill for Belbuca 150mcg three times per day. If this does not improve your pain, then I will send your next refill as Belbuca 300mcg one film every 12 hours (2 per day).   Continue Current Treatment Plan with:   - Interventional injections through [facility]     Return to Clinic:   -  30-minute Video or In-Person Appointment with Destiny He, ZEUS, APRN, FNP-C in 2 months, or sooner if needed    Future Considerations:   - Okay to increase taper to Belbuca 300mg BID          Interval history from last visit on 4/10/24:   Pain score today: Worst Pain (10)   Pain rating: intensity ranges from 10/10 to 10/10, and averages 10/10 on a 0-10 scale.   Mustapha underwent lateral femoral cutaneous nerve block on 3/22/24 at [facility] and experienced mild benefit with improved muscle spasm and anesthesia of pudendal region for several hours afterward.    Today, he reports Belbuca was more effective than he expected and he resumed Belbuca 150mcg at 11am and 11pm. He did not like the \"high\" side effects of taking Belbuca 600mcg BID; which is why he was taking 1/2 films. On April 25, 2024 he is scheduled for CT guided pudendal nerve block at [facility].           REVIEW OF SYSTEMS:   ROS: 10 point ROS neg other than the symptoms noted above in the HPI.     The patient otherwise denies red flag symptoms, such as: thunderclap headache, bowel or bladder incontinence, parasthesias, weakness, saddle anesthesia, unintentional weight loss, or fever/chills/sweats.     Medical History: Any changes in medical history since they were last seen? No     Medications and Allergies reviewed. Yes     Review of [place] Prescription Monitoring Program " ():  reviewed on 6/12/24. No concern for abuse or misuse of controlled medications based on this report. Controlled substances prescribed in the past 12 months include: (Belbuca)     Current MME: 27 / day    Current controlled substance medications, if any, are being prescribed by: Pain Mgmt    Primary Care Provider is [facility] due date: 1/10/25   UDS due date:  12/13/24         Objective    OBJECTIVE:    Physical Exam  Vitals:    06/12/24 1002   BP: (!) 164/95   BP Location: Left arm   Patient Position: Sitting   Cuff Size: Adult Regular   Pulse: 76   SpO2: 95%        Appearance:   A&O. Patient is appropriate.   Patient is in NAD.      HHENT:  Normocephalic, atraumatic. Eyes without conjunctival injection or jaundice. Neck supple. No obvious neck masses.     Pulmonary:  Normal effort; no cough or audible wheeze      Skin: No obvious rash, lesions, or petechiae of exposed skin.    Neuro: Cranial nerves grossly intact.  Mentation and speech appropriate for age.     Psych:  Alert, without lethargy or stupor. Speech fluent. Appropriate affect. Mood normal. Able to follow commands without difficulty. Normal mood, judgement and behavior.        Gait pattern:   Patient has an antalgic gait pattern:NO  Gait favors the neither side.  Mobility and/or assistive devices? NO            Diagnostic Tests/ Imaging/ Labs resulted since last visit:   None       Assessment & Plan    ASSESSMENT:     Visit discussion: Mustapha Negrete is seen in follow up today at the pain clinic for pudendal neuralgia.  Patient continues process to potential release of predental nerve entrapment by a provider team in [place].  Recent placement of DRG stimulator trial leads has not demonstrated significant improvement over the past 48 hours.  However, he still has opportunity to optimize this therapy with additional device programming.  If he does not achieve at least 50% relief from DRG stimulator, he will proceed with goal of  pudendal nerve entrapment release.  At this time, I reviewed neuropathic medications such as gabapentin, pregabalin, topiramate and lamotrigine.  Patient is not interested in initiating any these medications.  He does indicate previous side effects from gabapentin and pregabalin.  However, gabapentin had been helpful initially.  Patient has been approved for Belbuca 150 mcg 3 times daily dosing.  Refill was prescribed today.  Patient advised to request additional refills with a 5-day supply remaining to allow time for approval.  Return to clinic in 3 months to reassess pain control.  Patient verbalizes understanding and agrees with plan.         PLAN:    Medication Management:   - Please allow 5-7 days to process refills to avoid running out of your medication.   - Request refills of controlled substance medications through [employer] or call [phone].   - CONTINUE taking Belbuca 150mcg film - insert ONE film inside cheek every 8 hours for pain; max 3 per day      Continue Current Treatment Plan with:   - DRG Stimulator trial        Return to Clinic:   -  30-minute In-Person Appointment with Destiny He, ZEUS, APRN, FNP-C in 3 months, or sooner if needed      Future Considerations:  Trial of gabapentin or oxcarbazapine       VISIT DIAGNOSIS:   1. Pudendal neuralgia  - Buprenorphine HCl (BELBUCA) 150 MCG FILM buccal film; Place 1 Film (150 mcg) inside cheek 3 times daily Fill / start 6/12/2024 (30 day supply)  Dispense: 90 Film; Refill: 0  - Adult Pain Clinic Follow-Up Order; Future    2. Myofascial muscle pain  - Buprenorphine HCl (BELBUCA) 150 MCG FILM buccal film; Place 1 Film (150 mcg) inside cheek 3 times daily Fill / start 6/12/2024 (30 day supply)  Dispense: 90 Film; Refill: 0  - Adult Pain Clinic Follow-Up Order; Future    3. Rectal pain  - Buprenorphine HCl (BELBUCA) 150 MCG FILM buccal film; Place 1 Film (150 mcg) inside cheek 3 times daily Fill / start 6/12/2024 (30 day supply)  Dispense: 90 Film;  Refill: 0  - Adult Pain Clinic Follow-Up Order; Future      ___________________________________________________________________    BILLING TIME DOCUMENTATION:   TOTAL TIME includes:   Time spent preparing to see the patient: 3 minutes (reviewing records and tests)  Time spend face to face with the patient: 18 minutes  Time spent ordering tests, medications, procedures and referrals: 0 minutes  Time spent Referring and communicating with other healthcare professionals: 0 minutes  Documenting clinical information in Epic: 5 minutes    The total TIME spent on this patient on the day of the appointment was 26 minutes.     ___________________________________________________________________    Review of Electronic Chart: Today I have also reviewed available medical information in the patient's medical record at [facility] (EPIC) and Care Everywhere (if available), including relevant provider notes, laboratory work, and imaging.     Destiny He, MICAELA, DNP, FNP-C   [facility]

## 2024-06-12 NOTE — PATIENT INSTRUCTIONS
PATIENT INSTRUCTIONS:     I am recommending a multidisciplinary treatment plan to help this patient better manage his pain. The following recommendations were given to the patient. Diagnosis, treatment options, risks, benefits, and alternatives were discussed; all questions were answered. Self-care instructions were given. The patient expressed understanding of the plan for management.     Medication Management:   - Please allow 5-7 days to process refills to avoid running out of your medication.   - Request refills of controlled substance medications through [employer] or call [phone].   - CONTINUE taking Belbuca 150mcg film - insert ONE film inside cheek every 8 hours for pain; max 3 per day      Continue Current Treatment Plan with:   - DRG Stimulator trial        Return to Clinic:   -  30-minute In-Person Appointment with Destiny He, ZEUS, MICAELA, DENISSE in 3 months, or sooner if needed            ----------------------------------------------------------------  Clinic Number:  [phone]   Call with any questions about your care and for scheduling assistance.   Calls are returned Monday through Friday between 8 AM and 4:30 PM. We usually get back to you within 2 business days depending on the issue/request.    If we are prescribing your medications:  For opioid medication refills, call the clinic or send a [employer] message 7 days in advance.  Please include:  Name of requested medication  Name of the pharmacy.  For non-opioid medications, call your pharmacy directly to request a refill. Please allow 3-4 days to be processed.   Per [place] State Law:  All controlled substance prescriptions must be filled within 30 days of being written.    For those controlled substances allowing refills, pickup must occur within 30 days of last fill.      We believe regular attendance is key to your success in our program!    Any time you are unable to keep your appointment we ask that you call us at least 24 hours in advance to  cancel.This will allow us to offer the appointment time to another patient.   Multiple missed appointments may lead to dismissal from the clinic.

## 2024-07-03 ENCOUNTER — PATIENT OUTREACH (OUTPATIENT)
Dept: GERIATRIC MEDICINE | Facility: CLINIC | Age: 64
End: 2024-07-03
Payer: MEDICARE

## 2024-07-03 NOTE — PROGRESS NOTES
[facility] Care Coordination Contact    No longer active with [facility] community case management effective 3/31/24.  Reason for community disenrollment: MA Term.  Members income is too high and he knew this was coming.    Tammy Ocampo RN  [facility]  [phone]

## 2024-07-10 DIAGNOSIS — K62.89 RECTAL PAIN: ICD-10-CM

## 2024-07-10 DIAGNOSIS — M79.18 MYOFASCIAL MUSCLE PAIN: ICD-10-CM

## 2024-07-10 DIAGNOSIS — G58.8 PUDENDAL NEURALGIA: ICD-10-CM

## 2024-07-10 NOTE — TELEPHONE ENCOUNTER
Medication refill information reviewed.     Due date for Buprenorphine HCl (BELBUCA) 150 MCG FILM buccal film   is 7/12/2024     Prescriptions prepped for review.     Will route to provider.     Yanira Lynn RN  [facility]  [phone]

## 2024-07-10 NOTE — TELEPHONE ENCOUNTER
Received fax from pharmacy requesting refill(s) for:    Buprenorphine HCl (BELBUCA) 150 MCG FILM buccal film      Last dispensed from pharmacy on 6/13/24    Patient's last office/virtual visit by prescribing provider on 6/12/24  Next office/virtual appointment scheduled for 9/11/24    Last urine drug screen date 12/13/23  Current opioid agreement on file? Yes Date of opioid agreement: 1/10/24    E-prescribe to:    [facility] - [place], [place] - [address]    Will route to [facility] for review and preparation of prescription(s).

## 2024-07-12 NOTE — TELEPHONE ENCOUNTER
7/12/2024 6:40 AM     REFILL REQUEST:     This is a patient of mine. PDMP and Chart have been reviewed; refill request is appropriate.    Refilled for 30 day supply.  Script e-Prescribed to pharmacy    MICAELA Herman, ZEUS, FNP-C

## 2024-08-19 ENCOUNTER — OFFICE VISIT (OUTPATIENT)
Dept: FAMILY MEDICINE | Facility: CLINIC | Age: 64
End: 2024-08-19
Payer: MEDICARE

## 2024-08-19 ENCOUNTER — ANCILLARY PROCEDURE (OUTPATIENT)
Dept: GENERAL RADIOLOGY | Facility: CLINIC | Age: 64
End: 2024-08-19
Attending: PHYSICIAN ASSISTANT
Payer: MEDICARE

## 2024-08-19 VITALS
SYSTOLIC BLOOD PRESSURE: 132 MMHG | BODY MASS INDEX: 33.09 KG/M2 | RESPIRATION RATE: 18 BRPM | DIASTOLIC BLOOD PRESSURE: 80 MMHG | HEIGHT: 64 IN | OXYGEN SATURATION: 97 % | WEIGHT: 193.8 LBS | HEART RATE: 94 BPM | TEMPERATURE: 97.3 F

## 2024-08-19 DIAGNOSIS — Z12.5 SCREENING FOR PROSTATE CANCER: ICD-10-CM

## 2024-08-19 DIAGNOSIS — M25.531 RIGHT WRIST PAIN: ICD-10-CM

## 2024-08-19 DIAGNOSIS — R10.2 PELVIC PAIN IN MALE: ICD-10-CM

## 2024-08-19 DIAGNOSIS — S62.001A CLOSED NONDISPLACED FRACTURE OF SCAPHOID OF RIGHT WRIST, UNSPECIFIED PORTION OF SCAPHOID, INITIAL ENCOUNTER: Primary | ICD-10-CM

## 2024-08-19 DIAGNOSIS — Z13.1 SCREENING FOR DIABETES MELLITUS: ICD-10-CM

## 2024-08-19 DIAGNOSIS — I25.10 ASHD (ARTERIOSCLEROTIC HEART DISEASE): ICD-10-CM

## 2024-08-19 DIAGNOSIS — E78.5 DYSLIPIDEMIA: ICD-10-CM

## 2024-08-19 LAB
ALBUMIN SERPL BCG-MCNC: 4.4 G/DL (ref 3.5–5.2)
ALBUMIN UR-MCNC: ABNORMAL MG/DL
ALP SERPL-CCNC: 42 U/L (ref 40–150)
ALT SERPL W P-5'-P-CCNC: 22 U/L (ref 0–70)
AMORPH CRY #/AREA URNS HPF: ABNORMAL /HPF
ANION GAP SERPL CALCULATED.3IONS-SCNC: 12 MMOL/L (ref 7–15)
APPEARANCE UR: CLEAR
AST SERPL W P-5'-P-CCNC: 22 U/L (ref 0–45)
BILIRUB SERPL-MCNC: 0.8 MG/DL
BILIRUB UR QL STRIP: NEGATIVE
BUN SERPL-MCNC: 22.7 MG/DL (ref 8–23)
CALCIUM SERPL-MCNC: 9.8 MG/DL (ref 8.8–10.4)
CHLORIDE SERPL-SCNC: 102 MMOL/L (ref 98–107)
CHOLEST SERPL-MCNC: 192 MG/DL
COLOR UR AUTO: YELLOW
CREAT SERPL-MCNC: 0.87 MG/DL (ref 0.67–1.17)
EGFRCR SERPLBLD CKD-EPI 2021: >90 ML/MIN/1.73M2
ERYTHROCYTE [DISTWIDTH] IN BLOOD BY AUTOMATED COUNT: 12 % (ref 10–15)
FASTING STATUS PATIENT QL REPORTED: ABNORMAL
FASTING STATUS PATIENT QL REPORTED: ABNORMAL
GLUCOSE SERPL-MCNC: 131 MG/DL (ref 70–99)
GLUCOSE UR STRIP-MCNC: NEGATIVE MG/DL
HBA1C MFR BLD: 5.9 % (ref 0–5.6)
HCO3 SERPL-SCNC: 26 MMOL/L (ref 22–29)
HCT VFR BLD AUTO: 44.5 % (ref 40–53)
HDLC SERPL-MCNC: 46 MG/DL
HGB BLD-MCNC: 14.9 G/DL (ref 13.3–17.7)
HGB UR QL STRIP: NEGATIVE
KETONES UR STRIP-MCNC: NEGATIVE MG/DL
LDLC SERPL CALC-MCNC: 110 MG/DL
LEUKOCYTE ESTERASE UR QL STRIP: NEGATIVE
LIPASE SERPL-CCNC: 35 U/L (ref 13–60)
MCH RBC QN AUTO: 29.8 PG (ref 26.5–33)
MCHC RBC AUTO-ENTMCNC: 33.5 G/DL (ref 31.5–36.5)
MCV RBC AUTO: 89 FL (ref 78–100)
NITRATE UR QL: NEGATIVE
NONHDLC SERPL-MCNC: 146 MG/DL
PH UR STRIP: 6 [PH] (ref 5–7)
PLATELET # BLD AUTO: 261 10E3/UL (ref 150–450)
POTASSIUM SERPL-SCNC: 4.6 MMOL/L (ref 3.4–5.3)
PROT SERPL-MCNC: 7.1 G/DL (ref 6.4–8.3)
PSA SERPL DL<=0.01 NG/ML-MCNC: 1.87 NG/ML (ref 0–4.5)
RBC # BLD AUTO: 5 10E6/UL (ref 4.4–5.9)
RBC #/AREA URNS AUTO: ABNORMAL /HPF
SODIUM SERPL-SCNC: 140 MMOL/L (ref 135–145)
SP GR UR STRIP: 1.02 (ref 1–1.03)
TRIGL SERPL-MCNC: 181 MG/DL
UROBILINOGEN UR STRIP-ACNC: 0.2 E.U./DL
WBC # BLD AUTO: 7.1 10E3/UL (ref 4–11)
WBC #/AREA URNS AUTO: ABNORMAL /HPF

## 2024-08-19 PROCEDURE — 83690 ASSAY OF LIPASE: CPT | Performed by: PHYSICIAN ASSISTANT

## 2024-08-19 PROCEDURE — 85027 COMPLETE CBC AUTOMATED: CPT | Performed by: PHYSICIAN ASSISTANT

## 2024-08-19 PROCEDURE — 73110 X-RAY EXAM OF WRIST: CPT | Mod: RT | Performed by: STUDENT IN AN ORGANIZED HEALTH CARE EDUCATION/TRAINING PROGRAM

## 2024-08-19 PROCEDURE — 81001 URINALYSIS AUTO W/SCOPE: CPT | Performed by: PHYSICIAN ASSISTANT

## 2024-08-19 PROCEDURE — 83036 HEMOGLOBIN GLYCOSYLATED A1C: CPT | Performed by: PHYSICIAN ASSISTANT

## 2024-08-19 PROCEDURE — 84403 ASSAY OF TOTAL TESTOSTERONE: CPT | Performed by: PHYSICIAN ASSISTANT

## 2024-08-19 PROCEDURE — 80061 LIPID PANEL: CPT | Performed by: PHYSICIAN ASSISTANT

## 2024-08-19 PROCEDURE — 80053 COMPREHEN METABOLIC PANEL: CPT | Performed by: PHYSICIAN ASSISTANT

## 2024-08-19 PROCEDURE — G0103 PSA SCREENING: HCPCS | Performed by: PHYSICIAN ASSISTANT

## 2024-08-19 PROCEDURE — 36415 COLL VENOUS BLD VENIPUNCTURE: CPT | Performed by: PHYSICIAN ASSISTANT

## 2024-08-19 PROCEDURE — 99214 OFFICE O/P EST MOD 30 MIN: CPT | Performed by: PHYSICIAN ASSISTANT

## 2024-08-19 ASSESSMENT — PAIN SCALES - GENERAL: PAINLEVEL: NO PAIN (0)

## 2024-08-19 NOTE — PATIENT INSTRUCTIONS
Mary Luciano,    Thank you for allowing [facility] to manage your care.    Your right wrist likely has a ganglion cyst. This will likely resolve on it's own, but be re-evaluate if this is not improving in the next month or any time if it worsens.    If you develop worsening/changing symptoms at any time, please be seen in clinic/urgent care or call 911/go to the emergency department for evaluation as we discussed.    I ordered some lab work. Please go to the laboratory to get your studies.    I ordered an xray of your wrist. Please call diagnostic imaging [phone] to schedule your test if you did not get it done during our visit today.    Please allow 1-2 business days for our office to contact you in regards to your laboratory/radiological studies.  If not done so, I encourage you to login into [employer] ([email or web address] to review your results as well.     Drink 8-10 glasses of fluid daily to stay well-hydrated.    If you have any questions or concerns, please feel free to call us at [phone]    Sincerely,    Marito Mancilla PA-C    Did you know?      You can schedule a video visit for follow-up appointments as well as future appointments for certain conditions.  Please see the below link.     [email or web address]    If you have not already done so,  I encourage you to sign up for visetot ([email or web address]).  This will allow you to review your results, securely communicate with a provider, and schedule virtual visits as well.

## 2024-08-19 NOTE — PROGRESS NOTES
Assessment & Plan   Problem List Items Addressed This Visit          Nervous and Auditory    Pelvic pain in male    Relevant Orders    Testosterone, total    UA Macroscopic with reflex to Microscopic and Culture - Lab Collect (Completed)    Comprehensive metabolic panel (BMP + Alb, Alk Phos, ALT, AST, Total. Bili, TP) (Completed)    CBC with platelets (Completed)    Lipase (Completed)    UA Microscopic with Reflex to Culture (Completed)       Circulatory    ASHD (arteriosclerotic heart disease)    Relevant Orders    Lipid panel reflex to direct LDL Fasting (Completed)     Other Visit Diagnoses       Closed nondisplaced fracture of scaphoid of right wrist, unspecified portion of scaphoid, initial encounter    -  Primary    Relevant Orders    Orthopedic  Referral    Screening for prostate cancer        Relevant Orders    PSA, screen (Completed)    Screening for diabetes mellitus        Relevant Orders    Hemoglobin A1c (Completed)    Right wrist pain        Relevant Orders    XR Wrist Right G/E 3 Views (Completed)            Mustapha Negrete is a 63 year old male with a PMH of chronic pelvic pain who now presents c/o ongoing pelvic pain as well as swelling to the dorsum of the right wrist for the last ~6 weeks. No known specific trauma, but he may have bumped into his golf cart. Right handed. Patient had to leave before he had his imaging performed to make it to an appt with a pain provider and agreed to return for imaging later today. XR shows a likely subacute right scaphoid waist fracture. Will contact patient and have him  a thumb spica splint. Urgent referral placed to ortho hand for evaluation. Labs reassuring aside from elevated cholesterol. Will trial pravastatin if he is amenable. Has not tolerated simvastatin and atorvastatin previously. Follow up with primary in next 3-6 months to reassess.    Complete history and physical exam as below. Afebrile with normal vital signs.    DDx and Dx  "discussed with and explained to the pt to their satisfaction.  All questions were answered at this time. Pt expressed understanding of and agreement with this dx, tx, and plan. No further workup warranted and standard medication warnings given. I have given the patient a list of pertinent indications for re-evaluation. Will go to the Emergency Department if symptoms worsen or new concerning symptoms arise. Patient left in no apparent distress.      The 10-year ASCVD risk score (Jenelle ESCOBAR, et al., 2019) is: 15.8%    Values used to calculate the score:      Age: 63 years      Sex: Male      Is Non- : No      Diabetic: No      Tobacco smoker: No      Systolic Blood Pressure: 132 mmHg      Is BP treated: Yes      HDL Cholesterol: 46 mg/dL      Total Cholesterol: 192 mg/dL    BMI  Estimated body mass index is 33.37 kg/m  as calculated from the following:    Height as of this encounter: 1.623 m (5' 3.9\").    Weight as of this encounter: 87.9 kg (193 lb 12.8 oz).     See Patient Instructions      Zhang Luciano is a 63 year old, presenting for the following health issues:  Urinary Problem and Mass        8/19/2024     7:58 AM   Additional Questions   Roomed by Padmini Carroll CMA   Accompanied by N/A         8/19/2024     7:58 AM   Patient Reported Additional Medications   Patient reports taking the following new medications No new medications      Has been seeing pain specialists for his chronic pelvic pain and had PRP and nerve blocks in the last month without relief. Seeing another pain specialist today in [place]. Penile and perineal/rectal pain persist but are not different than his usual chronic symptoms.     Patient also has a lump on the right hand, it is at the top of the hand, and appeared about a month and a half ago.  There is no pain, and he doesn't know how fast the growth was.  Hard to the touch. May have hit it against his golf cart accidentally, but cannot recall. Right handed. " "Not painful.     Patient would like to have a few tests done such as PSA, liver, Testerone, etc, he is fasting.      Review of Systems  Constitutional, HEENT, cardiovascular, pulmonary, msk, gi and gu systems are negative, except as otherwise noted.      Objective    /80   Pulse 94   Temp 97.3  F (36.3  C) (Temporal)   Resp 18   Ht 1.623 m (5' 3.9\")   Wt 87.9 kg (193 lb 12.8 oz)   SpO2 97%   BMI 33.37 kg/m    Body mass index is 33.37 kg/m .  Physical Exam  Vitals and nursing note reviewed.   Constitutional:       General: He is not in acute distress.     Appearance: Normal appearance. He is not diaphoretic.   HENT:      Head: Normocephalic and atraumatic.      Nose: Nose normal.   Eyes:      Conjunctiva/sclera: Conjunctivae normal.   Pulmonary:      Effort: Pulmonary effort is normal. No respiratory distress.   Musculoskeletal:      Comments: RUE: 3cm fluctuant mass to the dorsum of the wrist with underlying firm nodule. Area non-tender and without erythema, rash, warmth, or other overlying signs of trauma or infection. Distal CMS intact. Remainder of limb non-tender including the snuffbox.    Skin:     General: Skin is dry.      Coloration: Skin is not jaundiced or pale.   Neurological:      General: No focal deficit present.      Mental Status: He is alert. Mental status is at baseline.   Psychiatric:         Mood and Affect: Mood normal.         Behavior: Behavior normal.          Results for orders placed or performed in visit on 08/19/24   XR Wrist Right G/E 3 Views     Status: None    Narrative    XR WRIST RIGHT G/E 3 VIEWS   8/19/2024 9:12 AM     HISTORY: Right wrist pain  COMPARISON: None.       Impression    IMPRESSION: Mildly displaced fracture of the scaphoid waist. Sclerosis  along the fracture margins suggests that this may be subacute or  chronic. Moderate radiocarpal degenerative arthritis with joint space  narrowing most pronounced at the scaphoid articulation. Moderate first  CMC and " STT degenerative arthritis. Chondrocalcinosis about the wrist.  Arterial atherosclerotic calcifications.    BRIANNA HILL MD         SYSTEM ID:  [id number]   Results for orders placed or performed in visit on 08/19/24   Lipid panel reflex to direct LDL Fasting     Status: Abnormal   Result Value Ref Range    Cholesterol 192 <200 mg/dL    Triglycerides 181 (H) <150 mg/dL    Direct Measure HDL 46 >=40 mg/dL    LDL Cholesterol Calculated 110 (H) <=100 mg/dL    Non HDL Cholesterol 146 (H) <130 mg/dL    Patient Fasting > 8hrs? Unknown     Narrative    Cholesterol  Desirable:  <200 mg/dL    Triglycerides  Normal:  Less than 150 mg/dL  Borderline High:  150-199 mg/dL  High:  200-499 mg/dL  Very High:  Greater than or equal to 500 mg/dL    Direct Measure HDL  Female:  Greater than or equal to 50 mg/dL   Male:  Greater than or equal to 40 mg/dL    LDL Cholesterol  Desirable:  <100mg/dL  Above Desirable:  100-129 mg/dL   Borderline High:  130-159 mg/dL   High:  160-189 mg/dL   Very High:  >= 190 mg/dL    Non HDL Cholesterol  Desirable:  130 mg/dL  Above Desirable:  130-159 mg/dL  Borderline High:  160-189 mg/dL  High:  190-219 mg/dL  Very High:  Greater than or equal to 220 mg/dL   Lipase     Status: Normal   Result Value Ref Range    Lipase 35 13 - 60 U/L   CBC with platelets     Status: Normal   Result Value Ref Range    WBC Count 7.1 4.0 - 11.0 10e3/uL    RBC Count 5.00 4.40 - 5.90 10e6/uL    Hemoglobin 14.9 13.3 - 17.7 g/dL    Hematocrit 44.5 40.0 - 53.0 %    MCV 89 78 - 100 fL    MCH 29.8 26.5 - 33.0 pg    MCHC 33.5 31.5 - 36.5 g/dL    RDW 12.0 10.0 - 15.0 %    Platelet Count 261 150 - 450 10e3/uL   Comprehensive metabolic panel (BMP + Alb, Alk Phos, ALT, AST, Total. Bili, TP)     Status: Abnormal   Result Value Ref Range    Sodium 140 135 - 145 mmol/L    Potassium 4.6 3.4 - 5.3 mmol/L    Carbon Dioxide (CO2) 26 22 - 29 mmol/L    Anion Gap 12 7 - 15 mmol/L    Urea Nitrogen 22.7 8.0 - 23.0 mg/dL    Creatinine 0.87 0.67  - 1.17 mg/dL    GFR Estimate >90 >60 mL/min/1.73m2    Calcium 9.8 8.8 - 10.4 mg/dL    Chloride 102 98 - 107 mmol/L    Glucose 131 (H) 70 - 99 mg/dL    Alkaline Phosphatase 42 40 - 150 U/L    AST 22 0 - 45 U/L    ALT 22 0 - 70 U/L    Protein Total 7.1 6.4 - 8.3 g/dL    Albumin 4.4 3.5 - 5.2 g/dL    Bilirubin Total 0.8 <=1.2 mg/dL    Patient Fasting > 8hrs? Unknown    UA Macroscopic with reflex to Microscopic and Culture - Lab Collect     Status: Abnormal    Specimen: Urine, Midstream   Result Value Ref Range    Color Urine Yellow Colorless, Straw, Light Yellow, Yellow    Appearance Urine Clear Clear    Glucose Urine Negative Negative mg/dL    Bilirubin Urine Negative Negative    Ketones Urine Negative Negative mg/dL    Specific Gravity Urine 1.025 1.003 - 1.035    Blood Urine Negative Negative    pH Urine 6.0 5.0 - 7.0    Protein Albumin Urine Trace (A) Negative mg/dL    Urobilinogen Urine 0.2 0.2, 1.0 E.U./dL    Nitrite Urine Negative Negative    Leukocyte Esterase Urine Negative Negative   Hemoglobin A1c     Status: Abnormal   Result Value Ref Range    Hemoglobin A1C 5.9 (H) 0.0 - 5.6 %   PSA, screen     Status: Normal   Result Value Ref Range    Prostate Specific Antigen Screen 1.87 0.00 - 4.50 ng/mL    Narrative    This result is obtained using the Roche Elecsys total PSA method on the dileep [id number] immunoassay analyzer. Results obtained with different assay methods or kits cannot be used interchangeably.   UA Microscopic with Reflex to Culture     Status: Abnormal   Result Value Ref Range    RBC Urine 0-2 0-2 /HPF /HPF    WBC Urine 0-5 0-5 /HPF /HPF    Amorphous Crystals Urine Few (A) None Seen /HPF    Narrative    Urine Culture not indicated           Signed Electronically by: DARVIN Wiggins

## 2024-08-20 ENCOUNTER — TELEPHONE (OUTPATIENT)
Dept: FAMILY MEDICINE | Facility: CLINIC | Age: 64
End: 2024-08-20
Payer: MEDICARE

## 2024-08-20 RX ORDER — PRAVASTATIN SODIUM 40 MG
40 TABLET ORAL DAILY
Qty: 90 TABLET | Refills: 3 | Status: SHIPPED | OUTPATIENT
Start: 2024-08-20 | End: 2024-10-03

## 2024-08-20 NOTE — TELEPHONE ENCOUNTER
----- Message from Desean Mancilla sent at 8/20/2024  6:44 AM CDT -----  Team - please call patient with results. He has a fracture of a right wrist bone (despite no history of trauma). He needs to have a wrist splint including a thumb. Please coordinate with MA staff to get the splint and required form and provider to sign DME form (Mary Smith may be able to cover in my absence). Have patient pick this up today. He needs to see orthopedic hand surgery in the next week to have this addressed and may need surgery. Referral placed. Please pass on contact info below. If he cannot be seen by a hand surgeon in the next week, he should seek care with [facility] or [facility]. They both have walk in clinics as an option. Thanks.       DARVIN Wiggins  8/20/2024  7:04 AM CDT Back to Top      Team - please call patient with results. (FYI there is another result note regarding his wrist xray). Cholesterol returned high enough that he should be on a statin medication to prevent stroke and heart attacks. I will send a better tolerated statin (pravastatin) to his pharmacy for pickup. Thanks.

## 2024-08-20 NOTE — TELEPHONE ENCOUNTER
Spoke with patient, relayed providers message below and patient verbalized understanding. Pt did decline starting a statin. Pt did not have a pen and paper for phone number for hand surgeon, please give pt phone number when reaching out to pt to coordinate splint, ortho [phone].    Routing to team to help coordinate splint for pt to  today per provider's request:    Please coordinate with MA staff to get the splint and required form and provider to sign DME form (Mary Smith may be able to cover in my absence). Have patient pick this up today.     Dori Galarza, RN on 8/20/2024 at 8:56 AM

## 2024-08-20 NOTE — RESULT ENCOUNTER NOTE
Team - please call patient with results. He has a fracture of a right wrist bone (despite no history of trauma). He needs to have a wrist splint including a thumb. Please coordinate with MA staff to get the splint and required form and provider to sign DME form (Mary Smith may be able to cover in my absence). Have patient pick this up today. He needs to see orthopedic hand surgery in the next week to have this addressed and may need surgery. Referral placed. Please pass on contact info below. If he cannot be seen by a hand surgeon in the next week, he should seek care with [facility] or [facility]. They both have walk in clinics as an option. Thanks.

## 2024-08-20 NOTE — RESULT ENCOUNTER NOTE
Team - please call patient with results. (FYI there is another result note regarding his wrist xray). Cholesterol returned high enough that he should be on a statin medication to prevent stroke and heart attacks. I will send a better tolerated statin (pravastatin) to his pharmacy for pickup. Thanks.

## 2024-08-21 LAB — TESTOST SERPL-MCNC: 261 NG/DL (ref 240–950)

## 2024-08-21 NOTE — TELEPHONE ENCOUNTER
Patient has agreed to come in at 1:30 PM today to have the splint placed. He is somewhat reluctant to have the referral to Ortho as he has no pain. However, he has agreed to the referral.     Mihaela SINGRE  [facility]

## 2024-08-22 ENCOUNTER — TELEPHONE (OUTPATIENT)
Dept: PALLIATIVE MEDICINE | Facility: CLINIC | Age: 64
End: 2024-08-22
Payer: MEDICARE

## 2024-08-22 DIAGNOSIS — K62.89 RECTAL PAIN: ICD-10-CM

## 2024-08-22 DIAGNOSIS — G58.8 PUDENDAL NEURALGIA: ICD-10-CM

## 2024-08-22 DIAGNOSIS — M79.18 MYOFASCIAL MUSCLE PAIN: ICD-10-CM

## 2024-08-22 NOTE — TELEPHONE ENCOUNTER
Medication refill information reviewed.     Due date for Buprenorphine HCl (BELBUCA) 150 MCG FILM buccal film   was 8/11/2024     Prescriptions prepped for review.     Will route to provider.

## 2024-08-22 NOTE — TELEPHONE ENCOUNTER
Received request for a refill(s) of Buprenorphine HCl (BELBUCA) 150 MCG FILM buccal film      Last dispensed from pharmacy on 07/12/24    Patient's last office/virtual visit by prescribing provider on 06/12/24  Next office/virtual appointment scheduled for 09/11/24    Last urine drug screen date 12/13/23  Current opioid agreement on file (completed within the last year) Yes Date of opioid agreement: 01/10/24    E-prescribe to     [facility] - [place], [place] - [address]  [address] [address]  Phone: [phone] Fax: [phone]    Will route to nursing Grant for review and preparation of prescription(s).       Sydnie Cho MA  [facility] [place]

## 2024-08-26 NOTE — TELEPHONE ENCOUNTER
8/26/2024 2:10 PM     REFILL REQUEST:     This is a patient of mine. PDMP and Chart have been reviewed; refill request is appropriate.    Refilled for 30 day supply.  Script e-Prescribed to pharmacy    MICAELA Herman, ZEUS, FNP-C

## 2024-09-07 ENCOUNTER — OFFICE VISIT (OUTPATIENT)
Dept: URGENT CARE | Facility: URGENT CARE | Age: 64
End: 2024-09-07
Payer: MEDICARE

## 2024-09-07 VITALS
RESPIRATION RATE: 18 BRPM | HEART RATE: 73 BPM | WEIGHT: 199 LBS | TEMPERATURE: 98.1 F | SYSTOLIC BLOOD PRESSURE: 169 MMHG | BODY MASS INDEX: 34.27 KG/M2 | OXYGEN SATURATION: 98 % | DIASTOLIC BLOOD PRESSURE: 89 MMHG

## 2024-09-07 DIAGNOSIS — R42 VERTIGO: Primary | ICD-10-CM

## 2024-09-07 DIAGNOSIS — I10 ESSENTIAL HYPERTENSION: ICD-10-CM

## 2024-09-07 PROCEDURE — 99214 OFFICE O/P EST MOD 30 MIN: CPT | Performed by: FAMILY MEDICINE

## 2024-09-07 RX ORDER — MECLIZINE HYDROCHLORIDE 25 MG/1
25 TABLET ORAL 3 TIMES DAILY PRN
Qty: 25 TABLET | Refills: 0 | Status: SHIPPED | OUTPATIENT
Start: 2024-09-07 | End: 2024-10-03

## 2024-09-07 NOTE — PATIENT INSTRUCTIONS
Take prescribed medication as directed.    Continue the exercises (Epley Maneuvers).    Take your BP meds and have follow of BP with primary.

## 2024-09-07 NOTE — PROGRESS NOTES
(R42) Vertigo  (primary encounter diagnosis)    Comment:   Suspect inner ear disturbance.  Presentation not suspicious for CVA.    Plan: meclizine (ANTIVERT) 25 MG tablet     Advised that he continue to drive the Epley maneuvers.  Observe and have follow-up if these symptoms persist.        (I10) Essential hypertension  Comment:   Elevated BP noted.  Patient advised.  Plan:   He needs follow-up with primary care for both conditions and was so advised.  Asked him to get back on his lisinopril for now.        Take prescribed medication as directed.    Continue the exercises (Epley Maneuvers).    Take your BP meds and have follow of BP with primary.        CHIEF COMPLAINT    Vertigo.      HISTORY    This is a 63-year-old man who had onset of vertigo symptoms 4 days ago.  He has not taken medication.  He did see a chiropractor a couple days ago and they were trying some Epley maneuvers.  He has not seen much benefit.    He is not having other adverse neurologic symptoms such as headache, numbness or weakness, speech problem or diminished vision.    He has a complex past history, see below.    He was on lisinopril but stopped it about 4 months ago and just started within the last couple of days.      Patient Active Problem List   Diagnosis    Acute prostatitis    Pelvic pain in male    Urinary retention    Symptomatic cholelithiasis    Reflux esophagitis    Psoriatic arthritis (H)    MARIAN (obstructive sleep apnea)    OA (osteoarthritis) of hip    Neuropathy    Mild intermittent asthma, uncomplicated    Insomnia, unspecified    GERD (gastroesophageal reflux disease)    Hepatic steatosis    Benign prostatic hyperplasia with urinary obstruction    Anal fissure    Tear of right acetabular labrum, sequela    Lumbar disc herniation with radiculopathy    Lower urinary tract symptoms    Arthritis of left acromioclavicular joint    ASHD (arteriosclerotic heart disease)    Calculus of gallbladder with chronic cholecystitis  without obstruction    Change in bowel habits    Complete tear of left rotator cuff    Constipation    COVID-19 virus infection    Displacement of lumbar intervertebral disc without myelopathy    Diverticular disease of large intestine    Hiatal hernia    Hyperlipidemia    Low testosterone    Pruritus ani    Pudendal neuralgia    Coccydynia    Uncomplicated opioid dependence (H)    F11.2 - Continuous opioid dependence (H)    F11.2 - Episodic opioid dependence (H)    F11.9 - Chronic, continuous use of opioids    Fecal incontinence    Essential hypertension       Current Outpatient Medications   Medication Sig Dispense Refill    Buprenorphine HCl (BELBUCA) 150 MCG FILM buccal film Place 1 Film (150 mcg) inside cheek 3 times daily. Fill /start 8/26/2024 (30 day supply) 90 Film 0    Ergocalciferol (VITAMIN D2 PO)       FLOMAX 0.4 MG capsule Take 0.4 mg by mouth      fluticasone (FLONASE) 50 MCG/ACT nasal spray Spray 1 spray into both nostrils daily 9.9 mL 0    meclizine (ANTIVERT) 25 MG tablet Take 1 tablet (25 mg) by mouth 3 times daily as needed for dizziness. 25 tablet 0    naloxone (NARCAN) 4 MG/0.1ML nasal spray Spray 1 spray (4 mg) into one nostril alternating nostrils once as needed for opioid reversal every 2-3 minutes until assistance arrives 0.1 mL 0    pravastatin (PRAVACHOL) 40 MG tablet Take 1 tablet (40 mg) by mouth daily 90 tablet 3    tiZANidine (ZANAFLEX) 4 MG tablet Take 4 mg by mouth once as needed for muscle spasms      acetaminophen (TYLENOL) 500 MG tablet Take 500-1,000 mg by mouth every 6 hours as needed for mild pain (Patient not taking: Reported on 4/10/2024)      albuterol (PROAIR HFA/PROVENTIL HFA/VENTOLIN HFA) 108 (90 Base) MCG/ACT inhaler Inhale 2 puffs into the lungs every 6 hours (Patient not taking: Reported on 4/10/2024)      lisinopril (ZESTRIL) 10 MG tablet Take 1 tablet (10 mg) by mouth daily (Patient not taking: Reported on 8/19/2024) 90 tablet 3           REVIEW OF SYSTEMS    No  fever or chills.  No head congestion or sore throat.  No cough or SOB.  No chest pain.  No vomiting or diarrhea.  No urinary difficulty.  No swelling.  No rash.        EXAM  BP (!) 169/89 (BP Location: Right arm, Patient Position: Left side, Cuff Size: Adult Large)   Pulse 73   Temp 98.1  F (36.7  C) (Tympanic)   Resp 18   Wt 90.3 kg (199 lb)   SpO2 98%   BMI 34.27 kg/m      No facial asymmetry and speech is clear.  Patient alert.  PERRL, EOMI, no obvious nystagmus  Ear canals and TMs WNL.  Neck without adenopathy.  Cardiac RSR with normal rate, no murmur  No extremity drift  Negative Romberg

## 2024-09-11 ENCOUNTER — OFFICE VISIT (OUTPATIENT)
Dept: PALLIATIVE MEDICINE | Facility: CLINIC | Age: 64
End: 2024-09-11
Payer: MEDICARE

## 2024-09-11 VITALS — HEART RATE: 85 BPM | DIASTOLIC BLOOD PRESSURE: 90 MMHG | SYSTOLIC BLOOD PRESSURE: 163 MMHG

## 2024-09-11 DIAGNOSIS — M79.18 MYOFASCIAL MUSCLE PAIN: ICD-10-CM

## 2024-09-11 DIAGNOSIS — K62.89 RECTAL PAIN: ICD-10-CM

## 2024-09-11 DIAGNOSIS — G58.8 PUDENDAL NEURALGIA: Primary | ICD-10-CM

## 2024-09-11 PROCEDURE — 99214 OFFICE O/P EST MOD 30 MIN: CPT | Performed by: NURSE PRACTITIONER

## 2024-09-11 ASSESSMENT — PAIN SCALES - GENERAL: PAINLEVEL: WORST PAIN (10)

## 2024-09-11 NOTE — PROGRESS NOTES
[facility]         Date of Visit: 9/11/2024    CHIEF COMPLAINT:   Chief Complaint   Patient presents with    Pain       Subjective   SUBJECTIVE    INTERVAL HISTORY:   Mustapha Negrete is a 63 year old male seen for INITIAL EVALUATION on 3/22/24; last seen for FOLLOW UP on 6/12/24.  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         Recommendations/ Plan at the last visit included:  Visit discussion: Mustapha Negrete is seen in follow up today at the pain clinic for pudendal neuralgia.  Patient continues process to potential release of predental nerve entrapment by a provider team in [place].  Recent placement of DRG stimulator trial leads has not demonstrated significant improvement over the past 48 hours.  However, he still has opportunity to optimize this therapy with additional device programming.  If he does not achieve at least 50% relief from DRG stimulator, he will proceed with goal of pudendal nerve entrapment release.  At this time, I reviewed neuropathic medications such as gabapentin, pregabalin, topiramate and lamotrigine.  Patient is not interested in initiating any these medications.  He does indicate previous side effects from gabapentin and pregabalin.  However, gabapentin had been helpful initially.  Patient has been approved for Belbuca 150 mcg 3 times daily dosing.  Refill was prescribed today.  Patient advised to request additional refills with a 5-day supply remaining to allow time for approval.  Return to clinic in 3 months to reassess pain control.  Patient verbalizes understanding and agrees with plan.     PLAN:    Medication Management:   - Please allow 5-7 days to process refills to avoid running out of your medication.   - Request refills of controlled substance medications through [employer] or call [phone].   - CONTINUE taking Belbuca 150mcg film - insert ONE film inside cheek every 8 hours for pain; max 3 per day    Continue Current Treatment Plan with:  "  - DRG Stimulator trial     Return to Clinic:   -  30-minute In-Person Appointment with Destiny He, ZEUS, APRN, HONGC in 3 months, or sooner if needed    Future Considerations:  Trial of gabapentin or oxcarbazapine     Interval history from last visit on 6/12/24:   Pain score today: 8  Pain rating: intensity ranges from 7/10 to 10/10, and averages 9/10 on a 0-10 scale.   On 6/10/24, he had insertion of DRG stimulator for a 1-week trial for left-sided pudendal pain. Reports initial relief but then drove 2-hours to return home; pain has not improved significantly. He will be working with [employer] to adjust stimulator programming later today. At this time, burning pain continues with no improvement. States he was instructed to stop taking Belbuca before anesthesia for placement of stimulator leads. Buprenorphine helps \"take the edge off\" the clement        Since the last visit, Mustapha Negrete reports:   Pain score today: Worst Pain (10)   Pain rating: intensity ranges from 10/10 to 10/10, and averages 10/10 on a 0-10 scale.   DRG stimulator trial was not effective.   Since then, he had consultation with Dr. Farhan Schreiber in [place] for interventional pain evaluation. He had a PRP injections at pudendal nerve and \"some sort of ganglion nerve\" in left upper thigh. This made the pain worse over the past 5 weeks; no benefit yet.  Yesterday, he presented to a multispecialty pain clinic at [facility] to seek chiropractic and physical therapy.   Had an MRIs of bilateral hips and hamstrings and pelvis.  He will have chiropractic treatment and physical therapy three times per week starting this week.  He was told he needs a right hip replacement; is scheduled for orthopedic surgical consult for possible hip replacement.   Has developed vertigo and believes he is reacting to meds for treatment of BPH.   Admits he was given a #20 tabs supply of Oxycodone 5mg by Dr. Schreiber after PRP injection on " 8/07/24.           REVIEW OF SYSTEMS:   ROS: 10 point ROS neg other than the symptoms noted above in the HPI.     The patient otherwise denies red flag symptoms, such as: thunderclap headache, bowel or bladder incontinence, parasthesias, weakness, saddle anesthesia, unintentional weight loss, or fever/chills/sweats.     Medical History: Any changes in medical history since they were last seen? No    Medications and Allergies reviewed. Yes     Review of [place] Prescription Monitoring Program ():  reviewed on 9/11/24. No concern for abuse or misuse of controlled medications based on this report. Controlled substances prescribed in the past 6 months include: (Belbuca 150mcg, Oxycodone 10mg)     Current MME: 9 / day    Current controlled substance medications, if any, are being prescribed by: Pain Mgmt    Primary Care Provider is Tesfaye Faria CSA due date: 1/10/25   UDS due date:  12/13/24     THE 4 As OF OPIOID MAINTENANCE ANALGESIA    Analgesia: Is pain relief clinically significant? Yes  Activity: Is patient functional and able to perform Activities of Daily Living? Yes  Adverse effects: Is patient free from adverse side effects from opiates? Yes  Adherence to Rx protocol: Is patient adhering to Controlled Substance Agreement and taking medications ONLY as ordered? Yes    Is Narcan prescribed for opiate use >50 MME daily or concurrent use of opiates and benzodiazepines? N/A          Objective    OBJECTIVE:    Physical Exam  Vitals:    09/11/24 0942   BP: (!) 163/90   Pulse: 85        Appearance:   A&O. Patient is appropriate.   Patient is in NAD.      HHENT:  Normocephalic, atraumatic. Eyes without conjunctival injection or jaundice. Neck supple. No obvious neck masses.     Pulmonary:  Normal effort; no cough or audible wheeze      Skin: No obvious rash, lesions, or petechiae of exposed skin.    Neuro: Cranial nerves grossly intact.  Mentation and speech appropriate for age.     Psych:  Alert,  without lethargy or stupor. Speech fluent. Appropriate affect. Mood normal. Able to follow commands without difficulty. Normal mood, judgement and behavior.            Diagnostic Tests/ Imaging/ Labs resulted since last visit:     MRI Bilateral Hamstring 8/26/24  INTERPRETATION: There are changes of tendinosis and splitting of the common hamstrings origins bilaterally, with partial-thickness tearing involving up to 50% of the tendon thickness. No definite evidence for rupture or retraction can be seen. No evidence for additional injury to the hamstrings muscle belly or myotendinous junction regions can be seen. Note is made of atrophic changes involving the hamstrings musculature bilaterally, however there is no evidence for additional injury to the biceps femoris, semitendinosus, or semimembranosus. No other musculotendinous abnormalities of the thighs can be seen. The quadriceps and adductor musculature is normal in appearance.    There is a moderate fluid collection within the region of the right iliopsoas bursa, in keeping with changes of bursitis. Please also see the separate MRI report of the pelvis and hips dated 8/24/2024. No other abnormal fluid collections about the thighs can be seen.    No neurovascular abnormalities of the thighs are present.    The patient is status post left hip arthroplasty. No abnormalities about the device can be seen. Osteoarthritic changes of the right hip are noted. No other bony abnormalities of the left or right femur can be seen. There is no evidence for femoral fracture or stress injury. No evidence for destructive bony lesion or periosteal reaction can be seen.    CONCLUSION:    1. Tendinosis and splitting of the common hamstrings origins bilaterally without evidence for rupture or retraction. Atrophic changes of the hamstrings musculature can be seen without definite evidence for acute strain or tear.    2. No acute bony abnormalities of the right or left femur can be  seen.    3. Right iliopsoas bursitis. No other abnormal fluid collections are noted.    4. No neurovascular abnormalities are identified.      MRI Bilateral Hip and Pelvis 8/24/24  INTERPRETATION: Fat-suppressed imaging of the pelvis and hips demonstrates the patient to be status post left hip arthroplasty. There is no definite evidence for well-defined fracture, loosening, or osteolysis about the left hip arthroplasty device. Osteoarthritic changes of the right hip are noted with marrow edema along the articular surfaces and spurring along the articular margins. A mild right hip joint effusion is noted. No evidence for acute bony abnormality can be seen involving the remaining portions of the left or right proximal femur. There is no evidence for acute bony abnormality of the pelvis. The iliac wings appear intact. No injuries to the sacrum or sacroiliac joints are present. The superior and inferior pubic rami appear intact. No acute bony abnormalities of the left or right ischium can be seen.    Full-thickness and near full-thickness chondral loss can be seen along the weightbearing surfaces of the right hip with a mild effusion. The findings are in keeping with osteoarthritic change with marrow edema along the articular surfaces. Mild spurring along the articular margins can be seen. No definite evidence for well-defined stress fracture or avascular necrosis of the right proximal femur can be seen. No acute bony injuries of the right acetabular region are noted.    Moderate fluid is seen within the right iliopsoas bursa on coronal series 2 image 25 with broad-based strain involving the right iliacus muscle belly on coronal series 12 image 13. No definite evidence for rupture of the right iliopsoas tendon can be seen. The findings are in keeping with the iliopsoas bursitis. No other abnormal bursal fluid collections about the pelvis or hips can be seen. There is tendinosis and splitting of the common hamstrings  origins bilaterally without evidence for rupture or retraction. No evidence for injury to the distal gluteus medius or gluteus minimus tendons can be seen. The gluteal musculature is normal in appearance. No abnormalities of the adductor compartments can be seen.    The patient is status post lumbar fusion surgery.    No acute intrapelvic abnormalities are identified.    CONCLUSION:    1. Status post left hip arthroplasty. No definite fracture, loosening, or osteolysis about the device can be seen.    2. Osteoarthritic changes of the right hip without definite evidence for fracture or acute bony injury. A mild right hip joint effusion is seen.    3. Moderate right iliopsoas bursitis with broad-based strain involving the right iliacus muscle belly. No evidence for injury to the right iliopsoas tendon can be seen.    4. Tendinosis and splitting of the common hamstrings origins bilaterally without evidence for rupture or retraction.    5. Status post lumbar fusion surgery.            Assessment & Plan      VISIT DIAGNOSIS:   1. Pudendal neuralgia  - Adult Pain Clinic Follow-Up Order  - Adult Pain Clinic Follow-Up Order; Future    2. Myofascial muscle pain  - Adult Pain Clinic Follow-Up Order  - Adult Pain Clinic Follow-Up Order; Future    3. Rectal pain  - Adult Pain Clinic Follow-Up Order  - Adult Pain Clinic Follow-Up Order; Future      ASSESSMENT:   Visit discussion: Mustapha Negrete is seen in follow up today at the pain clinic for pudendal neuralgia. Again reviewed potential benefits of neuroleptic medications to address neuropathy pain. Patient would prefer to wait. May consider Trileptal in the future. Okay to increase Belbuca 150mcg to TID dosing until this prescription is gone. Patient may run short and request early refill. Reiterated consistent dosing if we increase to Belbuca 300mg BID. Return in 2 months. Patient verbalizes understanding and agreement with plan.      PLAN:    Medication Management:   -  INCREASE dose of Belbuca to 150mcg film - one film inside cheek every 8 hours; max 3 per day until gone    - At next refill, we can increase to Belbuca 300mcg film - one film every 12 hours; max 2 per day     Continue Current Treatment Plan with:   - Physical Therapy   - Chiropractic Care      Return to Clinic:   -  30-minute In-Person Appointment with Destiny He DNP, APRN, FNP-C in 2 months, or sooner if needed      Future Considerations:   We may consider a trial of Trileptal (Oxcarbazepine) for nerve pain if you do not feel benefit from dose increase of Belbuca.    Please call the clinic with a report of how many Belbuca 150mcg films you have left today.       ___________________________________________________________________    BILLING TIME DOCUMENTATION:   TOTAL TIME includes:   Time spent preparing to see the patient: 3 minutes (reviewing records and tests)  Time spend face to face with the patient: 31 minutes  Time spent ordering tests, medications, procedures and referrals: 0 minutes  Time spent Referring and communicating with other healthcare professionals: 0 minutes  Documenting clinical information in Epic: 3 minutes    The total TIME spent on this patient on the day of the appointment was 37 minutes.     ___________________________________________________________________    Review of Electronic Chart: Today I have also reviewed available medical information in the patient's medical record at [facility] ([facility]) and Care Everywhere (if available), including relevant provider notes, laboratory work, and imaging.     MICAELA Herman, ZEUS, DENISSE   [facility]

## 2024-09-11 NOTE — PATIENT INSTRUCTIONS
PATIENT INSTRUCTIONS:     I am recommending a multidisciplinary treatment plan to help this patient better manage his pain. The following recommendations were given to the patient. Diagnosis, treatment options, risks, benefits, and alternatives were discussed; all questions were answered. Self-care instructions were given. The patient expressed understanding of the plan for management.   Medication Management:   - INCREASE dose of Belbuca to 150mcg film - one film inside cheek every 8 hours; max 3 per day until gone    - At next refill, we can increase to Belbuca 300mcg film - one film every 12 hours; max 2 per day   Continue Current Treatment Plan with:   - Physical Therapy   - Chiropractic Care   Return to Clinic:   -  30-minute In-Person Appointment with Destiny He, ZEUS, APRN, HONGC in 2 months, or sooner if needed    Future Considerations:   We may consider a trial of Trileptal (Oxcarbazepine) for nerve pain if you do not feel benefit from dose increase of Belbuca.    Please call the clinic with a report of how many Belbuca 150mcg films you have left today.     ----------------------------------------------------------------  Clinic Number:  [phone]   Call with any questions about your care and for scheduling assistance.   Calls are returned Monday through Friday between 8 AM and 4:30 PM. We usually get back to you within 2 business days depending on the issue/request.    If we are prescribing your medications:  For opioid medication refills, call the clinic or send a [employer] message 7 days in advance.  Please include:  Name of requested medication  Name of the pharmacy.  For non-opioid medications, call your pharmacy directly to request a refill. Please allow 3-4 days to be processed.   Per [place] State Law:  All controlled substance prescriptions must be filled within 30 days of being written.    For those controlled substances allowing refills, pickup must occur within 30 days of last fill.      We  believe regular attendance is key to your success in our program!    Any time you are unable to keep your appointment we ask that you call us at least 24 hours in advance to cancel.This will allow us to offer the appointment time to another patient.   Multiple missed appointments may lead to dismissal from the clinic.

## 2024-09-12 ENCOUNTER — TELEPHONE (OUTPATIENT)
Dept: PALLIATIVE MEDICINE | Facility: CLINIC | Age: 64
End: 2024-09-12
Payer: MEDICARE

## 2024-09-12 DIAGNOSIS — M79.18 MYOFASCIAL MUSCLE PAIN: ICD-10-CM

## 2024-09-12 DIAGNOSIS — K62.89 RECTAL PAIN: ICD-10-CM

## 2024-09-12 DIAGNOSIS — G58.8 PUDENDAL NEURALGIA: Primary | ICD-10-CM

## 2024-09-12 NOTE — TELEPHONE ENCOUNTER
[facility] Call Center    Phone Message    May a detailed message be left on voicemail: yes     Reason for Call: Other: Patient calling to let Destiny know he has 62 tablets left of the Belbuca.  Please call him back with any questions.      Action Taken: Message routed to:  Other: [facility]    Travel Screening: Not Applicable     Date of Service:

## 2024-09-17 NOTE — TELEPHONE ENCOUNTER
9/17/2024 4:11 PM     REFILL REQUEST:     This is a patient of mine. PDMP and Chart have been reviewed.     As of 9/12/24, patient had 20 day supply of Belbuca 150mcg films at TID dosing; #62 films. This will last until 9/31/24.     Okay to fill for Belbuca 300mcg - ONE film every 12 hours; fill on 9/30/24, start 10/01/24.   Discontinue Belbuca 150mcg film TID as of 10/01/24.     Refilled for 30 day supply.  Script e-Prescribed to pharmacy    Destiny He, APRN, DNP, FNP-C

## 2024-10-03 ENCOUNTER — OFFICE VISIT (OUTPATIENT)
Dept: FAMILY MEDICINE | Facility: CLINIC | Age: 64
End: 2024-10-03
Payer: MEDICARE

## 2024-10-03 VITALS
RESPIRATION RATE: 18 BRPM | HEART RATE: 96 BPM | DIASTOLIC BLOOD PRESSURE: 76 MMHG | TEMPERATURE: 97.3 F | OXYGEN SATURATION: 97 % | HEIGHT: 64 IN | SYSTOLIC BLOOD PRESSURE: 148 MMHG | WEIGHT: 199 LBS | BODY MASS INDEX: 33.97 KG/M2

## 2024-10-03 DIAGNOSIS — R10.2 PELVIC PAIN IN MALE: Primary | ICD-10-CM

## 2024-10-03 DIAGNOSIS — M25.551 HIP PAIN, RIGHT: ICD-10-CM

## 2024-10-03 DIAGNOSIS — I10 ESSENTIAL HYPERTENSION: ICD-10-CM

## 2024-10-03 LAB
ANION GAP SERPL CALCULATED.3IONS-SCNC: 9 MMOL/L (ref 7–15)
BASOPHILS # BLD AUTO: 0 10E3/UL (ref 0–0.2)
BASOPHILS NFR BLD AUTO: 0 %
BUN SERPL-MCNC: 17.8 MG/DL (ref 8–23)
CALCIUM SERPL-MCNC: 9.7 MG/DL (ref 8.8–10.4)
CHLORIDE SERPL-SCNC: 103 MMOL/L (ref 98–107)
CREAT SERPL-MCNC: 0.81 MG/DL (ref 0.67–1.17)
CRP SERPL-MCNC: 7.29 MG/L
EGFRCR SERPLBLD CKD-EPI 2021: >90 ML/MIN/1.73M2
EOSINOPHIL # BLD AUTO: 0.2 10E3/UL (ref 0–0.7)
EOSINOPHIL NFR BLD AUTO: 2 %
ERYTHROCYTE [DISTWIDTH] IN BLOOD BY AUTOMATED COUNT: 12.1 % (ref 10–15)
GLUCOSE SERPL-MCNC: 97 MG/DL (ref 70–99)
HCO3 SERPL-SCNC: 27 MMOL/L (ref 22–29)
HCT VFR BLD AUTO: 44.1 % (ref 40–53)
HGB BLD-MCNC: 14.7 G/DL (ref 13.3–17.7)
IMM GRANULOCYTES # BLD: 0 10E3/UL
IMM GRANULOCYTES NFR BLD: 0 %
LYMPHOCYTES # BLD AUTO: 0.9 10E3/UL (ref 0.8–5.3)
LYMPHOCYTES NFR BLD AUTO: 12 %
MCH RBC QN AUTO: 29.8 PG (ref 26.5–33)
MCHC RBC AUTO-ENTMCNC: 33.3 G/DL (ref 31.5–36.5)
MCV RBC AUTO: 90 FL (ref 78–100)
MONOCYTES # BLD AUTO: 0.7 10E3/UL (ref 0–1.3)
MONOCYTES NFR BLD AUTO: 9 %
NEUTROPHILS # BLD AUTO: 6.2 10E3/UL (ref 1.6–8.3)
NEUTROPHILS NFR BLD AUTO: 77 %
PLATELET # BLD AUTO: 305 10E3/UL (ref 150–450)
POTASSIUM SERPL-SCNC: 4.3 MMOL/L (ref 3.4–5.3)
RBC # BLD AUTO: 4.93 10E6/UL (ref 4.4–5.9)
SODIUM SERPL-SCNC: 139 MMOL/L (ref 135–145)
WBC # BLD AUTO: 8 10E3/UL (ref 4–11)

## 2024-10-03 PROCEDURE — 86140 C-REACTIVE PROTEIN: CPT | Performed by: PHYSICIAN ASSISTANT

## 2024-10-03 PROCEDURE — 36415 COLL VENOUS BLD VENIPUNCTURE: CPT | Performed by: PHYSICIAN ASSISTANT

## 2024-10-03 PROCEDURE — 80048 BASIC METABOLIC PNL TOTAL CA: CPT | Performed by: PHYSICIAN ASSISTANT

## 2024-10-03 PROCEDURE — 87040 BLOOD CULTURE FOR BACTERIA: CPT | Performed by: PHYSICIAN ASSISTANT

## 2024-10-03 PROCEDURE — 85025 COMPLETE CBC W/AUTO DIFF WBC: CPT | Performed by: PHYSICIAN ASSISTANT

## 2024-10-03 PROCEDURE — 99215 OFFICE O/P EST HI 40 MIN: CPT | Performed by: PHYSICIAN ASSISTANT

## 2024-10-03 RX ORDER — LOSARTAN POTASSIUM AND HYDROCHLOROTHIAZIDE 12.5; 5 MG/1; MG/1
TABLET ORAL
Qty: 90 TABLET | Refills: 0 | Status: SHIPPED | OUTPATIENT
Start: 2024-10-03

## 2024-10-03 ASSESSMENT — ASTHMA QUESTIONNAIRES
QUESTION_4 LAST FOUR WEEKS HOW OFTEN HAVE YOU USED YOUR RESCUE INHALER OR NEBULIZER MEDICATION (SUCH AS ALBUTEROL): NOT AT ALL
ACT_TOTALSCORE: 24
ACT_TOTALSCORE: 24
QUESTION_1 LAST FOUR WEEKS HOW MUCH OF THE TIME DID YOUR ASTHMA KEEP YOU FROM GETTING AS MUCH DONE AT WORK, SCHOOL OR AT HOME: NONE OF THE TIME
QUESTION_2 LAST FOUR WEEKS HOW OFTEN HAVE YOU HAD SHORTNESS OF BREATH: NOT AT ALL
QUESTION_3 LAST FOUR WEEKS HOW OFTEN DID YOUR ASTHMA SYMPTOMS (WHEEZING, COUGHING, SHORTNESS OF BREATH, CHEST TIGHTNESS OR PAIN) WAKE YOU UP AT NIGHT OR EARLIER THAN USUAL IN THE MORNING: NOT AT ALL
QUESTION_5 LAST FOUR WEEKS HOW WOULD YOU RATE YOUR ASTHMA CONTROL: WELL CONTROLLED

## 2024-10-03 ASSESSMENT — PAIN SCALES - GENERAL: PAINLEVEL: EXTREME PAIN (9)

## 2024-10-03 ASSESSMENT — ENCOUNTER SYMPTOMS: HIP PAIN: 1

## 2024-10-03 NOTE — PATIENT INSTRUCTIONS
Mary Luciano,    Thank you for allowing [facility] to manage your care.    I am unsure of the cause of your symptoms, but your exam is reassuring. We will see what our workup shows.     If you develop worsening/changing symptoms at any time, please be seen in clinic/urgent care or call 911/go to the emergency department for evaluation.    Your blood pressure was high today. Get a blood pressure cuff for use at home. Take and record your blood pressure twice daily for 2 weeks. If your blood pressure is greater than or equal to 140/90mm Hg on average, please contact us.    I ordered some lab work. Please go to the laboratory to get your studies.    I sent your prescriptions to your pharmacy.    I made a referral to pelvic floor PT. They will be calling in approximately 1 week to set up your appointment.  If you do not hear from them, please call the specialty number on your after visit summary.     Please allow 1-2 business days for our office to contact you in regards to your laboratory/radiological studies.  If not done so, I encourage you to login into [employer] ([email or web address] to review your results as well.     If you have any questions or concerns, please feel free to call us at [phone]    Sincerely,    Marito Mancilla PA-C    Did you know?      You can schedule a video visit for follow-up appointments as well as future appointments for certain conditions.  Please see the below link.     [email or web address]    If you have not already done so,  I encourage you to sign up for [employer] ([email or web address]).  This will allow you to review your results, securely communicate with a provider, and schedule virtual visits as well.

## 2024-10-03 NOTE — PROGRESS NOTES
Assessment & Plan   Problem List Items Addressed This Visit          Nervous and Auditory    Pelvic pain in male - Primary    Relevant Orders    Physical Therapy  Referral       Circulatory    Essential hypertension    Relevant Medications    losartan-hydrochlorothiazide (HYZAAR) 50-12.5 MG tablet    Other Relevant Orders    Basic metabolic panel  (Ca, Cl, CO2, Creat, Gluc, K, Na, BUN) (Completed)     Other Visit Diagnoses       Hip pain, right        Relevant Orders    CBC with platelets and differential (Completed)    CRP, inflammation (Completed)    Blood Culture Peripheral Blood           Right hip arthralgia  - Likely related to PRP injection and possible hip osteoarthritis. Referral to [facility] for right hip injection declined by patient. He is quite concerned about septic arthritis, but I have low suspicion for this. Despite mildly elevated CRP, he has no leukocytosis, obj fever, overlying signs of infection to suggest infection. Blood culture done at his request is pending. Recommend he continue to follow up with ortho.    Hypertension  - Systolic blood pressure readings up to 180 mmHg and diastolic readings up to 90 mmHg.  - Start Hyzaar (Losartan and hydrochlorothiazide combination) for blood pressure control. Monitor blood pressure at home. Patient to maintain a blood pressure log and titrate up this med.    Request for pelvic floor physical therapy  - Patient reports pelvic floor dysfunction.  - Referral to [facility] for pelvic floor physical therapy.    Complete history and physical exam as below. Afebrile with normal vital signs except for elevated bp, which they will monitor at home and contact us if >140/90mmHg on average.    DDx and Dx discussed with and explained to the pt to their satisfaction.  All questions were answered at this time. Pt expressed understanding of and agreement with this dx, tx, and plan. No further workup warranted and standard medication warnings given.  "I have given the patient a list of pertinent indications for re-evaluation. Will go to the Emergency Department if symptoms worsen or new concerning symptoms arise. Patient left the in no apparent distress.     BMI  Estimated body mass index is 34.27 kg/m  as calculated from the following:    Height as of this encounter: 1.623 m (5' 3.9\").    Weight as of this encounter: 90.3 kg (199 lb).     See Patient Instructions    Review of prior external note(s) from - CareEverywhere information from [facility] reviewed  Ordering of each unique test  Prescription drug management  48 minutes spent by me on the date of the encounter doing chart review, history and exam, documentation and further activities per the note  Zhang Luciano is a 63 year old, presenting for the following health issues:  Hypertension and Hip Pain        10/3/2024     6:54 AM   Additional Questions   Roomed by Padmini Carroll CMA   Accompanied by N/A         10/3/2024     6:54 AM   Patient Reported Additional Medications   Patient reports taking the following new medications No new medications     History of present illness  - Patient reports right hip pain for the past two months following a PRP injection for chronic pelvic pain.  - Reports hip pain worsens with weight bearing  - Reports feeling feverish and having chills and sweats, but no measured fever  - has been seen by [facility] who felt he should have a right hip steroid injection and ordered this, but he is hoping for labs today to investigate whether he has a right hip infection. An outside provider also recommended that he seek eval for this. No redness, rash, abdominal pain, numbness/tingling, injury or other complaints regarding the hip.    - Reports difficulty urinating when on Lisinopril, so he stopped this on his own. Reports high blood pressure readings, up to 180/90  - Reports burning sensation during urination and burning in the groin area and hamstrings, but these are chronic for " "him.  - Reports dizziness on Flomax      Past medical history  History of hip replacement on the left side    Past surgical history  Left hip replacement    Social history  Reports having a son with mental health issues    Allergies  Tamsulosin - caused dizziness    Current medications  - Belbuca  - Albuterol inhaler (used 3-4 times a year)  - Flonase (used rarely)    Vitals  Hypertension readings, up to 180/90    Physical exam  MUSCULOSKELETAL: Hip pain localized to the front and groin area. Pain increases with pressure and rotation.    Review of Systems  Constitutional, neuro, ENT, endocrine, pulmonary, cardiac, gastrointestinal, genitourinary, musculoskeletal, integument and psychiatric systems are negative, except as otherwise noted.      Objective    BP (!) 148/76   Pulse 96   Temp 97.3  F (36.3  C) (Temporal)   Resp 18   Ht 1.623 m (5' 3.9\")   Wt 90.3 kg (199 lb)   SpO2 97%   BMI 34.27 kg/m    Body mass index is 34.27 kg/m .  Physical Exam  Vitals and nursing note reviewed.   Constitutional:       General: He is not in acute distress.     Appearance: He is not ill-appearing or diaphoretic.   HENT:      Head: Normocephalic and atraumatic.      Mouth/Throat:      Mouth: Mucous membranes are moist.   Eyes:      Conjunctiva/sclera: Conjunctivae normal.   Cardiovascular:      Rate and Rhythm: Normal rate and regular rhythm.      Heart sounds: Normal heart sounds. No murmur heard.     No friction rub. No gallop.   Pulmonary:      Effort: Pulmonary effort is normal. No respiratory distress.      Breath sounds: Normal breath sounds. No stridor. No wheezing, rhonchi or rales.   Abdominal:      General: Bowel sounds are normal. There is no distension.      Palpations: Abdomen is soft. There is no mass.      Tenderness: There is no abdominal tenderness. There is no guarding or rebound.      Hernia: No hernia is present.   Musculoskeletal:      Comments: No CVA or midline spinal tenderness. No overlying signs of " trauma or infection to the hips per patient. Right hip painful with rotation and axial loading. No tenderness to the hips. Distal CMS intact. Remainder of limbs non-tender.   Skin:     General: Skin is warm and dry.   Neurological:      General: No focal deficit present.      Mental Status: He is alert. Mental status is at baseline.      Comments: Able to toe lift, heel walk and knee bend.   Psychiatric:         Mood and Affect: Mood normal.         Behavior: Behavior normal.          Results for orders placed or performed in visit on 10/03/24   CRP, inflammation     Status: Abnormal   Result Value Ref Range    CRP Inflammation 7.29 (H) <5.00 mg/L   Basic metabolic panel  (Ca, Cl, CO2, Creat, Gluc, K, Na, BUN)     Status: Normal   Result Value Ref Range    Sodium 139 135 - 145 mmol/L    Potassium 4.3 3.4 - 5.3 mmol/L    Chloride 103 98 - 107 mmol/L    Carbon Dioxide (CO2) 27 22 - 29 mmol/L    Anion Gap 9 7 - 15 mmol/L    Urea Nitrogen 17.8 8.0 - 23.0 mg/dL    Creatinine 0.81 0.67 - 1.17 mg/dL    GFR Estimate >90 >60 mL/min/1.73m2    Calcium 9.7 8.8 - 10.4 mg/dL    Glucose 97 70 - 99 mg/dL   CBC with platelets and differential     Status: None   Result Value Ref Range    WBC Count 8.0 4.0 - 11.0 10e3/uL    RBC Count 4.93 4.40 - 5.90 10e6/uL    Hemoglobin 14.7 13.3 - 17.7 g/dL    Hematocrit 44.1 40.0 - 53.0 %    MCV 90 78 - 100 fL    MCH 29.8 26.5 - 33.0 pg    MCHC 33.3 31.5 - 36.5 g/dL    RDW 12.1 10.0 - 15.0 %    Platelet Count 305 150 - 450 10e3/uL    % Neutrophils 77 %    % Lymphocytes 12 %    % Monocytes 9 %    % Eosinophils 2 %    % Basophils 0 %    % Immature Granulocytes 0 %    Absolute Neutrophils 6.2 1.6 - 8.3 10e3/uL    Absolute Lymphocytes 0.9 0.8 - 5.3 10e3/uL    Absolute Monocytes 0.7 0.0 - 1.3 10e3/uL    Absolute Eosinophils 0.2 0.0 - 0.7 10e3/uL    Absolute Basophils 0.0 0.0 - 0.2 10e3/uL    Absolute Immature Granulocytes 0.0 <=0.4 10e3/uL   CBC with platelets and differential     Status: None     Narrative    The following orders were created for panel order CBC with platelets and differential.  Procedure                               Abnormality         Status                     ---------                               -----------         ------                     CBC with platelets and d...[[id number]]                      Final result                 Please view results for these tests on the individual orders.           Signed Electronically by: DARVIN Wiggins

## 2024-10-08 LAB — BACTERIA BLD CULT: NO GROWTH

## 2024-10-11 ENCOUNTER — DOCUMENTATION ONLY (OUTPATIENT)
Dept: FAMILY MEDICINE | Facility: CLINIC | Age: 64
End: 2024-10-11
Payer: MEDICARE

## 2024-10-11 DIAGNOSIS — I10 ESSENTIAL HYPERTENSION: Primary | ICD-10-CM

## 2024-10-11 NOTE — PROGRESS NOTES
Patient has an upcoming lab appointment on 10.24.24 at 7:00 am. Please review and place future orders that may be needed. Otherwise please call patient to cancel lab appointment.    Thank you,  [facility] lab staff

## 2024-10-22 ENCOUNTER — TRANSFERRED RECORDS (OUTPATIENT)
Dept: HEALTH INFORMATION MANAGEMENT | Facility: CLINIC | Age: 64
End: 2024-10-22
Payer: MEDICARE

## 2024-10-24 ENCOUNTER — LAB (OUTPATIENT)
Dept: LAB | Facility: CLINIC | Age: 64
End: 2024-10-24
Payer: MEDICARE

## 2024-10-24 DIAGNOSIS — I10 ESSENTIAL HYPERTENSION: ICD-10-CM

## 2024-10-24 LAB
ANION GAP SERPL CALCULATED.3IONS-SCNC: 10 MMOL/L (ref 7–15)
BUN SERPL-MCNC: 18.2 MG/DL (ref 8–23)
CALCIUM SERPL-MCNC: 9.7 MG/DL (ref 8.8–10.4)
CHLORIDE SERPL-SCNC: 103 MMOL/L (ref 98–107)
CREAT SERPL-MCNC: 0.79 MG/DL (ref 0.67–1.17)
EGFRCR SERPLBLD CKD-EPI 2021: >90 ML/MIN/1.73M2
GLUCOSE SERPL-MCNC: 138 MG/DL (ref 70–99)
HCO3 SERPL-SCNC: 27 MMOL/L (ref 22–29)
POTASSIUM SERPL-SCNC: 4.8 MMOL/L (ref 3.4–5.3)
SODIUM SERPL-SCNC: 140 MMOL/L (ref 135–145)

## 2024-10-24 PROCEDURE — 36415 COLL VENOUS BLD VENIPUNCTURE: CPT

## 2024-10-24 PROCEDURE — 80048 BASIC METABOLIC PNL TOTAL CA: CPT

## 2024-10-31 ENCOUNTER — OFFICE VISIT (OUTPATIENT)
Dept: FAMILY MEDICINE | Facility: CLINIC | Age: 64
End: 2024-10-31
Payer: MEDICARE

## 2024-10-31 ENCOUNTER — OFFICE VISIT (OUTPATIENT)
Dept: PALLIATIVE MEDICINE | Facility: CLINIC | Age: 64
End: 2024-10-31
Attending: NURSE PRACTITIONER
Payer: MEDICARE

## 2024-10-31 VITALS
HEIGHT: 64 IN | SYSTOLIC BLOOD PRESSURE: 126 MMHG | BODY MASS INDEX: 33.97 KG/M2 | OXYGEN SATURATION: 100 % | WEIGHT: 199 LBS | HEART RATE: 104 BPM | DIASTOLIC BLOOD PRESSURE: 76 MMHG | RESPIRATION RATE: 18 BRPM | TEMPERATURE: 97.4 F

## 2024-10-31 VITALS — HEART RATE: 98 BPM | DIASTOLIC BLOOD PRESSURE: 83 MMHG | SYSTOLIC BLOOD PRESSURE: 121 MMHG

## 2024-10-31 DIAGNOSIS — K62.89 RECTAL PAIN: ICD-10-CM

## 2024-10-31 DIAGNOSIS — M79.18 MYOFASCIAL MUSCLE PAIN: ICD-10-CM

## 2024-10-31 DIAGNOSIS — Z01.818 PREOP GENERAL PHYSICAL EXAM: Primary | ICD-10-CM

## 2024-10-31 DIAGNOSIS — M25.551 HIP PAIN, RIGHT: ICD-10-CM

## 2024-10-31 DIAGNOSIS — G58.8 PUDENDAL NEURALGIA: ICD-10-CM

## 2024-10-31 DIAGNOSIS — M25.551 HIP PAIN, RIGHT: Primary | ICD-10-CM

## 2024-10-31 PROCEDURE — 99213 OFFICE O/P EST LOW 20 MIN: CPT | Performed by: NURSE PRACTITIONER

## 2024-10-31 PROCEDURE — 99214 OFFICE O/P EST MOD 30 MIN: CPT | Performed by: PHYSICIAN ASSISTANT

## 2024-10-31 PROCEDURE — 93000 ELECTROCARDIOGRAM COMPLETE: CPT | Performed by: PHYSICIAN ASSISTANT

## 2024-10-31 ASSESSMENT — PAIN SCALES - GENERAL: PAINLEVEL_OUTOF10: WORST PAIN (10)

## 2024-10-31 NOTE — PATIENT INSTRUCTIONS
Mary Luciano,    Thank you for allowing [facility] to manage your care.    If you have any questions or concerns, please feel free to call us at [phone]    Sincerely,    Marito Mancilla PA-C    Did you know?      You can schedule a video visit for follow-up appointments as well as future appointments for certain conditions.  Please see the below link.     [email or web address]    If you have not already done so,  I encourage you to sign up for [employer] ([email or web address]).  This will allow you to review your results, securely communicate with a provider, and schedule virtual visits as well.    How to Take Your Medication Before Surgery  Preoperative Medication Instructions    - losartan-hydrochlorothiazide: DO NOT TAKE on day of surgery (minimum 11 hours for general anesthesia).   - Herbal medications and vitamins: DO NOT TAKE 14 days prior to surgery.   - ibuprofen (Advil, Motrin): DO NOT TAKE 1 day before surgery.    - naproxen (Aleve, Naprosyn): DO NOT TAKE 4 days before surgery.    - Belbuca: Continue without modification. Notify Suboxone prescriber of upcoming surgery, patient will need an individualized perioperative plan.        Patient Education   Preparing for Your Surgery  For Adults  Getting started  In most cases, a nurse will call to review your health history and instructions. They will give you an arrival time based on your scheduled surgery time. Please be ready to share:  Your doctor's clinic name and phone number  Your medical, surgical, and anesthesia history  A list of allergies and sensitivities  A list of medicines, including herbal treatments and over-the-counter drugs  Whether the patient has a legal guardian (ask how to send us the papers in advance)  Note: You may not receive a call if you were seen at our PAC (Preoperative Assessment Center).  Please tell us if you're pregnant--or if there's any chance you might be pregnant.  Some surgeries may injure a fetus (unborn baby), so they require a pregnancy test. Surgeries that are safe for a fetus don't always need a test, and you can choose whether to have one.   Preparing for surgery  Within 10 to 30 days of surgery: Have a pre-op exam (sometimes called an H&P, or History and Physical). This can be done at a clinic or pre-operative center.  If you're having a , you may not need this exam. Talk to your care team.  At your pre-op exam, talk to your care team about all medicines you take. (This includes CBD oil and any drugs, such as THC, marijuana, and other forms of cannabis.) If you need to stop any medicine before surgery, ask when to start taking it again.  This is for your safety. Many medicines and drugs can make you bleed too much during surgery. Some change how well surgery (anesthesia) drugs work.  Call your insurance company to let them know you're having surgery. (If you don't have insurance, call [phone].)  Call your clinic if there's any change in your health. This includes a scrape or scratch near the surgery site, or any signs of a cold (sore throat, runny nose, cough, rash, fever).  Eating and drinking guidelines  For your safety: Unless your surgeon tells you otherwise, follow the guidelines below.  Eat and drink as normal until 8 hours before you arrive for surgery. After that, no food or milk. You can spit out gum when you arrive.  Drink clear liquids until 2 hours before you arrive. These are liquids you can see through, like water, Gatorade, and Propel Water. They also include plain black coffee and tea (no cream or milk).  No alcohol for 24 hours before you arrive. The night before surgery, stop any drinks that contain THC.  If your care team tells you to take medicine on the morning of surgery, it's okay to take it with a sip of water. No other medicines or drugs are allowed (including CBD oil)--follow your care team's instructions.  If you have questions  the day of surgery, call your hospital or surgery center.   Preventing infection  Shower or bathe the night before and the morning of surgery. Follow the instructions your clinic gave you. (If no instructions, use regular soap.)  Don't shave or clip hair near your surgery site. We'll remove the hair if needed.  Don't smoke or vape the morning of surgery. No chewing tobacco for 6 hours before you arrive. A nicotine patch is okay. You may spit out nicotine gum when you arrive.  For some surgeries, the surgeon will tell you to fully quit smoking and nicotine.  We will make every effort to keep you safe from infection. We will:  Clean our hands often with soap and water (or an alcohol-based hand rub).  Clean the skin at your surgery site with a special soap that kills germs.  Give you a special gown to keep you warm. (Cold raises the risk of infection.)  Wear hair covers, masks, gowns, and gloves during surgery.  Give antibiotic medicine, if prescribed. Not all surgeries need this medicine.  What to bring on the day of surgery  Photo ID and insurance card  Copy of your health care directive, if you have one  Glasses and hearing aids (bring cases)  You can't wear contacts during surgery  Inhaler and eye drops, if you use them (tell us about these when you arrive)  CPAP machine or breathing device, if you use them  A few personal items, if spending the night  If you have . . .  A pacemaker, ICD (cardiac defibrillator), or other implant: Bring the ID card.  An implanted stimulator: Bring the remote control.  A legal guardian: Bring a copy of the certified (court-stamped) guardianship papers.  Please remove any jewelry, including body piercings. Leave jewelry and other valuables at home.  If you're going home the day of surgery  You must have a responsible adult drive you home. They should stay with you overnight as well.  If you don't have someone to stay with you, and you aren't safe to go home alone, we may keep you  overnight. Insurance often won't pay for this.  After surgery  If it's hard to control your pain or you need more pain medicine, please call your surgeon's office.  Questions?   If you have any questions for your care team, list them here:   ____________________________________________________________________________________________________________________________________________________________________________________________________________________________________________________________  For informational purposes only. Not to replace the advice of your health care provider. Copyright   2003, 2019 [facility]. All rights reserved. Clinically reviewed by Chas Stanford MD. [employer] [id number] - REV 08/24.

## 2024-10-31 NOTE — PROGRESS NOTES
Preoperative Evaluation  [facility] [place]  [address] [facility]  [place] [address]  Phone: [phone]  Primary Provider: Tesfaye Faria MD  Pre-op Performing Provider: DARVIN Wiggins  Oct 31, 2024             10/31/2024   Surgical Information   What procedure is being done? right hip replacement    Facility or Hospital where procedure/surgery [facility] be performed[facility]    Who is doing the procedure / surgery? Dr Naif Oscar    Date of surgery / procedure: November 19Th, 2024   Time of surgery / procedure: don't know yet    Where do you plan to recover after surgery? at home with family        Patient-reported     Fax number for surgical facility: [phone]     Assessment & Plan     The proposed surgical procedure is considered INTERMEDIATE risk.    Problem List Items Addressed This Visit    None  Visit Diagnoses       Preop general physical exam    -  Primary    Relevant Orders    EKG 12-lead complete w/read - Clinics (Completed)    Hip pain, right                        - No identified additional risk factors other than previously addressed    Antiplatelet or Anticoagulation Medication Instructions   - Patient is on no antiplatelet or anticoagulation medications.    Additional Medication Instructions   - ACE/ARB: DO NOT TAKE on day of surgery (minimum 11 hours for general anesthesia).   - Herbal medications and vitamins: DO NOT TAKE 14 days prior to surgery.   - ibuprofen (Advil, Motrin): DO NOT TAKE 1 day before surgery.    - naproxen (Aleve, Naprosyn): DO NOT TAKE 4 days before surgery.    - Belbuca: Continue without modification. Notify Suboxone prescriber of upcoming surgery, patient will need an individualized perioperative plan.     Recommendation  Approval given to proceed with proposed procedure, without further diagnostic evaluation.    Zhang Luciano is a 63 year old, presenting for the following:  Pre-Op Exam          10/31/2024     2:03 PM    Additional Questions   Roomed by Padmini Carroll CMA   Accompanied by N/A         10/31/2024     2:03 PM   Patient Reported Additional Medications   Patient reports taking the following new medications No new medications     HPI related to upcoming procedure: needs right PARKER        10/31/2024   Pre-Op Questionnaire   Have you ever had a heart attack or stroke? No    Have you ever had surgery on your heart or blood vessels, such as a stent placement, a coronary artery bypass, or surgery on an artery in your head, neck, heart, or legs? No    Do you have chest pain with activity? No    Do you have a history of heart failure? No    Do you currently have a cold, bronchitis or symptoms of other infection? No    Do you have a cough, shortness of breath, or wheezing? No    Do you or anyone in your family have previous history of blood clots? No    Do you or does anyone in your family have a serious bleeding problem such as prolonged bleeding following surgeries or cuts? No    Have you ever had problems with anemia or been told to take iron pills? No    Have you had any abnormal blood loss such as black, tarry or bloody stools? No    Have you ever had a blood transfusion? No    Are you willing to have a blood transfusion if it is medically needed before, during, or after your surgery? Yes    Have you or any of your relatives ever had problems with anesthesia? (!) YES, palpitations and sedation with high dose of Versed    Do you have sleep apnea, excessive snoring or daytime drowsiness? (!) YES, but no apneic episodes    Do you have a CPAP machine? (!) NO     Do you have any artifical heart valves or other implanted medical devices like a pacemaker, defibrillator, or continuous glucose monitor? No    Do you have artificial joints? (!) YES, left hip    Are you allergic to latex? No        Patient-reported     Health Care Directive  Patient does not have a Health Care Directive: Discussed advance care planning with patient;  however, patient declined at this time.    Preoperative Review of    reviewed - controlled substances reflected in medication list.    Status of Chronic Conditions:  HYPERTENSION - Patient has longstanding history of HTN , currently denies any symptoms referable to elevated blood pressure. Specifically denies chest pain, palpitations, dyspnea, orthopnea, PND or peripheral edema. Blood pressure readings have been in normal range. Current medication regimen is as listed below. Patient denies any side effects of medication.     Patient Active Problem List    Diagnosis Date Noted    Essential hypertension 09/07/2024     Priority: Medium    Fecal incontinence 02/12/2024     Priority: Medium    F11.2 - Continuous opioid dependence (H) 05/23/2023     Priority: Medium    F11.2 - Episodic opioid dependence (H) 05/23/2023     Priority: Medium    F11.9 - Chronic, continuous use of opioids 05/23/2023     Priority: Medium    Uncomplicated opioid dependence (H)      Priority: Medium     Patient is followed by [facility] DO for ongoing prescription of pain medication.  All refills should only be approved by this provider, or covering partner.    Medication(s): oxycodone 5 mg  Maximum quantity per month: 30  Clinic visit frequency required: Q 3 months   PDMP Review         Value Time User    State PDMP site checked  Yes 7/3/2022 10:43 AM Jose Pérez MD          Controlled substance agreement:  CSA -- Patient Level:    Controlled Substance Agreement - Opioid - Scan on 9/8/2022 11:41 AM       Pain Clinic evaluation in the past: No    Opioid Risk Tool Total Score(s):  No flowsheet data found.  Last [facility] website verification:  done on 9/8/22   [email or web address]        Pudendal neuralgia 05/13/2022     Priority: Medium    Coccydynia 05/13/2022     Priority: Medium    Change in bowel habits 01/03/2022     Priority: Medium    Complete tear of left rotator cuff 01/03/2022     Priority: Medium     Pruritus ani 01/03/2022     Priority: Medium    Tear of right acetabular labrum, sequela 10/28/2021     Priority: Medium    Lumbar disc herniation with radiculopathy 10/28/2021     Priority: Medium     MRI 10/21  2.  At L4-L5, there is a right paracentral slightly caudally directed disc extrusion that narrows the right lateral recess and causes mass effect on the descending/traversing right L5 nerve root.  3.  At L3-L4, there is a right paracentral disc protrusion superimposed on disc bulge with narrowing of the right lateral recess and mass effect on the descending/traversing right L4 nerve root.      Acute prostatitis 10/06/2021     Priority: Medium    Pelvic pain in male 10/06/2021     Priority: Medium    Anal fissure 08/26/2021     Priority: Medium    Constipation 08/26/2021     Priority: Medium    Urinary retention 08/16/2021     Priority: Medium    Neuropathy 08/16/2021     Priority: Medium    Diverticular disease of large intestine 08/03/2021     Priority: Medium    Benign prostatic hyperplasia with urinary obstruction 07/12/2021     Priority: Medium    Calculus of gallbladder with chronic cholecystitis without obstruction 05/25/2021     Priority: Medium    MARIAN (obstructive sleep apnea) 05/03/2021     Priority: Medium     On BiPAP, see scans.      Symptomatic cholelithiasis 04/06/2021     Priority: Medium    COVID-19 virus infection 04/06/2021     Priority: Medium    Hepatic steatosis 03/26/2021     Priority: Medium    Psoriatic arthritis (H) 03/15/2021     Priority: Medium    Arthritis of left acromioclavicular joint 08/05/2019     Priority: Medium    Lower urinary tract symptoms 05/13/2019     Priority: Medium    Mild intermittent asthma, uncomplicated 04/05/2017     Priority: Medium    OA (osteoarthritis) of hip 05/19/2015     Priority: Medium    ASHD (arteriosclerotic heart disease) 10/25/2013     Priority: Medium    Hyperlipidemia 10/25/2013     Priority: Medium    Reflux esophagitis 04/18/2013      Priority: Medium    Hiatal hernia 04/18/2013     Priority: Medium    Insomnia, unspecified 02/27/2013     Priority: Medium    GERD (gastroesophageal reflux disease) 10/16/2012     Priority: Medium    Low testosterone 10/16/2012     Priority: Medium    Displacement of lumbar intervertebral disc without myelopathy 09/12/2011     Priority: Medium      Past Medical History:   Diagnosis Date    MARIAN (obstructive sleep apnea)     havne't used cpap since 2018    Pelvic floor dysfunction      Past Surgical History:   Procedure Laterality Date    BACK SURGERY      CHOLECYSTECTOMY      COLONOSCOPY N/A 8/31/2022    Procedure: COLONOSCOPY;  Surgeon: Veena Mendoza MD;  Location:  GI    ENT SURGERY      tonsillectomy    GENITOURINARY SURGERY      TURP    IMPLANT STIMULATOR SACRAL NERVE PERCUTANEOUS TRIAL N/A 10/17/2022    Procedure: INSERTION, NEUROSTIMULATOR, SACRAL, PERCUTANEOUS, FOR TRIAL (trial with PBC Lasers);  Surgeon: Nayan Tello MD;  [place] [facility]    JOINT REPLACEMENT, HIP RT/LT Left     ORTHOPEDIC SURGERY      wrist surgery, carpal tunnel 2005    PROSTATE SURGERY      Urolift    ROTATOR CUFF REPAIR RT/LT Left      Current Outpatient Medications   Medication Sig Dispense Refill    acetaminophen (TYLENOL) 500 MG tablet Take 500-1,000 mg by mouth every 6 hours as needed for mild pain.      albuterol (PROAIR HFA/PROVENTIL HFA/VENTOLIN HFA) 108 (90 Base) MCG/ACT inhaler Inhale 2 puffs into the lungs every 6 hours.      Buprenorphine HCl (BELBUCA) 300 MCG FILM buccal film Place 1 Film (300 mcg) inside cheek every 12 hours. Fill 10/31/24, start 11/01/24 (30 day supply) 60 Film 0    fluticasone (FLONASE) 50 MCG/ACT nasal spray Spray 1 spray into both nostrils daily 9.9 mL 0    losartan-hydrochlorothiazide (HYZAAR) 50-12.5 MG tablet Take 0.5 tablet by mouth daily for 7 days. If your blood pressure continues to be greater than 140/90mm Hg on average, please begin taking 1 tablet by mouth daily thereafter.  Otherwise, stay on 0.5 tablet daily. 90 tablet 0    naloxone (NARCAN) 4 MG/0.1ML nasal spray Spray 1 spray (4 mg) into one nostril alternating nostrils once as needed for opioid reversal every 2-3 minutes until assistance arrives 0.1 mL 0       Allergies   Allergen Reactions    Droperidol Other (See Comments)     Extrapyramidal Side Effect    Tamsulosin Dizziness    Fenofibrate Headache and Diarrhea             Fentanyl      Other reaction(s): N&V hard to wake up    Keflex [Cephalexin] Itching    Lactose GI Disturbance         Other reaction(s): GI upset    Midazolam      Other reaction(s): N&V, Hard to wake up    Monosodium Glutamate      Other reaction(s): diarrhea, headaches    Pcn [Penicillins] Other (See Comments)     Feels warm and flushed, but tolerates Cephalexin    Atorvastatin Palpitations     Other reaction(s): palpitations    Sertraline Palpitations         Other reaction(s): Unknown    Simvastatin Palpitations     Other reaction(s): palpitations    Sulfa Antibiotics Headache and Rash     Burning    Other reaction(s): Rash  Other reaction(s): rash and headache    Tetracycline Rash     Burning    Other reaction(s): rash        Social History     Tobacco Use    Smoking status: Former     Current packs/day: 0.00     Average packs/day: 1 pack/day for 5.0 years (5.0 ttl pk-yrs)     Types: Cigarettes     Start date: 1994     Quit date: 1999     Years since quittin.9     Passive exposure: Never    Smokeless tobacco: Never   Substance Use Topics    Alcohol use: Yes     Comment: very little     Family History   Problem Relation Age of Onset    Cerebrovascular Disease Mother     Hypertension Mother     Diabetes Mother     Liver Cancer Mother     Cancer Father 84        liver cancer    Prostate Cancer Father      History   Drug Use    Types: Marijuana     Comment: gummies             Review of Systems  Constitutional, neuro, ENT, endocrine, pulmonary, cardiac, gastrointestinal, genitourinary,  "musculoskeletal, integument and psychiatric systems are negative, except as otherwise noted.    Objective    /76   Pulse 104   Temp 97.4  F (36.3  C)   Resp 18   Ht 1.623 m (5' 3.9\")   Wt 90.3 kg (199 lb)   SpO2 100%   BMI 34.27 kg/m     Estimated body mass index is 34.27 kg/m  as calculated from the following:    Height as of this encounter: 1.623 m (5' 3.9\").    Weight as of this encounter: 90.3 kg (199 lb).  Physical Exam  Vitals and nursing note reviewed.   Constitutional:       General: He is not in acute distress.     Appearance: He is not ill-appearing or diaphoretic.   HENT:      Head: Normocephalic and atraumatic.      Mouth/Throat:      Mouth: Mucous membranes are moist.   Eyes:      Conjunctiva/sclera: Conjunctivae normal.   Cardiovascular:      Rate and Rhythm: Normal rate and regular rhythm.      Heart sounds: Normal heart sounds. No murmur heard.     No friction rub. No gallop.   Pulmonary:      Effort: Pulmonary effort is normal. No respiratory distress.      Breath sounds: Normal breath sounds. No stridor. No wheezing, rhonchi or rales.   Skin:     General: Skin is warm and dry.   Neurological:      General: No focal deficit present.      Mental Status: He is alert. Mental status is at baseline.      Gait: Gait abnormal (antalgic).   Psychiatric:         Mood and Affect: Mood normal.         Behavior: Behavior normal.         Recent Labs   Lab Test 10/24/24  0701 10/03/24  0801 08/19/24  0848   HGB  --  14.7 14.9   PLT  --  305 261    139 140   POTASSIUM 4.8 4.3 4.6   CR 0.79 0.81 0.87   A1C  --   --  5.9*        Diagnostics  No labs were ordered during this visit. Previous labs reviewed.  appears normal, NSR, normal intervals, no acute ST/T changes c/w ischemia, no LVH by voltage criteria, unchanged from previous tracings      Revised Cardiac Risk Index (RCRI)  The patient has the following serious cardiovascular risks for perioperative complications:   - No serious cardiac risks " = 0 points     RCRI Interpretation: 0 points: Class I (very low risk - 0.4% complication rate)         Signed Electronically by: DARVIN Wiggins  A copy of this evaluation report is provided to the requesting physician.

## 2024-10-31 NOTE — PROGRESS NOTES
[facility]         Date of Visit: 10/31/2024    CHIEF COMPLAINT:   Chief Complaint   Patient presents with    Pain       Subjective   SUBJECTIVE    INTERVAL HISTORY:   Mustapha Negrete is a 63 year old male seen for INITIAL EVALUATION on 3/22/24; last seen for FOLLOW UP on 9/11/24.  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         Recommendations/ Plan at the last visit included:  Visit discussion: Mustapha Negrete is seen in follow up today at the pain clinic for pudendal neuralgia. Again reviewed potential benefits of neuroleptic medications to address neuropathy pain. Patient would prefer to wait. May consider Trileptal in the future. Okay to increase Belbuca 150mcg to TID dosing until this prescription is gone. Patient may run short and request early refill. Reiterated consistent dosing if we increase to Belbuca 300mg BID. Return in 2 months. Patient verbalizes understanding and agreement with plan.      PLAN:    Medication Management:   - INCREASE dose of Belbuca to 150mcg film - one film inside cheek every 8 hours; max 3 per day until gone    - At next refill, we can increase to Belbuca 300mcg film - one film every 12 hours; max 2 per day   Continue Current Treatment Plan with:   - Physical Therapy   - Chiropractic Care   Return to Clinic:   -  30-minute In-Person Appointment with Destiny He, ZEUS, APRN, FNP-C in 2 months, or sooner if needed    Future Considerations:   We may consider a trial of Trileptal (Oxcarbazepine) for nerve pain if you do not feel benefit from dose increase of Belbuca.    Please call the clinic with a report of how many Belbuca 150mcg films you have left today.       Interval history from last visit on 9/11/24:   Pain score today: Worst Pain (10)   Pain rating: intensity ranges from 10/10 to 10/10, and averages 10/10 on a 0-10 scale.   DRG stimulator trial was not effective.   Since then, he had consultation with Dr. Farhan Schreiber in [place] for  "interventional pain evaluation. He had a PRP injections at pudendal nerve and \"some sort of ganglion nerve\" in left upper thigh. This made the pain worse over the past 5 weeks; no benefit yet.  Yesterday, he presented to a multispecialty [facility] clinic at [facility] to seek chiropractic and physical therapy.   Had an MRIs of bilateral hips and hamstrings and pelvis.  He will have chiropractic treatment and physical therapy three times per week starting this week.  He was told he needs a right hip replacement; is scheduled for orthopedic surgical consult for possible hip replacement.   Has developed vertigo and believes he is reacting to meds for treatment of BPH.   Admits he was given a #20 tabs supply of Oxycodone 5mg by Dr. Schreiber after PRP injection on 8/07/24.           Since the last visit, Mustapha Negrete reports:   Pain score today: Worst Pain (10)   Pain rating: intensity ranges from 10/10 to 10/10, and averages 10/10 on a 0-10 scale.   He anticipates right hip replacement on 11/19/24 with [facility] in [place].   Has been participating in physical therapy in preparation of hip surgery.  He qualifies for Medicare and has been told that buprenorphine is not covered by Medicare for the first calendar year.      REVIEW OF SYSTEMS:   ROS: 10 point ROS neg other than the symptoms noted above in the HPI.     The patient otherwise denies red flag symptoms, such as: thunderclap headache, bowel or bladder incontinence, parasthesias, weakness, saddle anesthesia, unintentional weight loss, or fever/chills/sweats.     Medical History: Any changes in medical history since they were last seen? No    Medications and Allergies reviewed. Yes     Review of [place] Prescription Monitoring Program ():  reviewed on 10/31/24. No concern for abuse or misuse of controlled medications based on this report. Controlled substances prescribed in the past 6 months include: (Belbuca 300mcg, 150mcg, Oxycodone 5mg) "     Current MME: 18 / day    Current controlled substance medications, if any, are being prescribed by: Pain Mgmt    Primary Care Provider is Tesfaye Faria CSA due date: 1/10/25   UDS due date:  12/13/24     THE 4 As OF OPIOID MAINTENANCE ANALGESIA    Analgesia: Is pain relief clinically significant? Yes  Activity: Is patient functional and able to perform Activities of Daily Living? Yes  Adverse effects: Is patient free from adverse side effects from opiates? Yes  Adherence to Rx protocol: Is patient adhering to Controlled Substance Agreement and taking medications ONLY as ordered? Yes    Is Narcan prescribed for opiate use >50 MME daily or concurrent use of opiates and benzodiazepines? N/A          Objective    OBJECTIVE:    Physical Exam  Vitals:    10/31/24 0854   BP: 121/83   Pulse: 98        Appearance:   A&O. Patient is appropriate.   Patient is in NAD.      HHENT:  Normocephalic, atraumatic. Eyes without conjunctival injection or jaundice. Neck supple. No obvious neck masses.     Pulmonary:  Normal effort; no cough or audible wheeze      Skin: No obvious rash, lesions, or petechiae of exposed skin.    Neuro: Cranial nerves grossly intact.  Mentation and speech appropriate for age.     Psych:  Alert, without lethargy or stupor. Speech fluent. Appropriate affect. Mood normal. Able to follow commands without difficulty. Normal mood, judgement and behavior.        Gait pattern:   Patient has an antalgic gait pattern:YES  Gait favors the neither side.  Mobility and/or assistive devices? YES  cane       Diagnostic Tests/ Imaging/ Labs resulted since last visit:   None       Assessment & Plan      VISIT DIAGNOSIS:   1. Hip pain, right (Primary)  - Adult Pain Clinic Follow-Up Order; Future    2. Pudendal neuralgia  - Adult Pain Clinic Follow-Up Order  - Buprenorphine HCl (BELBUCA) 300 MCG FILM buccal film; Place 1 Film (300 mcg) inside cheek every 12 hours. Fill 10/31/24, start 11/01/24 (30 day  supply)  Dispense: 60 Film; Refill: 0  - Adult Pain Clinic Follow-Up Order; Future    3. Myofascial muscle pain  - Adult Pain Clinic Follow-Up Order  - Buprenorphine HCl (BELBUCA) 300 MCG FILM buccal film; Place 1 Film (300 mcg) inside cheek every 12 hours. Fill 10/31/24, start 11/01/24 (30 day supply)  Dispense: 60 Film; Refill: 0  - Adult Pain Clinic Follow-Up Order; Future    4. Rectal pain  - Adult Pain Clinic Follow-Up Order  - Buprenorphine HCl (BELBUCA) 300 MCG FILM buccal film; Place 1 Film (300 mcg) inside cheek every 12 hours. Fill 10/31/24, start 11/01/24 (30 day supply)  Dispense: 60 Film; Refill: 0  - Adult Pain Clinic Follow-Up Order; Future      ASSESSMENT:   Visit discussion: Mustapha Negrete is seen in follow up today at the pain clinic for pudendal neuralgia and right hip pain.  Patient anticipates right hip replacement in approximately 3 weeks.  I advised him that during the postoperative period his surgeon will be managing acute, postoperative pain.  He is concerned with changes in health insurance and ongoing coverage for Belbuca.  At this time, I will refill Belbuca 300 mcg films to remain consistent with medication dosing until his surgery.  Patient will have a postsurgical recovery period that will be managed by his surgeon.  Patient was advised that future return to the pain management program will require evaluation of other nonopioid medications.  He has historically declined trials of neuroleptics and SNRIs.  In the future, we do need to address these medication options for neuropathic pain.  Patient was also advised that future opioid prescribing is not a guaranteed option after surgery.  We will address his pain management needs after right hip surgery and evaluate a and updated pain management plan at his next encounter.  Follow-up after surgery and rehab; estimating approximately 2 to 4 months.  Patient verbalizes understanding and agreement with plan.      PLAN:    Medication Management:    - CONTINUE taking Belbuca 300 mcg film - ONE film inside cheek every 12 hours       Continue Current Treatment Plan with:   Plan for Right Hip Replacement in November       Return to Clinic:   -  30-minute In-Person Appointment with Destiny He DNP, APRN, FNP-C in 3-4 months, or sooner if needed      Future Considerations:   After hip replacement, we will re-evaluate your pain management needs and start non-opioid medication options to address ongoing nerve pain.        ___________________________________________________________________    BILLING TIME DOCUMENTATION:   TOTAL TIME includes:   Time spent preparing to see the patient: 4 minutes (reviewing records and tests)  Time spend face to face with the patient: 19 minutes  Time spent ordering tests, medications, procedures and referrals: 0 minutes  Time spent Referring and communicating with other healthcare professionals: 0 minutes  Documenting clinical information in Epic: 3 minutes    The total TIME spent on this patient on the day of the appointment was 26 minutes.     ___________________________________________________________________    Review of Electronic Chart: Today I have also reviewed available medical information in the patient's medical record at [facility] ([facility]) and Care Everywhere (if available), including relevant provider notes, laboratory work, and imaging.     MICAELA Herman, ZEUS, DENISSE   [facility] Pain Management

## 2024-10-31 NOTE — PATIENT INSTRUCTIONS
PATIENT INSTRUCTIONS:     I am recommending a multidisciplinary treatment plan to help this patient better manage his pain. The following recommendations were given to the patient. Diagnosis, treatment options, risks, benefits, and alternatives were discussed; all questions were answered. Self-care instructions were given. The patient expressed understanding of the plan for management.   Medication Management:   - CONTINUE taking Belbuca 300 mcg film - ONE film inside cheek every 12 hours       Continue Current Treatment Plan with:   Plan for Right Hip Replacement in November       Return to Clinic:   -  30-minute In-Person Appointment with Destiny He, ZEUS, APRN, HONGC in 3-4 months, or sooner if needed      Future Considerations:   After hip replacement, we will re-evaluate your pain management needs and start non-opioid medication options to address ongoing nerve pain.      ----------------------------------------------------------------  Clinic Number:  [phone]   Call with any questions about your care and for scheduling assistance.   Calls are returned Monday through Friday between 8 AM and 4:30 PM. We usually get back to you within 2 business days depending on the issue/request.    If we are prescribing your medications:  For opioid medication refills, call the clinic or send a [employer] message 7 days in advance.  Please include:  Name of requested medication  Name of the pharmacy.  For non-opioid medications, call your pharmacy directly to request a refill. Please allow 3-4 days to be processed.   Per [place] State Law:  All controlled substance prescriptions must be filled within 30 days of being written.    For those controlled substances allowing refills, pickup must occur within 30 days of last fill.      We believe regular attendance is key to your success in our program!    Any time you are unable to keep your appointment we ask that you call us at least 24 hours in advance to cancel.This will allow  us to offer the appointment time to another patient.   Multiple missed appointments may lead to dismissal from the clinic.

## 2024-11-04 ENCOUNTER — TELEPHONE (OUTPATIENT)
Dept: FAMILY MEDICINE | Facility: CLINIC | Age: 64
End: 2024-11-04
Payer: MEDICARE

## 2024-11-04 DIAGNOSIS — Z01.818 PREOP GENERAL PHYSICAL EXAM: Primary | ICD-10-CM

## 2024-11-04 NOTE — TELEPHONE ENCOUNTER
Amaya with [facility] calling, she notes patient had pre op with Marito Mancilla on 10/31/24, had BMP done but they need a CBC done within 30 days of the 11/19/24 procedure, right hip replacement.    The 10/2/24 CBC will be too old.    [facility] can see the electronic record, no need to call or fax anything unless we have questions.    Routed to Marito Mancilla to address request for CBC (patient will need to be scheduled for a lab only visit once the order is in).    Mei PRICE RN  [facility] Triage

## 2024-11-04 NOTE — TELEPHONE ENCOUNTER
Message left for [facility] CARMEN Conde to call the clinic at [phone] & ask to speak with a Triage Nurse.    Mihaela Hua RN BSN  [facility]

## 2024-11-05 ENCOUNTER — THERAPY VISIT (OUTPATIENT)
Dept: PHYSICAL THERAPY | Facility: CLINIC | Age: 64
End: 2024-11-05
Attending: PHYSICIAN ASSISTANT
Payer: MEDICARE

## 2024-11-05 ENCOUNTER — LAB (OUTPATIENT)
Dept: LAB | Facility: CLINIC | Age: 64
End: 2024-11-05
Payer: MEDICARE

## 2024-11-05 DIAGNOSIS — Z01.818 PREOP GENERAL PHYSICAL EXAM: ICD-10-CM

## 2024-11-05 DIAGNOSIS — R10.2 PELVIC PAIN IN MALE: ICD-10-CM

## 2024-11-05 PROBLEM — R15.9 FECAL INCONTINENCE: Status: RESOLVED | Noted: 2024-02-12 | Resolved: 2024-11-05

## 2024-11-05 LAB
ERYTHROCYTE [DISTWIDTH] IN BLOOD BY AUTOMATED COUNT: 11.9 % (ref 10–15)
HCT VFR BLD AUTO: 40.9 % (ref 40–53)
HGB BLD-MCNC: 13.7 G/DL (ref 13.3–17.7)
MCH RBC QN AUTO: 30.1 PG (ref 26.5–33)
MCHC RBC AUTO-ENTMCNC: 33.5 G/DL (ref 31.5–36.5)
MCV RBC AUTO: 90 FL (ref 78–100)
PLATELET # BLD AUTO: 292 10E3/UL (ref 150–450)
RBC # BLD AUTO: 4.55 10E6/UL (ref 4.4–5.9)
WBC # BLD AUTO: 7.3 10E3/UL (ref 4–11)

## 2024-11-05 PROCEDURE — 90912 BFB TRAINING 1ST 15 MIN: CPT | Mod: GP | Performed by: PHYSICAL THERAPIST

## 2024-11-05 PROCEDURE — 97162 PT EVAL MOD COMPLEX 30 MIN: CPT | Mod: GP | Performed by: PHYSICAL THERAPIST

## 2024-11-05 PROCEDURE — 85027 COMPLETE CBC AUTOMATED: CPT

## 2024-11-05 PROCEDURE — 36415 COLL VENOUS BLD VENIPUNCTURE: CPT

## 2024-11-05 PROCEDURE — 97110 THERAPEUTIC EXERCISES: CPT | Mod: GP | Performed by: PHYSICAL THERAPIST

## 2024-11-05 PROCEDURE — 97530 THERAPEUTIC ACTIVITIES: CPT | Mod: GP | Performed by: PHYSICAL THERAPIST

## 2024-11-05 NOTE — PROGRESS NOTES
"PHYSICAL THERAPY EVALUATION  Type of Visit: Evaluation       Fall Risk Screen:  Fall screen completed by: PT  Have you fallen 2 or more times in the past year?: No  Have you fallen and had an injury in the past year?: No  Is patient a fall risk?: No    Subjective       Pt has long h/o pelvic pain that began about 7 yrs ago. He began having sharp pain after the urolift surgery about 5 yrs ago. Has done \"many\" procedures trying to remedy his pain. Most recently had PRP procedure in 8/2024, but this made the (R) hip worse and now he is going to have PARKER (R) on 11/19/24. Pt has had PT at [facility] that addressed the (R) hip/thigh/HS area, but they do not do PF PT, so pt is here today for Pelvic Floor Pain. Primary c/o is burning and tightness in the groin and bottom of (R) buttock. \"Feels like somebody took a razor blade and cut me there.\" Secondarily, will get very strong urge to urinate and will rush to the bathroom and dribble on the way.   Presenting condition or subjective complaint: (Patient-Rptd) pain burning tight crampingaround groin  Date of onset: 10/03/24    Relevant medical history:   Pelvic pain for last 7 years, tried pudendal nn injections and was going to go to [place] for Specialty Treatment, but then MD not was most likely not a good candidate for this rx.     Dates & types of surgery: (Patient-Rptd) urolift surgery and sacral nn stim implant 10/17/22, Back Surgery 4/2023, PARKER (L) 2011, RTC (L) shoulder 2015, carpal tunnel 2005, TURP age 47yrs. Back surgery - unknown date.  Cholecystectomy.     Prior diagnostic imaging/testing results: (Patient-Rptd) CT scan     Prior therapy history for the same diagnosis, illness or injury:        Prior Level of Function  Transfers: Independent  Ambulation: Independent  ADL: Independent    Living Environment  Social support: (Patient-Rptd) With family members   Type of home: (Patient-Rptd) House; Multi-level; Basement   Stairs to enter the home: " "(Patient-Rptd) Yes (Patient-Rptd) 14 Is there a railing: (Patient-Rptd) Yes     Ramp: (Patient-Rptd) No   Stairs inside the home: (Patient-Rptd) Yes (Patient-Rptd) 14 Is there a railing: (Patient-Rptd) Yes     Help at home: (Patient-Rptd) Home management tasks (cooking, cleaning); Home and Yard maintenance tasks  Equipment owned: (Patient-Rptd) Straight Cane; Walker with wheels; Crutches     Employment: (Patient-Rptd) Not Applicable    Hobbies/Interests: (Patient-Rptd) golf fishing    Patient goals for therapy: (Patient-Rptd) completely void all urine    Pain assessment:  (R) Hip = 10/10 constant; Pelvic: 10/10 constant ; states is \"like a razor blade right up the whole muscle.\"       Objective      PELVIC EVALUATION  ADDITIONAL HISTORY:  Sex assigned at birth: (Patient-Rptd) Male  Gender identity: (Patient-Rptd) Male    Pronouns: (Patient-Rptd) He/Him/His      Bladder History:  Feels bladder filling: (Patient-Rptd) No  Triggers for feeling of inability to wait to go to the bathroom: (Patient-Rptd) Yes (Patient-Rptd) sitting laying down  How long can you wait to urinate: (Patient-Rptd) 20 seconds  Gets up at night to urinate: (Patient-Rptd) Yes (Patient-Rptd) 5 times  Can stop the flow of urine when urinating: (Patient-Rptd) Yes  Volume of urine usually released: (Patient-Rptd) Small   Other issues: (Patient-Rptd) Straining to pass urine; Slow or hesitant urine stream; Trouble emptying bladder completely; Dribbling after urinating  Number of bladder infections in last 12 months:    Fluid intake per day: (Patient-Rptd) 60 (Patient-Rptd) 50    Medications taken for bladder: (Patient-Rptd) No     Activities causing urine leak: (Patient-Rptd) Hurrying to the bathroom due to a strong urge to urinate (pee); Other activities (Patient-Rptd) golf  Amount of urine typically leaked: (Patient-Rptd) small amount  Pads used to help with leaking: (Patient-Rptd) Yes I use this many pads per day: (Patient-Rptd) 1   I use this " type/brand: (Patient-Rptd) depends      Bowel History:  Frequency of bowel movement: (Patient-Rptd) every other day  Consistency of stool: (Patient-Rptd) Soft    Ignores the urge to defecate: (Patient-Rptd) No  Other bowel issues: (Patient-Rptd) Straining to have bowel movement  Length of time spent trying to have a bowel movement: (Patient-Rptd) 5minutes    Sexual Function History:  Sexual orientation: (Patient-Rptd) Straight    Sexually active:    Lubrication used:      Pelvic pain: (Patient-Rptd) Standing; Sitting; Certain positions    Pain or difficulty with orgasms/erection/ejaculation:      State of menopause:    Hormone medications:        Have you tried pelvic floor strengthening exercises for 4 weeks: (Patient-Rptd) Yes  Do you have any history of trauma that is relevant to your care that you d like to share: (Patient-Rptd) No      Discussed reason for referral regarding pelvic health needs and external/internal pelvic floor muscle examination with patient/guardian.  Opportunity provided to ask questions and verbal consent for assessment and intervention was given.    POSTURE: Standing Posture: Rounded shoulders, Forward head, Lordosis decreased, Generally flexed at (B) hips with longer standing  Sitting Posture: Rounded shoulders, Forward head, Lordosis decreased, Thoracic kyphosis increased  LUMBAR SCREEN:  Guards his hips and maintains a fwd flexion of trunk  HIP SCREEN:  Strength:  Disuse atrophy in (R) quad and HS vs (L); poor control over PFM d/t palpated tension    Functional Strength Testing:  Pt very limited in his mobilty today; ambulates with cane; guards all mvmts/transitions/transfers    PELVIC/SI SCREEN:  Heavily guards all motion    PAIN PROVOCATION TEST:  deferred as pt is having (R) PARKER in 2 weeks  PELVIS/SI SPECIAL TESTS:  will cont at next session to further investigate mobility and alignment  BREATHING SYMMETRY:  labored breathing with all activity    PELVIC EXAM  External Visual  "Inspection:  Skin in good condition at perineum ; able to see minimal lift with cue to contract, relax to baseline observed    Integumentary:   Anal: WNL    External Digital Palpation per Perineum:   Bulbo cavernosis: Tightness, Tenderness  Transverse perineal: Tightness, Tenderness    Scar:   Location/Type: NA  Mobility:  NA    Internal Digital Palpation:  Per Rectum:  Will complete at next session      ABDOMINAL ASSESSMENT  Abdominal Activation/Strength:  WFL     Scar:   Location/Type: Multiple scars: superior to umbillicus, cholecystectomy  Mobility:  Not formally assessed, but WFL     Fascial Tension/Restriction: Hypomobile coupled with pt guarding all motions through trunk    BIOFEEDBACK:  Position: Supine  Surface Electrodes: Abdominal, Perineal    Abdominals: highly paradoxic in initial PFCs,  PFM: Max = 25uV, Avg = 18uV, rest = 3uV in supine, 5uV in sitting; and endurance = not measured     DERMATOMES: WNL    Assessment & Plan   CLINICAL IMPRESSIONS  Medical Diagnosis: Pelvic pain in male    Treatment Diagnosis: Pelvic Floor Dysfunction   Impression/Assessment: Patient is a 63 year old male with \"pelvic pain\", burning urination, and constipation complaints.  The following significant findings have been identified: Pain, Decreased ROM/flexibility, Decreased joint mobility, Decreased strength, Impaired balance, Impaired sensation, Impaired gait, Impaired muscle performance, Decreased activity tolerance, and Impaired posture. These impairments interfere with their ability to perform self care tasks, recreational activities, and household chores as compared to previous level of function.     Clinical Decision Making (Complexity):  Clinical Presentation: Unstable/Unpredictable   Clinical Presentation Rationale: based on medical and personal factors listed in PT evaluation  Clinical Decision Making (Complexity): Moderate complexity    PLAN OF CARE  Treatment Interventions:  Modalities: Biofeedback, E-stim, " "Ultrasound  Interventions: Gait Training, Manual Therapy, Neuromuscular Re-education, Therapeutic Activity, Therapeutic Exercise, Self-Care/Home Management    Long Term Goals     PT Goal 1  Goal Identifier: [facility]  Goal Description: 1) Pt will report that his groin and pelvic pain is 5/10 with report no longer \"constant\" in 4 weeks.  Target Date: 12/03/24  PT Goal 2  Goal Identifier: [facility]  Goal Description: 2) Pt will report no burning sensation with 50% of voids, in 6 weeks.  Target Date: 12/17/24  PT Goal 3  Goal Identifier: LT  Goal Description: 3)Pt will report daily BMs with 50% of them \"easier, not a struggle\" in 8 weeks.  Target Date: 12/31/24  PT Goal 4  Goal Identifier: LT  Goal Description: 4)Pt will report 3/10 pelvic pain with ADLs, in 12 weeks.  Target Date: 01/28/25  PT Goal 5  Goal Identifier: LT  Goal Description: 5)Pt will be indep in HEP to prevent return of symptoms in 12 weeks.  Target Date: 01/28/25      Frequency of Treatment: 1x per week  Duration of Treatment: 8 weeks    Recommended Referrals to Other Professionals:  none  Education Assessment:   Learner/Method: Patient;Listening;Reading;Demonstration;Pictures/Video;No Barriers to Learning    Risks and benefits of evaluation/treatment have been explained.   Patient/Family/caregiver agrees with Plan of Care.     Evaluation Time:     PT Eval, Moderate Complexity Minutes (01308): 20   Present: Not applicable     Signing Clinician: Val Molina, PT        [facility]                                                                                   OUTPATIENT PHYSICAL THERAPY      PLAN OF TREATMENT FOR OUTPATIENT REHABILITATION   Patient's Last Name, First Name, Mustapha Riggs YOB: 1960   Provider's Name   [facility]   Medical Record No.  [id number]     Onset Date: 10/03/24  Start of Care Date: 11/05/24     Medical Diagnosis:  Pelvic pain in male      PT " Treatment Diagnosis:  Pelvic Floor Dysfunction Plan of Treatment  Frequency/Duration: 1x per week/ 8 weeks    Certification date from 11/05/24 to 12/31/24         See note for plan of treatment details and functional goals     Val Molina, PT                         I CERTIFY THE NEED FOR THESE SERVICES FURNISHED UNDER        THIS PLAN OF TREATMENT AND WHILE UNDER MY CARE     (Physician attestation of this document indicates review and certification of the therapy plan).              Referring Provider:  Desean Mancilla    Initial Assessment  See Epic Evaluation- Start of Care Date: 11/05/24

## 2024-11-05 NOTE — TELEPHONE ENCOUNTER
Called patient. Scheduled lab visit for today.    Patient stated understanding and agreeable with the plan of care.     Carolina SINGER RN  Triage Nurse  [facility]     Complex Repair And Graft Additional Text (Will Appearing After The Standard Complex Repair Text): The complex repair was not sufficient to completely close the primary defect. The remaining additional defect was repaired with the graft mentioned below.

## 2024-11-13 ENCOUNTER — TELEPHONE (OUTPATIENT)
Dept: SCHEDULING | Facility: CLINIC | Age: 64
End: 2024-11-13

## 2024-11-13 NOTE — TELEPHONE ENCOUNTER
Order/Referral Request    Who is requesting: Mustapha Merino    Orders being requested: refrr to cardiologist   Reason service is needed/diagnosis: burning of the heart     When are orders needed by: ASAP     Has this been discussed with Provider: No    Does patient have a preference on a Group/Provider/Facility?  [facility] and vascular [place]     Does patient have an appointment scheduled?: No    Where to send orders: [facility] [place]  fax number [phone]     Could we send this information to you in [employer] or would you prefer to receive a phone call?:   Patient would prefer a phone call   Okay to leave a detailed message?: Yes at Cell number on file:    Telephone Information:   Mobile [phone]

## 2024-11-14 NOTE — TELEPHONE ENCOUNTER
Received call in in basket today.    Patient stated that he does not have any heart issues.    He is wanting a referral for burning in his legs, for months.  He is having hip surgery on Monday.    We are not his PCP.  Patient stated that his PCP is through [facility].  Advised to call them regarding this message.      Advised to call  back with any further questions or concerns.    Patient stated understanding and agreeable with the plan of care.       Patient stated that   Carolina SINGER RN  Triage Nurse  [facility]

## 2024-12-02 ENCOUNTER — TELEPHONE (OUTPATIENT)
Dept: UROLOGY | Facility: CLINIC | Age: 64
End: 2024-12-02
Payer: MEDICARE

## 2024-12-02 NOTE — TELEPHONE ENCOUNTER
Health Call Center    Phone Message    May a detailed message be left on voicemail: yes     Reason for Call: Appointment Intake    Referring Provider Name: Self referred  Diagnosis and/or Symptoms:   Burning sensation from anal canal through penis, frequency urination, retention.    Patient is requesting a visit with Dr. Jean-Baptiste. Sending encounter for clinical review since some symptoms are not listed in scheduling protocol.    Action Taken: Message routed to:  [facility] Urology    Travel Screening: Not Applicable     Date of Service:

## 2024-12-11 ENCOUNTER — OFFICE VISIT (OUTPATIENT)
Dept: FAMILY MEDICINE | Facility: CLINIC | Age: 64
End: 2024-12-11
Payer: MEDICARE

## 2024-12-11 ENCOUNTER — ANCILLARY PROCEDURE (OUTPATIENT)
Dept: GENERAL RADIOLOGY | Facility: CLINIC | Age: 64
End: 2024-12-11
Attending: PHYSICIAN ASSISTANT
Payer: MEDICARE

## 2024-12-11 VITALS
TEMPERATURE: 97.5 F | RESPIRATION RATE: 18 BRPM | SYSTOLIC BLOOD PRESSURE: 132 MMHG | WEIGHT: 199 LBS | OXYGEN SATURATION: 97 % | HEART RATE: 100 BPM | DIASTOLIC BLOOD PRESSURE: 72 MMHG | HEIGHT: 64 IN | BODY MASS INDEX: 33.97 KG/M2

## 2024-12-11 DIAGNOSIS — R35.89 POLYURIA: ICD-10-CM

## 2024-12-11 DIAGNOSIS — Z12.5 SCREENING FOR PROSTATE CANCER: ICD-10-CM

## 2024-12-11 DIAGNOSIS — R05.1 ACUTE COUGH: ICD-10-CM

## 2024-12-11 DIAGNOSIS — R82.998 DARK URINE: ICD-10-CM

## 2024-12-11 DIAGNOSIS — R05.1 ACUTE COUGH: Primary | ICD-10-CM

## 2024-12-11 LAB
ALBUMIN UR-MCNC: ABNORMAL MG/DL
APPEARANCE UR: CLEAR
BACTERIA #/AREA URNS HPF: ABNORMAL /HPF
BILIRUB UR QL STRIP: NEGATIVE
COLOR UR AUTO: YELLOW
ERYTHROCYTE [DISTWIDTH] IN BLOOD BY AUTOMATED COUNT: 12.1 % (ref 10–15)
GLUCOSE UR STRIP-MCNC: NEGATIVE MG/DL
HCT VFR BLD AUTO: 38.8 % (ref 40–53)
HGB BLD-MCNC: 12.9 G/DL (ref 13.3–17.7)
HGB UR QL STRIP: NEGATIVE
KETONES UR STRIP-MCNC: NEGATIVE MG/DL
LEUKOCYTE ESTERASE UR QL STRIP: ABNORMAL
MCH RBC QN AUTO: 29.3 PG (ref 26.5–33)
MCHC RBC AUTO-ENTMCNC: 33.2 G/DL (ref 31.5–36.5)
MCV RBC AUTO: 88 FL (ref 78–100)
NITRATE UR QL: NEGATIVE
PH UR STRIP: 6.5 [PH] (ref 5–7)
PLATELET # BLD AUTO: 370 10E3/UL (ref 150–450)
RBC # BLD AUTO: 4.41 10E6/UL (ref 4.4–5.9)
RBC #/AREA URNS AUTO: ABNORMAL /HPF
SP GR UR STRIP: 1.02 (ref 1–1.03)
SQUAMOUS #/AREA URNS AUTO: ABNORMAL /LPF
UROBILINOGEN UR STRIP-ACNC: 0.2 E.U./DL
WBC # BLD AUTO: 7.1 10E3/UL (ref 4–11)
WBC #/AREA URNS AUTO: ABNORMAL /HPF

## 2024-12-11 PROCEDURE — 85027 COMPLETE CBC AUTOMATED: CPT | Performed by: PHYSICIAN ASSISTANT

## 2024-12-11 PROCEDURE — 99214 OFFICE O/P EST MOD 30 MIN: CPT | Performed by: PHYSICIAN ASSISTANT

## 2024-12-11 PROCEDURE — 81001 URINALYSIS AUTO W/SCOPE: CPT | Performed by: PHYSICIAN ASSISTANT

## 2024-12-11 PROCEDURE — 80053 COMPREHEN METABOLIC PANEL: CPT | Performed by: PHYSICIAN ASSISTANT

## 2024-12-11 PROCEDURE — 71046 X-RAY EXAM CHEST 2 VIEWS: CPT | Mod: TC | Performed by: RADIOLOGY

## 2024-12-11 PROCEDURE — 36415 COLL VENOUS BLD VENIPUNCTURE: CPT | Performed by: PHYSICIAN ASSISTANT

## 2024-12-11 PROCEDURE — G0103 PSA SCREENING: HCPCS | Performed by: PHYSICIAN ASSISTANT

## 2024-12-11 RX ORDER — BENZONATATE 100 MG/1
100 CAPSULE ORAL 3 TIMES DAILY PRN
Qty: 25 CAPSULE | Refills: 0 | Status: SHIPPED | OUTPATIENT
Start: 2024-12-11

## 2024-12-11 RX ORDER — AZITHROMYCIN 250 MG/1
TABLET, FILM COATED ORAL
Qty: 6 TABLET | Refills: 0 | Status: SHIPPED | OUTPATIENT
Start: 2024-12-11 | End: 2024-12-16

## 2024-12-11 ASSESSMENT — ANXIETY QUESTIONNAIRES
GAD7 TOTAL SCORE: 0
5. BEING SO RESTLESS THAT IT IS HARD TO SIT STILL: NOT AT ALL
7. FEELING AFRAID AS IF SOMETHING AWFUL MIGHT HAPPEN: NOT AT ALL
IF YOU CHECKED OFF ANY PROBLEMS ON THIS QUESTIONNAIRE, HOW DIFFICULT HAVE THESE PROBLEMS MADE IT FOR YOU TO DO YOUR WORK, TAKE CARE OF THINGS AT HOME, OR GET ALONG WITH OTHER PEOPLE: NOT DIFFICULT AT ALL
GAD7 TOTAL SCORE: 0
4. TROUBLE RELAXING: NOT AT ALL
6. BECOMING EASILY ANNOYED OR IRRITABLE: NOT AT ALL
8. IF YOU CHECKED OFF ANY PROBLEMS, HOW DIFFICULT HAVE THESE MADE IT FOR YOU TO DO YOUR WORK, TAKE CARE OF THINGS AT HOME, OR GET ALONG WITH OTHER PEOPLE?: NOT DIFFICULT AT ALL
3. WORRYING TOO MUCH ABOUT DIFFERENT THINGS: NOT AT ALL
2. NOT BEING ABLE TO STOP OR CONTROL WORRYING: NOT AT ALL
GAD7 TOTAL SCORE: 0
7. FEELING AFRAID AS IF SOMETHING AWFUL MIGHT HAPPEN: NOT AT ALL
1. FEELING NERVOUS, ANXIOUS, OR ON EDGE: NOT AT ALL

## 2024-12-11 ASSESSMENT — PAIN SCALES - GENERAL: PAINLEVEL_OUTOF10: NO PAIN (0)

## 2024-12-11 NOTE — PATIENT INSTRUCTIONS
Mary Luciano,    Thank you for allowing [facility] to manage your care.    I am unsure of the cause of your symptoms, but let's treat you for possible bacterial pneumonia with azithromycin. We will see what our workup shows.     If you develop worsening/changing symptoms at any time such as shortness of breath, persistent fevers, coughing up blood, or other worrisome symptoms, please be seen in clinic/urgent care or call 911/go to the emergency department for evaluation as we discussed.    I ordered some lab work. Please go to the laboratory to get your studies.    I ordered some xrays. Please go to our radiology department to get your xrays.    I sent your prescriptions to your pharmacy. Take a probiotic pill, eat live culture yogurt (Greek yogurt or Activia), drink kefir daily for one week after finishing the antibiotics to encourage growth of good bacteria in your system.    For cough not controlled by over the counter medications and albuterol inhaler, please use benzonatate as prescribed. Do not use this medication while driving, operating machinery, with other sedating medications, or while drinking alcohol as it will make you drowsy.    Please allow 1-2 business days for our office to contact you in regards to your laboratory/radiological studies.  If not done so, I encourage you to login into [employer] ([email or web address] to review your results as well.     Drink 8-10 glasses of fluid daily to stay well-hydrated.    If you have any questions or concerns, please feel free to call us at [phone]    Sincerely,    Marito Mancilla PA-C    Did you know?      You can schedule a video visit for follow-up appointments as well as future appointments for certain conditions.  Please see the below link.     [email or web address]    If you have not already done so,  I encourage you to sign up for [facility] [employer] ([email or web address]).  This will allow  you to review your results, securely communicate with a provider, and schedule virtual visits as well.

## 2024-12-12 LAB
ALBUMIN SERPL BCG-MCNC: 4.3 G/DL (ref 3.5–5.2)
ALP SERPL-CCNC: 57 U/L (ref 40–150)
ALT SERPL W P-5'-P-CCNC: 30 U/L (ref 0–70)
ANION GAP SERPL CALCULATED.3IONS-SCNC: 13 MMOL/L (ref 7–15)
AST SERPL W P-5'-P-CCNC: 21 U/L (ref 0–45)
BILIRUB SERPL-MCNC: 0.5 MG/DL
BUN SERPL-MCNC: 24.8 MG/DL (ref 8–23)
CALCIUM SERPL-MCNC: 10 MG/DL (ref 8.8–10.4)
CHLORIDE SERPL-SCNC: 103 MMOL/L (ref 98–107)
CREAT SERPL-MCNC: 1.13 MG/DL (ref 0.67–1.17)
EGFRCR SERPLBLD CKD-EPI 2021: 72 ML/MIN/1.73M2
GLUCOSE SERPL-MCNC: 87 MG/DL (ref 70–99)
HCO3 SERPL-SCNC: 26 MMOL/L (ref 22–29)
POTASSIUM SERPL-SCNC: 4.5 MMOL/L (ref 3.4–5.3)
PROT SERPL-MCNC: 6.9 G/DL (ref 6.4–8.3)
PSA SERPL DL<=0.01 NG/ML-MCNC: 2.42 NG/ML (ref 0–4.5)
SODIUM SERPL-SCNC: 142 MMOL/L (ref 135–145)

## 2024-12-17 ENCOUNTER — THERAPY VISIT (OUTPATIENT)
Dept: PHYSICAL THERAPY | Facility: CLINIC | Age: 64
End: 2024-12-17
Attending: PHYSICIAN ASSISTANT
Payer: MEDICARE

## 2024-12-17 DIAGNOSIS — R10.2 PELVIC PAIN IN MALE: Primary | ICD-10-CM

## 2024-12-17 PROCEDURE — 97110 THERAPEUTIC EXERCISES: CPT | Mod: GP | Performed by: PHYSICAL THERAPIST

## 2024-12-17 PROCEDURE — 97535 SELF CARE MNGMENT TRAINING: CPT | Mod: GP | Performed by: PHYSICAL THERAPIST

## 2024-12-17 PROCEDURE — 90901 BIOFEEDBACK TRAIN ANY METH: CPT | Mod: GP | Performed by: PHYSICAL THERAPIST

## 2024-12-17 PROCEDURE — 97140 MANUAL THERAPY 1/> REGIONS: CPT | Mod: GP | Performed by: PHYSICAL THERAPIST
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
## 2024-12-23 ENCOUNTER — OFFICE VISIT (OUTPATIENT)
Dept: FAMILY MEDICINE | Facility: CLINIC | Age: 64
End: 2024-12-23
Payer: MEDICARE

## 2024-12-23 ENCOUNTER — THERAPY VISIT (OUTPATIENT)
Dept: PHYSICAL THERAPY | Facility: CLINIC | Age: 64
End: 2024-12-23
Attending: PHYSICIAN ASSISTANT
Payer: MEDICARE

## 2024-12-23 VITALS
TEMPERATURE: 97.9 F | SYSTOLIC BLOOD PRESSURE: 134 MMHG | RESPIRATION RATE: 18 BRPM | OXYGEN SATURATION: 98 % | WEIGHT: 200.6 LBS | HEIGHT: 64 IN | DIASTOLIC BLOOD PRESSURE: 84 MMHG | BODY MASS INDEX: 34.25 KG/M2 | HEART RATE: 99 BPM

## 2024-12-23 DIAGNOSIS — R10.2 PELVIC PAIN IN MALE: Primary | ICD-10-CM

## 2024-12-23 DIAGNOSIS — R11.0 NAUSEA: Primary | ICD-10-CM

## 2024-12-23 PROCEDURE — 97530 THERAPEUTIC ACTIVITIES: CPT | Mod: GP | Performed by: PHYSICAL THERAPIST

## 2024-12-23 PROCEDURE — 97140 MANUAL THERAPY 1/> REGIONS: CPT | Mod: GP | Performed by: PHYSICAL THERAPIST

## 2024-12-23 PROCEDURE — 99213 OFFICE O/P EST LOW 20 MIN: CPT | Performed by: PHYSICIAN ASSISTANT

## 2024-12-23 PROCEDURE — 97110 THERAPEUTIC EXERCISES: CPT | Mod: GP | Performed by: PHYSICAL THERAPIST

## 2024-12-23 RX ORDER — FAMOTIDINE 20 MG/1
20 TABLET, FILM COATED ORAL 2 TIMES DAILY
Qty: 60 TABLET | Refills: 0 | Status: SHIPPED | OUTPATIENT
Start: 2024-12-23

## 2024-12-23 ASSESSMENT — ENCOUNTER SYMPTOMS
VOMITING: 0
COUGH: 0
DIAPHORESIS: 1
NAUSEA: 1
ABDOMINAL PAIN: 0
DIARRHEA: 1
BLOOD IN STOOL: 0
CHILLS: 1
APPETITE CHANGE: 1
RHINORRHEA: 0
SORE THROAT: 0
FATIGUE: 1
FEVER: 0

## 2024-12-23 ASSESSMENT — PAIN SCALES - GENERAL: PAINLEVEL_OUTOF10: NO PAIN (0)

## 2024-12-23 ASSESSMENT — PATIENT HEALTH QUESTIONNAIRE - PHQ9
SUM OF ALL RESPONSES TO PHQ QUESTIONS 1-9: 7
SUM OF ALL RESPONSES TO PHQ QUESTIONS 1-9: 7
10. IF YOU CHECKED OFF ANY PROBLEMS, HOW DIFFICULT HAVE THESE PROBLEMS MADE IT FOR YOU TO DO YOUR WORK, TAKE CARE OF THINGS AT HOME, OR GET ALONG WITH OTHER PEOPLE: NOT DIFFICULT AT ALL

## 2024-12-23 NOTE — PROGRESS NOTES
Assessment & Plan     Nausea  Patient is a 64-year-old male who presents to clinic for 1 month of persistent nausea.  Within the last month patient has had hip surgery has started and stopped multiple different medications.  Vital signs are normal.  Low suspicion for acute abdomen given no abdominal rebound or guarding on exam and patient is afebrile.  Reviewed medications and discussed that buprenorphine may be contributing to nausea.  Reviewed prior labs.  No clear cause for nausea.  Will trial famotidine.  If symptoms do not improve, recommended follow-up with pain clinic to discuss alternative options/next steps.  - famotidine (PEPCID) 20 MG tablet; Take 1 tablet (20 mg) by mouth 2 times daily.        See Patient Instructions    Zhang Luciano is a 64 year old, presenting for the following health issues:  Nausea        12/23/2024     1:09 PM   Additional Questions   Roomed by Padmini Carroll CMA   Accompanied by N/A         12/23/2024     1:09 PM   Patient Reported Additional Medications   Patient reports taking the following new medications No new medications     Nausea  Associated symptoms include chills, diaphoresis, fatigue and nausea. Pertinent negatives include no abdominal pain, chest pain, congestion, coughing, fever, sore throat or vomiting.      Patient is coming today due to severe nausea that has been ongoing for about a month.  There has been no vomiting, but patient does report lack of appetite, some diarrhea for 3-4 days last week, iron taste in the mouth, and puffy eyes. Patient was treated for cough with Azithromycin and this has resolved. No reflux, heartburn, belching. Patient has not tried OTC medications for help.     Hip replacement 11/18/24. Patient would like to review prior labs for cause of nausea.       Review of Systems   Constitutional:  Positive for appetite change, chills, diaphoresis and fatigue. Negative for fever.   HENT:  Negative for congestion, rhinorrhea and sore  "throat.    Respiratory:  Negative for cough.    Cardiovascular:  Negative for chest pain and leg swelling.   Gastrointestinal:  Positive for diarrhea and nausea. Negative for abdominal pain, blood in stool and vomiting.           Objective    /84   Pulse 99   Temp 97.9  F (36.6  C) (Temporal)   Resp 18   Ht 1.623 m (5' 3.9\")   Wt 91 kg (200 lb 9.6 oz)   SpO2 98%   BMI 34.54 kg/m    Body mass index is 34.54 kg/m .  Physical Exam  Vitals and nursing note reviewed.   Constitutional:       General: He is not in acute distress.     Appearance: Normal appearance.   HENT:      Head: Normocephalic and atraumatic.      Mouth/Throat:      Mouth: Mucous membranes are moist.      Pharynx: Oropharynx is clear.   Eyes:      Extraocular Movements: Extraocular movements intact.      Pupils: Pupils are equal, round, and reactive to light.   Cardiovascular:      Rate and Rhythm: Normal rate and regular rhythm.      Heart sounds: Normal heart sounds.   Pulmonary:      Effort: Pulmonary effort is normal.      Breath sounds: Normal breath sounds. No wheezing, rhonchi or rales.   Abdominal:      General: Bowel sounds are normal.      Palpations: Abdomen is soft.      Tenderness: There is abdominal tenderness (epigastric). There is no guarding or rebound.   Musculoskeletal:         General: Normal range of motion.      Cervical back: Normal range of motion.   Skin:     General: Skin is warm and dry.   Neurological:      General: No focal deficit present.      Mental Status: He is alert.   Psychiatric:         Mood and Affect: Mood normal.         Behavior: Behavior normal.                Signed Electronically by: Mary Smith PA-C    "

## 2024-12-23 NOTE — PATIENT INSTRUCTIONS
I suspect buprenorphine may be contributing to your ongoing nausea and stomach discomfort.  For treatment I have prescribed famotidine/Pepcid.  If your nausea persist, please reach out to your pain team to discuss alternative treatment options or next steps.    Please schedule a follow-up visit/recheck with your primary care provider within the next 3 months.  You can recheck your kidney function at that visit.

## 2025-01-14 ENCOUNTER — THERAPY VISIT (OUTPATIENT)
Dept: PHYSICAL THERAPY | Facility: CLINIC | Age: 65
End: 2025-01-14
Attending: PHYSICIAN ASSISTANT
Payer: MEDICARE

## 2025-01-14 ENCOUNTER — TELEPHONE (OUTPATIENT)
Dept: PALLIATIVE MEDICINE | Facility: CLINIC | Age: 65
End: 2025-01-14
Payer: MEDICARE

## 2025-01-14 DIAGNOSIS — R10.2 PELVIC PAIN IN MALE: Primary | ICD-10-CM

## 2025-01-14 PROCEDURE — 97140 MANUAL THERAPY 1/> REGIONS: CPT | Mod: GP | Performed by: PHYSICAL THERAPIST

## 2025-01-14 PROCEDURE — 97535 SELF CARE MNGMENT TRAINING: CPT | Mod: GP | Performed by: PHYSICAL THERAPIST

## 2025-01-14 PROCEDURE — 97110 THERAPEUTIC EXERCISES: CPT | Mod: GP | Performed by: PHYSICAL THERAPIST

## 2025-01-14 PROCEDURE — 97530 THERAPEUTIC ACTIVITIES: CPT | Mod: GP | Performed by: PHYSICAL THERAPIST

## 2025-01-14 NOTE — TELEPHONE ENCOUNTER
Health Call Center    Phone Message    May a detailed message be left on voicemail: yes     Reason for Call: Medication Question or concern regarding medication   Prescription Clarification  Name of Medication: oxyCODONE-acetaminophen (PERCOCET) 7.5-325 MG per tablet   Prescribing Provider: Destiny He   Pharmacy: [facility] in [place]   What on the order needs clarification? Pt said that the pharmacy will not give this to him until 1/17.  He said that Destiny wanted him to start this back on 1/10.  He is requesting a call back to discuss if he can get this sooner than the 17th.

## 2025-01-14 NOTE — TELEPHONE ENCOUNTER
reyette, Heather   Creation Time: 1/13/2025 10:21 AM     [facility] Call Center     Phone Message     May a detailed message be left on voicemail: yes      Reason for Call: Other: Pharmacy called to follow up and verify prescription. Please call when available.       Action Taken: Message routed to:  Other: Pain      Travel Screening: Not Applicable          Date of Service:

## 2025-01-14 NOTE — TELEPHONE ENCOUNTER
Current script:   Disp Refills Start End JANES   oxyCODONE-acetaminophen (PERCOCET) 7.5-325 MG per tablet 45 tablet 0 1/10/2025 -- No   Sig - Route: Take 1 tablet by mouth every 6 hours as needed for severe pain (max 3 tabs per day). Fill 1/10/25, 1/12/25 (15 day supply) - Oral     Called [facility].  Pharmacist says that pt filled oxycodone #10 tabs fromf a provider from [facility].  Per notes MICAELA Herman CNP is aware of this.  The current percocet prescribed by MICAELA Herman CNP will be filled.      Routed MICAELA Herman CNP as an FYI.    Jess RN-BSN  [facility]   [phone]

## 2025-01-15 NOTE — PROGRESS NOTES
Physical Therapy Progress and Recertification Note      [facility]                                                                                   OUTPATIENT PHYSICAL THERAPY    PLAN OF TREATMENT FOR OUTPATIENT REHABILITATION   Patient's Last Name, First Name, Mustapha Riggs YOB: 1960   Provider's Name   SHAHIDA [facility]   Medical Record No.  [id number]     Onset Date: 10/03/24  Start of Care Date: 11/05/24     Medical Diagnosis:  Pelvic pain in male      PT Treatment Diagnosis:  Pelvic Floor Dysfunction Plan of Treatment  Frequency/Duration: 1x per week/ 10 weeks    Certification date from 12/31/24 to 03/11/25         See note for plan of treatment details and functional goals     Val Molina, PT                         I CERTIFY THE NEED FOR THESE SERVICES FURNISHED UNDER        THIS PLAN OF TREATMENT AND WHILE UNDER MY CARE .             Physician Signature               Date    X_____________________________________________________                  Referring Provider:  Desean Mancilla    Initial Assessment  See Epic Evaluation- Start of Care Date: 11/05/24            PLAN  Continue therapy per current plan of care.    Beginning/End Dates of Progress Note Reporting Period:  11/5/24 to 01/14/2025    Referring Provider:  Desean Mancilla     01/14/25 0500   Appointment Info   Signing clinician's name / credentials Val Molina, PT MA #[id number]   Total/Authorized Visits 8 (Medicare)   Visits Used 5   Medical Diagnosis Pelvic pain in male   PT Tx Diagnosis Pelvic Floor Dysfunction   Progress Note/Certification   Start of Care Date 11/05/24   Onset of illness/injury or Date of Surgery 10/03/24   Therapy Frequency 1x per week   Predicted Duration 10 weeks   Certification date from 12/31/24   Certification date to 03/11/25   Progress Note Due Date 03/11/25   Progress Note Completed Date 01/14/25   PT Goal 1   Goal Identifier STG  "  Goal Description 1) Pt will report that his groin and pelvic pain is 5/10 with report no longer \"constant\" in 4 weeks.   Goal Progress Not Met: pt reporting still having constant pain, and rates 8-10/10 \"all the time.\"  (cont x 4 weeks)   Target Date 02/11/25   PT Goal 2   Goal Identifier STG   Goal Description 2) Pt will report no burning sensation with 50% of voids, in 6 weeks.   Goal Progress Met: pt states the burning during urination is \"about half the time.\"   Target Date 12/17/24   Date Met 12/23/24   PT Goal 3   Goal Identifier LTG   Goal Description 3)Pt will report daily BMs with 50% of them \"easier, not a struggle\" in 8 weeks.   Target Date 12/31/24   Goal Progress Met: pt states he is using Pomegranate Extract supplements and thinks this is helping.   Date Met 01/14/25   PT Goal 4   Goal Identifier LTG   Goal Description 4)Pt will report 3/10 pelvic pain with ADLs, in 12 weeks.   Target Date 03/11/25   Goal Progress Not Met: see previous pain goal above.  (cont for 8 weeks.)   PT Goal 5   Goal Identifier LTG   Goal Description 5)Pt will be indep in HEP to prevent return of symptoms in 12 weeks.   Target Date 03/11/25   Subjective Report   Subjective Report Pt had the injection 1/7/25.  States it was all Lidocaine and pt had no relief from it at all.  Pt states he has purchased and used rectal dilators (by suggestion of one of his past providers). States still having the \"Same 10/10 pain, even though I am trying to be positive and not focus on the pain like you said.\"  Points to all the same areas of pain.   Objective Measure 1   Objective Measure Pain   Details Still \"about the same.\"  States cannot sit for \"too long\" and stilll getting burning and stinging from the ischial tuberosities to anus and down to posterior distal thigh (pt pointing to spots of pain).   Objective Measure 2   Objective Measure Palpation   Details Tender (B) Pudendal nn medial to ischial tuberosities, and over (B) sacrotuberous " "ligs.  Tension (R) > (L) glute med and piriformis mms.   Objective Measure 3   Objective Measure Pelvic Alignment   Details Not fully assessed today   Therapeutic Procedure/Exercise   Therapeutic Procedures: strength, endurance, ROM, flexibility minutes (64362) 20   Ther Proc 1 - Details Instructed in/re-instructed in diaphragm breathing.   Pt is not doing or doing correctly when assessed this today.  Had pt revert back to using a hand over sternum and a hand over umbillicus to self-monitor for correct pattern.  Verbally talked thru full exercises correcting for larger rises of upper chest. Taught to use this during use of his rectal dilators. Taught, demo'd and had pt complete sitting on edge/corner of bed and placing ischial tuberosity off edge and having full perineum and other ischial tuberosity supported on bed. TOld to relax in this position to offer stretch to perineum and separation of pelvic bones. Told to do the sequency of MT (taught below), this stretch on corner of bed and then follow with Self-care (dilator use reviewed and re-instructed below).   Skilled Intervention Exer: pressure management with breathing for relaxation, stretching; progression of HEP   Patient Response/Progress Pt states \"I know exactly what you need me to do.\"  Able to demo position correctly. Unable to access PTRx on phone during session, so printed all exers today.   Therapeutic Activity   Therapeutic Activities: dynamic activities to improve functional performance minutes (08259) 10   Ther Act 1 - Details Reviewed \"pooping strategy\" with upright posture, stool under feet, belly big belly hard, and pursed lip blowing. Told if urine stream is weaker, then can use this strategy as well.  Pt then described using a pomegranate extract supplement and this \"seems to help a lot.\"   Manual Therapy   Manual Therapy: Mobilization, MFR, MLD, friction massage minutes (00813) 15   Manual Therapy 1 - Details Taught to lie on (L) side and " "reach with (R) hand to spot on \"soft tissue, not bone\" at perineum laterally and just \"inside\" (medial to) ischial tuberosity.  Told could also use a tennis ball in this same area to do self-massage. Emphasized this is gentle and should not be inducing pain/soreness, but will feel only pressure and should keep finger tips/tennis ball moving back and forth for 1-2 mins, then repeat with other side. Do this prior to the new stretch while sitting on edge/corner of a table.   Skilled Intervention MT: soft-tissue release, relaxation of tissue   Patient Response/Progress Pt states \"I know exactly what you want me to do.\"  Able to correctly place finger tips in the soft tissue zone.   Self Care/home Management   ADL/Home Mgmt Training (06730) 10   Self Care 1 - Details Pt states already has rectal dilators from past treatments (not this PT).  Suggested if pt has used them before to try the MT and Exer preps taught above and try using dilators again.  Suggested relaxation with diaph br and EAS contractions to try and subdue reflexive tensioning of PFM.  Provided full written instructions (see PTRx).   Skilled Intervention Self-care: rectal dilator use   Patient Response/Progress Pt offered that he has dilators and asked if he should be using. Open to trying PT's suggestions.   Plan   Home program PTRx   Updates to plan of care Pt not able to access phone reliably as he does not pay for data so cannot always call up his program on his phone.  Printed new exers for pt.   Plan for next session See pt in 1 week. Cont MT to reduce muscular tension.  Train with RUSI and/or BF to assist with PFM pain reduction/tension reduction.  Advance HEP.   Comments   Comments Possibly send message to Dr Jean-Baptiste asking if botox to pt's PFM is an option.   Pelvic Health Informed Consent Statement Discussed with patient/guardian reason for referral regarding pelvic health needs and external/internal pelvic floor muscle examination.  Opportunity " provided to ask questions and verbal consent for assessment and intervention was given.   Total Session Time   Timed Code Treatment Minutes 55   Total Treatment Time (sum of timed and untimed services) 55   Thank you for the referral of this patient.  Val Molina, PT, MA  #[id number]

## 2025-01-15 NOTE — TELEPHONE ENCOUNTER
Dr. Prescott okay to order injection? If so what one? See Lorelei MORALES's notes.    CARMEN Mercado-BSN  [facility]       for chemotherapy for chemotherapy for chemotherapy for chemotherapy for chemotherapy for chemotherapy for chemotherapy for chemotherapy for chemotherapy for chemotherapy for chemotherapy for chemotherapy for chemotherapy for chemotherapy

## 2025-01-16 ENCOUNTER — TELEPHONE (OUTPATIENT)
Dept: UROLOGY | Facility: CLINIC | Age: 65
End: 2025-01-16
Payer: MEDICARE

## 2025-01-16 NOTE — TELEPHONE ENCOUNTER
RN received message from patient's pelvic floor PT and Dr. Jean-Baptiste, who recommended pt meet with Dr. Johansen for consideration of pelvic floor botox.    Appointment scheduled.     CARMEN Ruth  Care Coordinator- Urology   [phone]

## 2025-01-21 ENCOUNTER — THERAPY VISIT (OUTPATIENT)
Dept: PHYSICAL THERAPY | Facility: CLINIC | Age: 65
End: 2025-01-21
Attending: PHYSICIAN ASSISTANT
Payer: MEDICARE

## 2025-01-21 DIAGNOSIS — R10.2 PELVIC PAIN IN MALE: Primary | ICD-10-CM

## 2025-01-21 PROCEDURE — 97110 THERAPEUTIC EXERCISES: CPT | Mod: GP | Performed by: PHYSICAL THERAPIST

## 2025-01-21 PROCEDURE — 97535 SELF CARE MNGMENT TRAINING: CPT | Mod: GP | Performed by: PHYSICAL THERAPIST

## 2025-01-21 NOTE — TELEPHONE ENCOUNTER
Reason for visit: Follow up - new symptoms      Relevant information:   Per patient to nurse on 12/2/24: He has continuous pressure and burning pain that starts in his rectum and goes into his penis that keeps him awake at night and affects both his urination and bowel movements. Interested in surgical options.   Followed by Dr. Jean-Baptiste and Dr. Tello in the past     Records/imaging/labs/orders: IN [facility], CARE EVERYWHERE, AND PACS      At Rooming:     Determine onset of sx at rooming    Is the patient taking any of the following medications:   o Cold medications     o Parkinson's disease medications     o Pain meds i.e. opioids     o BPH pharmacological tx i.e. finasteride,      Ensure patient has completed the AUA and IMAN  Ask Dr. Jean-Baptiste if he wants a PVR or Uroflow on the patient.       Karen Alvarado  1/21/2025  8:46 AM

## 2025-01-21 NOTE — TELEPHONE ENCOUNTER
MEDICAL RECORDS REQUEST   [facility]  Urology Clinic  [address] [place] [address]  PHONE: [phone]  Fax: [phone]        FUTURE VISIT INFORMATION                                                   Mustapha Negrete, : 1960 scheduled for future visit at [facility]    APPOINTMENT INFORMATION:  Date: 02/10/2025  Provider:  MARIXA  Reason for Visit/Diagnosis: PELVIC FLOOR BOTOX    REFERRAL INFORMATION:  Referring provider:  ROSALIA      RECORDS REQUESTED FOR VISIT                                                     NOTES  STATUS/DETAILS   OFFICE NOTE from referring provider  no   OFFICE NOTE from other specialist  YES, 2023 -- Yaz Khanna, MICAELA, C.N.P. @ [place]   OPERATIVE REPORT  yes, 2023    MEDICATION LIST  yes   LABS     URINALYSIS (UA)  yes   IMAGES  YES, INTERNAL  2023, 2023 -- CT ABD PELVIS    [facility]  2024 -- XR PELVIS       PRE-VISIT CHECKLIST      Joint diagnostic appointment coordinated correctly          (ensure right order & amount of time) Yes   RECORD COLLECTION COMPLETE Yes

## 2025-01-21 NOTE — TELEPHONE ENCOUNTER
Reason for visit: Follow up - new symptoms     Relevant information:   Per patient to nurse on 12/2/24: He has continuous pressure and burning pain that starts in his rectum and goes into his penis that keeps him awake at night and affects both his urination and bowel movements. Interested in surgical options.   Followed by Dr. Jean-Baptiste and Dr. Tello in the past    Records/imaging/labs/orders: IN [facility], CARE EVERYWHERE, AND PACS     At Rooming:     Determine onset of sx at rooming    Is the patient taking any of the following medications:   o Cold medications    o Parkinson's disease medications    o Pain meds i.e. opioids    o BPH pharmacological tx i.e. finasteride,     Ensure patient has completed the AUA and IMAN  Ask Dr. Jean-Baptiste if he wants a PVR or Uroflow on the patient.      Karen Alvarado  1/21/2025  8:46 AM

## 2025-01-24 ENCOUNTER — TELEPHONE (OUTPATIENT)
Dept: FAMILY MEDICINE | Facility: CLINIC | Age: 65
End: 2025-01-24

## 2025-01-24 NOTE — TELEPHONE ENCOUNTER
Patient Quality Outreach    Patient is due for the following:   Asthma  -  AAP  Hypertension -  Hypertension follow-up visit  IVD  -  BP Check  Physical Annual Wellness Visit      Topic Date Due    Pneumococcal Vaccine (1 of 2 - PCV) Never done    Hepatitis A Vaccine (1 of 2 - Risk 2-dose series) Never done    Zoster (Shingles) Vaccine (1 of 2) Never done    Flu Vaccine (1) Never done    COVID-19 Vaccine (1 - 2024-25 season) Never done         Type of outreach:    Chart review performed, no outreach needed.  Patient has appointment 1/27/25 for pre op  Patient has appointment 2/11/25 for annual wellness  Questions for provider review:    None           Li Yee MA  Chart routed to care team.

## 2025-01-27 ENCOUNTER — PRE VISIT (OUTPATIENT)
Dept: UROLOGY | Facility: CLINIC | Age: 65
End: 2025-01-27

## 2025-01-27 ENCOUNTER — OFFICE VISIT (OUTPATIENT)
Dept: FAMILY MEDICINE | Facility: CLINIC | Age: 65
End: 2025-01-27
Payer: MEDICARE

## 2025-01-27 VITALS
TEMPERATURE: 98.3 F | WEIGHT: 201 LBS | RESPIRATION RATE: 18 BRPM | BODY MASS INDEX: 34.61 KG/M2 | OXYGEN SATURATION: 97 % | DIASTOLIC BLOOD PRESSURE: 74 MMHG | SYSTOLIC BLOOD PRESSURE: 138 MMHG | HEART RATE: 89 BPM

## 2025-01-27 DIAGNOSIS — R68.83 CHILLS: ICD-10-CM

## 2025-01-27 DIAGNOSIS — R10.2 PELVIC PAIN IN MALE: ICD-10-CM

## 2025-01-27 DIAGNOSIS — Z01.818 PREOP GENERAL PHYSICAL EXAM: Primary | ICD-10-CM

## 2025-01-27 LAB
ALBUMIN UR-MCNC: ABNORMAL MG/DL
APPEARANCE UR: CLEAR
BACTERIA #/AREA URNS HPF: ABNORMAL /HPF
BILIRUB UR QL STRIP: NEGATIVE
COLOR UR AUTO: YELLOW
ERYTHROCYTE [DISTWIDTH] IN BLOOD BY AUTOMATED COUNT: 12.5 % (ref 10–15)
GLUCOSE UR STRIP-MCNC: NEGATIVE MG/DL
HCT VFR BLD AUTO: 43.4 % (ref 40–53)
HGB BLD-MCNC: 14.3 G/DL (ref 13.3–17.7)
HGB UR QL STRIP: NEGATIVE
KETONES UR STRIP-MCNC: NEGATIVE MG/DL
LEUKOCYTE ESTERASE UR QL STRIP: NEGATIVE
MCH RBC QN AUTO: 28.9 PG (ref 26.5–33)
MCHC RBC AUTO-ENTMCNC: 32.9 G/DL (ref 31.5–36.5)
MCV RBC AUTO: 88 FL (ref 78–100)
NITRATE UR QL: NEGATIVE
PH UR STRIP: 7 [PH] (ref 5–7)
PLATELET # BLD AUTO: 311 10E3/UL (ref 150–450)
RBC # BLD AUTO: 4.95 10E6/UL (ref 4.4–5.9)
RBC #/AREA URNS AUTO: ABNORMAL /HPF
SP GR UR STRIP: 1.02 (ref 1–1.03)
SQUAMOUS #/AREA URNS AUTO: ABNORMAL /LPF
UROBILINOGEN UR STRIP-ACNC: 0.2 E.U./DL
WBC # BLD AUTO: 9.5 10E3/UL (ref 4–11)
WBC #/AREA URNS AUTO: ABNORMAL /HPF

## 2025-01-27 PROCEDURE — 83690 ASSAY OF LIPASE: CPT | Performed by: PHYSICIAN ASSISTANT

## 2025-01-27 PROCEDURE — 36415 COLL VENOUS BLD VENIPUNCTURE: CPT | Performed by: PHYSICIAN ASSISTANT

## 2025-01-27 PROCEDURE — 93000 ELECTROCARDIOGRAM COMPLETE: CPT | Performed by: PHYSICIAN ASSISTANT

## 2025-01-27 PROCEDURE — 80053 COMPREHEN METABOLIC PANEL: CPT | Performed by: PHYSICIAN ASSISTANT

## 2025-01-27 PROCEDURE — 99215 OFFICE O/P EST HI 40 MIN: CPT | Mod: 25 | Performed by: PHYSICIAN ASSISTANT

## 2025-01-27 PROCEDURE — 81001 URINALYSIS AUTO W/SCOPE: CPT | Performed by: PHYSICIAN ASSISTANT

## 2025-01-27 PROCEDURE — 85027 COMPLETE CBC AUTOMATED: CPT | Performed by: PHYSICIAN ASSISTANT

## 2025-01-27 ASSESSMENT — PAIN SCALES - GENERAL: PAINLEVEL_OUTOF10: SEVERE PAIN (10)

## 2025-01-27 NOTE — PROGRESS NOTES
Preoperative Evaluation  [facility] [place]  [address] [address] [facility]  [place] [address]  Phone: [phone]  Primary Provider: Silver Pro DO  Pre-op Performing Provider: DARVIN Wiggins  Jan 27, 2025 1/27/2025   Surgical Information   What procedure is being done? deconpression   Facility or Hospital where procedure/surgery will be performed: [facility]   Who is doing the procedure / surgery? Dr Aguila   Date of surgery / procedure: 1/30/25   Time of surgery / procedure: 8:30am   Where do you plan to recover after surgery? at home with family     Fax number for surgical facility: [phone]     Assessment & Plan     The proposed surgical procedure is considered INTERMEDIATE risk.    Problem List Items Addressed This Visit          Nervous and Auditory    Pelvic pain in male     Other Visit Diagnoses       Preop general physical exam    -  Primary    Relevant Orders    EKG 12-lead complete w/read - Clinics (Completed)    Chills        Relevant Orders    Comprehensive metabolic panel (BMP + Alb, Alk Phos, ALT, AST, Total. Bili, TP)    CBC with platelets    Lipase    UA Macroscopic with reflex to Microscopic and Culture - Lab Collect           - No identified additional risk factors other than previously addressed    Antiplatelet or Anticoagulation Medication Instructions   - Patient is on no antiplatelet or anticoagulation medications.    Additional Medication Instructions  Take all scheduled medications on the day of surgery EXCEPT for modifications listed below:   - Herbal medications and vitamins: DO NOT TAKE 14 days prior to surgery.   - ACE/ARB/ARNI (lisinopril, enalapril, losartan, valsartan, olmesartan, sacubritril/valsartan) : DO NOT TAKE on day of surgery (minimum 11 hours for general anesthesia).   - Diuretics (furosemide, hydrochlorothiazide, chlorothalidone): DO NOT TAKE on the day of surgery.   - Topicals: DO NOT TAKE day of surgery.    Recommendation  Approval  given to proceed with proposed procedure. Fasting glucose in prediabetic range, but patient has known history of this.    Zhang Luciano is a 64 year old, presenting for the following:  Pre-Op Exam          1/27/2025    10:19 AM   Additional Questions   Roomed by Padmini Carroll CMA   Accompanied by N/A         1/27/2025    10:19 AM   Patient Reported Additional Medications   Patient reports taking the following new medications No new medications     HPI related to upcoming procedure: Lumbar hemilaminectomy/discectomy L2-3, L3-4        1/27/2025   Pre-Op Questionnaire   Have you ever had a heart attack or stroke? No   Have you ever had surgery on your heart or blood vessels, such as a stent placement, a coronary artery bypass, or surgery on an artery in your head, neck, heart, or legs? No   Do you have chest pain with activity? No   Do you have a history of heart failure? No   Do you currently have a cold, bronchitis or symptoms of other infection? Subjective chills for a month   Do you have a cough, shortness of breath, or wheezing? No   Do you or anyone in your family have previous history of blood clots? No   Do you or does anyone in your family have a serious bleeding problem such as prolonged bleeding following surgeries or cuts? No   Have you ever had problems with anemia or been told to take iron pills? No   Have you had any abnormal blood loss such as black, tarry or bloody stools? No   Have you ever had a blood transfusion? No   Are you willing to have a blood transfusion if it is medically needed before, during, or after your surgery? Yes   Have you or any of your relatives ever had problems with anesthesia? (!) YES versed was too sedating historically   Do you have sleep apnea, excessive snoring or daytime drowsiness? (!) YES   Do you have a CPAP machine? (!) NO    Do you have any artifical heart valves or other implanted medical devices like a pacemaker, defibrillator, or continuous glucose monitor?  No   Do you have artificial joints? (!) YES, right hip   Are you allergic to latex? No     Health Care Directive  Patient does not have a Health Care Directive: Discussed advance care planning with patient; however, patient declined at this time.    Preoperative Review of    reviewed - controlled substances reflected in medication list.    Status of Chronic Conditions:  See problem list for active medical problems.  Problems all longstanding and stable, except as noted/documented.  See ROS for pertinent symptoms related to these conditions.    Patient Active Problem List    Diagnosis Date Noted    Essential hypertension 09/07/2024     Priority: Medium    F11.2 - Continuous opioid dependence (H) 05/23/2023     Priority: Medium    F11.2 - Episodic opioid dependence (H) 05/23/2023     Priority: Medium    F11.9 - Chronic, continuous use of opioids 05/23/2023     Priority: Medium    Uncomplicated opioid dependence (H)      Priority: Medium     Patient is followed by Silver Pro DO for ongoing prescription of pain medication.  All refills should only be approved by this provider, or covering partner.    Medication(s): oxycodone 5 mg  Maximum quantity per month: 30  Clinic visit frequency required: Q 3 months   PDMP Review         Value Time User    State PDMP site checked  Yes 7/3/2022 10:43 AM Jose Pérez MD          Controlled substance agreement:  CSA -- Patient Level:    Controlled Substance Agreement - Opioid - Scan on 9/8/2022 11:41 AM       Pain Clinic evaluation in the past: No    Opioid Risk Tool Total Score(s):  No flowsheet data found.  Last [facility] website verification:  done on 9/8/22   [email or web address]        Pudendal neuralgia 05/13/2022     Priority: Medium    Coccydynia 05/13/2022     Priority: Medium    Change in bowel habits 01/03/2022     Priority: Medium    Complete tear of left rotator cuff 01/03/2022     Priority: Medium    Pruritus ani 01/03/2022      Priority: Medium    Tear of right acetabular labrum, sequela 10/28/2021     Priority: Medium    Lumbar disc herniation with radiculopathy 10/28/2021     Priority: Medium     MRI 10/21  2.  At L4-L5, there is a right paracentral slightly caudally directed disc extrusion that narrows the right lateral recess and causes mass effect on the descending/traversing right L5 nerve root.  3.  At L3-L4, there is a right paracentral disc protrusion superimposed on disc bulge with narrowing of the right lateral recess and mass effect on the descending/traversing right L4 nerve root.      Acute prostatitis 10/06/2021     Priority: Medium    Pelvic pain in male 10/06/2021     Priority: Medium    Anal fissure 08/26/2021     Priority: Medium    Constipation 08/26/2021     Priority: Medium    Urinary retention 08/16/2021     Priority: Medium    Neuropathy 08/16/2021     Priority: Medium    Diverticular disease of large intestine 08/03/2021     Priority: Medium    Benign prostatic hyperplasia with urinary obstruction 07/12/2021     Priority: Medium    Calculus of gallbladder with chronic cholecystitis without obstruction 05/25/2021     Priority: Medium    MARIAN (obstructive sleep apnea) 05/03/2021     Priority: Medium     On BiPAP, see scans.      Symptomatic cholelithiasis 04/06/2021     Priority: Medium    COVID-19 virus infection 04/06/2021     Priority: Medium    Hepatic steatosis 03/26/2021     Priority: Medium    Psoriatic arthritis (H) 03/15/2021     Priority: Medium    Arthritis of left acromioclavicular joint 08/05/2019     Priority: Medium    Lower urinary tract symptoms 05/13/2019     Priority: Medium    Mild intermittent asthma, uncomplicated 04/05/2017     Priority: Medium    OA (osteoarthritis) of hip 05/19/2015     Priority: Medium    ASHD (arteriosclerotic heart disease) 10/25/2013     Priority: Medium    Hyperlipidemia 10/25/2013     Priority: Medium    Reflux esophagitis 04/18/2013     Priority: Medium    Hiatal hernia  04/18/2013     Priority: Medium    Insomnia, unspecified 02/27/2013     Priority: Medium    GERD (gastroesophageal reflux disease) 10/16/2012     Priority: Medium    Low testosterone 10/16/2012     Priority: Medium    Displacement of lumbar intervertebral disc without myelopathy 09/12/2011     Priority: Medium      Past Medical History:   Diagnosis Date    MARIAN (obstructive sleep apnea)     havne't used cpap since 2018    Pelvic floor dysfunction      Past Surgical History:   Procedure Laterality Date    BACK SURGERY      CHOLECYSTECTOMY      COLONOSCOPY N/A 08/31/2022    Procedure: COLONOSCOPY;  Surgeon: Veena Mendoza MD;  Location:  GI    ENT SURGERY      tonsillectomy    GENITOURINARY SURGERY      TURP    IMPLANT STIMULATOR SACRAL NERVE PERCUTANEOUS TRIAL N/A 10/17/2022    Procedure: INSERTION, NEUROSTIMULATOR, SACRAL, PERCUTANEOUS, FOR TRIAL (trial with [employer]);  Surgeon: Nayan Tello MD;  Location: [facility] OR    JOINT REPLACEMENT, HIP RT/LT Left     ORTHOPEDIC SURGERY      wrist surgery, carpal tunnel 2005    PROSTATE SURGERY      Urolift    ROTATOR CUFF REPAIR RT/LT Left     TOTAL HIP ARTHROPLASTY Right 11/18/2024     Current Outpatient Medications   Medication Sig Dispense Refill    acetaminophen (TYLENOL) 500 MG tablet Take 500-1,000 mg by mouth every 6 hours as needed for mild pain.      albuterol (PROAIR HFA/PROVENTIL HFA/VENTOLIN HFA) 108 (90 Base) MCG/ACT inhaler Inhale 2 puffs into the lungs every 6 hours.      hydrOXYzine HCl (ATARAX) 25 MG tablet Take 1-2 tablets (25-50 mg) by mouth every 6 hours as needed for other (spasms). 90 tablet 0    losartan-hydrochlorothiazide (HYZAAR) 50-12.5 MG tablet Take 0.5 tablet by mouth daily for 7 days. If your blood pressure continues to be greater than 140/90mm Hg on average, please begin taking 1 tablet by mouth daily thereafter. Otherwise, stay on 0.5 tablet daily. 90 tablet 0    oxyCODONE-acetaminophen (PERCOCET) 7.5-325 MG per tablet Take 1  tablet by mouth every 6 hours as needed for severe pain (max 3 tabs per day). Fill 1/10/25, 1/12/25 (15 day supply) 45 tablet 0    benzonatate (TESSALON) 100 MG capsule Take 1 capsule (100 mg) by mouth 3 times daily as needed for cough. (Patient not taking: Reported on 1/27/2025) 25 capsule 0    famotidine (PEPCID) 20 MG tablet Take 1 tablet (20 mg) by mouth 2 times daily. (Patient not taking: Reported on 1/27/2025) 60 tablet 0    fluticasone (FLONASE) 50 MCG/ACT nasal spray Spray 1 spray into both nostrils daily (Patient not taking: Reported on 1/27/2025) 9.9 mL 0    naloxone (NARCAN) 4 MG/0.1ML nasal spray Spray 1 spray (4 mg) into one nostril alternating nostrils once as needed for opioid reversal every 2-3 minutes until assistance arrives (Patient not taking: Reported on 1/27/2025) 0.1 mL 0       Allergies   Allergen Reactions    Droperidol Other (See Comments)     Extrapyramidal Side Effect    Tamsulosin Dizziness    Fenofibrate Headache and Diarrhea             Fentanyl      Other reaction(s): N&V hard to wake up    Keflex [Cephalexin] Itching    Lactose GI Disturbance         Other reaction(s): GI upset    Midazolam      Other reaction(s): N&V, Hard to wake up    Monosodium Glutamate      Other reaction(s): diarrhea, headaches    Pcn [Penicillins] Other (See Comments)     Feels warm and flushed, but tolerates Cephalexin    Atorvastatin Palpitations     Other reaction(s): palpitations    Sertraline Palpitations         Other reaction(s): Unknown    Simvastatin Palpitations     Other reaction(s): palpitations    Sulfa Antibiotics Headache and Rash     Burning    Other reaction(s): Rash  Other reaction(s): rash and headache    Tetracycline Rash     Burning    Other reaction(s): rash        Social History     Tobacco Use    Smoking status: Former     Current packs/day: 0.00     Average packs/day: 1 pack/day for 5.0 years (5.0 ttl pk-yrs)     Types: Cigarettes     Start date: 11/9/1994     Quit date: 11/9/1999      "Years since quittin.2     Passive exposure: Never    Smokeless tobacco: Never   Substance Use Topics    Alcohol use: Yes     Comment: very little     Family History   Problem Relation Age of Onset    Cerebrovascular Disease Mother     Hypertension Mother     Diabetes Mother     Liver Cancer Mother     Cancer Father 84        liver cancer    Prostate Cancer Father      History   Drug Use    Types: Marijuana     Comment: gummies             Review of Systems  Constitutional, neuro, ENT, endocrine, pulmonary, cardiac, gastrointestinal, genitourinary, musculoskeletal, integument and psychiatric systems are negative, except as otherwise noted.    Objective    /74   Pulse 89   Temp 98.3  F (36.8  C) (Temporal)   Resp 18   Wt 91.2 kg (201 lb)   SpO2 97%   BMI 34.61 kg/m     Estimated body mass index is 34.61 kg/m  as calculated from the following:    Height as of 24: 1.623 m (5' 3.9\").    Weight as of this encounter: 91.2 kg (201 lb).  Physical Exam  Vitals and nursing note reviewed.   Constitutional:       General: He is not in acute distress.     Appearance: He is not ill-appearing or diaphoretic.   HENT:      Head: Normocephalic and atraumatic.      Mouth/Throat:      Mouth: Mucous membranes are moist.      Pharynx: Oropharynx is clear.   Eyes:      Conjunctiva/sclera: Conjunctivae normal.   Cardiovascular:      Rate and Rhythm: Normal rate and regular rhythm.      Heart sounds: Normal heart sounds. No murmur heard.     No friction rub. No gallop.   Pulmonary:      Effort: Pulmonary effort is normal. No respiratory distress.      Breath sounds: Normal breath sounds. No stridor. No wheezing, rhonchi or rales.   Abdominal:      General: Bowel sounds are normal. There is no distension.      Palpations: Abdomen is soft. There is no mass.      Tenderness: There is no abdominal tenderness. There is no right CVA tenderness, left CVA tenderness, guarding or rebound.      Hernia: No hernia is present. "   Musculoskeletal:      Cervical back: Neck supple. No rigidity.   Lymphadenopathy:      Cervical: No cervical adenopathy.   Skin:     General: Skin is warm and dry.   Neurological:      General: No focal deficit present.      Mental Status: He is alert. Mental status is at baseline.   Psychiatric:         Mood and Affect: Mood normal.         Behavior: Behavior normal.         Recent Labs   Lab Test 12/11/24  1339 11/05/24  1458 10/24/24  0701 10/03/24  0801 08/19/24  0848   HGB 12.9* 13.7  --    < > 14.9    292  --    < > 261     --  140   < > 140   POTASSIUM 4.5  --  4.8   < > 4.6   CR 1.13  --  0.79   < > 0.87   A1C  --   --   --   --  5.9*    < > = values in this interval not displayed.        Diagnostics  Results for orders placed or performed in visit on 01/27/25   UA Macroscopic with reflex to Microscopic and Culture - Lab Collect     Status: Abnormal    Specimen: Urine, Clean Catch   Result Value Ref Range    Color Urine Yellow Colorless, Straw, Light Yellow, Yellow    Appearance Urine Clear Clear    Glucose Urine Negative Negative mg/dL    Bilirubin Urine Negative Negative    Ketones Urine Negative Negative mg/dL    Specific Gravity Urine 1.020 1.003 - 1.035    Blood Urine Negative Negative    pH Urine 7.0 5.0 - 7.0    Protein Albumin Urine Trace (A) Negative mg/dL    Urobilinogen Urine 0.2 0.2, 1.0 E.U./dL    Nitrite Urine Negative Negative    Leukocyte Esterase Urine Negative Negative   Lipase     Status: Normal   Result Value Ref Range    Lipase 38 13 - 60 U/L   CBC with platelets     Status: Normal   Result Value Ref Range    WBC Count 9.5 4.0 - 11.0 10e3/uL    RBC Count 4.95 4.40 - 5.90 10e6/uL    Hemoglobin 14.3 13.3 - 17.7 g/dL    Hematocrit 43.4 40.0 - 53.0 %    MCV 88 78 - 100 fL    MCH 28.9 26.5 - 33.0 pg    MCHC 32.9 31.5 - 36.5 g/dL    RDW 12.5 10.0 - 15.0 %    Platelet Count 311 150 - 450 10e3/uL   Comprehensive metabolic panel (BMP + Alb, Alk Phos, ALT, AST, Total. Bili, TP)      Status: Abnormal   Result Value Ref Range    Sodium 144 135 - 145 mmol/L    Potassium 4.5 3.4 - 5.3 mmol/L    Carbon Dioxide (CO2) 28 22 - 29 mmol/L    Anion Gap 12 7 - 15 mmol/L    Urea Nitrogen 18.9 8.0 - 23.0 mg/dL    Creatinine 0.79 0.67 - 1.17 mg/dL    GFR Estimate >90 >60 mL/min/1.73m2    Calcium 10.0 8.8 - 10.4 mg/dL    Chloride 104 98 - 107 mmol/L    Glucose 124 (H) 70 - 99 mg/dL    Alkaline Phosphatase 51 40 - 150 U/L    AST 20 0 - 45 U/L    ALT 20 0 - 70 U/L    Protein Total 7.2 6.4 - 8.3 g/dL    Albumin 4.5 3.5 - 5.2 g/dL    Bilirubin Total 0.9 <=1.2 mg/dL   UA Microscopic with Reflex to Culture     Status: Abnormal   Result Value Ref Range    Bacteria Urine Few (A) None Seen /HPF    RBC Urine 0-2 0-2 /HPF /HPF    WBC Urine 5-10 (A) 0-5 /HPF /HPF    Squamous Epithelials Urine Few (A) None Seen /LPF    Narrative    Urine Culture not indicated     EKG: appears normal, NSR, normal axis, normal intervals, no acute ST/T changes c/w ischemia, no LVH by voltage criteria, unchanged from previous tracings    Revised Cardiac Risk Index (RCRI)  The patient has the following serious cardiovascular risks for perioperative complications:   - No serious cardiac risks = 0 points     RCRI Interpretation: 0 points: Class I (very low risk - 0.4% complication rate)         Signed Electronically by: DARVIN Wiggins  A copy of this evaluation report is provided to the requesting physician.

## 2025-01-27 NOTE — PATIENT INSTRUCTIONS
Mary Luciano,    Thank you for allowing [facility] to manage your care.    I am unsure of the cause of your symptoms, but your exam is reassuring. We will see what our workup shows.     If you develop worsening/changing symptoms at any time, please be seen in clinic/urgent care or call 911/go to the emergency department for evaluation.    I ordered some lab work. Please go to the laboratory to get your studies.    Please allow 1-2 business days for our office to contact you in regards to your laboratory/radiological studies.  If not done so, I encourage you to login into Mape ([email or web address] to review your results as well.     If you have any questions or concerns, please feel free to call us at [phone]    Sincerely,    Marito Mancilla PA-C    Did you know?      You can schedule a video visit for follow-up appointments as well as future appointments for certain conditions.  Please see the below link.     [email or web address]    If you have not already done so,  I encourage you to sign up for [employer]ape ([email or web address]).  This will allow you to review your results, securely communicate with a provider, and schedule virtual visits as well.    How to Take Your Medication Before Surgery  Preoperative Medication Instructions   Take all scheduled medications on the day of surgery EXCEPT for modifications listed below:   - Herbal medications and vitamins: DO NOT TAKE 14 days prior to surgery.   - lisinopril/hydrochlorothiazide: DO NOT TAKE on the day of surgery.   - Topicals: DO NOT TAKE day of surgery.     For your pain, please use Tylenol 650mg every 6 hours. Max acetaminophen (Tylenol) 3,000mg/24 hours      Narcotics Discharge Instructions: oxycodone    You have been previously prescribed a narcotic pain medication that has risk for addiction with prolonged use, so please use sparingly.  Additionally, narcotics are medications  that are sedating (will make you sleepy), so do not drive or operate machinery while taking this medication.  Avoid alcohol or other sedating medicines such as benzodiazepines while taking narcotics due to risk for increased sedation and difficulty breathing.      Narcotics will cause constipation.  If you need to take this medication please consider taking an over-the-counter stool softener and laxative, such as Senna Plus, to prevent constipation from developing.  Nausea is a side effect of narcotic use.  Possible additional side effects include vomiting, itching, and dizziness/lightheadedness.    Patient Education   Preparing for Your Surgery  For Adults  Getting started  In most cases, a nurse will call to review your health history and instructions. They will give you an arrival time based on your scheduled surgery time. Please be ready to share:  Your doctor's clinic name and phone number  Your medical, surgical, and anesthesia history  A list of allergies and sensitivities  A list of medicines, including herbal treatments and over-the-counter drugs  Whether the patient has a legal guardian (ask how to send us the papers in advance)  Note: You may not receive a call if you were seen at our PAC (Preoperative Assessment Center).  Please tell us if you're pregnant--or if there's any chance you might be pregnant. Some surgeries may injure a fetus (unborn baby), so they require a pregnancy test. Surgeries that are safe for a fetus don't always need a test, and you can choose whether to have one.   Preparing for surgery  Within 10 to 30 days of surgery: Have a pre-op exam (sometimes called an H&P, or History and Physical). This can be done at a clinic or pre-operative center.  If you're having a , you may not need this exam. Talk to your care team.  At your pre-op exam, talk to your care team about all medicines you take. (This includes CBD oil and any drugs, such as THC, marijuana, and other forms of  cannabis.) If you need to stop any medicine before surgery, ask when to start taking it again.  This is for your safety. Many medicines and drugs can make you bleed too much during surgery. Some change how well surgery (anesthesia) drugs work.  Call your insurance company to let them know you're having surgery. (If you don't have insurance, call [phone].)  Call your clinic if there's any change in your health. This includes a scrape or scratch near the surgery site, or any signs of a cold (sore throat, runny nose, cough, rash, fever).  Eating and drinking guidelines  For your safety: Unless your surgeon tells you otherwise, follow the guidelines below.  Eat and drink as normal until 8 hours before you arrive for surgery. After that, no food or milk. You can spit out gum when you arrive.  Drink clear liquids until 2 hours before you arrive. These are liquids you can see through, like water, Gatorade, and Propel Water. They also include plain black coffee and tea (no cream or milk).  No alcohol for 24 hours before you arrive. The night before surgery, stop any drinks that contain THC.  If your care team tells you to take medicine on the morning of surgery, it's okay to take it with a sip of water. No other medicines or drugs are allowed (including CBD oil)--follow your care team's instructions.  If you have questions the day of surgery, call your hospital or surgery center.   Preventing infection  Shower or bathe the night before and the morning of surgery. Follow the instructions your clinic gave you. (If no instructions, use regular soap.)  Don't shave or clip hair near your surgery site. We'll remove the hair if needed.  Don't smoke or vape the morning of surgery. No chewing tobacco for 6 hours before you arrive. A nicotine patch is okay. You may spit out nicotine gum when you arrive.  For some surgeries, the surgeon will tell you to fully quit smoking and nicotine.  We will make every effort to keep you safe  from infection. We will:  Clean our hands often with soap and water (or an alcohol-based hand rub).  Clean the skin at your surgery site with a special soap that kills germs.  Give you a special gown to keep you warm. (Cold raises the risk of infection.)  Wear hair covers, masks, gowns, and gloves during surgery.  Give antibiotic medicine, if prescribed. Not all surgeries need this medicine.  What to bring on the day of surgery  Photo ID and insurance card  Copy of your health care directive, if you have one  Glasses and hearing aids (bring cases)  You can't wear contacts during surgery  Inhaler and eye drops, if you use them (tell us about these when you arrive)  CPAP machine or breathing device, if you use them  A few personal items, if spending the night  If you have . . .  A pacemaker, ICD (cardiac defibrillator), or other implant: Bring the ID card.  An implanted stimulator: Bring the remote control.  A legal guardian: Bring a copy of the certified (court-stamped) guardianship papers.  Please remove any jewelry, including body piercings. Leave jewelry and other valuables at home.  If you're going home the day of surgery  You must have a responsible adult drive you home. They should stay with you overnight as well.  If you don't have someone to stay with you, and you aren't safe to go home alone, we may keep you overnight. Insurance often won't pay for this.  After surgery  If it's hard to control your pain or you need more pain medicine, please call your surgeon's office.  Questions?   If you have any questions for your care team, list them here:   ____________________________________________________________________________________________________________________________________________________________________________________________________________________________________________________________  For informational purposes only. Not to replace the advice of your health care provider. Copyright   2003, 2019  [facility]. All rights reserved. Clinically reviewed by Chas Stanford MD. [employer] [id number] - REV 08/24.

## 2025-01-28 ENCOUNTER — PRE VISIT (OUTPATIENT)
Dept: UROLOGY | Facility: CLINIC | Age: 65
End: 2025-01-28
Payer: MEDICARE

## 2025-01-28 DIAGNOSIS — Z53.9 ERRONEOUS ENCOUNTER--DISREGARD: Primary | ICD-10-CM

## 2025-01-28 LAB
ALBUMIN SERPL BCG-MCNC: 4.5 G/DL (ref 3.5–5.2)
ALP SERPL-CCNC: 51 U/L (ref 40–150)
ALT SERPL W P-5'-P-CCNC: 20 U/L (ref 0–70)
ANION GAP SERPL CALCULATED.3IONS-SCNC: 12 MMOL/L (ref 7–15)
AST SERPL W P-5'-P-CCNC: 20 U/L (ref 0–45)
BILIRUB SERPL-MCNC: 0.9 MG/DL
BUN SERPL-MCNC: 18.9 MG/DL (ref 8–23)
CALCIUM SERPL-MCNC: 10 MG/DL (ref 8.8–10.4)
CHLORIDE SERPL-SCNC: 104 MMOL/L (ref 98–107)
CREAT SERPL-MCNC: 0.79 MG/DL (ref 0.67–1.17)
EGFRCR SERPLBLD CKD-EPI 2021: >90 ML/MIN/1.73M2
GLUCOSE SERPL-MCNC: 124 MG/DL (ref 70–99)
HCO3 SERPL-SCNC: 28 MMOL/L (ref 22–29)
LIPASE SERPL-CCNC: 38 U/L (ref 13–60)
POTASSIUM SERPL-SCNC: 4.5 MMOL/L (ref 3.4–5.3)
PROT SERPL-MCNC: 7.2 G/DL (ref 6.4–8.3)
SODIUM SERPL-SCNC: 144 MMOL/L (ref 135–145)

## 2025-01-29 ENCOUNTER — TELEPHONE (OUTPATIENT)
Dept: FAMILY MEDICINE | Facility: CLINIC | Age: 65
End: 2025-01-29
Payer: MEDICARE

## 2025-01-29 NOTE — TELEPHONE ENCOUNTER
RN calling patient to update with concern on blood sugar result.   RN updated with Marito Mancilla's note.     Michaela Yan RN on 1/29/2025 at 12:22 PM

## 2025-01-29 NOTE — TELEPHONE ENCOUNTER
RN left a detailed message on Ali's personalized confidential voicemail per Marito cruz.   Encouraged to call back with further questions.     Michaela Yan RN on 1/29/2025 at 12:19 PM

## 2025-01-29 NOTE — TELEPHONE ENCOUNTER
Routing to Dr Mancilla    Is Patient cleared for Surgery?    Per Canelo, some labs were off    If cleared, please addend Pre-op    Please rout back to team to call to advise it has been addended.    Please call Jey    Direct line [phone]    Confidential VANDANA Gregorio will be able to pull Pre-op thru Care everywhere.    Surgery scheduled 1/30.    Pre op 1/27    Recommendation  Approval given to proceed with proposed procedure pending review of diagnostic evaluation.        RN received call from Jey from [facility] regarding above.    RN also received call from patient.    RN advised she had just spoken with [employer] regarding  Pre-op and addending visit    Patient concerned regarding blood sugar as patient had been fasting.    RN advised she would include concern in original message.     Fernando Abbott, RN, BSN, PHN  [facility]

## 2025-01-29 NOTE — TELEPHONE ENCOUNTER
Preop addendum made. He is approved for surgery. His is known to be prediabetic and his fasting glucose is in the prediabetic range. Not new info. Please update team at [facility]. Thanks.

## 2025-02-04 ENCOUNTER — OFFICE VISIT (OUTPATIENT)
Dept: UROLOGY | Facility: CLINIC | Age: 65
End: 2025-02-04
Payer: MEDICARE

## 2025-02-04 ENCOUNTER — PRE VISIT (OUTPATIENT)
Dept: UROLOGY | Facility: CLINIC | Age: 65
End: 2025-02-04

## 2025-02-04 DIAGNOSIS — R10.2 PELVIC PAIN IN MALE: Primary | ICD-10-CM

## 2025-02-04 PROCEDURE — 99213 OFFICE O/P EST LOW 20 MIN: CPT | Performed by: UROLOGY

## 2025-02-04 NOTE — NURSING NOTE
Chief Complaint   Patient presents with    Follow Up     Mustapha Negrete presents today for continuous pressure and burning pain that starts in his rectum and goes into his penis that keeps him awake at night and affects both his urination and bowel movements. Interested in surgical options.   Followed by Dr. Jean-Baptiste and Dr. Tello in the past. He was seen for URI symptoms to day. He has on-going swelling in the legs that was evaluated this morning.  He states he is most concerned for prostate cancer. He is interested in botox and a LYSSA.      [facility] :   01/30/2025 [id number] Worsening pelvic pain, poor stream, trial and failed SP tube, sacral neuromodulation -PVR < 150 -Recent Renal function and UA are NORMAL  Seeking consult with Dr Johansen in a few weeks. Not sure what other options. There is some data on perineal/pelvic low-dose shock wave, That is not covered by insurance.  He has upcoming surgery with spine. perhaps that pain is also causing pelvic tension.  With his new LE edema, and subjective retention, perhaps he would benefit from cardiac evaluation. He is not in subjective heart failure (CLARK, cyanosis, etc) Has just some trace LE edema at this time.       There were no vitals taken for this visit. There is no height or weight on file to calculate BMI.    Patient Active Problem List   Diagnosis    Acute prostatitis    Pelvic pain in male    Urinary retention    Symptomatic cholelithiasis    Reflux esophagitis    Psoriatic arthritis (H)    MARIAN (obstructive sleep apnea)    OA (osteoarthritis) of hip    Neuropathy    Mild intermittent asthma, uncomplicated    Insomnia, unspecified    GERD (gastroesophageal reflux disease)    Hepatic steatosis    Benign prostatic hyperplasia with urinary obstruction    Anal fissure    Tear of right acetabular labrum, sequela    Lumbar disc herniation with radiculopathy    Lower urinary tract symptoms    Arthritis of left acromioclavicular joint    ASHD (arteriosclerotic  heart disease)    Calculus of gallbladder with chronic cholecystitis without obstruction    Change in bowel habits    Complete tear of left rotator cuff    Constipation    COVID-19 virus infection    Displacement of lumbar intervertebral disc without myelopathy    Diverticular disease of large intestine    Hiatal hernia    Hyperlipidemia    Low testosterone    Pruritus ani    Pudendal neuralgia    Coccydynia    Uncomplicated opioid dependence (H)    F11.2 - Continuous opioid dependence (H)    F11.2 - Episodic opioid dependence (H)    F11.9 - Chronic, continuous use of opioids    Essential hypertension       Allergies   Allergen Reactions    Droperidol Other (See Comments)     Extrapyramidal Side Effect    Tamsulosin Dizziness    Fenofibrate Headache and Diarrhea             Fentanyl      Other reaction(s): N&V hard to wake up    Keflex [Cephalexin] Itching    Lactose GI Disturbance         Other reaction(s): GI upset    Midazolam      Other reaction(s): N&V, Hard to wake up    Monosodium Glutamate      Other reaction(s): diarrhea, headaches    Pcn [Penicillins] Other (See Comments)     Feels warm and flushed, but tolerates Cephalexin    Atorvastatin Palpitations     Other reaction(s): palpitations    Sertraline Palpitations         Other reaction(s): Unknown    Simvastatin Palpitations     Other reaction(s): palpitations    Sulfa Antibiotics Headache and Rash     Burning    Other reaction(s): Rash  Other reaction(s): rash and headache    Tetracycline Rash     Burning    Other reaction(s): rash       Current Outpatient Medications   Medication Sig Dispense Refill    acetaminophen (TYLENOL) 500 MG tablet Take 500-1,000 mg by mouth every 6 hours as needed for mild pain.      albuterol (PROAIR HFA/PROVENTIL HFA/VENTOLIN HFA) 108 (90 Base) MCG/ACT inhaler Inhale 2 puffs into the lungs every 6 hours.      benzonatate (TESSALON) 100 MG capsule Take 1 capsule (100 mg) by mouth 3 times daily as needed for cough. (Patient not  taking: Reported on 2025) 25 capsule 0    famotidine (PEPCID) 20 MG tablet Take 1 tablet (20 mg) by mouth 2 times daily. (Patient not taking: Reported on 2025) 60 tablet 0    fluticasone (FLONASE) 50 MCG/ACT nasal spray Spray 1 spray into both nostrils daily (Patient not taking: Reported on 2025) 9.9 mL 0    hydrOXYzine HCl (ATARAX) 25 MG tablet Take 1-2 tablets (25-50 mg) by mouth every 6 hours as needed for other (spasms). 90 tablet 0    losartan-hydrochlorothiazide (HYZAAR) 50-12.5 MG tablet Take 0.5 tablet by mouth daily for 7 days. If your blood pressure continues to be greater than 140/90mm Hg on average, please begin taking 1 tablet by mouth daily thereafter. Otherwise, stay on 0.5 tablet daily. 90 tablet 0    naloxone (NARCAN) 4 MG/0.1ML nasal spray Spray 1 spray (4 mg) into one nostril alternating nostrils once as needed for opioid reversal every 2-3 minutes until assistance arrives (Patient not taking: Reported on 2025) 0.1 mL 0    oxyCODONE-acetaminophen (PERCOCET) 7.5-325 MG per tablet Take 1 tablet by mouth every 6 hours as needed for severe pain (max 3 tabs per day). Fill 1/10/25, 25 (15 day supply) 45 tablet 0       Social History     Tobacco Use    Smoking status: Former     Current packs/day: 0.00     Average packs/day: 1 pack/day for 5.0 years (5.0 ttl pk-yrs)     Types: Cigarettes     Start date: 1994     Quit date: 1999     Years since quittin.2     Passive exposure: Never    Smokeless tobacco: Never   Vaping Use    Vaping status: Never Used   Substance Use Topics    Alcohol use: Yes     Comment: very little    Drug use: Yes     Types: Marijuana     Comment: glenna Alvarado  2025  3:10 PM

## 2025-02-04 NOTE — PROGRESS NOTES
UROLOGY OUTPATIENT VISIT      Chief Complaint:   Pelvic pain      Synopsis    Mustapha Negrete is a very pleasant AGE: 64 year old year old person    He has a longstanding history of pelvic pain.  Has had UroLift and transurethral resection of prostate has seen me for cystoscopy in the past which was not convincing for an overtly obstructing prostate.  Had also been seen by my colleagues as well as those at [facility] and [facility] for overactive bladder.  Had stage I sacral neuromodulation which was not successful.  Subsequently has been considered for pudendal nerve release and is seeking consultation in considering surgery.  Has a variety of ongoing concerns mainly pain with urination and in pelvic region.  Also notes various concerns particularly lower extremity edema over the past couple of weeks.  Describes his urine is dark and foamy but no overt infection or hematuria.  Most recent PSA is 2.42.  Has been seen by pelvic floor physical therapy and noted to have extremely tense pelvic floor musculature consideration and recommendation was made for Botox injections to the pelvic floor.    Digital rectal exam reveals a minimally enlarged prostate without nodules or tenderness         Medications     Current Outpatient Medications   Medication Sig Dispense Refill    acetaminophen (TYLENOL) 500 MG tablet Take 500-1,000 mg by mouth every 6 hours as needed for mild pain.      albuterol (PROAIR HFA/PROVENTIL HFA/VENTOLIN HFA) 108 (90 Base) MCG/ACT inhaler Inhale 2 puffs into the lungs every 6 hours.      benzonatate (TESSALON) 100 MG capsule Take 1 capsule (100 mg) by mouth 3 times daily as needed for cough. 25 capsule 0    famotidine (PEPCID) 20 MG tablet Take 1 tablet (20 mg) by mouth 2 times daily. 60 tablet 0    fluticasone (FLONASE) 50 MCG/ACT nasal spray Spray 1 spray into both nostrils daily 9.9 mL 0    hydrOXYzine HCl (ATARAX) 25 MG tablet Take 1-2 tablets (25-50 mg) by mouth every 6 hours as needed  "for other (spasms). 90 tablet 0    losartan-hydrochlorothiazide (HYZAAR) 50-12.5 MG tablet Take 0.5 tablet by mouth daily for 7 days. If your blood pressure continues to be greater than 140/90mm Hg on average, please begin taking 1 tablet by mouth daily thereafter. Otherwise, stay on 0.5 tablet daily. 90 tablet 0    naloxone (NARCAN) 4 MG/0.1ML nasal spray Spray 1 spray (4 mg) into one nostril alternating nostrils once as needed for opioid reversal every 2-3 minutes until assistance arrives 0.1 mL 0    oxyCODONE-acetaminophen (PERCOCET) 7.5-325 MG per tablet Take 1 tablet by mouth every 6 hours as needed for severe pain (max 3 tabs per day). Fill / start  02/06/2025 (15 day supply) 45 tablet 0     No current facility-administered medications for this visit.         The following  distinct labs were reviewed    I personally reviewed all applicable laboratory data and went over findings with patient  Significant for:    CBC RESULTS:  Recent Labs   Lab Test 01/27/25  1131 12/11/24  1339 11/05/24  1458 10/03/24  0801   WBC 9.5 7.1 7.3 8.0   HGB 14.3 12.9* 13.7 14.7    370 292 305        BMP RESULTS:  Recent Labs   Lab Test 01/27/25  1131 12/11/24  1339 10/24/24  0701 10/03/24  0801    142 140 139   POTASSIUM 4.5 4.5 4.8 4.3   CHLORIDE 104 103 103 103   CO2 28 26 27 27   ANIONGAP 12 13 10 9   * 87 138* 97   BUN 18.9 24.8* 18.2 17.8   CR 0.79 1.13 0.79 0.81   GFRESTIMATED >90 72 >90 >90       CALCIUM RESULTS:  Recent Labs   Lab Test 01/27/25  1131 12/11/24  1339 10/24/24  0701 10/03/24  0801   ANITA 10.0 10.0 9.7 9.7       PTH RESULTS:  No results for input(s): \"PTHI\" in the last 50822 hours.    HGB A1C RESULTS:  Lab Results   Component Value Date    A1C 5.9 08/19/2024    A1C 5.5 05/23/2023    A1C 5.9 08/19/2022       UA RESULTS:   Recent Labs   Lab Test 01/27/25  1139 12/11/24  1343 08/19/24  0848   SG 1.020 1.025 1.025   URINEPH 7.0 6.5 6.0   NITRITE Negative Negative Negative   RBCU 0-2 0-2 0-2   WBCU " 5-10* 10-25* 0-5       PSA RESULTS  Prostate Specific Antigen Screen   Date Value Ref Range Status   12/11/2024 2.42 0.00 - 4.50 ng/mL Final   08/19/2024 1.87 0.00 - 4.50 ng/mL Final   11/15/2023 3.04 0.00 - 4.50 ng/mL Final   10/08/2021 2.88 0.00 - 4.00 ug/L Final     PSA Tumor Marker   Date Value Ref Range Status   07/05/2022 1.91 0.00 - 4.50 ng/mL Final              Assessment/Plan   64 year old year old person with history of severe pelvic floor dysfunction and pain  -Do not believe that his ongoing urinary concerns are related to an obstructive process.  He is currently in the process of an evaluation for pudendal nerve release and I believe this may be a worthwhile consideration.  Will also refer him to one of my colleagues for consideration of Botox to the pelvic floor and periurethral musculature.   In the absence of symptom relief could consider completion laser nucleation of the prostate however I would want to repeat a cystoscopy prior.  He has an appointment with Joe Rincon in approximately a month.  Will complete current consultations and reestablish at that time      CC:  [employer]

## 2025-02-04 NOTE — LETTER
2/4/2025       RE: Mustapha Negrete  [address] [facility] [address]     Dear Colleague,    Thank you for referring your patient, Mustapha Negrete, to the [facility] [place] at [facility]. Please see a copy of my visit note below.          UROLOGY OUTPATIENT VISIT      Chief Complaint:   Pelvic pain      Synopsis    Mustapha Negrete is a very pleasant AGE: 64 year old year old person    He has a longstanding history of pelvic pain.  Has had UroLift and transurethral resection of prostate has seen me for cystoscopy in the past which was not convincing for an overtly obstructing prostate.  Had also been seen by my colleagues as well as those at [facility] and [facility] for overactive bladder.  Had stage I sacral neuromodulation which was not successful.  Subsequently has been considered for pudendal nerve release and is seeking consultation in considering surgery.  Has a variety of ongoing concerns mainly pain with urination and in pelvic region.  Also notes various concerns particularly lower extremity edema over the past couple of weeks.  Describes his urine is dark and foamy but no overt infection or hematuria.  Most recent PSA is 2.42.  Has been seen by pelvic floor physical therapy and noted to have extremely tense pelvic floor musculature consideration and recommendation was made for Botox injections to the pelvic floor.    Digital rectal exam reveals a minimally enlarged prostate without nodules or tenderness         Medications     Current Outpatient Medications   Medication Sig Dispense Refill     acetaminophen (TYLENOL) 500 MG tablet Take 500-1,000 mg by mouth every 6 hours as needed for mild pain.       albuterol (PROAIR HFA/PROVENTIL HFA/VENTOLIN HFA) 108 (90 Base) MCG/ACT inhaler Inhale 2 puffs into the lungs every 6 hours.       benzonatate (TESSALON) 100 MG capsule Take 1 capsule (100 mg) by mouth 3 times daily as needed for  "cough. 25 capsule 0     famotidine (PEPCID) 20 MG tablet Take 1 tablet (20 mg) by mouth 2 times daily. 60 tablet 0     fluticasone (FLONASE) 50 MCG/ACT nasal spray Spray 1 spray into both nostrils daily 9.9 mL 0     hydrOXYzine HCl (ATARAX) 25 MG tablet Take 1-2 tablets (25-50 mg) by mouth every 6 hours as needed for other (spasms). 90 tablet 0     losartan-hydrochlorothiazide (HYZAAR) 50-12.5 MG tablet Take 0.5 tablet by mouth daily for 7 days. If your blood pressure continues to be greater than 140/90mm Hg on average, please begin taking 1 tablet by mouth daily thereafter. Otherwise, stay on 0.5 tablet daily. 90 tablet 0     naloxone (NARCAN) 4 MG/0.1ML nasal spray Spray 1 spray (4 mg) into one nostril alternating nostrils once as needed for opioid reversal every 2-3 minutes until assistance arrives 0.1 mL 0     oxyCODONE-acetaminophen (PERCOCET) 7.5-325 MG per tablet Take 1 tablet by mouth every 6 hours as needed for severe pain (max 3 tabs per day). Fill / start  02/06/2025 (15 day supply) 45 tablet 0     No current facility-administered medications for this visit.         The following  distinct labs were reviewed    I personally reviewed all applicable laboratory data and went over findings with patient  Significant for:    CBC RESULTS:  Recent Labs   Lab Test 01/27/25  1131 12/11/24  1339 11/05/24  1458 10/03/24  0801   WBC 9.5 7.1 7.3 8.0   HGB 14.3 12.9* 13.7 14.7    370 292 305        BMP RESULTS:  Recent Labs   Lab Test 01/27/25  1131 12/11/24  1339 10/24/24  0701 10/03/24  0801    142 140 139   POTASSIUM 4.5 4.5 4.8 4.3   CHLORIDE 104 103 103 103   CO2 28 26 27 27   ANIONGAP 12 13 10 9   * 87 138* 97   BUN 18.9 24.8* 18.2 17.8   CR 0.79 1.13 0.79 0.81   GFRESTIMATED >90 72 >90 >90       CALCIUM RESULTS:  Recent Labs   Lab Test 01/27/25  1131 12/11/24  1339 10/24/24  0701 10/03/24  0801   ANITA 10.0 10.0 9.7 9.7       PTH RESULTS:  No results for input(s): \"PTHI\" in the last 72000 " hours.    HGB A1C RESULTS:  Lab Results   Component Value Date    A1C 5.9 08/19/2024    A1C 5.5 05/23/2023    A1C 5.9 08/19/2022       UA RESULTS:   Recent Labs   Lab Test 01/27/25  1139 12/11/24  1343 08/19/24  0848   SG 1.020 1.025 1.025   URINEPH 7.0 6.5 6.0   NITRITE Negative Negative Negative   RBCU 0-2 0-2 0-2   WBCU 5-10* 10-25* 0-5       PSA RESULTS  Prostate Specific Antigen Screen   Date Value Ref Range Status   12/11/2024 2.42 0.00 - 4.50 ng/mL Final   08/19/2024 1.87 0.00 - 4.50 ng/mL Final   11/15/2023 3.04 0.00 - 4.50 ng/mL Final   10/08/2021 2.88 0.00 - 4.00 ug/L Final     PSA Tumor Marker   Date Value Ref Range Status   07/05/2022 1.91 0.00 - 4.50 ng/mL Final              Assessment/Plan   64 year old year old person with history of severe pelvic floor dysfunction and pain  -Do not believe that his ongoing urinary concerns are related to an obstructive process.  He is currently in the process of an evaluation for pudendal nerve release and I believe this may be a worthwhile consideration.  Will also refer him to one of my colleagues for consideration of Botox to the pelvic floor and periurethral musculature.   In the absence of symptom relief could consider completion laser nucleation of the prostate however I would want to repeat a cystoscopy prior.  He has an appointment with Joe Rincon in approximately a month.  Will complete current consultations and reestablish at that time      CC:  Silver Pro      Again, thank you for allowing me to participate in the care of your patient.      Sincerely,    Francis Jean-Baptiste MD

## 2025-02-05 ENCOUNTER — PRE VISIT (OUTPATIENT)
Dept: UROLOGY | Facility: CLINIC | Age: 65
End: 2025-02-05
Payer: MEDICARE

## 2025-02-05 DIAGNOSIS — M79.18 MYOFASCIAL MUSCLE PAIN: ICD-10-CM

## 2025-02-05 DIAGNOSIS — K62.89 RECTAL PAIN: ICD-10-CM

## 2025-02-05 DIAGNOSIS — G58.8 PUDENDAL NEURALGIA: ICD-10-CM

## 2025-02-05 NOTE — TELEPHONE ENCOUNTER
"Reason for visit: Consult      Relevant information: \"Worsening pelvic pain, poor stream, trial and failed SP tube, sacral neuromodulation -PVR < 150 -Recent Renal function and UA are NORMAL   Seeking consult with Dr Johansen in a few weeks. Not sure what other options.\"  -Francis Dyer MD ([facility])     At Rooming: Standard    Tesfaye Alcazar, EMT-P  2/5/2025  6:57 AM  "

## 2025-02-05 NOTE — TELEPHONE ENCOUNTER
Received call from patient requesting refill(s) oxyCODONE-acetaminophen (PERCOCET) 7.5-325 MG per tablet    Last dispensed from pharmacy on 01/15/2025    Patient's last office/virtual visit by prescribing provider on 01/10/2025  Next office/virtual appointment scheduled for 02/21/2025    Last urine drug screen date 01/10/2025  Current opioid agreement on file (completed within the last year) Yes Date of opioid agreement: 01/10/2025    E-prescribe to   [facility] - [place], [place] - [address], pharmacy    Will route to [facility] for review and preparation of prescription(s).     Guerita Cervantes MA  [facility] [place]

## 2025-02-06 RX ORDER — OXYCODONE AND ACETAMINOPHEN 7.5; 325 MG/1; MG/1
1 TABLET ORAL EVERY 6 HOURS PRN
Qty: 45 TABLET | Refills: 0 | Status: SHIPPED | OUTPATIENT
Start: 2025-02-06

## 2025-02-06 NOTE — TELEPHONE ENCOUNTER
2/6/2025 9:52 AM     REFILL REQUEST:     This is a patient of mine. PDMP and Chart have been reviewed; refill request is appropriate.    Refilled for 15 day supply.  Script e-Prescribed to pharmacy    MICAELA Herman, ZEUS, FNP-C

## 2025-02-06 NOTE — TELEPHONE ENCOUNTER
Medication refill information reviewed.     Due date for oxyCODONE-acetaminophen (PERCOCET) 7.5-325 MG per tablet  is 1/27/2025     Prescriptions prepped for review.     Will route to provider.

## 2025-02-20 ENCOUNTER — OFFICE VISIT (OUTPATIENT)
Dept: FAMILY MEDICINE | Facility: CLINIC | Age: 65
End: 2025-02-20
Payer: MEDICARE

## 2025-02-20 VITALS
SYSTOLIC BLOOD PRESSURE: 158 MMHG | BODY MASS INDEX: 33.12 KG/M2 | RESPIRATION RATE: 16 BRPM | HEART RATE: 96 BPM | HEIGHT: 65 IN | OXYGEN SATURATION: 98 % | DIASTOLIC BLOOD PRESSURE: 84 MMHG | TEMPERATURE: 98 F | WEIGHT: 198.8 LBS

## 2025-02-20 DIAGNOSIS — N40.0 BENIGN PROSTATIC HYPERPLASIA, UNSPECIFIED WHETHER LOWER URINARY TRACT SYMPTOMS PRESENT: ICD-10-CM

## 2025-02-20 DIAGNOSIS — R39.89 ABNORMAL URINE COLOR: ICD-10-CM

## 2025-02-20 DIAGNOSIS — I10 ESSENTIAL HYPERTENSION: Primary | ICD-10-CM

## 2025-02-20 DIAGNOSIS — U07.1 INFECTION DUE TO 2019 NOVEL CORONAVIRUS: ICD-10-CM

## 2025-02-20 DIAGNOSIS — F11.20 UNCOMPLICATED OPIOID DEPENDENCE (H): ICD-10-CM

## 2025-02-20 PROBLEM — F33.1 MODERATE RECURRENT MAJOR DEPRESSION (H): Status: RESOLVED | Noted: 2025-02-20 | Resolved: 2025-02-20

## 2025-02-20 PROBLEM — F33.1 MODERATE RECURRENT MAJOR DEPRESSION (H): Status: ACTIVE | Noted: 2025-02-20

## 2025-02-20 PROBLEM — L40.50 PSORIATIC ARTHRITIS (H): Status: RESOLVED | Noted: 2021-03-15 | Resolved: 2025-02-20

## 2025-02-20 LAB
ALBUMIN UR-MCNC: 30 MG/DL
ANION GAP SERPL CALCULATED.3IONS-SCNC: 13 MMOL/L (ref 7–15)
APPEARANCE UR: CLEAR
BACTERIA #/AREA URNS HPF: ABNORMAL /HPF
BILIRUB UR QL STRIP: NEGATIVE
BUN SERPL-MCNC: 15.4 MG/DL (ref 8–23)
CALCIUM SERPL-MCNC: 10.1 MG/DL (ref 8.8–10.4)
CAOX CRY #/AREA URNS HPF: ABNORMAL /HPF
CHLORIDE SERPL-SCNC: 102 MMOL/L (ref 98–107)
COLOR UR AUTO: YELLOW
CREAT SERPL-MCNC: 0.9 MG/DL (ref 0.67–1.17)
EGFRCR SERPLBLD CKD-EPI 2021: >90 ML/MIN/1.73M2
GLUCOSE SERPL-MCNC: 119 MG/DL (ref 70–99)
GLUCOSE UR STRIP-MCNC: NEGATIVE MG/DL
HCO3 SERPL-SCNC: 26 MMOL/L (ref 22–29)
HGB UR QL STRIP: NEGATIVE
KETONES UR STRIP-MCNC: NEGATIVE MG/DL
LEUKOCYTE ESTERASE UR QL STRIP: ABNORMAL
MUCOUS THREADS #/AREA URNS LPF: PRESENT /LPF
NITRATE UR QL: NEGATIVE
PH UR STRIP: 7 [PH] (ref 5–7)
POTASSIUM SERPL-SCNC: 4.9 MMOL/L (ref 3.4–5.3)
RBC #/AREA URNS AUTO: ABNORMAL /HPF
SODIUM SERPL-SCNC: 141 MMOL/L (ref 135–145)
SP GR UR STRIP: 1.02 (ref 1–1.03)
SQUAMOUS #/AREA URNS AUTO: ABNORMAL /LPF
UROBILINOGEN UR STRIP-ACNC: 0.2 E.U./DL
WBC #/AREA URNS AUTO: ABNORMAL /HPF

## 2025-02-20 RX ORDER — TAMSULOSIN HYDROCHLORIDE 0.4 MG/1
0.4 CAPSULE ORAL DAILY
COMMUNITY
Start: 2025-02-20

## 2025-02-20 RX ORDER — HYDROCHLOROTHIAZIDE 25 MG/1
25 TABLET ORAL DAILY
Qty: 90 TABLET | Refills: 3 | Status: SHIPPED | OUTPATIENT
Start: 2025-02-20

## 2025-02-20 SDOH — HEALTH STABILITY: PHYSICAL HEALTH: ON AVERAGE, HOW MANY DAYS PER WEEK DO YOU ENGAGE IN MODERATE TO STRENUOUS EXERCISE (LIKE A BRISK WALK)?: 2 DAYS

## 2025-02-20 ASSESSMENT — ENCOUNTER SYMPTOMS
HALLUCINATIONS: 0
DIZZINESS: 0
NERVOUS/ANXIOUS: 0
GASTROINTESTINAL NEGATIVE: 1
FATIGUE: 0
SEIZURES: 0
CONFUSION: 0
ENDOCRINE NEGATIVE: 1
COLOR CHANGE: 0
WHEEZING: 0
EYE PAIN: 0
AGITATION: 0
ACTIVITY CHANGE: 0
FEVER: 0
COUGH: 0
EYE DISCHARGE: 0
ALLERGIC/IMMUNOLOGIC NEGATIVE: 1
HEMATOLOGIC/LYMPHATIC NEGATIVE: 1
APPETITE CHANGE: 0
SHORTNESS OF BREATH: 0
HEADACHES: 0
DECREASED CONCENTRATION: 0
PALPITATIONS: 0

## 2025-02-20 ASSESSMENT — PATIENT HEALTH QUESTIONNAIRE - PHQ9
SUM OF ALL RESPONSES TO PHQ QUESTIONS 1-9: 8
SUM OF ALL RESPONSES TO PHQ QUESTIONS 1-9: 8
10. IF YOU CHECKED OFF ANY PROBLEMS, HOW DIFFICULT HAVE THESE PROBLEMS MADE IT FOR YOU TO DO YOUR WORK, TAKE CARE OF THINGS AT HOME, OR GET ALONG WITH OTHER PEOPLE: SOMEWHAT DIFFICULT

## 2025-02-20 ASSESSMENT — SOCIAL DETERMINANTS OF HEALTH (SDOH): HOW OFTEN DO YOU GET TOGETHER WITH FRIENDS OR RELATIVES?: NEVER

## 2025-02-20 ASSESSMENT — PAIN SCALES - GENERAL: PAINLEVEL_OUTOF10: SEVERE PAIN (10)

## 2025-02-20 NOTE — PROGRESS NOTES
1. Essential hypertension (Primary)  Patient would like to discontinue losartan/hydrochlothiazide.  - hydrochlorothiazide (HYDRODIURIL) 25 MG tablet; Take 1 tablet (25 mg) by mouth daily.  Dispense: 90 tablet; Refill: 3    2. Uncomplicated opioid dependence (H)  Chronic in nature.  Patient is currently being followed by pain management.     3. Benign prostatic hyperplasia, unspecified whether lower urinary tract symptoms present  - tamsulosin (FLOMAX) 0.4 MG capsule; Take 1 capsule (0.4 mg) by mouth daily.    4. Abnormal urine color  - Basic metabolic panel  (Ca, Cl, CO2, Creat, Gluc, K, Na, BUN); Future  - UA Macroscopic with reflex to Microscopic and Culture - Lab Collect; Future  - Basic metabolic panel  (Ca, Cl, CO2, Creat, Gluc, K, Na, BUN)  - UA Macroscopic with reflex to Microscopic and Culture - Lab Collect  - UA Microscopic with Reflex to Culture    5. Infection due to 2019 novel coronavirus  Patient tested positive on 2/4/2025.  Continue with supportive care.     I spent 30 minutes with patient of which 50% was spent counseling and coordinating patient's care as well as discussing patient's plan of care.      Zhang Luciano is a 64 year old, presenting for the following health issues:  RECHECK (Medications, fluid retention, nausea)        2/20/2025     9:32 AM   Additional Questions   Roomed by Charles Senior   Accompanied by N/A         2/20/2025     9:32 AM   Patient Reported Additional Medications   Patient reports taking the following new medications Flomax     HPI     Hypertension: patient stopped losartan/hydrochlorothiazide due to potential side effects (cold symptoms, fatigue, and etc.). Patient is interested in taking hydrochlorothiazide.     2. Insomnia: Patient has a hard time falling and maintaining sleep.     3. Chronic pelvic pain: Patient plans to have an upcoming procedure.  He states that he continues to have a strong urine stream.  He is currently on flomax.    Review of Systems  "  Constitutional:  Negative for activity change, appetite change, fatigue and fever.   HENT: Negative.     Eyes:  Negative for pain, discharge and visual disturbance.   Respiratory:  Negative for cough, shortness of breath and wheezing.    Cardiovascular:  Negative for chest pain, palpitations and leg swelling.   Gastrointestinal: Negative.    Endocrine: Negative.    Genitourinary: Negative.    Skin:  Negative for color change and rash.   Allergic/Immunologic: Negative.    Neurological:  Negative for dizziness, seizures and headaches.   Hematological: Negative.    Psychiatric/Behavioral:  Negative for agitation, confusion, decreased concentration and hallucinations. The patient is not nervous/anxious.            Objective    BP (!) 144/88   Pulse 96   Temp 98  F (36.7  C) (Temporal)   Resp 16   Ht 1.66 m (5' 5.35\")   Wt 90.2 kg (198 lb 12.8 oz)   SpO2 98%   BMI 32.72 kg/m    Body mass index is 32.72 kg/m .  Physical Exam  Constitutional:       General: He is not in acute distress.     Appearance: He is well-developed.   HENT:      Head: Normocephalic and atraumatic.      Nose: Nose normal.   Eyes:      Conjunctiva/sclera: Conjunctivae normal.   Neck:      Trachea: No tracheal deviation.   Cardiovascular:      Rate and Rhythm: Normal rate and regular rhythm.      Heart sounds: Normal heart sounds.   Pulmonary:      Effort: Pulmonary effort is normal.      Breath sounds: No wheezing.   Musculoskeletal:         General: Normal range of motion.      Cervical back: Normal range of motion.   Skin:     Findings: No erythema or rash.   Neurological:      Mental Status: He is alert and oriented to person, place, and time.   Psychiatric:         Behavior: Behavior normal.                  Signed Electronically by: Silver Pro DO    Answers submitted by the patient for this visit:  Patient Health Questionnaire (Submitted on 2/20/2025)  If you checked off any problems, how difficult have these problems made it for " you to do your work, take care of things at home, or get along with other people?: Somewhat difficult  PHQ9 TOTAL SCORE: 8

## 2025-02-20 NOTE — PATIENT INSTRUCTIONS
Claudy Luciano,    Thank you for allowing [facility] to manage your care.    I ordered some blood work, please go to the laboratory to get your laboratory studies.    I sent your prescriptions to your pharmacy.  For your high blood pressure, I am starting you hydrochlorothiazide 25 mg daily.     For your convenience, test results are released as soon as they are available  Please allow 1-2 business days for me to send you a comment about your results.  If not done so, I encourage you to login into [employer] ([email or web address] to review your results in real time.     If you have any questions or concerns, please feel free to call us at [phone].    Sincerely,    Dr. Pro    Did you know?      You can schedule a video visit for follow-up appointments as well as future appointments for certain conditions.  Please see the below link.     [email or web address]    If you have not already done so,  I encourage you to sign up for [employer] ([email or web address]).  This will allow you to review your results, securely communicate with a provider, and schedule virtual visits as well.

## 2025-02-21 DIAGNOSIS — N39.0 URINARY TRACT INFECTION WITHOUT HEMATURIA, SITE UNSPECIFIED: Primary | ICD-10-CM

## 2025-02-21 RX ORDER — CIPROFLOXACIN 500 MG/1
500 TABLET, FILM COATED ORAL 2 TIMES DAILY
Qty: 10 TABLET | Refills: 0 | Status: SHIPPED | OUTPATIENT
Start: 2025-02-21 | End: 2025-02-26

## 2025-02-23 ENCOUNTER — HEALTH MAINTENANCE LETTER (OUTPATIENT)
Age: 65
End: 2025-02-23

## 2025-02-25 ENCOUNTER — PRE VISIT (OUTPATIENT)
Dept: UROLOGY | Facility: CLINIC | Age: 65
End: 2025-02-25
Payer: MEDICARE

## 2025-02-25 ENCOUNTER — TELEPHONE (OUTPATIENT)
Dept: FAMILY MEDICINE | Facility: CLINIC | Age: 65
End: 2025-02-25
Payer: MEDICARE

## 2025-02-25 NOTE — TELEPHONE ENCOUNTER
Per 2/20/25 UA Macroscopic with reflex to Microscopic and Culture:     Silver Terry Pro,   2/21/2025  7:28 AM CST       Please call patient.  Recent urine concerning for potential UTI.  I would like to start him on cipro 500 mg po BID for 5 days and recheck urine in 1 week.  Please schedule a laboratory only appointment.  Otherwise, his kidney function is normal.       Called to triage possible worsening symptoms. Pt stated symptoms are not worsening but remembers a few years ago he had a staph infection in his leg and was told he is susceptible to staph infections in the future. Pt notes he has the same pattern with antibiotics of needing to use them and want to rule out staph infection. Pt stated he always burning and hurting with urine.     Pt questions if he should have further labs be checked with already scheduled Friday lab(UA macro). Pt to call clinic if symptoms worsening.     Dori Galarza RN on 2/25/2025 at 11:56 AM

## 2025-02-25 NOTE — TELEPHONE ENCOUNTER
Please offer an in person appointment tomorrow using a same day slot or VAN slot for concerns of potential leg cellulitis.

## 2025-02-25 NOTE — TELEPHONE ENCOUNTER
Patient Returning Call    Reason for call:  Pt states he has a bladder inf. Not getting better. Has an appt on Friday. Worried about Staph inf.    Information relayed to patient:      Patient has additional questions:  Yes    What are your questions/concerns:      Who does the patient want to speak with:  RN    Is an  needed?:  No      Could we send this information to you in [employer] or would you prefer to receive a phone call?:   Patient would prefer a phone call   Okay to leave a detailed message?: Yes at Cell number on file:    Telephone Information:   Mobile [phone]

## 2025-02-25 NOTE — TELEPHONE ENCOUNTER
Reason for visit: Pelvic pain     Relevant information: Difficulty urinating, BPH without obstruction    Records/imaging/labs/orders:  All records available    At Rooming:  Standard rooming      Srinivasan Rose  2/25/2025  11:45 AM

## 2025-02-25 NOTE — TELEPHONE ENCOUNTER
Spoke with patient, relayed pcp's message below and patient verbalized understanding. Pt stated no further questions and scheduled for 2/26/25 for further evaluation. RN informed pt of 24hour nurse line and to call if symptoms worsening.     Dori Galarza, RN on 2/25/2025 at 3:37 PM

## 2025-02-26 ENCOUNTER — OFFICE VISIT (OUTPATIENT)
Dept: FAMILY MEDICINE | Facility: CLINIC | Age: 65
End: 2025-02-26
Payer: MEDICARE

## 2025-02-26 VITALS
DIASTOLIC BLOOD PRESSURE: 80 MMHG | SYSTOLIC BLOOD PRESSURE: 130 MMHG | HEIGHT: 65 IN | RESPIRATION RATE: 18 BRPM | HEART RATE: 107 BPM | OXYGEN SATURATION: 98 % | WEIGHT: 198 LBS | TEMPERATURE: 97.5 F | BODY MASS INDEX: 32.99 KG/M2

## 2025-02-26 DIAGNOSIS — Z23 NEED FOR SHINGLES VACCINE: ICD-10-CM

## 2025-02-26 DIAGNOSIS — Z29.11 NEED FOR VACCINATION AGAINST RESPIRATORY SYNCYTIAL VIRUS: ICD-10-CM

## 2025-02-26 DIAGNOSIS — R10.2 PELVIC PAIN IN MALE: ICD-10-CM

## 2025-02-26 DIAGNOSIS — Z23 NEED FOR PROPHYLACTIC VACCINATION AGAINST HEPATITIS A: ICD-10-CM

## 2025-02-26 DIAGNOSIS — R30.0 DYSURIA: Primary | ICD-10-CM

## 2025-02-26 LAB
ALBUMIN UR-MCNC: ABNORMAL MG/DL
APPEARANCE UR: CLEAR
BACTERIA #/AREA URNS HPF: ABNORMAL /HPF
BILIRUB UR QL STRIP: NEGATIVE
COLOR UR AUTO: YELLOW
GLUCOSE UR STRIP-MCNC: NEGATIVE MG/DL
HGB UR QL STRIP: NEGATIVE
KETONES UR STRIP-MCNC: NEGATIVE MG/DL
LEUKOCYTE ESTERASE UR QL STRIP: ABNORMAL
NITRATE UR QL: NEGATIVE
PH UR STRIP: 7 [PH] (ref 5–7)
RBC #/AREA URNS AUTO: ABNORMAL /HPF
SP GR UR STRIP: 1.02 (ref 1–1.03)
SQUAMOUS #/AREA URNS AUTO: ABNORMAL /LPF
UROBILINOGEN UR STRIP-ACNC: 0.2 E.U./DL
WBC #/AREA URNS AUTO: ABNORMAL /HPF

## 2025-02-26 PROCEDURE — 3079F DIAST BP 80-89 MM HG: CPT | Performed by: PHYSICIAN ASSISTANT

## 2025-02-26 PROCEDURE — 3075F SYST BP GE 130 - 139MM HG: CPT | Performed by: PHYSICIAN ASSISTANT

## 2025-02-26 PROCEDURE — 81001 URINALYSIS AUTO W/SCOPE: CPT | Performed by: PHYSICIAN ASSISTANT

## 2025-02-26 PROCEDURE — 87086 URINE CULTURE/COLONY COUNT: CPT | Performed by: PHYSICIAN ASSISTANT

## 2025-02-26 PROCEDURE — 99213 OFFICE O/P EST LOW 20 MIN: CPT | Performed by: PHYSICIAN ASSISTANT

## 2025-02-26 PROCEDURE — 1125F AMNT PAIN NOTED PAIN PRSNT: CPT | Performed by: PHYSICIAN ASSISTANT

## 2025-02-26 ASSESSMENT — PAIN SCALES - GENERAL: PAINLEVEL_OUTOF10: SEVERE PAIN (10)

## 2025-02-26 NOTE — PROGRESS NOTES
"  {PROVIDER CHARTING PREFERENCE:151664}    Zhang Luciano is a 64 year old, presenting for the following health issues:  UTI        2/26/2025     1:35 PM   Additional Questions   Roomed by Padmini Carroll CMA   Accompanied by N/A         2/26/2025     1:35 PM   Patient Reported Additional Medications   Patient reports taking the following new medications No new medications     UTI    History of Present Illness       Reason for visit:  Urinary track burning    He eats 0-1 servings of fruits and vegetables daily.He consumes 0 sweetened beverage(s) daily.He exercises with enough effort to increase his heart rate 9 or less minutes per day.  He exercises with enough effort to increase his heart rate 4 days per week.   He is taking medications regularly.     Patient is coming in today to rule out a staph infection (symptoms include: sweats, low grade intermittent fever, burning with urination, dark urine, burning in the groin and leg areas.)    {MA/LPN/RN Pre-Provider Visit Orders- hCG/UA/Strep (Optional):519526}  {SUPERLIST (Optional):070530}  {additonal problems for provider to add (Optional):236069}    {ROS Picklists (Optional):334615}      Objective    /80   Pulse 107   Temp 97.5  F (36.4  C) (Temporal)   Resp 18   Ht 1.66 m (5' 5.35\")   Wt 89.8 kg (198 lb)   SpO2 98%   BMI 32.60 kg/m    Body mass index is 32.6 kg/m .  Physical Exam   {Exam List (Optional):140231}    {Diagnostic Test Results (Optional):326896}        Signed Electronically by: DARVIN Wiggins  {Email feedback regarding this note to [email or web address]   :878498}  " "    1:35 PM   Additional Questions   Roomed by Padmini Carroll CMA   Accompanied by N/A         2/26/2025     1:35 PM   Patient Reported Additional Medications   Patient reports taking the following new medications No new medications     History of Present Illness       Reason for visit:  Urinary track burning    He eats 0-1 servings of fruits and vegetables daily.He consumes 0 sweetened beverage(s) daily.He exercises with enough effort to increase his heart rate 9 or less minutes per day.  He exercises with enough effort to increase his heart rate 4 days per week.   He is taking medications regularly.     Patient is coming in today to rule out a staph infection (symptoms include: sweats, low grade intermittent fever, burning with urination, dark urine, burning in the groin and leg areas.)  -Experiencing dysuria with dark urine and a burning sensation in the groin and leg.  - supposed to have lumbar back surgery 3.5 weeks ago, but this had to be cancelled due to COVID.  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  - Previously on losartan, which caused nausea, kidney pain, and a chronic cold; discontinued 3 weeks ago.  - Currently on hydrochlorothiazide, which is not causing significant issues.  - History of gallbladder removal 4-5 years ago.  - Experiencing nerve pain, particularly in the tailbone area, with burning sensations spreading from the groin.  - Concerns about digestive issues, mentioning difficulty digesting food.  - Personal stressors include raising a 2-year-old and past relationship issues.    Previous Test results:   - MRSA culture negative in 2015    Review of Systems  Constitutional, HEENT, cardiovascular, pulmonary, gi and gu systems are negative, except as otherwise noted.      Objective    /80   Pulse 107   Temp 97.5  F (36.4  C) (Temporal)   Resp 18   Ht 1.66 m (5' 5.35\")   Wt 89.8 kg (198 lb)   SpO2 98%   BMI 32.60 kg/m    Body mass index " is 32.6 kg/m .  Physical Exam  Vitals and nursing note reviewed.   Constitutional:       General: He is not in acute distress.     Appearance: Normal appearance. He is not diaphoretic.   HENT:      Head: Normocephalic and atraumatic.      Nose: Nose normal.   Eyes:      Conjunctiva/sclera: Conjunctivae normal.   Pulmonary:      Effort: Pulmonary effort is normal. No respiratory distress.   Skin:     General: Skin is dry.      Coloration: Skin is not jaundiced or pale.   Neurological:      General: No focal deficit present.      Mental Status: He is alert. Mental status is at baseline.   Psychiatric:         Mood and Affect: Mood normal.         Behavior: Behavior normal.          Results for orders placed or performed in visit on 02/26/25   UA Macroscopic with reflex to Microscopic and Culture - Lab Collect     Status: Abnormal    Specimen: Urine, Midstream   Result Value Ref Range    Color Urine Yellow Colorless, Straw, Light Yellow, Yellow    Appearance Urine Clear Clear    Glucose Urine Negative Negative mg/dL    Bilirubin Urine Negative Negative    Ketones Urine Negative Negative mg/dL    Specific Gravity Urine 1.020 1.003 - 1.035    Blood Urine Negative Negative    pH Urine 7.0 5.0 - 7.0    Protein Albumin Urine Trace (A) Negative mg/dL    Urobilinogen Urine 0.2 0.2, 1.0 E.U./dL    Nitrite Urine Negative Negative    Leukocyte Esterase Urine Trace (A) Negative   UA Microscopic with Reflex to Culture     Status: Abnormal   Result Value Ref Range    Bacteria Urine Few (A) None Seen /HPF    RBC Urine 0-2 0-2 /HPF /HPF    WBC Urine 10-25 (A) 0-5 /HPF /HPF    Squamous Epithelials Urine Few (A) None Seen /LPF   Urine Culture     Status: None    Specimen: Urine, Midstream   Result Value Ref Range    Culture No Growth            Signed Electronically by: DARVIN Wiggins

## 2025-02-27 LAB — BACTERIA UR CULT: NO GROWTH

## 2025-02-27 NOTE — PROGRESS NOTES
"Subjective     REASON FOR VISIT  Severe pelvic pain follow-up    HISTORY OF PRESENT ILLNESS  Mr. Negrete is a pleasant 64 year old male when speaking with today in regards to his longstanding history of pelvic pain.  He has been seen by multiple urologists through multiple different systems including [facility] and [facility] in addition to ours for overactive bladder.  Recent testing has not shown any evidence of a current obstructive process that could be leading to his pelvic pain, and has historically undergone stage I sacral neuromodulation without any success.  From a pain perspective, most recently was under consideration for pudendal nerve release, though unfortunately the surgery was postponed until the end of March due to a COVID infection.    Most recently was seen by my colleague Dr. Jean-Baptiste, at which time he felt there was no obstructive process going on leading to his symptoms, and that he should move forward with the pudendal nerve release he was scheduled for.  In addition, he was referred to someone for consideration of Botox to the pelvic floor and periurethral musculature.  Plan was to move forward with these interventions then meet with me as scheduled for symptom check.    Today:  Unfortunately, surgery was delayed so we are not able to evaluate symptoms postsurgery  Continues to endorse a lot of difficulty urinating, feeling like he needs to strain all the time to urinate and only getting approximately 4 ounces out of the time  Remembers needing to \"have a metal frankie shoved up my penis to stretch things out\" when he was younger, wondering if this is something that needs to be followed up on    Objective     PHYSICAL EXAMINATION  General: Alert, oriented, no acute distress    Assessment & Plan    Severe pelvic pain  Obstructive lower urinary tract symptoms     It was my pleasure to meet with Mustapha in regards to his ongoing severe pelvic pain and difficulty urinating.  After reviewing his " clinical history, we discussed that the main purpose of the visit today unfortunately is unable to be completed given that he is not done the surgery that has now been postponed until the end of March.  From that standpoint, I think the primary way for him to be helped is to complete the surgery.    That being said, I do believe it would be reasonable for us to complete a cystoscopy on a Monday or Wednesday for us to evaluate for any stricture or bladder neck contracture that could be leading to his extreme difficulty urinating.  We will get this on the calendar for sometime after his surgery date, but will adjust accordingly based on how he is doing after surgery.    Mr. Negrete expressed understanding and agreement to the above discussion and plan and all of his questions were answered to his satisfaction.     PLAN  Go through with surgery scheduled for the end of March  Schedule cystoscopy with me sometime after his surgery for stricture evaluation    SIGNED:    Khris Rincon PA-C    I spent a total of 30 minutes spent on the date of the encounter doing chart review, history and exam, documentation, and further activities as noted above.

## 2025-03-03 ENCOUNTER — OFFICE VISIT (OUTPATIENT)
Dept: UROLOGY | Facility: CLINIC | Age: 65
End: 2025-03-03
Payer: MEDICARE

## 2025-03-03 VITALS — DIASTOLIC BLOOD PRESSURE: 91 MMHG | SYSTOLIC BLOOD PRESSURE: 131 MMHG | OXYGEN SATURATION: 96 %

## 2025-03-03 DIAGNOSIS — R10.2 PELVIC PAIN IN MALE: Primary | ICD-10-CM

## 2025-03-03 PROCEDURE — 3075F SYST BP GE 130 - 139MM HG: CPT | Performed by: STUDENT IN AN ORGANIZED HEALTH CARE EDUCATION/TRAINING PROGRAM

## 2025-03-03 PROCEDURE — 3080F DIAST BP >= 90 MM HG: CPT | Performed by: STUDENT IN AN ORGANIZED HEALTH CARE EDUCATION/TRAINING PROGRAM

## 2025-03-03 PROCEDURE — 99214 OFFICE O/P EST MOD 30 MIN: CPT | Performed by: STUDENT IN AN ORGANIZED HEALTH CARE EDUCATION/TRAINING PROGRAM

## 2025-03-03 NOTE — LETTER
"3/3/2025       RE: Mustapha Negrete  [id number] [facility] [address]     Dear Colleague,    Thank you for referring your patient, Mustapha Negrete, to the [facility] [place] at [facility]. Please see a copy of my visit note below.    Subjective    REASON FOR VISIT  Severe pelvic pain follow-up    HISTORY OF PRESENT ILLNESS  Mr. Negrete is a pleasant 64 year old male when speaking with today in regards to his longstanding history of pelvic pain.  He has been seen by multiple urologists through multiple different systems including [facility] and [facility] in addition to ours for overactive bladder.  Recent testing has not shown any evidence of a current obstructive process that could be leading to his pelvic pain, and has historically undergone stage I sacral neuromodulation without any success.  From a pain perspective, most recently was under consideration for pudendal nerve release, though unfortunately the surgery was postponed until the end of March due to a COVID infection.    Most recently was seen by my colleague Dr. Jean-Baptiste, at which time he felt there was no obstructive process going on leading to his symptoms, and that he should move forward with the pudendal nerve release he was scheduled for.  In addition, he was referred to someone for consideration of Botox to the pelvic floor and periurethral musculature.  Plan was to move forward with these interventions then meet with me as scheduled for symptom check.    Today:  Unfortunately, surgery was delayed so we are not able to evaluate symptoms postsurgery  Continues to endorse a lot of difficulty urinating, feeling like he needs to strain all the time to urinate and only getting approximately 4 ounces out of the time  Remembers needing to \"have a metal frankie shoved up my penis to stretch things out\" when he was younger, wondering if this is something that needs to be followed up " on    Objective    PHYSICAL EXAMINATION  General: Alert, oriented, no acute distress    Assessment & Plan   Severe pelvic pain  Obstructive lower urinary tract symptoms     It was my pleasure to meet with Mustapha in regards to his ongoing severe pelvic pain and difficulty urinating.  After reviewing his clinical history, we discussed that the main purpose of the visit today unfortunately is unable to be completed given that he is not done the surgery that has now been postponed until the end of March.  From that standpoint, I think the primary way for him to be helped is to complete the surgery.    That being said, I do believe it would be reasonable for us to complete a cystoscopy on a Monday or Wednesday for us to evaluate for any stricture or bladder neck contracture that could be leading to his extreme difficulty urinating.  We will get this on the calendar for sometime after his surgery date, but will adjust accordingly based on how he is doing after surgery.    Mr. Negrete expressed understanding and agreement to the above discussion and plan and all of his questions were answered to his satisfaction.     PLAN  Go through with surgery scheduled for the end of March  Schedule cystoscopy with me sometime after his surgery for stricture evaluation    SIGNED:    Khris Rincon PA-C    I spent a total of 30 minutes spent on the date of the encounter doing chart review, history and exam, documentation, and further activities as noted above.       Again, thank you for allowing me to participate in the care of your patient.      Sincerely,    Khris Rincon PA-C

## 2025-03-03 NOTE — NURSING NOTE
"Chief Complaint   Patient presents with    Pelvic Pain     He was treated for a urinary tract infection. He finished his last ciprofloxican 500 mg 5 days ago. He rates his pain as a \"constant 10\" for the past 5 years. Surgery delayed at the end of the month ago. Today, he notes he has had one month of leg swelling. He did have hip surgery over 3 months ago, but no recent immobilization.  No shortness of breath, chest pain or dizziness, he is followed by cardiology.      Blood pressure today is elevated. P  Patient states baseline blood pressure is usually within this range. Patient denies chest pain, headache, shortness of breath, and vision changes.  Patient is not concerned for elevated reading today.  Patient  declined repeat blood pressure check today.   Blood pressure (!) 131/91, SpO2 96%. There is no height or weight on file to calculate BMI.    Patient Active Problem List   Diagnosis    Acute prostatitis    Pelvic pain in male    Urinary retention    Symptomatic cholelithiasis    Reflux esophagitis    MARIAN (obstructive sleep apnea)    OA (osteoarthritis) of hip    Neuropathy    Mild intermittent asthma, uncomplicated    Insomnia, unspecified    GERD (gastroesophageal reflux disease)    Hepatic steatosis    Benign prostatic hyperplasia with urinary obstruction    Anal fissure    Tear of right acetabular labrum, sequela    Lumbar disc herniation with radiculopathy    Lower urinary tract symptoms    Arthritis of left acromioclavicular joint    ASHD (arteriosclerotic heart disease)    Calculus of gallbladder with chronic cholecystitis without obstruction    Change in bowel habits    Complete tear of left rotator cuff    Constipation    COVID-19 virus infection    Displacement of lumbar intervertebral disc without myelopathy    Diverticular disease of large intestine    Hiatal hernia    Hyperlipidemia    Low testosterone    Pruritus ani    Pudendal neuralgia    Coccydynia    Uncomplicated opioid dependence (H)    " F11.2 - Continuous opioid dependence (H)    F11.2 - Episodic opioid dependence (H)    F11.9 - Chronic, continuous use of opioids    Essential hypertension       Allergies   Allergen Reactions    Droperidol Other (See Comments)     Extrapyramidal Side Effect    Tamsulosin Dizziness    Fenofibrate Headache and Diarrhea             Fentanyl      Other reaction(s): N&V hard to wake up    Keflex [Cephalexin] Itching    Lactose GI Disturbance         Other reaction(s): GI upset    Midazolam      Other reaction(s): N&V, Hard to wake up    Monosodium Glutamate      Other reaction(s): diarrhea, headaches    Pcn [Penicillins] Other (See Comments)     Feels warm and flushed, but tolerates Cephalexin    Atorvastatin Palpitations     Other reaction(s): palpitations    Sertraline Palpitations         Other reaction(s): Unknown    Simvastatin Palpitations     Other reaction(s): palpitations    Sulfa Antibiotics Headache and Rash     Burning    Other reaction(s): Rash  Other reaction(s): rash and headache    Tetracycline Rash     Burning    Other reaction(s): rash       Current Outpatient Medications   Medication Sig Dispense Refill    acetaminophen (TYLENOL) 500 MG tablet Take 500-1,000 mg by mouth every 6 hours as needed for mild pain.      albuterol (PROAIR HFA/PROVENTIL HFA/VENTOLIN HFA) 108 (90 Base) MCG/ACT inhaler Inhale 2 puffs into the lungs every 6 hours.      hydrochlorothiazide (HYDRODIURIL) 25 MG tablet Take 1 tablet (25 mg) by mouth daily. 90 tablet 3    oxyCODONE-acetaminophen (PERCOCET) 7.5-325 MG per tablet Take 1 tablet by mouth every 6 hours as needed for severe pain (max 3 tabs per day). Fill / start  02/21/2025 (15 day supply) 45 tablet 0    tamsulosin (FLOMAX) 0.4 MG capsule Take 1 capsule (0.4 mg) by mouth daily. (Patient not taking: Reported on 3/3/2025)         Social History     Tobacco Use    Smoking status: Former     Current packs/day: 0.00     Average packs/day: 1 pack/day for 5.0 years (5.0 ttl  pk-yrs)     Types: Cigarettes     Start date: 1994     Quit date: 1999     Years since quittin.3     Passive exposure: Never    Smokeless tobacco: Never   Vaping Use    Vaping status: Never Used   Substance Use Topics    Alcohol use: Yes     Comment: very little    Drug use: Yes     Types: Marijuana     Comment: glenna Alvarado  3/3/2025  1:05 PM

## 2025-03-03 NOTE — PATIENT INSTRUCTIONS
Mary Luciano,    Thank you for allowing [facility] to manage your care.    Your symptoms are consistent with your chronic pelvic pain which could be from your spine.    If you develop worsening/changing symptoms at any time, please be seen in clinic/urgent care or call 911/go to the emergency department for evaluation as we discussed.    If you have any questions or concerns, please feel free to call us at [phone]    Sincerely,    Marito Mancilla PA-C    Did you know?      You can schedule a video visit for follow-up appointments as well as future appointments for certain conditions.  Please see the below link.     [email or web address]    If you have not already done so,  I encourage you to sign up for [employer] ([email or web address]).  This will allow you to review your results, securely communicate with a provider, and schedule virtual visits as well.

## 2025-03-04 PROBLEM — R10.2 PELVIC PAIN IN MALE: Status: RESOLVED | Noted: 2021-10-06 | Resolved: 2025-03-04

## 2025-03-05 DIAGNOSIS — G58.8 PUDENDAL NEURALGIA: ICD-10-CM

## 2025-03-05 DIAGNOSIS — K62.89 RECTAL PAIN: ICD-10-CM

## 2025-03-05 DIAGNOSIS — M79.18 MYOFASCIAL MUSCLE PAIN: ICD-10-CM

## 2025-03-05 NOTE — TELEPHONE ENCOUNTER
Received request for a refill(s) of oxyCODONE-acetaminophen (PERCOCET) 7.5-325 MG per tablet     Last dispensed from pharmacy on 02/21/25    Patient's last office/virtual visit by prescribing provider on 02/21/25  Next office/virtual appointment scheduled for 04/30/25    Last urine drug screen date 01/10/25  Current opioid agreement on file (completed within the last year) Yes Date of opioid agreement: 01/10/25    E-prescribe to pharmacy-  [facility] - [place] - [address]       Will route to nursing [place] for review and preparation of prescription(s).

## 2025-03-06 RX ORDER — OXYCODONE AND ACETAMINOPHEN 7.5; 325 MG/1; MG/1
1 TABLET ORAL EVERY 6 HOURS PRN
Qty: 45 TABLET | Refills: 0 | Status: SHIPPED | OUTPATIENT
Start: 2025-03-06

## 2025-03-06 NOTE — TELEPHONE ENCOUNTER
3/6/2025 2:44 PM     REFILL REQUEST:     This is a patient of mine. PDMP and Chart have been reviewed; refill request is appropriate.    Refilled for 15 day supply.  Script e-Prescribed to pharmacy    MICAELA Herman, ZEUS, FNP-C

## 2025-03-06 NOTE — TELEPHONE ENCOUNTER
Medication refill information reviewed.     Due date for oxyCODONE-acetaminophen (PERCOCET) 7.5-325 MG per tablet  is 3/8/2025     Prescriptions prepped for review.     Will route to provider.

## 2025-03-19 ENCOUNTER — OFFICE VISIT (OUTPATIENT)
Dept: FAMILY MEDICINE | Facility: CLINIC | Age: 65
End: 2025-03-19
Payer: MEDICARE

## 2025-03-19 VITALS
HEART RATE: 90 BPM | HEIGHT: 65 IN | WEIGHT: 199.4 LBS | RESPIRATION RATE: 18 BRPM | TEMPERATURE: 97.8 F | OXYGEN SATURATION: 96 % | BODY MASS INDEX: 33.22 KG/M2 | SYSTOLIC BLOOD PRESSURE: 140 MMHG | DIASTOLIC BLOOD PRESSURE: 80 MMHG

## 2025-03-19 DIAGNOSIS — Z01.818 PREOP GENERAL PHYSICAL EXAM: Primary | ICD-10-CM

## 2025-03-19 DIAGNOSIS — R60.0 BILATERAL LOWER EXTREMITY EDEMA: ICD-10-CM

## 2025-03-19 DIAGNOSIS — B02.9 HERPES ZOSTER WITHOUT COMPLICATION: ICD-10-CM

## 2025-03-19 DIAGNOSIS — M51.16 LUMBAR DISC HERNIATION WITH RADICULOPATHY: ICD-10-CM

## 2025-03-19 LAB
ERYTHROCYTE [DISTWIDTH] IN BLOOD BY AUTOMATED COUNT: 12.9 % (ref 10–15)
HCT VFR BLD AUTO: 45.3 % (ref 40–53)
HGB BLD-MCNC: 14.8 G/DL (ref 13.3–17.7)
MCH RBC QN AUTO: 28.6 PG (ref 26.5–33)
MCHC RBC AUTO-ENTMCNC: 32.7 G/DL (ref 31.5–36.5)
MCV RBC AUTO: 88 FL (ref 78–100)
PLATELET # BLD AUTO: 294 10E3/UL (ref 150–450)
RBC # BLD AUTO: 5.17 10E6/UL (ref 4.4–5.9)
WBC # BLD AUTO: 6.3 10E3/UL (ref 4–11)

## 2025-03-19 RX ORDER — FUROSEMIDE 20 MG/1
20 TABLET ORAL DAILY
Qty: 7 TABLET | Refills: 0 | Status: SHIPPED | OUTPATIENT
Start: 2025-03-19

## 2025-03-19 ASSESSMENT — PAIN SCALES - GENERAL: PAINLEVEL_OUTOF10: NO PAIN (0)

## 2025-03-19 NOTE — PATIENT INSTRUCTIONS
Mary Luciano,    Thank you for allowing [facility] to manage your care.    I am unsure of the cause of your leg swelling, but we will see what our workup shows. You also likely have a resolving shingles infection.    If you develop worsening/changing symptoms at any time, please be seen in clinic/urgent care or call 911/go to the emergency department for evaluation.    I ordered some lab work. Please go to the laboratory to get your studies.    I sent your prescriptions to your pharmacy.    Please allow 1-2 business days for our office to contact you in regards to your laboratory/radiological studies.  If not done so, I encourage you to login into [employer] ([email or web address] to review your results as well.     Your blood pressure was high today. Take and record your blood pressure twice daily for 2 weeks. If your blood pressure is greater than or equal to 140/90mm Hg on average, please contact us.    If you have any questions or concerns, please feel free to call us at [phone]    Sincerely,    Marito Mancilla PA-C    Did you know?      You can schedule a video visit for follow-up appointments as well as future appointments for certain conditions.  Please see the below link.     [email or web address]    If you have not already done so,  I encourage you to sign up for [employer] ([email or web address]).  This will allow you to review your results, securely communicate with a provider, and schedule virtual visits as well.    How to Take Your Medication Before Surgery  Preoperative Medication Instructions   Take all scheduled medications on the day of surgery EXCEPT for modifications listed below:   - Herbal medications and vitamins: DO NOT TAKE 14 days prior to surgery.   - hydrochlorothiazide: DO NOT TAKE on the day of surgery.   - Topicals: DO NOT TAKE day of surgery.   - ibuprofen (Advil, Motrin): DO NOT TAKE 1 day before surgery.         Patient Education   Preparing for Your Surgery  For Adults  Getting started  In most cases, a nurse will call to review your health history and instructions. They will give you an arrival time based on your scheduled surgery time. Please be ready to share:  Your doctor's clinic name and phone number  Your medical, surgical, and anesthesia history  A list of allergies and sensitivities  A list of medicines, including herbal treatments and over-the-counter drugs  Whether the patient has a legal guardian (ask how to send us the papers in advance)  Note: You may not receive a call if you were seen at our PAC (Preoperative Assessment Center).  Please tell us if you're pregnant--or if there's any chance you might be pregnant. Some surgeries may injure a fetus (unborn baby), so they require a pregnancy test. Surgeries that are safe for a fetus don't always need a test, and you can choose whether to have one.   Preparing for surgery  Within 10 to 30 days of surgery: Have a pre-op exam (sometimes called an H&P, or History and Physical). This can be done at a clinic or pre-operative center.  If you're having a , you may not need this exam. Talk to your care team.  At your pre-op exam, talk to your care team about all medicines you take. (This includes CBD oil and any drugs, such as THC, marijuana, and other forms of cannabis.) If you need to stop any medicine before surgery, ask when to start taking it again.  This is for your safety. Many medicines and drugs can make you bleed too much during surgery. Some change how well surgery (anesthesia) drugs work.  Call your insurance company to let them know you're having surgery. (If you don't have insurance, call [phone].)  Call your clinic if there's any change in your health. This includes a scrape or scratch near the surgery site, or any signs of a cold (sore throat, runny nose, cough, rash, fever).  Eating and drinking guidelines  For your safety: Unless your  surgeon tells you otherwise, follow the guidelines below.  Eat and drink as normal until 8 hours before you arrive for surgery. After that, no food or milk. You can spit out gum when you arrive.  Drink clear liquids until 2 hours before you arrive. These are liquids you can see through, like water, Gatorade, and Propel Water. They also include plain black coffee and tea (no cream or milk).  No alcohol for 24 hours before you arrive. The night before surgery, stop any drinks that contain THC.  If your care team tells you to take medicine on the morning of surgery, it's okay to take it with a sip of water. No other medicines or drugs are allowed (including CBD oil)--follow your care team's instructions.  If you have questions the day of surgery, call your hospital or surgery center.   Preventing infection  Shower or bathe the night before and the morning of surgery. Follow the instructions your clinic gave you. (If no instructions, use regular soap.)  Don't shave or clip hair near your surgery site. We'll remove the hair if needed.  Don't smoke or vape the morning of surgery. No chewing tobacco for 6 hours before you arrive. A nicotine patch is okay. You may spit out nicotine gum when you arrive.  For some surgeries, the surgeon will tell you to fully quit smoking and nicotine.  We will make every effort to keep you safe from infection. We will:  Clean our hands often with soap and water (or an alcohol-based hand rub).  Clean the skin at your surgery site with a special soap that kills germs.  Give you a special gown to keep you warm. (Cold raises the risk of infection.)  Wear hair covers, masks, gowns, and gloves during surgery.  Give antibiotic medicine, if prescribed. Not all surgeries need this medicine.  What to bring on the day of surgery  Photo ID and insurance card  Copy of your health care directive, if you have one  Glasses and hearing aids (bring cases)  You can't wear contacts during surgery  Inhaler and  eye drops, if you use them (tell us about these when you arrive)  CPAP machine or breathing device, if you use them  A few personal items, if spending the night  If you have . . .  A pacemaker, ICD (cardiac defibrillator), or other implant: Bring the ID card.  An implanted stimulator: Bring the remote control.  A legal guardian: Bring a copy of the certified (court-stamped) guardianship papers.  Please remove any jewelry, including body piercings. Leave jewelry and other valuables at home.  If you're going home the day of surgery  You must have a responsible adult drive you home. They should stay with you overnight as well.  If you don't have someone to stay with you, and you aren't safe to go home alone, we may keep you overnight. Insurance often won't pay for this.  After surgery  If it's hard to control your pain or you need more pain medicine, please call your surgeon's office.  Questions?   If you have any questions for your care team, list them here:   ____________________________________________________________________________________________________________________________________________________________________________________________________________________________________________________________  For informational purposes only. Not to replace the advice of your health care provider. Copyright   2003, 2019 [employer]. All rights reserved. Clinically reviewed by Chas Stanford MD. [employer] [id number] - REV 08/24.

## 2025-03-19 NOTE — PROGRESS NOTES
Preoperative Evaluation  [facility] [place]  [address]  [place] [address]  Phone: [phone]  Primary Provider: Silver Pro DO  Pre-op Performing Provider: DARVIN Wiggins  Mar 19, 2025         3/19/2025   Surgical Information   What procedure is being done? back surgey   Facility or [facility] where procedure[facility]   Who is doing the procedure / surgery? Dr Aguila   Date of surgery / procedure: 3 21   Time of surgery / procedure: 830   Where do you plan to recover after surgery? at home with family     Fax number for surgical facility: 1 [phone]    Would like to also discuss BP medication (fluid retention - would possibly like to go on furosemide.), and burnings pain on right leg.  Assessment & Plan     The proposed surgical procedure is considered INTERMEDIATE risk.    Problem List Items Addressed This Visit          Nervous and Auditory    Lumbar disc herniation with radiculopathy     Other Visit Diagnoses       Preop general physical exam    -  Primary    Relevant Medications    furosemide (LASIX) 20 MG tablet    Other Relevant Orders    Comprehensive metabolic panel (BMP + Alb, Alk Phos, ALT, AST, Total. Bili, TP)    CBC with platelets (Completed)    Herpes zoster without complication        Bilateral lower extremity edema                        - No identified additional risk factors other than previously addressed    Recommendation  Approval given to proceed with proposed procedure pending review of diagnostic evaluation. Shingles is out of the treatment window and he declined Valtrex course. Rash is improving and not over surgical site. Will treat BLE edema with daily furosemide for 1 week. No cardiopulmonary symptoms.    Antiplatelet or Anticoagulation Medication Instructions   - Patient is on no antiplatelet or anticoagulation medications.     Additional Medication Instructions  Take all scheduled medications on the day of  surgery EXCEPT for modifications listed below:   - Herbal medications and vitamins: DO NOT TAKE 14 days prior to surgery.   - Diuretics (furosemide, hydrochlorothiazide, chlorothalidone): DO NOT TAKE on the day of surgery.   - Topicals: DO NOT TAKE day of surgery.   - ibuprofen (Advil, Motrin): DO NOT TAKE 1 day before surgery.     Zhang Luciano is a 64 year old, presenting for the following:  Pre-Op Exam          3/19/2025     8:39 AM   Additional Questions   Roomed by Padmini Carroll CMA   Accompanied by N/A         3/19/2025     8:39 AM   Patient Reported Additional Medications   Patient reports taking the following new medications No new medications     History of Present Illness-  - Mustapha Negrete, 64-year-old male.  - Experiencing weight gain of 10-12 lbs over the past couple of months, currently at 202 lbs, despite regular exercise and dietary control.  - Reports water retention and swelling in both legs, more pronounced in the right leg.  - Wearing compression socks to manage swelling.  - History of gout, with current joint pain in multiple joints including hands and shoulders, lasting about a month to a month and a half.  - No previous issues with water retention.  - Reports dark urine, especially in the afternoon.  - History of shingles, with symptoms starting about a month ago, located near the hip replacement area.  - Underwent an echocardiogram showing left ventricular ejection fraction at 50%, considered low normal.  - Reports difficulty urinating and concerns about potential prostate issues, with a history of multiple surgeries possibly leading to scar tissue.   - Mustapha Negrete, 64-year-old male.  - Experiencing weight gain of 10-12 lbs over the past couple of months, currently at 202 lbs, despite regular exercise and dietary control.  - Reports water retention and swelling in both legs, more pronounced in the right leg.  - Wearing compression socks to manage swelling.  - History of gout, with  current joint pain in multiple joints including hands and shoulders, lasting about a month to a month and a half.  - No previous issues with water retention.  - Reports dark urine, especially in the afternoon.  - History of shingles, with symptoms starting about a month ago, located near the hip replacement area.  - Underwent an echocardiogram showing left ventricular ejection fraction at 50%, considered low normal.  - Reports difficulty urinating and concerns about potential prostate issues, with a history of multiple surgeries possibly leading to scar tissue.  Undergoing lumbar spine surgery to relieve pelvic pain.        3/19/2025   Pre-Op Questionnaire   Have you ever had a heart attack or stroke? No   Have you ever had surgery on your heart or blood vessels, such as a stent placement, a coronary artery bypass, or surgery on an artery in your head, neck, heart, or legs? No   Do you have chest pain with activity? No   Do you have a history of heart failure? No   Do you currently have a cold, bronchitis or symptoms of other infection? No   Do you have a cough, shortness of breath, or wheezing? No   Do you or anyone in your family have previous history of blood clots? No   Do you or does anyone in your family have a serious bleeding problem such as prolonged bleeding following surgeries or cuts? No   Have you ever had problems with anemia or been told to take iron pills? No   Have you had any abnormal blood loss such as black, tarry or bloody stools? No   Have you ever had a blood transfusion? No   Are you willing to have a blood transfusion if it is medically needed before, during, or after your surgery? Yes   Have you or any of your relatives ever had problems with anesthesia? (!) YES, versed was too sedating historically    Do you have sleep apnea, excessive snoring or daytime drowsiness? (!) YES   Do you have a CPAP machine? (!) NO   Do you have any artifical heart valves or other implanted medical devices like  a pacemaker, defibrillator, or continuous glucose monitor? No   Do you have artificial joints? (!) YES, right hip   Are you allergic to latex? No     Health Care Directive  Patient does not have a Health Care Directive: Discussed advance care planning with patient; however, patient declined at this time.    Preoperative Review of    reviewed - controlled substances reflected in medication list.    Status of Chronic Conditions:  HYPERTENSION - Patient has longstanding history of HTN , currently denies any symptoms referable to elevated blood pressure. Specifically denies chest pain, palpitations, dyspnea, orthopnea, PND or peripheral edema. Blood pressure readings have not been in normal range. Current medication regimen is as listed below. Patient denies any side effects of medication.     Patient Active Problem List    Diagnosis Date Noted    Essential hypertension 09/07/2024     Priority: Medium    F11.2 - Continuous opioid dependence (H) 05/23/2023     Priority: Medium    F11.2 - Episodic opioid dependence (H) 05/23/2023     Priority: Medium    F11.9 - Chronic, continuous use of opioids 05/23/2023     Priority: Medium    Uncomplicated opioid dependence (H)      Priority: Medium     Patient is followed by Silver Pro DO for ongoing prescription of pain medication.  All refills should only be approved by this provider, or covering partner.    Medication(s): oxycodone 5 mg  Maximum quantity per month: 30  Clinic visit frequency required: Q 3 months   PDMP Review         Value Time User    State PDMP site checked  Yes 7/3/2022 10:43 AM Jose Pérez MD          Controlled substance agreement:  CSA -- Patient Level:    Controlled Substance Agreement - Opioid - Scan on 9/8/2022 11:41 AM       Pain Clinic evaluation in the past: No    Opioid Risk Tool Total Score(s):  No flowsheet data found.  Last [facility] website verification:  done on 9/8/22   [email or web address]        Gabidal  neuralgia 05/13/2022     Priority: Medium    Coccydynia 05/13/2022     Priority: Medium    Change in bowel habits 01/03/2022     Priority: Medium    Complete tear of left rotator cuff 01/03/2022     Priority: Medium    Pruritus ani 01/03/2022     Priority: Medium    Tear of right acetabular labrum, sequela 10/28/2021     Priority: Medium    Lumbar disc herniation with radiculopathy 10/28/2021     Priority: Medium     MRI 10/21  2.  At L4-L5, there is a right paracentral slightly caudally directed disc extrusion that narrows the right lateral recess and causes mass effect on the descending/traversing right L5 nerve root.  3.  At L3-L4, there is a right paracentral disc protrusion superimposed on disc bulge with narrowing of the right lateral recess and mass effect on the descending/traversing right L4 nerve root.      Acute prostatitis 10/06/2021     Priority: Medium    Anal fissure 08/26/2021     Priority: Medium    Constipation 08/26/2021     Priority: Medium    Urinary retention 08/16/2021     Priority: Medium    Neuropathy 08/16/2021     Priority: Medium    Diverticular disease of large intestine 08/03/2021     Priority: Medium    Benign prostatic hyperplasia with urinary obstruction 07/12/2021     Priority: Medium    Calculus of gallbladder with chronic cholecystitis without obstruction 05/25/2021     Priority: Medium    MARIAN (obstructive sleep apnea) 05/03/2021     Priority: Medium     On BiPAP, see scans.      Symptomatic cholelithiasis 04/06/2021     Priority: Medium    COVID-19 virus infection 04/06/2021     Priority: Medium    Hepatic steatosis 03/26/2021     Priority: Medium    Arthritis of left acromioclavicular joint 08/05/2019     Priority: Medium    Lower urinary tract symptoms 05/13/2019     Priority: Medium    Mild intermittent asthma, uncomplicated 04/05/2017     Priority: Medium    OA (osteoarthritis) of hip 05/19/2015     Priority: Medium    ASHD (arteriosclerotic heart disease) 10/25/2013      Priority: Medium    Hyperlipidemia 10/25/2013     Priority: Medium    Reflux esophagitis 04/18/2013     Priority: Medium    Hiatal hernia 04/18/2013     Priority: Medium    Insomnia, unspecified 02/27/2013     Priority: Medium    GERD (gastroesophageal reflux disease) 10/16/2012     Priority: Medium    Low testosterone 10/16/2012     Priority: Medium    Displacement of lumbar intervertebral disc without myelopathy 09/12/2011     Priority: Medium      Past Medical History:   Diagnosis Date    MARIAN (obstructive sleep apnea)     havne't used cpap since 2018    Pelvic floor dysfunction      Past Surgical History:   Procedure Laterality Date    BACK SURGERY      CHOLECYSTECTOMY      COLONOSCOPY N/A 08/31/2022    Procedure: COLONOSCOPY;  Surgeon: Veena Mendoza MD;  Location:  GI    ENT SURGERY      tonsillectomy    GENITOURINARY SURGERY      TURP    IMPLANT STIMULATOR SACRAL NERVE PERCUTANEOUS TRIAL N/A 10/17/2022    Procedure: INSERTION, NEUROSTIMULATOR, SACRAL, PERCUTANEOUS, FOR TRIAL (trial with WEEZEVENT);  Surgeon: Nayan Tello MD;  Location: [facility] OR    JOINT REPLACEMENT, HIP RT/LT Left     ORTHOPEDIC SURGERY      wrist surgery, carpal tunnel 2005    PROSTATE SURGERY      Urolift    ROTATOR CUFF REPAIR RT/LT Left     TOTAL HIP ARTHROPLASTY Right 11/18/2024     Current Outpatient Medications   Medication Sig Dispense Refill    acetaminophen (TYLENOL) 500 MG tablet Take 500-1,000 mg by mouth every 6 hours as needed for mild pain.      albuterol (PROAIR HFA/PROVENTIL HFA/VENTOLIN HFA) 108 (90 Base) MCG/ACT inhaler Inhale 2 puffs into the lungs every 6 hours.      furosemide (LASIX) 20 MG tablet Take 1 tablet (20 mg) by mouth daily. 7 tablet 0    hydrochlorothiazide (HYDRODIURIL) 25 MG tablet Take 1 tablet (25 mg) by mouth daily. 90 tablet 3    oxyCODONE-acetaminophen (PERCOCET) 7.5-325 MG per tablet Take 1 tablet by mouth every 6 hours as needed for severe pain (max 3 tabs per day). Fill 3/6/2025 start   2025 (15 day supply) 45 tablet 0    tamsulosin (FLOMAX) 0.4 MG capsule Take 1 capsule (0.4 mg) by mouth daily. (Patient not taking: Reported on 3/19/2025)         Allergies   Allergen Reactions    Droperidol Other (See Comments)     Extrapyramidal Side Effect    Tamsulosin Dizziness    Fenofibrate Headache and Diarrhea             Fentanyl      Other reaction(s): N&V hard to wake up    Keflex [Cephalexin] Itching    Lactose GI Disturbance         Other reaction(s): GI upset    Midazolam      Other reaction(s): N&V, Hard to wake up    Monosodium Glutamate      Other reaction(s): diarrhea, headaches    Pcn [Penicillins] Other (See Comments)     Feels warm and flushed, but tolerates Cephalexin    Atorvastatin Palpitations     Other reaction(s): palpitations    Sertraline Palpitations         Other reaction(s): Unknown    Simvastatin Palpitations     Other reaction(s): palpitations    Sulfa Antibiotics Headache and Rash     Burning    Other reaction(s): Rash  Other reaction(s): rash and headache    Tetracycline Rash     Burning    Other reaction(s): rash        Social History     Tobacco Use    Smoking status: Former     Current packs/day: 0.00     Average packs/day: 1 pack/day for 5.0 years (5.0 ttl pk-yrs)     Types: Cigarettes     Start date: 1994     Quit date: 1999     Years since quittin.3     Passive exposure: Never    Smokeless tobacco: Never   Substance Use Topics    Alcohol use: Yes     Comment: very little     Family History   Problem Relation Age of Onset    Cerebrovascular Disease Mother     Hypertension Mother     Diabetes Mother     Liver Cancer Mother     Cancer Father 84        liver cancer    Prostate Cancer Father      History   Drug Use    Types: Marijuana     Comment: gummies             Review of Systems  Constitutional, neuro, ENT, endocrine, pulmonary, cardiac, gastrointestinal, genitourinary, musculoskeletal, integument and psychiatric systems are negative, except as  "otherwise noted.    Objective    BP (!) 140/80   Pulse 90   Temp 97.8  F (36.6  C) (Temporal)   Resp 18   Ht 1.66 m (5' 5.35\")   Wt 90.4 kg (199 lb 6.4 oz)   SpO2 96%   BMI 32.83 kg/m     Estimated body mass index is 32.83 kg/m  as calculated from the following:    Height as of this encounter: 1.66 m (5' 5.35\").    Weight as of this encounter: 90.4 kg (199 lb 6.4 oz).  Physical Exam  Vitals and nursing note reviewed.   Constitutional:       General: He is not in acute distress.     Appearance: He is not ill-appearing or diaphoretic.   HENT:      Head: Normocephalic and atraumatic.      Mouth/Throat:      Mouth: Mucous membranes are moist.   Eyes:      Conjunctiva/sclera: Conjunctivae normal.   Cardiovascular:      Rate and Rhythm: Normal rate and regular rhythm.      Heart sounds: Normal heart sounds. No murmur heard.     No friction rub. No gallop.      Comments: BLE edema without tenderness.      Pulmonary:      Effort: Pulmonary effort is normal. No respiratory distress.      Breath sounds: Normal breath sounds. No stridor. No wheezing, rhonchi or rales.   Skin:     General: Skin is warm and dry.      Comments: Erythematous vesiculopapular rash to the right lateral thigh. No warmth, tenderness, fluctuance or other overlying signs of trauma or infection.   Neurological:      General: No focal deficit present.      Mental Status: He is alert. Mental status is at baseline.   Psychiatric:         Mood and Affect: Mood normal.         Behavior: Behavior normal.         Recent Labs   Lab Test 02/20/25  1029 01/27/25  1131 12/11/24  1339 10/03/24  0801 08/19/24  0848   HGB  --  14.3 12.9*   < > 14.9   PLT  --  311 370   < > 261    144 142   < > 140   POTASSIUM 4.9 4.5 4.5   < > 4.6   CR 0.90 0.79 1.13   < > 0.87   A1C  --   --   --   --  5.9*    < > = values in this interval not displayed.        Diagnostics  Labs pending at this time.  Results will be reviewed when available.   No EKG this visit, completed " in the last 90 days.    Revised Cardiac Risk Index (RCRI)  The patient has the following serious cardiovascular risks for perioperative complications:   - No serious cardiac risks = 0 points     RCRI Interpretation: 0 points: Class I (very low risk - 0.4% complication rate)         Signed Electronically by: DARVIN Wiggins  A copy of this evaluation report is provided to the requesting physician.

## 2025-03-20 LAB
ALBUMIN SERPL BCG-MCNC: 4.5 G/DL (ref 3.5–5.2)
ALP SERPL-CCNC: 42 U/L (ref 40–150)
ALT SERPL W P-5'-P-CCNC: 24 U/L (ref 0–70)
ANION GAP SERPL CALCULATED.3IONS-SCNC: 18 MMOL/L (ref 7–15)
AST SERPL W P-5'-P-CCNC: 32 U/L (ref 0–45)
BILIRUB SERPL-MCNC: 0.7 MG/DL
BUN SERPL-MCNC: 20.7 MG/DL (ref 8–23)
CALCIUM SERPL-MCNC: 10.1 MG/DL (ref 8.8–10.4)
CHLORIDE SERPL-SCNC: 101 MMOL/L (ref 98–107)
CREAT SERPL-MCNC: 0.75 MG/DL (ref 0.67–1.17)
EGFRCR SERPLBLD CKD-EPI 2021: >90 ML/MIN/1.73M2
GLUCOSE SERPL-MCNC: 120 MG/DL (ref 70–99)
HCO3 SERPL-SCNC: 19 MMOL/L (ref 22–29)
POTASSIUM SERPL-SCNC: 4.9 MMOL/L (ref 3.4–5.3)
PROT SERPL-MCNC: 7.6 G/DL (ref 6.4–8.3)
SODIUM SERPL-SCNC: 138 MMOL/L (ref 135–145)

## 2025-03-24 DIAGNOSIS — M79.18 MYOFASCIAL MUSCLE PAIN: ICD-10-CM

## 2025-03-24 DIAGNOSIS — K62.89 RECTAL PAIN: ICD-10-CM

## 2025-03-24 DIAGNOSIS — G58.8 PUDENDAL NEURALGIA: ICD-10-CM

## 2025-03-24 NOTE — TELEPHONE ENCOUNTER
Received request for a refill(s) of oxyCODONE-acetaminophen (PERCOCET) 7.5-325 MG per tablet      Last dispensed from pharmacy on 03/06/25    Patient's last office/virtual visit by prescribing provider on 020/21/25  Next office/virtual appointment scheduled for 04/30/25    Last urine drug screen date 01/10/25  Current opioid agreement on file (completed within the last year) Yes Date of opioid agreement: 01/10/25    E-prescribe to pharmacy-[facility] - [place] - [address]     Will route to nursing [place] for review and preparation of prescription(s).

## 2025-03-24 NOTE — TELEPHONE ENCOUNTER
Medication refill information reviewed.     Due date for oxyCODONE-acetaminophen (PERCOCET) 7.5-325 MG per tablet   is 3/23/2025     Prescriptions prepped for review.     Will route to provider.

## 2025-03-25 RX ORDER — OXYCODONE AND ACETAMINOPHEN 7.5; 325 MG/1; MG/1
1 TABLET ORAL EVERY 6 HOURS PRN
Qty: 45 TABLET | Refills: 0 | Status: SHIPPED | OUTPATIENT
Start: 2025-03-25

## 2025-03-25 NOTE — TELEPHONE ENCOUNTER
3/25/2025 9:45 AM     REFILL REQUEST:     This is a patient of mine. PDMP and Chart have been reviewed; refill request is appropriate.    Refilled for 15 day supply.  Script e-Prescribed to pharmacy    MICAELA Herman, ZEUS, FNP-C

## 2025-04-11 DIAGNOSIS — Z01.818 PREOP GENERAL PHYSICAL EXAM: ICD-10-CM

## 2025-04-11 NOTE — TELEPHONE ENCOUNTER
Patient would like a refill on his furosemide 20mg would like more then a 14 day program he needs this medication every day His pharmacy is [facility] in [place] . He only has 3 tablets left

## 2025-04-14 RX ORDER — FUROSEMIDE 20 MG/1
20 TABLET ORAL DAILY
Qty: 90 TABLET | Refills: 1 | Status: SHIPPED | OUTPATIENT
Start: 2025-04-14

## 2025-04-23 ENCOUNTER — OFFICE VISIT (OUTPATIENT)
Dept: FAMILY MEDICINE | Facility: CLINIC | Age: 65
End: 2025-04-23
Payer: MEDICARE

## 2025-04-23 VITALS
HEART RATE: 94 BPM | RESPIRATION RATE: 16 BRPM | OXYGEN SATURATION: 96 % | SYSTOLIC BLOOD PRESSURE: 130 MMHG | WEIGHT: 200.8 LBS | DIASTOLIC BLOOD PRESSURE: 82 MMHG | TEMPERATURE: 98.1 F | HEIGHT: 65 IN | BODY MASS INDEX: 33.45 KG/M2

## 2025-04-23 DIAGNOSIS — Z00.00 ENCOUNTER FOR MEDICARE ANNUAL WELLNESS EXAM: Primary | ICD-10-CM

## 2025-04-23 DIAGNOSIS — I10 ESSENTIAL HYPERTENSION: ICD-10-CM

## 2025-04-23 DIAGNOSIS — M51.16 LUMBAR DISC HERNIATION WITH RADICULOPATHY: ICD-10-CM

## 2025-04-23 PROCEDURE — 3075F SYST BP GE 130 - 139MM HG: CPT | Performed by: FAMILY MEDICINE

## 2025-04-23 PROCEDURE — 99214 OFFICE O/P EST MOD 30 MIN: CPT | Mod: 25 | Performed by: FAMILY MEDICINE

## 2025-04-23 PROCEDURE — 3079F DIAST BP 80-89 MM HG: CPT | Performed by: FAMILY MEDICINE

## 2025-04-23 PROCEDURE — G0439 PPPS, SUBSEQ VISIT: HCPCS | Performed by: FAMILY MEDICINE

## 2025-04-23 PROCEDURE — G2211 COMPLEX E/M VISIT ADD ON: HCPCS | Performed by: FAMILY MEDICINE

## 2025-04-23 RX ORDER — AMLODIPINE BESYLATE 5 MG/1
5 TABLET ORAL DAILY
Qty: 90 TABLET | Refills: 3 | Status: SHIPPED | OUTPATIENT
Start: 2025-04-23

## 2025-04-23 SDOH — HEALTH STABILITY: PHYSICAL HEALTH: ON AVERAGE, HOW MANY DAYS PER WEEK DO YOU ENGAGE IN MODERATE TO STRENUOUS EXERCISE (LIKE A BRISK WALK)?: 3 DAYS

## 2025-04-23 ASSESSMENT — ANXIETY QUESTIONNAIRES
3. WORRYING TOO MUCH ABOUT DIFFERENT THINGS: NOT AT ALL
6. BECOMING EASILY ANNOYED OR IRRITABLE: NOT AT ALL
5. BEING SO RESTLESS THAT IT IS HARD TO SIT STILL: NOT AT ALL
GAD7 TOTAL SCORE: 0
IF YOU CHECKED OFF ANY PROBLEMS ON THIS QUESTIONNAIRE, HOW DIFFICULT HAVE THESE PROBLEMS MADE IT FOR YOU TO DO YOUR WORK, TAKE CARE OF THINGS AT HOME, OR GET ALONG WITH OTHER PEOPLE: NOT DIFFICULT AT ALL
2. NOT BEING ABLE TO STOP OR CONTROL WORRYING: NOT AT ALL
1. FEELING NERVOUS, ANXIOUS, OR ON EDGE: NOT AT ALL
7. FEELING AFRAID AS IF SOMETHING AWFUL MIGHT HAPPEN: NOT AT ALL
GAD7 TOTAL SCORE: 0

## 2025-04-23 ASSESSMENT — ASTHMA QUESTIONNAIRES
QUESTION_2 LAST FOUR WEEKS HOW OFTEN HAVE YOU HAD SHORTNESS OF BREATH: NOT AT ALL
ACT_TOTALSCORE: 25
QUESTION_1 LAST FOUR WEEKS HOW MUCH OF THE TIME DID YOUR ASTHMA KEEP YOU FROM GETTING AS MUCH DONE AT WORK, SCHOOL OR AT HOME: NONE OF THE TIME
QUESTION_5 LAST FOUR WEEKS HOW WOULD YOU RATE YOUR ASTHMA CONTROL: COMPLETELY CONTROLLED
QUESTION_3 LAST FOUR WEEKS HOW OFTEN DID YOUR ASTHMA SYMPTOMS (WHEEZING, COUGHING, SHORTNESS OF BREATH, CHEST TIGHTNESS OR PAIN) WAKE YOU UP AT NIGHT OR EARLIER THAN USUAL IN THE MORNING: NOT AT ALL
QUESTION_4 LAST FOUR WEEKS HOW OFTEN HAVE YOU USED YOUR RESCUE INHALER OR NEBULIZER MEDICATION (SUCH AS ALBUTEROL): NOT AT ALL

## 2025-04-23 ASSESSMENT — ENCOUNTER SYMPTOMS
EYE DISCHARGE: 0
SEIZURES: 0
HEADACHES: 0
CONFUSION: 0
COUGH: 0
HALLUCINATIONS: 0
DIZZINESS: 0
COLOR CHANGE: 0
FEVER: 0
APPETITE CHANGE: 0
WHEEZING: 0
HEMATOLOGIC/LYMPHATIC NEGATIVE: 1
ACTIVITY CHANGE: 0
DECREASED CONCENTRATION: 0
SHORTNESS OF BREATH: 0
NERVOUS/ANXIOUS: 0
FATIGUE: 0
AGITATION: 0
ENDOCRINE NEGATIVE: 1
ALLERGIC/IMMUNOLOGIC NEGATIVE: 1
GASTROINTESTINAL NEGATIVE: 1
EYE PAIN: 0
PALPITATIONS: 0

## 2025-04-23 ASSESSMENT — PATIENT HEALTH QUESTIONNAIRE - PHQ9
5. POOR APPETITE OR OVEREATING: NOT AT ALL
SUM OF ALL RESPONSES TO PHQ QUESTIONS 1-9: 1

## 2025-04-23 ASSESSMENT — SOCIAL DETERMINANTS OF HEALTH (SDOH): HOW OFTEN DO YOU GET TOGETHER WITH FRIENDS OR RELATIVES?: NEVER

## 2025-04-23 NOTE — PATIENT INSTRUCTIONS
Claudy Luciano,    Thank you for allowing [facility] to manage your care.    I sent your prescriptions to your pharmacy.    For your nerve pain, I am starting you on amitriptyline 25 mg at night time.     For your high blood pressure, I am stopping hydrochlorothiazide and starting amlodipine 5 mg daily.     If you have any questions or concerns, please feel free to call us at [phone].    Sincerely,    Dr. Pro    Did you know?      You can schedule a video visit for follow-up appointments as well as future appointments for certain conditions.  Please see the below link.     [email or web address]    If you have not already done so,  I encourage you to sign up for [employer] ([email or web address]).  This will allow you to review your results, securely communicate with a provider, and schedule virtual visits as well.    Patient Education   Preventive Care Advice   This is general advice given by our system to help you stay healthy. However, your care team may have specific advice just for you. Please talk to your care team about your preventive care needs.  Nutrition  Eat 5 or more servings of fruits and vegetables each day.  Try wheat bread, brown rice and whole grain pasta (instead of white bread, rice, and pasta).  Get enough calcium and vitamin D. Check the label on foods and aim for 100% of the RDA (recommended daily allowance).  Lifestyle  Exercise at least 150 minutes each week  (30 minutes a day, 5 days a week).  Do muscle strengthening activities 2 days a week. These help control your weight and prevent disease.  No smoking.  Wear sunscreen to prevent skin cancer.  Have a dental exam and cleaning every 6 months.  Yearly exams  See your health care team every year to talk about:  Any changes in your health.  Any medicines your care team has prescribed.  Preventive care, family planning, and ways to prevent chronic diseases.  Shots (vaccines)   HPV shots (up  to age 26), if you've never had them before.  Hepatitis B shots (up to age 59), if you've never had them before.  COVID-19 shot: Get this shot when it's due.  Flu shot: Get a flu shot every year.  Tetanus shot: Get a tetanus shot every 10 years.  Pneumococcal, hepatitis A, and RSV shots: Ask your care team if you need these based on your risk.  Shingles shot (for age 50 and up)  General health tests  Diabetes screening:  Starting at age 35, Get screened for diabetes at least every 3 years.  If you are younger than age 35, ask your care team if you should be screened for diabetes.  Cholesterol test: At age 39, start having a cholesterol test every 5 years, or more often if advised.  Bone density scan (DEXA): At age 50, ask your care team if you should have this scan for osteoporosis (brittle bones).  Hepatitis C: Get tested at least once in your life.  STIs (sexually transmitted infections)  Before age 24: Ask your care team if you should be screened for STIs.  After age 24: Get screened for STIs if you're at risk. You are at risk for STIs (including HIV) if:  You are sexually active with more than one person.  You don't use condoms every time.  You or a partner was diagnosed with a sexually transmitted infection.  If you are at risk for HIV, ask about PrEP medicine to prevent HIV.  Get tested for HIV at least once in your life, whether you are at risk for HIV or not.  Cancer screening tests  Cervical cancer screening: If you have a cervix, begin getting regular cervical cancer screening tests starting at age 21.  Breast cancer scan (mammogram): If you've ever had breasts, begin having regular mammograms starting at age 40. This is a scan to check for breast cancer.  Colon cancer screening: It is important to start screening for colon cancer at age 45.  Have a colonoscopy test every 10 years (or more often if you're at risk) Or, ask your provider about stool tests like a FIT test every year or Cologuard test every 3  years.  To learn more about your testing options, visit:   .  For help making a decision, visit:   https://bit.ly/zb92104.  Prostate cancer screening test: If you have a prostate, ask your care team if a prostate cancer screening test (PSA) at age 55 is right for you.  Lung cancer screening: If you are a current or former smoker ages 50 to 80, ask your care team if ongoing lung cancer screenings are right for you.  For informational purposes only. Not to replace the advice of your health care provider. Copyright   2023 [employer]. All rights reserved. Clinically reviewed by the [employer]. "[employer] 146484 - REV 01/24.  Preventing Falls: Care Instructions  Injuries and health problems such as trouble walking or poor eyesight can increase your risk of falling. So can some medicines. But there are things you can do to help prevent falls. You can exercise to get stronger. You can also arrange your home to make it safer.    Talk to your doctor about the medicines you take. Ask if any of them increase the risk of falls and whether they can be changed or stopped.   Try to exercise regularly. It can help improve your strength and balance. This can help lower your risk of falling.         Practice fall safety and prevention.   Wear low-heeled shoes that fit well and give your feet good support. Talk to your doctor if you have foot problems that make this hard.  Carry a cellphone or wear a medical alert device that you can use to call for help.  Use stepladders instead of chairs to reach high objects. Don't climb if you're at risk for falls. Ask for help, if needed.  Wear the correct eyeglasses, if you need them.        Make your home safer.   Remove rugs, cords, clutter, and furniture from walkways.  Keep your house well lit. Use night-lights in hallways and bathrooms.  Install and use sturdy handrails on stairways.  Wear nonskid footwear, even inside. Don't walk barefoot or in  "socks without shoes.        Be safe outside.   Use handrails, curb cuts, and ramps whenever possible.  Keep your hands free by using a shoulder bag or backpack.  Try to walk in well-lit areas. Watch out for uneven ground, changes in pavement, and debris.  Be careful in the winter. Walk on the grass or gravel when sidewalks are slippery. Use de-icer on steps and walkways. Add non-slip devices to shoes.    Put grab bars and nonskid mats in your shower or tub and near the toilet. Try to use a shower chair or bath bench when bathing.   Get into a tub or shower by putting in your weaker leg first. Get out with your strong side first. Have a phone or medical alert device in the bathroom with you.   Where can you learn more?  Go to https://www.[employer].com.[employer]/patiented  Enter G117 in the search box to learn more about \"Preventing Falls: Care Instructions.\"  Current as of: July 31, 2024  Content Version: 14.4    7947-0376 [employer].   Care instructions adapted under license by your healthcare professional. If you have questions about a medical condition or this instruction, always ask your healthcare professional. [employer] disclaims any warranty or liability for your use of this information.    Relationships for Good Health  Relationships are important for our health and happiness. Social isolation, loneliness and lack of support are bad for your health. Studies show that loneliness can harm health and limit your life span as much as high blood pressure and smoking.   Take some time to reflect on your relationships. Then answer these questions:  Are there people in your life that cause you stress or drain your energy? What can you do to set " limits?  ________________________________________________________________________________________________________________________________________________________________________________________________________________________________________________________________________________________________________________________________________________  Who do you enjoy spending time with? Who can you go to for support?  ________________________________________________________________________________________________________________________________________________________________________________________________________________________________________________________________________________________________________________________________________________  What can you do to improve your relationships with others?  __________________________________________________________________________________________________________________________________________________________________________________________________________________  ______________________________________________________________________________________________________________________________  What do you like most about your relationships with others?  ________________________________________________________________________________________________________________________________________________________________________________________________________________________________________________________________________________________________________________________________________________  My goal: ______________________________________________________________________  I will: ______________________________________________________________________________________________________________________________________________________________________________________________    For informational purposes only. Not to replace the advice of your health care provider. Copyright   2018  [facility]. All rights reserved. Clinically reviewed by Bariatric Health  Team. [employer] 353379 - Rev 06/24.  Learning About Sleeping Well  What does sleeping well mean?     Sleeping well means getting enough sleep to feel good and stay healthy. How much sleep is enough varies among people.  The number of hours you sleep and how you feel when you wake up are both important. If you do not feel refreshed, you probably need more sleep. Another sign of not getting enough sleep is feeling tired during the day.  Experts recommend that adults get at least 7 or more hours of sleep per day. Children and older adults need more sleep.  Why is getting enough sleep important?  Getting enough quality sleep is a basic part of good health. When your sleep suffers, your physical health, mood, and your thoughts can suffer too. You may find yourself feeling more grumpy or stressed. Not getting enough sleep also can lead to serious problems, including injury, accidents, anxiety, and depression.  What might cause poor sleeping?  Many things can cause sleep problems, including:  Changes to your sleep schedule.  Stress. Stress can be caused by fear about a single event, such as giving a speech. Or you may have ongoing stress, such as worry about work or school.  Depression, anxiety, and other mental or emotional conditions.  Changes in your sleep habits or surroundings. This includes changes that happen where you sleep, such as noise, light, or sleeping in a different bed. It also includes changes in your sleep pattern, such as having jet lag or working a late shift.  Health problems, such as pain, breathing problems, and restless legs syndrome.  Lack of regular exercise.  Using alcohol, nicotine, or caffeine before bed.  How can you help yourself?  Here are some tips that may help you sleep more soundly and wake up feeling more refreshed.  Your sleeping area   Use your bedroom only for sleeping and sex. A bit of  "light reading may help you fall asleep. But if it doesn't, do your reading elsewhere in the house. Try not to use your TV, computer, smartphone, or tablet while you are in bed.  Be sure your bed is big enough to stretch out comfortably, especially if you have a sleep partner.  Keep your bedroom quiet, dark, and cool. Use curtains, blinds, or a sleep mask to block out light. To block out noise, use earplugs, soothing music, or a \"white noise\" machine.  Your evening and bedtime routine   Create a relaxing bedtime routine. You might want to take a warm shower or bath, or listen to soothing music.  Go to bed at the same time every night. And get up at the same time every morning, even if you feel tired.  What to avoid   Limit caffeine (coffee, tea, caffeinated sodas) during the day, and don't have any for at least 6 hours before bedtime.  Avoid drinking alcohol before bedtime. Alcohol can cause you to wake up more often during the night.  Try not to smoke or use tobacco, especially in the evening. Nicotine can keep you awake.  Limit naps during the day, especially close to bedtime.  Avoid lying in bed awake for too long. If you can't fall asleep or if you wake up in the middle of the night and can't get back to sleep within about 20 minutes, get out of bed and go to another room until you feel sleepy.  Avoid taking medicine right before bed that may keep you awake or make you feel hyper or energized. Your doctor can tell you if your medicine may do this and if you can take it earlier in the day.  If you can't sleep   Imagine yourself in a peaceful, pleasant scene. Focus on the details and feelings of being in a place that is relaxing.  Get up and do a quiet or boring activity until you feel sleepy.  Avoid drinking any liquids before going to bed to help prevent waking up often to use the bathroom.  Where can you learn more?  Go to https://www.healthwise.net/patiented  Enter J942 in the search box to learn more about " "\"Learning About Sleeping Well.\"  Current as of: July 31, 2024  Content Version: 14.4    5360-5190 [email or web address].   Care instructions adapted under license by your healthcare professional. If you have questions about a medical condition or this instruction, always ask your healthcare professional. [employer] disclaims any warranty or liability for your use of this information.    Bladder Training: Care Instructions  Your Care Instructions     Bladder training is used to treat urge incontinence and stress incontinence. Urge incontinence means that the need to urinate comes on so fast that you can't get to a toilet in time. Stress incontinence means that you leak urine because of pressure on your bladder. For example, it may happen when you laugh, cough, or lift something heavy.  Bladder training can increase how long you can wait before you have to urinate. It can also help your bladder hold more urine. And it can give you better control over the urge to urinate.  It is important to remember that bladder training takes a few weeks to a few months to make a difference. You may not see results right away, but don't give up.  Follow-up care is a key part of your treatment and safety. Be sure to make and go to all appointments, and call your doctor if you are having problems. It's also a good idea to know your test results and keep a list of the medicines you take.  How can you care for yourself at home?  Work with your doctor to come up with a bladder training program that is right for you. You may use one or more of the following methods.  Delayed urination  In the beginning, try to keep from urinating for 5 minutes after you first feel the need to go.  While you wait, take deep, slow breaths to relax. Kegel exercises can also help you delay the need to go to the bathroom.  After some practice, when you can easily wait 5 minutes to urinate, try to wait 10 minutes before you urinate.  Slowly increase " "the waiting period until you are able to control when you have to urinate.  Scheduled urination  Empty your bladder when you first wake up in the morning.  Schedule times throughout the day when you will urinate.  Start by going to the bathroom every hour, even if you don't need to go.  Slowly increase the time between trips to the bathroom.  When you have found a schedule that works well for you, keep doing it.  If you wake up during the night and have to urinate, do it. Apply your schedule to waking hours only.  Kegel exercises  These tighten and strengthen pelvic muscles, which can help you control the flow of urine. (If doing these exercises causes pain, stop doing them and talk with your doctor.) To do Kegel exercises:  Squeeze your muscles as if you were trying not to pass gas. Or squeeze your muscles as if you were stopping the flow of urine. Your belly, legs, and buttocks shouldn't move.  Hold the squeeze for 3 seconds, then relax for 5 to 10 seconds.  Start with 3 seconds, then add 1 second each week until you are able to squeeze for 10 seconds.  Repeat the exercise 10 times a session. Do 3 to 8 sessions a day.  When should you call for help?  Watch closely for changes in your health, and be sure to contact your doctor if:    Your incontinence is getting worse.     You do not get better as expected.   Where can you learn more?  Go to https://www.[employer].net/patiented  Enter V684 in the search box to learn more about \"Bladder Training: Care Instructions.\"  Current as of: April 30, 2024  Content Version: 14.4    8606-6408 [employer].   Care instructions adapted under license by your healthcare professional. If you have questions about a medical condition or this instruction, always ask your healthcare professional. [employer] disclaims any warranty or liability for your use of this information.    Chronic Pain: Care Instructions  Your Care Instructions     Chronic pain is pain that " lasts a long time (months or even years) and may or may not have a clear cause. It is different from acute pain, which usually does have a clear cause--like an injury or illness--and gets better over time. Chronic pain:  Lasts over time but may vary from day to day.  Does not go away despite efforts to end it.  May disrupt your sleep and lead to fatigue.  May cause depression or anxiety.  May make your muscles tense, causing more pain.  Can disrupt your work, hobbies, home life, and relationships with friends and family.  Chronic pain is a very real condition. It is not just in your head. Treatment can help and usually includes several methods used together, such as medicines, physical therapy, exercise, and other treatments. Learning how to relax and changing negative thought patterns can also help you cope.  Chronic pain is complex. Taking an active role in your treatment will help you better manage your pain. Tell your doctor if you have trouble dealing with your pain. You may have to try several things before you find what works best for you.  Follow-up care is a key part of your treatment and safety. Be sure to make and go to all appointments, and call your doctor if you are having problems. It's also a good idea to know your test results and keep a list of the medicines you take.  How can you care for yourself at home?  Pace yourself. Break up large jobs into smaller tasks. Save harder tasks for days when you have less pain, or go back and forth between hard tasks and easier ones. Take rest breaks.  Relax, and reduce stress. Relaxation techniques such as deep breathing or meditation can help.  Keep moving. Gentle, daily exercise can help reduce pain over the long run. Try low- or no-impact exercises such as walking, swimming, and stationary biking. Do stretches to stay flexible.  Try heat, cold packs, and massage.  Get enough sleep. Chronic pain can make you tired and drain your energy. Talk with your doctor  if you have trouble sleeping because of pain.  Think positive. Your thoughts can affect your pain level. Do things that you enjoy to distract yourself when you have pain instead of focusing on the pain. See a movie, read a book, listen to music, or spend time with a friend.  If you think you are depressed, talk to your doctor about treatment.  Keep a daily pain diary. Record how your moods, thoughts, sleep patterns, activities, and medicine affect your pain. You may find that your pain is worse during or after certain activities or when you are feeling a certain emotion. Having a record of your pain can help you and your doctor find the best ways to treat your pain.  Take pain medicines exactly as directed.  If the doctor gave you a prescription medicine for pain, take it as prescribed.  If you are not taking a prescription pain medicine, ask your doctor if you can take an over-the-counter medicine.  Reducing constipation caused by pain medicine  Talk to your doctor about a laxative. If a laxative doesn't work, your doctor may suggest a prescription medicine.  Include fruits, vegetables, beans, and whole grains in your diet each day. These foods are high in fiber.  If your doctor recommends it, get more exercise. Walking is a good choice. Bit by bit, increase the amount you walk every day. Try for at least 30 minutes on most days of the week.  Schedule time each day for a bowel movement. A daily routine may help. Take your time and do not strain when having a bowel movement.  When should you call for help?   Call your doctor now or seek immediate medical care if:    Your pain gets worse or is out of control.     You feel down or blue, or you do not enjoy things like you once did. You may be depressed, which is common in people with chronic pain. Depression can be treated.     You have vomiting or cramps for more than 2 hours.   Watch closely for changes in your health, and be sure to contact your doctor if:    You  "cannot sleep because of pain.     You are very worried or anxious about your pain.     You have trouble taking your pain medicine.     You have any concerns about your pain medicine.     You have trouble with bowel movements, such as:  No bowel movement in 3 days.  Blood in the anal area, in your stool, or on the toilet paper.  Diarrhea for more than 24 hours.   Where can you learn more?  Go to https://www.[employer].net/patiented  Enter N004 in the search box to learn more about \"Chronic Pain: Care Instructions.\"  Current as of: July 31, 2024  Content Version: 14.4    1293-1906 [employer].   Care instructions adapted under license by your healthcare professional. If you have questions about a medical condition or this instruction, always ask your healthcare professional. [employer] disclaims any warranty or liability for your use of this information.       "

## 2025-04-23 NOTE — PROGRESS NOTES
{PROVIDER CHARTING PREFERENCE:428008}    Zhang Luciano is a 64 year old, presenting for the following health issues:  RECHECK and Medicare Visit      4/23/2025     7:59 AM   Additional Questions   Roomed by MP         4/23/2025     7:59 AM   Patient Reported Additional Medications   Patient reports taking the following new medications muscle relaxant     HPI      Discuss health ongoing health issues  Annual Wellness Visit   {Split Bill scripting  The purpose of this visit is to discuss your medical history and prevent health problems before you are sick. You may be responsible for a co-pay, coinsurance, or deductible if your visit today includes services such as checking on a sore throat, having an x-ray or lab test, or treating and evaluating a new or existing condition :767717}  Patient has been advised of split billing requirements and indicates understanding: {Yes and No:018620}   {Provider  Link to SmartSet :641941}{ROOMER positive Fall Risk- Gait Speed Test is required click here to document the Gait Speed Test and then refresh the note to pull in results  :119714}  {ROOMER if patient is in their first year of Medicare a vision screen is required click here to document the Vison screen and then refresh the note to pull in results  :952293}  Health Care Directive  Patient does not have a Health Care Directive: {ADVANCE_DIRECTIVE_STATUS:835972}      4/23/2025   General Health   How would you rate your overall physical health? (!) POOR   Feel stress (tense, anxious, or unable to sleep) Not at all          4/23/2025   Nutrition   Diet: Low salt    I don't know       Multiple values from one day are sorted in reverse-chronological order         4/23/2025   Exercise   Days per week of moderate/strenous exercise 3 days           4/23/2025   Social Factors   Frequency of gathering with friends or relatives Never   Worry food won't last until get money to buy more No   Food not last or not have enough money  for food? No   Do you have housing? (Housing is defined as stable permanent housing and does not include staying ouside in a car, in a tent, in an abandoned building, in an overnight shelter, or couch-surfing.) Yes   Are you worried about losing your housing? No   Lack of transportation? No   Unable to get utilities (heat,electricity)? No   (!) SOCIAL CONNECTIONS CONCERN        4/23/2025   Fall Risk   Fallen 2 or more times in the past year? No   Trouble with walking or balance? Yes     {Positive Fall Risk- Gait Speed Test is required and was not documented before note was started.  If results do not appear, ask staff to complete.  Once completed, refresh note to pull in results Click here to link Gait Speed Test  :455387}      4/23/2025   Activities of Daily Living- Home Safety   Needs help with the following daily activites None of the above   Safety concerns in the home None of the above         4/23/2025   Dental   Dentist two times every year? (!) NO         4/23/2025   Hearing Screening   Hearing concerns? None of the above         4/23/2025   Driving Risk Screening   Patient/family members have concerns about driving No         4/23/2025   General Alertness/Fatigue Screening   Have you been more tired than usual lately? (!) YES         4/23/2025   Urinary Incontinence Screening   Bothered by leaking urine in past 6 months Yes          No data to display                  4/23/2025   Substance Use   Alcohol more than 3/day or more than 7/wk Not Applicable   Do you have a current opioid prescription? (!) YES   How severe/bad is pain from 1 to 10? 10/10   Do you use any other substances recreationally? No   {Provider  Link to Opioid Risk Tool  Assess risk of opioid use disorder :154675}  {AWV REQUIRED- Pull in ORT Results:799809}  Social History     Tobacco Use    Smoking status: Former     Current packs/day: 0.00     Average packs/day: 1 pack/day for 5.0 years (5.0 ttl pk-yrs)     Types: Cigarettes     Start  date: 1994     Quit date: 1999     Years since quittin.4     Passive exposure: Never    Smokeless tobacco: Never   Vaping Use    Vaping status: Never Used   Substance Use Topics    Alcohol use: Yes     Comment: very little    Drug use: Yes     Types: Marijuana     Comment: gummies     {Provider  If there are gaps in the social history shown above, please follow the link to update and then refresh the note Link to Social and Substance History :640909}  { Rooming Staff Patient needs a PHQ as part of the AWV.  Use this link to complete and then refresh the note to pull results Link to PHQ9 Assessment :396998}  {USE TO PULL IN PHQ RESULTS FOR TODAY:071394}    Last PSA:   Prostate Specific Antigen Screen   Date Value Ref Range Status   2024 2.42 0.00 - 4.50 ng/mL Final   10/08/2021 2.88 0.00 - 4.00 ug/L Final     PSA Tumor Marker   Date Value Ref Range Status   2022 1.91 0.00 - 4.50 ng/mL Final     ASCVD Risk   The 10-year ASCVD risk score (Jenelle ESCOBAR, et al., 2019) is: 16.6%    Values used to calculate the score:      Age: 64 years      Sex: Male      Is Non- : No      Diabetic: No      Tobacco smoker: No      Systolic Blood Pressure: 130 mmHg      Is BP treated: Yes      HDL Cholesterol: 46 mg/dL      Total Cholesterol: 192 mg/dL    {Link to Fracture Risk Assessment Tool (Optional):565027}    {Provider  REQUIRED FOR AWV Use the storyboard to review patient history, after sections have been marked as reviewed, refresh note to capture documentation:608563}  {Provider   REQUIRED AWV use this link to review and update sexual activity history  after section has been marked as reviewed, refresh note to capture documentation:490729}    Reviewed and updated as needed this visit by Provider                    {HISTORY OPTIONS (Optional):167983}    Current providers sharing in care for this patient include:  Patient Care Team:  Silver Pro DO as PCP - General  (Family Medicine)  Matty Ruiz MD as MD (Neurological Surgery)  Max Rico MD as Referring Physician (Family Medicine)  Silver Pro DO as Assigned PCP  Destiny He DNP as Nurse Practitioner (Pain Medicine)  Silver Pro DO as Physician (Family Medicine)  Destiny He DNP as Assigned Pain Medication Provider  Destiny He DNP as Nurse Practitioner (Pain Clinic)  Khris Rincon PA-C as Assigned Surgical Provider    The following health maintenance items are reviewed in Epic and correct as of today:  Health Maintenance   Topic Date Due    Pneumococcal Vaccine: 50+ Years (1 of 2 - PCV) Never done    HEPATITIS A IMMUNIZATION (1 of 2 - Risk 2-dose series) Never done    ZOSTER IMMUNIZATION (1 of 2) Never done    RSV VACCINE (1 - Risk 60-74 years 1-dose series) Never done    MEDICARE ANNUAL WELLNESS VISIT  Never done    INFLUENZA VACCINE (1) Never done    COVID-19 Vaccine (1 - 2024-25 season) Never done    ASTHMA ACTION PLAN  11/27/2024    DTAP/TDAP/TD IMMUNIZATION (2 - Td or Tdap) 06/23/2025    LIPID  08/19/2025    ASTHMA CONTROL TEST  10/23/2025    CHRISSY ASSESSMENT  12/11/2025    CONTROLLED SUBSTANCE AGREEMENT FOR CHRONIC PAIN MANAGEMENT  01/10/2026    ANNUAL REVIEW OF HM ORDERS  02/20/2026    PHQ-9  02/20/2026    BMP  04/11/2026    FALL RISK ASSESSMENT  04/23/2026    DIABETES SCREENING  04/11/2028    ADVANCE CARE PLANNING  03/19/2030    COLORECTAL CANCER SCREENING  08/31/2032    HEPATITIS C SCREENING  Completed    AORTIC ANEURYSM SCREENING (SYSTEM ASSIGNED)  Completed    HPV IMMUNIZATION  Aged Out    MENINGITIS IMMUNIZATION  Aged Out    URINE DRUG SCREEN  Discontinued       Appropriate preventive services were discussed with this patient, including applicable screening as appropriate for fall prevention, nutrition, physical activity, Tobacco-use cessation, weight loss and cognition.  Checklist reviewing preventive services available has been given to the patient.           "4/23/2025   Mini Cog   Clock Draw Score 2 Normal   3 Item Recall 2 objects recalled   Mini Cog Total Score 4     {A Mini-Cog total score of 0-2 suggests the possibility of dementia, score of 3-5 suggests no dementia:793486}       {additonal problems for provider to add (Optional):752109}    {ROS Picklists (Optional):464391}      Objective    /82   Pulse 94   Temp 98.1  F (36.7  C) (Temporal)   Resp 16   Ht 1.651 m (5' 5\")   Wt 91.1 kg (200 lb 12.8 oz)   SpO2 96%   BMI 33.41 kg/m    Body mass index is 33.41 kg/m .  Physical Exam   {Exam List (Optional):289056}    {Diagnostic Test Results (Optional):429116}        Signed Electronically by: Silver Pro DO  {Email feedback regarding this note to [email or web address]   :880091}  "

## 2025-04-23 NOTE — PROGRESS NOTES
"  Assessment & Plan     Encounter for Medicare annual wellness exam    Essential hypertension  Chronic and stable.  Patient would like to discontinue hydrlochlorothiazide due to potential gout complications and leg cramps.   Will start on amlodipine.   - amLODIPine (NORVASC) 5 MG tablet; Take 1 tablet (5 mg) by mouth daily.    Lumbar disc herniation with radiculopathy  S/P decompression hemilaminotomy/discetomy L2-L3 on 3/31/25.  Per patient, no improvement. Keep follow-up with general surgery.  Continue with percocet prn.  Scheduled for follow-up with urology and pain management.   - amitriptyline (ELAVIL) 25 MG tablet; Take 1 tablet (25 mg) by mouth at bedtime.    I spent 30 minutes discussing the patient s acute/chronic conditions and options for treatment including medication therapy, counseling services and meditation.    The longitudinal plan of care for the diagnosis(es)/condition(s) as documented were addressed during this visit. Due to the added complexity in care, I will continue to support Mustapha in the subsequent management and with ongoing continuity of care.      Patient has been advised of split billing requirements and indicates understanding: Yes        BMI  Estimated body mass index is 33.41 kg/m  as calculated from the following:    Height as of this encounter: 1.651 m (5' 5\").    Weight as of this encounter: 91.1 kg (200 lb 12.8 oz).   Weight management plan: Discussed healthy diet and exercise guidelines    Counseling  Appropriate preventive services were addressed with this patient via screening, questionnaire, or discussion as appropriate for fall prevention, nutrition, physical activity, Tobacco-use cessation, social engagement, weight loss and cognition.  Checklist reviewing preventive services available has been given to the patient.  Reviewed patient's diet, addressing concerns and/or questions.   He is at risk for lack of exercise and has been provided with information to increase physical " activity for the benefit of his well-being.   Patient is at risk for social isolation and has been provided with information about the benefit of social connection.   The patient was instructed to see the dentist every 6 months.   Discussed possible causes of fatigue. Information on urinary incontinence and treatment options given to patient.   I have reviewed Opioid Use Disorder and Substance Use Disorder risk factors and made any needed referrals.       Follow-up    Follow-up Visit   Expected date:  Jul 23, 2025 (Approximate)      Follow Up Appointment Details:     Follow-up with whom?: Me    Follow-Up for what?: Chronic Disease f/u    Chronic Disease f/u: General (Other)    Additional Details: Back pain follow-up    How?: In Person             Follow-up Visit   Expected date:  Apr 30, 2026 (Approximate)      Follow Up Appointment Details:     Follow-up with whom?: PCP    Follow-Up for what?: Medicare Wellness    Welcome or Annual?: Annual Wellness    How?: In Person                 Zhang Luciano is a 64 year old, presenting for the following health issues:  RECHECK and Medicare Visit      4/23/2025     7:59 AM   Additional Questions   Roomed by MP         4/23/2025     7:59 AM   Patient Reported Additional Medications   Patient reports taking the following new medications muscle relaxant     HPI      Discuss ongoing health issues and who he should see  Annual Wellness Visit     Patient has been advised of split billing requirements and indicates understanding: Yes       Health Care Directive  Patient does not have a Health Care Directive: Advance Directive received and scanned. Click on Code in the patient header to view.      4/23/2025   General Health   How would you rate your overall physical health? (!) POOR   Feel stress (tense, anxious, or unable to sleep) Not at all          4/23/2025   Nutrition   Diet: Low salt    I don't know       Multiple values from one day are sorted in reverse-chronological  order         4/23/2025   Exercise   Days per week of moderate/strenous exercise 3 days           4/23/2025   Social Factors   Frequency of gathering with friends or relatives Never   Worry food won't last until get money to buy more No   Food not last or not have enough money for food? No   Do you have housing? (Housing is defined as stable permanent housing and does not include staying ouside in a car, in a tent, in an abandoned building, in an overnight shelter, or couch-surfing.) Yes   Are you worried about losing your housing? No   Lack of transportation? No   Unable to get utilities (heat,electricity)? No   (!) SOCIAL CONNECTIONS CONCERN        4/23/2025   Fall Risk   Fallen 2 or more times in the past year? No   Trouble with walking or balance? Yes   Gait Speed Test (Document in seconds) 4.09   Gait Speed Test Interpretation Less than or equal to 5.00 seconds - PASS          4/23/2025   Activities of Daily Living- Home Safety   Needs help with the following daily activites None of the above   Safety concerns in the home None of the above         4/23/2025   Dental   Dentist two times every year? (!) NO         4/23/2025   Hearing Screening   Hearing concerns? None of the above         4/23/2025   Driving Risk Screening   Patient/family members have concerns about driving No         4/23/2025   General Alertness/Fatigue Screening   Have you been more tired than usual lately? (!) YES         4/23/2025   Urinary Incontinence Screening   Bothered by leaking urine in past 6 months Yes          No data to display                  4/23/2025   Substance Use   Alcohol more than 3/day or more than 7/wk Not Applicable   Do you have a current opioid prescription? (!) YES   How severe/bad is pain from 1 to 10? 10/10   Do you use any other substances recreationally? No          No data to display              Low Risk (0-3)  Moderate Risk (4-7)  High Risk (>8)  Social History     Tobacco Use    Smoking status: Former      Current packs/day: 0.00     Average packs/day: 1 pack/day for 5.0 years (5.0 ttl pk-yrs)     Types: Cigarettes     Start date: 1994     Quit date: 1999     Years since quittin.4     Passive exposure: Never    Smokeless tobacco: Never   Vaping Use    Vaping status: Never Used   Substance Use Topics    Alcohol use: Yes     Comment: very little    Drug use: Yes     Types: Marijuana     Comment: gummies         Today's PHQ-9 Score:       2025     9:43 AM   PHQ-9 SCORE   PHQ-9 Total Score MyChart 8 (Mild depression)   PHQ-9 Total Score 8        Patient-reported       Last PSA:   Prostate Specific Antigen Screen   Date Value Ref Range Status   2024 2.42 0.00 - 4.50 ng/mL Final   10/08/2021 2.88 0.00 - 4.00 ug/L Final     PSA Tumor Marker   Date Value Ref Range Status   2022 1.91 0.00 - 4.50 ng/mL Final     ASCVD Risk   The 10-year ASCVD risk score (Jenelle ESCOBAR, et al., 2019) is: 16.6%    Values used to calculate the score:      Age: 64 years      Sex: Male      Is Non- : No      Diabetic: No      Tobacco smoker: No      Systolic Blood Pressure: 130 mmHg      Is BP treated: Yes      HDL Cholesterol: 46 mg/dL      Total Cholesterol: 192 mg/dL              Reviewed and updated as needed this visit by Provider                    Past Medical History:   Diagnosis Date    MARIAN (obstructive sleep apnea)     havne't used cpap since 2018    Pelvic floor dysfunction      Past Surgical History:   Procedure Laterality Date    BACK SURGERY      CHOLECYSTECTOMY      COLONOSCOPY N/A 2022    Procedure: COLONOSCOPY;  Surgeon: Veena Mendoza MD;  Location:  GI    ENT SURGERY      tonsillectomy    GENITOURINARY SURGERY      TURP    IMPLANT STIMULATOR SACRAL NERVE PERCUTANEOUS TRIAL N/A 10/17/2022    Procedure: INSERTION, NEUROSTIMULATOR, SACRAL, PERCUTANEOUS, FOR TRIAL (trial with Gysttr[employer]);  Surgeon: Nayan Tello MD;  Location: [facility] OR    JOINT REPLACEMENT,  HIP RT/LT Left     ORTHOPEDIC SURGERY      wrist surgery, carpal tunnel 2005    PROSTATE SURGERY      Urolift    ROTATOR CUFF REPAIR RT/LT Left     TOTAL HIP ARTHROPLASTY Right 11/18/2024       Current providers sharing in care for this patient include:  Patient Care Team:  Silver Pro DO as PCP - General (Family Medicine)  Matty Ruiz MD as MD (Neurological Surgery)  Max Rico MD as Referring Physician (Family Medicine)  Silver Pro DO as Assigned PCP  Destiny He DNP as Nurse Practitioner (Pain Medicine)  Silver Pro DO as Physician (Family Medicine)  Destiny He DNP as Assigned Pain Medication Provider  Destiny He DNP as Nurse Practitioner (Pain Clinic)  Khris Rincon PA-C as Assigned Surgical Provider    The following health maintenance items are reviewed in Epic and correct as of today:  Health Maintenance   Topic Date Due    Pneumococcal Vaccine: 50+ Years (1 of 2 - PCV) Never done    HEPATITIS A IMMUNIZATION (1 of 2 - Risk 2-dose series) Never done    ZOSTER IMMUNIZATION (1 of 2) Never done    RSV VACCINE (1 - Risk 60-74 years 1-dose series) Never done    MEDICARE ANNUAL WELLNESS VISIT  Never done    INFLUENZA VACCINE (1) Never done    COVID-19 Vaccine (1 - 2024-25 season) Never done    ASTHMA ACTION PLAN  11/27/2024    DTAP/TDAP/TD IMMUNIZATION (2 - Td or Tdap) 06/23/2025    LIPID  08/19/2025    ASTHMA CONTROL TEST  10/23/2025    CHRISSY ASSESSMENT  12/11/2025    CONTROLLED SUBSTANCE AGREEMENT FOR CHRONIC PAIN MANAGEMENT  01/10/2026    ANNUAL REVIEW OF HM ORDERS  02/20/2026    PHQ-9  02/20/2026    BMP  04/11/2026    FALL RISK ASSESSMENT  04/23/2026    DIABETES SCREENING  04/11/2028    ADVANCE CARE PLANNING  03/19/2030    COLORECTAL CANCER SCREENING  08/31/2032    HEPATITIS C SCREENING  Completed    AORTIC ANEURYSM SCREENING (SYSTEM ASSIGNED)  Completed    HPV IMMUNIZATION  Aged Out    MENINGITIS IMMUNIZATION  Aged Out    URINE DRUG SCREEN   "Discontinued       Appropriate preventive services were discussed with this patient, including applicable screening as appropriate for fall prevention, nutrition, physical activity, Tobacco-use cessation, weight loss and cognition.  Checklist reviewing preventive services available has been given to the patient.          4/23/2025   Mini Cog   Clock Draw Score 2 Normal   3 Item Recall 2 objects recalled   Mini Cog Total Score 4          1. MAWV      2. Post-op follow-up: S/P decompression hemilaminotomy/discetomy L2-L3.  Patient continues to have discomfort.  Patient has follow-up with urology and pain management.  Patient continues to buttock pain and right sciatic pain.  Patient is currently taking oxycodone which helps with the pain.  Did not tolerate gabapentin.     3. Hypertension: Patient was previously on lisinopril.  Currently on hydrochlorothiazide.  Patient would like a trial of a different medication due to his gout.       Review of Systems   Constitutional:  Negative for activity change, appetite change, fatigue and fever.   HENT: Negative.     Eyes:  Negative for pain, discharge and visual disturbance.   Respiratory:  Negative for cough, shortness of breath and wheezing.    Cardiovascular:  Negative for chest pain, palpitations and leg swelling.   Gastrointestinal: Negative.    Endocrine: Negative.    Genitourinary: Negative.    Skin:  Negative for color change and rash.   Allergic/Immunologic: Negative.    Neurological:  Negative for dizziness, seizures and headaches.   Hematological: Negative.    Psychiatric/Behavioral:  Negative for agitation, confusion, decreased concentration and hallucinations. The patient is not nervous/anxious.          Objective    /82   Pulse 94   Temp 98.1  F (36.7  C) (Temporal)   Resp 16   Ht 1.651 m (5' 5\")   Wt 91.1 kg (200 lb 12.8 oz)   SpO2 96%   BMI 33.41 kg/m    Body mass index is 33.41 kg/m .  Physical Exam  Constitutional:       General: He is not in " acute distress.  HENT:      Head: Normocephalic and atraumatic.   Eyes:      Conjunctiva/sclera: Conjunctivae normal.   Cardiovascular:      Rate and Rhythm: Normal rate and regular rhythm.      Heart sounds: Normal heart sounds. No murmur heard.  Pulmonary:      Effort: Pulmonary effort is normal. No respiratory distress.      Breath sounds: No wheezing or rales.   Musculoskeletal:         General: Normal range of motion.      Comments: Lower back: well healed surgical scar appears C/D/I   Skin:     Findings: No rash.   Neurological:      Mental Status: He is alert and oriented to person, place, and time.                Signed Electronically by: Silver Pro DO

## 2025-04-24 NOTE — PROGRESS NOTES
"HISTORY OF PRESENT ILLNESS:  Mr. Negrete is a pleasant 64 year old male who I am seeing today for cystoscopy given his concern for possible urethral stricture.  I met with Mustapha recently in follow-up for his longstanding history of severe pelvic pain at which time he also endorsed continued obstructive voiding symptoms despite what sounds like low likelihood of true obstructive process.  However, at that visit he did endorse needing a \"metal frankie shoved up my penis to stretch things out\" in his past, so we discussed having him complete his upcoming surgery with general surgery for pudendal nerve release and follow-up with me for cystoscopy for stricture evaluation.    PROCEDURE:    Mr. Negrete was brought to the cystoscopy suite and placed in the lithotomy position. he was prepped and draped in the standard fashion.  A FLEXIBLE CYSTOSCOPE was inserted through the urethra and into the bladder.  Urethroscopy was normal. The sphincter coapted normally.  The prostate exhibited mild bilobar hypertrophy. The bladder was entered and inspected.  There were evidence of a diverticula on the right bladder wall inspected with no abnormalities and some cellules on the posterior bladder wall, but no stones or foreign bodies noted.  Mild trabeculations were noted. The ureteral orifices were identified in their orthotopic position bilaterally effluxing clear urine.  There were no areas of concerning erythema or papillary lesions. Retroflex view demonstrated a normal appearing bladder neck with evidence of previous UroLift procedure with a minor defect but still with some mild intravesical protrusion of the prostate. The cystoscope was then removed with no evidence of active bleeding.  Mr. Negrete tolerated the procedure well.     IMPRESSION:   Intermittent and weak urinary stream  Severe pelvic pain    It was my pleasure to complete Mustapha's cystoscopy today to rule out evidence of stricture or recurrent BPH.  After completing his cystoscopy " I was happy to let him know that there was no evidence of any adverse pathology seen on the cystoscopy.  I did my best to answer many questions in regards to different possible etiologies that Mustapha was concerned about, but ultimately believe that there is nothing from a urologic standpoint that explains his ongoing pelvic pain.  With this in mind, my recommendation would be to follow-up with me in 6 months in the clinic or virtually to check in on where he is at, as well as to continue to pursue pain management with his primary care or the pain clinic.    Mr. Negrete expressed understanding and agreement to the above discussion and plan and all of his questions were answered to his satisfaction.     PLAN:   Follow-up visit with me in 6 months    Signed by:    Khris Rincon PA-C  [facility]  [address] [place] [address]

## 2025-04-28 ENCOUNTER — OFFICE VISIT (OUTPATIENT)
Dept: UROLOGY | Facility: CLINIC | Age: 65
End: 2025-04-28
Payer: MEDICARE

## 2025-04-28 VITALS
HEIGHT: 65 IN | DIASTOLIC BLOOD PRESSURE: 92 MMHG | SYSTOLIC BLOOD PRESSURE: 144 MMHG | WEIGHT: 200 LBS | OXYGEN SATURATION: 95 % | HEART RATE: 110 BPM | BODY MASS INDEX: 33.32 KG/M2

## 2025-04-28 DIAGNOSIS — R10.2 PELVIC PAIN IN MALE: ICD-10-CM

## 2025-04-28 DIAGNOSIS — N35.919 STRICTURE OF MALE URETHRA, UNSPECIFIED STRICTURE TYPE: Primary | ICD-10-CM

## 2025-04-28 PROCEDURE — 99207 PR DROP WITH A PROCEDURE: CPT | Performed by: STUDENT IN AN ORGANIZED HEALTH CARE EDUCATION/TRAINING PROGRAM

## 2025-04-28 PROCEDURE — 3080F DIAST BP >= 90 MM HG: CPT | Performed by: STUDENT IN AN ORGANIZED HEALTH CARE EDUCATION/TRAINING PROGRAM

## 2025-04-28 PROCEDURE — 3077F SYST BP >= 140 MM HG: CPT | Performed by: STUDENT IN AN ORGANIZED HEALTH CARE EDUCATION/TRAINING PROGRAM

## 2025-04-28 PROCEDURE — 1125F AMNT PAIN NOTED PAIN PRSNT: CPT | Performed by: STUDENT IN AN ORGANIZED HEALTH CARE EDUCATION/TRAINING PROGRAM

## 2025-04-28 PROCEDURE — 52000 CYSTOURETHROSCOPY: CPT | Performed by: STUDENT IN AN ORGANIZED HEALTH CARE EDUCATION/TRAINING PROGRAM

## 2025-04-28 RX ORDER — LIDOCAINE HYDROCHLORIDE 20 MG/ML
JELLY TOPICAL ONCE
Status: COMPLETED | OUTPATIENT
Start: 2025-04-28 | End: 2025-04-28

## 2025-04-28 RX ADMIN — LIDOCAINE HYDROCHLORIDE: 20 JELLY TOPICAL at 09:54

## 2025-04-28 ASSESSMENT — PAIN SCALES - GENERAL: PAINLEVEL_OUTOF10: SEVERE PAIN (10)

## 2025-04-28 NOTE — NURSING NOTE
"Chief Complaint   Patient presents with    Cystoscopy       Blood pressure (!) 144/92, pulse 110, height 1.651 m (5' 5\"), weight 90.7 kg (200 lb), SpO2 95%. Body mass index is 33.28 kg/m .    Patient Active Problem List   Diagnosis    Acute prostatitis    Urinary retention    Symptomatic cholelithiasis    Reflux esophagitis    MARIAN (obstructive sleep apnea)    OA (osteoarthritis) of hip    Neuropathy    Mild intermittent asthma, uncomplicated    Insomnia, unspecified    GERD (gastroesophageal reflux disease)    Hepatic steatosis    Benign prostatic hyperplasia with urinary obstruction    Anal fissure    Tear of right acetabular labrum, sequela    Lumbar disc herniation with radiculopathy    Lower urinary tract symptoms    Arthritis of left acromioclavicular joint    ASHD (arteriosclerotic heart disease)    Calculus of gallbladder with chronic cholecystitis without obstruction    Change in bowel habits    Complete tear of left rotator cuff    Constipation    COVID-19 virus infection    Displacement of lumbar intervertebral disc without myelopathy    Diverticular disease of large intestine    Hiatal hernia    Hyperlipidemia    Low testosterone    Pruritus ani    Pudendal neuralgia    Coccydynia    Uncomplicated opioid dependence (H)    F11.2 - Continuous opioid dependence (H)    F11.2 - Episodic opioid dependence (H)    F11.9 - Chronic, continuous use of opioids    Essential hypertension       Allergies   Allergen Reactions    Droperidol Other (See Comments)     Extrapyramidal Side Effect    Tamsulosin Dizziness    Fenofibrate Headache and Diarrhea             Fentanyl      Other reaction(s): N&V hard to wake up    Keflex [Cephalexin] Itching    Lactose GI Disturbance         Other reaction(s): GI upset    Midazolam      Other reaction(s): N&V, Hard to wake up    Monosodium Glutamate      Other reaction(s): diarrhea, headaches    Pcn [Penicillins] Other (See Comments)     Feels warm and flushed, but tolerates " Cephalexin    Atorvastatin Palpitations     Other reaction(s): palpitations    Sertraline Palpitations         Other reaction(s): Unknown    Simvastatin Palpitations     Other reaction(s): palpitations    Sulfa Antibiotics Headache and Rash     Burning    Other reaction(s): Rash  Other reaction(s): rash and headache    Tetracycline Rash     Burning    Other reaction(s): rash       Current Outpatient Medications   Medication Sig Dispense Refill    albuterol (PROAIR HFA/PROVENTIL HFA/VENTOLIN HFA) 108 (90 Base) MCG/ACT inhaler Inhale 2 puffs into the lungs every 6 hours.      amitriptyline (ELAVIL) 25 MG tablet Take 1 tablet (25 mg) by mouth at bedtime. 90 tablet 3    furosemide (LASIX) 20 MG tablet Take 1 tablet (20 mg) by mouth daily. 90 tablet 1    hydrochlorothiazide (HYDRODIURIL) 25 MG tablet Take 0.5 tablets (12.5 mg) by mouth daily.      oxyCODONE-acetaminophen (PERCOCET) 7.5-325 MG per tablet Take 1 tablet by mouth every 6 hours as needed for severe pain (max 3 tabs per day). Fill / start  2025 (15 day supply) 45 tablet 0       Social History     Tobacco Use    Smoking status: Former     Current packs/day: 0.00     Average packs/day: 1 pack/day for 5.0 years (5.0 ttl pk-yrs)     Types: Cigarettes     Start date: 1994     Quit date: 1999     Years since quittin.4     Passive exposure: Never    Smokeless tobacco: Never   Vaping Use    Vaping status: Never Used   Substance Use Topics    Alcohol use: Yes     Comment: very little    Drug use: Yes     Types: Marijuana     Comment: gummies       Invasive Procedure Safety Checklist:    Procedure: Cystoscopy    Action: Complete sections and checkboxes as appropriate.    Pre-procedure:  1. Patient ID Verified with 2 identifiers (Ifeoma and  or MRN) : YES    2. Procedure and site verified with patient/designee (when able) : YES    3. Accurate consent documentation in medical record : YES    4. H&P (or appropriate assessment) documented in medical  record : N/A  H&P must be up to 30 days prior to procedure an updated within 24 hours of                 Procedure as applicable.     5. Relevant diagnostic and radiology test results appropriately labeled and displayed as applicable : YES    6. Blood products, implants, devices, and/or special equipment available for the procedure as applicable : YES    7. Procedure site(s) marked with provider initials [Exclusions: none] : NO    8. Marking not required. Reason : Yes  Procedure does not require site marking    Time Out:     Time-Out performed immediately prior to starting procedure, including verbal and active participation of all team members addressing: YES    1. Correct patient identity.  2. Confirmed that the correct side and site are marked.  3. An accurate procedure to be done.  4. Agreement on the procedure to be done.  5. Correct patient position.  6. Relevant images and results are properly labeled and appropriately displayed.  7. The need to administer antibiotics or fluids for irrigation purposes during the procedure as applicable.  8. Safety precautions based on patient history or medication use.    During Procedure: Verification of correct person, site, and procedure occurs any time the responsibility for care of the patient is transferred to another member of the care team.    The following medication was given:     MEDICATION:  Lidocaine without epinephrine 2% jelly  ROUTE: urethral   SITE: urethral   DOSE: 10 mL  LOT #: [id number]  : [employer]  EXPIRATION DATE: 11-26  NDC#: [id number]   Was there drug waste? No    Prior to med admin, verified patient identity using patient's name and date of birth.  Due to med administration, patient instructed to remain in clinic for 15 minutes  afterwards, and to report any adverse reaction to me immediately.    Drug Amount Wasted:  None.  Vial/Syringe: Syringe      Jami Guajardo  4/28/2025  9:37 AM

## 2025-04-28 NOTE — PATIENT INSTRUCTIONS
"AFTER YOUR CYSTOSCOPY        You have just completed a cystoscopy, or \"cysto\", which allowed your physician to learn more about your bladder (or to remove a stent placed after surgery). We suggest that you continue to avoid caffeine, fruit juice, and alcohol for the next 24 hours, however, you are encouraged to return to your normal activities.         A few things that are considered normal after your cystoscopy:     * Small amount of bleeding (or spotting) that clears within the next 24 hours     * Slight burning sensation with urination     * Sensation to of needing to avoid more frequently     * The feeling of \"air\" in your urine     * Mild discomfort that is relieved with Tylenol        Please contact our office promptly if you:     * Develop a fever above 101 degrees     * Are unable to urinate     * Develop bright red blood that does not stop     * Severe pain or swelling         Please contact our office with any concerns or questions @[email or web address].  " ORIF right radius and ulnar shaft fracture - mini flouro on 12/5/17.

## 2025-04-28 NOTE — LETTER
"4/28/2025       RE: Mustapha Negrete  [id number] [facility] [address]     Dear Colleague,    Thank you for referring your patient, Mustapha Negrete, to the [facility] [place] at [facility]. Please see a copy of my visit note below.    HISTORY OF PRESENT ILLNESS:  Mr. Negrete is a pleasant 64 year old male who I am seeing today for cystoscopy given his concern for possible urethral stricture.  I met with Mustapha recently in follow-up for his longstanding history of severe pelvic pain at which time he also endorsed continued obstructive voiding symptoms despite what sounds like low likelihood of true obstructive process.  However, at that visit he did endorse needing a \"metal frankie shoved up my penis to stretch things out\" in his past, so we discussed having him complete his upcoming surgery with general surgery for pudendal nerve release and follow-up with me for cystoscopy for stricture evaluation.    PROCEDURE:    Mr. Negrete was brought to the cystoscopy suite and placed in the lithotomy position. he was prepped and draped in the standard fashion.  A FLEXIBLE CYSTOSCOPE was inserted through the urethra and into the bladder.  Urethroscopy was normal. The sphincter coapted normally.  The prostate exhibited mild bilobar hypertrophy. The bladder was entered and inspected.  There were evidence of a diverticula on the right bladder wall inspected with no abnormalities and some cellules on the posterior bladder wall, but no stones or foreign bodies noted.  Mild trabeculations were noted. The ureteral orifices were identified in their orthotopic position bilaterally effluxing clear urine.  There were no areas of concerning erythema or papillary lesions. Retroflex view demonstrated a normal appearing bladder neck with evidence of previous UroLift procedure with a minor defect but still with some mild intravesical protrusion of the prostate. The cystoscope was " then removed with no evidence of active bleeding.  Mr. Negrete tolerated the procedure well.     IMPRESSION:   Intermittent and weak urinary stream  Severe pelvic pain    It was my pleasure to complete Mustapha's cystoscopy today to rule out evidence of stricture or recurrent BPH.  After completing his cystoscopy I was happy to let him know that there was no evidence of any adverse pathology seen on the cystoscopy.  I did my best to answer many questions in regards to different possible etiologies that Mustapha was concerned about, but ultimately believe that there is nothing from a urologic standpoint that explains his ongoing pelvic pain.  With this in mind, my recommendation would be to follow-up with me in 6 months in the clinic or virtually to check in on where he is at, as well as to continue to pursue pain management with his primary care or the pain clinic.    Mr. Negrete expressed understanding and agreement to the above discussion and plan and all of his questions were answered to his satisfaction.     PLAN:   Follow-up visit with me in 6 months    Signed by:    Khris Rincon PA-C  [facility]  [address] [place] [address]       Again, thank you for allowing me to participate in the care of your patient.      Sincerely,    Khris Rincon PA-C

## 2025-04-29 NOTE — PROGRESS NOTES
[facility]         Date of Visit: Apr 30, 2025     CHIEF COMPLAINT:   Chief Complaint   Patient presents with    Pain       Subjective   SUBJECTIVE    INTERVAL HISTORY:   Mustapha Negrete is a 64 year old male seen for INITIAL EVALUATION on 3/22/24; last seen for FOLLOW UP on 2/21/25.  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         Recommendations/ Plan at the last visit included:  Visit discussion: Mustapha Negrete is seen in follow up today at the pain clinic for potential neuralgia.  Patient was scheduled for L to, L3, L4 lumbar surgery on 2/4/2025.  Unfortunately, he developed COVID infection and surgery was rescheduled to 3/31/2025.  In the meantime, he has had positive benefit from medication changes for hypertension and continues to follow with urology for relief of predental neuralgia after UroLift procedure several years ago.  Due to viral infection and increased systemic inflammation, he did increase Percocet to a full maximum dose of 3 tabs per day.  Since then, he has noted increased constipation which is actually worsening perineal pain when attempting to move his bowels.  We discussed a bowel regimen of MiraLAX in the morning and senna in the evening to achieve more consistent softer bowel movements.  Continue with Percocet 7.5/325 mg as indicated below.  Patient was advised that his postsurgical team will manage immediate postoperative pain and he can return after he has been cleared from the postoperative window.  At that time, we will reconsider his pain needs and adjust medications as indicated.  Estimating he will return in the next 2 to 4 months.  Patient verbalizes understanding and agreement with plan.      PLAN:    Medication Management:   - CONTINUE taking Percocet 7.5/325mg - take 1 tab every 4-6 hours for severe pain; max 3 per day   - CONTINUE taking Senna - 1-2 tabs in evening for constipation   - START taking Miralax - 1 capful in AM mixed into liquid for  constipation    Continue Current Treatment Plan with:   Lumbar Surgery on 3/31/25    Return to Clinic:   -  30-minute In-Person Appointment with Destiny He, ZEUS, APRN, JEANETH-C in 2-4 months, or sooner if needed    Future Considerations:   We will continue to manage Percocet refills until your surgery. After surgery, your spine surgeon will manage post-operative pain until you have healed through the post-op period. At that time, you can return to Pain Management for re-evaluation of your pain.        Interval history from last visit on 2/21/25:   Pain score today: Severe Pain (10)   Pain rating: intensity ranges from 8/10 to 10/10 on a 0-10 scale.   He saw PCP yesterday who made medications changes to HNT meds; started hydrochlorothiazide and feels better already.  Was started on cipro for UTI.  Diagnosed COVID positive on 2/04/25; which delayed L2, L3, L4 Lumbar Spine surgery until 3/31/25.   He saw Urologist and did not get any direction on pudendal pain.   Pain is in prostate / groin area and is burning, stinging pain.  Pain is worse if constipated.   Had been taking Percocet 7.5/325mg two tabs per day but in the past week has needed 3 tabs per day; reports more constipation with 3 tabs per day.   Takes Percocet 7.5/325mg - 1 tab in AM, 1 tab mid afternoon, 1 tab in evening; feels benefit in 60-90 minutes; improves pain from 10/10 to 8/10; benefit lasts 2-3 hours.   Has been taking Senna - 2 tabs and papaya enzymes daily.   Drinking 50-70 ounces of water daily.             Since the last visit, Mustapha Negrete reports:   Pain score today: Severe Pain (10)   Pain rating: intensity ranges from 9/10 to 10/10 on a 0-10 scale.   Mustapha underwent L2-L3 and L3-L4 hemilaminectomy/discectomy decompression surgery on 3/31/25.     Today, he reports feeling more tingling since surgery; no improvement in perineal pain since surgery.   Saw Urology at [facility] yesterday, cystoscope was essentially normal.  Post surgical pain is not  improving.   Has been taking Oxycodone 5mg - 2 tabs (10mg) TID with Acetaminophen between doses; currently managed by post-surgical team.   Has appointment with Pain Specialist in [place] through [facility]; per patient report.   Scheduled for consultation with colorectal surgeon on 5/12/25.    Had right SI joint injection on 4/24/25, felt 50% relief in pain during day of procedure, no long-term relief.          REVIEW OF SYSTEMS:   ROS: 10 point ROS neg other than the symptoms noted above in the HPI.     The patient otherwise denies red flag symptoms, such as: thunderclap headache, bowel or bladder incontinence, parasthesias, weakness, saddle anesthesia, unintentional weight loss, or fever/chills/sweats.     Medical History: Any changes in medical history since they were last seen? No    Medications and Allergies reviewed. Yes     Review of [place] Prescription Monitoring Program ():  reviewed on 4/30/25. No concern for abuse or misuse of controlled medications based on this report. Controlled substances prescribed in the past 6-12 months include: (Oxycodone 5mg, Norco 5/325mg, Percocet 7.5/325mg, Belbuca 300mcg)     Current MME: 45 / day    Current PDMP reportable controlled substance medications, if any, are being prescribed by:  Spine Surgery   Primary Care Provider is [facility]       CSA due date: 1/10/26   UDS due date:  1/10/26     THE 4 As OF OPIOID MAINTENANCE ANALGESIA    Analgesia: Is pain relief clinically significant? No  Activity: Is patient functional and able to perform Activities of Daily Living? Yes  Adverse effects: Is patient free from adverse side effects from opiates? Yes  Adherence to Rx protocol: Is patient adhering to Controlled Substance Agreement and taking medications ONLY as ordered? Yes    Is Narcan prescribed for opiate use >50 MME daily or concurrent use of opiates and benzodiazepines? Yes           Objective    OBJECTIVE:    Physical Exam  Vitals:     04/30/25 0913   BP: 137/88   Pulse: 90        Appearance:   A&O. Patient is appropriate.   Patient is in NAD.      HHENT:  Normocephalic, atraumatic. Eyes without conjunctival injection or jaundice. Neck supple. No obvious neck masses.     Pulmonary:  Normal effort; no cough or audible wheeze      Skin: No obvious rash, lesions, or petechiae of exposed skin.    Neuro: Cranial nerves grossly intact.  Mentation and speech appropriate for age.     Psych:  Alert, without lethargy or stupor. Speech fluent. Appropriate affect. Mood normal. Able to follow commands without difficulty. Normal mood, judgement and behavior.        Diagnostic Tests/ Imaging/ Labs resulted since last visit:   None       Assessment & Plan      VISIT DIAGNOSIS:   1. Pudendal neuralgia  - Adult Pain Clinic Follow-Up Order  - Adult Pain Clinic Follow-Up Order; Future    2. Myofascial muscle pain  - Adult Pain Clinic Follow-Up Order  - Adult Pain Clinic Follow-Up Order; Future    3. Rectal pain  - Adult Pain Clinic Follow-Up Order  - Adult Pain Clinic Follow-Up Order; Future      ASSESSMENT:   Visit discussion: Mustapha Negrete is seen in follow up today at the pain clinic for chronic predental neuralgia and rectal pain.  Patient completed L2-L3 and L3-L4 hemilaminectomy and discectomy on 3/31/2025.  His spine surgery team has been refilling oxycodone 5 mg max 6 tabs per day since his procedure.  He is out of Percocet 7.5/325 mg as had been previously prescribed under pain management.  At this time, patient does continue within the postsurgical window for his surgery team to manage.  He reports an appointment with colorectal surgery consultation and an appointment with a pain specialist in [facility] are scheduled within the next 2 weeks.  I encouraged him to seek the opinion of the pain specialist and if he would like to transfer care to that provider we can go ahead and initiate that process.  Patient does not feel any improvement in predental  nerve pain after his lumbar decompression procedure.  Unfortunately, he has historically seen several providers for overlapping management of his condition.  Upon review of his imaging, it does not appear that he has any sacrum MRI images to review.  I did encourage patient to review potential benefit of sacrum MRI with his spine surgeon.  If patient does return to the chronic pain management program for future opioid refills, we will return to Percocet 7.5/325 mg max 3/day.  Patient is advised to follow-up in 2 to 3 months after he has completed his appointments if he chooses to continue with the pain management program through [facility].  He verbalizes understanding and agreement with plan.      PLAN:    Medication Management:   - Your spine surgeon will continue to manage post-surgical pain; please contact them for refills.      Continue Current Treatment Plan with:   Referral to Pain Specialist at [facility]  Referral to Colorectal Surgery consultation       Return to Clinic:   -  30-minute In-Person Appointment with Destiny He DNP, DENISSE HINOJOSA in 2-3 months, or sooner if needed      Future Considerations:   Ask your spine surgeon about obtaining an MRI of sacrum        ___________________________________________________________________    BILLING TIME DOCUMENTATION:   TOTAL TIME includes:   Time spent preparing to see the patient: 3 minutes (reviewing records and tests)  Time spend face to face with the patient: 17 minutes  Time spent ordering tests, medications, procedures and referrals: 0 minutes  Time spent Referring and communicating with other healthcare professionals: 0 minutes  Documenting clinical information in Epic: 3 minutes    The total TIME spent on this patient on the day of the appointment was 23 minutes.     ___________________________________________________________________    Review of Electronic Chart: Today I have also reviewed available medical information in the patient's  medical record at [facility] and Care Everywhere (if available), including relevant provider notes, laboratory work, and imaging.     MICAELA Herman, DNP, FNP-C   [facility]

## 2025-04-30 ENCOUNTER — OFFICE VISIT (OUTPATIENT)
Dept: PALLIATIVE MEDICINE | Facility: CLINIC | Age: 65
End: 2025-04-30
Attending: NURSE PRACTITIONER
Payer: MEDICARE

## 2025-04-30 VITALS — DIASTOLIC BLOOD PRESSURE: 88 MMHG | SYSTOLIC BLOOD PRESSURE: 137 MMHG | HEART RATE: 90 BPM

## 2025-04-30 DIAGNOSIS — G58.8 PUDENDAL NEURALGIA: ICD-10-CM

## 2025-04-30 DIAGNOSIS — K62.89 RECTAL PAIN: ICD-10-CM

## 2025-04-30 DIAGNOSIS — M79.18 MYOFASCIAL MUSCLE PAIN: ICD-10-CM

## 2025-04-30 PROCEDURE — 99213 OFFICE O/P EST LOW 20 MIN: CPT | Performed by: NURSE PRACTITIONER

## 2025-04-30 PROCEDURE — 1125F AMNT PAIN NOTED PAIN PRSNT: CPT | Performed by: NURSE PRACTITIONER

## 2025-04-30 PROCEDURE — 3079F DIAST BP 80-89 MM HG: CPT | Performed by: NURSE PRACTITIONER

## 2025-04-30 PROCEDURE — 3075F SYST BP GE 130 - 139MM HG: CPT | Performed by: NURSE PRACTITIONER

## 2025-04-30 ASSESSMENT — PAIN SCALES - PAIN ENJOYMENT GENERAL ACTIVITY SCALE (PEG)
INTERFERED_GENERAL_ACTIVITY: 10
INTERFERED_ENJOYMENT_LIFE: 9
AVG_PAIN_PASTWEEK: 10
INTERFERED_GENERAL_ACTIVITY: 10 - COMPLETELY INTERFERES
PEG_TOTALSCORE: 9.67
AVG_PAIN_PASTWEEK: 10 - PAIN AS BAD AS YOU CAN IMAGINE

## 2025-04-30 ASSESSMENT — PAIN SCALES - GENERAL: PAINLEVEL_OUTOF10: SEVERE PAIN (10)

## 2025-04-30 NOTE — PATIENT INSTRUCTIONS
PATIENT INSTRUCTIONS:     I am recommending a multidisciplinary treatment plan to help this patient better manage his pain. The following recommendations were given to the patient. Diagnosis, treatment options, risks, benefits, and alternatives were discussed; all questions were answered. Self-care instructions were given. The patient expressed understanding of the plan for management.   Medication Management:   - Your spine surgeon will continue to manage post-surgical pain; please contact them for refills.      Continue Current Treatment Plan with:   Referral to Pain Specialist at [facility]  Referral to Colorectal Surgery consultation       Return to Clinic:   -  30-minute In-Person Appointment with Destiny He, ZEUS, MICAELA, DENISSE in 2-3 months, or sooner if needed      Future Considerations:   Ask your spine surgeon about obtaining an MRI of sacrum      ----------------------------------------------------------------  Clinic Number:  [phone]   Call with any questions about your care and for scheduling assistance.   Calls are returned Monday through Friday between 8 AM and 4:30 PM. We usually get back to you within 2 business days depending on the issue/request.    If we are prescribing your medications:  For opioid medication refills, call the clinic or send a [employer] message 7 days in advance.  Please include:  Name of requested medication  Name of the pharmacy.  For non-opioid medications, call your pharmacy directly to request a refill. Please allow 3-4 days to be processed.   Per [place] State Law:  All controlled substance prescriptions must be filled within 30 days of being written.    For those controlled substances allowing refills, pickup must occur within 30 days of last fill.      We believe regular attendance is key to your success in our program!    Any time you are unable to keep your appointment we ask that you call us at least 24 hours in advance to cancel.This will allow us to  offer the appointment time to another patient.   Multiple missed appointments may lead to dismissal from the clinic.

## 2025-05-24 NOTE — PROGRESS NOTES
[facility] Medicine Progress Note  Date of Service: 10/09/2021     Assessment & Plan           Mustapha Negrete is a 60 year old male history of prostate cancer, BPH, recent diagnosis of urinary retention 2 weeks after laparoscopic cholecystectomy, MARIAN on BiPAP, low back pain and Covid infection in April presenting with uncontrolled thigh and rectal pain.    Possible prostatitis  Possible anal fissure, previously diagnosed   Perianal pain with radiation to the penis since 6/2021. Worse with BM. Also reports posterior thigh pain around the same time. Previously had several lesions on the lateral thighs, first the right then the left. Had been told by a chiropractor that this may be shingles.   Patient was admitted at [facility] 8/26 through 8/29 for the same issue.  He has been seen in the ER four times since then for similar complaints.  He was evaluated by [facility] neurological clinic back in July and had MRIs of his L-spine, see below. Prior laboratory work-up includes normal vitamin D and vitamin B12 and folate levels, negative syphilis, HIV, negative O&P. Additionally he was seen by [place] [facility], most recently last week at which time he was diagnosed with anal fissure along with a perianal fungal infection and started on topical antifungal as well as topical nitroglycerin.  He feels that this has seemed to help a bit. He has undergone colonoscopy recently which was reportedly normal.   Rectal pain may be related to anal fissure vs prostatitis vs pain syndrome vs other. In ED, patient did have quite a bit of prostate tenderness on rectal exam and so he was initiated on levofloxacin for possible prostatitis. He also reports increased pain with catheter exchanges.    Thigh pain seems to be neuropathic - question if this is a postherpetic neuralgia (prior rash, reported on bilateral lateral thighs) vs lumbar radiculopathy (though bilateral and correlates  more with S1 than lumbar changes noted below) vs pain syndrome. Patient reports previously been on gabapentin which was not terribly helpful for him.    Overall this distribution and description of pain is unusual. Though has had extensive work up as outpatient. Unclear exactly what is causing his symptoms, will trial below therapies and monitor response.  This admission:  --normal inflammatory markers: WBC 9.1, ESR 10 and CRP <2.9.   --Blood cultures NGTD  --urine culture positive Enterobacter cloacae complex, sens pending  --gonorrhea and Chlamydia urinary negative      Patient has previously been diagnosed with  Prostatitis this summer and has been treated with ciprofloxacin 500mg BID x 28 days ^/22-7/19 without improvement and also a course of Augmentin 3 weeks ago. UC positive for Enterobacter. Possible patient still has acute bacterial prostatitis and gave a dose of ertapenem while waiting for sensitivities to return an Enterobacter.  Sensitivities show that the Enterobacter is sensitive to fluoroquinolones.  It is possible the patient did not respond to the long course of ciprofloxacin due to the dosing.  PSA normal 2.28.    Suspect patient may have chronic prostatitis/chronic pelvic pain syndrome (CP/CPPS) and/or proctalgia fugax (PF) as cause of his most severe symptoms of rectal pain radiating to penis and bladder outlet obstruction. He has both pain that can be very short (seconds to minutes) which are more consistent with PF and also long-lasting pains more consistent with CP/CPPS. He stopped his tamsulosin due to feeling that it caused his penis to fill partially with fluid. I'm not sure this was due to tamsulosin and he is willing to try again as this is first line for CP/CPPS along with finasteride. The topical nitroglycerin is treatment for the PF. PF involves spasms of the pelvic floor that are triggered first by smooth muscle spasms the rectum and anus. These can be relieved by stretching by  sitting on a tennis ball. Finally, his pain syndrome is perpetuated by the use of narcotics with worsening pain coming on as the narcotics wear off.   - resume tamsulosin   - continue finasteride   - continue topical nitroglycerin   - schedule ibuprofen 600 mg QID   -Use tennis ball to relieve muscle spasms of pelvic floor.  This was discussed and described with patient who is willing to try.   - stop narcotics.  This was addressed with the patient at length and he is understanding.  Blood pressures running a little bit up and so we will give some temporary clonidine to see if that helps coming off the narcotics.   - course of high-dose levofloxacin 750 mg for possible bacterial prostatitis   -Continue clotrimazole topical cream twice daily for previously diagnosed perianal fungal infection  -- continue hydrocortisone rectal foam for discomfort   - stop amitriptyline    Recent leg lesions, possible bedbug bites    Patient has lesions in a line on one leg and later on the other. He has pictures from when they were acute on the left and these lesions have the appearance to me of bedbug bites as they are in a line, have a central dark wound surrounded by erythema and localized swelling and he had symptoms of itching. They do not appear like zoster in my opinion.   -I asked patient to have a family member check his room for evidence of bedbug infestation. They can look online to find out what to look for. Patient has not done this    L4/L5 compression  7/26/2021 MRI lumbar spine as below:    Unclear if these L5 and L4 nerve compressions are related to his symptoms.  At one  point patient was considered for what sounds like epidural spinal injections but due to concern for UTI and topical infection, this was deferred and not pursued.  Could consider repeat imaging but at this point patient would not be able to lay still without sedation for MRI, so will hold off.  Patient recently seen by a spine clinic and a plan is  being formulated.    Suspect he may have some radicular pain into the legs that is separate from the rectal pain. Focus is first on the rectal pain.     Urinary retention, chronic  BPH    Developed approximately 2 weeks after he had a laparoscopic cholecystectomy in May.  Catheter was placed in May and has been in since.  Patient previously seen by [place] [facility] and then more recently by Dr. Oneill here.  Patient has failed trials of voids multiple times.  Reports he had cystoscopy done previously which was normal.  Patient is on oxybutynin for bladder spasms.  -Continue home oxybutynin, finasteride   - resuming tamsulosin  - continue leon catheter   - continue outpatient urology follow up    Hypertension  Blood pressures stable.  - Continue home amlodipine 10 daily    MARIAN  On BiPAP at home per [facility] records.    Covid status  Patient tested positive for Covid 5/3/2021.  He tested positive on 8/26/2021 when he was admitted to [facility], suspect this was lingering viral particles as he was asymptomatic at the time.    - He is not yet vaccinated because he has had all these other health issues since then, but he is open to getting vaccinated in the future.   - PCR negative 10/6/2021     Diet: Snacks/Supplements Adult: Other; SF pudding; Between Meals  Snacks/Supplements Adult: Other; Rita Doone cookies; Between Meals  Snacks/Supplements Adult: Other; yogurt; Between Meals  Regular Diet Adult    DVT Prophylaxis: Low Risk/Ambulatory with no VTE prophylaxis indicated  Leon Catheter: PRESENT, indication: Retention  Central Lines: None  Code Status: Full Code               Discussion: Long discussion with patient as noted above.  He is very afraid of being discharged until he knows he can manage his pain off narcotics.    Disposition Plan   Expected discharge: Expect discharge in the morning  Attestation:  Total time: 60 minutes    Royce Chávez MD  [facility] Medicine        Interval History   No much improvement with  ertapenem.  Has had some sharp pains in the perineum and penis.  Is also had some more prolonged pains in the anus and rectum.  Has had some bladder spasms as well.    Physical Exam   Temp:  [97.2  F (36.2  C)-98.1  F (36.7  C)] 97.5  F (36.4  C)  Pulse:  [] 106  Resp:  [17-19] 17  BP: (132-156)/() 156/100  SpO2:  [95 %-98 %] 98 %    Weights:   Vitals:    10/06/21 1210 10/06/21 2114 10/08/21 0539   Weight: 72.6 kg (160 lb) 73.7 kg (162 lb 7.7 oz) 69.2 kg (152 lb 8.9 oz)    Body mass index is 26.19 kg/m .    Constitutional: Alert and oriented, nontoxic, no acute distress  CV: Regular, no edema  Respiratory: CTA bilaterally  GI: Soft, non-tender, bowel sounds normal  Skin: Warm and dry    Data   Recent Labs   Lab 10/06/21  1353   WBC 9.1   HGB 14.5   MCV 87         POTASSIUM 3.6   CHLORIDE 106   CO2 22   BUN 15   CR 0.76   ANIONGAP 10   ANITA 9.7   *   ALBUMIN 3.9   PROTTOTAL 7.3   BILITOTAL 1.8*   ALKPHOS 33*   ALT 41   AST 18       Recent Labs   Lab 10/06/21  1353   *        Unresulted Labs Ordered in the Past 30 Days of this Admission     Date and Time Order Name Status Description    10/6/2021  7:05 PM Blood Culture Peripheral Blood Preliminary     10/6/2021  7:05 PM Blood Culture Peripheral Blood Preliminary            Imaging: No results found for this or any previous visit (from the past 24 hour(s)).     I reviewed all new labs and imaging results over the last 24 hours. I personally reviewed no images or EKG's today.    Medications       amLODIPine  10 mg Oral Daily     cloNIDine  0.1 mg Oral TID     clotrimazole  1 applicator Topical BID     finasteride  5 mg Oral Daily     hydrocortisone-pramoxine  1 Applicatorful Rectal BID     ibuprofen  600 mg Oral 4x Daily     levofloxacin  500 mg Oral Daily     nitroGLYcerin  1 each Topical BID     oxybutynin ER  10 mg Oral Daily     [START ON 10/10/2021] polyethylene glycol  17 g Oral Daily     tamsulosin  0.4 mg Oral Daily    Reviewed    Royce Chávez MD  [facility] Medicine      negative...

## 2025-05-26 NOTE — PROGRESS NOTES
[facility]         Date of Visit: May 28, 2025     CHIEF COMPLAINT:   Chief Complaint   Patient presents with    Pain       Subjective   SUBJECTIVE    INTERVAL HISTORY:   Mustapha Negrete is a 64 year old male seen for INITIAL EVALUATION on 3/22/24; last seen for FOLLOW UP on 4/30/25.  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         Recommendations/ Plan at the last visit included:  Visit discussion: Mustapha Negrete is seen in follow up today at the pain clinic for chronic predental neuralgia and rectal pain.  Patient completed L2-L3 and L3-L4 hemilaminectomy and discectomy on 3/31/2025.  His spine surgery team has been refilling oxycodone 5 mg max 6 tabs per day since his procedure.  He is out of Percocet 7.5/325 mg as had been previously prescribed under pain management.  At this time, patient does continue within the postsurgical window for his surgery team to manage.  He reports an appointment with colorectal surgery consultation and an appointment with a pain specialist in [facility] are scheduled within the next 2 weeks.  I encouraged him to seek the opinion of the pain specialist and if he would like to transfer care to that provider we can go ahead and initiate that process.  Patient does not feel any improvement in predental nerve pain after his lumbar decompression procedure.  Unfortunately, he has historically seen several providers for overlapping management of his condition.  Upon review of his imaging, it does not appear that he has any sacrum MRI images to review.  I did encourage patient to review potential benefit of sacrum MRI with his spine surgeon.  If patient does return to the chronic pain management program for future opioid refills, we will return to Percocet 7.5/325 mg max 3/day.  Patient is advised to follow-up in 2 to 3 months after he has completed his appointments if he chooses to continue with the pain management program through [place].  He  "verbalizes understanding and agreement with plan.      PLAN:    Medication Management:   - Your spine surgeon will continue to manage post-surgical pain; please contact them for refills.    Continue Current Treatment Plan with:   Referral to Pain Specialist at [facility]  Referral to Colorectal Surgery consultation    Return to Clinic:   -  30-minute In-Person Appointment with Destiny He, ZEUS, APRN, HONGC in 2-3 months, or sooner if needed    Future Considerations:   Ask your spine surgeon about obtaining an MRI of sacrum        Interval history from last visit on 4/30/25:   Pain score today: Severe Pain (10)   Pain rating: intensity ranges from 9/10 to 10/10 on a 0-10 scale.   Mustapha underwent L2-L3 and L3-L4 hemilaminectomy/discectomy decompression surgery on 3/31/25.     Today, he reports feeling more tingling since surgery; no improvement in perineal pain since surgery.   Saw Urology at [facility] yesterday, cystoscope was essentially normal.  Post surgical pain is not improving.   Has been taking Oxycodone 5mg - 2 tabs (10mg) TID with Acetaminophen between doses; currently managed by post-surgical team.   Has appointment with Pain Specialist in [place] through [facility]; per patient report.   Scheduled for consultation with colorectal surgeon on 5/12/25.    Had right SI joint injection on 4/24/25, felt 50% relief in pain during day of procedure, no long-term relief.              Since the last visit, Mustapha Negrete reports:   Pain score today: Severe Pain (10)   Pain rating: intensity ranges from 8/10 to 10/10 on a 0-10 scale.    Currently prescribed Oxycodone 5mg max 4 per day by Spine Surgery team.   Saw Spine Surgeon follow up last week; had new order for Sacrum and Lumbar MRI.   Did not tolerate amitriptyline; prescribed by PCP; not taking; felt \"drugged out\" and headache; took for 2 nights then stopped.    Taking Oxycodone 5mg - 2 tabs (10mg) at 8am and 2 tabs (10mg) at 8pm; feels " effect in 90 minutes; improves pain from 10/10 to 8/10; benefit lasts 2-3 hours.   Would like to transfer pain management from surgery back to this clinic.       REVIEW OF SYSTEMS:   ROS: 10 point ROS neg other than the symptoms noted above in the HPI.     The patient otherwise denies red flag symptoms, such as: thunderclap headache, bowel or bladder incontinence, parasthesias, weakness, saddle anesthesia, unintentional weight loss, or fever/chills/sweats.     Medical History: Any changes in medical history since they were last seen? No    Medications and Allergies reviewed. Yes     Review of [place] Prescription Monitoring Program ():  reviewed on 5/28/25. No concern for abuse or misuse of controlled medications based on this report. Controlled substances prescribed in the past 6-12 months include: (Oxycodone 5mg, Norco 5/325mg, Percocet 7.5/325mg, Oxycodone 10mg)     Current MME: 30 / day    Current PDMP reportable controlled substance medications, if any, are being prescribed by: Spine Surgeon; Pain Mgmt    Primary Care Provider is [facility]       CSA due date: 1/10/26   UDS due date:  1/10/26     THE 4 As OF OPIOID MAINTENANCE ANALGESIA    Analgesia: Is pain relief clinically significant? Yes - not optimized   Activity: Is patient functional and able to perform Activities of Daily Living? Yes  Adverse effects: Is patient free from adverse side effects from opiates? Yes  Adherence to Rx protocol: Is patient adhering to Controlled Substance Agreement and taking medications ONLY as ordered? Yes    Is Narcan prescribed for opiate use >50 MME daily or concurrent use of opiates and benzodiazepines? Yes           Objective    OBJECTIVE:    Physical Exam  Vitals:    05/28/25 1056   BP: (!) 158/97   Pulse: 88        Appearance:   A&O. Patient is appropriate.   Patient is in NAD.      HHENT:  Normocephalic, atraumatic. Eyes without conjunctival injection or jaundice. Neck supple. No obvious neck masses.      Pulmonary:  Normal effort; no cough or audible wheeze      Skin: No obvious rash, lesions, or petechiae of exposed skin.    Neuro: Cranial nerves grossly intact.  Mentation and speech appropriate for age.     Psych:  Alert, without lethargy or stupor. Speech fluent. Appropriate affect. Mood normal. Able to follow commands without difficulty. Normal mood, judgement and behavior.        Gait pattern:   Patient has an antalgic gait pattern:YES  Gait favors the neither side.  Mobility and/or assistive devices? NO            Diagnostic Tests/ Imaging/ Labs resulted since last visit:   None       Assessment & Plan      VISIT DIAGNOSIS:   1. Pudendal neuralgia (Primary)  - Adult Pain Clinic Follow-Up Order  - pregabalin (LYRICA) 25 MG capsule; Take 1 capsule (25 mg) by mouth at bedtime for 7 days, THEN 1 capsule (25 mg) 2 times daily for 23 days.  Dispense: 53 capsule; Refill: 0  - oxyCODONE-acetaminophen (PERCOCET) 7.5-325 MG per tablet; Take 1 tablet by mouth every 6 hours as needed for severe pain (max 3 tabs per day). Fill 5/29/25, start 6/01/25 (15 day supply)  Dispense: 45 tablet; Refill: 0  - Adult Pain Clinic Follow-Up Order; Future    2. Myofascial muscle pain  - Adult Pain Clinic Follow-Up Order  - oxyCODONE-acetaminophen (PERCOCET) 7.5-325 MG per tablet; Take 1 tablet by mouth every 6 hours as needed for severe pain (max 3 tabs per day). Fill 5/29/25, start 6/01/25 (15 day supply)  Dispense: 45 tablet; Refill: 0  - Adult Pain Clinic Follow-Up Order; Future    3. Rectal pain  - Adult Pain Clinic Follow-Up Order  - oxyCODONE-acetaminophen (PERCOCET) 7.5-325 MG per tablet; Take 1 tablet by mouth every 6 hours as needed for severe pain (max 3 tabs per day). Fill 5/29/25, start 6/01/25 (15 day supply)  Dispense: 45 tablet; Refill: 0  - Adult Pain Clinic Follow-Up Order; Future    4. Encounter for therapeutic drug monitoring  - naloxone (NARCAN) 4 MG/0.1ML nasal spray; Spray 1 spray (4 mg) into one nostril  alternating nostrils as needed. every 2-3 minutes until assistance arrives  Dispense: 2 each; Refill: 2      ASSESSMENT:   Visit discussion: Mustapha Negrete is seen in follow up today at the pain clinic for chronic low back pain and potential neuropathy.  Patient returns today to reestablish ongoing pain management after lumbar surgery in March 2025.  He has been previously prescribed oxycodone 5 mg max 4 tabs per day by his surgical team.  He is requesting to trial a nerve related medication and we discussed the benefit of pregabalin.  Will start a slow upward taper of pregabalin by 25 mg every 7 days as indicated below.  Discontinue oxycodone when out of current prescription.  We will resume Percocet 7.5/325 mg 1 tab every 6 hours max 3/day.  Patient will follow-up with spine surgeon to review sacrum and lumbar MRI results on Friday, 5/30/2025.  Patient is advised to return for follow-up in approximately 4 weeks.  At that time we will continue upward dose taper of pregabalin.  Patient's questions were answered to his satisfaction.  He verbalizes understanding and agreement with plan.      PLAN:    Medication Management:   - STOP taking Oxycodone 5mg when you run out of the current prescription by 6/01/25   - START taking Percocet 7.5/325mg - 1 tab by mouth every 6 hours as needed for pain; max 3 per day    - START taking Pregabalin 25mg - take 25mg at bedtime for 7 days, then take 25mg in AM and 25mg at bedtime       Continue Current Treatment Plan with:   Spine Surgeon through [facility]       Return to Clinic:   -  30-minute In-Person Appointment with Destiny He, ZEUS, APRN, FNP-C in 4 weeks, or sooner if needed      Future Considerations:   No future refills through Surgery Team; request refills from Pain Management        ___________________________________________________________________    BILLING TIME DOCUMENTATION:   TOTAL TIME includes:   Time spent preparing to see the patient: 4 minutes (reviewing records  and tests)  Time spend face to face with the patient: 17 minutes  Time spent ordering tests, medications, procedures and referrals: 0 minutes  Time spent Referring and communicating with other healthcare professionals: 0 minutes  Documenting clinical information in Epic: 6 minutes    The total TIME spent on this patient on the day of the appointment was 27 minutes.     ___________________________________________________________________    Review of Electronic Chart: Today I have also reviewed available medical information in the patient's medical record at [facility] and Care Everywhere (if available), including relevant provider notes, laboratory work, and imaging.     Destiny He, MICAELA, DNP, FNP-C   [facility]

## 2025-05-28 ENCOUNTER — OFFICE VISIT (OUTPATIENT)
Dept: PALLIATIVE MEDICINE | Facility: CLINIC | Age: 65
End: 2025-05-28
Attending: NURSE PRACTITIONER
Payer: MEDICARE

## 2025-05-28 VITALS — DIASTOLIC BLOOD PRESSURE: 97 MMHG | HEART RATE: 88 BPM | SYSTOLIC BLOOD PRESSURE: 158 MMHG

## 2025-05-28 DIAGNOSIS — Z51.81 ENCOUNTER FOR THERAPEUTIC DRUG MONITORING: ICD-10-CM

## 2025-05-28 DIAGNOSIS — M79.18 MYOFASCIAL MUSCLE PAIN: ICD-10-CM

## 2025-05-28 DIAGNOSIS — G58.8 PUDENDAL NEURALGIA: Primary | ICD-10-CM

## 2025-05-28 DIAGNOSIS — K62.89 RECTAL PAIN: ICD-10-CM

## 2025-05-28 PROCEDURE — 3080F DIAST BP >= 90 MM HG: CPT | Performed by: NURSE PRACTITIONER

## 2025-05-28 PROCEDURE — 3077F SYST BP >= 140 MM HG: CPT | Performed by: NURSE PRACTITIONER

## 2025-05-28 PROCEDURE — 99214 OFFICE O/P EST MOD 30 MIN: CPT | Performed by: NURSE PRACTITIONER

## 2025-05-28 PROCEDURE — 1125F AMNT PAIN NOTED PAIN PRSNT: CPT | Performed by: NURSE PRACTITIONER

## 2025-05-28 RX ORDER — PREGABALIN 25 MG/1
CAPSULE ORAL
Qty: 53 CAPSULE | Refills: 0 | Status: SHIPPED | OUTPATIENT
Start: 2025-05-28 | End: 2025-06-27

## 2025-05-28 RX ORDER — OXYCODONE AND ACETAMINOPHEN 7.5; 325 MG/1; MG/1
1 TABLET ORAL EVERY 6 HOURS PRN
Qty: 45 TABLET | Refills: 0 | Status: SHIPPED | OUTPATIENT
Start: 2025-05-28

## 2025-05-28 ASSESSMENT — PAIN SCALES - GENERAL: PAINLEVEL_OUTOF10: SEVERE PAIN (10)

## 2025-06-02 DIAGNOSIS — I10 ESSENTIAL HYPERTENSION: ICD-10-CM

## 2025-06-02 RX ORDER — HYDROCHLOROTHIAZIDE 25 MG/1
12.5 TABLET ORAL DAILY
Qty: 90 TABLET | Refills: 1 | Status: SHIPPED | OUTPATIENT
Start: 2025-06-02

## 2025-06-02 NOTE — TELEPHONE ENCOUNTER
Pt calling for the refill. Has one day left. No refills on file.    Per last note:  [facility] DO KN    4/25/25 11:15 AM  Note  Spoke with patient.  Patient would like to discontinue amlodipine and restart hydrochlorothiazide 12.5 mg daily.           Liyah Rodríguez RN on 6/2/2025 at 10:22 AM

## 2025-06-03 ENCOUNTER — TELEPHONE (OUTPATIENT)
Dept: PALLIATIVE MEDICINE | Facility: CLINIC | Age: 65
End: 2025-06-03
Payer: MEDICARE

## 2025-06-03 NOTE — TELEPHONE ENCOUNTER
[facility] Call Center    Phone Message    May a detailed message be left on voicemail: yes     Reason for Call: Other: Patient called and he is going to have major back surgery on June 30th and would like to stop taking the Pregablin till after his surgery . He is taking so many medication right know he would just like to hold off for know till after his major surgery. Please review wand call patient back.      Action Taken: Message routed to:  Other: [facility]     Travel Screening: Not Applicable     Date of Service:

## 2025-06-04 NOTE — TELEPHONE ENCOUNTER
"Reason for the Call: Patient called and he is going to have major back surgery on June 30th and would like to stop taking the Pregablin till after his surgery . He is taking so many medication right know he would just like to hold off for know till after his major surgery. Please review wand call patient back.        Medication Name: Pregabalin  Current Dose: Has taken 25mg at bedtime for 4 days at this time.   Did not take medication last night.    Side Effects: No.   Efficacy: Too soon to tell.     Patient notes he is wanting to discontinue medication at this time as he has many other medications and upcoming \"major surgery.\"  Patient notes he is not disagreeable to re-attempting medication following completion of surgery.     Yanira Lynn RN    "

## 2025-06-18 ENCOUNTER — OFFICE VISIT (OUTPATIENT)
Dept: FAMILY MEDICINE | Facility: CLINIC | Age: 65
End: 2025-06-18
Payer: MEDICARE

## 2025-06-18 ENCOUNTER — RESULTS FOLLOW-UP (OUTPATIENT)
Dept: FAMILY MEDICINE | Facility: CLINIC | Age: 65
End: 2025-06-18

## 2025-06-18 VITALS
DIASTOLIC BLOOD PRESSURE: 74 MMHG | HEIGHT: 65 IN | TEMPERATURE: 97.6 F | OXYGEN SATURATION: 97 % | RESPIRATION RATE: 16 BRPM | WEIGHT: 201.4 LBS | HEART RATE: 88 BPM | SYSTOLIC BLOOD PRESSURE: 122 MMHG | BODY MASS INDEX: 33.55 KG/M2

## 2025-06-18 DIAGNOSIS — M48.061 SPINAL STENOSIS OF LUMBAR REGION WITHOUT NEUROGENIC CLAUDICATION: ICD-10-CM

## 2025-06-18 DIAGNOSIS — Z01.818 PREOP GENERAL PHYSICAL EXAM: Primary | ICD-10-CM

## 2025-06-18 LAB
ANION GAP SERPL CALCULATED.3IONS-SCNC: 12 MMOL/L (ref 7–15)
BUN SERPL-MCNC: 20.7 MG/DL (ref 8–23)
CALCIUM SERPL-MCNC: 9.8 MG/DL (ref 8.8–10.4)
CHLORIDE SERPL-SCNC: 102 MMOL/L (ref 98–107)
CREAT SERPL-MCNC: 0.89 MG/DL (ref 0.67–1.17)
EGFRCR SERPLBLD CKD-EPI 2021: >90 ML/MIN/1.73M2
ERYTHROCYTE [DISTWIDTH] IN BLOOD BY AUTOMATED COUNT: 12.5 % (ref 10–15)
GLUCOSE SERPL-MCNC: 123 MG/DL (ref 70–99)
HCO3 SERPL-SCNC: 25 MMOL/L (ref 22–29)
HCT VFR BLD AUTO: 43.5 % (ref 40–53)
HGB BLD-MCNC: 14.3 G/DL (ref 13.3–17.7)
MCH RBC QN AUTO: 28.9 PG (ref 26.5–33)
MCHC RBC AUTO-ENTMCNC: 32.9 G/DL (ref 31.5–36.5)
MCV RBC AUTO: 88 FL (ref 78–100)
PLATELET # BLD AUTO: 347 10E3/UL (ref 150–450)
POTASSIUM SERPL-SCNC: 4.6 MMOL/L (ref 3.4–5.3)
RBC # BLD AUTO: 4.94 10E6/UL (ref 4.4–5.9)
SODIUM SERPL-SCNC: 139 MMOL/L (ref 135–145)
WBC # BLD AUTO: 7.4 10E3/UL (ref 4–11)

## 2025-06-18 PROCEDURE — 80048 BASIC METABOLIC PNL TOTAL CA: CPT | Performed by: FAMILY MEDICINE

## 2025-06-18 PROCEDURE — 85027 COMPLETE CBC AUTOMATED: CPT | Performed by: FAMILY MEDICINE

## 2025-06-18 PROCEDURE — 1125F AMNT PAIN NOTED PAIN PRSNT: CPT | Performed by: FAMILY MEDICINE

## 2025-06-18 PROCEDURE — 3078F DIAST BP <80 MM HG: CPT | Performed by: FAMILY MEDICINE

## 2025-06-18 PROCEDURE — 99214 OFFICE O/P EST MOD 30 MIN: CPT | Performed by: FAMILY MEDICINE

## 2025-06-18 PROCEDURE — 3074F SYST BP LT 130 MM HG: CPT | Performed by: FAMILY MEDICINE

## 2025-06-18 PROCEDURE — 36415 COLL VENOUS BLD VENIPUNCTURE: CPT | Performed by: FAMILY MEDICINE

## 2025-06-18 ASSESSMENT — ENCOUNTER SYMPTOMS
SEIZURES: 0
EYE PAIN: 0
WHEEZING: 0
HALLUCINATIONS: 0
GASTROINTESTINAL NEGATIVE: 1
FATIGUE: 0
AGITATION: 0
NERVOUS/ANXIOUS: 0
FEVER: 0
HEADACHES: 0
EYE DISCHARGE: 0
DECREASED CONCENTRATION: 0
ACTIVITY CHANGE: 0
APPETITE CHANGE: 0
PALPITATIONS: 0
ENDOCRINE NEGATIVE: 1
DIZZINESS: 0
CONFUSION: 0
HEMATOLOGIC/LYMPHATIC NEGATIVE: 1
COUGH: 0
COLOR CHANGE: 0
SHORTNESS OF BREATH: 0
ALLERGIC/IMMUNOLOGIC NEGATIVE: 1

## 2025-06-18 ASSESSMENT — PAIN SCALES - GENERAL: PAINLEVEL_OUTOF10: SEVERE PAIN (10)

## 2025-06-18 NOTE — PROGRESS NOTES
Preoperative Evaluation  [facility] [place]  [address]  [place] [address]  Phone: [phone]  Primary Provider: Silver Pro DO  Pre-op Performing Provider: Silver Pro DO  Jun 18, 2025 6/18/2025   Surgical Information   What procedure is being done? Instrumentation Removal Posterior L4-S1, Decompression Transfacet/Transforaminal Levels L2-L3, Decompression Revision Lateral Recess Levels L3-L4, PSF Posterior Spine Fusion Levels L2-L4, Instrumentation Levels L2-S1   [facility] or [facility]   Who is doing the procedure / surgery? Sonia Aguila   Date of surgery / procedure: 6 30 2025   Time of surgery / procedure: 4hours 30MINUTES   Where do you plan to recover after surgery? at home with family     Fax number for surgical facility: [phone]        Assessment & Plan     The proposed surgical procedure is considered INTERMEDIATE risk.    Preop general physical exam  - Basic metabolic panel  (Ca, Cl, CO2, Creat, Gluc, K, Na, BUN); Future  - CBC with platelets; Future  - Basic metabolic panel  (Ca, Cl, CO2, Creat, Gluc, K, Na, BUN)  - CBC with platelets    Spinal stenosis of lumbar region without neurogenic claudication       - No identified additional risk factors other than previously addressed    Preoperative Medication Instructions  Antiplatelet or Anticoagulation Medication Instructions   - We reviewed the medication list and the patient is not on an antiplatelet or anticoagulation medications.    Additional Medication Instructions   - Diuretics (furosemide, hydrochlorothiazide, chlorothalidone): DO NOT TAKE on the day of surgery.    Recommendation  Approval given to proceed with proposed procedure pending review of diagnostic evaluation.    Follow-up     Zhang Luciano is a 64 year old, presenting for the following:  Pre-Op Exam          6/18/2025     8:42 AM   Additional Questions   Roomed by Padmini  STEPH Carroll   Accompanied by N/A         6/18/2025     8:42 AM   Patient Reported Additional Medications   Patient reports taking the following new medications No new medications     HPI: 65 yo M with lumbar stenosis and lumbar radiculopathy        6/18/2025   Pre-Op Questionnaire   Have you ever had a heart attack or stroke? No   Have you ever had surgery on your heart or blood vessels, such as a stent placement, a coronary artery bypass, or surgery on an artery in your head, neck, heart, or legs? No   Do you have chest pain with activity? No   Do you have a history of heart failure? No   Do you currently have a cold, bronchitis or symptoms of other infection? No   Do you have a cough, shortness of breath, or wheezing? No   Do you or anyone in your family have previous history of blood clots? No   Do you or does anyone in your family have a serious bleeding problem such as prolonged bleeding following surgeries or cuts? No   Have you ever had problems with anemia or been told to take iron pills? No   Have you had any abnormal blood loss such as black, tarry or bloody stools? No   Have you ever had a blood transfusion? No   Are you willing to have a blood transfusion if it is medically needed before, during, or after your surgery? Yes   Have you or any of your relatives ever had problems with anesthesia? (!) YES.  States of postop drowsiness   Do you have sleep apnea, excessive snoring or daytime drowsiness? (!) YES   Do you have a CPAP machine? (!) NO.  Does not use CPAP   Do you have any artifical heart valves or other implanted medical devices like a pacemaker, defibrillator, or continuous glucose monitor? No   Do you have artificial joints? (!) YES   Are you allergic to latex? No     Advance Care Planning    Discussed advance care planning with patient; informed AVS has link to [employer].    Preoperative Review of    reviewed - controlled substances reflected in medication list.      Status of Chronic  Conditions:  HYPERTENSION - Patient has longstanding history of HTN , currently denies any symptoms referable to elevated blood pressure. Specifically denies chest pain, palpitations, dyspnea, orthopnea, PND or peripheral edema. Blood pressure readings have been in normal range. Current medication regimen is as listed below. Patient denies any side effects of medication.     Patient Active Problem List    Diagnosis Date Noted    Essential hypertension 09/07/2024     Priority: Medium    F11.2 - Continuous opioid dependence (H) 05/23/2023     Priority: Medium    F11.2 - Episodic opioid dependence (H) 05/23/2023     Priority: Medium    F11.9 - Chronic, continuous use of opioids 05/23/2023     Priority: Medium    Uncomplicated opioid dependence (H)      Priority: Medium     Patient is followed by Silver Pro DO for ongoing prescription of pain medication.  All refills should only be approved by this provider, or covering partner.    Medication(s): oxycodone 5 mg  Maximum quantity per month: 30  Clinic visit frequency required: Q 3 months   PDMP Review         Value Time User    State PDMP site checked  Yes 7/3/2022 10:43 AM Jose Pérez MD          Controlled substance agreement:  CSA -- Patient Level:    Controlled Substance Agreement - Opioid - Scan on 9/8/2022 11:41 AM       Pain Clinic evaluation in the past: No    Opioid Risk Tool Total Score(s):  No flowsheet data found.  Last [place] website verification:  done on 9/8/22   [email or web address]        Pudendal neuralgia 05/13/2022     Priority: Medium    Coccydynia 05/13/2022     Priority: Medium    Change in bowel habits 01/03/2022     Priority: Medium    Complete tear of left rotator cuff 01/03/2022     Priority: Medium    Pruritus ani 01/03/2022     Priority: Medium    Tear of right acetabular labrum, sequela 10/28/2021     Priority: Medium    Lumbar disc herniation with radiculopathy 10/28/2021     Priority: Medium     MRI  10/21  2.  At L4-L5, there is a right paracentral slightly caudally directed disc extrusion that narrows the right lateral recess and causes mass effect on the descending/traversing right L5 nerve root.  3.  At L3-L4, there is a right paracentral disc protrusion superimposed on disc bulge with narrowing of the right lateral recess and mass effect on the descending/traversing right L4 nerve root.      Acute prostatitis 10/06/2021     Priority: Medium    Anal fissure 08/26/2021     Priority: Medium    Constipation 08/26/2021     Priority: Medium    Urinary retention 08/16/2021     Priority: Medium    Neuropathy 08/16/2021     Priority: Medium    Diverticular disease of large intestine 08/03/2021     Priority: Medium    Benign prostatic hyperplasia with urinary obstruction 07/12/2021     Priority: Medium    Calculus of gallbladder with chronic cholecystitis without obstruction 05/25/2021     Priority: Medium    MARIAN (obstructive sleep apnea) 05/03/2021     Priority: Medium     On BiPAP, see scans.      Symptomatic cholelithiasis 04/06/2021     Priority: Medium    COVID-19 virus infection 04/06/2021     Priority: Medium    Hepatic steatosis 03/26/2021     Priority: Medium    Arthritis of left acromioclavicular joint 08/05/2019     Priority: Medium    Lower urinary tract symptoms 05/13/2019     Priority: Medium    Mild intermittent asthma, uncomplicated 04/05/2017     Priority: Medium    OA (osteoarthritis) of hip 05/19/2015     Priority: Medium    ASHD (arteriosclerotic heart disease) 10/25/2013     Priority: Medium    Hyperlipidemia 10/25/2013     Priority: Medium    Reflux esophagitis 04/18/2013     Priority: Medium    Hiatal hernia 04/18/2013     Priority: Medium    Insomnia, unspecified 02/27/2013     Priority: Medium    GERD (gastroesophageal reflux disease) 10/16/2012     Priority: Medium    Low testosterone 10/16/2012     Priority: Medium    Displacement of lumbar intervertebral disc without myelopathy 09/12/2011      Priority: Medium      Past Medical History:   Diagnosis Date    MARIAN (obstructive sleep apnea)     havne't used cpap since 2018    Pelvic floor dysfunction      Past Surgical History:   Procedure Laterality Date    BACK SURGERY      CHOLECYSTECTOMY      COLONOSCOPY N/A 08/31/2022    Procedure: COLONOSCOPY;  Surgeon: Veena Mendoza MD;  Location:  GI    ENT SURGERY      tonsillectomy    GENITOURINARY SURGERY      TURP    IMPLANT STIMULATOR SACRAL NERVE PERCUTANEOUS TRIAL N/A 10/17/2022    Procedure: INSERTION, NEUROSTIMULATOR, SACRAL, PERCUTANEOUS, FOR TRIAL (trial with LVL6);  Surgeon: Nayan Tello MD;  [place] [facility] [place]    JOINT REPLACEMENT, HIP RT/LT Left     ORTHOPEDIC SURGERY      wrist surgery, carpal tunnel 2005    PROSTATE SURGERY      Urolift    ROTATOR CUFF REPAIR RT/LT Left     TOTAL HIP ARTHROPLASTY Right 11/18/2024     Current Outpatient Medications   Medication Sig Dispense Refill    albuterol (PROAIR HFA/PROVENTIL HFA/VENTOLIN HFA) 108 (90 Base) MCG/ACT inhaler Inhale 2 puffs into the lungs every 6 hours.      furosemide (LASIX) 20 MG tablet Take 1 tablet (20 mg) by mouth daily. 90 tablet 1    hydrochlorothiazide (HYDRODIURIL) 25 MG tablet Take 0.5 tablets (12.5 mg) by mouth daily. 90 tablet 1    naloxone (NARCAN) 4 MG/0.1ML nasal spray Spray 1 spray (4 mg) into one nostril alternating nostrils as needed. every 2-3 minutes until assistance arrives 2 each 2    oxyCODONE-acetaminophen (PERCOCET) 7.5-325 MG per tablet Take 1 tablet by mouth every 6 hours as needed for severe pain (max 3 tabs per day). Okay to fill 6/14/25 and start 6/16/25 (15 day supply) 45 tablet 0    pregabalin (LYRICA) 25 MG capsule Take 1 capsule (25 mg) by mouth at bedtime for 7 days, THEN 1 capsule (25 mg) 2 times daily for 23 days. (Patient not taking: No sig reported) 53 capsule 0       Allergies   Allergen Reactions    Droperidol Other (See Comments)     Extrapyramidal Side Effect    Tamsulosin Dizziness     Fenofibrate Headache and Diarrhea             Fentanyl      Other reaction(s): N&V hard to wake up    Keflex [Cephalexin] Itching    Lactose GI Disturbance         Other reaction(s): GI upset    Midazolam      Other reaction(s): N&V, Hard to wake up    Monosodium Glutamate      Other reaction(s): diarrhea, headaches    Pcn [Penicillins] Other (See Comments)     Feels warm and flushed, but tolerates Cephalexin    Atorvastatin Palpitations     Other reaction(s): palpitations    Sertraline Palpitations         Other reaction(s): Unknown    Simvastatin Palpitations     Other reaction(s): palpitations    Sulfa Antibiotics Headache and Rash     Burning    Other reaction(s): Rash  Other reaction(s): rash and headache    Tetracycline Rash     Burning    Other reaction(s): rash        Social History     Tobacco Use    Smoking status: Former     Current packs/day: 0.00     Average packs/day: 1 pack/day for 5.0 years (5.0 ttl pk-yrs)     Types: Cigarettes     Start date: 1994     Quit date: 1999     Years since quittin.6     Passive exposure: Never    Smokeless tobacco: Never   Substance Use Topics    Alcohol use: Yes     Comment: very little     Family History   Problem Relation Age of Onset    Cerebrovascular Disease Mother     Hypertension Mother     Diabetes Mother     Liver Cancer Mother     Cancer Father 84        liver cancer    Prostate Cancer Father     Diabetes Father     Cerebrovascular Disease Father      History   Drug Use    Types: Marijuana     Comment: gummies           Review of Systems   Constitutional:  Negative for activity change, appetite change, fatigue and fever.   HENT: Negative.     Eyes:  Negative for pain, discharge and visual disturbance.   Respiratory:  Negative for cough, shortness of breath and wheezing.    Cardiovascular:  Negative for chest pain, palpitations and leg swelling.   Gastrointestinal: Negative.    Endocrine: Negative.    Genitourinary: Negative.    Skin:  Negative  "for color change and rash.   Allergic/Immunologic: Negative.    Neurological:  Negative for dizziness, seizures and headaches.   Hematological: Negative.    Psychiatric/Behavioral:  Negative for agitation, confusion, decreased concentration and hallucinations. The patient is not nervous/anxious.          Objective    /74   Pulse 88   Temp 97.6  F (36.4  C) (Temporal)   Resp 16   Ht 1.651 m (5' 5\")   Wt 91.4 kg (201 lb 6.4 oz)   SpO2 97%   BMI 33.51 kg/m     Estimated body mass index is 33.51 kg/m  as calculated from the following:    Height as of this encounter: 1.651 m (5' 5\").    Weight as of this encounter: 91.4 kg (201 lb 6.4 oz).  Physical Exam  Constitutional:       General: He is not in acute distress.  HENT:      Head: Normocephalic and atraumatic.   Eyes:      Conjunctiva/sclera: Conjunctivae normal.   Cardiovascular:      Rate and Rhythm: Normal rate and regular rhythm.      Heart sounds: Normal heart sounds. No murmur heard.  Pulmonary:      Effort: Pulmonary effort is normal. No respiratory distress.      Breath sounds: No wheezing or rales.   Musculoskeletal:         General: Normal range of motion.   Skin:     Findings: No rash.   Neurological:      Mental Status: He is alert and oriented to person, place, and time.           Recent Labs   Lab Test 04/11/25  1106 03/19/25  0957 02/20/25  1029 01/27/25  1131 10/03/24  0801 08/19/24  0848   HGB  --  14.8  --  14.3   < > 14.9   PLT  --  294  --  311   < > 261    138   < > 144   < > 140   POTASSIUM 4.3 4.9   < > 4.5   < > 4.6   CR 0.93 0.75   < > 0.79   < > 0.87   A1C  --   --   --   --   --  5.9*    < > = values in this interval not displayed.        Diagnostics  Labs pending at this time.  Results will be reviewed when available.   No EKG required, no history of coronary heart disease, significant arrhythmia, peripheral arterial disease or other structural heart disease.    Revised Cardiac Risk Index (RCRI)  The patient has the " following serious cardiovascular risks for perioperative complications:   - No serious cardiac risks = 0 points     RCRI Interpretation: 0 points: Class I (very low risk - 0.4% complication rate)         Signed Electronically by: Silver Pro DO  A copy of this evaluation report is provided to the requesting physician.

## 2025-06-18 NOTE — PATIENT INSTRUCTIONS
Claudy Luciano,    Thank you for allowing [facility] to manage your care.    Please discontinue Furosemide.     I ordered some blood work, please go to the laboratory to get your laboratory studies.    For your convenience, test results are released as soon as they are available  Please allow 1-2 business days for me to send you a comment about your results.  If not done so, I encourage you to login into [employer] ([email or web address] to review your results in real time.     If you have any questions or concerns, please feel free to call us at [phone].    Sincerely,    Dr. Pro    Did you know?      You can schedule a video visit for follow-up appointments as well as future appointments for certain conditions.  Please see the below link.     [email or web address]    If you have not already done so,  I encourage you to sign up for [employer] ([email or web address]).  This will allow you to review your results, securely communicate with a provider, and schedule virtual visits as well.      How to Take Your Medication Before Surgery  Preoperative Medication Instructions   Antiplatelet or Anticoagulation Medication Instructions   - We reviewed the medication list and the patient is not on an antiplatelet or anticoagulation medications.    Additional Medication Instructions   - Diuretics (furosemide, hydrochlorothiazide, chlorothalidone): DO NOT TAKE on the day of surgery.       Patient Education   Preparing for Your Surgery  For Adults  Getting started  In most cases, a nurse will call to review your health history and instructions. They will give you an arrival time based on your scheduled surgery time. Please be ready to share:  Your doctor's clinic name and phone number  Your medical, surgical, and anesthesia history  A list of allergies and sensitivities  A list of medicines, including herbal treatments and over-the-counter drugs  Whether the  patient has a legal guardian (ask how to send us the papers in advance)  Note: You may not receive a call if you were seen at our PAC (Preoperative Assessment Center).  Please tell us if you're pregnant--or if there's any chance you might be pregnant. Some surgeries may injure a fetus (unborn baby), so they require a pregnancy test. Surgeries that are safe for a fetus don't always need a test, and you can choose whether to have one.   Preparing for surgery  Within 10 to 30 days of surgery: Have a pre-op exam (sometimes called an H&P, or History and Physical). This can be done at a clinic or pre-operative center.  If you're having a , you may not need this exam. Talk to your care team.  At your pre-op exam, talk to your care team about all medicines you take. (This includes CBD oil and any drugs, such as THC, marijuana, and other forms of cannabis.) If you need to stop any medicine before surgery, ask when to start taking it again.  This is for your safety. Many medicines and drugs can make you bleed too much during surgery. Some change how well surgery (anesthesia) drugs work.  Call your insurance company to let them know you're having surgery. (If you don't have insurance, call [phone].)  Call your clinic if there's any change in your health. This includes a scrape or scratch near the surgery site, or any signs of a cold (sore throat, runny nose, cough, rash, fever).  Eating and drinking guidelines  For your safety: Unless your surgeon tells you otherwise, follow the guidelines below.  Eat and drink as normal until 8 hours before you arrive for surgery. After that, no food or milk. You can spit out gum when you arrive.  Drink clear liquids until 2 hours before you arrive. These are liquids you can see through, like water, Gatorade, and Propel Water. They also include plain black coffee and tea (no cream or milk).  No alcohol for 24 hours before you arrive. The night before surgery, stop any drinks  that contain THC.  If your care team tells you to take medicine on the morning of surgery, it's okay to take it with a sip of water. No other medicines or drugs are allowed (including CBD oil)--follow your care team's instructions.  If you have questions the day of surgery, call your hospital or surgery center.   Preventing infection  Shower or bathe the night before and the morning of surgery. Follow the instructions your clinic gave you. (If no instructions, use regular soap.)  Don't shave or clip hair near your surgery site. We'll remove the hair if needed.  Don't smoke or vape the morning of surgery. No chewing tobacco for 6 hours before you arrive. A nicotine patch is okay. You may spit out nicotine gum when you arrive.  For some surgeries, the surgeon will tell you to fully quit smoking and nicotine.  We will make every effort to keep you safe from infection. We will:  Clean our hands often with soap and water (or an alcohol-based hand rub).  Clean the skin at your surgery site with a special soap that kills germs.  Give you a special gown to keep you warm. (Cold raises the risk of infection.)  Wear hair covers, masks, gowns, and gloves during surgery.  Give antibiotic medicine, if prescribed. Not all surgeries need this medicine.  What to bring on the day of surgery  Photo ID and insurance card  Copy of your health care directive, if you have one  Glasses and hearing aids (bring cases)  You can't wear contacts during surgery  Inhaler and eye drops, if you use them (tell us about these when you arrive)  CPAP machine or breathing device, if you use them  A few personal items, if spending the night  If you have . . .  A pacemaker, ICD (cardiac defibrillator), or other implant: Bring the ID card.  An implanted stimulator: Bring the remote control.  A legal guardian: Bring a copy of the certified (court-stamped) guardianship papers.  Please remove any jewelry, including body piercings. Leave jewelry and other  valuables at home.  If you're going home the day of surgery  You must have a support person drive you home. They should stay with you overnight, and they may need to help with your self-care.  If you don't have a support person, please tells us as soon as possible. We can help.  After surgery  If it's hard to control your pain or you need more pain medicine, please call your surgeon's office.  Questions?   If you have any questions for your care team, list them here:   ____________________________________________________________________________________________________________________________________________________________________________________________________________________________________________________________  For informational purposes only. Not to replace the advice of your health care provider. Copyright   2003, 2019 [employer]. All rights reserved. Clinically reviewed by Chas Stanford MD. [employer] 505731 - REV 02/25.

## 2025-07-07 ENCOUNTER — OFFICE VISIT (OUTPATIENT)
Dept: FAMILY MEDICINE | Facility: CLINIC | Age: 65
End: 2025-07-07
Payer: MEDICARE

## 2025-07-07 VITALS
WEIGHT: 201 LBS | SYSTOLIC BLOOD PRESSURE: 122 MMHG | HEIGHT: 65 IN | BODY MASS INDEX: 33.49 KG/M2 | TEMPERATURE: 98.3 F | HEART RATE: 100 BPM | DIASTOLIC BLOOD PRESSURE: 76 MMHG | OXYGEN SATURATION: 100 % | RESPIRATION RATE: 16 BRPM

## 2025-07-07 DIAGNOSIS — R33.9 URINARY RETENTION: Primary | ICD-10-CM

## 2025-07-07 DIAGNOSIS — G89.18 POSTOPERATIVE PAIN: ICD-10-CM

## 2025-07-07 PROCEDURE — 99215 OFFICE O/P EST HI 40 MIN: CPT | Performed by: PHYSICIAN ASSISTANT

## 2025-07-07 PROCEDURE — 3074F SYST BP LT 130 MM HG: CPT | Performed by: PHYSICIAN ASSISTANT

## 2025-07-07 PROCEDURE — 1111F DSCHRG MED/CURRENT MED MERGE: CPT | Performed by: PHYSICIAN ASSISTANT

## 2025-07-07 PROCEDURE — 3078F DIAST BP <80 MM HG: CPT | Performed by: PHYSICIAN ASSISTANT

## 2025-07-07 RX ORDER — OXYCODONE HYDROCHLORIDE 5 MG/1
5-10 TABLET ORAL EVERY 4 HOURS PRN
COMMUNITY
Start: 2024-11-19

## 2025-07-07 RX ORDER — ACETAMINOPHEN 500 MG
1000 TABLET ORAL EVERY 6 HOURS PRN
COMMUNITY
Start: 2025-07-03

## 2025-07-07 RX ORDER — TAMSULOSIN HYDROCHLORIDE 0.4 MG/1
0.4 CAPSULE ORAL EVERY EVENING
COMMUNITY
Start: 2025-07-07

## 2025-07-07 RX ORDER — HYDROXYZINE HYDROCHLORIDE 25 MG/1
25-50 TABLET, FILM COATED ORAL EVERY 6 HOURS PRN
COMMUNITY

## 2025-07-07 NOTE — PATIENT INSTRUCTIONS
Mary Luciano,    Thank you for allowing [facility] to manage your care.    If you develop worsening/changing symptoms at any time such as fever, worsening pain, or other worrisome symptoms, please be seen in urgent care or call 911/go to the emergency department for evaluation.    If you have any questions or concerns, please feel free to call us at [phone]    Sincerely,    Marito Mancilla PA-C    Did you know?      You can schedule a video visit for follow-up appointments as well as future appointments for certain conditions.  Please see the below link.     [email or web address]    If you have not already done so,  I encourage you to sign up for [email or web address] ([email or web address]).  This will allow you to review your results, securely communicate with a provider, and schedule virtual visits as well.

## 2025-07-07 NOTE — PROGRESS NOTES
Assessment & Plan   Problem List Items Addressed This Visit       Urinary retention - Primary     Other Visit Diagnoses         Postoperative pain               Urinary retention:  - Urinary retention has been a long-standing issue for the past 5 years, exacerbated by recent lumbar spine surgery and medication use, including oxycodone and muscle relaxants.  - Awaiting follow-up with urology for a voiding trial. Start taking tamsulosin 0.4 mg daily, preferably at night, to help with urinary flow.  - Risks and side effects: Potential dizziness and lightheadedness from tamsulosin, which may lower blood pressure.    Post-operative pain:  - Pain management post-lumbar spine surgery is ongoing, with oxycodone and Tylenol being used.  - Continue using oxycodone for breakthrough pain not controlled by Tylenol, with an aim to wean off oxycodone. Consider topical medications for pain relief.  - Spine team to manage steroid use post-surgery to avoid immune system complications.  -surgical wound appears to be healing well.    Complete history and physical exam as below. Afebrile with normal vital signs.    DDx and Dx discussed with and explained to the pt to their satisfaction.  All questions were answered at this time. Pt expressed understanding of and agreement with this dx, tx, and plan. No further workup warranted and standard medication warnings given. I have given the patient a list of pertinent indications for re-evaluation. Will go to the Emergency Department if symptoms worsen or new concerning symptoms arise. Patient left in no apparent distress.   Review of prior external note(s) from - [facility] information from [facility] reviewed  Prescription drug management  40 minutes spent by me on the date of the encounter doing chart review, history and exam, documentation and further activities per the note   MED REC REQUIREDPost Medication Reconciliation Status: discharge medications reconciled and changed, per  note/orders  Follow-up  Return for your already scheduled appointment, or call 911/go to an ER anytime if worsening.    Zhang Luciano is a 64 year old, presenting for the following health issues:  Hospital F/U        7/7/2025     1:33 PM   Additional Questions   Roomed by Padmini Carroll CMA   Accompanied by N/A         7/7/2025     1:33 PM   Patient Reported Additional Medications   Patient reports taking the following new medications No new medications     History of Present Illness       Back Pain:  He presents for follow up of back pain. Patient's back pain is a chronic problem.  Location of back pain:  Right lower back, left lower back, right middle of back and left middle of back  Description of back pain: burning, cramping and shooting  Back pain spreads: right buttocks, left buttocks, right thigh and left thigh    Since patient first noticed back pain, pain is: gradually worsening  Does back pain interfere with his job:  Not applicable       He eats 0-1 servings of fruits and vegetables daily.He consumes 1 sweetened beverage(s) daily.He exercises with enough effort to increase his heart rate 9 or less minutes per day.  He exercises with enough effort to increase his heart rate 3 or less days per week.   He is taking medications regularly.   Mustapha Negrete, 64-year-old male  - History of urinary retention intermittently for the last 5 years, requiring self-catheterization on multiple occasions  - Underwent lumbar spine surgery, admitted from June 30, 2025 to July 3, 2025, for acute post-op pain  - Discharged with oxycodone for moderate-to-severe pain not controlled by Tylenol and Robaxin  - Returned to [facility] Emergency Room 2 days prior to this visit (July 5, 2025) with urinary retention; 800 cc urine drained by self cath, Walden catheter placed  - Reports urinary retention episodes triggered by anesthesia and muscle relaxants (Robaxin/methocarbamol)  - Reports constipation post-operatively,  "resolved with diarrhea after 3-4 days, currently having loose stools  - Has previously used Miralax and Senna for bowel management  - Reports ongoing groin pain, unchanged since surgery  - Has been on oxycodone for pain management for 2 years, attempting to taper use  - Briefly trialed pregabalin for 3 days pre-operatively, discontinued prior to surgery  Hospital Follow-up Visit:    Hospital/Nursing Home/IP Rehab Facility: [facility]  Date of Admission: 06/30/2025  Date of Discharge: 07/03/2025  Reason(s) for Admission: Acute postoperative pain (Primary Dx);   Spasm;   Muscle spasm;   Opioid-induced constipation   Do you have any other stressors you would like to discuss with your provider? No    Problems taking medications regularly:  None  Medication changes since discharge: None  Problems adhering to non-medication therapy:  None    Summary of hospitalization:  CareEverywhere information obtained and reviewed  Diagnostic Tests/Treatments reviewed.  Follow up needed: see below  Other Healthcare Providers Involved in Patient s Care:         Specialist appointment - urologist for Walden removal in the next 2 weeks. [place] 1.5 mos  Update since discharge: improved.     Plan of care communicated with patient   ED/UC Followup:    Facility:  [facility]  Date of visit: 07/05/2025  Reason for visit: Urinary Retention  Current Status: improving slowly, though has urinary retention      Review of Systems  Constitutional, HEENT, cardiovascular, pulmonary, gi and gu systems are negative, except as otherwise noted.      Objective    /76   Pulse 100   Temp 98.3  F (36.8  C) (Temporal)   Resp 16   Ht 1.651 m (5' 5\")   Wt 91.2 kg (201 lb)   SpO2 100%   BMI 33.45 kg/m    Body mass index is 33.45 kg/m .  Physical Exam  Vitals and nursing note reviewed.   Constitutional:       General: He is not in acute distress.     Appearance: Normal appearance. He is not diaphoretic.   HENT:      Head: Normocephalic and atraumatic.    "   Nose: Nose normal.   Eyes:      Conjunctiva/sclera: Conjunctivae normal.   Pulmonary:      Effort: Pulmonary effort is normal. No respiratory distress.   Skin:     General: Skin is dry.      Coloration: Skin is not jaundiced or pale.      Comments: Lumbar/sacral region has a well-approximated surgical wound with steri strips in place. No erythema, warmth, drainage or other overlying signs of trauma or infection.   Neurological:      General: No focal deficit present.      Mental Status: He is alert. Mental status is at baseline.   Psychiatric:         Mood and Affect: Mood normal.         Behavior: Behavior normal.     Signed Electronically by: DARVIN Wiggins

## 2025-07-22 ENCOUNTER — TRANSFERRED RECORDS (OUTPATIENT)
Dept: HEALTH INFORMATION MANAGEMENT | Facility: CLINIC | Age: 65
End: 2025-07-22
Payer: MEDICARE

## 2025-07-23 ENCOUNTER — OFFICE VISIT (OUTPATIENT)
Dept: FAMILY MEDICINE | Facility: CLINIC | Age: 65
End: 2025-07-23
Attending: FAMILY MEDICINE
Payer: MEDICARE

## 2025-07-23 VITALS
RESPIRATION RATE: 24 BRPM | WEIGHT: 200 LBS | BODY MASS INDEX: 33.32 KG/M2 | OXYGEN SATURATION: 96 % | DIASTOLIC BLOOD PRESSURE: 84 MMHG | SYSTOLIC BLOOD PRESSURE: 122 MMHG | HEART RATE: 106 BPM | HEIGHT: 65 IN | TEMPERATURE: 98.2 F

## 2025-07-23 DIAGNOSIS — Z13.220 LIPID SCREENING: ICD-10-CM

## 2025-07-23 DIAGNOSIS — N39.0 URINARY TRACT INFECTION WITHOUT HEMATURIA, SITE UNSPECIFIED: ICD-10-CM

## 2025-07-23 DIAGNOSIS — E55.9 VITAMIN D DEFICIENCY: ICD-10-CM

## 2025-07-23 DIAGNOSIS — M51.16 LUMBAR DISC HERNIATION WITH RADICULOPATHY: ICD-10-CM

## 2025-07-23 DIAGNOSIS — I10 ESSENTIAL HYPERTENSION: Primary | ICD-10-CM

## 2025-07-23 LAB
ALBUMIN UR-MCNC: NEGATIVE MG/DL
APPEARANCE UR: ABNORMAL
BACTERIA #/AREA URNS HPF: ABNORMAL /HPF
BILIRUB UR QL STRIP: NEGATIVE
COLOR UR AUTO: YELLOW
GLUCOSE UR STRIP-MCNC: NEGATIVE MG/DL
HGB UR QL STRIP: ABNORMAL
KETONES UR STRIP-MCNC: NEGATIVE MG/DL
LEUKOCYTE ESTERASE UR QL STRIP: ABNORMAL
NITRATE UR QL: NEGATIVE
PH UR STRIP: 7 [PH] (ref 5–7)
RBC #/AREA URNS AUTO: ABNORMAL /HPF
SP GR UR STRIP: 1.02 (ref 1–1.03)
UROBILINOGEN UR STRIP-ACNC: 0.2 E.U./DL
WBC #/AREA URNS AUTO: >100 /HPF
WBC CLUMPS #/AREA URNS HPF: PRESENT /HPF

## 2025-07-23 PROCEDURE — 3079F DIAST BP 80-89 MM HG: CPT | Performed by: FAMILY MEDICINE

## 2025-07-23 PROCEDURE — 80061 LIPID PANEL: CPT | Performed by: FAMILY MEDICINE

## 2025-07-23 PROCEDURE — 80048 BASIC METABOLIC PNL TOTAL CA: CPT | Performed by: FAMILY MEDICINE

## 2025-07-23 PROCEDURE — 82306 VITAMIN D 25 HYDROXY: CPT | Performed by: FAMILY MEDICINE

## 2025-07-23 PROCEDURE — 99214 OFFICE O/P EST MOD 30 MIN: CPT | Performed by: FAMILY MEDICINE

## 2025-07-23 PROCEDURE — 3049F LDL-C 100-129 MG/DL: CPT | Performed by: FAMILY MEDICINE

## 2025-07-23 PROCEDURE — 3074F SYST BP LT 130 MM HG: CPT | Performed by: FAMILY MEDICINE

## 2025-07-23 PROCEDURE — 36415 COLL VENOUS BLD VENIPUNCTURE: CPT | Performed by: FAMILY MEDICINE

## 2025-07-23 PROCEDURE — G2211 COMPLEX E/M VISIT ADD ON: HCPCS | Performed by: FAMILY MEDICINE

## 2025-07-23 PROCEDURE — 81001 URINALYSIS AUTO W/SCOPE: CPT | Performed by: FAMILY MEDICINE

## 2025-07-23 RX ORDER — HYDROCHLOROTHIAZIDE 25 MG/1
25 TABLET ORAL DAILY
Qty: 90 TABLET | Refills: 3 | Status: SHIPPED | OUTPATIENT
Start: 2025-07-23

## 2025-07-23 ASSESSMENT — PATIENT HEALTH QUESTIONNAIRE - PHQ9
SUM OF ALL RESPONSES TO PHQ QUESTIONS 1-9: 8
SUM OF ALL RESPONSES TO PHQ QUESTIONS 1-9: 8

## 2025-07-23 NOTE — PROGRESS NOTES
Assessment & Plan     Essential hypertension  Chronic.  Now well controlled.  Continue with hydrochlorothiazide.   - hydrochlorothiazide (HYDRODIURIL) 25 MG tablet; Take 1 tablet (25 mg) by mouth daily.  - Basic metabolic panel  (Ca, Cl, CO2, Creat, Gluc, K, Na, BUN); Future  - Basic metabolic panel  (Ca, Cl, CO2, Creat, Gluc, K, Na, BUN)    Lumbar disc herniation with radiculopathy  S/P decompression L2-L3, revision lateral recess L3 to L4 and posterior spine fusion L2-L4 on 6/30.  Advised to keep follow-up with spine specialist.  Pain is controlled is adequately controlled.     Urinary tract infection without hematuria, site unspecified  Currently being treated for UTI.  Advised to complete antibiotic course and follow-up with [facility].   - UA Macroscopic with reflex to Microscopic and Culture - Lab Collect  - Urine Microscopic Exam  - Urine Culture    Lipid screening  - Lipid panel reflex to direct LDL Fasting; Future  - Lipid panel reflex to direct LDL Fasting    Vitamin D deficiency  Chronic.  Most recent vitamin D results reviewed. Advised to decrease vitamin D supplementation.   - Vitamin D Deficiency; Future  - Vitamin D Deficiency    I spent 30 minutes discussing the patient s acute/chronic conditions and options for treatment including medication therapy, counseling services and meditation.    The longitudinal plan of care for the diagnosis(es)/condition(s) as documented were addressed during this visit. Due to the added complexity in care, I will continue to support Mustapha in the subsequent management and with ongoing continuity of care.      Zhang uLciano is a 64 year old, presenting for the following health issues:  RECHECK      7/23/2025     9:38 AM   Additional Questions   Roomed by MP         7/23/2025     9:38 AM   Patient Reported Additional Medications   Patient reports taking the following new medications lyrica, cipro     History of Present Illness       Back Pain:  He presents for  follow up of back pain. Patient's back pain is a chronic problem.  Location of back pain:  Left lower back, left middle of back, right buttock, left buttock, right hip and left hip  Description of back pain: burning, sharp and shooting  Back pain spreads: right buttocks, left buttocks, right thigh and left thigh    Since patient first noticed back pain, pain is: gradually worsening  Does back pain interfere with his job:  Not applicable       Hypertension: He presents for follow up of hypertension.  He does check blood pressure  regularly outside of the clinic. Outside blood pressures have been over 140/90. He follows a low salt diet.     Reason for visit:  Check    He eats 2-3 servings of fruits and vegetables daily.He consumes 0 sweetened beverage(s) daily.He exercises with enough effort to increase his heart rate 9 or less minutes per day.  He exercises with enough effort to increase his heart rate 7 days per week.   He is taking medications regularly.        Postop/Hospital follow-up: S/P lumbar revision on 6/30.  This was complicated by the fact that he had urinary retention which required a ER visit on 7/5/2025.  Required leon for 10 days.  As of today, he states of urinary frequency.  He was seen by [facility] yesterday and had a bladder scan performed which showed mild residual urine.  In addition, he was diagnosed with UTI and treated with cipro for 7 days.  Scheduled for urology on August 6th.      Review of Systems   Constitutional:  Negative for activity change, appetite change, fatigue and fever.   HENT: Negative.     Eyes:  Negative for pain, discharge and visual disturbance.   Respiratory:  Negative for cough, shortness of breath and wheezing.    Cardiovascular:  Negative for chest pain, palpitations and leg swelling.   Gastrointestinal: Negative.    Endocrine: Negative.    Genitourinary:  Positive for frequency.        Pelvic pain   Skin:  Negative for color change and rash.   Allergic/Immunologic:  "Negative.    Neurological:  Negative for dizziness, seizures and headaches.   Hematological: Negative.    Psychiatric/Behavioral:  Negative for agitation, confusion, decreased concentration and hallucinations. The patient is not nervous/anxious.            Objective    /84   Pulse 106   Temp 98.2  F (36.8  C) (Temporal)   Resp 24   Ht 1.651 m (5' 5\")   Wt 90.7 kg (200 lb)   SpO2 96%   BMI 33.28 kg/m    Body mass index is 33.28 kg/m .  Physical Exam  Constitutional:       General: He is not in acute distress.  HENT:      Head: Normocephalic and atraumatic.   Eyes:      Conjunctiva/sclera: Conjunctivae normal.   Cardiovascular:      Rate and Rhythm: Normal rate and regular rhythm.      Heart sounds: Normal heart sounds. No murmur heard.  Pulmonary:      Effort: Pulmonary effort is normal. No respiratory distress.      Breath sounds: No wheezing or rales.   Musculoskeletal:         General: Normal range of motion.   Skin:     Findings: No rash.   Neurological:      Mental Status: He is alert and oriented to person, place, and time.                Signed Electronically by: Silver Pro DO    "

## 2025-07-23 NOTE — PATIENT INSTRUCTIONS
Claudy Luciano,    Thank you for allowing [facility] to manage your care.    I ordered some blood work, please go to the laboratory to get your laboratory studies.    I sent your prescriptions to your pharmacy.    For your convenience, test results are released as soon as they are available  Please allow 1-2 business days for me to send you a comment about your results.  If not done so, I encourage you to login into [facility] [email or web address] to review your results in real time.     If you have any questions or concerns, please feel free to call us at [phone].    Sincerely,    Dr. Pro    Did you know?      You can schedule a video visit for follow-up appointments as well as future appointments for certain conditions.  Please see the below link.     [email or web address]    If you have not already done so,  I encourage you to sign up for [email or web address] ([email or web address]).  This will allow you to review your results, securely communicate with a provider, and schedule virtual visits as well.

## 2025-07-24 LAB
ANION GAP SERPL CALCULATED.3IONS-SCNC: 16 MMOL/L (ref 7–15)
BUN SERPL-MCNC: 20.7 MG/DL (ref 8–23)
CALCIUM SERPL-MCNC: 10 MG/DL (ref 8.8–10.4)
CHLORIDE SERPL-SCNC: 99 MMOL/L (ref 98–107)
CHOLEST SERPL-MCNC: 195 MG/DL
CREAT SERPL-MCNC: 0.85 MG/DL (ref 0.67–1.17)
EGFRCR SERPLBLD CKD-EPI 2021: >90 ML/MIN/1.73M2
FASTING STATUS PATIENT QL REPORTED: YES
FASTING STATUS PATIENT QL REPORTED: YES
GLUCOSE SERPL-MCNC: 109 MG/DL (ref 70–99)
HCO3 SERPL-SCNC: 24 MMOL/L (ref 22–29)
HDLC SERPL-MCNC: 54 MG/DL
LDLC SERPL CALC-MCNC: 111 MG/DL
NONHDLC SERPL-MCNC: 141 MG/DL
POTASSIUM SERPL-SCNC: 4.6 MMOL/L (ref 3.4–5.3)
SODIUM SERPL-SCNC: 139 MMOL/L (ref 135–145)
TRIGL SERPL-MCNC: 150 MG/DL
VIT D+METAB SERPL-MCNC: 100 NG/ML (ref 20–50)

## 2025-07-24 ASSESSMENT — ENCOUNTER SYMPTOMS
AGITATION: 0
DIZZINESS: 0
ACTIVITY CHANGE: 0
CONFUSION: 0
ENDOCRINE NEGATIVE: 1
WHEEZING: 0
ALLERGIC/IMMUNOLOGIC NEGATIVE: 1
HEMATOLOGIC/LYMPHATIC NEGATIVE: 1
HEADACHES: 0
SEIZURES: 0
SHORTNESS OF BREATH: 0
HALLUCINATIONS: 0
FATIGUE: 0
COUGH: 0
PALPITATIONS: 0
DECREASED CONCENTRATION: 0
COLOR CHANGE: 0
NERVOUS/ANXIOUS: 0
EYE PAIN: 0
FREQUENCY: 1
EYE DISCHARGE: 0
FEVER: 0
GASTROINTESTINAL NEGATIVE: 1
APPETITE CHANGE: 0

## 2025-08-06 ENCOUNTER — TELEPHONE (OUTPATIENT)
Dept: PALLIATIVE MEDICINE | Facility: CLINIC | Age: 65
End: 2025-08-06
Payer: MEDICARE

## 2025-08-07 ENCOUNTER — OFFICE VISIT (OUTPATIENT)
Dept: PALLIATIVE MEDICINE | Facility: CLINIC | Age: 65
End: 2025-08-07
Attending: NURSE PRACTITIONER
Payer: MEDICARE

## 2025-08-07 ENCOUNTER — TRANSFERRED RECORDS (OUTPATIENT)
Dept: HEALTH INFORMATION MANAGEMENT | Facility: CLINIC | Age: 65
End: 2025-08-07

## 2025-08-07 VITALS — SYSTOLIC BLOOD PRESSURE: 130 MMHG | HEART RATE: 86 BPM | DIASTOLIC BLOOD PRESSURE: 84 MMHG

## 2025-08-07 DIAGNOSIS — K62.89 RECTAL PAIN: ICD-10-CM

## 2025-08-07 DIAGNOSIS — F11.90 CHRONIC, CONTINUOUS USE OF OPIOIDS: ICD-10-CM

## 2025-08-07 DIAGNOSIS — G58.8 PUDENDAL NEURALGIA: ICD-10-CM

## 2025-08-07 DIAGNOSIS — M79.18 MYOFASCIAL MUSCLE PAIN: ICD-10-CM

## 2025-08-07 DIAGNOSIS — G89.28 OTHER CHRONIC POSTPROCEDURAL PAIN: Primary | ICD-10-CM

## 2025-08-07 DIAGNOSIS — M54.16 LUMBAR RADICULOPATHY: ICD-10-CM

## 2025-08-07 DIAGNOSIS — Z51.81 ENCOUNTER FOR THERAPEUTIC DRUG MONITORING: ICD-10-CM

## 2025-08-07 RX ORDER — BUPRENORPHINE 2 MG/1
TABLET SUBLINGUAL
Qty: 29 TABLET | Refills: 0 | Status: SHIPPED | OUTPATIENT
Start: 2025-08-10 | End: 2025-09-09

## 2025-08-07 ASSESSMENT — PAIN SCALES - GENERAL: PAINLEVEL_OUTOF10: SEVERE PAIN (10)

## (undated) DEVICE — SOL BENZOIN 0.5OZ

## (undated) DEVICE — DRSG STERI STRIP 1/2X4" R1547

## (undated) DEVICE — DRSG GAUZE 4X4" TRAY 6939

## (undated) DEVICE — GOWN LG DISP 9515

## (undated) DEVICE — STIMULATOR EXTERNAL VERIFY MEDTRONIC INTERSTIM 353101

## (undated) DEVICE — PEN MARKING SKIN W/LABELS 31145884

## (undated) DEVICE — GLOVE PROTEXIS POWDER FREE 7.0 ORTHOPEDIC 2D73ET70

## (undated) DEVICE — PREP CHLORAPREP W/ORANGE TINT 10.5ML 260715

## (undated) DEVICE — GLOVE RADIATION RESISTANT SZ 7.5  95-395

## (undated) DEVICE — DRAPE BACK TABLE  44X90" 8377

## (undated) DEVICE — STRAP KNEE/BODY 31143004

## (undated) DEVICE — SYR 10ML FINGER CONTROL W/O NDL 309695

## (undated) DEVICE — NDL 27GA 1.25" 305136

## (undated) RX ORDER — LIDOCAINE HYDROCHLORIDE 20 MG/ML
JELLY TOPICAL
Status: DISPENSED
Start: 2025-04-28

## (undated) RX ORDER — BUPIVACAINE HYDROCHLORIDE 2.5 MG/ML
INJECTION, SOLUTION EPIDURAL; INFILTRATION; INTRACAUDAL
Status: DISPENSED
Start: 2022-10-17

## (undated) RX ORDER — LIDOCAINE HYDROCHLORIDE 10 MG/ML
INJECTION, SOLUTION EPIDURAL; INFILTRATION; INTRACAUDAL; PERINEURAL
Status: DISPENSED
Start: 2022-10-17

## (undated) RX ORDER — LIDOCAINE HYDROCHLORIDE 10 MG/ML
INJECTION, SOLUTION EPIDURAL; INFILTRATION; INTRACAUDAL; PERINEURAL
Status: DISPENSED
Start: 2021-11-11

## (undated) RX ORDER — LIDOCAINE HYDROCHLORIDE 20 MG/ML
JELLY TOPICAL
Status: DISPENSED
Start: 2022-03-08

## (undated) RX ORDER — LIDOCAINE HYDROCHLORIDE 20 MG/ML
JELLY TOPICAL
Status: DISPENSED
Start: 2022-08-08

## (undated) RX ORDER — BUPIVACAINE HYDROCHLORIDE 5 MG/ML
INJECTION, SOLUTION EPIDURAL; INTRACAUDAL
Status: DISPENSED
Start: 2022-10-17

## (undated) RX ORDER — CIPROFLOXACIN 500 MG/1
TABLET, FILM COATED ORAL
Status: DISPENSED
Start: 2022-08-08